# Patient Record
Sex: FEMALE | Race: BLACK OR AFRICAN AMERICAN | Employment: OTHER | URBAN - METROPOLITAN AREA
[De-identification: names, ages, dates, MRNs, and addresses within clinical notes are randomized per-mention and may not be internally consistent; named-entity substitution may affect disease eponyms.]

---

## 2017-07-17 ENCOUNTER — APPOINTMENT (OUTPATIENT)
Dept: CT IMAGING | Age: 77
End: 2017-07-17
Attending: EMERGENCY MEDICINE
Payer: MEDICARE

## 2017-07-17 ENCOUNTER — HOSPITAL ENCOUNTER (OUTPATIENT)
Age: 77
Setting detail: OBSERVATION
Discharge: HOME HEALTH | End: 2017-07-18
Attending: EMERGENCY MEDICINE | Admitting: INTERNAL MEDICINE
Payer: MEDICARE

## 2017-07-17 DIAGNOSIS — S06.5X0A TRAUMATIC SUBDURAL HEMORRHAGE WITHOUT LOSS OF CONSCIOUSNESS, INITIAL ENCOUNTER (HCC): ICD-10-CM
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
## 2017-07-17 LAB
BASOPHILS # BLD AUTO: 0 K/UL (ref 0–0.1)
BASOPHILS # BLD: 0 % (ref 0–1)
EOSINOPHIL # BLD: 0.1 K/UL (ref 0–0.4)
EOSINOPHIL NFR BLD: 1 % (ref 0–7)
ERYTHROCYTE [DISTWIDTH] IN BLOOD BY AUTOMATED COUNT: 14.1 % (ref 11.5–14.5)
HCT VFR BLD AUTO: 35 % (ref 35–47)
HGB BLD-MCNC: 11.4 G/DL (ref 11.5–16)
LYMPHOCYTES # BLD AUTO: 18 % (ref 12–49)
LYMPHOCYTES # BLD: 1.1 K/UL (ref 0.8–3.5)
MCH RBC QN AUTO: 29.5 PG (ref 26–34)
MCHC RBC AUTO-ENTMCNC: 32.6 G/DL (ref 30–36.5)
MCV RBC AUTO: 90.4 FL (ref 80–99)
MONOCYTES # BLD: 0.4 K/UL (ref 0–1)
MONOCYTES NFR BLD AUTO: 7 % (ref 5–13)
NEUTS SEG # BLD: 4.5 K/UL (ref 1.8–8)
NEUTS SEG NFR BLD AUTO: 74 % (ref 32–75)
PLATELET # BLD AUTO: 154 K/UL (ref 150–400)
RBC # BLD AUTO: 3.87 M/UL (ref 3.8–5.2)
WBC # BLD AUTO: 6.1 K/UL (ref 3.6–11)

## 2017-07-17 PROCEDURE — 85025 COMPLETE CBC W/AUTO DIFF WBC: CPT | Performed by: EMERGENCY MEDICINE

## 2017-07-17 PROCEDURE — 85610 PROTHROMBIN TIME: CPT | Performed by: EMERGENCY MEDICINE

## 2017-07-17 PROCEDURE — 70450 CT HEAD/BRAIN W/O DYE: CPT

## 2017-07-17 PROCEDURE — 84484 ASSAY OF TROPONIN QUANT: CPT | Performed by: EMERGENCY MEDICINE

## 2017-07-17 PROCEDURE — 82550 ASSAY OF CK (CPK): CPT | Performed by: EMERGENCY MEDICINE

## 2017-07-17 PROCEDURE — 99285 EMERGENCY DEPT VISIT HI MDM: CPT

## 2017-07-17 PROCEDURE — 36415 COLL VENOUS BLD VENIPUNCTURE: CPT | Performed by: EMERGENCY MEDICINE

## 2017-07-17 PROCEDURE — 85730 THROMBOPLASTIN TIME PARTIAL: CPT | Performed by: EMERGENCY MEDICINE

## 2017-07-17 PROCEDURE — 80053 COMPREHEN METABOLIC PANEL: CPT | Performed by: EMERGENCY MEDICINE

## 2017-07-17 RX ORDER — SODIUM CHLORIDE 0.9 % (FLUSH) 0.9 %
5-10 SYRINGE (ML) INJECTION AS NEEDED
Status: DISCONTINUED | OUTPATIENT
Start: 2017-07-17 | End: 2017-07-18 | Stop reason: HOSPADM

## 2017-07-17 RX ORDER — SODIUM CHLORIDE 0.9 % (FLUSH) 0.9 %
5-10 SYRINGE (ML) INJECTION EVERY 8 HOURS
Status: DISCONTINUED | OUTPATIENT
Start: 2017-07-17 | End: 2017-07-18 | Stop reason: HOSPADM

## 2017-07-17 NOTE — IP AVS SNAPSHOT
[address] [facility] 
[phone] Patient: Bonifacio Padilla MRN: [id number] :1940 You are allergic to the following Allergen Reactions Percocet (Oxycodone-Acetaminophen) Nausea and Vomiting Recent Documentation Weight Breastfeeding? BMI OB Status Smoking Status 62.4 kg No 28.75 kg/m2 Postmenopausal Never Smoker Unresulted Labs Order Current Status CULTURE, MRSA In process Emergency Contacts Name Discharge Info Relation Home Work Mobile Jaki Mercer DISCHARGE CAREGIVER [3] Child [2] [phone] Nedra Bhatia  Daughter [21] [phone] About your hospitalization You were admitted on:  2017 You last received care in the:  [facility] PROGRESSIVE CARE You were discharged on:  2017 Unit phone number:  [phone] Why you were hospitalized Your primary diagnosis was:  Not on File Your diagnoses also included:  Sdh (Subdural Hematoma) (Hcc) Providers Seen During Your Hospitalizations Provider Role Specialty Primary office phone Sol Baires MD Attending Provider Emergency Medicine [phone] Jimbo Spaulding MD Attending Provider Hospitalist [phone] Your Primary Care Physician (PCP) Primary Care Physician Office Phone Office Fax Magy Herb [phone] Follow-up Information Follow up With Details Comments Contact Info Yemi Somers MD On 2017 PCP - follow up at 1:00 PM [address] [place] [address] 
[phone] Addy Boswell 89  will be providing skilled nursing and evaluate for [phone] Renny Lino MD  Neuro surgery - follow up - Rn will call patient to schedule. This would be at the [place] office [address] [facility] 3, [address], [address] [facility] 
[phone] Current Discharge Medication List  
  
START taking these medications Dose & Instructions Dispensing Information Comments Morning Noon Evening Bedtime  
 acetaminophen 500 mg tablet Commonly known as:  TYLENOL Your last dose was: Your next dose is:    
   
   
 Dose:  500 mg Take 1 Tab by mouth every six (6) hours as needed. Quantity:  40 Tab Refills:  0  
     
   
   
   
  
 meclizine 12.5 mg tablet Commonly known as:  ANTIVERT Your last dose was: Your next dose is:    
   
   
 Dose:  12.5 mg Take 1 Tab by mouth three (3) times daily for 5 days. Quantity:  15 Tab Refills:  0  
     
   
   
   
  
 ondansetron hcl 8 mg tablet Commonly known as:  ZOFRAN (AS HYDROCHLORIDE) Your last dose was: Your next dose is:    
   
   
 Dose:  8 mg Take 1 Tab by mouth every eight (8) hours as needed for Nausea. Quantity:  20 Tab Refills:  0 CONTINUE these medications which have CHANGED Dose & Instructions Dispensing Information Comments Morning Noon Evening Bedtime  
 insulin aspart protamine/insulin aspart 100 unit/mL (70-30) injection Commonly known as:  NOVOLOG MIX 70/30 What changed:  additional instructions Your last dose was: Your next dose is:    
   
   
 22 units before breakfast and 22 units before supper Quantity:  20 mL Refills:  10  
     
   
   
   
  
  
CONTINUE these medications which have NOT CHANGED Dose & Instructions Dispensing Information Comments Morning Noon Evening Bedtime  
 albuterol 90 mcg/actuation inhaler Commonly known as:  PROVENTIL HFA, VENTOLIN HFA, PROAIR HFA Your last dose was: Your next dose is:    
   
   
 Dose:  2 Puff Take 2 Puffs by inhalation every four (4) hours as needed for Wheezing. Quantity:  1 Inhaler Refills:  0 amLODIPine 5 mg tablet Commonly known as:  [place] Callie Your last dose was: Your next dose is:    
   
   
 Dose:  5 mg Take 5 mg by mouth daily. Indications: hypertension Refills:  0  
     
   
   
   
  
 chlorpheniramine-HYDROcodone 10-8 mg/5 mL suspension Commonly known as:  Taya Khoury ER Your last dose was: Your next dose is:    
   
   
 Dose:  5 mL Take 5 mL by mouth every twelve (12) hours as needed for Cough. Quantity:  60 mL Refills:  0  
     
   
   
   
  
 cholecalciferol 400 unit Tab tablet Commonly known as:  VITAMIN D3 Your last dose was: Your next dose is: Take  by mouth daily. Refills:  0  
     
   
   
   
  
 co-enzyme Q-10 100 mg capsule Commonly known as:  COQ-10 Your last dose was: Your next dose is:    
   
   
 Dose:  100 mg Take 1 capsule by mouth daily. Quantity:  100 capsule Refills:  2  
     
   
   
   
  
 gabapentin 300 mg capsule Commonly known as:  NEURONTIN Your last dose was: Your next dose is:    
   
   
 Dose:  300 mg Take 300 mg by mouth three (3) times daily as needed. Refills:  0  
     
   
   
   
  
 hydrALAZINE 50 mg tablet Commonly known as:  APRESOLINE Your last dose was: Your next dose is:    
   
   
 Dose:  50 mg Take 50 mg by mouth three (3) times daily. Refills:  0  
     
   
   
   
  
 LASIX 40 mg tablet Generic drug:  furosemide Your last dose was: Your next dose is:    
   
   
 Dose:  60 mg Take 60 mg by mouth two (2) times a day. 1-2 tabs daily Refills:  0  
     
   
   
   
  
 lisinopril 40 mg tablet Commonly known as:  Marietta Martha Your last dose was: Your next dose is:    
   
   
 Dose:  40 mg Take 40 mg by mouth daily. Refills:  0  
     
   
   
   
  
 metoprolol succinate 100 mg tablet Commonly known as:  TOPROL-XL Your last dose was: Your next dose is:    
   
   
 Dose:  100 mg Take 100 mg by mouth daily. Refills:  0 MIRALAX 17 gram packet Generic drug:  polyethylene glycol Your last dose was: Your next dose is:    
   
   
 Dose:  17 g Take 17 g by mouth daily. Indications: constipation Refills:  0  
     
   
   
   
  
 omeprazole 20 mg capsule Commonly known as:  PRILOSEC Your last dose was: Your next dose is:    
   
   
 Dose:  20 mg Take 20 mg by mouth daily. Refills:  0  
     
   
   
   
  
 pramipexole 0.125 mg tablet Commonly known as:  MIRAPEX Your last dose was: Your next dose is:    
   
   
 Dose:  0.125 mg Take 0.125 mg by mouth nightly. Refills:  0  
     
   
   
   
  
 traMADol 50 mg tablet Commonly known as:  ULTRAM  
   
Your last dose was: Your next dose is:    
   
   
 Dose:  50 mg Take 1 Tab by mouth every six (6) hours as needed for Pain. Max Daily Amount: 200 mg. Quantity:  40 Tab Refills:  0 STOP taking these medications   
 aspirin 81 mg chewable tablet HYDROcodone-acetaminophen 7.5-325 mg per tablet Commonly known as:  NORCO  
   
  
 predniSONE 10 mg tablet Commonly known as:  Ancel Clock Where to Get Your Medications Information on where to get these meds will be given to you by the nurse or doctor. ! Ask your nurse or doctor about these medications  
  acetaminophen 500 mg tablet  
 meclizine 12.5 mg tablet  
 ondansetron hcl 8 mg tablet  
 traMADol 50 mg tablet Discharge Instructions HOSPITALIST DISCHARGE INSTRUCTIONS 
NAME: Aline Churchill :  1940 MRN:  [id number] Date/Time:  2017 10:47 AM 
 
ADMIT DATE: 2017 DISCHARGE DATE: 2017 DIAGNOSIS:  Sub Dural Hematomas, DM, HTN, CAD, GERD, PVD, MEDICATIONS: 
  
 
         As per medication reconciliation · It is important that you take the medication exactly as they are prescribed. · Keep your medication in the bottles provided by the pharmacist and keep a list of the medication names, dosages, and times to be taken in your wallet. · Do not take other medications without consulting your doctor. Pain Management: per above medications What to [facility] at [facility] Recommended diet:  Cardiac Diet and Diabetic Diet, NO ASA NOR DERIVATIVES FOR 4 WEEKS (ADVIL, ALEVE, DICLOFENAC, NAPROXEN ETC) Recommended activity: Activity as tolerated and No driving If you experience any of the following symptoms then please call your primary care physician or return to the emergency room if you cannot get hold of your doctor: 
Fever, chills, nausea, vomiting, diarrhea, change in mentation, falling, bleeding, shortness of breath. Follow Up: 
 PCP you are to call and set up an appointment to see them in 2 week. F/U Neuro Surgery Information obtained by : 
I understand that if any problems occur once I am at home I am to contact my physician. I understand and acknowledge receipt of the instructions indicated above. Physician's or R.N.'s Signature                                                                  Date/Time Patient or Representative Signature                                                          Date/Time Discharge Orders None [facility] Announcement We are excited to announce that we are making your provider's discharge notes available to you in [employer].   You will see these notes when they are completed and signed by the physician that discharged you from your recent hospital stay. If you have any questions or concerns about any information you see in Heartbeat, please call the Health Information Department where you were seen or reach out to your Primary Care Provider for more information about your plan of care. Introducing [facility]! Omari Nguyen introduces Heartbeat patient portal. Now you can access parts of your medical record, email your doctor's office, and request medication refills online. 1. In your internet browser, go to [email or web address] 2. Click on the First Time User? Click Here link in the Sign In box. You will see the New Member Sign Up page. 3. Enter your Heartbeat Access Code exactly as it appears below. You will not need to use this code after youve completed the sign-up process. If you do not sign up before the expiration date, you must request a new code. · Heartbeat Access Code: [email or web address] Expires: 10/15/2017 10:53 PM 
 
4. Enter the last four digits of your Social Security Number (xxxx) and Date of Birth (mm/dd/yyyy) as indicated and click Submit. You will be taken to the next sign-up page. 5. Create a Heartbeat ID. This will be your Heartbeat login ID and cannot be changed, so think of one that is secure and easy to remember. 6. Create a Heartbeat password. You can change your password at any time. 7. Enter your Password Reset Question and Answer. This can be used at a later time if you forget your password. 8. Enter your e-mail address. You will receive e-mail notification when new information is available in [address] 9. Click Sign Up. You can now view and download portions of your medical record. 10. Click the Download Summary menu link to download a portable copy of your medical information. If you have questions, please visit the Frequently Asked Questions section of the Heartbeat website. Remember, Heartbeat is NOT to be used for urgent needs. For medical emergencies, dial 911. Now available from your iPhone and Android! General Information Please provide this summary of care documentation to your next provider. Patient Signature:  ____________________________________________________________ Date:  ____________________________________________________________  
  
Sissy Lapine Provider Signature:  ____________________________________________________________ Date:  ____________________________________________________________

## 2017-07-17 NOTE — IP AVS SNAPSHOT
[address] [facility] 
[phone] Patient: Corinne Flores MRN: [id number] :1940 You are allergic to the following Allergen Reactions Percocet (Oxycodone-Acetaminophen) Nausea and Vomiting Recent Documentation Weight Breastfeeding? BMI OB Status Smoking Status 62.4 kg No 28.75 kg/m2 Postmenopausal Never Smoker Unresulted Labs Order Current Status CULTURE, MRSA In process Emergency Contacts Name Discharge Info Relation Home Work Mobile Jaki Mercer DISCHARGE CAREGIVER [3] Child [2] [phone] Mg Hernandes  Daughter [21] [phone] About your hospitalization You were admitted on:  2017 You last received care in the:  [facility] 2 PROGRESSIVE CARE You were discharged on:  2017 Why you were hospitalized Your primary diagnosis was:  Not on File Your diagnoses also included:  Sdh (Subdural Hematoma) (Hcc) Providers Seen During Your Hospitalizations Provider Role Specialty Primary office phone Jesika Hankins MD Attending Provider Emergency Medicine [phone] Lashawn Morris MD Attending Provider Hospitalist [phone] Your Primary Care Physician (PCP) Primary Care Physician Office Phone Office Fax Leesa Sinha [phone] Follow-up Information Follow up With Details Comments Contact Info Zonia White MD On 2017 PCP - follow up at 1:00 PM [address] [facility] [address] [facility] [address] 
[phone] Addy Boswell 103  will be providing skilled nursing and evaluate for [phone] Felicity Domínguez MD  Neuro surgery - follow up - Rn will call patient to schedule. This would be at the [place] office [address] [place] [address], [facility], [address], [address] [place] [address] 
[phone] Current Discharge Medication List  
  
START taking these medications Dose & Instructions Dispensing Information Comments Morning Noon Evening Bedtime  
 acetaminophen 500 mg tablet Commonly known as:  TYLENOL Your last dose was: Your next dose is:    
   
   
 Dose:  500 mg Take 1 Tab by mouth every six (6) hours as needed. Quantity:  40 Tab Refills:  0  
     
   
   
   
  
 meclizine 12.5 mg tablet Commonly known as:  ANTIVERT Your last dose was: Your next dose is:    
   
   
 Dose:  12.5 mg Take 1 Tab by mouth three (3) times daily for 5 days. Quantity:  15 Tab Refills:  0  
     
   
   
   
  
 ondansetron hcl 8 mg tablet Commonly known as:  ZOFRAN (AS HYDROCHLORIDE) Your last dose was: Your next dose is:    
   
   
 Dose:  8 mg Take 1 Tab by mouth every eight (8) hours as needed for Nausea. Quantity:  20 Tab Refills:  0 CONTINUE these medications which have CHANGED Dose & Instructions Dispensing Information Comments Morning Noon Evening Bedtime  
 insulin aspart protamine/insulin aspart 100 unit/mL (70-30) injection Commonly known as:  NOVOLOG MIX 70/30 What changed:  additional instructions Your last dose was: Your next dose is:    
   
   
 22 units before breakfast and 22 units before supper Quantity:  20 mL Refills:  10  
     
   
   
   
  
  
CONTINUE these medications which have NOT CHANGED Dose & Instructions Dispensing Information Comments Morning Noon Evening Bedtime  
 albuterol 90 mcg/actuation inhaler Commonly known as:  PROVENTIL HFA, VENTOLIN HFA, PROAIR HFA Your last dose was: Your next dose is:    
   
   
 Dose:  2 Puff Take 2 Puffs by inhalation every four (4) hours as needed for Wheezing. Quantity:  1 Inhaler Refills:  0  
     
   
   
   
  
 amLODIPine 5 mg tablet Commonly known as:  Arminda Savage Your last dose was: Your next dose is:    
   
   
 Dose:  5 mg Take 5 mg by mouth daily. Indications: hypertension Refills:  0  
     
   
   
   
  
 chlorpheniramine-HYDROcodone 10-8 mg/5 mL suspension Commonly known as:  Bernard Dubin ER Your last dose was: Your next dose is:    
   
   
 Dose:  5 mL Take 5 mL by mouth every twelve (12) hours as needed for Cough. Quantity:  60 mL Refills:  0  
     
   
   
   
  
 cholecalciferol 400 unit Tab tablet Commonly known as:  VITAMIN D3 Your last dose was: Your next dose is: Take  by mouth daily. Refills:  0  
     
   
   
   
  
 co-enzyme Q-10 100 mg capsule Commonly known as:  COQ-10 Your last dose was: Your next dose is:    
   
   
 Dose:  100 mg Take 1 capsule by mouth daily. Quantity:  100 capsule Refills:  2  
     
   
   
   
  
 gabapentin 300 mg capsule Commonly known as:  NEURONTIN Your last dose was: Your next dose is:    
   
   
 Dose:  300 mg Take 300 mg by mouth three (3) times daily as needed. Refills:  0  
     
   
   
   
  
 hydrALAZINE 50 mg tablet Commonly known as:  APRESOLINE Your last dose was: Your next dose is:    
   
   
 Dose:  50 mg Take 50 mg by mouth three (3) times daily. Refills:  0  
     
   
   
   
  
 LASIX 40 mg tablet Generic drug:  furosemide Your last dose was: Your next dose is:    
   
   
 Dose:  60 mg Take 60 mg by mouth two (2) times a day. 1-2 tabs daily Refills:  0  
     
   
   
   
  
 lisinopril 40 mg tablet Commonly known as:  Steph Dowdy Your last dose was: Your next dose is:    
   
   
 Dose:  40 mg Take 40 mg by mouth daily. Refills:  0  
     
   
   
   
  
 metoprolol succinate 100 mg tablet Commonly known as:  TOPROL-XL Your last dose was:     
   
Your next dose is:    
   
   
 Dose:  100 mg  
 Take 100 mg by mouth daily. Refills:  0 MIRALAX 17 gram packet Generic drug:  polyethylene glycol Your last dose was: Your next dose is:    
   
   
 Dose:  17 g Take 17 g by mouth daily. Indications: constipation Refills:  0  
     
   
   
   
  
 omeprazole 20 mg capsule Commonly known as:  PRILOSEC Your last dose was: Your next dose is:    
   
   
 Dose:  20 mg Take 20 mg by mouth daily. Refills:  0  
     
   
   
   
  
 pramipexole 0.125 mg tablet Commonly known as:  MIRAPEX Your last dose was: Your next dose is:    
   
   
 Dose:  0.125 mg Take 0.125 mg by mouth nightly. Refills:  0  
     
   
   
   
  
 traMADol 50 mg tablet Commonly known as:  ULTRAM  
   
Your last dose was: Your next dose is:    
   
   
 Dose:  50 mg Take 1 Tab by mouth every six (6) hours as needed for Pain. Max Daily Amount: 200 mg. Quantity:  40 Tab Refills:  0 STOP taking these medications   
 aspirin 81 mg chewable tablet HYDROcodone-acetaminophen 7.5-325 mg per tablet Commonly known as:  NORCO  
   
  
 predniSONE 10 mg tablet Commonly known as:  [facility] Where to Get Your Medications Information on where to get these meds will be given to you by the nurse or doctor. ! Ask your nurse or doctor about these medications  
  acetaminophen 500 mg tablet  
 meclizine 12.5 mg tablet  
 ondansetron hcl 8 mg tablet  
 traMADol 50 mg tablet Discharge Instructions HOSPITALIST DISCHARGE INSTRUCTIONS 
NAME: Tonie Gillette :  1940 MRN:  [id number] Date/Time:  2017 10:47 AM 
 
ADMIT DATE: 2017 DISCHARGE DATE: 2017 DIAGNOSIS:  Sub Dural Hematomas, DM, HTN, CAD, GERD, PVD, MEDICATIONS: 
  
 
         As per medication reconciliation · It is important that you take the medication exactly as they are prescribed. · Keep your medication in the bottles provided by the pharmacist and keep a list of the medication names, dosages, and times to be taken in your wallet. · Do not take other medications without consulting your doctor. Pain Management: per above medications What to do at [facility] Recommended diet:  Cardiac Diet and Diabetic Diet, NO ASA NOR DERIVATIVES FOR 4 WEEKS (ADVIL, ALEVE, DICLOFENAC, NAPROXEN ETC) Recommended activity: Activity as tolerated and No driving If you experience any of the following symptoms then please call your primary care physician or return to the emergency room if you cannot get hold of your doctor: 
Fever, chills, nausea, vomiting, diarrhea, change in mentation, falling, bleeding, shortness of breath. Follow Up: 
 PCP you are to call and set up an appointment to see them in 2 week. F/U Neuro Surgery Information obtained by : 
I understand that if any problems occur once I am at home I am to contact my physician. I understand and acknowledge receipt of the instructions indicated above. Physician's or R.N.'s Signature                                                                  Date/Time Patient or Representative Signature                                                          Date/Time Discharge Orders None General Information Please provide this summary of care documentation to your next provider. Introducing [facility]! Debbie Delgado introduces [employer] patient portal. Now you can access parts of your medical record, email your doctor's office, and request medication refills online.    
 
1. In your internet browser, go to [email or web address] 2. Click on the First Time User? Click Here link in the Sign In box. You will see the New Member Sign Up page. 3. Enter your PlayMob Access Code exactly as it appears below. You will not need to use this code after youve completed the sign-up process. If you do not sign up before the expiration date, you must request a new code. · PlayMob Access Code: [email or web address] Expires: 10/15/2017 10:53 PM 
 
4. Enter the last four digits of your Social Security Number (xxxx) and Date of Birth (mm/dd/yyyy) as indicated and click Submit. You will be taken to the next sign-up page. 5. Create a PlayMob ID. This will be your PlayMob login ID and cannot be changed, so think of one that is secure and easy to remember. 6. Create a PlayMob password. You can change your password at any time. 7. Enter your Password Reset Question and Answer. This can be used at a later time if you forget your password. 8. Enter your e-mail address. You will receive e-mail notification when new information is available in [address] 9. Click Sign Up. You can now view and download portions of your medical record. 10. Click the Download Summary menu link to download a portable copy of your medical information. If you have questions, please visit the Frequently Asked Questions section of the PlayMob website. Remember, PlayMob is NOT to be used for urgent needs. For medical emergencies, dial 911. Now available from your iPhone and Android! Patient Signature:  ____________________________________________________________ Date:  ____________________________________________________________  
  
Gladys Murcia Provider Signature:  ____________________________________________________________ Date:  ____________________________________________________________

## 2017-07-18 ENCOUNTER — APPOINTMENT (OUTPATIENT)
Dept: CT IMAGING | Age: 77
End: 2017-07-18
Attending: INTERNAL MEDICINE
Payer: MEDICARE

## 2017-07-18 VITALS
RESPIRATION RATE: 20 BRPM | SYSTOLIC BLOOD PRESSURE: 180 MMHG | OXYGEN SATURATION: 98 % | WEIGHT: 137.57 LBS | HEART RATE: 60 BPM | BODY MASS INDEX: 28.75 KG/M2 | TEMPERATURE: 98.2 F | DIASTOLIC BLOOD PRESSURE: 55 MMHG

## 2017-07-18 PROBLEM — S06.5XAA SDH (SUBDURAL HEMATOMA): Status: ACTIVE | Noted: 2017-07-18

## 2017-07-18 LAB
ALBUMIN SERPL BCP-MCNC: 3 G/DL (ref 3.5–5)
ALBUMIN/GLOB SERPL: 0.9 {RATIO} (ref 1.1–2.2)
ALP SERPL-CCNC: 292 U/L (ref 45–117)
ALT SERPL-CCNC: 17 U/L (ref 12–78)
ANION GAP BLD CALC-SCNC: 6 MMOL/L (ref 5–15)
APPEARANCE UR: ABNORMAL
APTT PPP: 30.6 SEC (ref 22.1–32.5)
AST SERPL W P-5'-P-CCNC: 30 U/L (ref 15–37)
ATRIAL RATE: 60 BPM
BACTERIA URNS QL MICRO: NEGATIVE /HPF
BILIRUB SERPL-MCNC: 0.7 MG/DL (ref 0.2–1)
BILIRUB UR QL: NEGATIVE
BUN SERPL-MCNC: 16 MG/DL (ref 6–20)
BUN/CREAT SERPL: 20 (ref 12–20)
CALCIUM SERPL-MCNC: 8.6 MG/DL (ref 8.5–10.1)
CALCULATED R AXIS, ECG10: -109 DEGREES
CALCULATED T AXIS, ECG11: 77 DEGREES
CHLORIDE SERPL-SCNC: 107 MMOL/L (ref 97–108)
CK MB CFR SERPL CALC: 1.4 % (ref 0–2.5)
CK MB SERPL-MCNC: 2.2 NG/ML (ref 5–25)
CK SERPL-CCNC: 158 U/L (ref 26–192)
CO2 SERPL-SCNC: 28 MMOL/L (ref 21–32)
COLOR UR: ABNORMAL
CREAT SERPL-MCNC: 0.82 MG/DL (ref 0.55–1.02)
DIAGNOSIS, 93000: NORMAL
EPITH CASTS URNS QL MICRO: ABNORMAL /LPF
GLOBULIN SER CALC-MCNC: 3.4 G/DL (ref 2–4)
GLUCOSE BLD STRIP.AUTO-MCNC: 144 MG/DL (ref 65–100)
GLUCOSE BLD STRIP.AUTO-MCNC: 162 MG/DL (ref 65–100)
GLUCOSE BLD STRIP.AUTO-MCNC: 195 MG/DL (ref 65–100)
GLUCOSE SERPL-MCNC: 207 MG/DL (ref 65–100)
GLUCOSE UR STRIP.AUTO-MCNC: NEGATIVE MG/DL
HGB UR QL STRIP: ABNORMAL
HYALINE CASTS URNS QL MICRO: ABNORMAL /LPF (ref 0–5)
INR PPP: 1.1 (ref 0.9–1.1)
KETONES UR QL STRIP.AUTO: NEGATIVE MG/DL
LEUKOCYTE ESTERASE UR QL STRIP.AUTO: NEGATIVE
NITRITE UR QL STRIP.AUTO: NEGATIVE
P-R INTERVAL, ECG05: 164 MS
PH UR STRIP: 6 [PH] (ref 5–8)
POTASSIUM SERPL-SCNC: 4 MMOL/L (ref 3.5–5.1)
PROT SERPL-MCNC: 6.4 G/DL (ref 6.4–8.2)
PROT UR STRIP-MCNC: 30 MG/DL
PROTHROMBIN TIME: 10.9 SEC (ref 9–11.1)
Q-T INTERVAL, ECG07: 514 MS
QRS DURATION, ECG06: 172 MS
QTC CALCULATION (BEZET), ECG08: 514 MS
RBC #/AREA URNS HPF: ABNORMAL /HPF (ref 0–5)
SERVICE CMNT-IMP: ABNORMAL
SODIUM SERPL-SCNC: 141 MMOL/L (ref 136–145)
SP GR UR REFRACTOMETRY: 1.03 (ref 1–1.03)
THERAPEUTIC RANGE,PTTT: NORMAL SECS (ref 58–77)
TROPONIN I SERPL-MCNC: <0.04 NG/ML
UA: UC IF INDICATED,UAUC: ABNORMAL
UROBILINOGEN UR QL STRIP.AUTO: 1 EU/DL (ref 0.2–1)
VENTRICULAR RATE, ECG03: 60 BPM
WBC URNS QL MICRO: ABNORMAL /HPF (ref 0–4)

## 2017-07-18 PROCEDURE — 99218 HC RM OBSERVATION: CPT

## 2017-07-18 PROCEDURE — 77030011256 HC DRSG MEPILEX <16IN NO BORD MOLN -A

## 2017-07-18 PROCEDURE — 81001 URINALYSIS AUTO W/SCOPE: CPT | Performed by: EMERGENCY MEDICINE

## 2017-07-18 PROCEDURE — 74011636637 HC RX REV CODE- 636/637: Performed by: INTERNAL MEDICINE

## 2017-07-18 PROCEDURE — 74011250637 HC RX REV CODE- 250/637: Performed by: INTERNAL MEDICINE

## 2017-07-18 PROCEDURE — 70450 CT HEAD/BRAIN W/O DYE: CPT

## 2017-07-18 PROCEDURE — 82962 GLUCOSE BLOOD TEST: CPT

## 2017-07-18 PROCEDURE — 77030032490 HC SLV COMPR SCD KNE COVD -B

## 2017-07-18 PROCEDURE — 65390000012 HC CONDITION CODE 44 OBSERVATION

## 2017-07-18 PROCEDURE — G8978 MOBILITY CURRENT STATUS: HCPCS

## 2017-07-18 PROCEDURE — 97161 PT EVAL LOW COMPLEX 20 MIN: CPT

## 2017-07-18 PROCEDURE — G8979 MOBILITY GOAL STATUS: HCPCS

## 2017-07-18 PROCEDURE — 97116 GAIT TRAINING THERAPY: CPT

## 2017-07-18 PROCEDURE — 93005 ELECTROCARDIOGRAM TRACING: CPT

## 2017-07-18 RX ORDER — NALOXONE HYDROCHLORIDE 0.4 MG/ML
0.4 INJECTION, SOLUTION INTRAMUSCULAR; INTRAVENOUS; SUBCUTANEOUS AS NEEDED
Status: DISCONTINUED | OUTPATIENT
Start: 2017-07-18 | End: 2017-07-18 | Stop reason: HOSPADM

## 2017-07-18 RX ORDER — ONDANSETRON HYDROCHLORIDE 8 MG/1
8 TABLET, FILM COATED ORAL
Qty: 20 TAB | Refills: 0 | Status: SHIPPED | OUTPATIENT
Start: 2017-07-18 | End: 2020-09-03

## 2017-07-18 RX ORDER — HYDRALAZINE HYDROCHLORIDE 20 MG/ML
10 INJECTION INTRAMUSCULAR; INTRAVENOUS
Status: DISCONTINUED | OUTPATIENT
Start: 2017-07-18 | End: 2017-07-18 | Stop reason: HOSPADM

## 2017-07-18 RX ORDER — HYDROCODONE BITARTRATE AND ACETAMINOPHEN 7.5; 325 MG/1; MG/1
1 TABLET ORAL
Status: DISCONTINUED | OUTPATIENT
Start: 2017-07-18 | End: 2017-07-18 | Stop reason: HOSPADM

## 2017-07-18 RX ORDER — GABAPENTIN 300 MG/1
300 CAPSULE ORAL 2 TIMES DAILY
COMMUNITY
End: 2021-04-27 | Stop reason: CLARIF

## 2017-07-18 RX ORDER — MAGNESIUM SULFATE 100 %
4 CRYSTALS MISCELLANEOUS AS NEEDED
Status: DISCONTINUED | OUTPATIENT
Start: 2017-07-18 | End: 2017-07-18 | Stop reason: HOSPADM

## 2017-07-18 RX ORDER — ALBUTEROL SULFATE 90 UG/1
2 AEROSOL, METERED RESPIRATORY (INHALATION)
Status: DISCONTINUED | OUTPATIENT
Start: 2017-07-18 | End: 2017-07-18 | Stop reason: HOSPADM

## 2017-07-18 RX ORDER — INSULIN LISPRO 100 [IU]/ML
INJECTION, SOLUTION INTRAVENOUS; SUBCUTANEOUS
Status: DISCONTINUED | OUTPATIENT
Start: 2017-07-18 | End: 2017-07-18 | Stop reason: HOSPADM

## 2017-07-18 RX ORDER — DEXTROSE 50 % IN WATER (D50W) INTRAVENOUS SYRINGE
12.5-25 AS NEEDED
Status: DISCONTINUED | OUTPATIENT
Start: 2017-07-18 | End: 2017-07-18

## 2017-07-18 RX ORDER — ACETAMINOPHEN 500 MG
500 TABLET ORAL
Status: DISCONTINUED | OUTPATIENT
Start: 2017-07-18 | End: 2017-07-18 | Stop reason: HOSPADM

## 2017-07-18 RX ORDER — PANTOPRAZOLE SODIUM 40 MG/1
40 TABLET, DELAYED RELEASE ORAL DAILY
Status: DISCONTINUED | OUTPATIENT
Start: 2017-07-18 | End: 2017-07-18 | Stop reason: HOSPADM

## 2017-07-18 RX ORDER — METOPROLOL SUCCINATE 50 MG/1
100 TABLET, EXTENDED RELEASE ORAL DAILY
Status: DISCONTINUED | OUTPATIENT
Start: 2017-07-18 | End: 2017-07-18 | Stop reason: HOSPADM

## 2017-07-18 RX ORDER — MECLIZINE HCL 12.5 MG 12.5 MG/1
12.5 TABLET ORAL 3 TIMES DAILY
Qty: 15 TAB | Refills: 0 | Status: SHIPPED | OUTPATIENT
Start: 2017-07-18 | End: 2017-07-23

## 2017-07-18 RX ORDER — DEXTROSE MONOHYDRATE 100 MG/ML
125-250 INJECTION, SOLUTION INTRAVENOUS AS NEEDED
Status: DISCONTINUED | OUTPATIENT
Start: 2017-07-18 | End: 2017-07-18 | Stop reason: HOSPADM

## 2017-07-18 RX ORDER — ACETAMINOPHEN 500 MG
500 TABLET ORAL
Qty: 40 TAB | Refills: 0 | Status: SHIPPED | OUTPATIENT
Start: 2017-07-18 | End: 2018-02-23

## 2017-07-18 RX ORDER — TRAMADOL HYDROCHLORIDE 50 MG/1
50 TABLET ORAL
Qty: 40 TAB | Refills: 0 | Status: SHIPPED | OUTPATIENT
Start: 2017-07-18 | End: 2018-02-05

## 2017-07-18 RX ORDER — POLYETHYLENE GLYCOL 3350 17 G/17G
17 POWDER, FOR SOLUTION ORAL DAILY
COMMUNITY
End: 2021-08-26 | Stop reason: CLARIF

## 2017-07-18 RX ORDER — AMLODIPINE BESYLATE 5 MG/1
5 TABLET ORAL DAILY
Status: ON HOLD | COMMUNITY
End: 2017-08-12

## 2017-07-18 RX ORDER — LISINOPRIL 20 MG/1
40 TABLET ORAL DAILY
Status: DISCONTINUED | OUTPATIENT
Start: 2017-07-18 | End: 2017-07-18 | Stop reason: HOSPADM

## 2017-07-18 RX ORDER — LABETALOL HYDROCHLORIDE 5 MG/ML
10 INJECTION, SOLUTION INTRAVENOUS
Status: DISCONTINUED | OUTPATIENT
Start: 2017-07-18 | End: 2017-07-18 | Stop reason: HOSPADM

## 2017-07-18 RX ORDER — HYDRALAZINE HYDROCHLORIDE 50 MG/1
50 TABLET, FILM COATED ORAL 3 TIMES DAILY
Status: DISCONTINUED | OUTPATIENT
Start: 2017-07-18 | End: 2017-07-18 | Stop reason: HOSPADM

## 2017-07-18 RX ORDER — ONDANSETRON 2 MG/ML
4 INJECTION INTRAMUSCULAR; INTRAVENOUS
Status: DISCONTINUED | OUTPATIENT
Start: 2017-07-18 | End: 2017-07-18 | Stop reason: HOSPADM

## 2017-07-18 RX ADMIN — INSULIN LISPRO 2 UNITS: 100 INJECTION, SOLUTION INTRAVENOUS; SUBCUTANEOUS at 13:05

## 2017-07-18 RX ADMIN — Medication 10 ML: at 06:50

## 2017-07-18 RX ADMIN — Medication 10 ML: at 13:06

## 2017-07-18 RX ADMIN — METOPROLOL SUCCINATE 100 MG: 50 TABLET, EXTENDED RELEASE ORAL at 08:40

## 2017-07-18 RX ADMIN — HYDRALAZINE HYDROCHLORIDE 50 MG: 50 TABLET, FILM COATED ORAL at 08:40

## 2017-07-18 RX ADMIN — ACETAMINOPHEN 500 MG: 500 TABLET ORAL at 06:50

## 2017-07-18 RX ADMIN — LISINOPRIL 40 MG: 20 TABLET ORAL at 08:40

## 2017-07-18 RX ADMIN — PANTOPRAZOLE SODIUM 40 MG: 40 TABLET, DELAYED RELEASE ORAL at 08:40

## 2017-07-18 RX ADMIN — INSULIN LISPRO 9 UNITS: 100 INJECTION, SUSPENSION SUBCUTANEOUS at 08:39

## 2017-07-18 RX ADMIN — INSULIN LISPRO 2 UNITS: 100 INJECTION, SOLUTION INTRAVENOUS; SUBCUTANEOUS at 07:30

## 2017-07-18 RX ADMIN — FUROSEMIDE 60 MG: 40 TABLET ORAL at 08:39

## 2017-07-18 NOTE — ED NOTES
TRANSFER - OUT REPORT:    Verbal report given to [facility]) on Aditi Bruno  being transferred to PCU(unit) for routine progression of care       Report consisted of patients Situation, Background, Assessment and   Recommendations(SBAR). Information from the following report(s) SBAR, Kardex, ED Summary and MAR was reviewed with the receiving nurse. Lines:   Peripheral IV 07/17/17 Left Wrist (Active)   Site Assessment Clean, dry, & intact 7/17/2017 11:53 PM   Phlebitis Assessment 0 7/17/2017 11:53 PM   Infiltration Assessment 0 7/17/2017 11:53 PM   Dressing Status Clean, dry, & intact 7/17/2017 11:53 PM   Dressing Type Transparent 7/17/2017 11:53 PM   Hub Color/Line Status Blue 7/17/2017 11:53 PM   Alcohol Cap Used No 7/17/2017 11:53 PM        Opportunity for questions and clarification was provided.       Patient transported with:   Monitor  Registered Nurse

## 2017-07-18 NOTE — H&P
Hospitalist Admission Note    NAME: Aline Churchill   :  1940   MRN:  [id number]     Date/Time:  2017 3:14 AM    Patient PCP: Fatemeh Galindo MD  ________________________________________________________________________    My assessment of this patient's clinical condition and my plan of care is as follows. Assessment / Plan:  Subdural hematoma, Traumatic s/p GLF  -Transfer from [facility] where patient was undergoing an outpatient vascular procedure with hx of PVD. Prior to to discharge home she fell and hit her head on the left side. OSH CT with 3mm small lenticular subdural hematoma on CT.  -Head CT- Official read currently \"no acute process. \" however ED physician has notified me they have discussed with neurosurgery and there is ~2mm SDH and recommend watching her overnight.  -No focal neuro deficits on current exam.  -Admit to PCU on telemetry  -neuro checks per protocol and stat CT head wo contrast for changes. -BP control to maintain SBP <160. Home BP meds with prn.  -hold ASA  -PT/OT consult  -repeat CT head in am.  -Neurosurgery to follow in am.    DM II  -continue pta 70/30  -accuchecks with SSI    CAD s/p CABG  Ischemic CM  PPM  RBBB  -stable  -telemetery  -Holding ASA with SDH  -continue pta metoprolol, lasix, and lisinopril    GERD  -pta ppi    Code Status: Full  Surrogate Decision Maker: daughter  DVT Prophylaxis: scds  GI Prophylaxis: not indicated  Baseline: ambulatory        Subjective:   CHIEF COMPLAINT: transfer for SDH    HISTORY OF PRESENT ILLNESS:     Aline Churchill is a 64 y.o.  female who presents as a transfer from [facility] for further monitoring following a GLF with 3mm small lenticular subdural hematoma on CT. Patient was having an outpatient left arteriogram with and when preparing to leave the PACU she fell and hit the left side of her head on the floor. No fractures noted on XR's.  No neuro deficits on exam. Repeat CT head at [facility] shows official read of  \"no acute process. \" However, ED physician has notified me they have discussed with neurosurgery and there is ~2mm SDH and recommend watching her overnight. Patient denies headache currently, has some pain around left side of face where she fell. No CP, SOB, N/V, fever/chills. We were asked to admit for work up and evaluation of the above problems. Past Medical History:   Diagnosis Date    Arthritis     CAD (coronary artery disease)     CABG, pacemaker    Chronic pain     COPD     Diabetes ([address]     GERD (gastroesophageal reflux disease)     Hypertension     Ill-defined condition     high cholesterol        Past Surgical History:   Procedure Laterality Date    CARDIAC SURG PROCEDURE UNLIST      3 CABG, Stents    HX PACEMAKER      July 2011    OR COLONOSCOPY FLX DX W/COLLJ SPEC WHEN PFRMD  11/10/2011         OR EGD TRANSORAL BIOPSY SINGLE/MULTIPLE  8/29/2012            Social History   Substance Use Topics    Smoking status: Never Smoker    Smokeless tobacco: Never Used    Alcohol use No        Family History   Problem Relation Age of Onset    Lung Disease Mother     Heart Disease Father     Hypertension Father     Diabetes Sister      Allergies   Allergen Reactions    Percocet [Oxycodone-Acetaminophen] Nausea and Vomiting        Prior to Admission medications    Medication Sig Start Date End Date Taking? Authorizing Provider   hydrALAZINE (APRESOLINE) 50 mg tablet Take 50 mg by mouth three (3) times daily. Historical Provider   HYDROcodone-acetaminophen (NORCO) 7.5-325 mg per tablet Take 1 Tab by mouth every six (6) hours as needed for Pain. Max Daily Amount: 4 Tabs. 3/6/16   Gerald Romberg, DO   albuterol (PROVENTIL HFA, VENTOLIN HFA, PROAIR HFA) 90 mcg/actuation inhaler Take 2 Puffs by inhalation every four (4) hours as needed for Wheezing.  3/2/16   Shahzad Velez MD   chlorpheniramine-HYDROcodone [facility]) 10-8 mg/5 mL suspension Take 5 mL by mouth every twelve (12) hours as needed for Cough. 3/2/16   Emeterio Blanton MD   predniSONE (DELTASONE) 10 mg tablet 4 tabs daily for 2 days   3 tabs daily for 2 days   2 tabs daily for 2 days   1 tab   daily for 2 days 3/2/16   Emeterio Blanton MD   traMADol [facility]) 50 mg tablet Take 50 mg by mouth every six (6) hours as needed for Pain. Omar Hernandez MD   omeprazole (PRILOSEC) 20 mg capsule Take 20 mg by mouth daily. Historical Provider   cholecalciferol (VITAMIN D3) 400 unit tab tablet Take  by mouth daily. Historical Provider   pramipexole (MIRAPEX) 0.125 mg tablet Take 0.125 mg by mouth nightly. Historical Provider   co-enzyme Q-10 (COQ-10) 100 mg capsule Take 1 capsule by mouth daily. 10/23/14   Paul Melendez MD   aspirin 81 mg chewable tablet Take 81 mg by mouth daily. Omar Hernandez MD   insulin aspart protamine (NOVOLOG MIX 70/30) 100 unit/mL (70-30) injection 22 units before breakfast and 22 units before supper  Patient taking differently: 9 units before breakfast and 9 units before supper if BG is below 200 8/1/12   Trinidad Wilson MD   lisinopril (PRINIVIL, ZESTRIL) 40 mg tablet Take 40 mg by mouth daily. Omar Hernandez MD   furosemide (LASIX) 40 mg tablet Take 60 mg by mouth daily. 1-2 tabs daily    Omar Hernandez MD   metoprolol-XL (TOPROL-XL) 100 mg XL tablet Take 100 mg by mouth daily. Omar Hernandez MD       REVIEW OF SYSTEMS:     I am not able to complete the review of systems because:    The patient is intubated and sedated    The patient has altered mental status due to his acute medical problems    The patient has baseline aphasia from prior stroke(s)    The patient has baseline dementia and is not reliable historian    The patient is in acute medical distress and unable to provide information           Total of 12 systems reviewed as follows:       POSITIVE= underlined text  Negative = text not underlined  General:  fever, chills, sweats, generalized weakness, weight loss/gain,      loss of appetite   Eyes:    blurred vision, eye pain, loss of vision, double vision  ENT:    rhinorrhea, pharyngitis   Respiratory:   cough, sputum production, SOB, LE, wheezing, pleuritic pain   Cardiology:   chest pain, palpitations, orthopnea, PND, edema, syncope   Gastrointestinal:  abdominal pain , N/V, diarrhea, dysphagia, constipation, bleeding   Genitourinary:  frequency, urgency, dysuria, hematuria, incontinence   Muskuloskeletal :  arthralgia, myalgia, back pain  Hematology:  easy bruising, nose or gum bleeding, lymphadenopathy   Dermatological: rash, ulceration, pruritis, color change / jaundice  Endocrine:   hot flashes or polydipsia   Neurological:  headache, dizziness, confusion, focal weakness, paresthesia,     Speech difficulties, memory loss, gait difficulty  Psychological: Feelings of anxiety, depression, agitation    Objective:   VITALS:    Visit Vitals    /42 (BP 1 Location: Right arm, BP Patient Position: At rest)    Pulse 60    Temp 98.1 °F (36.7 °C)    Resp 20    SpO2 96%       PHYSICAL EXAM:    General:    Alert, cooperative, no distress, appears stated age. HEENT: Left periorbital swelling. anicteric sclerae, pink conjunctivae     No oral ulcers, mucosa moist, throat clear, dentition fair  Neck:  Supple, symmetrical,  thyroid: non tender  Lungs:   Clear to auscultation bilaterally. No Wheezing or Rhonchi. No rales. Chest wall:  No tenderness  No Accessory muscle use. Heart:   Regular  rhythm,  No  murmur   2+ edema R>L chronic  Abdomen:   Soft, non-tender. Not distended. Bowel sounds normal  Extremities: No cyanosis. No clubbing,      Skin turgor normal, Capillary refill normal, Radial dial pulse 2+  Skin:     Not pale. Not Jaundiced  No rashes   Psych:  Good insight. Not depressed. Not anxious or agitated. Neurologic: EOMs intact. No facial asymmetry. No aphasia or slurred speech. Symmetrical strength, Sensation grossly intact. Alert and oriented X 4. _______________________________________________________________________  Care Plan discussed with:    Comments   Patient y    Family  y  daughter   RN     Care Manager                    Consultant:      _______________________________________________________________________  Expected  Disposition:   Home with Family x   HH/PT/OT/RN    SNF/LTC    QUYNH    ________________________________________________________________________  TOTAL TIME:  61 Minutes    Critical Care Provided     Minutes non procedure based      Comments    x Reviewed previous records   >50% of visit spent in counseling and coordination of care  Discussion with patient and/or family and questions answered       ________________________________________________________________________  Signed: Francisco Panda MD    Procedures: see electronic medical records for all procedures/Xrays and details which were not copied into this note but were reviewed prior to creation of Plan. LAB DATA REVIEWED:    Recent Results (from the past 24 hour(s))   CBC WITH AUTOMATED DIFF    Collection Time: 07/17/17 11:41 PM   Result Value Ref Range    WBC 6.1 3.6 - 11.0 K/uL    RBC 3.87 3.80 - 5.20 M/uL    HGB 11.4 (L) 11.5 - 16.0 g/dL    HCT 35.0 35.0 - 47.0 %    MCV 90.4 80.0 - 99.0 FL    MCH 29.5 26.0 - 34.0 PG    MCHC 32.6 30.0 - 36.5 g/dL    RDW 14.1 11.5 - 14.5 %    PLATELET 716 957 - 882 K/uL    NEUTROPHILS 74 32 - 75 %    LYMPHOCYTES 18 12 - 49 %    MONOCYTES 7 5 - 13 %    EOSINOPHILS 1 0 - 7 %    BASOPHILS 0 0 - 1 %    ABS. NEUTROPHILS 4.5 1.8 - 8.0 K/UL    ABS. LYMPHOCYTES 1.1 0.8 - 3.5 K/UL    ABS. MONOCYTES 0.4 0.0 - 1.0 K/UL    ABS. EOSINOPHILS 0.1 0.0 - 0.4 K/UL    ABS.  BASOPHILS 0.0 0.0 - 0.1 K/UL   METABOLIC PANEL, COMPREHENSIVE    Collection Time: 07/17/17 11:41 PM   Result Value Ref Range    Sodium 141 136 - 145 mmol/L    Potassium 4.0 3.5 - 5.1 mmol/L    Chloride 107 97 - 108 mmol/L    CO2 28 21 - 32 mmol/L    Anion gap 6 5 - 15 mmol/L    Glucose 207 (H) 65 - 100 mg/dL    BUN 16 6 - 20 MG/DL    Creatinine 0.82 0.55 - 1.02 MG/DL    BUN/Creatinine ratio 20 12 - 20      GFR est AA >60 >60 ml/min/1.73m2    GFR est non-AA >60 >60 ml/min/1.73m2    Calcium 8.6 8.5 - 10.1 MG/DL    Bilirubin, total 0.7 0.2 - 1.0 MG/DL    ALT (SGPT) 17 12 - 78 U/L    AST (SGOT) 30 15 - 37 U/L    Alk.  phosphatase 292 (H) 45 - 117 U/L    Protein, total 6.4 6.4 - 8.2 g/dL    Albumin 3.0 (L) 3.5 - 5.0 g/dL    Globulin 3.4 2.0 - 4.0 g/dL    A-G Ratio 0.9 (L) 1.1 - 2.2     PROTHROMBIN TIME + INR    Collection Time: 07/17/17 11:41 PM   Result Value Ref Range    INR 1.1 0.9 - 1.1      Prothrombin time 10.9 9.0 - 11.1 sec   PTT    Collection Time: 07/17/17 11:41 PM   Result Value Ref Range    aPTT 30.6 22.1 - 32.5 sec    aPTT, therapeutic range     58.0 - 77.0 SECS   CK W/ CKMB & INDEX    Collection Time: 07/17/17 11:41 PM   Result Value Ref Range     26 - 192 U/L    CK - MB 2.2 <3.6 NG/ML    CK-MB Index 1.4 0 - 2.5     TROPONIN I    Collection Time: 07/17/17 11:41 PM   Result Value Ref Range    Troponin-I, Qt. <0.04 <0.05 ng/mL   EKG, 12 LEAD, INITIAL    Collection Time: 07/18/17 12:03 AM   Result Value Ref Range    Ventricular Rate 60 BPM    Atrial Rate 60 BPM    P-R Interval 164 ms    QRS Duration 172 ms    Q-T Interval 514 ms    QTC Calculation (Bezet) 514 ms    Calculated R Axis -109 degrees    Calculated T Axis 77 degrees    Diagnosis       ** Suspect arm lead reversal, interpretation assumes no reversal  Atrial-paced rhythm  Right bundle branch block  Anterior infarct , age undetermined  T wave abnormality, consider lateral ischemia  When compared with ECG of 28-FEB-2016 15:02,  Electronic atrial pacemaker has replaced Electronic ventricular pacemaker     URINALYSIS W/ REFLEX CULTURE    Collection Time: 07/18/17 12:53 AM   Result Value Ref Range    Color YELLOW/STRAW      Appearance CLOUDY (A) CLEAR      Specific gravity 1.027 1.003 - 1.030      pH (UA) 6.0 5.0 - 8.0      Protein 30 (A) NEG mg/dL    Glucose NEGATIVE  NEG mg/dL    Ketone NEGATIVE  NEG mg/dL    Bilirubin NEGATIVE  NEG      Blood SMALL (A) NEG      Urobilinogen 1.0 0.2 - 1.0 EU/dL    Nitrites NEGATIVE  NEG      Leukocyte Esterase NEGATIVE  NEG      WBC 0-4 0 - 4 /hpf    RBC 5-10 0 - 5 /hpf    Epithelial cells FEW FEW /lpf    Bacteria NEGATIVE  NEG /hpf    UA:UC IF INDICATED CULTURE NOT INDICATED BY UA RESULT CNI      Hyaline cast 0-2 0 - 5 /lpf

## 2017-07-18 NOTE — DISCHARGE INSTRUCTIONS
HOSPITALIST DISCHARGE INSTRUCTIONS  NAME: Aditi Bruno   :  1940   MRN:  [id number]     Date/Time:  2017 10:47 AM    ADMIT DATE: 2017     DISCHARGE DATE: 2017     DIAGNOSIS:  Sub Dural Hematomas, DM, HTN, CAD, GERD, PVD,     MEDICATIONS:                As per medication reconciliation    · It is important that you take the medication exactly as they are prescribed. · Keep your medication in the bottles provided by the pharmacist and keep a list of the medication names, dosages, and times to be taken in your wallet. · Do not take other medications without consulting your doctor. Pain Management: per above medications    What to do at Home    Recommended diet:  Cardiac Diet and Diabetic Diet, NO ASA NOR DERIVATIVES FOR 4 WEEKS (ADVIL, ALEVE, DICLOFENAC, NAPROXEN ETC)    Recommended activity: Activity as tolerated and No driving     If you experience any of the following symptoms then please call your primary care physician or return to the emergency room if you cannot get hold of your doctor:  Fever, chills, nausea, vomiting, diarrhea, change in mentation, falling, bleeding, shortness of breath. Follow Up:   PCP you are to call and set up an appointment to see them in 2 week. F/U Neuro Surgery      Information obtained by :  I understand that if any problems occur once I am at home I am to contact my physician. I understand and acknowledge receipt of the instructions indicated above.                                                                                                                                            Physician's or R.N.'s Signature                                                                  Date/Time                                                                                                                                              Patient or Representative Signature                                                          Date/Time

## 2017-07-18 NOTE — ED PROVIDER NOTES
HPI Comments: Tashia Gaona is a 64 y.o. female, with PMHx significant for CAD, HTN, DM, COPD, arthritis, GERD and HDL, who presents via EMS to the ED as transferred from [facility] for further evaluation of subdural hematoma as noted on CT scan s/p GLF PTA. Per daughter, pt had L arteriogram at [facility] this afternoon. Daughter states pt was preparing to leave PACU, when she was walking with a nurse, fell, and hit her head. Pt currently c/o a mild L sided HA, but is otherwise without complaint at this time. Pt denies the use of anticoagulants. Per I[facility] Laughlin 9 chart review, pt noted to have 3mm small lenticular subdural hematoma on CT following fall. Pt specifically denies any recent fever, chills, nausea, vomiting, diarrhea, CP, SOB, lightheadedness, dizziness, numbness, weakness, tingling, BLE swelling, heart palpitations, urinary sxs, changes in BM, changes in PO intake, melena, hematochezia, cough, or congestion. PSHx of Stents, pacemaker,     Social hx: - Tobacco use, - EtOH use, - Illicit drug use    PCP: Mary Hernandez MD    There are no other complaints, changes or physical findings at this time. The history is provided by the patient and a relative. No  was used.         Past Medical History:   Diagnosis Date    Arthritis     CAD (coronary artery disease)     CABG, pacemaker    Chronic pain     COPD     Diabetes ([address]     GERD (gastroesophageal reflux disease)     Hypertension     Ill-defined condition     high cholesterol       Past Surgical History:   Procedure Laterality Date    CARDIAC SURG PROCEDURE UNLIST      3 CABG, Stents    HX PACEMAKER      July 2011    [facility] COLONOSCOPY FLX DX W/COLLJ [facility] WHEN PFRMD  11/10/2011         UT EGD TRANSORAL BIOPSY SINGLE/MULTIPLE  8/29/2012              Family History:   Problem Relation Age of Onset    Lung Disease Mother     Heart Disease Father     Hypertension Father     Diabetes Sister Social History     Social History    Marital status:      Spouse name: N/A    Number of children: N/A    Years of education: N/A     Occupational History    Not on file. Social History Main Topics    Smoking status: Never Smoker    Smokeless tobacco: Never Used    Alcohol use No    Drug use: No    Sexual activity: Not on file     Other Topics Concern    Not on file     Social History Narrative         ALLERGIES: Percocet [oxycodone-acetaminophen]    Review of Systems   Constitutional: Negative for chills and fever. HENT: Negative for congestion, ear pain, rhinorrhea and sore throat. Respiratory: Negative for cough and shortness of breath. Cardiovascular: Negative for chest pain, palpitations and leg swelling. Gastrointestinal: Negative for abdominal pain, constipation, diarrhea, nausea and vomiting. No melena  No hematochezia   Endocrine: Negative for polyuria. Genitourinary: Negative for dysuria, frequency and hematuria. Neurological: Positive for headaches. Negative for dizziness, weakness, light-headedness and numbness. No tingling   All other systems reviewed and are negative. Vitals:    07/17/17 2250   BP: 166/54   Pulse: 60   Resp: 16   Temp: 98.1 °F (36.7 °C)   SpO2: 95%            Physical Exam   Nursing note and vitals reviewed.   General appearance - well nourished, well appearing, and in no distress  Eyes - pupils equal and reactive, extraocular eye movements intact  ENT - mucous membranes moist, pharynx normal without lesions  Neck - supple, no significant adenopathy; non-tender to palpation  Chest - clear to auscultation, no wheezes, rales or rhonchi; non-tender to palpation, pacemaker placed in L upper chest wall  Heart - normal rate and regular rhythm, S1 and S2 normal, no murmurs noted  Abdomen - soft, nontender, nondistended, no masses or organomegaly  Musculoskeletal - no joint tenderness, deformity or swelling; normal ROM  Extremities - peripheral pulses normal, no pedal edema  Skin - normal coloration and turgor, no rashes, well healed sternotomy incision, bandage over L groin from surgery earlier today  Neurological - alert, oriented x3, normal speech, no focal findings or movement disorder noted  Psych - drowsy      MDM  Number of Diagnoses or Management Options  Fall, initial encounter:   Traumatic subdural hemorrhage without loss of consciousness, initial encounter [facility]):      Amount and/or Complexity of Data Reviewed  Clinical lab tests: ordered and reviewed  Tests in the radiology section of CPT®: ordered and reviewed  Tests in the medicine section of CPT®: ordered and reviewed  Obtain history from someone other than the patient: yes (Daughter)  Review and summarize past medical records: yes  Discuss the patient with other providers: yes (Neurosurgery / Hospitalist)  Independent visualization of images, tracings, or specimens: yes        Procedures    EKG interpretation: (Preliminary) 0010  Rhythm: paced rhythm. Rate (approx.): 60bpm; Axis: left axis deviation; Normal MO; prolonged QTc intervals; ST/T wave: non-specific changes; Other findings: possible ischemia. Written by BOUCHRA Gan, as dictated by Antonina Tejeda MD    CONSULT NOTE:  1:26 AM  Antonina Tejeda MD spoke with Dr. Selene Denson,  Specialty: Neurosurgery  Discussed patient's hx, disposition, and available diagnostic and imaging results. Reviewed care plans. Consultant does not advise surgical intervention at this time, please see his notes for further details. CONSULT NOTE:   1:56 AM  Joyce Vila MD spoke with Danyell Fang MD,   Specialty: Hospitalist  Discussed pt's hx, disposition, and available diagnostic and imaging results. Reviewed care plans. Consultant will evaluate pt for admission.   Written by BOUCHRA Gan, as dictated by Joyce Vila MD      LABORATORY TESTS:  Recent Results (from the past 12 hour(s))   CBC WITH AUTOMATED DIFF    Collection Time: 07/17/17 11:41 PM   Result Value Ref Range    WBC 6.1 3.6 - 11.0 K/uL    RBC 3.87 3.80 - 5.20 M/uL    HGB 11.4 (L) 11.5 - 16.0 g/dL    HCT 35.0 35.0 - 47.0 %    MCV 90.4 80.0 - 99.0 FL    MCH 29.5 26.0 - 34.0 PG    MCHC 32.6 30.0 - 36.5 g/dL    RDW 14.1 11.5 - 14.5 %    PLATELET 692 625 - 711 K/uL    NEUTROPHILS 74 32 - 75 %    LYMPHOCYTES 18 12 - 49 %    MONOCYTES 7 5 - 13 %    EOSINOPHILS 1 0 - 7 %    BASOPHILS 0 0 - 1 %    ABS. NEUTROPHILS 4.5 1.8 - 8.0 K/UL    ABS. LYMPHOCYTES 1.1 0.8 - 3.5 K/UL    ABS. MONOCYTES 0.4 0.0 - 1.0 K/UL    ABS. EOSINOPHILS 0.1 0.0 - 0.4 K/UL    ABS. BASOPHILS 0.0 0.0 - 0.1 K/UL   METABOLIC PANEL, COMPREHENSIVE    Collection Time: 07/17/17 11:41 PM   Result Value Ref Range    Sodium 141 136 - 145 mmol/L    Potassium 4.0 3.5 - 5.1 mmol/L    Chloride 107 97 - 108 mmol/L    CO2 28 21 - 32 mmol/L    Anion gap 6 5 - 15 mmol/L    Glucose 207 (H) 65 - 100 mg/dL    BUN 16 6 - 20 MG/DL    Creatinine 0.82 0.55 - 1.02 MG/DL    BUN/Creatinine ratio 20 12 - 20      GFR est AA >60 >60 ml/min/1.73m2    GFR est non-AA >60 >60 ml/min/1.73m2    Calcium 8.6 8.5 - 10.1 MG/DL    Bilirubin, total 0.7 0.2 - 1.0 MG/DL    ALT (SGPT) 17 12 - 78 U/L    AST (SGOT) 30 15 - 37 U/L    Alk.  phosphatase 292 (H) 45 - 117 U/L    Protein, total 6.4 6.4 - 8.2 g/dL    Albumin 3.0 (L) 3.5 - 5.0 g/dL    Globulin 3.4 2.0 - 4.0 g/dL    A-G Ratio 0.9 (L) 1.1 - 2.2     PROTHROMBIN TIME + INR    Collection Time: 07/17/17 11:41 PM   Result Value Ref Range    INR 1.1 0.9 - 1.1      Prothrombin time 10.9 9.0 - 11.1 sec   PTT    Collection Time: 07/17/17 11:41 PM   Result Value Ref Range    aPTT 30.6 22.1 - 32.5 sec    aPTT, therapeutic range     58.0 - 77.0 SECS   CK W/ CKMB & INDEX    Collection Time: 07/17/17 11:41 PM   Result Value Ref Range     26 - 192 U/L    CK - MB 2.2 <3.6 NG/ML    CK-MB Index 1.4 0 - 2.5     TROPONIN I    Collection Time: 07/17/17 11:41 PM   Result Value Ref Range Troponin-I, Qt. <0.04 <0.05 ng/mL   EKG, 12 LEAD, INITIAL    Collection Time: 07/18/17 12:03 AM   Result Value Ref Range    Ventricular Rate 60 BPM    Atrial Rate 60 BPM    P-R Interval 164 ms    QRS Duration 172 ms    Q-T Interval 514 ms    QTC Calculation (Bezet) 514 ms    Calculated R Axis -109 degrees    Calculated T Axis 77 degrees    Diagnosis       ** Suspect arm lead reversal, interpretation assumes no reversal  Atrial-paced rhythm  Right bundle branch block  Anterior infarct , age undetermined  T wave abnormality, consider lateral ischemia  When compared with ECG of 28-FEB-2016 15:02,  Electronic atrial pacemaker has replaced Electronic ventricular pacemaker     URINALYSIS W/ REFLEX CULTURE    Collection Time: 07/18/17 12:53 AM   Result Value Ref Range    Color YELLOW/STRAW      Appearance CLOUDY (A) CLEAR      Specific gravity 1.027 1.003 - 1.030      pH (UA) 6.0 5.0 - 8.0      Protein 30 (A) NEG mg/dL    Glucose NEGATIVE  NEG mg/dL    Ketone NEGATIVE  NEG mg/dL    Bilirubin NEGATIVE  NEG      Blood SMALL (A) NEG      Urobilinogen 1.0 0.2 - 1.0 EU/dL    Nitrites NEGATIVE  NEG      Leukocyte Esterase NEGATIVE  NEG      WBC 0-4 0 - 4 /hpf    RBC 5-10 0 - 5 /hpf    Epithelial cells FEW FEW /lpf    Bacteria NEGATIVE  NEG /hpf    UA:UC IF INDICATED CULTURE NOT INDICATED BY UA RESULT CNI      Hyaline cast 0-2 0 - 5 /lpf       IMAGING RESULTS:    CT Results  (Last 48 hours)    EXAM:  CT HEAD WO CONT     INDICATION:   fall, subdural     COMPARISON: 6/8/2014.     CONTRAST:  None.     TECHNIQUE: Unenhanced CT of the head was performed using 5 mm images. Brain and  bone windows were generated. CT dose reduction was achieved through use of a  standardized protocol tailored for this examination and automatic exposure  control for dose modulation.       FINDINGS:  The ventricles and sulci are normal in size, shape and configuration and  midline. There is no significant white matter disease.  Incidental calcifications  are seen in the basal ganglia and dentate nucleus. There is no intracranial  hemorrhage, extra-axial collection, mass, mass effect or midline shift. The  basilar cisterns are open. No acute infarct is identified. The bone windows  demonstrate no abnormalities. The visualized portions of the paranasal sinuses  and mastoid air cells are clear.     IMPRESSION  IMPRESSION: No acute process. -------------------------------------------------------------------------------------------------------------------------------------------    Laurence Harrell MD   brittany Jul 18, 2017 12:48:24 AM EDT         Addendum: There is a convex area of hyperdensity along the right cerebral  convexity on series 2 image 19. This measures 2 mm in thickness and 12 mm long. This likely represents a small subdural hematoma. This is new since 6/8/2014.      This was discussed with Dr. Marko Cassidy at 12:45 AM on 7/18/2017. MEDICATIONS GIVEN:  Medications   sodium chloride (NS) flush 5-10 mL (not administered)   sodium chloride (NS) flush 5-10 mL (not administered)       IMPRESSION:  1. Fall, initial encounter    2. Traumatic subdural hemorrhage without loss of consciousness, initial encounter ([facility] [address]        PLAN:  1. Admit to hospitalist    ADMISSION NOTE:  2:00 AM  Patient is being admitted to the hospital by Dr. Duglas Santacruz. The results of their tests and reasons for their admission have been discussed with them and/or available family. They convey agreement and understanding for the need to be admitted and for their admission diagnosis. Written by Kassy Maldonado, ED Scribe, as dictated by Joyce Negron MD.    ATTESTATION:  This note is prepared by Davis Oshea, acting as Scribe for Joyce Negron MD.    Sravanthi Mckinley MD: The scribe's documentation has been prepared under my direction and personally reviewed by me in its entirety.  I confirm that the note above accurately reflects all work, treatment, procedures, and medical decision making performed by me. This note will not be viewable in [address]

## 2017-07-18 NOTE — DISCHARGE SUMMARY
Hospitalist Discharge Summary     Patient ID:  Cheri Maciel  [id number]  49 y.o.  1940    PCP on record: Amador Hatchet, MD    Admit date: 7/17/2017  Discharge date and time: 7/18/2017      DISCHARGE DIAGNOSIS:    Sub Dural Hematomas, DM, HTN, CAD, GERD, PVD,       CONSULTATIONS:  IP CONSULT TO NEUROSURGERY    Excerpted HPI from H&P of Jerrica Kang MD:  Cheri Maciel is a 64 y.o.  female who presents as a transfer from [address] [place] [address] for further monitoring following a GLF with 3mm small lenticular subdural hematoma on CT. Patient was having an outpatient left arteriogram with and when preparing to leave the PACU she fell and hit the left side of her head on the floor. No fractures noted on XR's. No neuro deficits on exam. Repeat CT head at [facility] shows official read of  \"no acute process. \" However, ED physician has notified me they have discussed with neurosurgery and there is ~2mm SDH and recommend watching her overnight. Patient denies headache currently, has some pain around left side of face where she fell. No CP, SOB, N/V, fever/chills. We were asked to admit for work up and evaluation of the above problems. ______________________________________________________________________  DISCHARGE SUMMARY/HOSPITAL COURSE:  for full details see H&P, daily progress notes, labs, consult notes. Subdural hematoma, Traumatic s/p GLF  -Transfer from [address] [place] [address] where patient was undergoing an outpatient vascular procedure with hx of PVD. Prior to to discharge home she fell and hit her head on the left side.  OSH CT with 3mm small lenticular subdural hematoma on CT.  -Head CT- Official read currently \"no acute process. \" however ED physician has notified me they have discussed with neurosurgery and there is ~2mm SDH and recommend watching her overnight. Repeated CT head no change on SDH, as per NS can D/c home.   DM II  -continue pta 70/30  -accuchecks with SSI   CAD s/p CABG  Ischemic CM  PPM  RBBB  -stable  -telemetery  -Holding ASA with SDH  -continue pta metoprolol, lasix, and lisinopril  GERD  -pta ppi  Code Status: Full  Surrogate Decision Maker: daughter  DVT Prophylaxis: scds  GI Prophylaxis: not indicated  Baseline: ambulatory    D/c Home with Home health services and F/U with PCP and Neuro Surgery as outpatient  _______________________________________________________________________  Patient seen and examined by me on discharge day. Pertinent Findings:  Gen:    Not in distress  Chest: Clear lungs  CVS:   Regular rhythm. No edema  Abd:  Soft, not distended, not tender  Neuro:  Alert, GCS 15, no gross focal deficits    Home Health Care Discharge Planning: [facility]  Face to Face Encounter      NAME: Michael Wilkerson   :  1940   MRN:  [id number]     Primary Diagnosis: Sub Dural Hematomas, DM, HTN, CAD, GERD, PVD,     Date of Face to Face:  2017 10:46 AM                                  Face to Face Encounter findings are related to primary reason for home care:   YES    1. I certify that the patient needs intermittent skilled nursing care, physical therapy and/or speech therapy. I will not be following this patient in the Community and Dr. Bimal Vazquez MD will be responsible for signing the [address] of Care. 2. Initial Orders for Care: Physical Therapy and Occupational Therapy    3. I certify that this patient is homebound because of illness or injury, need the aid of supportive devices such as crutches, canes, wheelchairs, and walkers; the use of special transportation; or the assistance of another person in order to leave their place of residence. There exists a normal inability to leave home and leaving home requires a considerable and taxing effort. 4. I certify that this patient is under my care and that I had a Face-to-Face Encounter that meets the physician Face-to-Face Encounter requirements.   Document the physical findings from the Face-to-Face Encounter that support the need for skilled services: Has new diagnosis that requires skilled nursing teaching and intervention , Has new medications that requires skilled nursing teaching and monitoring for understanding and compliance , Needs skilled safety assessment and interventions  and Has new finding of weakness and altered mobility that requires skilled physical/occupational and/or speech therapy services for evaluation and interventions. Patient is weak and frail, poor balance and endurance, high risk for falls    Vikas Callahan MD  Discharging Physician  Office: [phone]  Fax:   [phone]      _______________________________________________________________________  DISCHARGE MEDICATIONS:   Current Discharge Medication List      START taking these medications    Details   acetaminophen (TYLENOL) 500 mg tablet Take 1 Tab by mouth every six (6) hours as needed. Qty: 40 Tab, Refills: 0      ondansetron hcl (ZOFRAN, AS HYDROCHLORIDE,) 8 mg tablet Take 1 Tab by mouth every eight (8) hours as needed for Nausea. Qty: 20 Tab, Refills: 0      meclizine (ANTIVERT) 12.5 mg tablet Take 1 Tab by mouth three (3) times daily for 5 days. Qty: 15 Tab, Refills: 0         CONTINUE these medications which have CHANGED    Details   traMADol (ULTRAM) 50 mg tablet Take 1 Tab by mouth every six (6) hours as needed for Pain. Max Daily Amount: 200 mg. Qty: 40 Tab, Refills: 0         CONTINUE these medications which have NOT CHANGED    Details   amLODIPine (NORVASC) 5 mg tablet Take 5 mg by mouth daily. Indications: hypertension      polyethylene glycol (MIRALAX) 17 gram packet Take 17 g by mouth daily. Indications: constipation      cholecalciferol (VITAMIN D3) 400 unit tab tablet Take  by mouth daily. Associated Diagnoses: Leg pain, central, right; Gait difficulty      co-enzyme Q-10 (COQ-10) 100 mg capsule Take 1 capsule by mouth daily.   Qty: 100 capsule, Refills: 2 Associated Diagnoses: Leg pain, central, right; Gait difficulty      insulin aspart protamine (NOVOLOG MIX 70/30) 100 unit/mL (70-30) injection 22 units before breakfast and 22 units before supper  Qty: 20 mL, Refills: 10      lisinopril (PRINIVIL, ZESTRIL) 40 mg tablet Take 40 mg by mouth daily. furosemide (LASIX) 40 mg tablet Take 60 mg by mouth two (2) times a day. 1-2 tabs daily       metoprolol-XL (TOPROL-XL) 100 mg XL tablet Take 100 mg by mouth daily. gabapentin (NEURONTIN) 300 mg capsule Take 300 mg by mouth three (3) times daily as needed. hydrALAZINE (APRESOLINE) 50 mg tablet Take 50 mg by mouth three (3) times daily. albuterol (PROVENTIL HFA, VENTOLIN HFA, PROAIR HFA) 90 mcg/actuation inhaler Take 2 Puffs by inhalation every four (4) hours as needed for Wheezing. Qty: 1 Inhaler, Refills: 0      chlorpheniramine-HYDROcodone (TUSSIONEX PENNKINETIC ER) 10-8 mg/5 mL suspension Take 5 mL by mouth every twelve (12) hours as needed for Cough. Qty: 60 mL, Refills: 0      omeprazole (PRILOSEC) 20 mg capsule Take 20 mg by mouth daily. Associated Diagnoses: Leg pain, central, right; Gait difficulty      pramipexole (MIRAPEX) 0.125 mg tablet Take 0.125 mg by mouth nightly. Associated Diagnoses: Leg pain, central, right; Gait difficulty         STOP taking these medications       aspirin 81 mg chewable tablet Comments:   Reason for Stopping:         HYDROcodone-acetaminophen (NORCO) 7.5-325 mg per tablet Comments:   Reason for Stopping:         predniSONE (DELTASONE) 10 mg tablet Comments:   Reason for Stopping:               My Recommended Diet, Activity, Wound Care, and follow-up labs are listed in the patient's Discharge Insturctions which I have personally completed and reviewed.     _______________________________________________________________________  DISPOSITION:    Home with Family:    Home with HH/PT/OT/RN: y   SNF/LTC:    QUYNH:    OTHER:        Condition at Discharge: Stable  _______________________________________________________________________  Follow up with:   PCP : Sanna Dumont MD  Follow-up Information     Follow up With Details Comments [address], [place]   [address] Maria T Rivera   [phone]        F/U PCP  F/U Neuro Surgery        Total time in minutes spent coordinating this discharge (includes going over instructions, follow-up, prescriptions, and preparing report for sign off to her PCP) :  35 minutes    Signed:  Migdalia Aguirre MD

## 2017-07-18 NOTE — CONSULTS
[address]   [address] [place] [address]       Name:  Roya White   MR#:  [id number]   :  1940   Account #:  [de-identified]    Date of Consultation:  2017   Date of Adm:  2017       DATE OF CONSULTATION:  2017    REASON FOR CONSULTATION:  Subdural hematoma. HISTORY OF PRESENT ILLNESS:  The patient is a 77-year-old   [facility] American female. She is having an outpatient   vascular procedure for a history of PVD in [address]. She was   walking in her bathroom and fell, hit her head on the left side. She   does not remember what happened or how it happened. CT scan   revealed a 3 mm small lenticular subdural hematoma. She was   transferred to [facility] for evaluation. A head CT scan was   repeated which showed a 2 mm subdural hematoma. She has   remained awake and alert with no focal deficits. She says she feels a   little funny and has had on and off headaches. Because of the subdural   hematoma, I was called for evaluation. PAST MEDICAL HISTORY:    1. Arthritis. 2.  Coronary artery disease. 3.  Chronic pain. 4.  COPD. 5.  Diabetes. 6.  GERD. 7.  Hypertension. 8.  Cholesterol. SOCIAL HISTORY:  Does not smoke, does not drink. FAMILY HISTORY:  Lung disease, heart disease, hypertension in   mother and father. ALLERGIES:  PERCOCET. MEDICATIONS:    1. Hydralazine. 2.  Hydrocodone. 3.  Proventil. 4.  Tussionex. 6.  Ultram.   7.  Prilosec. REVIEW OF SYSTEMS:  No nausea, vomiting, fever or chills. No   chest pain or shortness of breath. The rest of the 10 systems were   reviewed and negative except for back pain which has been chronic   and longstanding. PHYSICAL EXAMINATION    GENERAL:  Shows a well developed, well nourished female sitting in   no acute distress.     VITAL SIGNS:  Temperature 98.4, pulse 60, blood pressure 179/53,   respirations 18, saturation 92% on room air. HEENT:  Normocephalic, atraumatic. Pupils are equal, round and   reactive to light. Extraocular movements are intact. Face is   symmetric. Tongue and uvula midline. Shoulder shrug is normal.    NEUROLOGIC:  Strength is 5/5 in upper and lower extremities. Sensation is grossly intact to light touch. Deep tendon reflexes are   intact. Gait was not tested. She is awake, alert and oriented x3. Her   speech is fluent. Adequate recent and remote memory. Adequate fund   of knowledge. X-RAYS:  CT scan as above shows a 2 mm right sided hyperdensity   consistent with a subdural hematoma. IMPRESSION:  Small subdural hematoma. PLAN:  I discussed the findings with the patient and her daughter, who   I actually know as I have operated on her Zunilda Bassett). She did   have a repeat study which did not show any further growth of the   subdural hematoma. She has otherwise remained neurologically   intact. Will treat this conservatively. She does not need a surgery. She can go when it is okay with the primary team.  She does not need   followup unless she is having a problem.           Xiomara Jameson MD MM / Cristy Mallory   D:  07/18/2017   08:49   T:  07/18/2017   10:29   Job #:  [id number]

## 2017-07-18 NOTE — ED NOTES
Bedside and Verbal shift change report given to [facility] (oncoming nurse) by Russel Phalen (offgoing nurse). Report included the following information SBAR.

## 2017-07-18 NOTE — PROGRESS NOTES
Bedside shift change report given to Autumn Koenig RN  (oncoming nurse) by Param Lozano RN (offgoing nurse). Report included the following information SBAR, Kardex, Intake/Output, MAR, Med Rec Status, Cardiac Rhythm . and Alarm Parameters .

## 2017-07-18 NOTE — FACE TO FACE
Home Health Care Discharge Planning: [facility]  Face to Face Encounter      NAME: Omar Adair   :  1940   MRN:  [id number]     Primary Diagnosis: Sub Dural Hematomas, DM, HTN, CAD, GERD, PVD,     Date of Face to Face:  2017 10:46 AM                                  Face to Face Encounter findings are related to primary reason for home care:   YES    1. I certify that the patient needs intermittent skilled nursing care, physical therapy and/or speech therapy. I will not be following this patient in the Community and Dr. Jada Steiner MD will be responsible for signing the [address] of Care. 2. Initial Orders for Care: Physical Therapy and Occupational Therapy    3. I certify that this patient is homebound because of illness or injury, need the aid of supportive devices such as crutches, canes, wheelchairs, and walkers; the use of special transportation; or the assistance of another person in order to leave their place of residence. There exists a normal inability to leave home and leaving home requires a considerable and taxing effort. 4. I certify that this patient is under my care and that I had a Face-to-Face Encounter that meets the physician Face-to-Face Encounter requirements. Document the physical findings from the Face-to-Face Encounter that support the need for skilled services: Has new diagnosis that requires skilled nursing teaching and intervention , Has new medications that requires skilled nursing teaching and monitoring for understanding and compliance , Needs skilled safety assessment and interventions  and Has new finding of weakness and altered mobility that requires skilled physical/occupational and/or speech therapy services for evaluation and interventions.    Patient is weak and frail, poor balance and endurance, high risk for falls    Gabriela Molina MD  Discharging Physician  Office: [phone]  Fax:   [phone]

## 2017-07-18 NOTE — PROGRESS NOTES
Cm acknowledged discharge order home  with [facility] and face 2 face for [facility], PT/OT    Patient's family is present in the room. Patient confirmed name  and demographics. HH already serviced by [facility]. Patient is on room air. DME - RW and cane. Patient has O2 at home that she uses \"as needed\". Normal is provider. HH order submitted via ecin and faxed manually to [facility]. Confirmed they can provide soc on 2017. Appts scheduled or noted on discharge summary. Care Management Interventions  PCP Verified by CM: Yes  Mode of Transport at Discharge:  Other (see comment) (family)  Transition of Care Consult (CM Consult): Home Health ([address] [place] [address] - INPATIENT: No  Reason Outside [place]: Patient already serviced by other home care/hospice agency  Discharge Durable Medical Equipment: No  Physical Therapy Consult: Yes  Occupational Therapy Consult: No  Speech Therapy Consult: No  Current Support Network: Lives with Spouse, Has Personal Caregivers  Confirm Follow Up Transport: Family  Plan discussed with Pt/Family/Caregiver: Yes  Discharge Location  Discharge Placement: Home with home health    Susanne Rubio  [id number]

## 2017-07-18 NOTE — WOUND CARE
Wound care consulted to see this patient for her left lower leg wound that was present on admission. Pt. Has had a long journey with chronic wound due to peripheral arterial disease. She has never smoked. She has CAD and CABG with arythmias. The left leg has a small pink wound that is partial thickness but draining some yellow fluid (not pus). The wound was cleansed with NS and then a CHG wipe that was allowed to dry on the skin. Foam dressing applied to the wound. The wound is pink and healing well. The partial thickness wounds on the right leg are also healing well and are dressed with zinc oxide to protect the skin.    Bobby Law RN, BSN, [employer]

## 2017-07-18 NOTE — ED NOTES
Assumed care of pt from EMS. Pt arrives from [facility] for evaluation of subdural hematoma. Daughter reports pt had outpatient imaging done today and was being assisted to bathroom at the completion of the study when she fell and struck her head on the commode. Bruising noted to upper gum, dentures removed PTA. Skin tear to right knee. Given 0.5mg Ativan IM and Tylenol PO at [facility] for anxiety and pain. Alert and oriented upon arrival. Positioned for comfort on stretcher. Call bell within reach. Daughter at bedside.

## 2017-07-18 NOTE — CONSULTS
Pt seen and examined, full consult to follow  Fall, transferred in.  2mm subdural, no mass effect or shift. Stable neurologically. No surgery needed. May go from my standpoint. No follow-up needed from me.     Dragan Qiu MD

## 2017-07-18 NOTE — PROGRESS NOTES
Problem: Mobility Impaired (Adult and Pediatric)  Goal: *Acute Goals and Plan of Care (Insert Text)  Physical Therapy Goals  Initiated 7/18/2017  1. Patient will move from supine to sit and sit to supine , scoot up and down and roll side to side in bed with independence within 7 day(s). 2. Patient will transfer from bed to chair and chair to bed with supervision/set-up using the least restrictive device within 7 day(s). 3. Patient will perform sit to stand with supervision/set-up within 7 day(s). 4. Patient will ambulate with minimal assistance/contact guard assist for 150 feet with the least restrictive device within 7 day(s). 5. Patient will ascend/descend 4 stairs with 1 handrail(s) with minimal assistance/contact guard assist within 7 day(s). PHYSICAL THERAPY EVALUATION  Patient: Chanda Crowley (64 y.o. female)  Date: 7/18/2017  Primary Diagnosis: SDH (subdural hematoma) (HCC)  SDH (subdural hematoma) (HCC)        Precautions: Fall         ASSESSMENT :  Based on the objective data described below, the patient presents with mildly decreased balance/strength/activity tolerance, overall SBA to min assist for all mobility. RA VSS. Pt's dtr lives with her & is available to assist as needed. .     Patient will benefit from skilled intervention to address the above impairments.   Patients rehabilitation potential is considered to be Good  Factors which may influence rehabilitation potential include:   [X]         None noted  [ ]         Mental ability/status  [ ]         Medical condition  [ ]         Home/family situation and support systems  [ ]         Safety awareness  [ ]         Pain tolerance/management  [ ]         Other:        PLAN :  Recommendations and Planned Interventions:  [X]           Bed Mobility Training             [ ]    Neuromuscular Re-Education  [X]           Transfer Training                   [ ]    Orthotic/Prosthetic Training  [X]           Gait Training                         [ ] Modalities  [X]           Therapeutic Exercises           [ ]    Edema Management/Control  [X]           Therapeutic Activities            [X]    Patient and Family Training/Education  [ ]           Other (comment):     Frequency/Duration: Patient will be followed by physical therapy  4 times a week to address goals. Discharge Recommendations: Home Health  Further Equipment Recommendations for Discharge: Pt has RW, cane       SUBJECTIVE:   Patient stated I want to get up. I've been in bed too long.       OBJECTIVE DATA SUMMARY:   HISTORY:    Past Medical History:   Diagnosis Date    Arthritis      CAD (coronary artery disease)       CABG, pacemaker    Chronic pain      COPD      Diabetes ([address]      GERD (gastroesophageal reflux disease)      Hypertension      Ill-defined condition       high cholesterol     Past Surgical History:   Procedure Laterality Date    CARDIAC SURG PROCEDURE UNLIST         3 CABG, Stents    HX PACEMAKER         July 2011    NE COLONOSCOPY FLX DX W/COLLJ SPEC WHEN PFRMD   11/10/2011          NE EGD TRANSORAL BIOPSY SINGLE/MULTIPLE   8/29/2012           Prior Level of Function/Home Situation: Independent with cane or RW  Personal factors and/or comorbidities impacting plan of care:      Home Situation  Home Environment: Private residence  # Steps to Enter: 4 with rails   One/Two Story Residence: One story  Living Alone: No  Support Systems: Family member(s)  Patient Expects to be Discharged to[de-identified] Private residence  Current DME Used/Available at Home: Glucometer, Cane, straight, Walker, Blood pressure cuff, Raised toilet seat     EXAMINATION/PRESENTATION/DECISION MAKING:   Critical Behavior:  Neurologic State: Alert  Orientation Level: Oriented X4  Cognition: Follows commands     Hearing:   Auditory  Auditory Impairment: Hard of hearing, bilateral     Range Of Motion:  AROM: Generally decreased, functional           PROM: Generally decreased, functional           Strength: Strength: Generally decreased, functional; no focal weakness                    Tone & Sensation:   Tone: Normal              Sensation: Impaired (Diabetic neuropathy)               Coordination:  Coordination: Generally decreased, functional        Functional Mobility:  Bed Mobility:  Rolling: Stand-by asssistance; Additional time  Supine to Sit: Minimum assistance; Additional time     Scooting: Stand-by asssistance; Additional time  Transfers:  Sit to Stand: Contact guard assistance; Additional time  Stand to Sit: Contact guard assistance; Additional time                       Balance:   Sitting: Intact  Standing: Impaired; With support (Fair with RW or HHA)  Ambulation/Gait Training:  Distance (ft): 25 Feet (ft)  Assistive Device:  (HHA)  Ambulation - Level of Assistance: Minimal assistance (HHA)        Gait Abnormalities: Decreased step clearance        Base of Support: Narrowed        Step Length: Left shortened;Right shortened     Functional Measure:  Love Balance Test:      Sitting to Standin  Standing Unsupported: 2  Sitting with Back Unsupported: 4  Standing to Sitting: 3  Transfers: 3  Standing Unsupported with Eyes Closed: 1  Standing Unsupported with Feet Together: 0  Reach Forward with Outstretched Arm: 1   Object: 0  Turn to Look Over Shoulders: 2  Turn 360 Degrees: 1  Alternate Foot on Step/Stool: 0  Standing Unsupported One Foot in Front: 0  Stand on One Le  Total: 19             56=Maximum possible score;   0-20=High fall risk  21-40=Moderate fall risk   41-56=Low fall risk      Love Balance Test and G-code impairment scale:  Percentage of Impairment CH     0%    CI     1-19% CJ     20-39% CK     40-59% CL     60-79% CM     80-99% CN      100%   Love   Score 0-56 56 45-55 34-44 23-33 12-22 1-11 0            G codes:   In compliance with CMSs Claims Based Outcome Reporting, the following G-code set was chosen for this patient based on their primary functional limitation being treated: The outcome measure chosen to determine the severity of the functional limitation was the Liz with a score of 19/56  which was correlated with the impairment scale. · Mobility - Walking and Moving Around:               - CURRENT STATUS:    CL - 60%-79% impaired, limited or restricted               - GOAL STATUS:           CK - 40%-59% impaired, limited or restricted               - D/C STATUS:                       ---------------To be determined---------------      Physical Therapy Evaluation Charge Determination   History Examination Presentation Decision-Making   LOW Complexity : Zero comorbidities / personal factors that will impact the outcome / POC LOW Complexity : 1-2 Standardized tests and measures addressing body structure, function, activity limitation and / or participation in recreation  LOW Complexity : Stable, uncomplicated  LOW Complexity : FOTO score of       Based on the above components, the patient evaluation is determined to be of the following complexity level: LOW      Pain:  Pain Scale 1: Numeric (0 - 10)  Pain Intensity 1: 0  Pain Location 1: Head  Pain Orientation 1: Left; Anterior  Pain Description 1: Aching  Pain Intervention(s) 1: Rest  Activity Tolerance:   Fair with RA VSS  Please refer to the flowsheet for vital signs taken during this treatment. After treatment:   [X]         Patient left in no apparent distress sitting up in chair  [ ]         Patient left in no apparent distress in bed  [X]         Call bell left within reach  [X]         Nursing notified  [X]         Caregiver present  [ ]         Bed alarm activated      COMMUNICATION/EDUCATION:   The patients plan of care was discussed with: Registered Nurse and . [X]         Fall prevention education was provided and the patient/caregiver indicated understanding. [X]         Patient/family have participated as able in goal setting and plan of care.   [X]         Patient/family agree to work toward stated goals and plan of care. [ ]         Patient understands intent and goals of therapy, but is neutral about his/her participation. [ ]         Patient is unable to participate in goal setting and plan of care.      Thank you for this referral.  Em Pierre, PT   Time Calculation: 25 mins

## 2017-07-18 NOTE — PROGRESS NOTES
TRANSFER - IN REPORT:    Verbal report received from [facility] RN(name) on Sofy Aiken  being received from ED(unit) for routine progression of care      Report consisted of patients Situation, Background, Assessment and   Recommendations(SBAR). Information from the following report(s) SBAR, Kardex, MAR, Recent Results and Cardiac Rhythm NSR was reviewed with the receiving nurse. Opportunity for questions and clarification was provided. Assessment completed upon patients arrival to unit and care assumed. Primary Nurse Colleen Kingsley RN and Ender Oliva RN performed a dual skin assessment on this patient Impairment noted- see wound doc flow sheet  Gerald score is 18. Bedside shift change report given to Sharad OH (oncoming nurse) by Colleen Kingsley RN (offgoing nurse). Report included the following information SBAR, Kardex, MAR, Recent Results and Cardiac Rhythm paced.

## 2017-07-18 NOTE — PROGRESS NOTES
5020-0076 takes pills crushed with applesauce; Dr Deon Jacobo in to see pt; pt went to CT by transport via stretcher; c/o dizziness; daughter Tyler Nguyễn at bedside with pt. The PT/OT to work with pt this AM too. 6873-2970 denies dizziness in bed, Dr Corrie Jarrett is aware and told pt and her daughter due to the bleed in her head she will have some dizziness from time to time, (repeat of head CT - no changes); follow up in on week after discharge today per Dr Adri Coello. 1119-4275 attempt top have BM on bedpan, only voided urine; PT/OT in room with pt; reports headache; .  [id number] wound care nurse saw pt; case management worked with pt and family on discharge needs; reported leg cramps intermittently; walked to bathroom to void. 3150-7147 reviewed discharge paperwork, which pt and her family verbalized understanding of med list, when to take what next and what f/u apt she has. 1545 transferred via wheelchair by volunteer to front entrance for discharge with her family.

## 2017-07-19 ENCOUNTER — PATIENT OUTREACH (OUTPATIENT)
Dept: ENDOCRINOLOGY | Age: 77
End: 2017-07-19

## 2017-07-19 LAB
BACTERIA SPEC CULT: NORMAL
BACTERIA SPEC CULT: NORMAL
SERVICE CMNT-IMP: NORMAL

## 2017-07-19 NOTE — PROGRESS NOTES
Late entry -  Patient/family informed CM that O2 provider was [facility] and that they have not been able to contact the provider. CM sent inquiry via [employer] this morning to Carline Ryder on behalf of patient. 1124am - [facility] confirmed they provide service. Phone number provided to patient by [employer]. [phone]  CM spoke directly with patient.     [address] Ezra Ray RN CM  Ext [phone]

## 2017-07-19 NOTE — PROGRESS NOTES
Tonie Gillette is a 64 y.o. female   This patient was received as a referral from High Risk Report   Summary of patients top three medical problems:     Problem 1: Fall  Patient had a ground level fall after completing a vascular procedure for PVD. Patient fell on left side of face. Patient states a \"little\" headache, but no dizziness or fainting. Patient states no SOB, difficulty breathing or chest pain. Patient states no numbness or tingling. Patient complains of right knee pain and swelling. Patient  called PCP, Dr. Pablito Rodriguez office today, but no response yet. NN advised patient to call PCP again and schedule an appointment for her right knee pain and hospital follow up. Patient does have a schedule PCP appointment for 8/8/17. NN reinforced patient education on fall precautions. Patient states agreement and understanding. Problem 2: Headache/SDH  Patient complain of a \"little\" headache, but no dizziness, fainting, numbness or tingling. Patient states has Tylenol for pain and it helps. NN advised patient not to take any NSAIDS medications, like Advil or Aleve. NN encouraged patient to follow up with PCP for recent hospitalization and fall. Patient states agreement and understanding. NN competed medication reconciliation with patient. Patient states taking all medications with no problems or concerns. Problem 3: Possible depression/fear of Falling  Patient has good family support and a relationship with PCP office. Patient  has daughters with her today. Patient  has home health, [facility] who visit today, 7/19/17 and has a regular schedule. NN reinforced patient education on fall precautions with ambulating. Patient seemed in good spirits today. No other problems or concerns noted.     Patient's challenges to self management identified:  depression, medication management and PCP relationship  Patients motivational level on a scale of 0-10: 7  Medication Management:  good adherence and good understanding  Advance Care Planning:   Patient was offered the opportunity to discuss advance care planning:  no     Does patient have an Advance Directive:  unknown   If no, did you provide information on Advance Care Planning?  no     [employer], Referrals, and Durable Medical Equipment: [address] [facility] [facility], 7/19/17. Patient uses a walker with ambulation. Follow up appointments:  PCP on 8/8/17, per neurology note, no follow up needed. Goals Addressed      Attends follow-up appointments as directed. 7/19/17  Patient will scheduled a follow up appointment with PCP within the next two days. YHW          Patient verbalized understanding of all information discussed. Patient has this Nurse Navigators contact information for any further questions, concerns, or needs. A written copy of this care plan was will be mailed to the patient.

## 2017-07-20 ENCOUNTER — PATIENT OUTREACH (OUTPATIENT)
Dept: ENDOCRINOLOGY | Age: 77
End: 2017-07-20

## 2017-07-20 NOTE — PROGRESS NOTES
Goals Addressed      Attends follow-up appointments as directed. 17  Patient will scheduled a follow up appointment with PCP within the next two days. [facility]    17  NN called Dr. Kb Henderson office at [facility]. Spoke to Kim Villagomez, verified patient's name, address and . Kim Villagomez states patient was originally scheduled for hospital follow up on 17. NN asked for physician's availability. No appointment for today, 17 or tomorrow, 17, but has an opening on Monday, 2017 at 2:30 pm.  [facility]    NN called patient, verified patient's name, address and . Patients still having right knee pain and swelling, but did not call PCP office for appointment. NN confirmed patient availability for PCP appointment scheduled on 17.   [facility]

## 2017-07-26 ENCOUNTER — PATIENT OUTREACH (OUTPATIENT)
Dept: ENDOCRINOLOGY | Age: 77
End: 2017-07-26

## 2017-07-26 NOTE — PROGRESS NOTES
Goals Addressed      Attends follow-up appointments as directed. 17  Patient will scheduled a follow up appointment with PCP within the next two days. [facility]    17  NN called Dr. Pablito Rodriguez office at [facility]. Spoke to Constantin Salazar, verified patient's name, address and . Constantin Salazar states patient was originally scheduled for hospital follow up on 17. NN asked for physician's availability. No appointment for today, 17 or tomorrow, 17, but has an opening on 2017 at 2:30 pm.  [facility]    NN called patient, verified patient's name, address and . Patients still having right knee pain and swelling, but did not call PCP office for appointment. NN confirmed patient availability for PCP appointment scheduled on 17. YHW    17  Spoke to patient today, verified patient's name, address and . Patient states still having right knee pain and swelling. YW    Patient states attended PCP follow up on 17. Patient states received an x-ray of the right knee and Tylenol for pain. Patient states will get a call with results of x-ray from PCP. Patient states Dr. Rj Wong advised to rest, elevate, use ice to help reduce swelling and extra-strength Tylenol for pain. Patient states still using walker with ambulation. NN encouraged patient to use precautions with walking and movement. Patient states agreement and understanding. YHW    Patient states home health visits twice weekly.   [facility]

## 2017-08-08 ENCOUNTER — PATIENT OUTREACH (OUTPATIENT)
Dept: CASE MANAGEMENT | Age: 77
End: 2017-08-08

## 2017-08-09 ENCOUNTER — HOSPITAL ENCOUNTER (INPATIENT)
Age: 77
LOS: 2 days | Discharge: HOME HEALTH | DRG: 683 | End: 2017-08-12
Attending: FAMILY MEDICINE | Admitting: INTERNAL MEDICINE
Payer: MEDICARE

## 2017-08-09 ENCOUNTER — APPOINTMENT (OUTPATIENT)
Dept: GENERAL RADIOLOGY | Age: 77
DRG: 683 | End: 2017-08-09
Attending: FAMILY MEDICINE
Payer: MEDICARE

## 2017-08-09 ENCOUNTER — APPOINTMENT (OUTPATIENT)
Dept: CT IMAGING | Age: 77
DRG: 683 | End: 2017-08-09
Attending: FAMILY MEDICINE
Payer: MEDICARE

## 2017-08-09 DIAGNOSIS — N17.9 AKI (ACUTE KIDNEY INJURY) (HCC): ICD-10-CM

## 2017-08-09 DIAGNOSIS — E86.0 DEHYDRATION: ICD-10-CM

## 2017-08-09 DIAGNOSIS — R42 DIZZINESS: Primary | ICD-10-CM

## 2017-08-09 LAB
ALBUMIN SERPL BCP-MCNC: 3.6 G/DL (ref 3.4–5)
ALBUMIN/GLOB SERPL: 0.8 {RATIO} (ref 0.8–1.7)
ALP SERPL-CCNC: 294 U/L (ref 45–117)
ALT SERPL-CCNC: 17 U/L (ref 13–56)
ANION GAP BLD CALC-SCNC: 7 MMOL/L (ref 3–18)
APPEARANCE UR: CLEAR
AST SERPL W P-5'-P-CCNC: 27 U/L (ref 15–37)
BACTERIA URNS QL MICRO: NEGATIVE /HPF
BASOPHILS # BLD AUTO: 0 K/UL (ref 0–0.06)
BASOPHILS # BLD: 0 % (ref 0–2)
BILIRUB SERPL-MCNC: 0.4 MG/DL (ref 0.2–1)
BILIRUB UR QL: NEGATIVE
BNP SERPL-MCNC: 3656 PG/ML (ref 0–1800)
BUN SERPL-MCNC: 29 MG/DL (ref 7–18)
BUN/CREAT SERPL: 16 (ref 12–20)
CALCIUM SERPL-MCNC: 9.2 MG/DL (ref 8.5–10.1)
CHLORIDE SERPL-SCNC: 104 MMOL/L (ref 100–108)
CK MB CFR SERPL CALC: NORMAL % (ref 0–4)
CK MB SERPL-MCNC: <1 NG/ML (ref 5–25)
CK SERPL-CCNC: 93 U/L (ref 26–192)
CO2 SERPL-SCNC: 29 MMOL/L (ref 21–32)
COLOR UR: YELLOW
CREAT SERPL-MCNC: 1.85 MG/DL (ref 0.6–1.3)
DIFFERENTIAL METHOD BLD: ABNORMAL
EOSINOPHIL # BLD: 0.1 K/UL (ref 0–0.4)
EOSINOPHIL NFR BLD: 1 % (ref 0–5)
EPITH CASTS URNS QL MICRO: NORMAL /LPF (ref 0–5)
ERYTHROCYTE [DISTWIDTH] IN BLOOD BY AUTOMATED COUNT: 14.5 % (ref 11.6–14.5)
GLOBULIN SER CALC-MCNC: 4.3 G/DL (ref 2–4)
GLUCOSE BLD STRIP.AUTO-MCNC: 218 MG/DL (ref 70–110)
GLUCOSE BLD STRIP.AUTO-MCNC: 50 MG/DL (ref 70–110)
GLUCOSE SERPL-MCNC: 64 MG/DL (ref 74–99)
GLUCOSE UR STRIP.AUTO-MCNC: NEGATIVE MG/DL
HCT VFR BLD AUTO: 35.2 % (ref 35–45)
HGB BLD-MCNC: 11.2 G/DL (ref 12–16)
HGB UR QL STRIP: NEGATIVE
INR PPP: 1.1 (ref 0.8–1.2)
KETONES UR QL STRIP.AUTO: NEGATIVE MG/DL
LEUKOCYTE ESTERASE UR QL STRIP.AUTO: NEGATIVE
LYMPHOCYTES # BLD AUTO: 14 % (ref 21–52)
LYMPHOCYTES # BLD: 0.8 K/UL (ref 0.9–3.6)
MCH RBC QN AUTO: 28.5 PG (ref 24–34)
MCHC RBC AUTO-ENTMCNC: 31.8 G/DL (ref 31–37)
MCV RBC AUTO: 89.6 FL (ref 74–97)
MONOCYTES # BLD: 0.3 K/UL (ref 0.05–1.2)
MONOCYTES NFR BLD AUTO: 6 % (ref 3–10)
NEUTS SEG # BLD: 4.5 K/UL (ref 1.8–8)
NEUTS SEG NFR BLD AUTO: 79 % (ref 40–73)
NITRITE UR QL STRIP.AUTO: NEGATIVE
PH UR STRIP: 6.5 [PH] (ref 5–8)
PLATELET # BLD AUTO: 208 K/UL (ref 135–420)
PMV BLD AUTO: 9.7 FL (ref 9.2–11.8)
POTASSIUM SERPL-SCNC: 5.3 MMOL/L (ref 3.5–5.5)
PROT SERPL-MCNC: 7.9 G/DL (ref 6.4–8.2)
PROT UR STRIP-MCNC: 100 MG/DL
PROTHROMBIN TIME: 14 SEC (ref 11.5–15.2)
RBC # BLD AUTO: 3.93 M/UL (ref 4.2–5.3)
RBC #/AREA URNS HPF: NORMAL /HPF (ref 0–5)
SODIUM SERPL-SCNC: 140 MMOL/L (ref 136–145)
SP GR UR REFRACTOMETRY: 1.01 (ref 1–1.03)
TROPONIN I SERPL-MCNC: <0.02 NG/ML (ref 0–0.06)
UROBILINOGEN UR QL STRIP.AUTO: 1 EU/DL (ref 0.2–1)
WBC # BLD AUTO: 5.7 K/UL (ref 4.6–13.2)
WBC URNS QL MICRO: NORMAL /HPF (ref 0–5)

## 2017-08-09 PROCEDURE — 96375 TX/PRO/DX INJ NEW DRUG ADDON: CPT

## 2017-08-09 PROCEDURE — 81001 URINALYSIS AUTO W/SCOPE: CPT | Performed by: FAMILY MEDICINE

## 2017-08-09 PROCEDURE — 74011000250 HC RX REV CODE- 250: Performed by: FAMILY MEDICINE

## 2017-08-09 PROCEDURE — 74011000258 HC RX REV CODE- 258: Performed by: FAMILY MEDICINE

## 2017-08-09 PROCEDURE — 82962 GLUCOSE BLOOD TEST: CPT

## 2017-08-09 PROCEDURE — 96365 THER/PROPH/DIAG IV INF INIT: CPT

## 2017-08-09 PROCEDURE — 83880 ASSAY OF NATRIURETIC PEPTIDE: CPT | Performed by: FAMILY MEDICINE

## 2017-08-09 PROCEDURE — 85025 COMPLETE CBC W/AUTO DIFF WBC: CPT | Performed by: FAMILY MEDICINE

## 2017-08-09 PROCEDURE — 99285 EMERGENCY DEPT VISIT HI MDM: CPT

## 2017-08-09 PROCEDURE — 74022 RADEX COMPL AQT ABD SERIES: CPT

## 2017-08-09 PROCEDURE — 96361 HYDRATE IV INFUSION ADD-ON: CPT

## 2017-08-09 PROCEDURE — 74011250636 HC RX REV CODE- 250/636: Performed by: FAMILY MEDICINE

## 2017-08-09 PROCEDURE — 82550 ASSAY OF CK (CPK): CPT | Performed by: FAMILY MEDICINE

## 2017-08-09 PROCEDURE — 51701 INSERT BLADDER CATHETER: CPT

## 2017-08-09 PROCEDURE — 93005 ELECTROCARDIOGRAM TRACING: CPT

## 2017-08-09 PROCEDURE — 80053 COMPREHEN METABOLIC PANEL: CPT | Performed by: FAMILY MEDICINE

## 2017-08-09 PROCEDURE — 85610 PROTHROMBIN TIME: CPT | Performed by: FAMILY MEDICINE

## 2017-08-09 PROCEDURE — 70450 CT HEAD/BRAIN W/O DYE: CPT

## 2017-08-09 RX ORDER — DEXTROSE 50 % IN WATER (D50W) INTRAVENOUS SYRINGE
25
Status: COMPLETED | OUTPATIENT
Start: 2017-08-09 | End: 2017-08-09

## 2017-08-09 RX ORDER — ONDANSETRON 2 MG/ML
INJECTION INTRAMUSCULAR; INTRAVENOUS
Status: DISPENSED
Start: 2017-08-09 | End: 2017-08-10

## 2017-08-09 RX ORDER — ONDANSETRON 2 MG/ML
4 INJECTION INTRAMUSCULAR; INTRAVENOUS ONCE
Status: COMPLETED | OUTPATIENT
Start: 2017-08-09 | End: 2017-08-09

## 2017-08-09 RX ADMIN — PROMETHAZINE HYDROCHLORIDE 12.5 MG: 25 INJECTION, SOLUTION INTRAMUSCULAR; INTRAVENOUS at 23:03

## 2017-08-09 RX ADMIN — DEXTROSE MONOHYDRATE 25 G: 25 INJECTION, SOLUTION INTRAVENOUS at 22:05

## 2017-08-09 RX ADMIN — SODIUM CHLORIDE 1000 ML: 900 INJECTION, SOLUTION INTRAVENOUS at 20:08

## 2017-08-09 RX ADMIN — ONDANSETRON 4 MG: 2 INJECTION INTRAMUSCULAR; INTRAVENOUS at 20:08

## 2017-08-09 NOTE — IP AVS SNAPSHOT
[address] [address] 
[phone] Patient: Adam Pelaez MRN: [id number] :1940 Current Discharge Medication List  
  
START taking these medications Dose & Instructions Dispensing Information Comments Morning Noon Evening Bedtime  
 docusate sodium 100 mg capsule Commonly known as:  Steven Wesley Your last dose was: Your next dose is:    
   
   
 Dose:  100 mg Take 1 Cap by mouth two (2) times a day. Quantity:  60 Cap Refills:  0 CONTINUE these medications which have CHANGED Dose & Instructions Dispensing Information Comments Morning Noon Evening Bedtime  
 amLODIPine 10 mg tablet Commonly known as:  Hemant Bowser What changed:   
- medication strength 
- how much to take Your last dose was: Your next dose is:    
   
   
 Dose:  10 mg Take 1 Tab by mouth daily. Indications: hypertension Quantity:  30 Tab Refills:  0  
     
   
   
   
  
 furosemide 40 mg tablet Commonly known as:  LASIX What changed:   
- how much to take - when to take this 
- additional instructions Your last dose was: Your next dose is:    
   
   
 Dose:  40 mg Take 1 Tab by mouth daily. Quantity:  30 Tab Refills:  0  
     
   
   
   
  
 insulin aspart protamine/insulin aspart 100 unit/mL (70-30) injection Commonly known as:  NOVOLOG MIX 70/30 What changed:  additional instructions Your last dose was: Your next dose is:    
   
   
 22 units before breakfast and 22 units before supper Quantity:  20 mL Refills:  10  
     
   
   
   
  
  
CONTINUE these medications which have NOT CHANGED Dose & Instructions Dispensing Information Comments Morning Noon Evening Bedtime  
 acetaminophen 500 mg tablet Commonly known as:  TYLENOL Your last dose was:     
   
Your next dose is:    
   
   
 Dose:  500 mg  
 Take 1 Tab by mouth every six (6) hours as needed. Quantity:  40 Tab Refills:  0  
     
   
   
   
  
 albuterol 90 mcg/actuation inhaler Commonly known as:  PROVENTIL HFA, VENTOLIN HFA, PROAIR HFA Your last dose was: Your next dose is:    
   
   
 Dose:  2 Puff Take 2 Puffs by inhalation every four (4) hours as needed for Wheezing. Quantity:  1 Inhaler Refills:  0  
     
   
   
   
  
 cholecalciferol 400 unit Tab tablet Commonly known as:  VITAMIN D3 Your last dose was: Your next dose is: Take  by mouth daily. Refills:  0  
     
   
   
   
  
 co-enzyme Q-10 100 mg capsule Commonly known as:  COQ-10 Your last dose was: Your next dose is:    
   
   
 Dose:  100 mg Take 1 capsule by mouth daily. Quantity:  100 capsule Refills:  2  
     
   
   
   
  
 gabapentin 300 mg capsule Commonly known as:  NEURONTIN Your last dose was: Your next dose is:    
   
   
 Dose:  300 mg Take 300 mg by mouth three (3) times daily as needed. Refills:  0  
     
   
   
   
  
 metoprolol succinate 100 mg tablet Commonly known as:  TOPROL-XL Your last dose was: Your next dose is:    
   
   
 Dose:  100 mg Take 100 mg by mouth daily. Refills:  0 MIRALAX 17 gram packet Generic drug:  polyethylene glycol Your last dose was: Your next dose is:    
   
   
 Dose:  17 g Take 17 g by mouth daily. Indications: constipation Refills:  0  
     
   
   
   
  
 omeprazole 20 mg capsule Commonly known as:  PRILOSEC Your last dose was: Your next dose is:    
   
   
 Dose:  20 mg Take 20 mg by mouth daily. Refills:  0  
     
   
   
   
  
 ondansetron hcl 8 mg tablet Commonly known as:  ZOFRAN (AS HYDROCHLORIDE) Your last dose was: Your next dose is:    
   
   
 Dose:  8 mg Take 1 Tab by mouth every eight (8) hours as needed for Nausea. Quantity:  20 Tab Refills:  0  
     
   
   
   
  
 pramipexole 0.125 mg tablet Commonly known as:  MIRAPEX Your last dose was: Your next dose is:    
   
   
 Dose:  0.125 mg Take 0.125 mg by mouth nightly. Refills:  0  
     
   
   
   
  
 traMADol 50 mg tablet Commonly known as:  ULTRAM  
   
Your last dose was: Your next dose is:    
   
   
 Dose:  50 mg Take 1 Tab by mouth every six (6) hours as needed for Pain. Max Daily Amount: 200 mg. Quantity:  40 Tab Refills:  0 STOP taking these medications   
 lisinopril 40 mg tablet Commonly known as:  Constance Choudhary Where to Get Your Medications Information on where to get these meds will be given to you by the nurse or doctor. ! Ask your nurse or doctor about these medications  
  amLODIPine 10 mg tablet  
 docusate sodium 100 mg capsule  
 furosemide 40 mg tablet

## 2017-08-09 NOTE — IP AVS SNAPSHOT
Alessandra Whitehead 
 
 
 [address] [facility] [address] 
[phone] Patient: Philippa Holstein MRN: [id number] :1940 You are allergic to the following Allergen Reactions Percocet (Oxycodone-Acetaminophen) Nausea and Vomiting Recent Documentation Height Weight Breastfeeding? BMI OB Status Smoking Status 1.499 m 63.3 kg No 28.19 kg/m2 Postmenopausal Never Smoker Emergency Contacts Name Discharge Info Relation Home Work Mobile Jaki Mercer DISCHARGE CAREGIVER [3] Child [2]   [phone] Sal Centeno  Daughter [21] [phone] About your hospitalization You were admitted on:  August 10, 2017 You last received care in the:  [facility] You were discharged on:  2017 Unit phone number:  [phone] Why you were hospitalized Your primary diagnosis was:  Dehydration Your diagnoses also included:  Dizziness, Gene (Acute Kidney Injury) (Hcc), Drowsy, S/P Cabg (Coronary Artery Bypass Graft), Chf (Congestive Heart Failure) (Hcc), Sdh (Subdural Hematoma) (Hcc), Dm2 (Diabetes Mellitus, Type 2) (Hcc), Hypoglycemia, Elevated Brain Natriuretic Peptide (Bnp) Level, Drowsiness Providers Seen During Your Hospitalizations Provider Role Specialty Primary office phone Tyrell Fisher MD Attending Provider Emergency Medicine [phone] Brando Prescott MD Attending Provider Emergency Medicine [phone] Carmen Vazquez MD Attending Provider Internal Medicine [phone] Your Primary Care Physician (PCP) Primary Care Physician Office Phone Office Fax Kira Messer [phone] Follow-up Information Follow up With Details Comments Contact Info Yony Reno MD Call Follow up in 1 week. Please call to schedule the appointment. [address] [place] [address] 
[phone] Current Discharge Medication List  
  
START taking these medications Dose & Instructions Dispensing Information Comments Morning Noon Evening Bedtime  
 docusate sodium 100 mg capsule Commonly known as:  Tania Macias Your last dose was: Your next dose is:    
   
   
 Dose:  100 mg Take 1 Cap by mouth two (2) times a day. Quantity:  60 Cap Refills:  0 CONTINUE these medications which have CHANGED Dose & Instructions Dispensing Information Comments Morning Noon Evening Bedtime  
 amLODIPine 10 mg tablet Commonly known as:  Job Dub What changed:   
- medication strength 
- how much to take Your last dose was: Your next dose is:    
   
   
 Dose:  10 mg Take 1 Tab by mouth daily. Indications: hypertension Quantity:  30 Tab Refills:  0  
     
   
   
   
  
 furosemide 40 mg tablet Commonly known as:  LASIX What changed:   
- how much to take - when to take this 
- additional instructions Your last dose was: Your next dose is:    
   
   
 Dose:  40 mg Take 1 Tab by mouth daily. Quantity:  30 Tab Refills:  0  
     
   
   
   
  
 insulin aspart protamine/insulin aspart 100 unit/mL (70-30) injection Commonly known as:  NOVOLOG MIX 70/30 What changed:  additional instructions Your last dose was: Your next dose is:    
   
   
 22 units before breakfast and 22 units before supper Quantity:  20 mL Refills:  10  
     
   
   
   
  
  
CONTINUE these medications which have NOT CHANGED Dose & Instructions Dispensing Information Comments Morning Noon Evening Bedtime  
 acetaminophen 500 mg tablet Commonly known as:  TYLENOL Your last dose was: Your next dose is:    
   
   
 Dose:  500 mg Take 1 Tab by mouth every six (6) hours as needed. Quantity:  40 Tab Refills:  0  
     
   
   
   
  
 albuterol 90 mcg/actuation inhaler Commonly known as:  PROVENTIL HFA, VENTOLIN HFA, PROAIR HFA Your last dose was: Your next dose is:    
   
   
 Dose:  2 Puff Take 2 Puffs by inhalation every four (4) hours as needed for Wheezing. Quantity:  1 Inhaler Refills:  0  
     
   
   
   
  
 cholecalciferol 400 unit Tab tablet Commonly known as:  VITAMIN D3 Your last dose was: Your next dose is: Take  by mouth daily. Refills:  0  
     
   
   
   
  
 co-enzyme Q-10 100 mg capsule Commonly known as:  COQ-10 Your last dose was: Your next dose is:    
   
   
 Dose:  100 mg Take 1 capsule by mouth daily. Quantity:  100 capsule Refills:  2  
     
   
   
   
  
 gabapentin 300 mg capsule Commonly known as:  NEURONTIN Your last dose was: Your next dose is:    
   
   
 Dose:  300 mg Take 300 mg by mouth three (3) times daily as needed. Refills:  0  
     
   
   
   
  
 metoprolol succinate 100 mg tablet Commonly known as:  TOPROL-XL Your last dose was: Your next dose is:    
   
   
 Dose:  100 mg Take 100 mg by mouth daily. Refills:  0 MIRALAX 17 gram packet Generic drug:  polyethylene glycol Your last dose was: Your next dose is:    
   
   
 Dose:  17 g Take 17 g by mouth daily. Indications: constipation Refills:  0  
     
   
   
   
  
 omeprazole 20 mg capsule Commonly known as:  PRILOSEC Your last dose was: Your next dose is:    
   
   
 Dose:  20 mg Take 20 mg by mouth daily. Refills:  0  
     
   
   
   
  
 ondansetron hcl 8 mg tablet Commonly known as:  ZOFRAN (AS HYDROCHLORIDE) Your last dose was: Your next dose is:    
   
   
 Dose:  8 mg Take 1 Tab by mouth every eight (8) hours as needed for Nausea. Quantity:  20 Tab Refills:  0  
     
   
   
   
  
 pramipexole 0.125 mg tablet Commonly known as:  MIRAPEX Your last dose was: Your next dose is:    
   
   
 Dose:  0.125 mg Take 0.125 mg by mouth nightly. Refills:  0  
     
   
   
   
  
 traMADol 50 mg tablet Commonly known as:  ULTRAM  
   
Your last dose was: Your next dose is:    
   
   
 Dose:  50 mg Take 1 Tab by mouth every six (6) hours as needed for Pain. Max Daily Amount: 200 mg. Quantity:  40 Tab Refills:  0 STOP taking these medications   
 lisinopril 40 mg tablet Commonly known as:  Marietta Thomas Where to Get Your Medications Information on where to get these meds will be given to you by the nurse or doctor. ! Ask your nurse or doctor about these medications  
  amLODIPine 10 mg tablet  
 docusate sodium 100 mg capsule  
 furosemide 40 mg tablet Discharge Instructions Dehydration: Care Instructions Your Care Instructions Dehydration happens when your body loses too much fluid. This might happen when you do not drink enough water or you lose large amounts of fluids from your body because of diarrhea, vomiting, or sweating. Severe dehydration can be life-threatening. Water and minerals called electrolytes help put your body fluids back in balance. Learn the early signs of fluid loss, and drink more fluids to prevent dehydration. Follow-up care is a key part of your treatment and safety. Be sure to make and go to all appointments, and call your doctor if you are having problems. It's also a good idea to know your test results and keep a list of the medicines you take. How can you care for yourself at home? · To prevent dehydration, drink plenty of fluids, enough so that your urine is light yellow or clear like water. Choose water and other caffeine-free clear liquids until you feel better. If you have kidney, heart, or liver disease and have to limit fluids, talk with your doctor before you increase the amount of fluids you drink. · If you do not feel like eating or drinking, try taking small sips of water, sports drinks, or other rehydration drinks. · Get plenty of rest. 
To prevent dehydration · Add more fluids to your diet and daily routine, unless your doctor has told you not to. · During hot weather, drink more fluids. Drink even more fluids if you exercise a lot. Stay away from drinks with alcohol or caffeine. · Watch for the symptoms of dehydration. These include: ¨ A dry, sticky mouth. ¨ Dark yellow urine, and not much of it. ¨ Dry and sunken eyes. ¨ Feeling very tired. · Learn what problems can lead to dehydration. These include: ¨ Diarrhea, fever, and vomiting. ¨ Any illness with a fever, such as pneumonia or the flu. ¨ Activities that cause heavy sweating, such as endurance races and heavy outdoor work in hot or humid weather. ¨ Alcohol or drug abuse or withdrawal. 
¨ Certain medicines, such as cold and allergy pills (antihistamines), diet pills (diuretics), and laxatives. ¨ Certain diseases, such as diabetes, cancer, and heart or kidney disease. When should you call for help? Call 911 anytime you think you may need emergency care. For example, call if: 
· You passed out (lost consciousness). Call your doctor now or seek immediate medical care if: 
· You are confused and cannot think clearly. · You are dizzy or lightheaded, or you feel like you may faint. · You have signs of needing more fluids. You have sunken eyes and a dry mouth, and you pass only a little dark urine. · You cannot keep fluids down. Watch closely for changes in your health, and be sure to contact your doctor if: 
· You are not making tears. · Your skin is very dry and sags slowly back into place after you pinch it. · Your mouth and eyes are very dry. Where can you learn more? Go to http://armida-lou.info/. Enter X937 in the search box to learn more about \"Dehydration: Care Instructions. \" 
 Current as of: March 20, 2017 Content Version: 11.3 © 1635-5351 [email or web address]. Care instructions adapted under license by [employer] (which disclaims liability or warranty for this information). If you have questions about a medical condition or this instruction, always ask your healthcare professional. [address] any warranty or liability for your use of this information. DISCHARGE SUMMARY from Nurse The following personal items are in your possession at time of discharge: 
 
  
Visual Aid: Glasses, With patient Hearing Aids/Status: Does not own PATIENT INSTRUCTIONS: 
 
 
F-face looks uneven A-arms unable to move or move unevenly S-speech slurred or non-existent T-time-call 911 as soon as signs and symptoms begin-DO NOT go Back to bed or wait to see if you get better-TIME IS BRAIN. Warning Signs of HEART ATTACK Call 911 if you have these symptoms: 
? Chest discomfort. Most heart attacks involve discomfort in the center of the chest that lasts more than a few minutes, or that goes away and comes back. It can feel like uncomfortable pressure, squeezing, fullness, or pain. ? Discomfort in other areas of the upper body. Symptoms can include pain or discomfort in one or both arms, the back, neck, jaw, or stomach. ? Shortness of breath with or without chest discomfort. ? Other signs may include breaking out in a cold sweat, nausea, or lightheadedness. Don't wait more than five minutes to call [address]! Fast action can save your life.  Calling 911 is almost always the fastest way to get lifesaving treatment. Emergency Medical Services staff can begin treatment when they arrive  up to an hour sooner than if someone gets to the hospital by car. The discharge information has been reviewed with the patient. The patient verbalized understanding. Discharge medications reviewed with the patient and appropriate educational materials and side effects teaching were provided. Patient armband removed and shredded Lab Results Component Value Date/Time Hemoglobin A1c 6.9 08/10/2017 01:15 PM  
 
This lab test reflects that your blood sugar averaged 151 mg/dl over the past 3 months. It is important to follow up with your provider on a routine basis to continue to evaluate your blood sugar and discuss any necessary changes in treatment. Discharge Orders None Introducing [facility] SERVICES! Agnes Hankins introduces 1[employer] patient portal. Now you can access parts of your medical record, email your doctor's office, and request medication refills online. 1. In your internet browser, go to [email or web address] 2. Click on the First Time User? Click Here link in the Sign In box. You will see the New Member Sign Up page. 3. Enter your 1[employer] Access Code exactly as it appears below. You will not need to use this code after youve completed the sign-up process. If you do not sign up before the expiration date, you must request a new code. · [employer] Access Code: [email or web address] Expires: 10/15/2017 10:53 PM 
 
4. Enter the last four digits of your Social Security Number (xxxx) and Date of Birth (mm/dd/yyyy) as indicated and click Submit. You will be taken to the next sign-up page. 5. Create a Q.brancht ID. This will be your Q.brancht login ID and cannot be changed, so think of one that is secure and easy to remember. 6. Create a Q.brancht password. You can change your password at any time. 7. Enter your Password Reset Question and Answer. This can be used at a later time if you forget your password. 8. Enter your e-mail address. You will receive e-mail notification when new information is available in [address] 9. Click Sign Up. You can now view and download portions of your medical record. 10. Click the Download Summary menu link to download a portable copy of your medical information. If you have questions, please visit the Frequently Asked Questions section of the [employer] website. Remember, Air[employer] is NOT to be used for urgent needs. For medical emergencies, dial 911. Now available from your iPhone and Android! General Information Please provide this summary of care documentation to your next provider. Patient Signature:  ____________________________________________________________ Date:  ____________________________________________________________  
  
[facility] Provider Signature:  ____________________________________________________________ Date:  ____________________________________________________________

## 2017-08-09 NOTE — ED PROVIDER NOTES
[address]  EMERGENCY DEPARTMENT HISTORY AND PHYSICAL EXAM       Date: 8/9/2017   Patient Name: Miguelina Barger   YOB: 1940  Medical Record Number: [id number]    History of Presenting Illness     Chief Complaint   Patient presents with    Dizziness    Nausea        History Provided By:  patient and EMS    Additional History: 6:39 PM  Miguelina Barger is a 64 y.o. female who presents via EMS to the emergency department C/O nausea and vomiting x 2 days. Other sx include dizziness, generalized weakness, SOB, and chest heaviness. Pt reports that she was out of her medications for 2-3 days and just had them refilled. She took double doses today. PMHx includes CAD, HTN, DM, COPD, arthritis, GERD, HLD, and CHF. SHx includes CABG x 3, cardiac stents, and pacemaker. Denies leg swelling, diarrhea, and any other sx or complaints. Primary Care Provider: Jose Jefferson MD   Specialist:    Past History     Past Medical History:   Past Medical History:   Diagnosis Date    Arthritis     CAD (coronary artery disease)     CABG, pacemaker    Chronic pain     COPD     Diabetes ([address]     GERD (gastroesophageal reflux disease)     Hypertension     Ill-defined condition     high cholesterol        Past Surgical History:   Past Surgical History:   Procedure Laterality Date    CARDIAC SURG PROCEDURE UNLIST      3 CABG, Stents    HX PACEMAKER      July 2011    ID COLONOSCOPY FLX DX W/COLLJ SPEC WHEN PFRMD  11/10/2011         ID EGD TRANSORAL BIOPSY SINGLE/MULTIPLE  8/29/2012             Family History:   Family History   Problem Relation Age of Onset    Lung Disease Mother     Heart Disease Father     Hypertension Father     Diabetes Sister         Social History:   Social History   Substance Use Topics    Smoking status: Never Smoker    Smokeless tobacco: Never Used    Alcohol use No        Allergies:    Allergies   Allergen Reactions    Percocet [Oxycodone-Acetaminophen] Nausea and Vomiting        Review of Systems   Review of Systems   Respiratory: Positive for shortness of breath. Cardiovascular: Positive for chest pain. Negative for leg swelling. Gastrointestinal: Positive for nausea and vomiting. Negative for diarrhea. Neurological: Positive for dizziness and weakness (generalized). All other systems reviewed and are negative. Physical Exam  Vitals:    08/09/17 2300 08/09/17 2345 08/10/17 0000 08/10/17 0030   BP: 177/68 159/55 139/50 143/55   Pulse: 60 60 60 60   Resp: 15 14 13 12   Temp:       SpO2: 93% 97% 96% 95%   Weight:       Height:           Physical Exam   Nursing note and vitals reviewed. Vital signs and nursing notes reviewed    CONSTITUTIONAL: Alert, elderly, weak appearing. Wearing depends  HEAD:  Normocephalic, atraumatic  EYES: PERRL; EOM's intact. ENTM: Nose: no rhinorrhea; Throat: no erythema or exudate, mucous membranes moist  Neck:  No JVD, supple without lymphadenopathy  RESP: Chest clear, equal breath sounds. CV: S1 and S2 WNL; No murmurs, gallops or rubs. Pacemaker in left chest wall. GI: Normal bowel sounds, abdomen soft and non-tender. No masses or organomegaly. : No costo-vertebral angle tenderness. BACK:  Non-tender  UPPER EXT:  Normal inspection  LOWER EXT: Bilateral chronic edema in LE, no calf tenderness. Distal pulses intact. NEURO: CN intact, reflexes 2/4 and sym, strength 5/5 and sym, sensation intact. SKIN: Surgical scar to abd. Chronic skin thickening in bilateral LE.  PSYCH:  Alert and oriented, normal affect.     Diagnostic Study Results     Labs -      Recent Results (from the past 12 hour(s))   EKG, 12 LEAD, INITIAL    Collection Time: 08/09/17  6:58 PM   Result Value Ref Range    Ventricular Rate 60 BPM    Atrial Rate 30 BPM    QRS Duration 162 ms    Q-T Interval 524 ms    QTC Calculation (Bezet) 524 ms    Calculated R Axis -91 degrees    Calculated T Axis 69 degrees    Diagnosis       AV sequential or dual chamber electronic pacemaker  No previous ECGs available     CBC WITH AUTOMATED DIFF    Collection Time: 08/09/17  8:00 PM   Result Value Ref Range    WBC 5.7 4.6 - 13.2 K/uL    RBC 3.93 (L) 4.20 - 5.30 M/uL    HGB 11.2 (L) 12.0 - 16.0 g/dL    HCT 35.2 35.0 - 45.0 %    MCV 89.6 74.0 - 97.0 FL    MCH 28.5 24.0 - 34.0 PG    MCHC 31.8 31.0 - 37.0 g/dL    RDW 14.5 11.6 - 14.5 %    PLATELET 927 298 - 081 K/uL    MPV 9.7 9.2 - 11.8 FL    NEUTROPHILS 79 (H) 40 - 73 %    LYMPHOCYTES 14 (L) 21 - 52 %    MONOCYTES 6 3 - 10 %    EOSINOPHILS 1 0 - 5 %    BASOPHILS 0 0 - 2 %    ABS. NEUTROPHILS 4.5 1.8 - 8.0 K/UL    ABS. LYMPHOCYTES 0.8 (L) 0.9 - 3.6 K/UL    ABS. MONOCYTES 0.3 0.05 - 1.2 K/UL    ABS. EOSINOPHILS 0.1 0.0 - 0.4 K/UL    ABS. BASOPHILS 0.0 0.0 - 0.06 K/UL    DF AUTOMATED     METABOLIC PANEL, COMPREHENSIVE    Collection Time: 08/09/17  8:00 PM   Result Value Ref Range    Sodium 140 136 - 145 mmol/L    Potassium 5.3 3.5 - 5.5 mmol/L    Chloride 104 100 - 108 mmol/L    CO2 29 21 - 32 mmol/L    Anion gap 7 3.0 - 18 mmol/L    Glucose 64 (L) 74 - 99 mg/dL    BUN 29 (H) 7.0 - 18 MG/DL    Creatinine 1.85 (H) 0.6 - 1.3 MG/DL    BUN/Creatinine ratio 16 12 - 20      GFR est AA 32 (L) >60 ml/min/1.73m2    GFR est non-AA 26 (L) >60 ml/min/1.73m2    Calcium 9.2 8.5 - 10.1 MG/DL    Bilirubin, total 0.4 0.2 - 1.0 MG/DL    ALT (SGPT) 17 13 - 56 U/L    AST (SGOT) 27 15 - 37 U/L    Alk.  phosphatase 294 (H) 45 - 117 U/L    Protein, total 7.9 6.4 - 8.2 g/dL    Albumin 3.6 3.4 - 5.0 g/dL    Globulin 4.3 (H) 2.0 - 4.0 g/dL    A-G Ratio 0.8 0.8 - 1.7     PROTHROMBIN TIME + INR    Collection Time: 08/09/17  8:00 PM   Result Value Ref Range    Prothrombin time 14.0 11.5 - 15.2 sec    INR 1.1 0.8 - 1.2     NT-PRO BNP    Collection Time: 08/09/17  8:00 PM   Result Value Ref Range    NT pro-BNP 3656 (H) 0 - 1800 PG/ML   CARDIAC PANEL,(CK, CKMB & TROPONIN)    Collection Time: 08/09/17  8:00 PM   Result Value Ref Range    CK 93 26 - 192 U/L    CK - MB <1.0 <3.6 ng/ml    CK-MB Index  0.0 - 4.0 %     CALCULATION NOT PERFORMED WHEN RESULT IS BELOW LINEAR LIMIT    Troponin-I, Qt. <0.02 0.00 - 0.06 NG/ML   GLUCOSE, POC    Collection Time: 08/09/17  9:43 PM   Result Value Ref Range    Glucose (POC) 50 (LL) 70 - 110 mg/dL   URINALYSIS W/ RFLX MICROSCOPIC    Collection Time: 08/09/17 10:40 PM   Result Value Ref Range    Color YELLOW      Appearance CLEAR      Specific gravity 1.012 1.005 - 1.030      pH (UA) 6.5 5.0 - 8.0      Protein 100 (A) NEG mg/dL    Glucose NEGATIVE  NEG mg/dL    Ketone NEGATIVE  NEG mg/dL    Bilirubin NEGATIVE  NEG      Blood NEGATIVE  NEG      Urobilinogen 1.0 0.2 - 1.0 EU/dL    Nitrites NEGATIVE  NEG      Leukocyte Esterase NEGATIVE  NEG     URINE MICROSCOPIC ONLY    Collection Time: 08/09/17 10:40 PM   Result Value Ref Range    WBC 0 to 1 0 - 5 /hpf    RBC 4 to 6 0 - 5 /hpf    Epithelial cells 1 to 2 0 - 5 /lpf    Bacteria NEGATIVE  NEG /hpf   GLUCOSE, POC    Collection Time: 08/09/17 10:42 PM   Result Value Ref Range    Glucose (POC) 218 (H) 70 - 110 mg/dL   GLUCOSE, POC    Collection Time: 08/10/17 12:31 AM   Result Value Ref Range    Glucose (POC) 142 (H) 70 - 110 mg/dL       Radiologic Studies -  The following have been ordered and reviewed:  CT HEAD WO CONT   Final Result      IMPRESSION  IMPRESSION: Motion degraded study.     Cerebral volume loss and mild periventricular and central white matter  diminished attenuation likely microvascular disease.     No definite acute intracranial abnormality.     Heterogeneous calvarium with scattered sclerotic areas. Correlation needed as  metastatic disease may have this appearance. XR ABD ACUTE W 1 V CHEST    (Results Pending)       X-RAY FINDINGS:  Acute abd x-ray shows NAP  Pending review by Radiologist  Recorded by BOUCHRA Perez, as dictated by Sangita Gruber MD    Medical Decision Making   I am the first provider for this patient.      I reviewed the vital signs, available nursing notes, past medical history, past surgical history, family history and social history. Vital Signs-Reviewed the patient's vital signs. Patient Vitals for the past 12 hrs:   Temp Pulse Resp BP SpO2   08/10/17 0030 - 60 12 143/55 95 %   08/10/17 0000 - 60 13 139/50 96 %   08/09/17 2345 - 60 14 159/55 97 %   08/09/17 2300 - 60 15 177/68 93 %   08/09/17 2230 - 60 23 186/68 92 %   08/09/17 2100 97.9 °F (36.6 °C) (!) 59 18 149/57 97 %   08/09/17 1846 98.6 °F (37 °C) 60 16 168/70 93 %       Pulse Oximetry Analysis - Normal 93% on room air. Pt placed on NC. Cardiac Monitor:   Rate: 60 bpm  Rhythm: Paced     EKG interpretation: (Preliminary)  Rhythm: AV sequential or dual chamber electronic pacemaker. Rate (approx.): 60 bpm; QRS duration is 162 ms. EKG read by Winsome Garcia MD at 6:58 PM    Old Medical Records: Nursing notes. Provider Notes:   INITIAL CLINICAL IMPRESSION and PLANS:  The patient presents with the primary complaint(s) of: nausea, vomiting, and dizziness. The presentation, to include historical aspects and clinical findings are consistent with the DX of dehydration. However, other possible DX's to consider as primary, associated with, or exacerbated by include:    1. Electrolyte abnormality  2. Small bowel obstruction  3. UTI  4. CHF  5. ACS    Considering the above, my initial management plan to evaluate and therapeutic interventions include the following and as noted in the orders:    1. Labs: CBC, CMP, UA, Prothrombin time + INR, NT-PRO BNP, cardiac panel  2. Imaging: EKG, XR abd acute     Procedures:   Procedures    ED Course:  6:39 PM  Initial assessment performed. The patients presenting problems have been discussed, and they are in agreement with the care plan formulated and outlined with them. I have encouraged them to ask questions as they arise throughout their visit. 9:57 PM  She has a glucose of 50. Will give an amp of D50.  Also, in reviewing her medical records, pt has a hx of subdural hematoma, so will order a CT. BEDSIDE TRANSFER OF CARE:  11:12 PM  Patient care will be transferred from Nedra Farnsworth MD to Shaneka Sweeney MD.  Discussed available diagnostic results and care plan at length at beside. Patient and family made aware of provider change. All questions answered. Patient and family voiced understanding. Written by Rebekah Salguero, ED Scribe, as dictated by Nedra Farnsworth MD.    12:30 AM - Pt's daughter states that the pt was added on a second diuretics a few days ago and has been feeling more dizzy since. Pt had a previous fall and was transferred to [facility] in [place] as her CT scan showed subdural hematoma. She was discharged after a few days of hospitalization as the pt's bleed was getting re-absorbed. Pt has no subdural hematoma on CT today. CONSULT NOTE:   12:32 AM  Shaneka Sweeney MD spoke with Mahi Casiano MD,   Specialty: Hospitalist  Discussed pt's hx, disposition, and available diagnostic and imaging results. Reviewed care plans. Consultant will [address] [place] [address]. Written by Buster Henderson, ED Scribe, as dictated by Shaneka Sweeney MD.    12:42 AM - On re-exam: Pt is drowsy on re-exam. Mildly confused, but is in no distress. Lungs are clear and heart is paced. She is moving all four extremities. Pts daughter believes her drowsiness is due to the multiple doses of promethazine IV for nausea in the ED. Blood sugar check is currently in the 140s. Pt was tx with dextrose 25g earlier for low bg. Pt still looks dry, so will give additional liter of nml saline, admit the hospitalist and hold off on diuretics. CT was reviewed and showed no acute bleed or stroke, though should consider MRI in the future for evaluation of potential mets on CT report.     Medications Given in the ED:  Medications   ondansetron (ZOFRAN) 4 mg/2 mL injection (  Canceled Entry 8/9/17 4829)   sodium chloride 0.9 % bolus infusion 1,000 mL (not administered)   sodium chloride 0.9 % bolus infusion 1,000 mL (0 mL IntraVENous IV Completed 8/9/17 2208)   ondansetron (ZOFRAN) injection 4 mg (4 mg IntraVENous Given 8/9/17 2008)   dextrose (D50W) injection syrg 25 g (25 g IntraVENous Given 8/9/17 2205)   promethazine (PHENERGAN) 12.5 mg in 0.9% sodium chloride 50 mL IVPB (0 mg IntraVENous IV Completed 8/9/17 2320)     12:35 AM  Patient is being admitted to the hospital by Leilani Steward MD. The results of their tests and reasons for their admission have been discussed with them and/or available family. They convey agreement and understanding for the need to be admitted and for their admission diagnosis. Diagnosis   Clinical Impression:     1. Dizziness    2. PENELOPE (acute kidney injury) ([address]    3. Dehydration    4.      possibly medication reaction, (additional diuretic started recently)    ADMIT TO TELE  _______________________________   Attestations: This note is prepared by Cherie Yadav, acting as a Scribe for Frank Kirby MD on 6:40 PM on 8/9/2017. Frank Kirby MD: The scribe's documentation has been prepared under my direction and personally reviewed by me in its entirety. _______________________________     Attestation: This note is prepared in-part by Cristino Hernandez, acting as Scribe for Ale Valentine MD.    Ale Valentine MD: The scribe's documentation has been prepared under my direction and personally reviewed by me in its entirety. I confirm that the note above accurately reflects all work, treatment, procedures, and medical decision making performed by me.

## 2017-08-09 NOTE — Clinical Note
Patient Class[de-identified] Observation [360] Type of Bed: Telemetry [19] Reason for Observation: dizziness, dehydration, penelope, pacemaker Admitting Diagnosis: PENELOPE (acute kidney injury) ([facility] [[id number]] Admitting Diagnosis: Dizziness [166048] Admitting Diagnosis: Dehydration [276.51. ICD-9-CM] Admitting Physician: Darcie Cassidy [[id number]] Attending Physician: Darcie Cassidy [[id number]] Comments: dizziness, dehydration, penelope

## 2017-08-09 NOTE — ED TRIAGE NOTES
Pt brought into er via ems for complaints of nausea, and dizziness when standing up. Pt also reports she had an episode of chest heaviness today. Pt states she has chf, and was out of her medications for two days. Restarted medications today. Sepsis Screening completed    (  )Patient meets SIRS criteria. (  x)Patient does not meet SIRS criteria.       SIRS Criteria is achieved when two or more of the following are present   Temperature < 96.8°F (36°C) or > 100.9°F (38.3°C)   Heart Rate > 90 beats per minute   Respiratory Rate > 20 breaths per minute   WBC count > 12,000 or <4,000 or > 10% bands

## 2017-08-09 NOTE — ED NOTES
Bedside and Verbal shift change report given to ALISON Riggins  (oncoming nurse) by Lynda Robin RN   (offgoing nurse). Report included the following information SBAR, Kardex, Intake/Output, MAR and Recent Results.

## 2017-08-10 PROBLEM — E86.0 DEHYDRATION: Status: ACTIVE | Noted: 2017-08-10

## 2017-08-10 PROBLEM — R40.0 DROWSINESS: Status: RESOLVED | Noted: 2017-08-10 | Resolved: 2017-08-10

## 2017-08-10 PROBLEM — E16.2 HYPOGLYCEMIA: Status: ACTIVE | Noted: 2017-08-10

## 2017-08-10 PROBLEM — R42 DIZZINESS: Status: ACTIVE | Noted: 2017-08-10

## 2017-08-10 PROBLEM — R40.0 DROWSINESS: Status: ACTIVE | Noted: 2017-08-10

## 2017-08-10 PROBLEM — N17.9 AKI (ACUTE KIDNEY INJURY) (HCC): Status: ACTIVE | Noted: 2017-08-10

## 2017-08-10 PROBLEM — R40.0 DROWSY: Status: RESOLVED | Noted: 2017-08-10 | Resolved: 2017-08-10

## 2017-08-10 PROBLEM — R40.0 DROWSY: Status: ACTIVE | Noted: 2017-08-10

## 2017-08-10 PROBLEM — R79.89 ELEVATED BRAIN NATRIURETIC PEPTIDE (BNP) LEVEL: Status: ACTIVE | Noted: 2017-08-10

## 2017-08-10 LAB
ALBUMIN SERPL BCP-MCNC: 3.3 G/DL (ref 3.4–5)
ALBUMIN/GLOB SERPL: 0.9 {RATIO} (ref 0.8–1.7)
ALP SERPL-CCNC: 281 U/L (ref 45–117)
ALT SERPL-CCNC: 16 U/L (ref 13–56)
ANION GAP BLD CALC-SCNC: 7 MMOL/L (ref 3–18)
AST SERPL W P-5'-P-CCNC: 25 U/L (ref 15–37)
BILIRUB SERPL-MCNC: 0.4 MG/DL (ref 0.2–1)
BUN SERPL-MCNC: 23 MG/DL (ref 7–18)
BUN/CREAT SERPL: 15 (ref 12–20)
CALCIUM SERPL-MCNC: 8.8 MG/DL (ref 8.5–10.1)
CHLORIDE SERPL-SCNC: 108 MMOL/L (ref 100–108)
CO2 SERPL-SCNC: 29 MMOL/L (ref 21–32)
CREAT SERPL-MCNC: 1.51 MG/DL (ref 0.6–1.3)
EST. AVERAGE GLUCOSE BLD GHB EST-MCNC: 151 MG/DL
GLOBULIN SER CALC-MCNC: 3.5 G/DL (ref 2–4)
GLUCOSE BLD STRIP.AUTO-MCNC: 102 MG/DL (ref 70–110)
GLUCOSE BLD STRIP.AUTO-MCNC: 142 MG/DL (ref 70–110)
GLUCOSE BLD STRIP.AUTO-MCNC: 196 MG/DL (ref 70–110)
GLUCOSE BLD STRIP.AUTO-MCNC: 262 MG/DL (ref 70–110)
GLUCOSE BLD STRIP.AUTO-MCNC: 86 MG/DL (ref 70–110)
GLUCOSE SERPL-MCNC: 84 MG/DL (ref 74–99)
HBA1C MFR BLD: 6.9 % (ref 4.5–5.6)
POTASSIUM SERPL-SCNC: 5 MMOL/L (ref 3.5–5.5)
PROT SERPL-MCNC: 6.8 G/DL (ref 6.4–8.2)
SODIUM SERPL-SCNC: 144 MMOL/L (ref 136–145)

## 2017-08-10 PROCEDURE — 74011250636 HC RX REV CODE- 250/636: Performed by: INTERNAL MEDICINE

## 2017-08-10 PROCEDURE — 74011636637 HC RX REV CODE- 636/637: Performed by: HOSPITALIST

## 2017-08-10 PROCEDURE — 83036 HEMOGLOBIN GLYCOSYLATED A1C: CPT | Performed by: HOSPITALIST

## 2017-08-10 PROCEDURE — 65660000000 HC RM CCU STEPDOWN

## 2017-08-10 PROCEDURE — 74011250636 HC RX REV CODE- 250/636: Performed by: HOSPITALIST

## 2017-08-10 PROCEDURE — 97161 PT EVAL LOW COMPLEX 20 MIN: CPT

## 2017-08-10 PROCEDURE — 82962 GLUCOSE BLOOD TEST: CPT

## 2017-08-10 PROCEDURE — 74011250637 HC RX REV CODE- 250/637: Performed by: INTERNAL MEDICINE

## 2017-08-10 PROCEDURE — 77030011943

## 2017-08-10 PROCEDURE — 80053 COMPREHEN METABOLIC PANEL: CPT | Performed by: INTERNAL MEDICINE

## 2017-08-10 PROCEDURE — 36415 COLL VENOUS BLD VENIPUNCTURE: CPT | Performed by: HOSPITALIST

## 2017-08-10 RX ORDER — CHOLECALCIFEROL (VITAMIN D3) 125 MCG
100 CAPSULE ORAL DAILY
Status: DISCONTINUED | OUTPATIENT
Start: 2017-08-10 | End: 2017-08-12 | Stop reason: HOSPADM

## 2017-08-10 RX ORDER — TRAMADOL HYDROCHLORIDE 50 MG/1
50 TABLET ORAL
Status: DISCONTINUED | OUTPATIENT
Start: 2017-08-10 | End: 2017-08-12 | Stop reason: HOSPADM

## 2017-08-10 RX ORDER — AMLODIPINE BESYLATE 5 MG/1
5 TABLET ORAL DAILY
Status: DISCONTINUED | OUTPATIENT
Start: 2017-08-10 | End: 2017-08-11

## 2017-08-10 RX ORDER — ALBUTEROL SULFATE 90 UG/1
2 AEROSOL, METERED RESPIRATORY (INHALATION)
Status: DISCONTINUED | OUTPATIENT
Start: 2017-08-10 | End: 2017-08-12 | Stop reason: HOSPADM

## 2017-08-10 RX ORDER — METOPROLOL SUCCINATE 100 MG/1
100 TABLET, EXTENDED RELEASE ORAL DAILY
Status: DISCONTINUED | OUTPATIENT
Start: 2017-08-10 | End: 2017-08-12 | Stop reason: HOSPADM

## 2017-08-10 RX ORDER — MAGNESIUM SULFATE 100 %
4 CRYSTALS MISCELLANEOUS AS NEEDED
Status: DISCONTINUED | OUTPATIENT
Start: 2017-08-10 | End: 2017-08-12 | Stop reason: HOSPADM

## 2017-08-10 RX ORDER — POLYETHYLENE GLYCOL 3350 17 G/17G
17 POWDER, FOR SOLUTION ORAL DAILY
Status: DISCONTINUED | OUTPATIENT
Start: 2017-08-10 | End: 2017-08-12 | Stop reason: HOSPADM

## 2017-08-10 RX ORDER — PRAMIPEXOLE DIHYDROCHLORIDE 0.25 MG/1
0.12 TABLET ORAL
Status: DISCONTINUED | OUTPATIENT
Start: 2017-08-10 | End: 2017-08-12 | Stop reason: HOSPADM

## 2017-08-10 RX ORDER — OMEPRAZOLE 20 MG/1
20 CAPSULE, DELAYED RELEASE ORAL DAILY
Status: DISCONTINUED | OUTPATIENT
Start: 2017-08-10 | End: 2017-08-12 | Stop reason: HOSPADM

## 2017-08-10 RX ORDER — INSULIN LISPRO 100 [IU]/ML
INJECTION, SOLUTION INTRAVENOUS; SUBCUTANEOUS
Status: DISCONTINUED | OUTPATIENT
Start: 2017-08-10 | End: 2017-08-10

## 2017-08-10 RX ORDER — DEXTROSE 50 % IN WATER (D50W) INTRAVENOUS SYRINGE
25-50 AS NEEDED
Status: DISCONTINUED | OUTPATIENT
Start: 2017-08-10 | End: 2017-08-10 | Stop reason: SDUPTHER

## 2017-08-10 RX ORDER — ONDANSETRON 4 MG/1
8 TABLET, FILM COATED ORAL
Status: DISCONTINUED | OUTPATIENT
Start: 2017-08-10 | End: 2017-08-11

## 2017-08-10 RX ORDER — DEXTROSE 50 % IN WATER (D50W) INTRAVENOUS SYRINGE
25-50 AS NEEDED
Status: DISCONTINUED | OUTPATIENT
Start: 2017-08-10 | End: 2017-08-12 | Stop reason: HOSPADM

## 2017-08-10 RX ORDER — SODIUM CHLORIDE 9 MG/ML
50 INJECTION, SOLUTION INTRAVENOUS CONTINUOUS
Status: DISCONTINUED | OUTPATIENT
Start: 2017-08-10 | End: 2017-08-11

## 2017-08-10 RX ORDER — CYANOCOBALAMIN (VITAMIN B-12) 500 MCG
400 TABLET ORAL DAILY
Status: DISCONTINUED | OUTPATIENT
Start: 2017-08-10 | End: 2017-08-12 | Stop reason: HOSPADM

## 2017-08-10 RX ORDER — HEPARIN SODIUM 5000 [USP'U]/ML
5000 INJECTION, SOLUTION INTRAVENOUS; SUBCUTANEOUS EVERY 8 HOURS
Status: DISCONTINUED | OUTPATIENT
Start: 2017-08-10 | End: 2017-08-12 | Stop reason: HOSPADM

## 2017-08-10 RX ORDER — ACETAMINOPHEN 325 MG/1
650 TABLET ORAL
Status: DISCONTINUED | OUTPATIENT
Start: 2017-08-10 | End: 2017-08-12 | Stop reason: HOSPADM

## 2017-08-10 RX ORDER — MAGNESIUM SULFATE 100 %
16 CRYSTALS MISCELLANEOUS AS NEEDED
Status: DISCONTINUED | OUTPATIENT
Start: 2017-08-10 | End: 2017-08-10 | Stop reason: SDUPTHER

## 2017-08-10 RX ORDER — MAGNESIUM SULFATE 100 %
4 CRYSTALS MISCELLANEOUS AS NEEDED
Status: DISCONTINUED | OUTPATIENT
Start: 2017-08-10 | End: 2017-08-10 | Stop reason: SDUPTHER

## 2017-08-10 RX ORDER — INSULIN LISPRO 100 [IU]/ML
INJECTION, SOLUTION INTRAVENOUS; SUBCUTANEOUS
Status: DISCONTINUED | OUTPATIENT
Start: 2017-08-10 | End: 2017-08-12 | Stop reason: HOSPADM

## 2017-08-10 RX ADMIN — CHOLECALCIFEROL TAB 10 MCG (400 UNIT) 400 UNITS: 10 TAB at 09:18

## 2017-08-10 RX ADMIN — HEPARIN SODIUM 5000 UNITS: 5000 INJECTION, SOLUTION INTRAVENOUS; SUBCUTANEOUS at 22:19

## 2017-08-10 RX ADMIN — PRAMIPEXOLE DIHYDROCHLORIDE 0.12 MG: 0.25 TABLET ORAL at 22:18

## 2017-08-10 RX ADMIN — Medication 100 MG: at 09:18

## 2017-08-10 RX ADMIN — OMEPRAZOLE 20 MG: 20 CAPSULE, DELAYED RELEASE ORAL at 09:18

## 2017-08-10 RX ADMIN — METOPROLOL SUCCINATE 100 MG: 100 TABLET, EXTENDED RELEASE ORAL at 09:18

## 2017-08-10 RX ADMIN — SODIUM CHLORIDE 1000 ML: 900 INJECTION, SOLUTION INTRAVENOUS at 00:53

## 2017-08-10 RX ADMIN — INSULIN LISPRO 6 UNITS: 100 INJECTION, SOLUTION INTRAVENOUS; SUBCUTANEOUS at 22:18

## 2017-08-10 RX ADMIN — AMLODIPINE BESYLATE 5 MG: 5 TABLET ORAL at 09:18

## 2017-08-10 RX ADMIN — TRAMADOL HYDROCHLORIDE 50 MG: 50 TABLET, FILM COATED ORAL at 09:18

## 2017-08-10 RX ADMIN — SODIUM CHLORIDE 50 ML/HR: 900 INJECTION, SOLUTION INTRAVENOUS at 08:17

## 2017-08-10 RX ADMIN — POLYETHYLENE GLYCOL 3350 17 G: 17 POWDER, FOR SOLUTION ORAL at 09:18

## 2017-08-10 NOTE — PROGRESS NOTES
Problem: Mobility Impaired (Adult and Pediatric)  Goal: *Acute Goals and Plan of Care (Insert Text)  Physical Therapy Goals  Initiated 8/10/2017 and to be accomplished within 3-7 day(s)  1. Patient will move from supine to sit and sit to supine in bed with modified independence. 2. Patient will transfer from bed to chair and chair to bed with modified independence using the least restrictive device. 3. Patient will perform sit to stand with modified independence. 4. Patient will ambulate with supervision for 150 feet with the least restrictive device. 5. Patient will ascend/descend 4 stairs with B handrail(s) with contact guard. Outcome: Progressing Towards Goal  PHYSICAL THERAPY EVALUATION     Patient: Miguelina Barger (64 y.o. female)  Date: 8/10/2017  Primary Diagnosis: PENELOPE (acute kidney injury) ([facility]  Dizziness  Dehydration  PENELOPE (acute kidney injury) ([facility]  Dehydration  Drowsiness  Hypoglycemia  Precautions:   Fall      ASSESSMENT :  Based on the objective data described below, the patient presents with lower extremity weakness, decreased gait quality and ambulation, impaired stair negotiation, and overall limitations in functional mobility. Pt reports she has had several fall sin the past possible due to attempting to do too much on her own and not using walker. Pt lives in [facility] with 4 steps to enter and also has a ramp on the back of the home. Pt performed sit to supine with supervision, sit to stand with CGA. Patient ambulated 15 feet in room with RW, GB applied, CGA; occasional cues for RW position and posture during amb. Pt tolerated session well as evidenced by no c/o increased pain, no lightheadedness, no dizziness. Pt educated on supine there ex to be performed in bed for LE strengthening. Pt reports history of R hip/knee pain worsened with recent fall and plans to see ortho in future.  Patient would benefit from skilled inpatient physical therapy to address deficits, progress as tolerated to achieve long term goals and allow safe discharge. Daughter present upon leaving     Patient will benefit from skilled intervention to address the above impairments. Patients rehabilitation potential is considered to be Good  Factors which may influence rehabilitation potential include:   [ ]         None noted  [ ]         Mental ability/status  [X]         Medical condition  [ ]         Home/family situation and support systems  [ ]         Safety awareness  [ ]         Pain tolerance/management  [ ]         Other:        PLAN :  Recommendations and Planned Interventions:  [X]           Bed Mobility Training             [X]    Neuromuscular Re-Education  [X]           Transfer Training                   [ ]    Orthotic/Prosthetic Training  [X]           Gait Training                          [ ]    Modalities  [X]           Therapeutic Exercises          [ ]    Edema Management/Control  [X]           Therapeutic Activities            [X]    Patient and Family Training/Education  [ ]           Other (comment):     Frequency/Duration: Patient will be followed by physical therapy 1-2 times per day to address goals. Discharge Recommendations: Home Health  Further Equipment Recommendations for Discharge: N/A       SUBJECTIVE:   Patient stated I am feeling pretty good, a little weak, a little tired.       OBJECTIVE DATA SUMMARY:       Past Medical History:   Diagnosis Date    Arthritis      CAD (coronary artery disease)       CABG, pacemaker    Chronic pain      COPD      Diabetes ([facility]      GERD (gastroesophageal reflux disease)      Hypertension      Ill-defined condition       high cholesterol     Past Surgical History:   Procedure Laterality Date    CARDIAC SURG PROCEDURE UNLIST         3 CABG, Stents    HX PACEMAKER         July 2011    MD COLONOSCOPY FLX DX W/IKER Baker WHEN PFRMD   11/10/2011          MD EGD TRANSORAL BIOPSY SINGLE/MULTIPLE   8/29/2012           Barriers to Learning/Limitations: None  Compensate with: N/A  Prior Level of Function/Home Situation: Independent amb c/RW or rollator  Home Situation  Home Environment: Private residence  # Steps to Enter: 4  Rails to Enter: Yes  Hand Rails : Bilateral  Wheelchair Ramp: Yes  One/Two Story Residence: One story  Living Alone: No  Support Systems: Spouse/Significant Other/Partner, Child(jenny)  Patient Expects to be Discharged to[de-identified] Private residence  Current DME Used/Available at Home: Roxi Janet, quad, Nile [place], rolling, Nile [place], rollator  Critical Behavior:  Neurologic State: Alert  Orientation Level: Oriented X4  Cognition: Appropriate decision making  Strength:    Strength: Generally decreased, functional  Tone & Sensation:   Tone: Normal  Sensation: Intact  Range Of Motion:  AROM: Generally decreased, functional  Functional Mobility:  Bed Mobility:  Sit to Supine: Supervision  Transfers:  Sit to Stand: Contact guard assistance  Stand to Sit: Contact guard assistance  Balance:   Sitting: Intact  Standing: Intact; With support  Ambulation/Gait Training:  Distance (ft): 15 Feet (ft)  Assistive Device: Gait belt;Walker, rolling  Ambulation - Level of Assistance: Contact guard assistance  Gait Abnormalities: Decreased step clearance;Shuffling gait  Base of Support: Narrowed  Stance: Time  Speed/Michelle: Slow  Step Length: Left shortened;Right shortened  Swing Pattern: Right asymmetrical;Left asymmetrical  Therapeutic Exercises:   Educated on supine there ex to be performed in bed, QS, heel slides, ankle pumps, x10 reps ea  Pain:  Pain Scale 1: Numeric (0 - 10)  Pain Intensity 1: 0  Pain Location 1: Toe (comment which one) (Left Great)  Pain Description 1: Sore;Constant  Activity Tolerance:   Good  Please refer to the flowsheet for vital signs taken during this treatment.   After treatment:   [ ]         Patient left in no apparent distress sitting up in chair  [X]         Patient left in no apparent distress in bed  [X]         Call bell left within reach  [ ] Nursing notified  [ ]         Caregiver present  [ ]         Bed alarm activated      COMMUNICATION/EDUCATION:   [X]         Fall prevention education was provided and the patient/caregiver indicated understanding. [X]         Patient/family have participated as able in goal setting and plan of care. [ ]         Patient/family agree to work toward stated goals and plan of care. [ ]         Patient understands intent and goals of therapy, but is neutral about his/her participation. [ ]         Patient is unable to participate in goal setting and plan of care.      Thank you for this referral.  Luz Day   Time Calculation: 12 mins      Eval Complexity: History: MEDIUM  Complexity : 1-2 comorbidities / personal factors will impact the outcome/ POC Exam:LOW Complexity : 1-2 Standardized tests and measures addressing body structure, function, activity limitation and / or participation in recreation  Presentation: LOW Complexity : Stable, uncomplicated  Clinical Decision Making:Medium Complexity amb <30 ft, transfer c/CGA Overall Complexity:LOW

## 2017-08-10 NOTE — ROUTINE PROCESS
TRANSFER - OUT REPORT:    Verbal report given to Anuja OH on Miguelina Barger  being transferred to [facility] [address] for routine progression of care       Report consisted of patients Situation, Background, Assessment and   Recommendations(SBAR). Information from the following report(s) SBAR, ED Summary and MAR was reviewed with the receiving nurse. Lines:   Peripheral IV 08/09/17 Left Wrist (Active)   Site Assessment Clean, dry, & intact 8/9/2017  8:03 PM   Phlebitis Assessment 0 8/9/2017  8:03 PM   Infiltration Assessment 0 8/9/2017  8:03 PM   Dressing Status Clean, dry, & intact 8/9/2017  8:03 PM   Hub Color/Line Status Patent 8/9/2017  8:03 PM        Opportunity for questions and clarification was provided.       Patient transported with:   Monitor  Registered Nurse  Tech

## 2017-08-10 NOTE — ROUTINE PROCESS
TRANSFER - IN REPORT:    Verbal report received from ALISON Penny RN(name) on Corrine Solid  being received from ED(unit) for routine progression of care      Report consisted of patients Situation, Background, Assessment and   Recommendations(SBAR). Information from the following report(s) SBAR, Kardex, ED Summary, Intake/Output and MAR was reviewed with the receiving nurse. Opportunity for questions and clarification was provided.

## 2017-08-10 NOTE — PROGRESS NOTES
Hospitalist Progress Note    Patient: Yee Soni MRN: [id number]  CSN: [id number]    YOB: 1940  Age: 64 y.o. Sex: female    DOA: 8/9/2017 LOS:  LOS: 0 days          Chief Complaint:    PENELOPE and dehydration      Assessment/Plan   1. Nausea/Vomiting; resolved -eating well today  2. Acute Kidney Injury; likely from dehydration/diuretics-improved Cr  3. Drowsiness, likely from medication side effect - resolved  4. Hypoglycemia, from poor PO intake in past few days superimposed by same dose insulin in setting of PENELOPE-holding insulin, monitoring BG levels, check A1C  5. Chronic systolic CHF, compensated at this time -gentle IVF  6. Elevated BNP  7. CAD s/p CABG  8. DM2  9. Peripheral Neuropathy  10.  Recent Subdural hemorrhage/hematoma, stable    Continue current plan with genlte fluids and order PT consult  Follow BG levels  BMP in am  Home tomorrow if doing well    Saw a small toe lesion left foot that shes following podiatry for-will restart abx PO-not sure whats she on, but will give keflex for here      Patient Active Problem List   Diagnosis Code    S/P CABG (coronary artery bypass graft) Z95.1    DM2 (diabetes mellitus, type 2) ([facility] E11.9    Polyneuropathy in diabetes ([facility] E11.42    CHF (congestive heart failure) ([facility] I50.9    SDH (subdural hematoma) ([facility]) I62.00    Dehydration E86.0    Dizziness R42    PENELOPE (acute kidney injury) ([facility] N17.9    Hypoglycemia E16.2    Elevated brain natriuretic peptide (BNP) level R79.89       Subjective:  Feeling better  Denies N/V/D  Hungry  No CP or SOB      Review of systems:    Constitutional: denies fevers, chills, myalgias  Respiratory: denies SOB, cough  Cardiovascular: denies chest pain, palpitations  Gastrointestinal: denies nausea, vomiting, diarrhea      Vital signs/Intake and Output:  Visit Vitals    /80    Pulse 60    Temp 98.1 °F (36.7 °C)    Resp 15    Ht 4' 11\" (1.499 m)    Wt 59 kg (130 lb)    SpO2 94%    Breastfeeding No    BMI 26.26 kg/m2     Current Shift:  08/10 0701 - 08/10 1900  In: 480 [P.O.:480]  Out: 350 [Urine:350]  Last three shifts:       Exam:    General: Well developed, alert, NAD, OX3  Head/Neck: NCAT, supple, No masses, No lymphadenopathy  CVS:Regular rate and rhythm, no M/R/G, S1/S2 heard, no thrill  Lungs:Clear to auscultation bilaterally, no wheezes, rhonchi, or rales  Abdomen: Soft, Nontender, No distention, Normal Bowel sounds, No hepatomegaly  Extremities: No C/C/E, pulses palpable 2+  Skin:normal texture and turgor, no rashes, no lesions  Neuro:grossly normal , follows commands  Psych:appropriate                Labs: Results:       Chemistry Recent Labs      08/10/17   0900  08/09/17 2000   GLU  84  64*   NA  144  140   K  5.0  5.3   CL  108  104   CO2  29  29   BUN  23*  29*   CREA  1.51*  1.85*   CA  8.8  9.2   AGAP  7  7   BUCR  15  16   AP  281*  294*   TP  6.8  7.9   ALB  3.3*  3.6   GLOB  3.5  4.3*   AGRAT  0.9  0.8      CBC w/Diff Recent Labs      08/09/17 2000   WBC  5.7   RBC  3.93*   HGB  11.2*   HCT  35.2   PLT  208   GRANS  79*   LYMPH  14*   EOS  1      Cardiac Enzymes Recent Labs      08/09/17 2000   CPK  93   CKND1  CALCULATION NOT PERFORMED WHEN RESULT IS BELOW LINEAR LIMIT      Coagulation Recent Labs      08/09/17 2000   PTP  14.0   INR  1.1       Lipid Panel Lab Results   Component Value Date/Time    Cholesterol, total 96 07/10/2011 04:30 AM    HDL Cholesterol 46 07/10/2011 04:30 AM    LDL, calculated 31 07/10/2011 04:30 AM    VLDL, calculated 19 07/10/2011 04:30 AM    Triglyceride 95 07/10/2011 04:30 AM    CHOL/HDL Ratio 2.1 07/10/2011 04:30 AM      BNP No results for input(s): BNPP in the last 72 hours.    Liver Enzymes Recent Labs      08/10/17   0900   TP  6.8   ALB  3.3*   AP  281*   SGOT  25      Thyroid Studies No results found for: T4, T3U, TSH, TSHEXT     Procedures/imaging: see electronic medical records for all procedures/Xrays and details which were not copied into this note but were reviewed prior to creation of Hieu Gardiner MD

## 2017-08-10 NOTE — PROGRESS NOTES
0668:  Morning medications administered. Two daughters at bedside. Patient finishing up breakfast.  Call light left within reach. 1030:  Patient eating another tray. This RN asked if it was breakfast or lunch, patient states it's an early lunch. 1115:  Patient ate lunch and refused to have BS drawn. 1215:  Patient eating another lunch tray. 1400:  Patient refusing heparin injection. Patient and daughter educated on anticoagulation, risks and benefits, patient gives verbal understanding and still refuses. 1600:  Patient just ate lunch and refusing BS draw.     7626:  Called kitchen. Patient has eaten lunch 3 times today. Kitchen verified last lunch was ticket was printed at 14:38. States someone just ordered dessert for the room. I let kitchen know this RN has not been able to get an accurate blood sugar reading on the patient because they have been eating and getting trays every two hours. Kitjan confirms and states they will flag the account.

## 2017-08-10 NOTE — PROGRESS NOTES
attempted to visit patient but she was unavailable. Chaplains remain available to provide pastoral support to patient and family as needed and requested. Rev.  David Qiu  [phone]

## 2017-08-10 NOTE — ED NOTES
Pt hourly rounding competed. Safety   Pt (x) resting on stretcher with side rails up and call bell in reach. () in chair    () in parents arms. Toileting   Pt offered ()Bedpan     ()Assistance to Restroom     ()Urinal  Ongoing Updates  Updated on plan of care and status of test results. Pain Management  Inquired as to comfort and offered comfort measures:    (x) warm blankets   () dimmed lights  Patient remains on CCM with no s/s ectopy noted.  Patient asleep, but denies any complaints upon awakening

## 2017-08-10 NOTE — DIABETES MGMT
GLYCEMIC CONTROL & NUTRITION:    - known h/o DM, no current A1C on file, please add to labs  - noted hypoglycemic event upon admission   - recommend continue POCT + Humalog corrective coverage  - per PTA med list, pt takes Novolog 70/30 insulin at home, 10 units q breakfast, 9 units q dinner    Recent Glucose Results:   Lab Results   Component Value Date/Time    GLU 64 (L) 08/09/2017 08:00 PM    GLUCPOC 86 08/10/2017 06:21 AM    GLUCPOC 102 08/10/2017 02:45 AM    GLUCPOC 142 (H) 08/10/2017 12:31 AM             Janice Mitchell, MPH, RD, CDE

## 2017-08-10 NOTE — ED NOTES
Pt hourly rounding competed. Safety   Pt (x) resting on stretcher with side rails up and call bell in reach. () in chair    () in parents arms. Toileting   Pt offered ()Bedpan     ()Assistance to Restroom     ()Urinal  Ongoing Updates  Updated on plan of care and status of test results. Pain Management  Inquired as to comfort and offered comfort measures:    (x) warm blankets   (x) dimmed lights  Remains on CCM with no s/s ectopy noted. Patient remains asleep at this time. Upon awakening patient denies any complaints with no s/s distress noted.

## 2017-08-10 NOTE — ED NOTES
Pt hourly rounding competed. Safety   Pt (x) resting on stretcher with side rails up and call bell in reach. () in chair    () in parents arms. Toileting   Pt offered ()Bedpan     ()Assistance to Restroom     ()Urinal  Ongoing Updates  Updated on plan of care and status of test results. Pain Management  Inquired as to comfort and offered comfort measures:    (x) warm blankets   (x) dimmed lights  Remains on CCM with no s/s ectopy noted. Patient remains asleep at this time. No s/s distress noted.

## 2017-08-10 NOTE — ED NOTES
Assumed patient care at this time. Patient returned to room from x-ray. Patient A&Ox4. Denies any complaints at this time. Patient remains on CCM with no s/s ectopy noted. Patient resting to comfort with no s/s distress noted. Family at bedside.

## 2017-08-10 NOTE — ED NOTES
Returned to room from CT at this time. Patient back to [facility] with no s/s ectopy noted. Patient is resting to comfort with no complaints and/or s/s distress noted.

## 2017-08-10 NOTE — ED NOTES
Pt hourly rounding competed. Safety   Pt (x) resting on stretcher with side rails up and call bell in reach. () in chair    () in parents arms. Toileting   Pt offered ()Bedpan     ()Assistance to Restroom     ()Urinal  Ongoing Updates  Updated on plan of care and status of test results. Pain Management  Inquired as to comfort and offered comfort measures:    (x) warm blankets   () dimmed lights  Remains on CCM with no s/s ectopy noted. Patient administered d50 for reports low BS. Patient denies any complaints at this time with no s/s distress note. d

## 2017-08-10 NOTE — H&P
History & Physical    Patient: Governor Sawyer MRN: [id number]  CSN: [id number]    YOB: 1940  Age: 64 y.o. Sex: female      DOA: 8/9/2017  Primary Care Provider:  Shen Hall MD      Assessment/Plan     Patient Active Problem List   Diagnosis Code    Sinus node dysfunction ([facility] I49.5    Angina pectoris, unstable ([facility]) I20.0    S/P CABG (coronary artery bypass graft) Z95.1    Dyspnea R06.00    Carpal tunnel syndrome on both sides G56.03    Ulnar neuropathy of both upper extremities, L>R G56.23    DM2 (diabetes mellitus, type 2) ([facility]) E11.9    Polyneuropathy in diabetes ([facility] E11.42    Unspecified hereditary and idiopathic peripheral neuropathy G60.9    CHF (congestive heart failure) ([facility]) I50.9    SDH (subdural hematoma) ([facility]) I62.00    Dehydration E86.0    Dizziness R42    PENELOPE (acute kidney injury) ([facility] N17.9    Drowsy R40.0    Hypoglycemia E16.2    Elevated brain natriuretic peptide (BNP) level R79.89    Drowsiness R40.0       1. Nausea/Vomiting; improved   2. Acute Kidney Injury; likely from dehydration/diuretics  3. Drowsiness, likely from medication side effect - Phenergan   4. Hypoglycemia, from poor PO intake in past few days superimposed by same dose insulin in setting of PENELOPE  5. Chronic systolic CHF, compensated at this time   6. Elevated BNP, but asymptomatic   7. CAD s/p CABG  8. DM2  9. Peripheral Neuropathy  10. Recent Subdural hemorrhage/hematoma, stable. FULL CODE     -admit for further care to [facility] for now   -gentle hydration, caution with her chf  -discontinue nephrotoxic drugs, monitor Cr and urine output. If no improvement obtain CT A/P without contrast to rule out any post obs etiology and get Renal US to eval kidneys.     -monitor urine output and frequent bladder scan to ensure no urinary retention  -neurochecks, accuchecks, hold home insulin, low dose ISS for now   -CT head is negative at this time for acute pathology, if no improvement in mental status in few hours. Will obtain MRI brain   -hold home dose of lasix at this time  -cont other medications   -supportive care   -no need for ABG at this time, little concern for CO2 narcosis. CC: nausea vomiting        HPI:     Erika Lawrence is a 64 y.o. female who comes to the ED for evaluation of nausea and vomiting. Patient is drowsy at this time therefore hx per russell who is at bedside. Stan Stuart tells me that the patient had suffered from a subdural hematoma last month secondary to the fall and broke her denture. She was doing well in terms of subdural hemorrhage but due to her broken dentures her appetite had been extremely poor. She is typically AAOx3 and able to ambulate on her own. She is compliant with her meds. BUt this last week she ran out of her medications including lasix therefore did not take it on Sunday and Monday. She called her PCP who advised her to double the dose for Tuesday and added another diuretic per the granddaughter but cannot recall medication name at this time. Patient took it on Tuesday but did not again On Wednesday. She continued to have nausea and one or two episodes NBNB vomiting during this time therefore came to the ED. In the ED patient had CT head done which was negative. Her Labs showed hypoglycemia and elevated Cr. She was given 1amp of D50 which improved her BS. She was also given phenergan for nausea but since then she has became increasingly drowsy yet arousable. Patient doesn't seem to have any hx of urinary retention or other known renal issues. No recent changes in her insulin dosages. Patient is drowsy at this time and doesn't have any complaints.      Past Medical History:   Diagnosis Date    Arthritis     CAD (coronary artery disease)     CABG, pacemaker    Chronic pain     COPD     Diabetes ([facility]     GERD (gastroesophageal reflux disease)     Hypertension     Ill-defined condition     high cholesterol       Past Surgical History:   Procedure Laterality Date    CARDIAC SURG PROCEDURE UNLIST      3 CABG, Stents    HX PACEMAKER      July 2011    NV COLONOSCOPY FLX DX W/COLLJ SPEC WHEN PFRMD  11/10/2011         NV EGD TRANSORAL BIOPSY SINGLE/MULTIPLE  8/29/2012            Family History   Problem Relation Age of Onset    Lung Disease Mother     Heart Disease Father     Hypertension Father     Diabetes Sister        Social History     Social History    Marital status:      Spouse name: N/A    Number of children: N/A    Years of education: N/A     Social History Main Topics    Smoking status: Never Smoker    Smokeless tobacco: Never Used    Alcohol use No    Drug use: No    Sexual activity: Not on file     Other Topics Concern    Not on file     Social History Narrative       Prior to Admission medications    Medication Sig Start Date End Date Taking? Authorizing Provider   amLODIPine (NORVASC) 5 mg tablet Take 5 mg by mouth daily. Indications: hypertension   Yes Historical Provider   gabapentin (NEURONTIN) 300 mg capsule Take 300 mg by mouth three (3) times daily as needed. Yes Historical Provider   polyethylene glycol (MIRALAX) 17 gram packet Take 17 g by mouth daily. Indications: constipation   Yes Historical Provider   acetaminophen (TYLENOL) 500 mg tablet Take 1 Tab by mouth every six (6) hours as needed. 7/18/17  Yes Yvette Mobley MD   traMADol (ULTRAM) 50 mg tablet Take 1 Tab by mouth every six (6) hours as needed for Pain. Max Daily Amount: 200 mg. 7/18/17  Yes Yvette Mobley MD   ondansetron hcl (ZOFRAN, AS HYDROCHLORIDE,) 8 mg tablet Take 1 Tab by mouth every eight (8) hours as needed for Nausea. 7/18/17  Yes Yvette Mobley MD   albuterol (PROVENTIL HFA, VENTOLIN HFA, PROAIR HFA) 90 mcg/actuation inhaler Take 2 Puffs by inhalation every four (4) hours as needed for Wheezing. 3/2/16  Yes Veronica Norris MD   omeprazole (PRILOSEC) 20 mg capsule Take 20 mg by mouth daily.    Yes Historical Provider co-enzyme Q-10 (COQ-10) 100 mg capsule Take 1 capsule by mouth daily. 10/23/14  Yes Latisha Santoro MD   insulin aspart protamine (NOVOLOG MIX 70/30) 100 unit/mL (70-30) injection 22 units before breakfast and 22 units before supper  Patient taking differently: 10 units before breakfast and 9 units before supper 12  Yes Jonnathan Monroy MD   lisinopril (PRINIVIL, ZESTRIL) 40 mg tablet Take 40 mg by mouth daily. Yes Omar Hernandez MD   furosemide (LASIX) 40 mg tablet Take 60 mg by mouth two (2) times a day. 1-2 tabs daily    Yes Omar Hernandez MD   metoprolol-XL (TOPROL-XL) 100 mg XL tablet Take 100 mg by mouth daily. Yes Omar Hernandez MD   cholecalciferol (VITAMIN D3) 400 unit tab tablet Take  by mouth daily. Historical Provider   pramipexole (MIRAPEX) 0.125 mg tablet Take 0.125 mg by mouth nightly. Historical Provider       Allergies   Allergen Reactions    Percocet [Oxycodone-Acetaminophen] Nausea and Vomiting       Review of Systems  Gen: No fever, chills, malaise, weight loss/gain. Heent: No headache, rhinorrhea, epistaxis, ear pain, hearing loss, sinus pain, neck pain/stiffness, sore throat. Heart: No chest pain, palpitations, LE, pnd, or orthopnea. Resp: No cough, hemoptysis, wheezing and shortness of breath. GI: No diarrhea, constipation, melena or hematochezia. : No urinary obstruction, dysuria or hematuria. Derm: No rash, new skin lesion or pruritis. Musc/skeletal: no bone or joint complains. Vasc: No edema, cyanosis or claudication. Endo: No heat/cold intolerance, no polyuria,polydipsia or polyphagia. Neuro: No unilateral weakness, numbness, tingling. No seizures. Heme: No easy bruising or bleeding.           Physical Exam:     Physical Exam:  Visit Vitals    /55    Pulse 60    Temp 97.9 °F (36.6 °C)    Resp 12    Ht 4' 11\" (1.499 m)    Wt 59 kg (130 lb)    SpO2 95%    BMI 26.26 kg/m2      O2 Device: Room air    Temp (24hrs), Av.3 °F (36.8 °C), Min:97.9 °F (36.6 °C), Max:98.6 °F (37 °C)             General:  Drowsy yet arousable   Head:  Normocephalic, without obvious abnormality, atraumatic. Eyes:  Conjunctivae/corneas clear, sclera anicteric, PERRL, EOMs intact. Nose: Nares normal. No drainage or sinus tenderness. Throat: Lips, mucosa, and tongue DRY   Neck: Supple, symmetrical, trachea midline, no adenopathy. Lungs:   Clear to auscultation bilaterally. Heart:  Regular rate and rhythm, S1, S2 normal, systolic murmur, click, rub or gallop. CABG scar     Abdomen: Soft, non-tender. Bowel sounds normal. No masses,  No organomegaly. Extremities: Extremities normal, atraumatic, no cyanosis or edema. Capillary refill normal.   Pulses: 2+ and symmetric all extremities. Skin: Skin color pink/pale/mottled/flushed, turgor normal. No rashes or lesions   Neurologic: CNII-XII intact. No focal motor or sensory deficit. Labs Reviewed:      Recent Results (from the past 24 hour(s))   EKG, 12 LEAD, INITIAL    Collection Time: 08/09/17  6:58 PM   Result Value Ref Range    Ventricular Rate 60 BPM    Atrial Rate 30 BPM    QRS Duration 162 ms    Q-T Interval 524 ms    QTC Calculation (Bezet) 524 ms    Calculated R Axis -91 degrees    Calculated T Axis 69 degrees    Diagnosis       AV sequential or dual chamber electronic pacemaker  No previous ECGs available     CBC WITH AUTOMATED DIFF    Collection Time: 08/09/17  8:00 PM   Result Value Ref Range    WBC 5.7 4.6 - 13.2 K/uL    RBC 3.93 (L) 4.20 - 5.30 M/uL    HGB 11.2 (L) 12.0 - 16.0 g/dL    HCT 35.2 35.0 - 45.0 %    MCV 89.6 74.0 - 97.0 FL    MCH 28.5 24.0 - 34.0 PG    MCHC 31.8 31.0 - 37.0 g/dL    RDW 14.5 11.6 - 14.5 %    PLATELET 581 363 - 301 K/uL    MPV 9.7 9.2 - 11.8 FL    NEUTROPHILS 79 (H) 40 - 73 %    LYMPHOCYTES 14 (L) 21 - 52 %    MONOCYTES 6 3 - 10 %    EOSINOPHILS 1 0 - 5 %    BASOPHILS 0 0 - 2 %    ABS. NEUTROPHILS 4.5 1.8 - 8.0 K/UL    ABS. LYMPHOCYTES 0.8 (L) 0.9 - 3.6 K/UL    ABS.  MONOCYTES 0.3 0.05 - 1.2 K/UL    ABS. EOSINOPHILS 0.1 0.0 - 0.4 K/UL    ABS. BASOPHILS 0.0 0.0 - 0.06 K/UL    DF AUTOMATED     METABOLIC PANEL, COMPREHENSIVE    Collection Time: 08/09/17  8:00 PM   Result Value Ref Range    Sodium 140 136 - 145 mmol/L    Potassium 5.3 3.5 - 5.5 mmol/L    Chloride 104 100 - 108 mmol/L    CO2 29 21 - 32 mmol/L    Anion gap 7 3.0 - 18 mmol/L    Glucose 64 (L) 74 - 99 mg/dL    BUN 29 (H) 7.0 - 18 MG/DL    Creatinine 1.85 (H) 0.6 - 1.3 MG/DL    BUN/Creatinine ratio 16 12 - 20      GFR est AA 32 (L) >60 ml/min/1.73m2    GFR est non-AA 26 (L) >60 ml/min/1.73m2    Calcium 9.2 8.5 - 10.1 MG/DL    Bilirubin, total 0.4 0.2 - 1.0 MG/DL    ALT (SGPT) 17 13 - 56 U/L    AST (SGOT) 27 15 - 37 U/L    Alk.  phosphatase 294 (H) 45 - 117 U/L    Protein, total 7.9 6.4 - 8.2 g/dL    Albumin 3.6 3.4 - 5.0 g/dL    Globulin 4.3 (H) 2.0 - 4.0 g/dL    A-G Ratio 0.8 0.8 - 1.7     PROTHROMBIN TIME + INR    Collection Time: 08/09/17  8:00 PM   Result Value Ref Range    Prothrombin time 14.0 11.5 - 15.2 sec    INR 1.1 0.8 - 1.2     NT-PRO BNP    Collection Time: 08/09/17  8:00 PM   Result Value Ref Range    NT pro-BNP 3656 (H) 0 - 1800 PG/ML   CARDIAC PANEL,(CK, CKMB & TROPONIN)    Collection Time: 08/09/17  8:00 PM   Result Value Ref Range    CK 93 26 - 192 U/L    CK - MB <1.0 <3.6 ng/ml    CK-MB Index  0.0 - 4.0 %     CALCULATION NOT PERFORMED WHEN RESULT IS BELOW LINEAR LIMIT    Troponin-I, Qt. <0.02 0.00 - 0.06 NG/ML   GLUCOSE, POC    Collection Time: 08/09/17  9:43 PM   Result Value Ref Range    Glucose (POC) 50 (LL) 70 - 110 mg/dL   URINALYSIS W/ RFLX MICROSCOPIC    Collection Time: 08/09/17 10:40 PM   Result Value Ref Range    Color YELLOW      Appearance CLEAR      Specific gravity 1.012 1.005 - 1.030      pH (UA) 6.5 5.0 - 8.0      Protein 100 (A) NEG mg/dL    Glucose NEGATIVE  NEG mg/dL    Ketone NEGATIVE  NEG mg/dL    Bilirubin NEGATIVE  NEG      Blood NEGATIVE  NEG      Urobilinogen 1.0 0.2 - 1.0 EU/dL Nitrites NEGATIVE  NEG      Leukocyte Esterase NEGATIVE  NEG     URINE MICROSCOPIC ONLY    Collection Time: 08/09/17 10:40 PM   Result Value Ref Range    WBC 0 to 1 0 - 5 /hpf    RBC 4 to 6 0 - 5 /hpf    Epithelial cells 1 to 2 0 - 5 /lpf    Bacteria NEGATIVE  NEG /hpf   GLUCOSE, POC    Collection Time: 08/09/17 10:42 PM   Result Value Ref Range    Glucose (POC) 218 (H) 70 - 110 mg/dL   GLUCOSE, POC    Collection Time: 08/10/17 12:31 AM   Result Value Ref Range    Glucose (POC) 142 (H) 70 - 110 mg/dL       Procedures/imaging: see electronic medical records for all procedures/Xrays and details which were not copied into this note but were reviewed prior to creation of Plan    50 minutes of care time spent in the direct evaluation and treatment of this high risk patient.      CC: Pati Fishman MD

## 2017-08-10 NOTE — DIABETES MGMT
GLYCEMIC CONTROL AND NUTRITION PROGRESS NOTE:    Assessment/Recommendations:  -pt with known h/o T2DM, BID mixed insulin home regimen  -- noted hypoglycemic event upon admission   - recommend continue POCT + Humalog corrective coverage  -<24 hour admission, no corrective coverage required    Subjective:  -pt/family confirmed home regimen, denies hypoglycemia rarely, SBGM BID 80-130s range in AM, 100-250 range in the evening  -pt under the care of [facility] in [place]  -pt with poor appetite recently r/t need for new dentures & N & V, pt with N & V 2 days prior to admission, believes she took AM insulin dose had N & V which caused hypoglycemia    Goal:  - BG will be in target range of  mg/dl (non-ICU) by 8/16  -PO intake will be at least 50% of meals offered by 8/15    Most recent blood glucose values:   Lab Results   Component Value Date/Time    GLU 84 08/10/2017 09:00 AM    GLU 64 (L) 08/09/2017 08:00 PM    GLUCPOC 86 08/10/2017 06:21 AM    GLUCPOC 102 08/10/2017 02:45 AM    GLUCPOC 142 (H) 08/10/2017 12:31 AM       Current hospital diabetes medications:   - Humalog Normal Insulin Sensitivity Corrective Coverage    Previous day's insulin requirements:   <24 hour admission    Home diabetes medications:  Novolog Mix 70/30 10 units QAM  Novolog Mix 70/30 9 units QPM        ,       Body mass index is 26.26 kg/(m^2). Last 3 Recorded Weights in this Encounter    08/09/17 1846   Weight: 59 kg (130 lb)    Ht Readings from Last 1 Encounters:   08/09/17 4' 11\" (1.499 m)       Diet:    Active Orders   Diet    DIET DIABETIC CONSISTENT CARB Regular; 2 GM NA (House Low NA); 70-70-70 (House);  AHA-LOW-CHOL FAT       Intake:   Patient Vitals for the past 100 hrs:   % Diet Eaten   08/10/17 0804 45 %       Education:  __x__Refer to Diabetes Education Record             ____Education not indicated at this time        Thanh Dave RN, MS

## 2017-08-10 NOTE — ED NOTES
Pt hourly rounding competed. Safety   Pt (x) resting on stretcher with side rails up and call bell in reach. () in chair    () in parents arms. Toileting   Pt offered ()Bedpan     ()Assistance to Restroom     ()Urinal  Ongoing Updates  Updated on plan of care and status of test results. Pain Management  Inquired as to comfort and offered comfort measures:    ()xwarm blankets   () dimmed lights  Remains on CCM. Patient continues to deny any complaints at this time with no s/s distress noted. Family at bedside.

## 2017-08-10 NOTE — DIABETES MGMT
Diabetes Patient/Family Education Record    Factors That  May Influence Patients Ability  to Learn or  Comply With  Recommendations:    []   Language barrier    []   Cultural needs   []   Motivation    []   Cognitive limitation    []   Physical   []   Education    []   Physiological factors   []   Hearing/vision/speaking impairment   []   Congregational beliefs    []   Financial factors   []  Other:   [x]  No factors identified at this time.      Person Instructed:   [x]   Patient   [x]   Family (daughters)   []  Other     Preference for Learning:   [x]   Verbal   []   Written   []  Demonstration     Level of Comprehension & Competence:    []  Good                                      [x] Fair                                     []  Poor                             []  Needs Reinforcement   [x]  Teachback completed    Education Component:   [x]  Medication management, including confirmation of home regimen, rotation of injection sites and timing of insulin    []  Nutritional management including the role of carbohydrate intake   []  Exercise   [x]  Signs, symptoms, and treatment of hyperglycemia and hypoglycemia   [] Treatment of hyperglycemia and hypoglycemia   [x]  Importance of blood glucose monitoring; pt states SBGM BID   []  Instruction on use of blood glucose meter   []  Discuss the importance of HbA1C monitoring and provide patient with  Results; pending   []  Sick day guidelines   []  Proper use and disposal of lancets, needles, syringes or insulin pens (if appropriate)   []  Potential long-term complications (retinopathy, kidney disease, neuropathy, heart disease, stroke, vascular disease, foot care)   [] Provide emergency contact number and contact number for more information    [x]  Goal:  Patient/family will demonstrate understanding of Diabetes Self Management Skills by: (date) _8/15______  Plan for post-discharge education or self-management support:    [] Outpatient class schedule provided            [] Patient Declined    [] Scheduled for outpatient classes (date) _______       Dimple Pace RN, MS  Glycemic Control Team

## 2017-08-10 NOTE — PROGRESS NOTES
PT    PT orders received and chart reviewed. Attempted PT evaluation at this time however, pt in spa session.       Sydney Collins PT, DPT

## 2017-08-11 LAB
ALBUMIN SERPL BCP-MCNC: 3.3 G/DL (ref 3.4–5)
ALBUMIN/GLOB SERPL: 0.8 {RATIO} (ref 0.8–1.7)
ALP SERPL-CCNC: 277 U/L (ref 45–117)
ALT SERPL-CCNC: 15 U/L (ref 13–56)
ANION GAP BLD CALC-SCNC: 8 MMOL/L (ref 3–18)
APTT PPP: 34.7 SEC (ref 23–36.4)
AST SERPL W P-5'-P-CCNC: 25 U/L (ref 15–37)
BASOPHILS # BLD AUTO: 0 K/UL (ref 0–0.06)
BASOPHILS # BLD: 0 % (ref 0–2)
BILIRUB SERPL-MCNC: 0.5 MG/DL (ref 0.2–1)
BUN SERPL-MCNC: 23 MG/DL (ref 7–18)
BUN/CREAT SERPL: 16 (ref 12–20)
CALCIUM SERPL-MCNC: 9.5 MG/DL (ref 8.5–10.1)
CHLORIDE SERPL-SCNC: 107 MMOL/L (ref 100–108)
CO2 SERPL-SCNC: 27 MMOL/L (ref 21–32)
CREAT SERPL-MCNC: 1.47 MG/DL (ref 0.6–1.3)
DIFFERENTIAL METHOD BLD: ABNORMAL
EOSINOPHIL # BLD: 0.1 K/UL (ref 0–0.4)
EOSINOPHIL NFR BLD: 2 % (ref 0–5)
ERYTHROCYTE [DISTWIDTH] IN BLOOD BY AUTOMATED COUNT: 14.8 % (ref 11.6–14.5)
GLOBULIN SER CALC-MCNC: 4.1 G/DL (ref 2–4)
GLUCOSE BLD STRIP.AUTO-MCNC: 261 MG/DL (ref 70–110)
GLUCOSE BLD STRIP.AUTO-MCNC: 87 MG/DL (ref 70–110)
GLUCOSE BLD STRIP.AUTO-MCNC: 98 MG/DL (ref 70–110)
GLUCOSE SERPL-MCNC: 83 MG/DL (ref 74–99)
HCT VFR BLD AUTO: 35 % (ref 35–45)
HGB BLD-MCNC: 11.1 G/DL (ref 12–16)
INR PPP: 1.1 (ref 0.8–1.2)
LYMPHOCYTES # BLD AUTO: 16 % (ref 21–52)
LYMPHOCYTES # BLD: 1 K/UL (ref 0.9–3.6)
MCH RBC QN AUTO: 28.7 PG (ref 24–34)
MCHC RBC AUTO-ENTMCNC: 31.7 G/DL (ref 31–37)
MCV RBC AUTO: 90.4 FL (ref 74–97)
MONOCYTES # BLD: 0.5 K/UL (ref 0.05–1.2)
MONOCYTES NFR BLD AUTO: 8 % (ref 3–10)
NEUTS SEG # BLD: 4.7 K/UL (ref 1.8–8)
NEUTS SEG NFR BLD AUTO: 74 % (ref 40–73)
PLATELET # BLD AUTO: 202 K/UL (ref 135–420)
PMV BLD AUTO: 10.1 FL (ref 9.2–11.8)
POTASSIUM SERPL-SCNC: 4.8 MMOL/L (ref 3.5–5.5)
PROT SERPL-MCNC: 7.4 G/DL (ref 6.4–8.2)
PROTHROMBIN TIME: 13.7 SEC (ref 11.5–15.2)
RBC # BLD AUTO: 3.87 M/UL (ref 4.2–5.3)
SODIUM SERPL-SCNC: 142 MMOL/L (ref 136–145)
WBC # BLD AUTO: 6.3 K/UL (ref 4.6–13.2)

## 2017-08-11 PROCEDURE — 74011636637 HC RX REV CODE- 636/637: Performed by: HOSPITALIST

## 2017-08-11 PROCEDURE — 85610 PROTHROMBIN TIME: CPT | Performed by: INTERNAL MEDICINE

## 2017-08-11 PROCEDURE — 85730 THROMBOPLASTIN TIME PARTIAL: CPT | Performed by: INTERNAL MEDICINE

## 2017-08-11 PROCEDURE — 82962 GLUCOSE BLOOD TEST: CPT

## 2017-08-11 PROCEDURE — 74011250636 HC RX REV CODE- 250/636: Performed by: HOSPITALIST

## 2017-08-11 PROCEDURE — 74011250637 HC RX REV CODE- 250/637: Performed by: INTERNAL MEDICINE

## 2017-08-11 PROCEDURE — 80053 COMPREHEN METABOLIC PANEL: CPT | Performed by: INTERNAL MEDICINE

## 2017-08-11 PROCEDURE — 74011250636 HC RX REV CODE- 250/636: Performed by: INTERNAL MEDICINE

## 2017-08-11 PROCEDURE — 74011250637 HC RX REV CODE- 250/637: Performed by: HOSPITALIST

## 2017-08-11 PROCEDURE — 65660000000 HC RM CCU STEPDOWN

## 2017-08-11 PROCEDURE — 85025 COMPLETE CBC W/AUTO DIFF WBC: CPT | Performed by: INTERNAL MEDICINE

## 2017-08-11 PROCEDURE — 36415 COLL VENOUS BLD VENIPUNCTURE: CPT | Performed by: INTERNAL MEDICINE

## 2017-08-11 RX ORDER — FUROSEMIDE 40 MG/1
40 TABLET ORAL DAILY
Status: DISCONTINUED | OUTPATIENT
Start: 2017-08-11 | End: 2017-08-12 | Stop reason: HOSPADM

## 2017-08-11 RX ORDER — FUROSEMIDE 40 MG/1
40 TABLET ORAL DAILY
Status: DISCONTINUED | OUTPATIENT
Start: 2017-08-11 | End: 2017-08-11

## 2017-08-11 RX ORDER — ONDANSETRON 2 MG/ML
4 INJECTION INTRAMUSCULAR; INTRAVENOUS ONCE
Status: COMPLETED | OUTPATIENT
Start: 2017-08-11 | End: 2017-08-11

## 2017-08-11 RX ORDER — INSULIN GLARGINE 100 [IU]/ML
10 INJECTION, SOLUTION SUBCUTANEOUS DAILY
Status: DISCONTINUED | OUTPATIENT
Start: 2017-08-11 | End: 2017-08-12 | Stop reason: HOSPADM

## 2017-08-11 RX ORDER — AMLODIPINE BESYLATE 5 MG/1
10 TABLET ORAL DAILY
Status: DISCONTINUED | OUTPATIENT
Start: 2017-08-11 | End: 2017-08-12 | Stop reason: HOSPADM

## 2017-08-11 RX ORDER — FUROSEMIDE 10 MG/ML
40 INJECTION INTRAMUSCULAR; INTRAVENOUS ONCE
Status: COMPLETED | OUTPATIENT
Start: 2017-08-11 | End: 2017-08-11

## 2017-08-11 RX ORDER — MECLIZINE HCL 12.5 MG 12.5 MG/1
25 TABLET ORAL
Status: DISCONTINUED | OUTPATIENT
Start: 2017-08-11 | End: 2017-08-12 | Stop reason: HOSPADM

## 2017-08-11 RX ORDER — DOCUSATE SODIUM 100 MG/1
100 CAPSULE, LIQUID FILLED ORAL 2 TIMES DAILY
Status: DISCONTINUED | OUTPATIENT
Start: 2017-08-11 | End: 2017-08-12 | Stop reason: HOSPADM

## 2017-08-11 RX ADMIN — OMEPRAZOLE 20 MG: 20 CAPSULE, DELAYED RELEASE ORAL at 10:18

## 2017-08-11 RX ADMIN — ONDANSETRON 4 MG: 2 INJECTION INTRAMUSCULAR; INTRAVENOUS at 03:00

## 2017-08-11 RX ADMIN — INSULIN LISPRO 6 UNITS: 100 INJECTION, SOLUTION INTRAVENOUS; SUBCUTANEOUS at 13:21

## 2017-08-11 RX ADMIN — FUROSEMIDE 40 MG: 40 TABLET ORAL at 21:29

## 2017-08-11 RX ADMIN — METOPROLOL SUCCINATE 100 MG: 100 TABLET, EXTENDED RELEASE ORAL at 10:19

## 2017-08-11 RX ADMIN — PRAMIPEXOLE DIHYDROCHLORIDE 0.12 MG: 0.25 TABLET ORAL at 21:30

## 2017-08-11 RX ADMIN — DOCUSATE SODIUM 100 MG: 100 CAPSULE, LIQUID FILLED ORAL at 21:30

## 2017-08-11 RX ADMIN — TRAMADOL HYDROCHLORIDE 50 MG: 50 TABLET, FILM COATED ORAL at 19:07

## 2017-08-11 RX ADMIN — HEPARIN SODIUM 5000 UNITS: 5000 INJECTION, SOLUTION INTRAVENOUS; SUBCUTANEOUS at 21:30

## 2017-08-11 RX ADMIN — FUROSEMIDE 40 MG: 10 INJECTION, SOLUTION INTRAMUSCULAR; INTRAVENOUS at 13:20

## 2017-08-11 RX ADMIN — Medication 100 MG: at 10:19

## 2017-08-11 RX ADMIN — AMLODIPINE BESYLATE 10 MG: 5 TABLET ORAL at 10:19

## 2017-08-11 RX ADMIN — CHOLECALCIFEROL TAB 10 MCG (400 UNIT) 400 UNITS: 10 TAB at 10:19

## 2017-08-11 RX ADMIN — HEPARIN SODIUM 5000 UNITS: 5000 INJECTION, SOLUTION INTRAVENOUS; SUBCUTANEOUS at 13:20

## 2017-08-11 RX ADMIN — DOCUSATE SODIUM 100 MG: 100 CAPSULE, LIQUID FILLED ORAL at 10:18

## 2017-08-11 RX ADMIN — POLYETHYLENE GLYCOL 3350 17 G: 17 POWDER, FOR SOLUTION ORAL at 09:00

## 2017-08-11 RX ADMIN — HEPARIN SODIUM 5000 UNITS: 5000 INJECTION, SOLUTION INTRAVENOUS; SUBCUTANEOUS at 06:30

## 2017-08-11 RX ADMIN — INSULIN GLARGINE 10 UNITS: 100 INJECTION, SOLUTION SUBCUTANEOUS at 13:21

## 2017-08-11 NOTE — PROGRESS NOTES
Problem: Mobility Impaired (Adult and Pediatric)  Goal: *Acute Goals and Plan of Care (Insert Text)  Physical Therapy Goals  Initiated 8/10/2017 and to be accomplished within 3-7 day(s)  1. Patient will move from supine to sit and sit to supine in bed with modified independence. 2. Patient will transfer from bed to chair and chair to bed with modified independence using the least restrictive device. 3. Patient will perform sit to stand with modified independence. 4. Patient will ambulate with supervision for 150 feet with the least restrictive device. 5. Patient will ascend/descend 4 stairs with B handrail(s) with contact guard. Outcome: Progressing Towards Goal  Pt refused PT due to:  [ ]  Nausea/vomiting  [ ]  Eating  [ ]  Pain  [X]  Pt lethargic. Stating, \"I've had a long day. Maybe tomorrow\"  [ ]  Off Unit  Will f/u tomorrow. Thank you.   Meli Navarro, PTA

## 2017-08-11 NOTE — PROGRESS NOTES
2237:  Patient's granddaughter called this RN and states her grandmother has vertigo and is still dizzy. Last time patient was in the hospital patient was given something for vertigo and family would like patient to receive something for vertigo. Spoke with Dr. Karthik Baires' PA at nurse's station about patient. He will update Dr. Karthik Baires. 1112:  During rounds patient states she is still SOB. This is a new complaint for patient. Demond Felix checked her oxygen levels. Patient was 89/90 on room air.   Patient placed on 2 L NC.

## 2017-08-11 NOTE — PROGRESS NOTES
1930: Care assumed of this pt from [facility]. Pt appears to be in stable condition. No distress noted. Will monitor. 2200: Pt took scheduled meds crushed in apple sauce. Tolerated well. Will monitor. 0000: Pt resting. Family member at bedside will monitor. 0150: Pt with c/o nausea. Cannot take pills at this time. Notified Dr. Charlotte Holloway. Orders placed in chart    0300: Pt resting, Stated nausea subsided. She refused Zofran at this time. Will monitor. 0600: Pt decided to take zofran now as she became nauseated again. Zofran given. Will monitor.

## 2017-08-11 NOTE — ROUTINE PROCESS
Bedside and Verbal shift change report given to [employer] (oncoming nurse) by Caty Dale RN   (offgoing nurse). Report included the following information SBAR, Kardex and MAR.

## 2017-08-11 NOTE — ROUTINE PROCESS
Bedside and Verbal shift change report given to MIKE Wilhelm (oncoming nurse) by Lenwood Nissen, RN   (offgoing nurse). Report included the following information SBAR, Kardex, Intake/Output, MAR, Accordion, Recent Results, Med Rec Status and Alarm Parameters .

## 2017-08-11 NOTE — PROGRESS NOTES
Hospitalist Progress Note    Patient: Hussein Spencer MRN: [id number]  CSN: [id number]    YOB: 1940  Age: 64 y.o.   Sex: female    DOA: 8/9/2017 LOS:  LOS: 1 day          Chief Complaint:    Vertigo, dehydration      Assessment/Plan     Nausea/Vomiting; resolved -eating well today  Acute Kidney Injury; likely from dehydration/diuretics-improved Cr  Vertigo-CT head upon admission-add antivert PRN  Drowsiness, likely from medication side effect - resolved  Hypoglycemia, from poor PO intake in past few days superimposed by same dose insulin in setting of PENELOPE-holding insulin, monitoring BG levels, check [id number] basal lantus as BG increasing  Chronic systolic CHF, starting to decompensate as sats lower today and LE-IV lasix X 1, resume daily lasix  CAD s/p CABG  Peripheral Neuropathy  Recent Subdural hemorrhage/hematoma, stable    BMP in am  Get chest xray  PT again today  May be able to leave by tomorrow, getting her back onto lasix again as needs it for CHF but had overdone her dosing at home which caused PENELOPE< dehydration, and vomiting    Patient Active Problem List   Diagnosis Code    S/P CABG (coronary artery bypass graft) Z95.1    DM2 (diabetes mellitus, type 2) ([facility]) E11.9    Polyneuropathy in diabetes ([facility] [address] E11.42    CHF (congestive heart failure) ([facility]) I50.9    SDH (subdural hematoma) ([facility]) I62.00    Dehydration E86.0    Dizziness R42    PENELOPE (acute kidney injury) ([facility] [address] N17.9    Hypoglycemia E16.2    Elevated brain natriuretic peptide (BNP) level R79.89       Subjective:    Vertigo this am, had some vomiting last night  Better now, but c/o LE  No CP  No nausea now resolved  Did eat some  Apparently has vertigo at home also    Review of systems:    Constitutional: denies fevers, chills, myalgias  Respiratory: denies cough  Cardiovascular: denies chest pain, palpitations  Gastrointestinal: denies diarrhea      Vital signs/Intake and Output:  Visit Vitals    /60    Pulse 60    Temp 98.1 °F (36.7 °C)    Resp 17    Ht 4' 11\" (1.499 m)    Wt 60 kg (132 lb 4.4 oz)    SpO2 100%    Breastfeeding No    BMI 26.72 kg/m2     Current Shift:  08/11 0701 - 08/11 1900  In: 440 [P.O.:440]  Out: 275 [Urine:275]  Last three shifts:  08/09 1901 - 08/11 0700  In: 720 [P.O.:720]  Out: 1350 [Urine:1350]    Exam:    General: Well developed, alert, NAD, OX3  Head/Neck: NCAT, supple, No masses, No lymphadenopathy  CVS:Regular rate and rhythm, no M/R/G, S1/S2 heard, no thrill  Lungs:rales bases BL  Abdomen: Soft, Nontender, No distention, Normal Bowel sounds, No hepatomegaly  Extremities: No C/C/E, pulses palpable 2+  Skin:normal texture and turgor, no rashes, no lesions  Neuro:grossly normal , follows commands  Psych:appropriate                Labs: Results:       Chemistry Recent Labs      08/11/17   0206  08/10/17   0900 08/09/17 2000   GLU  83  84  64*   NA  142  144  140   K  4.8  5.0  5.3   CL  107  108  104   CO2  27  29  29   BUN  23*  23*  29*   CREA  1.47*  1.51*  1.85*   CA  9.5  8.8  9.2   AGAP  8  7  7   BUCR  16  15  16   AP  277*  281*  294*   TP  7.4  6.8  7.9   ALB  3.3*  3.3*  3.6   GLOB  4.1*  3.5  4.3*   AGRAT  0.8  0.9  0.8      CBC w/Diff Recent Labs      08/11/17 0206 08/09/17 2000   WBC  6.3  5.7   RBC  3.87*  3.93*   HGB  11.1*  11.2*   HCT  35.0  35.2   PLT  202  208   GRANS  74*  79*   LYMPH  16*  14*   EOS  2  1      Cardiac Enzymes Recent Labs      08/09/17 2000   CPK  93   CKND1  CALCULATION NOT PERFORMED WHEN RESULT IS BELOW LINEAR LIMIT      Coagulation Recent Labs      08/11/17 0206 08/09/17 2000   PTP  13.7  14.0   INR  1.1  1.1   APTT  34.7   --        Lipid Panel Lab Results   Component Value Date/Time    Cholesterol, total 96 07/10/2011 04:30 AM    HDL Cholesterol 46 07/10/2011 04:30 AM    LDL, calculated 31 07/10/2011 04:30 AM    VLDL, calculated 19 07/10/2011 04:30 AM    Triglyceride 95 07/10/2011 04:30 AM    CHOL/HDL Ratio 2.1 07/10/2011 04:30 AM BNP No results for input(s): BNPP in the last 72 hours.    Liver Enzymes Recent Labs      08/11/17   0206   TP  7.4   ALB  3.3*   AP  277*   SGOT  25      Thyroid Studies No results found for: T4, T3U, TSH, TSHEXT     Procedures/imaging: see electronic medical records for all procedures/Xrays and details which were not copied into this note but were reviewed prior to creation of Leoncio Henderson MD

## 2017-08-11 NOTE — PROGRESS NOTES
Chart Reviewed. Noted patient admitted to the hospital for further medical management. Care Management to follow up with patient and/or family for transition of care needs prn. Readmission Risk Assessment:     High Risk and [facility] patients    RRAT Score:  21 or greater    Initial Assessment:  64 y.o. female who presents via EMS to the emergency department C/O nausea and vomiting x 2 days. Admitted for medical management of dehydration     Emergency Contact:  See face sheet    Pertinent Medical Hx:    Arthritis, CAD, CABG, chronic pain, COPD, DM,GERD, HTN    PCP/Specialists:  Carlos Del Rio     [facility] Services:      DME:     [address] [facility], quad walker rollator    High Risk Care Transition Plan:  1. Evaluate for [facility] or [facility], SNF, acute rehab, community care coordination of Resources. 2. Involve patient/caregiver in assessment, planning, education and implement of intervention. 3. CM daily patient care huddles/interdisciplinary rounds. 4. PCP/Specialist appointment within 48 hours of discharge unless otherwise specified. 5. Facilitate transportation and logistics for follow-up appointments. 6. Medication reconciliation [id number]  7. Discharge follow-up phone call within 2  4 days (NN, [employer], Nursing) CM follow up as assigned. 8. Formal handoff between hospital provider and post-acute provider to transition patient  9. Handoff to [address] Nurse Navigator or PCP practice. Care Management Interventions  PCP Verified by CM:  Yes  Transition of Care Consult (CM Consult): Discharge Planning     Care manager available and will assist with safe transition of care

## 2017-08-12 ENCOUNTER — APPOINTMENT (OUTPATIENT)
Dept: GENERAL RADIOLOGY | Age: 77
DRG: 683 | End: 2017-08-12
Attending: INTERNAL MEDICINE
Payer: MEDICARE

## 2017-08-12 VITALS
RESPIRATION RATE: 20 BRPM | TEMPERATURE: 98.5 F | SYSTOLIC BLOOD PRESSURE: 164 MMHG | HEIGHT: 59 IN | OXYGEN SATURATION: 94 % | BODY MASS INDEX: 28.13 KG/M2 | WEIGHT: 139.55 LBS | DIASTOLIC BLOOD PRESSURE: 55 MMHG | HEART RATE: 60 BPM

## 2017-08-12 LAB
ALBUMIN SERPL BCP-MCNC: 3.1 G/DL (ref 3.4–5)
ALBUMIN/GLOB SERPL: 0.8 {RATIO} (ref 0.8–1.7)
ALP SERPL-CCNC: 266 U/L (ref 45–117)
ALT SERPL-CCNC: 13 U/L (ref 13–56)
ANION GAP BLD CALC-SCNC: 7 MMOL/L (ref 3–18)
AST SERPL W P-5'-P-CCNC: 22 U/L (ref 15–37)
BILIRUB SERPL-MCNC: 0.6 MG/DL (ref 0.2–1)
BUN SERPL-MCNC: 14 MG/DL (ref 7–18)
BUN/CREAT SERPL: 11 (ref 12–20)
CALCIUM SERPL-MCNC: 9.6 MG/DL (ref 8.5–10.1)
CHLORIDE SERPL-SCNC: 106 MMOL/L (ref 100–108)
CO2 SERPL-SCNC: 29 MMOL/L (ref 21–32)
CREAT SERPL-MCNC: 1.29 MG/DL (ref 0.6–1.3)
GLOBULIN SER CALC-MCNC: 3.9 G/DL (ref 2–4)
GLUCOSE BLD STRIP.AUTO-MCNC: 116 MG/DL (ref 70–110)
GLUCOSE BLD STRIP.AUTO-MCNC: 85 MG/DL (ref 70–110)
GLUCOSE SERPL-MCNC: 111 MG/DL (ref 74–99)
POTASSIUM SERPL-SCNC: 4.3 MMOL/L (ref 3.5–5.5)
PROT SERPL-MCNC: 7 G/DL (ref 6.4–8.2)
SODIUM SERPL-SCNC: 142 MMOL/L (ref 136–145)

## 2017-08-12 PROCEDURE — 74011250637 HC RX REV CODE- 250/637: Performed by: INTERNAL MEDICINE

## 2017-08-12 PROCEDURE — 74011250636 HC RX REV CODE- 250/636: Performed by: HOSPITALIST

## 2017-08-12 PROCEDURE — 80053 COMPREHEN METABOLIC PANEL: CPT | Performed by: INTERNAL MEDICINE

## 2017-08-12 PROCEDURE — 74011250637 HC RX REV CODE- 250/637: Performed by: HOSPITALIST

## 2017-08-12 PROCEDURE — 36415 COLL VENOUS BLD VENIPUNCTURE: CPT | Performed by: INTERNAL MEDICINE

## 2017-08-12 PROCEDURE — 97530 THERAPEUTIC ACTIVITIES: CPT

## 2017-08-12 PROCEDURE — 97116 GAIT TRAINING THERAPY: CPT

## 2017-08-12 PROCEDURE — 74011636637 HC RX REV CODE- 636/637: Performed by: HOSPITALIST

## 2017-08-12 PROCEDURE — 82962 GLUCOSE BLOOD TEST: CPT

## 2017-08-12 PROCEDURE — 74022 RADEX COMPL AQT ABD SERIES: CPT

## 2017-08-12 RX ORDER — FUROSEMIDE 40 MG/1
40 TABLET ORAL DAILY
Qty: 30 TAB | Refills: 0 | Status: SHIPPED | OUTPATIENT
Start: 2017-08-12

## 2017-08-12 RX ORDER — AMLODIPINE BESYLATE 10 MG/1
10 TABLET ORAL DAILY
Qty: 30 TAB | Refills: 0 | Status: SHIPPED | OUTPATIENT
Start: 2017-08-12 | End: 2018-02-23 | Stop reason: DRUGHIGH

## 2017-08-12 RX ORDER — DOCUSATE SODIUM 100 MG/1
100 CAPSULE, LIQUID FILLED ORAL 2 TIMES DAILY
Qty: 60 CAP | Refills: 0 | Status: SHIPPED | OUTPATIENT
Start: 2017-08-12 | End: 2018-02-23

## 2017-08-12 RX ADMIN — OMEPRAZOLE 20 MG: 20 CAPSULE, DELAYED RELEASE ORAL at 09:01

## 2017-08-12 RX ADMIN — INSULIN GLARGINE 10 UNITS: 100 INJECTION, SOLUTION SUBCUTANEOUS at 13:06

## 2017-08-12 RX ADMIN — CHOLECALCIFEROL TAB 10 MCG (400 UNIT) 400 UNITS: 10 TAB at 09:02

## 2017-08-12 RX ADMIN — POLYETHYLENE GLYCOL 3350 17 G: 17 POWDER, FOR SOLUTION ORAL at 09:07

## 2017-08-12 RX ADMIN — ACETAMINOPHEN 650 MG: 325 TABLET ORAL at 02:35

## 2017-08-12 RX ADMIN — DOCUSATE SODIUM 100 MG: 100 CAPSULE, LIQUID FILLED ORAL at 09:02

## 2017-08-12 RX ADMIN — METOPROLOL SUCCINATE 100 MG: 100 TABLET, EXTENDED RELEASE ORAL at 09:02

## 2017-08-12 RX ADMIN — Medication 100 MG: at 09:01

## 2017-08-12 RX ADMIN — AMLODIPINE BESYLATE 10 MG: 5 TABLET ORAL at 09:01

## 2017-08-12 RX ADMIN — HEPARIN SODIUM 5000 UNITS: 5000 INJECTION, SOLUTION INTRAVENOUS; SUBCUTANEOUS at 07:02

## 2017-08-12 NOTE — PROGRESS NOTES
Problem: Mobility Impaired (Adult and Pediatric)  Goal: *Acute Goals and Plan of Care (Insert Text)  Physical Therapy Goals  Initiated 8/10/2017 and to be accomplished within 3-7 day(s)  1. Patient will move from supine to sit and sit to supine in bed with modified independence. 2. Patient will transfer from bed to chair and chair to bed with modified independence using the least restrictive device. 3. Patient will perform sit to stand with modified independence. 4. Patient will ambulate with supervision for 150 feet with the least restrictive device. 5. Patient will ascend/descend 4 stairs with B handrail(s) with contact guard. Outcome: Progressing Towards Goal  PHYSICAL THERAPY TREATMENT     Patient: Sofy Aiken (64 y.o. female)  Date: 8/12/2017  Diagnosis: PENELOPE (acute kidney injury) ([facility] [address]  Dizziness  Dehydration  PENELOPE (acute kidney injury) ([facility] [address]  Dehydration  Drowsiness  Hypoglycemia Dehydration       Precautions: Fall   Chart, physical therapy assessment, plan of care and goals were reviewed. ASSESSMENT:  Pt is fairly mobile, notes that she has ramp to enter home and demonstrates enough safety for D/c. SPO2 of 93-94% on room air. Daughter is present and comfortable with pt's ability. Will continue to follow, if D/c is held for some reason   Progression toward goals:  [ ]      Improving appropriately and progressing toward goals  [X]      Improving slowly and progressing toward goals  [ ]      Not making progress toward goals and plan of care will be adjusted       PLAN:  Patient continues to benefit from skilled intervention to address the above impairments. Continue treatment per established plan of care.   Discharge Recommendations:  Home Health  Further Equipment Recommendations for Discharge:  bedside commode and rolling walker       SUBJECTIVE:   Patient stated I want to get out of .      OBJECTIVE DATA SUMMARY:   Critical Behavior:  Neurologic State: Alert  Orientation Level: Oriented X4  Cognition: Appropriate decision making, Appropriate for age attention/concentration, Appropriate safety awareness, Follows commands  Functional Mobility Training:  Bed Mobility:  Rolling: Supervision  Supine to Sit: Supervision  Sit to Supine: Supervision  Transfers:  Sit to Stand: Contact guard assistance  Stand to Sit: Contact guard assistance  Balance:  Sitting: Intact  Standing: Intact; With support  Ambulation/Gait Training:  Distance (ft): 200 Feet (ft)  Assistive Device: Gait belt;Walker, rolling  Ambulation - Level of Assistance: Contact guard assistance  Gait Abnormalities: Decreased step clearance;Shuffling gait  Base of Support: Widened  Stance: Time  Speed/Michelle: Slow  Step Length: Left shortened;Right shortened  Swing Pattern: Right asymmetrical;Left asymmetrical  Pain:  Pain Scale 1: Numeric (0 - 10)  Pain Intensity 1: 0  Pain Intervention(s) 1: Medication (see MAR)  Activity Tolerance:   Fair  Please refer to the flowsheet for vital signs taken during this treatment.   After treatment:   [ ] Patient left in no apparent distress sitting up in chair  [X] Patient left in no apparent distress in bed  [X] Call bell left within reach  [X] Nursing notified  [ ] Caregiver present  [ ] Bed alarm activated      Megan Torres PTA   Time Calculation: 34 mins

## 2017-08-12 NOTE — PROGRESS NOTES
2250:  Bedside and Verbal shift change report received from Aly Hodge RN (offgoing nurse) to Conor Butler RN (oncoming nurse). Report included the following information SBAR, Kardex, Intake/Output, MAR, Recent Results and Cardiac Rhythm AV Paced. Pt resting in bed. Appears to be in no distress at this time. Call bell within reach. Zone phone number given to pt. Pt instructed to call zone phone or call bell if needed. Pt instructed to call for assistance before ambulating.    0125:  Pt complains of pain in her feet, but refuses tylenol 650mg. Explained that the tramadol 50 mg isn't due until 0700. She said she \"can wait\".

## 2017-08-12 NOTE — ROUTINE PROCESS
Bedside and Verbal shift change report given to ADRIANO Estes (oncoming nurse) by Geary Phoenix, RN (offgoing nurse).  Report included the following information SBAR, Kardex, Intake/Output, MAR, Recent Results, Med Rec Status and Cardiac Rhythm AV Paced

## 2017-08-12 NOTE — PROGRESS NOTES
Cm notified by nurse Sheldon Perkins of patients discharge,PT and  recommendation for home health, cm spoke with daughter via phone conversation family and patient active with home health agency but doesn't recall name,cm noted from last visit this yr in July pt was discharged with [facility] services,cm also verified with [facility] from [address] pt is still active,patent requested to resume services with company,daughter informed cm that pt has home rolling walker and bedside commode,daughter verified that pt also receives personal care services thru [facility] [employer] in Home which will also resume via family arrangements,[facility] home [facility] aware of todays discharge and will contacting patient at home to resume services. Daughter will be scheduling patients follow up appointments and driving pt home. Care Management Interventions  PCP Verified by CM: Yes  Palliative Care Consult (Criteria: CHF and RRAT>21): No  Reason for No Palliative Care Consult: Other (see comment)  Mode of Transport at Discharge:  (daughter)  Transition of Care Consult (CM Consult): [address]: No  Reason Outside [place]: Patient already serviced by other home care/hospice agency ([facility])  Discharge Durable Medical Equipment: No  Health Maintenance Reviewed: Yes  Physical Therapy Consult: Yes  Occupational Therapy Consult: No  Speech Therapy Consult: No  Current Support Network:  Other (daughters)  Confirm Follow Up Transport: Family  Plan discussed with Pt/Family/Caregiver: Yes  Freedom of Choice Offered: Yes  Discharge Location  Discharge Placement: Home with home health

## 2017-08-12 NOTE — ROUTINE PROCESS
0730 assumed care of pt after bedside verbal report was given by off going nurse, pt asleep in bed quietly, no acute distress noted, will monitor     0910 pt off unit for xray    0942 pt back from xray    1530 pt discharged to home, instructions given on medication, follow up appointment, opportunity given for questions, avs reviewed with silva armendariz

## 2017-08-12 NOTE — PROGRESS NOTES
1530: Care assumed of pt at this time from [facility]. No distress noted. Pt resting quietly. No c/o pain will monitor. 1700: Pt having frequent incontinent episodes. Asking for [place]wick. [address] [facility] [address]. Pt tolerated well. Will monitor. 2000: Pt resting well. Family members at bedside. Will monitor. 2300: Bedside and Verbal shift change report given to Emmanuel OH (oncoming nurse) by Susana Isaacs RN   (offgoing nurse). Report included the following information SBAR, Kardex and MAR.

## 2017-08-12 NOTE — DISCHARGE INSTRUCTIONS
Dehydration: Care Instructions  Your Care Instructions  Dehydration happens when your body loses too much fluid. This might happen when you do not drink enough water or you lose large amounts of fluids from your body because of diarrhea, vomiting, or sweating. Severe dehydration can be life-threatening. Water and minerals called electrolytes help put your body fluids back in balance. Learn the early signs of fluid loss, and drink more fluids to prevent dehydration. Follow-up care is a key part of your treatment and safety. Be sure to make and go to all appointments, and call your doctor if you are having problems. It's also a good idea to know your test results and keep a list of the medicines you take. How can you care for yourself at home? · To prevent dehydration, drink plenty of fluids, enough so that your urine is light yellow or clear like water. Choose water and other caffeine-free clear liquids until you feel better. If you have kidney, heart, or liver disease and have to limit fluids, talk with your doctor before you increase the amount of fluids you drink. · If you do not feel like eating or drinking, try taking small sips of water, sports drinks, or other rehydration drinks. · Get plenty of rest.  To prevent dehydration  · Add more fluids to your diet and daily routine, unless your doctor has told you not to. · During hot weather, drink more fluids. Drink even more fluids if you exercise a lot. Stay away from drinks with alcohol or caffeine. · Watch for the symptoms of dehydration. These include:  ¨ A dry, sticky mouth. ¨ Dark yellow urine, and not much of it. ¨ Dry and sunken eyes. ¨ Feeling very tired. · Learn what problems can lead to dehydration. These include:  ¨ Diarrhea, fever, and vomiting. ¨ Any illness with a fever, such as pneumonia or the flu. ¨ Activities that cause heavy sweating, such as endurance races and heavy outdoor work in hot or humid weather.   ¨ Alcohol or drug abuse or withdrawal.  ¨ Certain medicines, such as cold and allergy pills (antihistamines), diet pills (diuretics), and laxatives. ¨ Certain diseases, such as diabetes, cancer, and heart or kidney disease. When should you call for help? Call 911 anytime you think you may need emergency care. For example, call if:  · You passed out (lost consciousness). Call your doctor now or seek immediate medical care if:  · You are confused and cannot think clearly. · You are dizzy or lightheaded, or you feel like you may faint. · You have signs of needing more fluids. You have sunken eyes and a dry mouth, and you pass only a little dark urine. · You cannot keep fluids down. Watch closely for changes in your health, and be sure to contact your doctor if:  · You are not making tears. · Your skin is very dry and sags slowly back into place after you pinch it. · Your mouth and eyes are very dry. Where can you learn more? Go to http://armida-lou.info/. Enter U380 in the search box to learn more about \"Dehydration: Care Instructions. \"  Current as of: March 20, 2017  Content Version: 11.3  © 0752-7548 [employer]. Care instructions adapted under license by [employer] (which disclaims liability or warranty for this information). If you have questions about a medical condition or this instruction, always ask your healthcare professional. [place] [address] any warranty or liability for your use of this information.     DISCHARGE SUMMARY from Nurse    The following personal items are in your possession at time of discharge:       Visual Aid: Glasses, With patient  Hearing Aids/Status: Does not own                         PATIENT INSTRUCTIONS:    After general anesthesia or intravenous sedation, for 24 hours or while taking prescription Narcotics:  · Limit your activities  · Do not drive and operate hazardous machinery  · Do not make important personal or business decisions  · Do  not drink alcoholic beverages  · If you have not urinated within 8 hours after discharge, please contact your surgeon on call. Report the following to your surgeon:  · Excessive pain, swelling, redness or odor of or around the surgical area  · Temperature over 100.5  · Nausea and vomiting lasting longer than 4 hours or if unable to take medications  · Any signs of decreased circulation or nerve impairment to extremity: change in color, persistent  numbness, tingling, coldness or increase pain  · Any questions        What to do at Home:  Recommended activity: Activity as tolerated. If you experience any of the following symptoms chest pain, difficulty breathing, changes in mental status, increased weakness/dizziness, bleeding/dark tarry stools please call 911. *  Please give a list of your current medications to your Primary Care Provider. *  Please update this list whenever your medications are discontinued, doses are      changed, or new medications (including over-the-counter products) are added. *  Please carry medication information at all times in case of emergency situations. These are general instructions for a healthy lifestyle:    No smoking/ No tobacco products/ Avoid exposure to second hand smoke    Surgeon General's Warning:  Quitting smoking now greatly reduces serious risk to your health. Obesity, smoking, and sedentary lifestyle greatly increases your risk for illness    A healthy diet, regular physical exercise & weight monitoring are important for maintaining a healthy lifestyle    You may be retaining fluid if you have a history of heart failure or if you experience any of the following symptoms:  Weight gain of 3 pounds or more overnight or 5 pounds in a week, increased swelling in our hands or feet or shortness of breath while lying flat in bed.   Please call your doctor as soon as you notice any of these symptoms; do not wait until your next office visit. Recognize signs and symptoms of STROKE:    F-face looks uneven    A-arms unable to move or move unevenly    S-speech slurred or non-existent    T-time-call 911 as soon as signs and symptoms begin-DO NOT go       Back to bed or wait to see if you get better-TIME IS BRAIN. Warning Signs of HEART ATTACK     Call 911 if you have these symptoms:   Chest discomfort. Most heart attacks involve discomfort in the center of the chest that lasts more than a few minutes, or that goes away and comes back. It can feel like uncomfortable pressure, squeezing, fullness, or pain.  Discomfort in other areas of the upper body. Symptoms can include pain or discomfort in one or both arms, the back, neck, jaw, or stomach.  Shortness of breath with or without chest discomfort.  Other signs may include breaking out in a cold sweat, nausea, or lightheadedness. Don't wait more than five minutes to call 911 - MINUTES MATTER! Fast action can save your life. Calling 911 is almost always the fastest way to get lifesaving treatment. Emergency Medical Services staff can begin treatment when they arrive -- up to an hour sooner than if someone gets to the hospital by car. The discharge information has been reviewed with the patient. The patient verbalized understanding. Discharge medications reviewed with the patient and appropriate educational materials and side effects teaching were provided. Patient armband removed and shredded      Lab Results   Component Value Date/Time    Hemoglobin A1c 6.9 08/10/2017 01:15 PM     This lab test reflects that your blood sugar averaged 151 mg/dl over the past 3 months. It is important to follow up with your provider on a routine basis to continue to evaluate your blood sugar and discuss any necessary changes in treatment.

## 2017-08-12 NOTE — PROGRESS NOTES
Problem: Falls - Risk of  Goal: *Absence of Falls  Document Baljinder Fall Risk and appropriate interventions in the flowsheet.    Outcome: Progressing Towards Goal  Fall Risk Interventions:        Mentation Interventions: Adequate sleep, hydration, pain control     Medication Interventions: Evaluate medications/consider consulting pharmacy     Elimination Interventions: Call light in reach     History of Falls Interventions: Consult care management for discharge planning, Door open when patient unattended, Evaluate medications/consider consulting pharmacy, Room close to nurse's station, Utilize gait belt for transfer/ambulation

## 2017-08-12 NOTE — ROUTINE PROCESS
Bedside and Written shift change report given to MIKE Martin (oncoming nurse) by Ester Dawson RN   (offgoing nurse). Report included the following information SBAR, Kardex, Intake/Output, MAR, Accordion, Recent Results, Med Rec Status and Alarm Parameters .

## 2017-08-12 NOTE — DISCHARGE SUMMARY
Discharge Summary    Patient: Alicia Viveros MRN: [id number]  CSN: [id number]    YOB: 1940  Age: 64 y.o.   Sex: female    DOA: 8/9/2017 LOS:  LOS: 2 days   Discharge Date: 8/12/2017     Primary Care Provider:  Urbano Teixeira MD    Admission Diagnoses: PENELOPE (acute kidney injury) [facility])  Dizziness  Dehydration  PENELOPE (acute kidney injury) ([facility]  Dehydration  Drowsiness  Hypoglycemia    Discharge Diagnoses:    Problem List as of 8/12/2017  Date Reviewed: 8/10/2017          Codes Class Noted - Resolved    * (Principal)Dehydration ICD-10-CM: E86.0  ICD-9-CM: 276.51  8/10/2017 - Present        Dizziness ICD-10-CM: R42  ICD-9-CM: 780.4  8/10/2017 - Present        PENELOPE (acute kidney injury) ([facility] ICD-10-CM: N17.9  ICD-9-CM: 584.9  8/10/2017 - Present        Hypoglycemia ICD-10-CM: E16.2  ICD-9-CM: 251.2  8/10/2017 - Present        Elevated brain natriuretic peptide (BNP) level ICD-10-CM: R79.89  ICD-9-CM: 790.99  8/10/2017 - Present        SDH (subdural hematoma) ([facility] ICD-10-CM: I62.00  ICD-9-CM: 432.1  7/18/2017 - Present        CHF (congestive heart failure) ([facility] ICD-10-CM: I50.9  ICD-9-CM: 428.0  2/28/2016 - Present        DM2 (diabetes mellitus, type 2) ([facility] ICD-10-CM: E11.9  ICD-9-CM: 250.00  10/30/2014 - Present        Polyneuropathy in diabetes ([facility] ICD-10-CM: E11.42  ICD-9-CM: 357.2  10/30/2014 - Present        S/P CABG (coronary artery bypass graft) (Chronic) ICD-10-CM: Z95.1  ICD-9-CM: V45.81  7/11/2011 - Present        RESOLVED: Drowsy ICD-10-CM: R40.0  ICD-9-CM: 780.09  8/10/2017 - 8/10/2017        RESOLVED: Drowsiness ICD-10-CM: R40.0  ICD-9-CM: 780.09  8/10/2017 - 8/10/2017        RESOLVED: Carpal tunnel syndrome on both sides ICD-10-CM: G56.03  ICD-9-CM: 354.0  10/30/2014 - 8/10/2017        RESOLVED: Ulnar neuropathy of both upper extremities, L>R ICD-10-CM: G56.23  ICD-9-CM: 354.2  10/30/2014 - 8/10/2017        RESOLVED: Unspecified hereditary and idiopathic peripheral neuropathy ICD-10-CM: G60.9  ICD-9-CM: 356.9  10/30/2014 - 8/10/2017        RESOLVED: Dyspnea ICD-10-CM: R06.00  ICD-9-CM: 786.09  8/18/2014 - 8/10/2017        RESOLVED: Sinus node dysfunction (HCC) ICD-10-CM: I49.5  ICD-9-CM: 427.81  7/11/2011 - 8/10/2017        RESOLVED: Angina pectoris, unstable ([facility] ICD-10-CM: I20.0  ICD-9-CM: 411.1  7/11/2011 - 8/10/2017        RESOLVED: CAD (coronary artery disease) (Chronic) ICD-10-CM: I25.10  ICD-9-CM: 414.00  7/11/2011 - 8/18/2014              Discharge Medications:     Discharge Medication List as of 8/12/2017  1:53 PM      START taking these medications    Details   docusate sodium (COLACE) 100 mg capsule Take 1 Cap by mouth two (2) times a day., Print, Disp-60 Cap, R-0         CONTINUE these medications which have CHANGED    Details   amLODIPine (NORVASC) 10 mg tablet Take 1 Tab by mouth daily. Indications: hypertension, Print, Disp-30 Tab, R-0      furosemide (LASIX) 40 mg tablet Take 1 Tab by mouth daily. , Print, Disp-30 Tab, R-0         CONTINUE these medications which have NOT CHANGED    Details   gabapentin (NEURONTIN) 300 mg capsule Take 300 mg by mouth three (3) times daily as needed., Historical Med      polyethylene glycol (MIRALAX) 17 gram packet Take 17 g by mouth daily. Indications: constipation, Historical Med      acetaminophen (TYLENOL) 500 mg tablet Take 1 Tab by mouth every six (6) hours as needed. , Print, Disp-40 Tab, R-0      traMADol (ULTRAM) 50 mg tablet Take 1 Tab by mouth every six (6) hours as needed for Pain. Max Daily Amount: 200 mg., Print, Disp-40 Tab, R-0      ondansetron hcl (ZOFRAN, AS HYDROCHLORIDE,) 8 mg tablet Take 1 Tab by mouth every eight (8) hours as needed for Nausea. , Print, Disp-20 Tab, R-0      albuterol (PROVENTIL HFA, VENTOLIN HFA, PROAIR HFA) 90 mcg/actuation inhaler Take 2 Puffs by inhalation every four (4) hours as needed for Wheezing., Print, Disp-1 Inhaler, R-0      omeprazole (PRILOSEC) 20 mg capsule Take 20 mg by mouth daily. , Historical Med      co-enzyme Q-10 (COQ-10) 100 mg capsule Take 1 capsule by mouth daily. , Normal, Disp-100 capsule, R-2      insulin aspart protamine (NOVOLOG MIX 70/30) 100 unit/mL (70-30) injection 22 units before breakfast and 22 units before supper, Normal, Disp-20 mL, R-10      metoprolol-XL (TOPROL-XL) 100 mg XL tablet Take 100 mg by mouth daily. , Historical Med      cholecalciferol (VITAMIN D3) 400 unit tab tablet Take  by mouth daily. , Historical Med      pramipexole (MIRAPEX) 0.125 mg tablet Take 0.125 mg by mouth nightly., Historical Med         STOP taking these medications       lisinopril (PRINIVIL, ZESTRIL) 40 mg tablet Comments:   Reason for Stopping:               Discharge Condition: Good    Procedures : none    Consults: None      PHYSICAL EXAM   Visit Vitals    /55 (BP 1 Location: Left arm, BP Patient Position: At rest;Sitting)    Pulse 60    Temp 98.5 °F (36.9 °C)    Resp 20    Ht 4' 11\" (1.499 m)    Wt 63.3 kg (139 lb 8.8 oz)    SpO2 94%    Breastfeeding No    BMI 28.19 kg/m2     General: Awake, cooperative, no acute distress    HEENT: NC, Atraumatic. PERRLA, EOMI. Anicteric sclerae. Lungs:  CTA Bilaterally. No Wheezing/Rhonchi/Rales. Heart:  Regular  rhythm,  No murmur, No Rubs, No Gallops  Abdomen: Soft, Non distended, Non tender. +Bowel sounds,   Extremities: No c/c/e  Psych:   Not anxious or agitated. Neurologic:  No acute neurological deficits. Admission HPI : Erika Lawrence is a 64 y.o. female who comes to the ED for evaluation of nausea and vomiting. Patient is drowsy at this time therefore  per [facility] who is at [address] tells me that the patient had suffered from a subdural hematoma last month secondary to the fall and broke her denture. She was doing well in terms of subdural hemorrhage but due to her broken dentures her appetite had been extremely poor. She is typically AAOx3 and able to ambulate on her own. She is compliant with her meds. BUt this last week she ran out of her medications including lasix therefore did not take it on Sunday and Monday. She called her PCP who advised her to double the dose for Tuesday and added another diuretic per the granddaughter but cannot recall medication name at this time. Patient took it on Tuesday but did not again On Wednesday. She continued to have nausea and one or two episodes NBNB vomiting during this time therefore came to the ED. In the ED patient had CT head done which was negative. Her Labs showed hypoglycemia and elevated Cr. She was given 1amp of D50 which improved her BS. She was also given phenergan for nausea but since then she has became increasingly drowsy yet arousable.      Patient doesn't seem to have any hx of urinary retention or other known renal issues. No recent changes in her insulin dosages. Patient is drowsy at this time and doesn't have any complaints. Hospital Course :     Prerenal renal failure:  admitted to telemetry for gentle hydration with caution in light of her history of CHF. Nephrotoxic drugs were discontinued and renal function was followed with serial metabolic panel measurements which gradually improved and were normal prior to discharge. Patient discharge creatinine was 1.29. Patient's clinical presentation was thought secondary to dehydration. Upon discharge she will be started on a reduced dose of Lasix at 40 mg daily. On the day of discharge the patient had stable vital signs and laboratories and was discharged home in improved condition.     Activity: Activity as tolerated    Diet: Cardiac Diet    Follow-up: in one week with primary care    Disposition: home    Minutes spent on discharge: 40       Labs: Results:       Chemistry Recent Labs      08/12/17   0425  08/11/17   0206  08/10/17   0900   GLU  111*  83  84   NA  142  142  144   K  4.3  4.8  5.0   CL  106  107  108   CO2  29  27  29   BUN  14  23*  23*   CREA  1.29  1.47* 1.51*   CA  9.6  9.5  8.8   AGAP  7  8  7   BUCR  11*  16  15   AP  266*  277*  281*   TP  7.0  7.4  6.8   ALB  3.1*  3.3*  3.3*   GLOB  3.9  4.1*  3.5   AGRAT  0.8  0.8  0.9      CBC w/Diff Recent Labs      08/11/17 0206 08/09/17 2000   WBC  6.3  5.7   RBC  3.87*  3.93*   HGB  11.1*  11.2*   HCT  35.0  35.2   PLT  202  208   GRANS  74*  79*   LYMPH  16*  14*   EOS  2  1      Cardiac Enzymes Recent Labs      08/09/17 2000   CPK  93   CKND1  CALCULATION NOT PERFORMED WHEN RESULT IS BELOW LINEAR LIMIT      Coagulation Recent Labs      08/11/17 0206 08/09/17 2000   PTP  13.7  14.0   INR  1.1  1.1   APTT  34.7   --        Lipid Panel Lab Results   Component Value Date/Time    Cholesterol, total 96 07/10/2011 04:30 AM    HDL Cholesterol 46 07/10/2011 04:30 AM    LDL, calculated 31 07/10/2011 04:30 AM    VLDL, calculated 19 07/10/2011 04:30 AM    Triglyceride 95 07/10/2011 04:30 AM    CHOL/HDL Ratio 2.1 07/10/2011 04:30 AM      BNP No results for input(s): BNPP in the last 72 hours. Liver Enzymes Recent Labs      08/12/17   0425   TP  7.0   ALB  3.1*   AP  266*   SGOT  22      Thyroid Studies No results found for: T4, T3U, TSH, TSHEXT         Significant Diagnostic Studies: Xr Abd Acute W 1 V Chest    Result Date: 8/12/2017  Acute abdominal series, including frontal chest radiograph and supine and upright views of the abdomen Comparison: 8/9/2017 Indication: Abdominal pain. Findings: Stable enlargement of cardiac silhouette. Pacer leads unchanged. No visible pleural abnormality. Increased reticular opacification in the lungs. No consolidation. No free intraperitoneal air. Gaseous distention of bowel segments in the left abdomen likely colonic. Overall there is a nonobstructive pattern. Moderate amount of colonic stool. Calcifications in the pelvis likely fibroids. No acute osseous findings. IMPRESSION: 1. Nonobstructive bowel gas pattern. Moderate amount of proximal colonic stool.  2. Increased reticulation in the lungs, possibly mild edema. Xr Abd Acute W 1 V Chest    Result Date: 8/10/2017  ACUTE ABDOMINAL SERIES: Indication: Nausea and vomiting. Comparison:  None. --- ABDOMEN --- Supine and erect  views, 3 films. No evidence of bowel obstruction or free intraperitoneal air. Moderate to large amount of stool within the hepatic flexure and proximal transverse colon. Coarse calcifications in the right para midline lower pelvis may represent calcified fibroids. Moderate to marked lower lumbar spondylosis. --- CHEST --- PA view. Left subclavian biventricular pacer. Prior sternotomy and CABG. The lungs are expanded without focal consolidation, pulmonary edema or pneumothorax. Blunting of both lateral costophrenic angles may represent trace pleural effusions or thickening. The cardiac silhouette is mildly enlarged. IMPRESSION: 1. No evidence of bowel obstruction or free intraperitoneal air. 2. Moderate to large amount stool within the hepatic flexure and proximal transverse colon. 3. Suspect calcified fibroids. 4. Trace pleural effusions or thickening. Otherwise, no acute cardiopulmonary disease. Mild cardiomegaly. Ct Head Wo Cont    Result Date: 8/10/2017  EXAM: CT head INDICATION: Dizziness. COMPARISON: None. TECHNIQUE: Axial CT imaging of the head was performed without intravenous contrast. Dose reduction techniques used: automated exposure control, adjustment of the mAs and/or kVp according to patient size, and iterative reconstruction techniques. _______________ FINDINGS: The images are motion degraded. BRAIN AND POSTERIOR FOSSA: There is cerebral volume loss with prominence of the lateral and the third ventricles. The cortical sulci are widened appropriately. The fourth ventricle and basal cisterns are normally outlined. There is mild bilateral periventricular and central white matter diminished attenuation. Basal ganglia and cerebellar calcifications are noted. No acute hemorrhage is seen. No definite territorial defect is identified. EXTRA-AXIAL SPACES AND MENINGES: There are no abnormal extra-axial fluid collections. CALVARIUM: The calvarium is heterogeneous with scattered sclerotic areas are seen measuring up to 2 cm. SINUSES: Clear. OTHER: None. _______________     IMPRESSION: Motion degraded study. Cerebral volume loss and mild periventricular and central white matter diminished attenuation likely microvascular disease. No definite acute intracranial abnormality. Heterogeneous calvarium with scattered sclerotic areas. Correlation needed as metastatic disease may have this appearance. Ct Head Wo Cont    Result Date: 7/18/2017  EXAM:  CT HEAD WITHOUT CONTRAST INDICATION: Follow-up subdural hematoma. COMPARISON: 7/17/2017. CONTRAST: None. TECHNIQUE: Unenhanced CT of the head was performed using 5 mm images. Brain and bone windows were generated. Sagittal and coronal reformations were generated. CT dose reduction was achieved through use of a standardized protocol tailored for this examination and automatic exposure control for dose modulation. CT dose reduction was achieved through use of a standardized protocol tailored for this examination and automatic exposure control for dose modulation. FINDINGS: The ventricles and sulci are normal in size, shape and configuration and midline. There is no significant white matter disease. The tiny right lateral and left posterior parietal subdural hematomas are unchanged. There is no mass effect or midline shift. There is no new intracranial hemorrhage. The basilar cisterns are open. No acute infarct is identified. There is some basal ganglia and cerebellar calcification which is unchanged. The bone windows demonstrate no abnormalities. The visualized portions of the paranasal sinuses and mastoid air cells are clear. IMPRESSION: Tiny subdural hematomas unchanged.       Ct Head Wo Cont    Addendum Date: 7/18/2017    Addendum: There is a convex area of hyperdensity along the right cerebral convexity on series 2 image 19. This measures 2 mm in thickness and 12 mm long. This likely represents a small subdural hematoma. This is new since 6/8/2014. This was discussed with Dr. Ginger Rothman at 12:45 AM on 7/18/2017. Result Date: 7/18/2017  EXAM:  CT HEAD WO CONT INDICATION:   fall, subdural COMPARISON: 6/8/2014. CONTRAST:  None. TECHNIQUE: Unenhanced CT of the head was performed using 5 mm images. Brain and bone windows were generated. CT dose reduction was achieved through use of a standardized protocol tailored for this examination and automatic exposure control for dose modulation. FINDINGS: The ventricles and sulci are normal in size, shape and configuration and midline. There is no significant white matter disease. Incidental calcifications are seen in the basal ganglia and dentate nucleus. There is no intracranial hemorrhage, extra-axial collection, mass, mass effect or midline shift. The basilar cisterns are open. No acute infarct is identified. The bone windows demonstrate no abnormalities. The visualized portions of the paranasal sinuses and mastoid air cells are clear. IMPRESSION: No acute process. No results found for this or any previous visit.         CC: Hawk Naylor MD

## 2017-08-13 LAB
ATRIAL RATE: 30 BPM
CALCULATED R AXIS, ECG10: -91 DEGREES
CALCULATED T AXIS, ECG11: 69 DEGREES
DIAGNOSIS, 93000: NORMAL
Q-T INTERVAL, ECG07: 524 MS
QRS DURATION, ECG06: 162 MS
QTC CALCULATION (BEZET), ECG08: 524 MS
VENTRICULAR RATE, ECG03: 60 BPM

## 2017-08-14 NOTE — PROGRESS NOTES
Occupational Therapy Screening:  Services are not indicated at this time. [facility] based on results obtained during the nursing admission assessment. The patients chart was reviewed and the patient was discharged home with Home Health services resumed. Thank you.   Renetta Ramirez, OTR/L

## 2017-08-16 ENCOUNTER — PATIENT OUTREACH (OUTPATIENT)
Dept: CASE MANAGEMENT | Age: 77
End: 2017-08-16

## 2017-08-23 ENCOUNTER — PATIENT OUTREACH (OUTPATIENT)
Dept: CASE MANAGEMENT | Age: 77
End: 2017-08-23

## 2017-08-23 NOTE — PROGRESS NOTES
Goals Addressed      Attends follow-up appointments as directed. 17  Patient will scheduled a follow up appointment with PCP within the next two days. [facility]    17  NN called Dr. Kelby Mabry office at [facility]. Spoke to Shauna Lennox, verified patient's name, address and . Shauna Lennox states patient was originally scheduled for hospital follow up on 17. NN asked for physician's availability. No appointment for today, 17 or tomorrow, 17, but has an opening on 2017 at 2:30 pm.  [facility]    NN called patient, verified patient's name, address and . Patients still having right knee pain and swelling, but did not call PCP office for appointment. NN confirmed patient availability for PCP appointment scheduled on 17. YW    17  Spoke to patient today, verified patient's name, address and . Patient states still having right knee pain and swelling. YW    Patient states attended PCP follow up on 17. Patient states received an x-ray of the right knee and Tylenol for pain. Patient states will get a call with results of x-ray from PCP. Patient states Dr. Temo Bowman advised to rest, elevate, use ice to help reduce swelling and extra-strength Tylenol for pain. Patient states still using walker with ambulation. NN encouraged patient to use precautions with walking and movement. Patient states agreement and understanding. YW    Patient states home health visits twice weekly. YHW    17  Spoke to patient today, verified patient's name, address and . Patient states doing okay, still having right knee pain and swelling. Patient states has follow up visit with PCP, Dr. Pattie Austin today, 17. Patient states x-ray was negative. 17  Patient readmitted to [facility] on 17-17 for dehydration due to nausea, vomiting. Called patient, patient unavailable.   Spoke to patient's daughter, verified patient's name, address and .  Patient's daughter states patient is currently in the bathroom. Patient daughter states patient is eating and voiding normally. Patient's daughter states patient has a hospital follow up visit with PCP, Dr. Geneva Mcelroy on 17 and [facility] YW    17  Non-[facility] patient, patient completed NN confirmed hospital follow up visit to PCP, Dr. Kunal Newman on 17 for 17 discharge from [facility]. Patient has no follow up with specialty physician. [facility] on 17 with weekly visits. Patient was readmitted to [facility] on 17, NN confirmed followed up with PCP on 17. Home health reestablished. This patient has no further outreach by NN.   Jin Riggins

## 2017-09-17 ENCOUNTER — HOSPITAL ENCOUNTER (EMERGENCY)
Age: 77
Discharge: HOME | End: 2017-09-17
Attending: EMERGENCY MEDICINE | Admitting: EMERGENCY MEDICINE
Payer: MEDICARE

## 2017-09-17 ENCOUNTER — APPOINTMENT (OUTPATIENT)
Dept: GENERAL RADIOLOGY | Age: 77
End: 2017-09-17
Attending: EMERGENCY MEDICINE
Payer: MEDICARE

## 2017-09-17 VITALS
OXYGEN SATURATION: 100 % | DIASTOLIC BLOOD PRESSURE: 71 MMHG | BODY MASS INDEX: 30.23 KG/M2 | SYSTOLIC BLOOD PRESSURE: 178 MMHG | RESPIRATION RATE: 16 BRPM | HEART RATE: 60 BPM | TEMPERATURE: 97.7 F | WEIGHT: 149.69 LBS

## 2017-09-17 DIAGNOSIS — R06.02 SOB (SHORTNESS OF BREATH): Primary | ICD-10-CM

## 2017-09-17 DIAGNOSIS — I50.9 ACUTE ON CHRONIC CONGESTIVE HEART FAILURE, UNSPECIFIED CONGESTIVE HEART FAILURE TYPE: ICD-10-CM

## 2017-09-17 LAB
ALBUMIN SERPL-MCNC: 3.4 G/DL (ref 3.5–5)
ALBUMIN/GLOB SERPL: 0.9 {RATIO} (ref 1.1–2.2)
ALP SERPL-CCNC: 275 U/L (ref 45–117)
ALT SERPL-CCNC: 15 U/L (ref 12–78)
ANION GAP SERPL CALC-SCNC: 4 MMOL/L (ref 5–15)
AST SERPL-CCNC: 25 U/L (ref 15–37)
BASOPHILS # BLD: 0 K/UL (ref 0–0.1)
BASOPHILS NFR BLD: 0 % (ref 0–1)
BILIRUB SERPL-MCNC: 0.7 MG/DL (ref 0.2–1)
BNP SERPL-MCNC: 3781 PG/ML (ref 0–450)
BUN SERPL-MCNC: 16 MG/DL (ref 6–20)
BUN/CREAT SERPL: 15 (ref 12–20)
CALCIUM SERPL-MCNC: 8.9 MG/DL (ref 8.5–10.1)
CHLORIDE SERPL-SCNC: 106 MMOL/L (ref 97–108)
CK MB CFR SERPL CALC: 2.5 % (ref 0–2.5)
CK MB SERPL-MCNC: 1.6 NG/ML (ref 5–25)
CK SERPL-CCNC: 64 U/L (ref 26–192)
CO2 SERPL-SCNC: 30 MMOL/L (ref 21–32)
CREAT SERPL-MCNC: 1.04 MG/DL (ref 0.55–1.02)
EOSINOPHIL # BLD: 0.1 K/UL (ref 0–0.4)
EOSINOPHIL NFR BLD: 1 % (ref 0–7)
ERYTHROCYTE [DISTWIDTH] IN BLOOD BY AUTOMATED COUNT: 15.6 % (ref 11.5–14.5)
GLOBULIN SER CALC-MCNC: 4 G/DL (ref 2–4)
GLUCOSE SERPL-MCNC: 124 MG/DL (ref 65–100)
HCT VFR BLD AUTO: 37.2 % (ref 35–47)
HGB BLD-MCNC: 11.7 G/DL (ref 11.5–16)
LYMPHOCYTES # BLD: 1.1 K/UL (ref 0.8–3.5)
LYMPHOCYTES NFR BLD: 20 % (ref 12–49)
MCH RBC QN AUTO: 28.9 PG (ref 26–34)
MCHC RBC AUTO-ENTMCNC: 31.5 G/DL (ref 30–36.5)
MCV RBC AUTO: 91.9 FL (ref 80–99)
MONOCYTES # BLD: 0.4 K/UL (ref 0–1)
MONOCYTES NFR BLD: 8 % (ref 5–13)
NEUTS SEG # BLD: 3.9 K/UL (ref 1.8–8)
NEUTS SEG NFR BLD: 71 % (ref 32–75)
PLATELET # BLD AUTO: 198 K/UL (ref 150–400)
POTASSIUM SERPL-SCNC: 4 MMOL/L (ref 3.5–5.1)
PROT SERPL-MCNC: 7.4 G/DL (ref 6.4–8.2)
RBC # BLD AUTO: 4.05 M/UL (ref 3.8–5.2)
SODIUM SERPL-SCNC: 140 MMOL/L (ref 136–145)
TROPONIN I SERPL-MCNC: <0.04 NG/ML
WBC # BLD AUTO: 5.5 K/UL (ref 3.6–11)

## 2017-09-17 PROCEDURE — 83880 ASSAY OF NATRIURETIC PEPTIDE: CPT | Performed by: EMERGENCY MEDICINE

## 2017-09-17 PROCEDURE — 71020 XR CHEST PA LAT: CPT

## 2017-09-17 PROCEDURE — 74011250637 HC RX REV CODE- 250/637: Performed by: EMERGENCY MEDICINE

## 2017-09-17 PROCEDURE — 36415 COLL VENOUS BLD VENIPUNCTURE: CPT | Performed by: EMERGENCY MEDICINE

## 2017-09-17 PROCEDURE — 74011250636 HC RX REV CODE- 250/636: Performed by: EMERGENCY MEDICINE

## 2017-09-17 PROCEDURE — 99285 EMERGENCY DEPT VISIT HI MDM: CPT

## 2017-09-17 PROCEDURE — 84484 ASSAY OF TROPONIN QUANT: CPT | Performed by: EMERGENCY MEDICINE

## 2017-09-17 PROCEDURE — 80053 COMPREHEN METABOLIC PANEL: CPT | Performed by: EMERGENCY MEDICINE

## 2017-09-17 PROCEDURE — 82550 ASSAY OF CK (CPK): CPT | Performed by: EMERGENCY MEDICINE

## 2017-09-17 PROCEDURE — 85025 COMPLETE CBC W/AUTO DIFF WBC: CPT | Performed by: EMERGENCY MEDICINE

## 2017-09-17 PROCEDURE — 93005 ELECTROCARDIOGRAM TRACING: CPT

## 2017-09-17 PROCEDURE — 96374 THER/PROPH/DIAG INJ IV PUSH: CPT

## 2017-09-17 RX ORDER — FUROSEMIDE 80 MG/1
TABLET ORAL
Qty: 6 TAB | Refills: 0 | Status: SHIPPED | OUTPATIENT
Start: 2017-09-17 | End: 2017-09-20

## 2017-09-17 RX ORDER — FUROSEMIDE 10 MG/ML
80 INJECTION INTRAMUSCULAR; INTRAVENOUS ONCE
Status: COMPLETED | OUTPATIENT
Start: 2017-09-17 | End: 2017-09-17

## 2017-09-17 RX ORDER — FUROSEMIDE 10 MG/ML
40 INJECTION INTRAMUSCULAR; INTRAVENOUS
Status: DISCONTINUED | OUTPATIENT
Start: 2017-09-17 | End: 2017-09-17

## 2017-09-17 RX ORDER — AMLODIPINE BESYLATE 5 MG/1
5 TABLET ORAL
Status: COMPLETED | OUTPATIENT
Start: 2017-09-17 | End: 2017-09-17

## 2017-09-17 RX ADMIN — AMLODIPINE BESYLATE 5 MG: 5 TABLET ORAL at 14:42

## 2017-09-17 RX ADMIN — FUROSEMIDE 80 MG: 10 INJECTION, SOLUTION INTRAMUSCULAR; INTRAVENOUS at 15:30

## 2017-09-17 NOTE — ED NOTES
Pt given discharge instructions by Dr. Rosio Mc. Saline lock removed. Pt discharged via wheelchair. Accompanied by family. No acute distress at time of discharge.

## 2017-09-17 NOTE — ED PROVIDER NOTES
Patient is a 64 y.o. female presenting with shortness of breath. Shortness of Breath   This is a new problem. The average episode lasts 3 days. The problem occurs intermittently. The current episode started more than 2 days ago. Associated symptoms include leg swelling. Pertinent negatives include no fever, no headaches, no sore throat, no ear pain, no neck pain, no cough, no sputum production, no hemoptysis, no wheezing, no PND, no orthopnea, no chest pain, no syncope, no abdominal pain, no rash and no leg pain. Pt is a 64 y.o. F with h/o PENELOPE, chronic systolic CHF, CAD s/p CABG, DM2, COPD who presents with c/o shortness of breath, onset 3 days ago. Reports she called her cardiologist and recommended increase in her Lasix from 40mg to 80mg daily and she started doing this several days ago. She is on chronic 3L O2 at home. Denies any chest pain; she is currently on plavix for which she is taking as prescribed. She reports bilateral leg swelling and that her right leg is often more swollen than her left. Denies any calf pain, palpitations, lightheadness.   She states her last cath was over a year ago.     Past Medical History:   Diagnosis Date    Arthritis     CAD (coronary artery disease)     CABG, pacemaker    Chronic pain     COPD     Diabetes ([address]     GERD (gastroesophageal reflux disease)     Hypertension     Ill-defined condition     high cholesterol       Past Surgical History:   Procedure Laterality Date    CARDIAC SURG PROCEDURE UNLIST      3 CABG, Stents    HX PACEMAKER      July 2011    OR COLONOSCOPY FLX DX W/COLLJ SPEC WHEN PFRMD  11/10/2011         OR EGD TRANSORAL BIOPSY SINGLE/MULTIPLE  8/29/2012              Family History:   Problem Relation Age of Onset    Lung Disease Mother     Heart Disease Father     Hypertension Father     Diabetes Sister        Social History     Social History    Marital status:      Spouse name: N/A    Number of children: N/A    Years of education: N/A     Occupational History    Not on file. Social History Main Topics    Smoking status: Never Smoker    Smokeless tobacco: Never Used    Alcohol use No    Drug use: No    Sexual activity: Not on file     Other Topics Concern    Not on file     Social History Narrative         ALLERGIES: Percocet [oxycodone-acetaminophen]    Review of Systems   Constitutional: Negative. Negative for activity change, appetite change, chills, diaphoresis, fatigue and fever. HENT: Negative. Negative for ear pain and sore throat. Eyes: Negative. Respiratory: Positive for shortness of breath. Negative for apnea, cough, hemoptysis, sputum production, choking, chest tightness, wheezing and stridor. Cardiovascular: Positive for leg swelling. Negative for chest pain, orthopnea, syncope and PND. Gastrointestinal: Negative for abdominal pain. Musculoskeletal: Negative for neck pain. Skin: Negative for rash. Neurological: Negative for headaches. Vitals:    09/17/17 1223 09/17/17 1415   BP:  (!) 149/131   Pulse: 60 60   Resp: 16    Temp: 97.7 °F (36.5 °C)    SpO2: 97% 100%   Weight: 67.9 kg (149 lb 11.1 oz)             Physical Exam   Constitutional: She appears well-developed. HENT:   Head: Normocephalic. Eyes: Pupils are equal, round, and reactive to light. Cardiovascular: Normal rate and normal heart sounds. Pulmonary/Chest: No respiratory distress. She has no wheezes. She has rales. She exhibits no tenderness. Abdominal: Soft. She exhibits no distension. There is no tenderness. There is no rebound. Musculoskeletal: She exhibits edema. Bilateral LE edema from mid calf to feet bilaterally; R>L, baseline per patient; neg Herman's; non TTP   Neurological: She is alert. Skin: Skin is warm.       Recent Results (from the past 12 hour(s))   EKG, 12 LEAD, INITIAL    Collection Time: 09/17/17 12:39 PM   Result Value Ref Range    Ventricular Rate 60 BPM    Atrial Rate 62 BPM    P-R Interval 176 ms    QRS Duration 164 ms    Q-T Interval 482 ms    QTC Calculation (Bezet) 482 ms    Calculated P Axis -126 degrees    Calculated R Axis -95 degrees    Calculated T Axis 90 degrees    Diagnosis       AV dual-paced rhythm  Biventricular pacemaker detected  Abnormal ECG  When compared with ECG of 18-JUL-2017 00:03,  No significant change was found     CBC WITH AUTOMATED DIFF    Collection Time: 09/17/17  1:13 PM   Result Value Ref Range    WBC 5.5 3.6 - 11.0 K/uL    RBC 4.05 3.80 - 5.20 M/uL    HGB 11.7 11.5 - 16.0 g/dL    HCT 37.2 35.0 - 47.0 %    MCV 91.9 80.0 - 99.0 FL    MCH 28.9 26.0 - 34.0 PG    MCHC 31.5 30.0 - 36.5 g/dL    RDW 15.6 (H) 11.5 - 14.5 %    PLATELET 555 932 - 298 K/uL    NEUTROPHILS 71 32 - 75 %    LYMPHOCYTES 20 12 - 49 %    MONOCYTES 8 5 - 13 %    EOSINOPHILS 1 0 - 7 %    BASOPHILS 0 0 - 1 %    ABS. NEUTROPHILS 3.9 1.8 - 8.0 K/UL    ABS. LYMPHOCYTES 1.1 0.8 - 3.5 K/UL    ABS. MONOCYTES 0.4 0.0 - 1.0 K/UL    ABS. EOSINOPHILS 0.1 0.0 - 0.4 K/UL    ABS. BASOPHILS 0.0 0.0 - 0.1 K/UL   METABOLIC PANEL, COMPREHENSIVE    Collection Time: 09/17/17  1:13 PM   Result Value Ref Range    Sodium 140 136 - 145 mmol/L    Potassium 4.0 3.5 - 5.1 mmol/L    Chloride 106 97 - 108 mmol/L    CO2 30 21 - 32 mmol/L    Anion gap 4 (L) 5 - 15 mmol/L    Glucose 124 (H) 65 - 100 mg/dL    BUN 16 6 - 20 MG/DL    Creatinine 1.04 (H) 0.55 - 1.02 MG/DL    BUN/Creatinine ratio 15 12 - 20      GFR est AA >60 >60 ml/min/1.73m2    GFR est non-AA 51 (L) >60 ml/min/1.73m2    Calcium 8.9 8.5 - 10.1 MG/DL    Bilirubin, total 0.7 0.2 - 1.0 MG/DL    ALT (SGPT) 15 12 - 78 U/L    AST (SGOT) 25 15 - 37 U/L    Alk.  phosphatase 275 (H) 45 - 117 U/L    Protein, total 7.4 6.4 - 8.2 g/dL    Albumin 3.4 (L) 3.5 - 5.0 g/dL    Globulin 4.0 2.0 - 4.0 g/dL    A-G Ratio 0.9 (L) 1.1 - 2.2     TROPONIN I    Collection Time: 09/17/17  1:13 PM   Result Value Ref Range    Troponin-I, Qt. <0.04 <0.05 ng/mL   CK W/ CKMB & INDEX    Collection Time: 09/17/17  1:13 PM   Result Value Ref Range    CK 64 26 - 192 U/L    CK - MB 1.6 <3.6 NG/ML    CK-MB Index 2.5 0 - 2.5     NT-PRO BNP    Collection Time: 09/17/17  1:13 PM   Result Value Ref Range    NT pro-BNP 3781 (H) 0 - 450 PG/ML       CXR Results  (Last 48 hours)               09/17/17 1313  XR CHEST PA LAT Final result    Impression:  IMPRESSION: New small pleural effusions. Chronic cardiomegaly. No overt   pulmonary edema. Narrative:  INDICATION: sob       EXAM: CXR 2 Views. COMPARISON: 6/8/2016. FINDINGS: Frontal and lateral views of the chest show no pulmonary infiltrate. Heart is large, unchanged. There is no overt pulmonary edema. There are new   small pleural effusions. There is no evident pneumothorax, adenopathy or pleural effusion. Biventricular   pacemaker is present. There is prior median sternotomy/CABG. MDM  Number of Diagnoses or Management Options  Acute on chronic congestive heart failure, unspecified congestive heart failure type ([facility] [address]):   SOB (shortness of breath):     ED Course     64 y.o. F with known systolic CHF on lasix, requires 3L NC at home p/w SOB; ddx includes acute on chronic CHF, ACS, pleural effusion, will eval with EKG, labs, CXR, will give home antihypertensives; pending eval may need Cardiology involvement.     Procedures    Labs noted; prior ECHO noted. CXR with new pleural effusions, although small. Discussed case with Dr. Star Costa, Cardiology, recommend IV Lasix 80mg here in ED; if symptomatically improved, will dc home with PO Lasix 80mg BID x 3 days and f/u Cardiology 9/18. Reassessment 15:20  Pt feels signifcant improvement after Lasix. No new complaints; will dc home; Rx's for increased Lasix dosage given to patient; return precautions given. I have reviewed discharge instructions with the patient. The patient verbalized understanding. ICD-10-CM ICD-9-CM   1. SOB (shortness of breath) R06.02 786.05   2. Acute on chronic congestive heart failure, unspecified congestive heart failure type ([facility]) I50.9 428.0

## 2017-09-17 NOTE — ED NOTES
Pt waited in [address] until room 17 available, pt undressed and placed in patient gown, pt placed on monitor times three, 3L of oxygen via NC, call bell within reach

## 2017-09-18 LAB
ATRIAL RATE: 62 BPM
CALCULATED P AXIS, ECG09: -126 DEGREES
CALCULATED R AXIS, ECG10: -95 DEGREES
CALCULATED T AXIS, ECG11: 90 DEGREES
DIAGNOSIS, 93000: NORMAL
P-R INTERVAL, ECG05: 176 MS
Q-T INTERVAL, ECG07: 482 MS
QRS DURATION, ECG06: 164 MS
QTC CALCULATION (BEZET), ECG08: 482 MS
VENTRICULAR RATE, ECG03: 60 BPM

## 2018-02-05 ENCOUNTER — APPOINTMENT (OUTPATIENT)
Dept: GENERAL RADIOLOGY | Age: 78
End: 2018-02-05
Attending: EMERGENCY MEDICINE
Payer: MEDICARE

## 2018-02-05 ENCOUNTER — HOSPITAL ENCOUNTER (EMERGENCY)
Age: 78
Discharge: HOME | End: 2018-02-05
Attending: EMERGENCY MEDICINE
Payer: MEDICARE

## 2018-02-05 VITALS
RESPIRATION RATE: 13 BRPM | WEIGHT: 149.91 LBS | DIASTOLIC BLOOD PRESSURE: 89 MMHG | TEMPERATURE: 97.8 F | HEIGHT: 58 IN | OXYGEN SATURATION: 100 % | HEART RATE: 60 BPM | BODY MASS INDEX: 31.47 KG/M2 | SYSTOLIC BLOOD PRESSURE: 161 MMHG

## 2018-02-05 DIAGNOSIS — M79.672 LEFT FOOT PAIN: ICD-10-CM

## 2018-02-05 DIAGNOSIS — E87.79 OTHER HYPERVOLEMIA: Primary | ICD-10-CM

## 2018-02-05 LAB
ANION GAP SERPL CALC-SCNC: 4 MMOL/L (ref 5–15)
BASOPHILS # BLD: 0 K/UL (ref 0–0.1)
BASOPHILS NFR BLD: 0 % (ref 0–1)
BNP SERPL-MCNC: 5433 PG/ML (ref 0–450)
BUN SERPL-MCNC: 16 MG/DL (ref 6–20)
BUN/CREAT SERPL: 15 (ref 12–20)
CALCIUM SERPL-MCNC: 9.4 MG/DL (ref 8.5–10.1)
CHLORIDE SERPL-SCNC: 102 MMOL/L (ref 97–108)
CO2 SERPL-SCNC: 34 MMOL/L (ref 21–32)
CREAT SERPL-MCNC: 1.05 MG/DL (ref 0.55–1.02)
DIFFERENTIAL METHOD BLD: NORMAL
EOSINOPHIL # BLD: 0.2 K/UL (ref 0–0.4)
EOSINOPHIL NFR BLD: 2 % (ref 0–7)
ERYTHROCYTE [DISTWIDTH] IN BLOOD BY AUTOMATED COUNT: 14.5 % (ref 11.5–14.5)
GLUCOSE SERPL-MCNC: 89 MG/DL (ref 65–100)
HCT VFR BLD AUTO: 40.9 % (ref 35–47)
HGB BLD-MCNC: 12.6 G/DL (ref 11.5–16)
IMM GRANULOCYTES # BLD: 0 K/UL (ref 0–0.04)
IMM GRANULOCYTES NFR BLD AUTO: 0 % (ref 0–0.5)
LYMPHOCYTES # BLD: 1.1 K/UL (ref 0.8–3.5)
LYMPHOCYTES NFR BLD: 14 % (ref 12–49)
MCH RBC QN AUTO: 29.2 PG (ref 26–34)
MCHC RBC AUTO-ENTMCNC: 30.8 G/DL (ref 30–36.5)
MCV RBC AUTO: 94.7 FL (ref 80–99)
MONOCYTES # BLD: 0.8 K/UL (ref 0–1)
MONOCYTES NFR BLD: 10 % (ref 5–13)
NEUTS SEG # BLD: 5.9 K/UL (ref 1.8–8)
NEUTS SEG NFR BLD: 74 % (ref 32–75)
NRBC # BLD: 0 K/UL (ref 0–0.01)
NRBC BLD-RTO: 0 PER 100 WBC
PLATELET # BLD AUTO: 186 K/UL (ref 150–400)
PMV BLD AUTO: 10.1 FL (ref 8.9–12.9)
POTASSIUM SERPL-SCNC: 4.4 MMOL/L (ref 3.5–5.1)
RBC # BLD AUTO: 4.32 M/UL (ref 3.8–5.2)
SODIUM SERPL-SCNC: 140 MMOL/L (ref 136–145)
WBC # BLD AUTO: 7.9 K/UL (ref 3.6–11)

## 2018-02-05 PROCEDURE — 96374 THER/PROPH/DIAG INJ IV PUSH: CPT

## 2018-02-05 PROCEDURE — 77030008027

## 2018-02-05 PROCEDURE — 73620 X-RAY EXAM OF FOOT: CPT

## 2018-02-05 PROCEDURE — 74011250637 HC RX REV CODE- 250/637: Performed by: EMERGENCY MEDICINE

## 2018-02-05 PROCEDURE — 85025 COMPLETE CBC W/AUTO DIFF WBC: CPT | Performed by: EMERGENCY MEDICINE

## 2018-02-05 PROCEDURE — 71045 X-RAY EXAM CHEST 1 VIEW: CPT

## 2018-02-05 PROCEDURE — 36415 COLL VENOUS BLD VENIPUNCTURE: CPT | Performed by: EMERGENCY MEDICINE

## 2018-02-05 PROCEDURE — 80048 BASIC METABOLIC PNL TOTAL CA: CPT | Performed by: EMERGENCY MEDICINE

## 2018-02-05 PROCEDURE — 99285 EMERGENCY DEPT VISIT HI MDM: CPT

## 2018-02-05 PROCEDURE — 73630 X-RAY EXAM OF FOOT: CPT

## 2018-02-05 PROCEDURE — 74011250636 HC RX REV CODE- 250/636: Performed by: EMERGENCY MEDICINE

## 2018-02-05 PROCEDURE — 83880 ASSAY OF NATRIURETIC PEPTIDE: CPT | Performed by: EMERGENCY MEDICINE

## 2018-02-05 RX ORDER — TRAMADOL HYDROCHLORIDE 50 MG/1
50 TABLET ORAL
Qty: 20 TAB | Refills: 0 | Status: SHIPPED | OUTPATIENT
Start: 2018-02-05 | End: 2018-02-21 | Stop reason: ALTCHOICE

## 2018-02-05 RX ORDER — ACETAMINOPHEN 500 MG
1000 TABLET ORAL ONCE
Status: COMPLETED | OUTPATIENT
Start: 2018-02-05 | End: 2018-02-05

## 2018-02-05 RX ORDER — FUROSEMIDE 10 MG/ML
40 INJECTION INTRAMUSCULAR; INTRAVENOUS
Status: COMPLETED | OUTPATIENT
Start: 2018-02-05 | End: 2018-02-05

## 2018-02-05 RX ADMIN — ACETAMINOPHEN 1000 MG: 500 TABLET ORAL at 15:53

## 2018-02-05 RX ADMIN — FUROSEMIDE 40 MG: 10 INJECTION, SOLUTION INTRAMUSCULAR; INTRAVENOUS at 16:49

## 2018-02-05 NOTE — DISCHARGE INSTRUCTIONS
Foot Pain: Care Instructions  Your Care Instructions  Foot injuries that cause pain and swelling are fairly common. Almost all sports or home repair projects can cause a misstep that ends up as foot pain. Normal wear and tear, especially as you get older, also can cause foot pain. Most minor foot injuries will heal on their own, and home treatment is usually all you need to do. If you have a severe injury, you may need tests and treatment. Follow-up care is a key part of your treatment and safety. Be sure to make and go to all appointments, and call your doctor if you are having problems. It's also a good idea to know your test results and keep a list of the medicines you take. How can you care for yourself at home? · Take pain medicines exactly as directed. ¨ If the doctor gave you a prescription medicine for pain, take it as prescribed. ¨ If you are not taking a prescription pain medicine, ask your doctor if you can take an over-the-counter medicine. · Rest and protect your foot. Take a break from any activity that may cause pain. · Put ice or a cold pack on your foot for 10 to 20 minutes at a time. Put a thin cloth between the ice and your skin. · Prop up the sore foot on a pillow when you ice it or anytime you sit or lie down during the next 3 days. Try to keep it above the level of your heart. This will help reduce swelling. · Your doctor may recommend that you wrap your foot with an elastic bandage. Keep your foot wrapped for as long as your doctor advises. · If your doctor recommends crutches, use them as directed. · Wear roomy footwear. · As soon as pain and swelling end, begin gentle exercises of your foot. Your doctor can tell you which exercises will help. When should you call for help? Call 911 anytime you think you may need emergency care. For example, call if:  ? · Your foot turns pale, white, blue, or cold.    ?Call your doctor now or seek immediate medical care if:  ? · You cannot move or stand on your foot. ? · Your foot looks twisted or out of its normal position. ? · Your foot is not stable when you step down. ? · You have signs of infection, such as:  ¨ Increased pain, swelling, warmth, or redness. ¨ Red streaks leading from the sore area. ¨ Pus draining from a place on your foot. ¨ A fever. ? · Your foot is numb or tingly. ? Watch closely for changes in your health, and be sure to contact your doctor if:  ? · You do not get better as expected. ? · You have bruises from an injury that last longer than 2 weeks. Where can you learn more? Go to http://armida-lou.info/. Enter D259 in the search box to learn more about \"Foot Pain: Care Instructions. \"  Current as of: March 21, 2017  Content Version: 11.4  © 1336-9758 [employer]. Care instructions adapted under license by [employer] (which disclaims liability or warranty for this information). If you have questions about a medical condition or this instruction, always ask your healthcare professional. [address] any warranty or liability for your use of this information.

## 2018-02-05 NOTE — ED PROVIDER NOTES
EMERGENCY DEPARTMENT HISTORY AND PHYSICAL EXAM      Date: 2/5/2018  Patient Name: Linnette Landau    History of Presenting Illness     Chief Complaint   Patient presents with    Foot Pain     Via w/c w/ daughter w/ c/o L foot pain for 2 days, pt denies injury/trauma. Pt unable to bear weight on LLE at this time. Pt found to be hypoxic in triage, pt was recently d/c-ed from [address] [place] [address] for CHF exac & was sent home on 2l nc, pt has no home oxygen at this time, placed on 2L in triage.  Suture Removal     Daughter reports need to have sutures from pelvis removed that have been in for 2 weeks after a skin biopsy. History Provided By: Patient    HPI: Linnette Landau, 76 y.o. female with PMHx significant for CAD, HTN, DM, COPD, GERD, presents via wheelchair to the ED for further evaluation of an acute onset of sharp, stabbing, left lateral foot pain with an associated gait complication secondary to pain with onset this morning. The pt reports associated sx of chronic BL leg swelling as well. She expresses that she went to bed last night normal and upon waking up this morning she went to stand up with sudden onset sharp pain in the left foot noting that she has been unable to ambulate or bear weight leading her to the ED. The pt endorses taking a Gabapentin this morning but denies taking an afternoon dose. She is also c/o exacerbated SOB since discharge from hospital on 2/3 now requiring oxygen all the time. She reports associated clear mucous productive cough with intermittent mild hemoptysis as well. The pt discloses that prior to her admission she was on oxygen via nasal cannula PRN but since her admission she has required O2 at all times noting that her SPO2 drops significantly when her oxygen is taken off. She ensures that she is compliant with her Lasix. She denies any fevers, chills, chest pain, abdominal pain, nausea, vomiting, or diarrhea.        PCP: Joi Foster MD    There are no other complaints, changes, or physical findings at this time. Current Facility-Administered Medications   Medication Dose Route Frequency Provider Last Rate Last Dose    acetaminophen (TYLENOL) tablet 1,000 mg  1,000 mg Oral ONCE Johanne Lockhart MD         Current Outpatient Prescriptions   Medication Sig Dispense Refill    amLODIPine (NORVASC) 10 mg tablet Take 1 Tab by mouth daily. Indications: hypertension 30 Tab 0    furosemide (LASIX) 40 mg tablet Take 1 Tab by mouth daily. 30 Tab 0    docusate sodium (COLACE) 100 mg capsule Take 1 Cap by mouth two (2) times a day. 60 Cap 0    gabapentin (NEURONTIN) 300 mg capsule Take 300 mg by mouth three (3) times daily as needed.  polyethylene glycol (MIRALAX) 17 gram packet Take 17 g by mouth daily. Indications: constipation      acetaminophen (TYLENOL) 500 mg tablet Take 1 Tab by mouth every six (6) hours as needed. 40 Tab 0    traMADol (ULTRAM) 50 mg tablet Take 1 Tab by mouth every six (6) hours as needed for Pain. Max Daily Amount: 200 mg. 40 Tab 0    ondansetron hcl (ZOFRAN, AS HYDROCHLORIDE,) 8 mg tablet Take 1 Tab by mouth every eight (8) hours as needed for Nausea. 20 Tab 0    albuterol (PROVENTIL HFA, VENTOLIN HFA, PROAIR HFA) 90 mcg/actuation inhaler Take 2 Puffs by inhalation every four (4) hours as needed for Wheezing. 1 Inhaler 0    omeprazole (PRILOSEC) 20 mg capsule Take 20 mg by mouth daily.  cholecalciferol (VITAMIN D3) 400 unit tab tablet Take  by mouth daily.  pramipexole (MIRAPEX) 0.125 mg tablet Take 0.125 mg by mouth nightly.  co-enzyme Q-10 (COQ-10) 100 mg capsule Take 1 capsule by mouth daily. 100 capsule 2    insulin aspart protamine (NOVOLOG MIX 70/30) 100 unit/mL (70-30) injection 22 units before breakfast and 22 units before supper (Patient taking differently: 10 units before breakfast and 9 units before supper) 20 mL 10    metoprolol-XL (TOPROL-XL) 100 mg XL tablet Take 100 mg by mouth daily.          Past History     Past Medical History:  Past Medical History:   Diagnosis Date    Arthritis     CAD (coronary artery disease)     CABG, pacemaker    Chronic pain     COPD     Diabetes ([address]     GERD (gastroesophageal reflux disease)     Hypertension     Ill-defined condition     high cholesterol       Past Surgical History:  Past Surgical History:   Procedure Laterality Date    CARDIAC SURG PROCEDURE UNLIST      3 CABG, Stents    HX PACEMAKER      July 2011    [place] COLONOSCOPY FLX DX W/COLLJ SPEC WHEN PFRMD  11/10/2011         [place] EGD TRANSORAL BIOPSY SINGLE/MULTIPLE  8/29/2012            Family History:  Family History   Problem Relation Age of Onset    Lung Disease Mother     Heart Disease Father     Hypertension Father     Diabetes Sister        Social History:  Social History   Substance Use Topics    Smoking status: Never Smoker    Smokeless tobacco: Never Used    Alcohol use No       Allergies: Allergies   Allergen Reactions    Percocet [Oxycodone-Acetaminophen] Nausea and Vomiting         Review of Systems   Review of Systems   Constitutional: Negative for activity change, appetite change, chills, fever and unexpected weight change. HENT: Negative for congestion. Eyes: Negative for pain and visual disturbance. Respiratory: Positive for cough (mucous productive with intermittent hemoptysis ) and shortness of breath. Cardiovascular: Positive for leg swelling (chronic ). Negative for chest pain. Gastrointestinal: Negative for abdominal pain, diarrhea, nausea and vomiting. Genitourinary: Negative for dysuria. Musculoskeletal: Positive for arthralgias (left foot ) and gait problem (secondary to pain ). Negative for back pain. Skin: Negative for rash. Neurological: Negative for headaches. Physical Exam   Physical Exam   Constitutional: She is oriented to person, place, and time. She appears well-developed and well-nourished.    Elderly female in mild distress    HENT:   Head: Normocephalic and atraumatic. Mouth/Throat: Oropharynx is clear and moist.   Eyes: Conjunctivae and EOM are normal. Pupils are equal, round, and reactive to light. Right eye exhibits no discharge. Left eye exhibits no discharge. Neck: Normal range of motion. Neck supple. Cardiovascular: Normal rate, regular rhythm and normal heart sounds. No murmur heard. Pulmonary/Chest: Effort normal and breath sounds normal. No respiratory distress. She has no wheezes. She has no rales. Abdominal: Soft. Bowel sounds are normal. She exhibits no distension. There is no tenderness. Genitourinary:   Genitourinary Comments: 1 stitch to the proximal portion of an incision just above the mons pubis that appears well healed without redness or induration   Musculoskeletal: Normal range of motion. She exhibits edema (2+ pitting BL lower extremities ). The lateral portion of the left foot there is a small protuberance similar to the right foot with tenderness along the 5th metatarsal, there is no swelling, redness, or induration   Neurological: She is alert and oriented to person, place, and time. No cranial nerve deficit. She exhibits normal muscle tone. Skin: Skin is warm and dry. No rash noted. She is not diaphoretic. Nursing note and vitals reviewed. Diagnostic Study Results     Labs -     Recent Results (from the past 12 hour(s))   CBC WITH AUTOMATED DIFF    Collection Time: 02/05/18  3:45 PM   Result Value Ref Range    WBC 7.9 3.6 - 11.0 K/uL    RBC 4.32 3.80 - 5.20 M/uL    HGB 12.6 11.5 - 16.0 g/dL    HCT 40.9 35.0 - 47.0 %    MCV 94.7 80.0 - 99.0 FL    MCH 29.2 26.0 - 34.0 PG    MCHC 30.8 30.0 - 36.5 g/dL    RDW 14.5 11.5 - 14.5 %    PLATELET 084 735 - 297 K/uL    MPV 10.1 8.9 - 12.9 FL    NRBC 0.0 0  WBC    ABSOLUTE NRBC 0.00 0.00 - 0.01 K/uL    NEUTROPHILS 74 32 - 75 %    LYMPHOCYTES 14 12 - 49 %    MONOCYTES 10 5 - 13 %    EOSINOPHILS 2 0 - 7 %    BASOPHILS 0 0 - 1 %    IMMATURE GRANULOCYTES 0 0.0 - 0.5 %    ABS. NEUTROPHILS 5.9 1.8 - 8.0 K/UL    ABS. LYMPHOCYTES 1.1 0.8 - 3.5 K/UL    ABS. MONOCYTES 0.8 0.0 - 1.0 K/UL    ABS. EOSINOPHILS 0.2 0.0 - 0.4 K/UL    ABS. BASOPHILS 0.0 0.0 - 0.1 K/UL    ABS. IMM. GRANS. 0.0 0.00 - 0.04 K/UL    DF AUTOMATED     METABOLIC PANEL, BASIC    Collection Time: 02/05/18  3:45 PM   Result Value Ref Range    Sodium 140 136 - 145 mmol/L    Potassium 4.4 3.5 - 5.1 mmol/L    Chloride 102 97 - 108 mmol/L    CO2 34 (H) 21 - 32 mmol/L    Anion gap 4 (L) 5 - 15 mmol/L    Glucose 89 65 - 100 mg/dL    BUN 16 6 - 20 MG/DL    Creatinine 1.05 (H) 0.55 - 1.02 MG/DL    BUN/Creatinine ratio 15 12 - 20      GFR est AA >60 >60 ml/min/1.73m2    GFR est non-AA 51 (L) >60 ml/min/1.73m2    Calcium 9.4 8.5 - 10.1 MG/DL   NT-PRO BNP    Collection Time: 02/05/18  3:45 PM   Result Value Ref Range    NT pro-BNP 5433 (H) 0 - 450 PG/ML       Radiologic Studies -   XR FOOT LT MIN 3 V   Final Result   EXAM:  XR FOOT LT MIN 3 V     INDICATION:   Lateral left foot pain for 2 days without injury.     COMPARISON:  Left foot views on 9/13/2013.     FINDINGS:  Three views of the left foot demonstrate osteopenia. New diffuse  soft tissue swelling, greatest in the toes and lateral forefoot. No acute  fracture or dislocation. No evidence of osteomyelitis. No soft tissue gas. Vascular calcifications are still present.     IMPRESSION  IMPRESSION:       New nonspecific soft tissue swelling. No evidence of fracture or osteomyelitis.      XR CHEST PORT   Final Result   INDICATION:  sob, hypoxia      COMPARISON: 9/17/2017     FINDINGS: Single AP portable view of the chest obtained at 1605 demonstrates a  stable cardiomediastinal silhouette. There is chronic cardiomegaly. There are  median sternotomy wires and a left-sided pacer device. The lungs are clear  bilaterally. There is a chronic small right pleural effusion.  No osseous  abnormalities are seen.     IMPRESSION  IMPRESSION: No evidence of acute cardiopulmonary process.    Medical Decision Making   I am the first provider for this patient. I reviewed the vital signs, available nursing notes, past medical history, past surgical history, family history and social history. Vital Signs-Reviewed the patient's vital signs. Patient Vitals for the past 12 hrs:   Temp Pulse Resp BP SpO2   02/05/18 1645 - 60 10 154/62 100 %   02/05/18 1615 - 60 17 168/74 99 %   02/05/18 1547 - - - - 99 %   02/05/18 1530 - 60 15 161/70 99 %   02/05/18 1519 - 62 14 - 98 %   02/05/18 1515 97.8 °F (36.6 °C) - 17 (!) 157/97 97 %   02/05/18 1350 - - - - 92 %   02/05/18 1345 97.8 °F (36.6 °C) 60 22 146/49 (!) 87 %       Pulse Oximetry Analysis - 87% on room air    Cardiac Monitor:   Rate: 60 bpm  Rhythm: Normal Sinus Rhythm          Records Reviewed: Nursing Notes, Old Medical Records, Previous electrocardiograms, Previous Radiology Studies and Previous Laboratory Studies    Provider Notes (Medical Decision Making):     Elderly female with multiple complaints and an exam concerning for fluid overload. Foot pain without evidence of gout, ulceration, or active cellulitis. ED Course:   Initial assessment performed. The patients presenting problems have been discussed, and they are in agreement with the care plan formulated and outlined with them. I have encouraged them to ask questions as they arise throughout their visit. Progress Notes:    6:48 PM  The pt has been re-evaluated. She was updated on reassuring findings and updated on the plan to discharge with follow up in place. Daughter states she has been experiencing pain for the past few days and mentions it has not been as acute as the patient portrayed. Discussed home care and monitoring with medications. Also discussed home diuretics and cardiology follow up this week. Critical Care Time: 0 minutes    Disposition:  Discharge Note:  6:48 PM  The patient is ready for discharge.  The patient's signs, symptoms, diagnosis, and discharge instruction have been discussed and the patient has conveyed their understanding. The patient is to follow up as recommended or return to the ER should their symptoms worsen. Plan has been discussed and the patient is in agreement. Written by Randall Gottlieb ED Scribe, as dictated by Gabrielle Baires MD    PLAN:  1. Current Discharge Medication List      CONTINUE these medications which have CHANGED    Details   traMADol (ULTRAM) 50 mg tablet Take 1 Tab by mouth every six (6) hours as needed for Pain. Max Daily Amount: 200 mg. Qty: 20 Tab, Refills: 0    Associated Diagnoses: Left foot pain           2. Follow-up Information     Follow up With Details Comments Contact Info    Royce Quevedo MD Schedule an appointment as soon as possible for a visit  [address] [phone]          Return to ED if worse     Diagnosis     Clinical Impression:   1. Other hypervolemia    2. Left foot pain        Attestations:    Attestation: This note is prepared by Sybil Gottlieb, acting as Scribe for Gabrielle Baires MD.      Gabrielle Baires MD: The scribe's documentation has been prepared under my direction and personally reviewed by me in its entirety. I confirm that the note above accurately reflects all work, treatment, procedures, and medical decision making performed by me.

## 2018-02-05 NOTE — ED NOTES
Pt reports left foot pain on bottom x2 days, denies injury, unable to stand on foot, reports she needs sutures to pelvic area removed from biopsy, had an appt with dermatologist however did not go due to foot pain, pt uses oxygen at home as needed, was placed on oxygen in triage due to low oxygen sats

## 2018-02-06 NOTE — ED NOTES
Pt received discharge instructions from Dr. Mehreen Mccormick and verbalized understanding. Pt wheeled to exit and helped into vehicle.

## 2018-02-21 ENCOUNTER — APPOINTMENT (OUTPATIENT)
Dept: GENERAL RADIOLOGY | Age: 78
DRG: 871 | End: 2018-02-21
Payer: MEDICARE

## 2018-02-21 ENCOUNTER — HOSPITAL ENCOUNTER (EMERGENCY)
Age: 78
Discharge: HOME | DRG: 871 | End: 2018-02-21
Attending: EMERGENCY MEDICINE
Payer: MEDICARE

## 2018-02-21 VITALS
HEART RATE: 59 BPM | OXYGEN SATURATION: 100 % | RESPIRATION RATE: 20 BRPM | HEIGHT: 58 IN | BODY MASS INDEX: 26.79 KG/M2 | DIASTOLIC BLOOD PRESSURE: 51 MMHG | WEIGHT: 127.65 LBS | SYSTOLIC BLOOD PRESSURE: 157 MMHG | TEMPERATURE: 98.1 F

## 2018-02-21 DIAGNOSIS — M79.10 MYALGIA: ICD-10-CM

## 2018-02-21 DIAGNOSIS — J20.9 ACUTE BRONCHITIS, UNSPECIFIED ORGANISM: Primary | ICD-10-CM

## 2018-02-21 DIAGNOSIS — E16.2 HYPOGLYCEMIA: ICD-10-CM

## 2018-02-21 LAB
ALBUMIN SERPL-MCNC: 3.5 G/DL (ref 3.5–5)
ALBUMIN/GLOB SERPL: 0.8 {RATIO} (ref 1.1–2.2)
ALP SERPL-CCNC: 362 U/L (ref 45–117)
ALT SERPL-CCNC: 21 U/L (ref 12–78)
ANION GAP SERPL CALC-SCNC: 7 MMOL/L (ref 5–15)
APPEARANCE UR: CLEAR
AST SERPL-CCNC: 29 U/L (ref 15–37)
BACTERIA URNS QL MICRO: NEGATIVE /HPF
BASOPHILS # BLD: 0.1 K/UL (ref 0–0.1)
BASOPHILS NFR BLD: 1 % (ref 0–1)
BILIRUB SERPL-MCNC: 0.7 MG/DL (ref 0.2–1)
BILIRUB UR QL: NEGATIVE
BUN SERPL-MCNC: 32 MG/DL (ref 6–20)
BUN/CREAT SERPL: 27 (ref 12–20)
CALCIUM SERPL-MCNC: 9.2 MG/DL (ref 8.5–10.1)
CHLORIDE SERPL-SCNC: 104 MMOL/L (ref 97–108)
CO2 SERPL-SCNC: 28 MMOL/L (ref 21–32)
COLOR UR: ABNORMAL
CREAT SERPL-MCNC: 1.19 MG/DL (ref 0.55–1.02)
DIFFERENTIAL METHOD BLD: NORMAL
EOSINOPHIL # BLD: 0.1 K/UL (ref 0–0.4)
EOSINOPHIL NFR BLD: 2 % (ref 0–7)
EPITH CASTS URNS QL MICRO: ABNORMAL /LPF
ERYTHROCYTE [DISTWIDTH] IN BLOOD BY AUTOMATED COUNT: 13.8 % (ref 11.5–14.5)
FLUAV AG NPH QL IA: NEGATIVE
FLUBV AG NOSE QL IA: NEGATIVE
GLOBULIN SER CALC-MCNC: 4.2 G/DL (ref 2–4)
GLUCOSE SERPL-MCNC: 64 MG/DL (ref 65–100)
GLUCOSE UR STRIP.AUTO-MCNC: NEGATIVE MG/DL
HCT VFR BLD AUTO: 37.1 % (ref 35–47)
HGB BLD-MCNC: 11.7 G/DL (ref 11.5–16)
HGB UR QL STRIP: ABNORMAL
HYALINE CASTS URNS QL MICRO: ABNORMAL /LPF (ref 0–5)
IMM GRANULOCYTES # BLD: 0 K/UL (ref 0–0.04)
IMM GRANULOCYTES NFR BLD AUTO: 0 % (ref 0–0.5)
KETONES UR QL STRIP.AUTO: NEGATIVE MG/DL
LEUKOCYTE ESTERASE UR QL STRIP.AUTO: NEGATIVE
LYMPHOCYTES # BLD: 1 K/UL (ref 0.8–3.5)
LYMPHOCYTES NFR BLD: 16 % (ref 12–49)
MCH RBC QN AUTO: 29.3 PG (ref 26–34)
MCHC RBC AUTO-ENTMCNC: 31.5 G/DL (ref 30–36.5)
MCV RBC AUTO: 92.8 FL (ref 80–99)
MONOCYTES # BLD: 0.7 K/UL (ref 0–1)
MONOCYTES NFR BLD: 11 % (ref 5–13)
NEUTS SEG # BLD: 4.1 K/UL (ref 1.8–8)
NEUTS SEG NFR BLD: 69 % (ref 32–75)
NITRITE UR QL STRIP.AUTO: NEGATIVE
NRBC # BLD: 0 K/UL (ref 0–0.01)
NRBC BLD-RTO: 0 PER 100 WBC
PH UR STRIP: 5 [PH] (ref 5–8)
PLATELET # BLD AUTO: 193 K/UL (ref 150–400)
PMV BLD AUTO: 10.6 FL (ref 8.9–12.9)
POTASSIUM SERPL-SCNC: 3.9 MMOL/L (ref 3.5–5.1)
PROT SERPL-MCNC: 7.7 G/DL (ref 6.4–8.2)
PROT UR STRIP-MCNC: ABNORMAL MG/DL
RBC # BLD AUTO: 4 M/UL (ref 3.8–5.2)
RBC #/AREA URNS HPF: ABNORMAL /HPF (ref 0–5)
SODIUM SERPL-SCNC: 139 MMOL/L (ref 136–145)
SP GR UR REFRACTOMETRY: 1.01 (ref 1–1.03)
UA: UC IF INDICATED,UAUC: ABNORMAL
UROBILINOGEN UR QL STRIP.AUTO: 1 EU/DL (ref 0.2–1)
WBC # BLD AUTO: 6 K/UL (ref 3.6–11)
WBC URNS QL MICRO: ABNORMAL /HPF (ref 0–4)

## 2018-02-21 PROCEDURE — 71046 X-RAY EXAM CHEST 2 VIEWS: CPT

## 2018-02-21 PROCEDURE — 36415 COLL VENOUS BLD VENIPUNCTURE: CPT | Performed by: PHYSICIAN ASSISTANT

## 2018-02-21 PROCEDURE — 81001 URINALYSIS AUTO W/SCOPE: CPT | Performed by: PHYSICIAN ASSISTANT

## 2018-02-21 PROCEDURE — 74011250637 HC RX REV CODE- 250/637: Performed by: PHYSICIAN ASSISTANT

## 2018-02-21 PROCEDURE — 85025 COMPLETE CBC W/AUTO DIFF WBC: CPT | Performed by: PHYSICIAN ASSISTANT

## 2018-02-21 PROCEDURE — 80053 COMPREHEN METABOLIC PANEL: CPT | Performed by: PHYSICIAN ASSISTANT

## 2018-02-21 PROCEDURE — 94640 AIRWAY INHALATION TREATMENT: CPT

## 2018-02-21 PROCEDURE — 99283 EMERGENCY DEPT VISIT LOW MDM: CPT

## 2018-02-21 PROCEDURE — 77030038269 HC DRN EXT URIN PURWCK BARD -A

## 2018-02-21 PROCEDURE — 87804 INFLUENZA ASSAY W/OPTIC: CPT | Performed by: PHYSICIAN ASSISTANT

## 2018-02-21 PROCEDURE — 74011250637 HC RX REV CODE- 250/637: Performed by: EMERGENCY MEDICINE

## 2018-02-21 PROCEDURE — 74011000250 HC RX REV CODE- 250: Performed by: EMERGENCY MEDICINE

## 2018-02-21 PROCEDURE — 77030029684 HC NEB SM VOL KT MONA -A

## 2018-02-21 RX ORDER — ALBUTEROL SULFATE 90 UG/1
2 AEROSOL, METERED RESPIRATORY (INHALATION)
Qty: 1 INHALER | Refills: 0 | Status: SHIPPED | OUTPATIENT
Start: 2018-02-21 | End: 2018-10-08

## 2018-02-21 RX ORDER — CODEINE PHOSPHATE AND GUAIFENESIN 10; 100 MG/5ML; MG/5ML
5 SOLUTION ORAL
Qty: 118 ML | Refills: 0 | Status: SHIPPED | OUTPATIENT
Start: 2018-02-21 | End: 2018-02-23

## 2018-02-21 RX ORDER — METHOCARBAMOL 750 MG/1
750 TABLET, FILM COATED ORAL
Status: COMPLETED | OUTPATIENT
Start: 2018-02-21 | End: 2018-02-21

## 2018-02-21 RX ORDER — ALBUTEROL SULFATE 2.5 MG/.5ML
5 SOLUTION RESPIRATORY (INHALATION)
Status: COMPLETED | OUTPATIENT
Start: 2018-02-21 | End: 2018-02-21

## 2018-02-21 RX ORDER — AZITHROMYCIN 250 MG/1
TABLET, FILM COATED ORAL
Qty: 6 TAB | Refills: 0 | Status: SHIPPED | OUTPATIENT
Start: 2018-02-21 | End: 2018-02-23

## 2018-02-21 RX ORDER — IBUPROFEN 400 MG/1
400 TABLET ORAL
Status: COMPLETED | OUTPATIENT
Start: 2018-02-21 | End: 2018-02-21

## 2018-02-21 RX ORDER — METHOCARBAMOL 750 MG/1
750 TABLET, FILM COATED ORAL
Qty: 20 TAB | Refills: 0 | Status: SHIPPED | OUTPATIENT
Start: 2018-02-21 | End: 2018-02-23

## 2018-02-21 RX ADMIN — IBUPROFEN 400 MG: 400 TABLET, FILM COATED ORAL at 14:59

## 2018-02-21 RX ADMIN — ALBUTEROL SULFATE 5 MG: 2.5 SOLUTION RESPIRATORY (INHALATION) at 16:44

## 2018-02-21 RX ADMIN — METHOCARBAMOL 750 MG: 750 TABLET ORAL at 17:42

## 2018-02-21 NOTE — ED NOTES
Received patient to exam room, sitting in a chair in a position of comfort, call bell within reach; pt reports cough, body aches and fever onset yesterday; family members with flu recently

## 2018-02-21 NOTE — DISCHARGE INSTRUCTIONS
Bronchitis: Care Instructions  Your Care Instructions    Bronchitis is inflammation of the bronchial tubes, which carry air to the lungs. The tubes swell and produce mucus, or phlegm. The mucus and inflamed bronchial tubes make you cough. You may have trouble breathing. Most cases of bronchitis are caused by viruses like those that cause colds. Antibiotics usually do not help and they may be harmful. Bronchitis usually develops rapidly and lasts about 2 to 3 weeks in otherwise healthy people. Follow-up care is a key part of your treatment and safety. Be sure to make and go to all appointments, and call your doctor if you are having problems. It's also a good idea to know your test results and keep a list of the medicines you take. How can you care for yourself at home? · Take all medicines exactly as prescribed. Call your doctor if you think you are having a problem with your medicine. · Get some extra rest.  · Take an over-the-counter pain medicine, such as acetaminophen (Tylenol), ibuprofen (Advil, Motrin), or naproxen (Aleve) to reduce fever and relieve body aches. Read and follow all instructions on the label. · Do not take two or more pain medicines at the same time unless the doctor told you to. Many pain medicines have acetaminophen, which is Tylenol. Too much acetaminophen (Tylenol) can be harmful. · Take an over-the-counter cough medicine that contains dextromethorphan to help quiet a dry, hacking cough so that you can sleep. Avoid cough medicines that have more than one active ingredient. Read and follow all instructions on the label. · Breathe moist air from a humidifier, hot shower, or sink filled with hot water. The heat and moisture will thin mucus so you can cough it out. · Do not smoke. Smoking can make bronchitis worse. If you need help quitting, talk to your doctor about stop-smoking programs and medicines. These can increase your chances of quitting for good.   When should you call for help? Call 911 anytime you think you may need emergency care. For example, call if:  ? · You have severe trouble breathing. ?Call your doctor now or seek immediate medical care if:  ? · You have new or worse trouble breathing. ? · You cough up dark brown or bloody mucus (sputum). ? · You have a new or higher fever. ? · You have a new rash. ? Watch closely for changes in your health, and be sure to contact your doctor if:  ? · You cough more deeply or more often, especially if you notice more mucus or a change in the color of your mucus. ? · You are not getting better as expected. Where can you learn more? Go to http://armida-lou.info/. Enter H333 in the search box to learn more about \"Bronchitis: Care Instructions. \"  Current as of: May 12, 2017  Content Version: 11.4  © 8277-4523 [employer]. Care instructions adapted under license by [employer] (which disclaims liability or warranty for this information). If you have questions about a medical condition or this instruction, always ask your healthcare professional. [place] [address] any warranty or liability for your use of this information. Muscle Aches: Care Instructions  Your Care Instructions    Muscle aches have many possible causes. Some common ones are overuse, tension, and injuries such as a strained muscle. An infection such as the flu can cause muscle aches. Or the aches may be caused by some medicines, such as statins. Muscle aches may also be a symptom of a disease like lupus or fibromyalgia. Myalgia is the medical term for muscle aches. The doctor will do a physical exam and ask questions to try to find what is causing your pain. You may also have tests such as blood tests or imaging tests like X-rays. These can help find or rule out serious problems. The doctor has checked you carefully, but problems can develop later.  If you notice any problems or new symptoms, get medical treatment right away. Follow-up care is a key part of your treatment and safety. Be sure to make and go to all appointments, and call your doctor if you are having problems. It's also a good idea to know your test results and keep a list of the medicines you take. How can you care for yourself at home? · Rest the area that hurts. You may need to stop or reduce the activity that causes your symptoms. Then you can return to it slowly. · Put ice or a cold pack on the area for 10 to 20 minutes at a time to ease pain. Put a thin cloth between the ice and your skin. · Take an over-the-counter pain medicine, such as acetaminophen (Tylenol), ibuprofen (Advil, Motrin), or naproxen (Aleve). Be safe with medicines. Read and follow all instructions on the label. When should you call for help? Call your doctor now or seek immediate medical care if:  ? · Your pain gets worse. ? · You have new symptoms, such as a fever, swelling, or a rash. ? Watch closely for changes in your health, and be sure to contact your doctor if:  ? · You do not get better as expected. Where can you learn more? Go to http://armida-lou.info/. Enter G355 in the search box to learn more about \"Muscle Aches: Care Instructions. \"  Current as of: October 14, 2016  Content Version: 11.4  © 0275-7811 [employer] Care instructions adapted under license by [employer] (which disclaims liability or warranty for this information). If you have questions about a medical condition or this instruction, always ask your healthcare professional. [address] any warranty or liability for your use of this information. Learning About Low Blood Sugar (Hypoglycemia) in Diabetes  What is low blood sugar (hypoglycemia)? Hypoglycemia means that your blood sugar is low and your body (especially your brain) is not getting enough fuel.  If you have diabetes, your blood sugar can go too low if you take too much of some diabetes medicines. It can also go too low if you miss a meal. And it can happen if you exercise too hard without eating enough food. Some medicines used to treat other health problems can cause low blood sugar too. What are the symptoms? Symptoms of low blood sugar can start quickly. It may take just 10 to 15 minutes. If you have had diabetes for many years, you may not realize that your blood sugar is low until it drops very low. · If your blood sugar level drops below 70 (mild low blood sugar), you may feel tired, anxious, dizzy, weak, shaky, or sweaty. You may have a fast heartbeat or blurry vision. · If your blood sugar level continues to drop (usually below 40), your behavior may change. You may feel more irritable. You may find it hard to concentrate or talk. And you may feel unsteady when you stand or walk. You may become too weak or confused to eat something with sugar to raise your blood sugar level. · If your blood sugar level drops very low (usually below 20), you may pass out (lose consciousness). Or you may have a seizure or stroke. If you have symptoms of severe low blood sugar, you need to get medical care right away. If you had a low blood sugar level during the night, you may wake up tired or with a headache. Or you may sweat so much during the night that your pajamas or sheets are damp when you wake up. How is low blood sugar treated? You can treat low blood sugar by eating or drinking something that has 15 grams of carbohydrate. These should be quick-sugar foods. Check your blood sugar level again 15 minutes after having a quick-sugar food to make sure your level is getting back to your target range.   Here are examples of quick-sugar foods that have 15 grams of carbohydrate:  · 3 to 4 glucose tablets  · 1 tube of glucose gel  · Hard candy (such as 3 Jolly Ranchers or 5 to 7 Life Savers)  · 1 tablespoon honey  · 2 tablespoons of raisins  · ½ cup to Viralica cup (4 to 6 ounces) of fruit juice or regular (not diet) soda  · 1 tablespoon of sugar  · 1 cup of fat-free milk  If you have problems with severe low blood sugar, someone else may have to give you a shot of glucagon. This is a hormone that raises blood sugar levels quickly. How can you prevent low blood sugar? You can take steps to prevent low blood sugar. · Follow your treatment plan. Take your insulin or other diabetes medicine exactly as your doctor prescribed it. Talk with your doctor if you're having low blood sugar often. Your medicine may need to be adjusted if it's causing your low blood sugar. · Check your blood sugar levels often. This helps you find early changes before an emergency happens. · Keep a quick-sugar food with you in case your blood sugar level drops low. · Eat small meals more often so that you don't get too hungry between meals. Don't skip meals. · Balance extra exercise with eating more. Check your blood sugar and learn how it changes after exercise. If your blood sugar stays at a normal level, you may not need to eat after you exercise. · Limit how much alcohol you drink. Alcohol can make low blood sugar go even lower. Don't drink alcohol if you have problems recognizing the early signs of low blood sugar. · Keep a diary of your symptoms. This helps you learn when changes in your body may signal low blood sugar. And keep track of how often you have low blood sugar, including when you last ate and what you ate. This will help you learn what causes your blood sugar to drop. · Learn about diabetes and low blood sugar. Support groups or a diabetes education center can help you understand how medicines, diet, and exercise affect your blood sugar levels. Since low blood sugar levels can quickly become an emergency, be sure to wear medical alert jewelry, such as a medical alert bracelet. This is to let people know you have diabetes so they can get help for you.  You can buy this at most drugstores. And make sure your family, friends, and coworkers know the symptoms of low blood sugar. Teach them what to do to get your sugar level up. Follow-up care is a key part of your treatment and safety. Be sure to make and go to all appointments, and call your doctor if you are having problems. It's also a good idea to know your test results and keep a list of the medicines you take. Where can you learn more? Go to http://armida-lou.info/. Enter B225 in the search box to learn more about \"Learning About Low Blood Sugar (Hypoglycemia) in Diabetes. \"  Current as of: March 13, 2017  Content Version: 11.4  © 2831-2816 [employer]. Care instructions adapted under license by [employer] (which disclaims liability or warranty for this information). If you have questions about a medical condition or this instruction, always ask your healthcare professional. [address] any warranty or liability for your use of this information.

## 2018-02-21 NOTE — ED PROVIDER NOTES
EMERGENCY DEPARTMENT HISTORY AND PHYSICAL EXAM      Date: 2/21/2018  Patient Name: Jose Antonio Marshall    I have seen and evaluated this patient in the Express Care portion of triage for generalized weakness with hx of CAD, COPD, and DM. The patient's care will begin now and orders have been placed. This patient will be seen and provided further care in the Emergency Room. Written by Mulu Hugo ED Scribe, as dictated by Alejandro Zuluaga  ---------------------------------------------------------------------------------------------------------------------    History of Presenting Illness     Chief Complaint   Patient presents with    Generalized Body Aches     Woke up feeling bad this am with a cough and generalized body aches    Fever     Feels very warm to the touch but no temp in triage    Cough     Congested sounding cough       History Provided By: Patient    HPI: Jose Antonio Marshall, 76 y.o. female with PMHx significant for CAD, HTN, DM, COPD, and HLD, presents ambulatory to the ED with cc of constant, generalized weakness and body aches with associated low grade fever and productive cough of dark colored sputum x several days. Pt reports additional symptom of mild SOB at baseline for which she uses supplemental oxygen at home as needed. She denies tobacco or alcohol use. She denies any use of duo-neb treatments at home. She denies any CP, HA, abdominal pain, diarrhea, sore throat. PCP: Miko Bassett NP   Family PMhx is significant for CAD, HTN, DM, CA    There are no other complaints, changes, or physical findings at this time. Current Outpatient Prescriptions   Medication Sig Dispense Refill    azithromycin (ZITHROMAX Z-DORIAN) 250 mg tablet As directed 6 Tab 0    methocarbamol (ROBAXIN-750) 750 mg tablet Take 1 Tab by mouth four (4) times daily as needed.  20 Tab 0    albuterol (PROVENTIL HFA, VENTOLIN HFA, PROAIR HFA) 90 mcg/actuation inhaler Take 2 Puffs by inhalation every four (4) hours as needed for Wheezing. 1 Inhaler 0    guaiFENesin-codeine (ROBITUSSIN AC) 100-10 mg/5 mL solution Take 5 mL by mouth three (3) times daily as needed for Cough. Max Daily Amount: 15 mL. 118 mL 0    amLODIPine (NORVASC) 10 mg tablet Take 1 Tab by mouth daily. Indications: hypertension 30 Tab 0    furosemide (LASIX) 40 mg tablet Take 1 Tab by mouth daily. 30 Tab 0    docusate sodium (COLACE) 100 mg capsule Take 1 Cap by mouth two (2) times a day. 60 Cap 0    gabapentin (NEURONTIN) 300 mg capsule Take 300 mg by mouth three (3) times daily as needed.  polyethylene glycol (MIRALAX) 17 gram packet Take 17 g by mouth daily. Indications: constipation      acetaminophen (TYLENOL) 500 mg tablet Take 1 Tab by mouth every six (6) hours as needed. 40 Tab 0    ondansetron hcl (ZOFRAN, AS HYDROCHLORIDE,) 8 mg tablet Take 1 Tab by mouth every eight (8) hours as needed for Nausea. 20 Tab 0    omeprazole (PRILOSEC) 20 mg capsule Take 20 mg by mouth daily.  cholecalciferol (VITAMIN D3) 400 unit tab tablet Take  by mouth daily.  pramipexole (MIRAPEX) 0.125 mg tablet Take 0.125 mg by mouth nightly.  co-enzyme Q-10 (COQ-10) 100 mg capsule Take 1 capsule by mouth daily. 100 capsule 2    insulin aspart protamine (NOVOLOG MIX 70/30) 100 unit/mL (70-30) injection 22 units before breakfast and 22 units before supper (Patient taking differently: 10 units before breakfast and 9 units before supper) 20 mL 10    metoprolol-XL (TOPROL-XL) 100 mg XL tablet Take 100 mg by mouth daily.          Past History     Past Medical History:  Past Medical History:   Diagnosis Date    Arthritis     CAD (coronary artery disease)     CABG, pacemaker    Chronic pain     COPD     Diabetes ([address]     GERD (gastroesophageal reflux disease)     Hypertension     Ill-defined condition     high cholesterol       Past Surgical History:  Past Surgical History:   Procedure Laterality Date    CARDIAC SURG PROCEDURE UNLIST      3 CABG, Stents    HX PACEMAKER      July 2011    WA COLONOSCOPY FLX DX W/COLLJ [facility] WHEN PFRMD  11/10/2011         [place] EGD TRANSORAL BIOPSY SINGLE/MULTIPLE  8/29/2012            Family History:  Family History   Problem Relation Age of Onset    Lung Disease Mother     Heart Disease Father     Hypertension Father     Diabetes Sister        Social History:  Social History   Substance Use Topics    Smoking status: Never Smoker    Smokeless tobacco: Never Used    Alcohol use No       Allergies: Allergies   Allergen Reactions    Percocet [Oxycodone-Acetaminophen] Nausea and Vomiting         Review of Systems   Review of Systems   Constitutional: Positive for fever. HENT: Negative for congestion and sore throat. Eyes: Negative for visual disturbance. Respiratory: Positive for cough and shortness of breath. Cardiovascular: Negative for chest pain. Gastrointestinal: Negative for abdominal pain and diarrhea. Endocrine: Negative for heat intolerance. Genitourinary: Negative for dysuria. Musculoskeletal: Positive for myalgias (body aches). Skin: Negative for rash. Allergic/Immunologic: Negative for immunocompromised state. Neurological: Positive for weakness (generalized). Negative for headaches. Hematological: Does not bruise/bleed easily. Psychiatric/Behavioral: Negative. All other systems reviewed and are negative. Physical Exam   Physical Exam   Constitutional: She is oriented to person, place, and time. She appears well-developed and well-nourished. She appears distressed (mild). HENT:   Head: Normocephalic and atraumatic. Eyes: EOM are normal. Pupils are equal, round, and reactive to light. Neck: Normal range of motion. Neck supple. Cardiovascular: Normal rate, regular rhythm, normal heart sounds and intact distal pulses. Pulmonary/Chest: Effort normal. No respiratory distress. She has rhonchi (BL). Abdominal: Soft. Bowel sounds are normal. She exhibits no mass.  There is tenderness in the suprapubic area and left upper quadrant. Musculoskeletal: Normal range of motion. She exhibits no edema. Neurological: She is alert and oriented to person, place, and time. Coordination normal.   Skin: Skin is warm and dry. Psychiatric: She has a normal mood and affect. Her behavior is normal.   Nursing note and vitals reviewed. Diagnostic Study Results     Labs -  Recent Results (from the past 12 hour(s))   METABOLIC PANEL, COMPREHENSIVE    Collection Time: 02/21/18  1:51 PM   Result Value Ref Range    Sodium 139 136 - 145 mmol/L    Potassium 3.9 3.5 - 5.1 mmol/L    Chloride 104 97 - 108 mmol/L    CO2 28 21 - 32 mmol/L    Anion gap 7 5 - 15 mmol/L    Glucose 64 (L) 65 - 100 mg/dL    BUN 32 (H) 6 - 20 MG/DL    Creatinine 1.19 (H) 0.55 - 1.02 MG/DL    BUN/Creatinine ratio 27 (H) 12 - 20      GFR est AA 53 (L) >60 ml/min/1.73m2    GFR est non-AA 44 (L) >60 ml/min/1.73m2    Calcium 9.2 8.5 - 10.1 MG/DL    Bilirubin, total 0.7 0.2 - 1.0 MG/DL    ALT (SGPT) 21 12 - 78 U/L    AST (SGOT) 29 15 - 37 U/L    Alk. phosphatase 362 (H) 45 - 117 U/L    Protein, total 7.7 6.4 - 8.2 g/dL    Albumin 3.5 3.5 - 5.0 g/dL    Globulin 4.2 (H) 2.0 - 4.0 g/dL    A-G Ratio 0.8 (L) 1.1 - 2.2     CBC WITH AUTOMATED DIFF    Collection Time: 02/21/18  1:51 PM   Result Value Ref Range    WBC 6.0 3.6 - 11.0 K/uL    RBC 4.00 3.80 - 5.20 M/uL    HGB 11.7 11.5 - 16.0 g/dL    HCT 37.1 35.0 - 47.0 %    MCV 92.8 80.0 - 99.0 FL    MCH 29.3 26.0 - 34.0 PG    MCHC 31.5 30.0 - 36.5 g/dL    RDW 13.8 11.5 - 14.5 %    PLATELET 431 516 - 573 K/uL    MPV 10.6 8.9 - 12.9 FL    NRBC 0.0 0  WBC    ABSOLUTE NRBC 0.00 0.00 - 0.01 K/uL    NEUTROPHILS 69 32 - 75 %    LYMPHOCYTES 16 12 - 49 %    MONOCYTES 11 5 - 13 %    EOSINOPHILS 2 0 - 7 %    BASOPHILS 1 0 - 1 %    IMMATURE GRANULOCYTES 0 0.0 - 0.5 %    ABS. NEUTROPHILS 4.1 1.8 - 8.0 K/UL    ABS. LYMPHOCYTES 1.0 0.8 - 3.5 K/UL    ABS. MONOCYTES 0.7 0.0 - 1.0 K/UL    ABS.  EOSINOPHILS 0.1 0.0 - 0.4 K/UL    ABS. BASOPHILS 0.1 0.0 - 0.1 K/UL    ABS. IMM. GRANS. 0.0 0.00 - 0.04 K/UL    DF AUTOMATED     INFLUENZA A & B AG (RAPID TEST)    Collection Time: 02/21/18  2:18 PM   Result Value Ref Range    Influenza A Antigen NEGATIVE  NEG      Influenza B Antigen NEGATIVE  NEG     URINALYSIS W/ REFLEX CULTURE    Collection Time: 02/21/18  5:44 PM   Result Value Ref Range    Color YELLOW/STRAW      Appearance CLEAR CLEAR      Specific gravity 1.013 1.003 - 1.030      pH (UA) 5.0 5.0 - 8.0      Protein TRACE (A) NEG mg/dL    Glucose NEGATIVE  NEG mg/dL    Ketone NEGATIVE  NEG mg/dL    Bilirubin NEGATIVE  NEG      Blood TRACE (A) NEG      Urobilinogen 1.0 0.2 - 1.0 EU/dL    Nitrites NEGATIVE  NEG      Leukocyte Esterase NEGATIVE  NEG      WBC 0-4 0 - 4 /hpf    RBC 5-10 0 - 5 /hpf    Epithelial cells FEW FEW /lpf    Bacteria NEGATIVE  NEG /hpf    UA:UC IF INDICATED CULTURE NOT INDICATED BY UA RESULT CNI      Hyaline cast 0-2 0 - 5 /lpf       Radiologic Studies -   CXR Results  (Last 48 hours)               02/21/18 1511  XR CHEST PA LAT Final result    Impression:  IMPRESSION: No acute findings. Narrative:  EXAM:  XR CHEST PA LAT       INDICATION:   Chest Pain       COMPARISON: 2/5/2018. FINDINGS: PA and lateral radiographs of the chest demonstrate clear lungs and   pleural margins. Median sternotomy wires and left axillary pacemaker with right   atrial, right ventricular and coronary sinus leads are again shown. Mild to   moderate cardiac contour enlargement is again demonstrated, unchanged. There is   mild atherosclerotic thoracic aortic tortuosity again noted with otherwise   normal mediastinal and hilar contour. The bones are moderately osteopenic   without vertebral compression fracture. Mild to moderate thoracic spine   degenerative changes are again shown. Medical Decision Making   I am the first provider for this patient.     I reviewed the vital signs, available nursing notes, past medical history, past surgical history, family history and social history. Vital Signs-Reviewed the patient's vital signs. Patient Vitals for the past 12 hrs:   Temp Pulse Resp BP SpO2   02/21/18 1250 98.1 °F (36.7 °C) (!) 59 20 157/51 100 %       Records Reviewed: Nursing Notes and Old Medical Records    Provider Notes (Medical Decision Making):   DDx: Bronchitis, pneumonia, viral syndrome, UTI, dehydration, hypoglycemia    ED Course:   Initial assessment performed. The patients presenting problems have been discussed, and they are in agreement with the care plan formulated and outlined with them. I have encouraged them to ask questions as they arise throughout their visit. 6:27 PM  Pt re-evaluated. She reports she is feeling better at this time. Anticipate discharge. 6:37 PM  I have reviewed discharge instructions with the patient and explained medications that she is being discharged with. The patient verbalized understanding and agrees with plan. Disposition:  Discharge Note:  6:37 PM  The patient has been re-evaluated and is ready for discharge. Reviewed available results with patient. Counseled patient on diagnosis and care plan. Patient has expressed understanding, and all questions have been answered. Patient agrees with plan and agrees to follow up as recommended, or return to the ED if their symptoms worsen. Discharge instructions have been provided and explained to the patient, along with reasons to return to the ED. PLAN:  1.    Discharge Medication List as of 2/21/2018  6:32 PM      START taking these medications    Details   azithromycin (ZITHROMAX Z-DORIAN) 250 mg tablet As directed, Normal, Disp-6 Tab, R-0      methocarbamol (ROBAXIN-750) 750 mg tablet Take 1 Tab by mouth four (4) times daily as needed., Normal, Disp-20 Tab, R-0         CONTINUE these medications which have CHANGED    Details   albuterol (PROVENTIL HFA, VENTOLIN HFA, PROAIR HFA) 90 mcg/actuation inhaler Take 2 Puffs by inhalation every four (4) hours as needed for Wheezing., Normal, Disp-1 Inhaler, R-0         CONTINUE these medications which have NOT CHANGED    Details   traMADol (ULTRAM) 50 mg tablet Take 1 Tab by mouth every six (6) hours as needed for Pain. Max Daily Amount: 200 mg., Print, Disp-20 Tab, R-0      amLODIPine (NORVASC) 10 mg tablet Take 1 Tab by mouth daily. Indications: hypertension, Print, Disp-30 Tab, R-0      furosemide (LASIX) 40 mg tablet Take 1 Tab by mouth daily. , Print, Disp-30 Tab, R-0      docusate sodium (COLACE) 100 mg capsule Take 1 Cap by mouth two (2) times a day., Print, Disp-60 Cap, R-0      gabapentin (NEURONTIN) 300 mg capsule Take 300 mg by mouth three (3) times daily as needed., Historical Med      polyethylene glycol (MIRALAX) 17 gram packet Take 17 g by mouth daily. Indications: constipation, Historical Med      acetaminophen (TYLENOL) 500 mg tablet Take 1 Tab by mouth every six (6) hours as needed. , Print, Disp-40 Tab, R-0      ondansetron hcl (ZOFRAN, AS HYDROCHLORIDE,) 8 mg tablet Take 1 Tab by mouth every eight (8) hours as needed for Nausea. , Print, Disp-20 Tab, R-0      omeprazole (PRILOSEC) 20 mg capsule Take 20 mg by mouth daily. , Historical Med      cholecalciferol (VITAMIN D3) 400 unit tab tablet Take  by mouth daily. , Historical Med      pramipexole (MIRAPEX) 0.125 mg tablet Take 0.125 mg by mouth nightly., Historical Med      co-enzyme Q-10 (COQ-10) 100 mg capsule Take 1 capsule by mouth daily. , Normal, Disp-100 capsule, R-2      insulin aspart protamine (NOVOLOG MIX 70/30) 100 unit/mL (70-30) injection 22 units before breakfast and 22 units before supper, Normal, Disp-20 mL, R-10      metoprolol-XL (TOPROL-XL) 100 mg XL tablet Take 100 mg by mouth daily. , Historical Med           2.    Follow-up Information     Follow up With Details Comments Matthew Coyne 89, NP In 2 days As needed [address]  [phone] [facility] EMERGENCY [facility]PT  If symptoms worsen [address] [address]  [phone]        Return to ED if worse     Diagnosis     Clinical Impression:   1. Acute bronchitis, unspecified organism    2. Myalgia    3. Hypoglycemia        Attestations: This note is prepared by Elina Pedro, acting as Scribe for Billy Calvert MD.    Billy Calvert MD: The scribe's documentation has been prepared under my direction and personally reviewed by me in its entirety. I confirm that the note above accurately reflects all work, treatment, procedures, and medical decision making performed by me.

## 2018-02-22 ENCOUNTER — HOSPITAL ENCOUNTER (INPATIENT)
Age: 78
LOS: 3 days | Discharge: HOME HEALTH | DRG: 871 | End: 2018-02-26
Attending: EMERGENCY MEDICINE | Admitting: INTERNAL MEDICINE
Payer: MEDICARE

## 2018-02-22 ENCOUNTER — APPOINTMENT (OUTPATIENT)
Dept: GENERAL RADIOLOGY | Age: 78
DRG: 871 | End: 2018-02-22
Attending: EMERGENCY MEDICINE
Payer: MEDICARE

## 2018-02-22 DIAGNOSIS — I50.9 ACUTE ON CHRONIC CONGESTIVE HEART FAILURE, UNSPECIFIED CONGESTIVE HEART FAILURE TYPE: ICD-10-CM

## 2018-02-22 DIAGNOSIS — J20.9 ACUTE BRONCHITIS, UNSPECIFIED ORGANISM: ICD-10-CM

## 2018-02-22 DIAGNOSIS — N17.9 AKI (ACUTE KIDNEY INJURY) (HCC): ICD-10-CM

## 2018-02-22 DIAGNOSIS — A41.9 SEPSIS, DUE TO UNSPECIFIED ORGANISM: Primary | ICD-10-CM

## 2018-02-22 LAB
ALBUMIN SERPL-MCNC: 3.7 G/DL (ref 3.5–5)
ALBUMIN/GLOB SERPL: 0.8 {RATIO} (ref 1.1–2.2)
ALP SERPL-CCNC: 369 U/L (ref 45–117)
ALT SERPL-CCNC: 20 U/L (ref 12–78)
ANION GAP SERPL CALC-SCNC: 7 MMOL/L (ref 5–15)
AST SERPL-CCNC: 46 U/L (ref 15–37)
BASOPHILS # BLD: 0 K/UL (ref 0–0.1)
BASOPHILS NFR BLD: 1 % (ref 0–1)
BILIRUB SERPL-MCNC: 0.8 MG/DL (ref 0.2–1)
BNP SERPL-MCNC: 9359 PG/ML (ref 0–450)
BUN SERPL-MCNC: 27 MG/DL (ref 6–20)
BUN/CREAT SERPL: 21 (ref 12–20)
CALCIUM SERPL-MCNC: 9.4 MG/DL (ref 8.5–10.1)
CHLORIDE SERPL-SCNC: 103 MMOL/L (ref 97–108)
CO2 SERPL-SCNC: 29 MMOL/L (ref 21–32)
CREAT SERPL-MCNC: 1.3 MG/DL (ref 0.55–1.02)
DIFFERENTIAL METHOD BLD: NORMAL
EOSINOPHIL # BLD: 0.1 K/UL (ref 0–0.4)
EOSINOPHIL NFR BLD: 1 % (ref 0–7)
ERYTHROCYTE [DISTWIDTH] IN BLOOD BY AUTOMATED COUNT: 13.8 % (ref 11.5–14.5)
GLOBULIN SER CALC-MCNC: 4.8 G/DL (ref 2–4)
GLUCOSE SERPL-MCNC: 122 MG/DL (ref 65–100)
HCT VFR BLD AUTO: 41.7 % (ref 35–47)
HGB BLD-MCNC: 13.1 G/DL (ref 11.5–16)
IMM GRANULOCYTES # BLD: 0 K/UL (ref 0–0.04)
IMM GRANULOCYTES NFR BLD AUTO: 0 % (ref 0–0.5)
LACTATE SERPL-SCNC: 1.4 MMOL/L (ref 0.4–2)
LYMPHOCYTES # BLD: 1.2 K/UL (ref 0.8–3.5)
LYMPHOCYTES NFR BLD: 20 % (ref 12–49)
MCH RBC QN AUTO: 29.2 PG (ref 26–34)
MCHC RBC AUTO-ENTMCNC: 31.4 G/DL (ref 30–36.5)
MCV RBC AUTO: 92.9 FL (ref 80–99)
MONOCYTES # BLD: 0.5 K/UL (ref 0–1)
MONOCYTES NFR BLD: 8 % (ref 5–13)
NEUTS SEG # BLD: 4 K/UL (ref 1.8–8)
NEUTS SEG NFR BLD: 69 % (ref 32–75)
NRBC # BLD: 0 K/UL (ref 0–0.01)
NRBC BLD-RTO: 0 PER 100 WBC
PLATELET # BLD AUTO: 159 K/UL (ref 150–400)
PMV BLD AUTO: 10.7 FL (ref 8.9–12.9)
POTASSIUM SERPL-SCNC: 5 MMOL/L (ref 3.5–5.1)
PROT SERPL-MCNC: 8.5 G/DL (ref 6.4–8.2)
RBC # BLD AUTO: 4.49 M/UL (ref 3.8–5.2)
SODIUM SERPL-SCNC: 139 MMOL/L (ref 136–145)
TROPONIN I SERPL-MCNC: <0.04 NG/ML
WBC # BLD AUTO: 5.7 K/UL (ref 3.6–11)

## 2018-02-22 PROCEDURE — 85025 COMPLETE CBC W/AUTO DIFF WBC: CPT | Performed by: EMERGENCY MEDICINE

## 2018-02-22 PROCEDURE — 96368 THER/DIAG CONCURRENT INF: CPT

## 2018-02-22 PROCEDURE — 87040 BLOOD CULTURE FOR BACTERIA: CPT | Performed by: EMERGENCY MEDICINE

## 2018-02-22 PROCEDURE — 74011000250 HC RX REV CODE- 250: Performed by: EMERGENCY MEDICINE

## 2018-02-22 PROCEDURE — 77030029684 HC NEB SM VOL KT MONA -A

## 2018-02-22 PROCEDURE — 96365 THER/PROPH/DIAG IV INF INIT: CPT

## 2018-02-22 PROCEDURE — 96375 TX/PRO/DX INJ NEW DRUG ADDON: CPT

## 2018-02-22 PROCEDURE — 94640 AIRWAY INHALATION TREATMENT: CPT

## 2018-02-22 PROCEDURE — 80053 COMPREHEN METABOLIC PANEL: CPT | Performed by: EMERGENCY MEDICINE

## 2018-02-22 PROCEDURE — 83605 ASSAY OF LACTIC ACID: CPT | Performed by: EMERGENCY MEDICINE

## 2018-02-22 PROCEDURE — 83880 ASSAY OF NATRIURETIC PEPTIDE: CPT | Performed by: EMERGENCY MEDICINE

## 2018-02-22 PROCEDURE — 71045 X-RAY EXAM CHEST 1 VIEW: CPT

## 2018-02-22 PROCEDURE — 36415 COLL VENOUS BLD VENIPUNCTURE: CPT | Performed by: EMERGENCY MEDICINE

## 2018-02-22 PROCEDURE — 99285 EMERGENCY DEPT VISIT HI MDM: CPT

## 2018-02-22 PROCEDURE — 84484 ASSAY OF TROPONIN QUANT: CPT | Performed by: EMERGENCY MEDICINE

## 2018-02-22 PROCEDURE — 93005 ELECTROCARDIOGRAM TRACING: CPT

## 2018-02-22 PROCEDURE — 74011250636 HC RX REV CODE- 250/636: Performed by: EMERGENCY MEDICINE

## 2018-02-22 RX ORDER — FUROSEMIDE 10 MG/ML
80 INJECTION INTRAMUSCULAR; INTRAVENOUS
Status: COMPLETED | OUTPATIENT
Start: 2018-02-22 | End: 2018-02-22

## 2018-02-22 RX ORDER — IPRATROPIUM BROMIDE AND ALBUTEROL SULFATE 2.5; .5 MG/3ML; MG/3ML
3 SOLUTION RESPIRATORY (INHALATION)
Status: COMPLETED | OUTPATIENT
Start: 2018-02-22 | End: 2018-02-22

## 2018-02-22 RX ORDER — SODIUM CHLORIDE 0.9 % (FLUSH) 0.9 %
5-10 SYRINGE (ML) INJECTION AS NEEDED
Status: DISCONTINUED | OUTPATIENT
Start: 2018-02-22 | End: 2018-02-25 | Stop reason: SDUPTHER

## 2018-02-22 RX ADMIN — AZITHROMYCIN 500 MG: 500 INJECTION, POWDER, LYOPHILIZED, FOR SOLUTION INTRAVENOUS at 23:28

## 2018-02-22 RX ADMIN — CEFTRIAXONE SODIUM 1 G: 1 INJECTION, POWDER, FOR SOLUTION INTRAMUSCULAR; INTRAVENOUS at 22:32

## 2018-02-22 RX ADMIN — METHYLPREDNISOLONE SODIUM SUCCINATE 125 MG: 125 INJECTION, POWDER, FOR SOLUTION INTRAMUSCULAR; INTRAVENOUS at 23:26

## 2018-02-22 RX ADMIN — IPRATROPIUM BROMIDE AND ALBUTEROL SULFATE 3 ML: .5; 3 SOLUTION RESPIRATORY (INHALATION) at 21:37

## 2018-02-22 RX ADMIN — FUROSEMIDE 80 MG: 10 INJECTION, SOLUTION INTRAMUSCULAR; INTRAVENOUS at 23:27

## 2018-02-22 NOTE — IP AVS SNAPSHOT
[address] [facility] 
[phone] Patient: Marcia Lund MRN: [id number] :1940 About your hospitalization You were admitted on:  2018 You last received care in the:  [facility]ARDIOPUL[facility] CARE You were discharged on:  2018 Why you were hospitalized Your primary diagnosis was:  Not on File Your diagnoses also included:  Diastolic Chf, Acute (Hcc) Follow-up Information Follow up With Details Comments Contact Info Fabienne Quintero NP In 3 days NP's office will contact pt directly to schedule follow-up appointment. [address] [phone] [facility] Office on Aging  For additional CHF education, med management, resources, and support in the community. [address] On 2018 This is your post-acute home healthcare provider. If you do not hear from them within 24 hours, please give them a call. Discharge Orders None A check [place] indicates which time of day the medication should be taken. My Medications START taking these medications Instructions Each Dose to Equal  
 Morning Noon Evening Bedtime  
 guaiFENesin  mg ER tablet Commonly known as:  Mike & Mike Your last dose was: Your next dose is: Take 1 Tab by mouth every twelve (12) hours. 600 mg  
    
   
   
   
  
 guaiFENesin-dextromethorphan 100-10 mg/5 mL syrup Commonly known as:  ROBITUSSIN DM Your last dose was: Your next dose is: Take 10 mL by mouth every six (6) hours as needed for up to 10 days. 10 mL  
    
   
   
   
  
 levoFLOXacin 750 mg tablet Commonly known as:  Cynthia Patel Your last dose was: Your next dose is: Take 1 Tab by mouth daily for 2 days.   
 750 mg  
    
   
   
 oseltamivir 30 mg capsule Commonly known as:  TAMIFLU Your last dose was: Your next dose is: Take 1 Cap by mouth daily for 3 days. 30 mg  
    
   
   
   
  
 predniSONE 10 mg tablet Commonly known as:  Carol Passy Your last dose was: Your next dose is:    
   
   
 5 tabs daily for 2 days 4 tabs daily for 2 days  3 tabs daily for 2 days  2 tabs daily for 2 days  1 tab   daily for 2 days CHANGE how you take these medications Instructions Each Dose to Equal  
 Morning Noon Evening Bedtime  
 amLODIPine 5 mg tablet Commonly known as:  Sadie Rose What changed:  Another medication with the same name was removed. Continue taking this medication, and follow the directions you see here. Your last dose was: Your next dose is: Take 5 mg by mouth daily. 5 mg * NovoLOG Mix 70-30 U-100 Insuln 100 unit/mL (70-30) injection Generic drug:  insulin aspart protamine/insulin aspart What changed:  Another medication with the same name was removed. Continue taking this medication, and follow the directions you see here. Your last dose was: Your next dose is:    
   
   
 13 Units by SubCUTAneous route daily. Patient takes 13 units in the AM and 3 units QHS  
 13 Units * NovoLOG Mix 70-30 U-100 Insuln 100 unit/mL (70-30) injection Generic drug:  insulin aspart protamine/insulin aspart What changed:  Another medication with the same name was removed. Continue taking this medication, and follow the directions you see here. Your last dose was: Your next dose is:    
   
   
 3 Units by SubCUTAneous route nightly. Patient takes 13 units in the AM and 3 units QHS  
 3 Units * Notice: This list has 2 medication(s) that are the same as other medications prescribed for you.  Read the directions carefully, and ask your doctor or other care provider to review them with you. CONTINUE taking these medications Instructions Each Dose to Equal  
 Morning Noon Evening Bedtime  
 albuterol 90 mcg/actuation inhaler Commonly known as:  PROVENTIL HFA, VENTOLIN HFA, PROAIR HFA Your last dose was: Your next dose is: Take 2 Puffs by inhalation every four (4) hours as needed for Wheezing. 2 Puff  
    
   
   
   
  
 furosemide 40 mg tablet Commonly known as:  LASIX Your last dose was: Your next dose is: Take 1 Tab by mouth daily. 40 mg  
    
   
   
   
  
 gabapentin 300 mg capsule Commonly known as:  NEURONTIN Your last dose was: Your next dose is: Take 300 mg by mouth two (2) times a day. 300 mg  
    
   
   
   
  
 metoprolol succinate 100 mg tablet Commonly known as:  TOPROL-XL Your last dose was: Your next dose is: Take 100 mg by mouth daily. 100 mg MIRALAX 17 gram packet Generic drug:  polyethylene glycol Your last dose was: Your next dose is: Take 17 g by mouth daily. Indications: constipation 17 g  
    
   
   
   
  
 ondansetron hcl 8 mg tablet Commonly known as:  ZOFRAN (AS HYDROCHLORIDE) Your last dose was: Your next dose is: Take 1 Tab by mouth every eight (8) hours as needed for Nausea. 8 mg  
    
   
   
   
  
 traMADol 50 mg tablet Commonly known as:  ULTRAM  
   
Your last dose was: Your next dose is: Take 50 mg by mouth two (2) times a day. 50 mg Where to Get Your Medications Information on where to get these meds will be given to you by the nurse or doctor. ! Ask your nurse or doctor about these medications  
  guaiFENesin  mg ER tablet  
 guaiFENesin-dextromethorphan 100-10 mg/5 mL syrup  
 levoFLOXacin 750 mg tablet oseltamivir 30 mg capsule  
 predniSONE 10 mg tablet Discharge Instructions None [employer] Announcement We are excited to announce that we are making your provider's discharge notes available to you in [email or web address]. You will see these notes when they are completed and signed by the physician that discharged you from your recent hospital stay. If you have any questions or concerns about any information you see in [email or web address], please call the Health Information Department where you were seen or reach out to your Primary Care Provider for more information about your plan of care. Introducing [facility]! [employer] introduces [email or web address] patient portal. Now you can access parts of your medical record, email your doctor's office, and request medication refills online. 1. In your internet browser, go to [email or web address] 2. Click on the First Time User? Click Here link in the Sign In box. You will see the New Member Sign Up page. 3. Enter your [email or web address] Access Code exactly as it appears below. You will not need to use this code after youve completed the sign-up process. If you do not sign up before the expiration date, you must request a new code. · [email or web address] Access Code: [email or web address] Expires: 5/6/2018  6:18 PM 
 
4. Enter the last four digits of your Social Security Number (xxxx) and Date of Birth (mm/dd/yyyy) as indicated and click Submit. You will be taken to the next sign-up page. 5. Create a [email or web address] ID. This will be your [email or web address] login ID and cannot be changed, so think of one that is secure and easy to remember. 6. Create a [email or web address] password. You can change your password at any time. 7. Enter your Password Reset Question and Answer. This can be used at a later time if you forget your password. 8. Enter your e-mail address. You will receive e-mail notification when new information is available in [address] 9. Click Sign Up. You can now view and download portions of your medical record. 10. Click the Download Summary menu link to download a portable copy of your medical information. If you have questions, please visit the Frequently Asked Questions section of the [employer] website. Remember, [employer] is NOT to be used for urgent needs. For medical emergencies, dial 911. Now available from your iPhone and Android! Unresulted Labs-Please follow up with your PCP about these lab tests Order Current Status CULTURE, BLOOD Preliminary result CULTURE, BLOOD Preliminary result Providers Seen During Your Hospitalization Provider Specialty Primary office phone Shama Oreilly MD Emergency Medicine [phone] Megan Sanchez MD Hospitalist [phone] Camille Levi MD Internal Medicine [phone] Your Primary Care Physician (PCP) Primary Care Physician Office Phone Office Fax Noy Luz Elena [phone] You are allergic to the following Allergen Reactions Percocet (Oxycodone-Acetaminophen) Nausea and Vomiting Recent Documentation Height Weight BMI OB Status Smoking Status 1.473 m 57.2 kg 26.33 kg/m2 Postmenopausal Never Smoker Emergency Contacts Name Discharge Info Relation Home Work Mobile Replaced by [facility] DISCHARGE CAREGIVER [3] Child [2] [phone] Jesús Penning  Daughter [21] [phone] Patient Belongings The following personal items are in your possession at time of discharge: 
  Dental Appliances: Lowers, Uppers, With patient  Visual Aid: Glasses, With patient      Home Medications: None   Jewelry: None  Clothing: At bedside    Other Valuables: None Please provide this summary of care documentation to your next provider.  
  
  
 
  
Signatures-by signing, you are acknowledging that this After Visit Summary has been reviewed with you and you have received a copy. Patient Signature:  ____________________________________________________________ Date:  ____________________________________________________________  
  
Kathleen Phenes Provider Signature:  ____________________________________________________________ Date:  ____________________________________________________________

## 2018-02-23 ENCOUNTER — APPOINTMENT (OUTPATIENT)
Dept: CT IMAGING | Age: 78
DRG: 871 | End: 2018-02-23
Attending: INTERNAL MEDICINE
Payer: MEDICARE

## 2018-02-23 PROBLEM — I50.31 DIASTOLIC CHF, ACUTE (HCC): Status: ACTIVE | Noted: 2018-02-23

## 2018-02-23 LAB
ATRIAL RATE: 84 BPM
CALCULATED R AXIS, ECG10: -96 DEGREES
CALCULATED T AXIS, ECG11: 78 DEGREES
DIAGNOSIS, 93000: NORMAL
GLUCOSE BLD STRIP.AUTO-MCNC: 143 MG/DL (ref 65–100)
GLUCOSE BLD STRIP.AUTO-MCNC: 165 MG/DL (ref 65–100)
GLUCOSE BLD STRIP.AUTO-MCNC: 202 MG/DL (ref 65–100)
GLUCOSE BLD STRIP.AUTO-MCNC: 250 MG/DL (ref 65–100)
Q-T INTERVAL, ECG07: 480 MS
QRS DURATION, ECG06: 164 MS
QTC CALCULATION (BEZET), ECG08: 480 MS
SERVICE CMNT-IMP: ABNORMAL
VENTRICULAR RATE, ECG03: 60 BPM

## 2018-02-23 PROCEDURE — 77010033678 HC OXYGEN DAILY

## 2018-02-23 PROCEDURE — 93306 TTE W/DOPPLER COMPLETE: CPT

## 2018-02-23 PROCEDURE — 94761 N-INVAS EAR/PLS OXIMETRY MLT: CPT

## 2018-02-23 PROCEDURE — 71250 CT THORAX DX C-: CPT

## 2018-02-23 PROCEDURE — 94640 AIRWAY INHALATION TREATMENT: CPT

## 2018-02-23 PROCEDURE — 74011000250 HC RX REV CODE- 250: Performed by: INTERNAL MEDICINE

## 2018-02-23 PROCEDURE — 65660000000 HC RM CCU STEPDOWN

## 2018-02-23 PROCEDURE — 74011250637 HC RX REV CODE- 250/637: Performed by: INTERNAL MEDICINE

## 2018-02-23 PROCEDURE — 74011250636 HC RX REV CODE- 250/636: Performed by: INTERNAL MEDICINE

## 2018-02-23 PROCEDURE — 82962 GLUCOSE BLOOD TEST: CPT

## 2018-02-23 PROCEDURE — 94760 N-INVAS EAR/PLS OXIMETRY 1: CPT

## 2018-02-23 PROCEDURE — 74011636637 HC RX REV CODE- 636/637: Performed by: INTERNAL MEDICINE

## 2018-02-23 RX ORDER — INSULIN LISPRO 100 [IU]/ML
INJECTION, SOLUTION INTRAVENOUS; SUBCUTANEOUS
Status: DISCONTINUED | OUTPATIENT
Start: 2018-02-23 | End: 2018-02-26 | Stop reason: HOSPADM

## 2018-02-23 RX ORDER — ONDANSETRON 2 MG/ML
4 INJECTION INTRAMUSCULAR; INTRAVENOUS
Status: DISCONTINUED | OUTPATIENT
Start: 2018-02-23 | End: 2018-02-26 | Stop reason: HOSPADM

## 2018-02-23 RX ORDER — ENOXAPARIN SODIUM 100 MG/ML
40 INJECTION SUBCUTANEOUS EVERY 24 HOURS
Status: DISCONTINUED | OUTPATIENT
Start: 2018-02-23 | End: 2018-02-23

## 2018-02-23 RX ORDER — SODIUM CHLORIDE 0.9 % (FLUSH) 0.9 %
5-10 SYRINGE (ML) INJECTION AS NEEDED
Status: DISCONTINUED | OUTPATIENT
Start: 2018-02-23 | End: 2018-02-26 | Stop reason: HOSPADM

## 2018-02-23 RX ORDER — POLYETHYLENE GLYCOL 3350 17 G/17G
17 POWDER, FOR SOLUTION ORAL DAILY
Status: DISCONTINUED | OUTPATIENT
Start: 2018-02-23 | End: 2018-02-26 | Stop reason: HOSPADM

## 2018-02-23 RX ORDER — METOPROLOL SUCCINATE 50 MG/1
100 TABLET, EXTENDED RELEASE ORAL DAILY
Status: DISCONTINUED | OUTPATIENT
Start: 2018-02-23 | End: 2018-02-26 | Stop reason: HOSPADM

## 2018-02-23 RX ORDER — FUROSEMIDE 10 MG/ML
40 INJECTION INTRAMUSCULAR; INTRAVENOUS DAILY
Status: DISCONTINUED | OUTPATIENT
Start: 2018-02-23 | End: 2018-02-24

## 2018-02-23 RX ORDER — GABAPENTIN 300 MG/1
300 CAPSULE ORAL
Status: DISCONTINUED | OUTPATIENT
Start: 2018-02-23 | End: 2018-02-26 | Stop reason: HOSPADM

## 2018-02-23 RX ORDER — DEXTROSE 50 % IN WATER (D50W) INTRAVENOUS SYRINGE
12.5-25 AS NEEDED
Status: DISCONTINUED | OUTPATIENT
Start: 2018-02-23 | End: 2018-02-26 | Stop reason: HOSPADM

## 2018-02-23 RX ORDER — FACIAL-BODY WIPES
10 EACH TOPICAL DAILY PRN
Status: DISCONTINUED | OUTPATIENT
Start: 2018-02-23 | End: 2018-02-26 | Stop reason: HOSPADM

## 2018-02-23 RX ORDER — FUROSEMIDE 10 MG/ML
40 INJECTION INTRAMUSCULAR; INTRAVENOUS 2 TIMES DAILY
Status: DISCONTINUED | OUTPATIENT
Start: 2018-02-23 | End: 2018-02-23

## 2018-02-23 RX ORDER — INSULIN ASPART 100 [IU]/ML
16 INJECTION, SUSPENSION SUBCUTANEOUS DAILY
COMMUNITY

## 2018-02-23 RX ORDER — TRAMADOL HYDROCHLORIDE 50 MG/1
50 TABLET ORAL 2 TIMES DAILY
COMMUNITY
End: 2021-04-27 | Stop reason: CLARIF

## 2018-02-23 RX ORDER — ENOXAPARIN SODIUM 100 MG/ML
30 INJECTION SUBCUTANEOUS EVERY 24 HOURS
Status: DISCONTINUED | OUTPATIENT
Start: 2018-02-24 | End: 2018-02-26 | Stop reason: HOSPADM

## 2018-02-23 RX ORDER — PRAMIPEXOLE DIHYDROCHLORIDE 0.25 MG/1
0.12 TABLET ORAL
Status: DISCONTINUED | OUTPATIENT
Start: 2018-02-23 | End: 2018-02-26 | Stop reason: HOSPADM

## 2018-02-23 RX ORDER — ALBUTEROL SULFATE 0.83 MG/ML
2.5 SOLUTION RESPIRATORY (INHALATION)
Status: DISCONTINUED | OUTPATIENT
Start: 2018-02-23 | End: 2018-02-26 | Stop reason: HOSPADM

## 2018-02-23 RX ORDER — AMLODIPINE BESYLATE 5 MG/1
10 TABLET ORAL DAILY
Status: ON HOLD | COMMUNITY
End: 2021-09-15

## 2018-02-23 RX ORDER — MAGNESIUM SULFATE 100 %
4 CRYSTALS MISCELLANEOUS AS NEEDED
Status: DISCONTINUED | OUTPATIENT
Start: 2018-02-23 | End: 2018-02-26 | Stop reason: HOSPADM

## 2018-02-23 RX ORDER — PREDNISONE 20 MG/1
40 TABLET ORAL
Status: DISCONTINUED | OUTPATIENT
Start: 2018-02-23 | End: 2018-02-26 | Stop reason: HOSPADM

## 2018-02-23 RX ORDER — OSELTAMIVIR PHOSPHATE 75 MG/1
75 CAPSULE ORAL 2 TIMES DAILY
Status: DISCONTINUED | OUTPATIENT
Start: 2018-02-23 | End: 2018-02-23

## 2018-02-23 RX ORDER — AMLODIPINE BESYLATE 5 MG/1
10 TABLET ORAL DAILY
Status: DISCONTINUED | OUTPATIENT
Start: 2018-02-23 | End: 2018-02-26 | Stop reason: HOSPADM

## 2018-02-23 RX ORDER — INSULIN ASPART 100 [IU]/ML
3 INJECTION, SUSPENSION SUBCUTANEOUS
COMMUNITY
End: 2021-04-27 | Stop reason: CLARIF

## 2018-02-23 RX ORDER — NALOXONE HYDROCHLORIDE 0.4 MG/ML
0.4 INJECTION, SOLUTION INTRAMUSCULAR; INTRAVENOUS; SUBCUTANEOUS AS NEEDED
Status: DISCONTINUED | OUTPATIENT
Start: 2018-02-23 | End: 2018-02-26 | Stop reason: HOSPADM

## 2018-02-23 RX ORDER — IPRATROPIUM BROMIDE AND ALBUTEROL SULFATE 2.5; .5 MG/3ML; MG/3ML
3 SOLUTION RESPIRATORY (INHALATION)
Status: DISCONTINUED | OUTPATIENT
Start: 2018-02-23 | End: 2018-02-24

## 2018-02-23 RX ORDER — AZITHROMYCIN 250 MG/1
250 TABLET, FILM COATED ORAL EVERY 24 HOURS
Status: DISCONTINUED | OUTPATIENT
Start: 2018-02-23 | End: 2018-02-26 | Stop reason: HOSPADM

## 2018-02-23 RX ORDER — ACETAMINOPHEN 325 MG/1
650 TABLET ORAL
Status: DISCONTINUED | OUTPATIENT
Start: 2018-02-23 | End: 2018-02-26 | Stop reason: HOSPADM

## 2018-02-23 RX ORDER — OSELTAMIVIR PHOSPHATE 30 MG/1
30 CAPSULE ORAL DAILY
Status: DISCONTINUED | OUTPATIENT
Start: 2018-02-24 | End: 2018-02-26 | Stop reason: HOSPADM

## 2018-02-23 RX ORDER — SODIUM CHLORIDE 0.9 % (FLUSH) 0.9 %
5-10 SYRINGE (ML) INJECTION EVERY 8 HOURS
Status: DISCONTINUED | OUTPATIENT
Start: 2018-02-23 | End: 2018-02-26 | Stop reason: HOSPADM

## 2018-02-23 RX ORDER — INSULIN GLARGINE 100 [IU]/ML
14 INJECTION, SOLUTION SUBCUTANEOUS
Status: DISCONTINUED | OUTPATIENT
Start: 2018-02-23 | End: 2018-02-24

## 2018-02-23 RX ORDER — GUAIFENESIN 100 MG/5ML
81 LIQUID (ML) ORAL DAILY
Status: DISCONTINUED | OUTPATIENT
Start: 2018-02-23 | End: 2018-02-26 | Stop reason: HOSPADM

## 2018-02-23 RX ADMIN — OSELTAMIVIR PHOSPHATE 75 MG: 75 CAPSULE ORAL at 09:12

## 2018-02-23 RX ADMIN — INSULIN GLARGINE 14 UNITS: 100 INJECTION, SOLUTION SUBCUTANEOUS at 21:47

## 2018-02-23 RX ADMIN — ASPIRIN 81 MG 81 MG: 81 TABLET ORAL at 09:12

## 2018-02-23 RX ADMIN — INSULIN LISPRO 5 UNITS: 100 INJECTION, SOLUTION INTRAVENOUS; SUBCUTANEOUS at 17:40

## 2018-02-23 RX ADMIN — PREDNISONE 40 MG: 20 TABLET ORAL at 09:14

## 2018-02-23 RX ADMIN — ACETAMINOPHEN 650 MG: 325 TABLET ORAL at 15:37

## 2018-02-23 RX ADMIN — ENOXAPARIN SODIUM 40 MG: 40 INJECTION SUBCUTANEOUS at 10:10

## 2018-02-23 RX ADMIN — ONDANSETRON 4 MG: 2 INJECTION INTRAMUSCULAR; INTRAVENOUS at 05:17

## 2018-02-23 RX ADMIN — IPRATROPIUM BROMIDE AND ALBUTEROL SULFATE 3 ML: .5; 3 SOLUTION RESPIRATORY (INHALATION) at 20:46

## 2018-02-23 RX ADMIN — Medication 10 ML: at 09:22

## 2018-02-23 RX ADMIN — CEFTRIAXONE SODIUM 1 G: 1 INJECTION, POWDER, FOR SOLUTION INTRAMUSCULAR; INTRAVENOUS at 21:40

## 2018-02-23 RX ADMIN — IPRATROPIUM BROMIDE AND ALBUTEROL SULFATE 3 ML: .5; 3 SOLUTION RESPIRATORY (INHALATION) at 09:11

## 2018-02-23 RX ADMIN — INSULIN LISPRO 2 UNITS: 100 INJECTION, SOLUTION INTRAVENOUS; SUBCUTANEOUS at 21:48

## 2018-02-23 RX ADMIN — IPRATROPIUM BROMIDE AND ALBUTEROL SULFATE 3 ML: .5; 3 SOLUTION RESPIRATORY (INHALATION) at 15:18

## 2018-02-23 RX ADMIN — INSULIN LISPRO 2 UNITS: 100 INJECTION, SOLUTION INTRAVENOUS; SUBCUTANEOUS at 08:59

## 2018-02-23 RX ADMIN — IPRATROPIUM BROMIDE AND ALBUTEROL SULFATE 3 ML: .5; 3 SOLUTION RESPIRATORY (INHALATION) at 13:43

## 2018-02-23 RX ADMIN — AZITHROMYCIN 250 MG: 250 TABLET, FILM COATED ORAL at 21:42

## 2018-02-23 RX ADMIN — POLYETHYLENE GLYCOL 3350 17 G: 17 POWDER, FOR SOLUTION ORAL at 09:16

## 2018-02-23 RX ADMIN — PRAMIPEXOLE DIHYDROCHLORIDE 0.12 MG: 0.25 TABLET ORAL at 21:43

## 2018-02-23 RX ADMIN — Medication 10 ML: at 21:38

## 2018-02-23 RX ADMIN — INSULIN LISPRO 2 UNITS: 100 INJECTION, SOLUTION INTRAVENOUS; SUBCUTANEOUS at 13:29

## 2018-02-23 RX ADMIN — FUROSEMIDE 40 MG: 10 INJECTION, SOLUTION INTRAMUSCULAR; INTRAVENOUS at 09:22

## 2018-02-23 RX ADMIN — METOPROLOL SUCCINATE 100 MG: 50 TABLET, EXTENDED RELEASE ORAL at 09:15

## 2018-02-23 RX ADMIN — AMLODIPINE BESYLATE 10 MG: 5 TABLET ORAL at 09:11

## 2018-02-23 RX ADMIN — INSULIN GLARGINE 14 UNITS: 100 INJECTION, SOLUTION SUBCUTANEOUS at 07:59

## 2018-02-23 NOTE — PROGRESS NOTES
TRANSFER - IN REPORT:    Verbal report received from IvyRN(name) on Alma Rothman  being received from ED(unit) for routine progression of care      Report consisted of patients Situation, Background, Assessment and   Recommendations(SBAR). Information from the following report(s) SBAR, Kardex, ED Summary, Intake/Output, MAR and Recent Results was reviewed with the receiving nurse. Opportunity for questions and clarification was provided. Assessment completed upon patients arrival to unit and care assumed.

## 2018-02-23 NOTE — PROGRESS NOTES
Patient off floor at this time. Will defer PT services and continue to follow.      Rosalba Sierra, PT, DPT, Emma Hoskins

## 2018-02-23 NOTE — ED NOTES
Linen changed, old brief removed, saturated at time of removal. Arrie Shack in place at this time. Cardiac monitor x3. Call bell in reach. Daughter at bedside. No complaints at this time. Pt accidentally pulled out PIV. New 20 gauge PIV placed in left arm. Pillow and blanket provided.

## 2018-02-23 NOTE — PROGRESS NOTES
Pharmacy Clarification of the Prior to Admission Medication Regimen Retrospective to the Admission Medication Reconciliation    The patient was interviewed regarding clarification of the prior to admission medication regimen. Patient's sister was present in room and obtained permission from patient to discuss drug regimen with visitor(s) present. Patient was questioned regarding use of any other inhalers, topical products, over the counter medications, herbal medications, vitamin products or ophthalmic/nasal/otic medication use. Information Obtained From: Rx Query and patient    Recommendations/Findings: The following amendments were made to the patient's active medication list on file at [address]:     1) Additions:    traMADol (ULTRAM) 50 mg tablet    2) Removals:    Tylenol   Zithromax   Vitamin D3   COQ-10   Colace   Robitussin AC   Methocarbamol   Prilosec   Mirapex    3) Changes:   amLODIPine (NORVASC)  (Old regimen: (strength: 10 mg) 10 mg daily /New regimen: (strength: 5 mg) 5 mg daily)   gabapentin (NEURONTIN) 300 mg capsule (Old regimen: 300 mg TID PRN /New regimen: 300 mg BID)   insulin aspart protamine/insulin aspart (NOVOLOG MIX 70-30 U-100 INSULN) 100 unit/mL (70-30) injection (Old regimen: 22 units before breakfast and 22 units before supper /New regimen: 13 units SC daily and 3 units SC QHS)    4) Pertinent Pharmacy Findings:   albuterol (PROVENTIL HFA, VENTOLIN HFA, PROAIR HFA) 90 mcg/actuation inhaler: Patient stated she ran out of this medication \"a few weeks ago but can get it refilled again if she needs it. \"       PTA medication list was corrected to the following:     Prior to Admission Medications   Prescriptions Last Dose Informant Patient Reported? Taking? albuterol (PROVENTIL HFA, VENTOLIN HFA, PROAIR HFA) 90 mcg/actuation inhaler Not Taking at Unknown time Self No No   Sig: Take 2 Puffs by inhalation every four (4) hours as needed for Wheezing.    amLODIPine (NORVASC) 5 mg tablet 2018 at Unknown time Self Yes Yes   Sig: Take 5 mg by mouth daily. furosemide (LASIX) 40 mg tablet 2018 at Unknown time Self No Yes   Sig: Take 1 Tab by mouth daily. gabapentin (NEURONTIN) 300 mg capsule 2018 at Unknown time Self Yes Yes   Sig: Take 300 mg by mouth two (2) times a day. insulin aspart protamine/insulin aspart (NOVOLOG MIX 70-30 U-100 INSULN) 100 unit/mL (70-30) injection 2018 at Unknown time Self Yes Yes   Si Units by SubCUTAneous route daily. Patient takes 13 units in the AM and 3 units QHS   insulin aspart protamine/insulin aspart (NOVOLOG MIX 70-30 U-100 INSULN) 100 unit/mL (70-30) injection 2018 at Unknown time Self Yes Yes   Sig: 3 Units by SubCUTAneous route nightly. Patient takes 13 units in the AM and 3 units QHS   metoprolol-XL (TOPROL-XL) 100 mg XL tablet 2018 at Unknown time Self Yes Yes   Sig: Take 100 mg by mouth daily. ondansetron hcl (ZOFRAN, AS HYDROCHLORIDE,) 8 mg tablet 2018 at Unknown time Self No Yes   Sig: Take 1 Tab by mouth every eight (8) hours as needed for Nausea. polyethylene glycol (MIRALAX) 17 gram packet 2018 at Unknown time Self Yes Yes   Sig: Take 17 g by mouth daily. Indications: constipation   traMADol (ULTRAM) 50 mg tablet 2018 at Unknown time Self Yes Yes   Sig: Take 50 mg by mouth two (2) times a day.       Facility-Administered Medications: None          Thank you,  Nita Lewis [facility]  Medication History Pharmacy Technician

## 2018-02-23 NOTE — ROUTINE PROCESS
TRANSFER - OUT REPORT:    Verbal report given to Isela(name) on Legrand Mcburney  being transferred to 2213 (unit) for routine progression of care       Report consisted of patients Situation, Background, Assessment and   Recommendations(SBAR). Information from the following report(s) SBAR, Kardex, ED Summary, Procedure Summary, Intake/Output, MAR, Accordion, Recent Results, Med Rec Status and Cardiac Rhythm NSR paced was reviewed with the receiving nurse. Lines:   Peripheral IV 02/23/18 Left Arm (Active)   Site Assessment Clean, dry, & intact 2/23/2018  5:45 AM   Phlebitis Assessment 0 2/23/2018  5:45 AM   Infiltration Assessment 0 2/23/2018  5:45 AM   Dressing Status Clean, dry, & intact 2/23/2018  5:45 AM   Dressing Type 4 X 4 2/23/2018  5:45 AM   Hub Color/Line Status Pink 2/23/2018  5:45 AM        Opportunity for questions and clarification was provided.       Patient transported with:   Zaya

## 2018-02-23 NOTE — CARDIO/PULMONARY
Cardiopulmonary Rehab:     Chart reviewed. Pt is on the CHF bundle list.   Pt is a 76 y.o. female admitted with Possible influenza A POA Community acquired pneumonia? .      ECHO pending. Pt with acute on chronic Diastolic HF. PMH includes DM with neuropathy, HTN, CABG, stents, COPD, and pacemaker. She is a nonsmoker. Pt last visited for CHF teaching 4/14/16. Pt visited, holding green emesis bag. Family at the bedside states she feels poorly. Pt avoiding eye contact, did not respond to nurses greeting. Will follow when pt feeling better and able to participate in teaching session. CHF teaching folder left at the bedside.

## 2018-02-23 NOTE — ED TRIAGE NOTES
Pt presents to the ED by EMS from home with c/o SOB. Pt was seen here in the ER yesterday and discharged with dx of bronchitis. Today, pt continues to have productive cough and now has a fever, CP, and SOB. EMS found patient to be 70% on RA at home. Pt has home O2 PRN but was not wearing at home when EMS arrived. BG for EMS was 122.

## 2018-02-23 NOTE — PROGRESS NOTES
Pharmacy Automatic Renal Dosing Protocol - Antimicrobials    Indication for Antimicrobials: Concern for influenza     Current Regimen of Each Antimicrobial:  Oseltamivir 75 mg PO twice daily (Start Date ; Day # )  Azithromycin 250 mg PO every daily (Start Date ; Day # )  Ceftriaxone 1 g IV every 24 hours (Start Date ; Day # 2)    Previous Antimicrobial Therapy:  None    Significant Cultures:    Blood: NGTD x 9 hours- pending    Paralysis, amputations, malnutrition: None documented     Labs:  Recent Labs      18   2154  18   1351   CREA  1.30*  1.19*   BUN  27*  32*   WBC  5.7  6.0     Temp (24hrs), Av.4 °F (37.4 °C), Min:98 °F (36.7 °C), Max:102 °F (38.9 °C)      Creatinine Clearance (mL/min) or Dialysis: 25 mL/min   No results found for: PCT    Impression/Plan:   · Will change oseltamivir to 30 mg daily for CrCl 10-29 mL/min per renal dosing protocol. · Antimicrobial stop date entered for oseltamivir and azithromycin. Pharmacy will follow daily and adjust medications as appropriate for renal function and/or serum levels. Thank you,  Sophia Drew, PHARMD          Recommended duration of therapy  [email or web address]/Pharmacy/Clinical%20Companion/Duration%20of%20ABX%20therapy. docx    Renal Dosing  [email or web address]/[facility]/Pharmacy/Clinical%20Companion/Renal%[email or web address]. pdf

## 2018-02-23 NOTE — H&P
Hospitalist Admission Note    NAME:  Valery Edwards   :   1940   MRN:   [id number]     Date of admit: 2018    PCP: Damian Min, NP    Assessment/Plan:     Sepsis POA with fever 102, RR 22, normal lactate  Possible influenza A POA  ? Community acquired pneumonia POA  Increasing cough and flu like symptoms with fever x days, then increasing SOB  CXR x 2 past 24 hours without ASD or edema  Influenza swab negative 18, but clinically consistent with flu  UA  was negative for UTI  ? Pneumonia missed on CXR  ? Another URI due to virus  IV ceftriaxone and azithro  Empiric tamiflu  Check CT scan chest to assess for ASD  Follow up on [facility]  Serial labs    Acute on chronic respiratory failure with hypoxia POA  Acute on chronic diastolic CHF POA  Occurs in setting of recent URI  Baseline uses O2 PRN, now Hypoxic sats 73%, tachypneic and SOB  CXR clear  pBNP increased 9359, positive orthopnea  Actively wheezing, denies known COPD or asthma, non smoker  ? URI with bronchospasm  ? CHF exacerbated by the URI  ? Pneumonia  IV lasix BID  Check echo  CT chest to rule out pneumonia  Nebs, PO prednisone    DM type 2 with neuropathy POA  Baseline uses 70/30 13 units in AM, 3 units in PM  Start lantus and SSI  Check HgBa1c  Additional NPH with steroids if persistently hyperglycemic    Essential HTN POA  Continue norvasc, PRN hydralazine    GERD    Body mass index is 25.71 kg/(m^2). Admit to inpatient appropriate due to high risk of decompensation if discharged from the ED    DVT prophylaxis with Lovenox    Code status: full code  NOK: daughter Sotero Clement    History     CHIEF COMPLAINT: I have been cough and SOB the past few days    HISTORY OF PRESENT ILLNESS:  76 Y. O. AAF  Baseline no COPD or asthma or tobacco history  Known diastolic CHF, last LVEF 66-51% in 2016, recently at [facility] for CHF per daughter  Has home O2 but uses only as needed, baseline not daily use    Past 4 to 5 days, increasing cough productive of white sputum  Fevers at home, pt does not know how high  Myalgias  Diffuse weakness, not wanting to get OOB  Poor appetite  No CP or orthopnea or LE edema  Increasing SOB with activity, starting having to wear O2 more frequently  Daughter also reports SOB with laying flat, having to sit up to catch her breath    Seen in ED yesterday  PA/LAt CXR negative for ASD or edema, UA negative for UTI  Flu swab negative  Nebs  Improved  D/C on z-dorian, albuterol HFA    Overnight increasing SOB, felt SOB at rest and with any activity  Woke up at night gasping for air  EMS called, sats 73% room air, brought to ED    ED hypoxic, required 3 L NC O2  Febrile to 102  pCXR no ASD or edema  pBNP 9359  Lactate 1.4  Actively wheezing  IV solumedrol, nebs  IV ceftriaxone and azithro  We were called to admit the patient        Past Medical History:   Diagnosis Date    Arthritis     CAD (coronary artery disease)     CABG, pacemaker    Chronic pain     COPD     Diabetes ([address]     GERD (gastroesophageal reflux disease)     Hypertension     Ill-defined condition     high cholesterol        Past Surgical History:   Procedure Laterality Date    CARDIAC SURG PROCEDURE UNLIST      3 CABG, Stents    HX PACEMAKER      July 2011    [place] COLONOSCOPY FLX DX W/COLLJ SPEC WHEN PFRMD  11/10/2011         [place] EGD TRANSORAL BIOPSY SINGLE/MULTIPLE  8/29/2012            Social History   Substance Use Topics    Smoking status: Never Smoker    Smokeless tobacco: Never Used    Alcohol use No    l;lenin with daughter    Family History   Problem Relation Age of Onset    Lung Disease Mother     Heart Disease Father     Hypertension Father     Diabetes Sister     8 children    Allergies   Allergen Reactions    Percocet [Oxycodone-Acetaminophen] Nausea and Vomiting        Prior to Admission medications    Medication Sig Start Date End Date Taking?  Authorizing Provider   azithromycin (ZITHROMAX Z-DORIAN) 250 mg tablet As directed 2/21/18 2/26/18  Ajay Ceron MD   methocarbamol (ROBAXIN-750) 750 mg tablet Take 1 Tab by mouth four (4) times daily as needed. 2/21/18   Ajay Ceron MD   albuterol (PROVENTIL HFA, VENTOLIN HFA, PROAIR HFA) 90 mcg/actuation inhaler Take 2 Puffs by inhalation every four (4) hours as needed for Wheezing. 2/21/18   Ajay Ceron MD   guaiFENesin-codeine (ROBITUSSIN AC) 100-10 mg/5 mL solution Take 5 mL by mouth three (3) times daily as needed for Cough. Max Daily Amount: 15 mL. 2/21/18   Ajay Ceron MD   amLODIPine (NORVASC) 10 mg tablet Take 1 Tab by mouth daily. Indications: hypertension 8/12/17   Meri Foster MD   furosemide (LASIX) 40 mg tablet Take 1 Tab by mouth daily. 8/12/17   Meri Foster MD   docusate sodium (COLACE) 100 mg capsule Take 1 Cap by mouth two (2) times a day. 8/12/17   Meri Foster MD   gabapentin (NEURONTIN) 300 mg capsule Take 300 mg by mouth three (3) times daily as needed. Historical Provider   polyethylene glycol (MIRALAX) 17 gram packet Take 17 g by mouth daily. Indications: constipation    Historical Provider   acetaminophen (TYLENOL) 500 mg tablet Take 1 Tab by mouth every six (6) hours as needed. 7/18/17   Salomón Henry MD   ondansetron hcl (ZOFRAN, AS HYDROCHLORIDE,) 8 mg tablet Take 1 Tab by mouth every eight (8) hours as needed for Nausea. 7/18/17   Salomón Henry MD   omeprazole (PRILOSEC) 20 mg capsule Take 20 mg by mouth daily. Historical Provider   cholecalciferol (VITAMIN D3) 400 unit tab tablet Take  by mouth daily. Historical Provider   pramipexole (MIRAPEX) 0.125 mg tablet Take 0.125 mg by mouth nightly. Historical Provider   co-enzyme Q-10 (COQ-10) 100 mg capsule Take 1 capsule by mouth daily.  10/23/14   Ada Burgos MD   insulin aspart protamine (NOVOLOG MIX 70/30) 100 unit/mL (70-30) injection 22 units before breakfast and 22 units before supper  Patient taking differently: 10 units before breakfast and 9 units before supper 8/1/12 Krysta Alvarez MD   metoprolol-XL (TOPROL-XL) 100 mg XL tablet Take 100 mg by mouth daily.     Omar Hernandez MD       Review of symptoms:     POSITIVE= Bold  Negative = not bold  General:  fever, chills, sweats, generalized weakness, weight loss     loss of appetite   Eyes:    blurred vision, eye pain, double vision  ENT:    Coryza, sore throat, trouble swallowing  Respiratory:   cough, sputum, SOB  Cardiology:   chest pain, orthopnea  Gastrointestinal:  abdominal pain , N/V, diarrhea, constipation, melena or BRBPR  Genitourinary:  Urgency, dysuria, hematuria  Muskuloskeletal :  Joint redness, swelling or acute joint pain, myalgias  Hematology:  easy bruising, nose or gum bleeding  Dermatological: rash, ulceration  Endocrine:   Polyuria or polydipsia, heat or hold intolerance  Neurological:  Headache, focal motor or sensory changes     Speech difficulties, memory loss  Psychological: depression, agitation      Objective:   VITALS:    Patient Vitals for the past 24 hrs:   Temp Pulse Resp BP SpO2   18 0330 - 60 18 130/41 100 %   18 0315 - 68 20 169/49 99 %   18 0300 - 60 21 140/42 100 %   18 0245 - 60 21 143/44 100 %   18 0234 - 60 21 - 99 %   18 0231 - - - 143/44 -   18 0200 - 60 19 - 96 %   18 0100 - 60 21 148/47 98 %   18 0042 - 60 26 138/46 97 %   18 2330 - - - 157/48 98 %   18 2326 - 60 16 - 99 %   18 2315 - 60 19 153/46 100 %   18 2300 - 60 21 154/50 100 %   18 2245 99.4 °F (37.4 °C) - - - -   18 - 62 20 161/52 94 %   184 (!) 102 °F (38.9 °C) 60 22 191/56 93 %     Temp (24hrs), Av.7 °F (38.2 °C), Min:99.4 °F (37.4 °C), Max:102 °F (38.9 °C)    O2 Flow Rate (L/min): 3 l/min O2 Device: Nasal cannula    PHYSICAL EXAM:   General:    Alert, cooperative in no distress     HEENT: Normocephalic, atraumatic    PERRL, EOMI  Sclera no icterus    Nasal mucosa without masses or discharge  Hearing intact to voice    Oropharynx without erythema or exudate  No thrush  Pink MM  Neck:  No meningismus, trachea midline, no carotid bruits     Thyroid not enlarged, no nodules or tenderness  Lungs:   Diffuse expiratory wheezes bilaterally, crackles at bases    No accessory muscle use or retractions. Heart:   Regular rate and rhythm,  no murmur or gallop. No LE edema     Dorsal pedis, Posterior tibial and radial pulses 2+ and symmetric  Abdomen:   Soft, non-tender. Not distended. Bowel sounds normal.     No masses, No Hepatosplenomegaly, No Rebound or guarding  Lymph nodes: No cervical or inguinal GILDA  Musculoskeletal:  No Joint swelling, erythema, warmth. No Cyanosis or clubbing  Skin:      No rashes    Not Jaundiced   No nodules or thickening    Capillary refill normal  Neurologic: Alert and oriented X 3, follows commands     Cranial nerves 2 to 12 intact    Symmetric motor strength bilaterally       LAB DATA REVIEWED:    Recent Results (from the past 12 hour(s))   METABOLIC PANEL, COMPREHENSIVE    Collection Time: 02/22/18  9:54 PM   Result Value Ref Range    Sodium 139 136 - 145 mmol/L    Potassium 5.0 3.5 - 5.1 mmol/L    Chloride 103 97 - 108 mmol/L    CO2 29 21 - 32 mmol/L    Anion gap 7 5 - 15 mmol/L    Glucose 122 (H) 65 - 100 mg/dL    BUN 27 (H) 6 - 20 MG/DL    Creatinine 1.30 (H) 0.55 - 1.02 MG/DL    BUN/Creatinine ratio 21 (H) 12 - 20      GFR est AA 48 (L) >60 ml/min/1.73m2    GFR est non-AA 40 (L) >60 ml/min/1.73m2    Calcium 9.4 8.5 - 10.1 MG/DL    Bilirubin, total 0.8 0.2 - 1.0 MG/DL    ALT (SGPT) 20 12 - 78 U/L    AST (SGOT) 46 (H) 15 - 37 U/L    Alk.  phosphatase 369 (H) 45 - 117 U/L    Protein, total 8.5 (H) 6.4 - 8.2 g/dL    Albumin 3.7 3.5 - 5.0 g/dL    Globulin 4.8 (H) 2.0 - 4.0 g/dL    A-G Ratio 0.8 (L) 1.1 - 2.2     CBC WITH AUTOMATED DIFF    Collection Time: 02/22/18  9:54 PM   Result Value Ref Range    WBC 5.7 3.6 - 11.0 K/uL    RBC 4.49 3.80 - 5.20 M/uL    HGB 13.1 11.5 - 16.0 g/dL    HCT 41.7 35.0 - 47.0 %    MCV 92.9 80.0 - 99.0 FL    MCH 29.2 26.0 - 34.0 PG    MCHC 31.4 30.0 - 36.5 g/dL    RDW 13.8 11.5 - 14.5 %    PLATELET 335 864 - 102 K/uL    MPV 10.7 8.9 - 12.9 FL    NRBC 0.0 0  WBC    ABSOLUTE NRBC 0.00 0.00 - 0.01 K/uL    NEUTROPHILS 69 32 - 75 %    LYMPHOCYTES 20 12 - 49 %    MONOCYTES 8 5 - 13 %    EOSINOPHILS 1 0 - 7 %    BASOPHILS 1 0 - 1 %    IMMATURE GRANULOCYTES 0 0.0 - 0.5 %    ABS. NEUTROPHILS 4.0 1.8 - 8.0 K/UL    ABS. LYMPHOCYTES 1.2 0.8 - 3.5 K/UL    ABS. MONOCYTES 0.5 0.0 - 1.0 K/UL    ABS. EOSINOPHILS 0.1 0.0 - 0.4 K/UL    ABS. BASOPHILS 0.0 0.0 - 0.1 K/UL    ABS. IMM. GRANS. 0.0 0.00 - 0.04 K/UL    DF AUTOMATED     NT-PRO BNP    Collection Time: 02/22/18  9:54 PM   Result Value Ref Range    NT pro-BNP 9359 (H) 0 - 450 PG/ML   TROPONIN I    Collection Time: 02/22/18  9:54 PM   Result Value Ref Range    Troponin-I, Qt. <0.04 <0.05 ng/mL   LACTIC ACID    Collection Time: 02/22/18  9:54 PM   Result Value Ref Range    Lactic acid 1.4 0.4 - 2.0 MMOL/L   EKG, 12 LEAD, INITIAL    Collection Time: 02/22/18 10:24 PM   Result Value Ref Range    Ventricular Rate 60 BPM    Atrial Rate 84 BPM    QRS Duration 164 ms    Q-T Interval 480 ms    QTC Calculation (Bezet) 480 ms    Calculated R Axis -96 degrees    Calculated T Axis 78 degrees    Diagnosis       AV dual-paced rhythm  Abnormal ECG  When compared with ECG of 17-SEP-2017 12:39,  No significant change was found         EKG as read by me shows AV paced rhythm, rate 60    pCXR read by radiology and reviewed by myself shows no ASD or edema    CT scan chest pending      I saw the patient personally, took a history and did a complete physical exam at the bedside. I performed complex decision making in coming up with a diagnostic and treatment plan for the patient. I reviewed the patient's past medical records, current laboratory and radiology results, and actual Xray films/EKG.  I have also discussed this case with the involved ED physician.     Care Plan discussed with:    Patient, daughter in room, ED Doc    Risk of deterioration:  High    Total Time Coordinating Admission: 65    minutes    Total Critical Care Time:         Soco Matos MD

## 2018-02-23 NOTE — CDMP QUERY
Dr. Ebenezer Prince MD :    There is noted documentation of Sepsis and Acute on chronic respiratory failure with hypoxia POA on this record. Could this be further clarified as    =>Severe sepsis with acute organ dysfunction (acute on chronic respiratory failure) POA  =>Other Explanation of clinical findings  =>Unable to Determine (no explanation of clinical findings)    The medical record reflects the following clinical findings, treatment, and risk factors:    Risk Factors: 77 BF w/hx: CAD, COPD, DM, GERD, HTN, high cholesterol  Clinical Indicators: Admitted with sepsis,  Pt with acute on chronic hypoxic respiratory failure   Treatment:  IV/PO antibiotics, tamiflu, IV lasix, oxygen      Please clarify and document your clinical opinion in the progress notes and discharge summary including the definitive and/or presumptive diagnosis, (suspected or probable), related to the above clinical findings. Please include clinical findings supporting your diagnosis    Thank You,   [email or web address]  [phone]

## 2018-02-23 NOTE — ED NOTES
Pt is not wanting to eat. Pt has a good strong cough, some of it productive, and some is coarse and hard to get up  Some coughing sets her breathing off and she has a hard time then eating  She is picking at her food  She wants applesauce to take her meds  She takes her meds with applesauce but swallows fine, she just doesn't have very much of an appetitie  Daughter says they have a hard time with her at home for the same reasons  Repositioned pt she moves and turns well.    Placed a new fresh gown on pt and new fresh pur wick on pt

## 2018-02-23 NOTE — PROGRESS NOTES
Pharmacy  Enoxaparin (Lovenox®) Monitoring/Dosing      Indication: VTE ppx  Current Dose: Enoxaparin 40 subcutaneously every 24 hours    Creatinine Clearance (mL/min): 25 mL/min   Current Weight: 55.8 kg     Labs:  Recent Labs      02/22/18 2154  02/21/18   1351   CREA  1.30*  1.19*   HGB  13.1  11.7   PLT  159  193     Wt Readings from Last 1 Encounters:   02/22/18 55.8 kg (123 lb)     Ht Readings from Last 1 Encounters:   02/22/18 147.3 cm (58\")       Impression/Plan: Will change lovenox to 30 mg daily due to patient weight < 60 kg and CrCl < 30 mL/min. Thanks,  Qiana Le, PHARMD      [email or web address]/Pharmacy/Clinical%20Companion/DVT%20Prophylaxis%20Adjustment%20Protcol. pdf

## 2018-02-23 NOTE — ED NOTES
Bedside and Verbal shift change report given to [address] [place] [address]  (oncoming nurse) by Rick Martinez (offgoing nurse). Report included the following information SBAR, Kardex, ED Summary, [facility] and Recent Results.

## 2018-02-23 NOTE — ED PROVIDER NOTES
EMERGENCY DEPARTMENT HISTORY AND PHYSICAL EXAM      Date: 2/22/2018  Patient Name: Meseret Kessler    History of Presenting Illness     Chief Complaint   Patient presents with    Shortness of Breath     by EMS for SOB. discharged from this ER yesterday dx with bronchitis. History Provided By: Patient    HPI: Meseret Kessler, 76 y.o. female with PMHx significant for CAD, HTN, DM, CHF, COPD, arthritis, GERD, presents via EMS to the ED with cc of constant SOB, worsening today. Pt reports associated symptoms of a productive cough, wheezing, mild CP, runny nose, and watery eyes. EMS notes the pt was found at 73% without O2, on RA. She is on O2 as needed, but was not on oxygen upon arrival. Pt was discharged home yesterday for similar symptoms for bronchitis. She was discharged with a z-pack and robaxin after feeling better. She had also had nebulization performed as well as given Robitussin and felt better afterwards and was then discharged. Pt endorses having her annual influenza vaccination this year. Pt denies a h/o COPD (despite medical records reporting she has a h/o COPD) or asthma. She denies any nausea, vomiting, or diarrhea. PCP: Gardenia Schofield NP    There are no other complaints, changes, or physical findings at this time. Current Facility-Administered Medications   Medication Dose Route Frequency Provider Last Rate Last Dose    sodium chloride (NS) flush 5-10 mL  5-10 mL IntraVENous PRN Heraclio Sauceda MD         Current Outpatient Prescriptions   Medication Sig Dispense Refill    azithromycin (ZITHROMAX Z-DORIAN) 250 mg tablet As directed 6 Tab 0    methocarbamol (ROBAXIN-750) 750 mg tablet Take 1 Tab by mouth four (4) times daily as needed. 20 Tab 0    albuterol (PROVENTIL HFA, VENTOLIN HFA, PROAIR HFA) 90 mcg/actuation inhaler Take 2 Puffs by inhalation every four (4) hours as needed for Wheezing.  1 Inhaler 0    guaiFENesin-codeine (ROBITUSSIN AC) 100-10 mg/5 mL solution Take 5 mL by mouth three (3) times daily as needed for Cough. Max Daily Amount: 15 mL. 118 mL 0    amLODIPine (NORVASC) 10 mg tablet Take 1 Tab by mouth daily. Indications: hypertension 30 Tab 0    furosemide (LASIX) 40 mg tablet Take 1 Tab by mouth daily. 30 Tab 0    docusate sodium (COLACE) 100 mg capsule Take 1 Cap by mouth two (2) times a day. 60 Cap 0    gabapentin (NEURONTIN) 300 mg capsule Take 300 mg by mouth three (3) times daily as needed.  polyethylene glycol (MIRALAX) 17 gram packet Take 17 g by mouth daily. Indications: constipation      acetaminophen (TYLENOL) 500 mg tablet Take 1 Tab by mouth every six (6) hours as needed. 40 Tab 0    ondansetron hcl (ZOFRAN, AS HYDROCHLORIDE,) 8 mg tablet Take 1 Tab by mouth every eight (8) hours as needed for Nausea. 20 Tab 0    omeprazole (PRILOSEC) 20 mg capsule Take 20 mg by mouth daily.  cholecalciferol (VITAMIN D3) 400 unit tab tablet Take  by mouth daily.  pramipexole (MIRAPEX) 0.125 mg tablet Take 0.125 mg by mouth nightly.  co-enzyme Q-10 (COQ-10) 100 mg capsule Take 1 capsule by mouth daily. 100 capsule 2    insulin aspart protamine (NOVOLOG MIX 70/30) 100 unit/mL (70-30) injection 22 units before breakfast and 22 units before supper (Patient taking differently: 10 units before breakfast and 9 units before supper) 20 mL 10    metoprolol-XL (TOPROL-XL) 100 mg XL tablet Take 100 mg by mouth daily.          Past History     Past Medical History:  Past Medical History:   Diagnosis Date    Arthritis     CAD (coronary artery disease)     CABG, pacemaker    Chronic pain     COPD     Diabetes ([facility] [address]     GERD (gastroesophageal reflux disease)     Hypertension     Ill-defined condition     high cholesterol       Past Surgical History:  Past Surgical History:   Procedure Laterality Date    CARDIAC SURG PROCEDURE UNLIST      3 CABG, Stents    HX PACEMAKER      July 2011    TX COLONOSCOPY FLX DX W/COLLJ [facility] WHEN PFRMD  11/10/2011         TX EGD TRANSORAL BIOPSY SINGLE/MULTIPLE  8/29/2012            Family History:  Family History   Problem Relation Age of Onset    Lung Disease Mother     Heart Disease Father     Hypertension Father     Diabetes Sister        Social History:  Social History   Substance Use Topics    Smoking status: Never Smoker    Smokeless tobacco: Never Used    Alcohol use No       Allergies: Allergies   Allergen Reactions    Percocet [Oxycodone-Acetaminophen] Nausea and Vomiting         Review of Systems   Review of Systems   Constitutional: Negative for chills and fever. HENT: Positive for rhinorrhea. Eyes:        +watery eyes   Respiratory: Positive for cough, shortness of breath and wheezing. Cardiovascular: Positive for chest pain. Gastrointestinal: Negative for constipation, diarrhea, nausea and vomiting. Neurological: Negative for weakness and numbness. All other systems reviewed and are negative. Physical Exam   Physical Exam   Constitutional: She is oriented to person, place, and time. She appears well-developed and well-nourished. HENT:   Head: Normocephalic and atraumatic. Eyes: Conjunctivae and EOM are normal.   Neck: Normal range of motion. Neck supple. Cardiovascular: Normal rate and regular rhythm. Pulmonary/Chest: Effort normal. Tachypnea noted. No respiratory distress. She has wheezes. Wheezing diffusely  93% on 2L   Abdominal: Soft. She exhibits no distension. There is no tenderness. Musculoskeletal: Normal range of motion. 1+ pitting edema BL, worse on the R   Neurological: She is alert and oriented to person, place, and time. Skin: Skin is warm and dry. Psychiatric: Her mood appears anxious. Nursing note and vitals reviewed.       Diagnostic Study Results     Labs -  Recent Results (from the past 12 hour(s))   METABOLIC PANEL, COMPREHENSIVE    Collection Time: 02/22/18  9:54 PM   Result Value Ref Range    Sodium 139 136 - 145 mmol/L    Potassium 5.0 3.5 - 5.1 mmol/L    Chloride 103 97 - 108 mmol/L    CO2 29 21 - 32 mmol/L    Anion gap 7 5 - 15 mmol/L    Glucose 122 (H) 65 - 100 mg/dL    BUN 27 (H) 6 - 20 MG/DL    Creatinine 1.30 (H) 0.55 - 1.02 MG/DL    BUN/Creatinine ratio 21 (H) 12 - 20      GFR est AA 48 (L) >60 ml/min/1.73m2    GFR est non-AA 40 (L) >60 ml/min/1.73m2    Calcium 9.4 8.5 - 10.1 MG/DL    Bilirubin, total 0.8 0.2 - 1.0 MG/DL    ALT (SGPT) 20 12 - 78 U/L    AST (SGOT) 46 (H) 15 - 37 U/L    Alk. phosphatase 369 (H) 45 - 117 U/L    Protein, total 8.5 (H) 6.4 - 8.2 g/dL    Albumin 3.7 3.5 - 5.0 g/dL    Globulin 4.8 (H) 2.0 - 4.0 g/dL    A-G Ratio 0.8 (L) 1.1 - 2.2     CBC WITH AUTOMATED DIFF    Collection Time: 02/22/18  9:54 PM   Result Value Ref Range    WBC 5.7 3.6 - 11.0 K/uL    RBC 4.49 3.80 - 5.20 M/uL    HGB 13.1 11.5 - 16.0 g/dL    HCT 41.7 35.0 - 47.0 %    MCV 92.9 80.0 - 99.0 FL    MCH 29.2 26.0 - 34.0 PG    MCHC 31.4 30.0 - 36.5 g/dL    RDW 13.8 11.5 - 14.5 %    PLATELET 172 113 - 378 K/uL    MPV 10.7 8.9 - 12.9 FL    NRBC 0.0 0  WBC    ABSOLUTE NRBC 0.00 0.00 - 0.01 K/uL    NEUTROPHILS 69 32 - 75 %    LYMPHOCYTES 20 12 - 49 %    MONOCYTES 8 5 - 13 %    EOSINOPHILS 1 0 - 7 %    BASOPHILS 1 0 - 1 %    IMMATURE GRANULOCYTES 0 0.0 - 0.5 %    ABS. NEUTROPHILS 4.0 1.8 - 8.0 K/UL    ABS. LYMPHOCYTES 1.2 0.8 - 3.5 K/UL    ABS. MONOCYTES 0.5 0.0 - 1.0 K/UL    ABS. EOSINOPHILS 0.1 0.0 - 0.4 K/UL    ABS. BASOPHILS 0.0 0.0 - 0.1 K/UL    ABS. IMM.  GRANS. 0.0 0.00 - 0.04 K/UL    DF AUTOMATED     NT-PRO BNP    Collection Time: 02/22/18  9:54 PM   Result Value Ref Range    NT pro-BNP 9359 (H) 0 - 450 PG/ML   TROPONIN I    Collection Time: 02/22/18  9:54 PM   Result Value Ref Range    Troponin-I, Qt. <0.04 <0.05 ng/mL   LACTIC ACID    Collection Time: 02/22/18  9:54 PM   Result Value Ref Range    Lactic acid 1.4 0.4 - 2.0 MMOL/L       Radiologic Studies -   CXR Results  (Last 48 hours)               02/22/18 5103  XR CHEST PORT Final result    Impression:  IMPRESSION: No evidence of acute cardiopulmonary process. Narrative:  INDICATION:  SOB wheezing        COMPARISON: 2/21/2018       FINDINGS: Single AP portable view of the chest obtained at 2158 demonstrates a   stable cardiomediastinal silhouette. There is chronic cardiomegaly. There is   evidence of median sternotomy. There is a left-sided pacer device. The lungs are   clear bilaterally. No osseous abnormalities are seen. 02/21/18 1511  XR CHEST PA LAT Final result    Impression:  IMPRESSION: No acute findings. Narrative:  EXAM:  XR CHEST PA LAT       INDICATION:   Chest Pain       COMPARISON: 2/5/2018. FINDINGS: PA and lateral radiographs of the chest demonstrate clear lungs and   pleural margins. Median sternotomy wires and left axillary pacemaker with right   atrial, right ventricular and coronary sinus leads are again shown. Mild to   moderate cardiac contour enlargement is again demonstrated, unchanged. There is   mild atherosclerotic thoracic aortic tortuosity again noted with otherwise   normal mediastinal and hilar contour. The bones are moderately osteopenic   without vertebral compression fracture. Mild to moderate thoracic spine   degenerative changes are again shown. Medical Decision Making   I am the first provider for this patient. I reviewed the vital signs, available nursing notes, past medical history, past surgical history, family history and social history. Vital Signs-Reviewed the patient's vital signs.   Patient Vitals for the past 12 hrs:   Temp Pulse Resp BP SpO2   02/22/18 2330 - - - 157/48 98 %   02/22/18 2326 - 60 16 - 99 %   02/22/18 2315 - 60 19 153/46 100 %   02/22/18 2300 - 60 21 154/50 100 %   02/22/18 2245 99.4 °F (37.4 °C) - - - -   02/22/18 2245 - 62 20 161/52 94 %   02/22/18 2124 (!) 102 °F (38.9 °C) 60 22 191/56 93 %     Pulse Oximetry Analysis - 95% on NC    Cardiac Monitor:   Rate: 58 bpm  Rhythm: Sinus Bradycardia EKG interpretation: (Preliminary): 2224  Rhythm: AV dual-paced rhythm; and regular . Rate (approx.): 60; Axis: normal; QRS interval: normal ; ST/T wave: normal.  Written by BOUCHRA San, as dictated by Frederick Chapman M.D. Records Reviewed: Nursing Notes and Old Medical Records    Provider Notes (Medical Decision Making):   Patient presents with SOB. DDx: COPD/Asthma exacerbation, CHF exacerbation, ACS, PE, PNA, PTX, Anxiety. Will get labs and cxr and ekg. Concern for heart failure vs PNA vs bronchitis. Given pts fever, sepsis labs ordered. Will only order fluids if pt meets severe sepsis criteria. ED Course:   Initial assessment performed. The patients presenting problems have been discussed, and they are in agreement with the care plan formulated and outlined with them. I have encouraged them to ask questions as they arise throughout their visit. CONSULT NOTE:   Margaret Mendoza M.D spoke with Dr. Nenita Hsu,   Specialty: Hospitalist  Discussed pt's hx, disposition, and available diagnostic and imaging results. Reviewed care plans. Consultant will evaluate pt for admission. Written by BOUCHRA San, as dictated by Frederick Chapman M.D. Critical Care Time:   0    Disposition:  PLAN:  1. Admit    ADMIT NOTE:  11:03 PM  Patient is being admitted to the hospital by Dr. Nenita Hsu. The results of their tests and reasons for their admission have been discussed with them and/or available family. They convey agreement and understanding for the need to be admitted and for their admission diagnosis. Consultation has been made with the inpatient physician specialist for hospitalization. Diagnosis     Clinical Impression:   1. Sepsis, due to unspecified organism ([address]    2. PENELOPE (acute kidney injury) ([address]    3. Acute bronchitis, unspecified organism    4. Acute on chronic congestive heart failure, unspecified congestive heart failure type [facility])        Attestations:     This note is prepared by Melecio Larson, acting as Scribe for Garret Hopkins M.D. Garret Hopkins M.D: The scribe's documentation has been prepared under my direction and personally reviewed by me in its entirety. I confirm that the note above accurately reflects all work, treatment, procedures, and medical decision making performed by me.

## 2018-02-24 LAB
ALBUMIN SERPL-MCNC: 3.2 G/DL (ref 3.5–5)
ALBUMIN/GLOB SERPL: 0.7 {RATIO} (ref 1.1–2.2)
ALP SERPL-CCNC: 318 U/L (ref 45–117)
ALT SERPL-CCNC: 16 U/L (ref 12–78)
ANION GAP SERPL CALC-SCNC: 11 MMOL/L (ref 5–15)
AST SERPL-CCNC: 27 U/L (ref 15–37)
BASOPHILS # BLD: 0 K/UL (ref 0–0.1)
BASOPHILS NFR BLD: 0 % (ref 0–1)
BILIRUB SERPL-MCNC: 0.4 MG/DL (ref 0.2–1)
BUN SERPL-MCNC: 42 MG/DL (ref 6–20)
BUN/CREAT SERPL: 25 (ref 12–20)
CALCIUM SERPL-MCNC: 8.8 MG/DL (ref 8.5–10.1)
CHLORIDE SERPL-SCNC: 102 MMOL/L (ref 97–108)
CO2 SERPL-SCNC: 28 MMOL/L (ref 21–32)
CREAT SERPL-MCNC: 1.69 MG/DL (ref 0.55–1.02)
DIFFERENTIAL METHOD BLD: ABNORMAL
EOSINOPHIL # BLD: 0 K/UL (ref 0–0.4)
EOSINOPHIL NFR BLD: 0 % (ref 0–7)
ERYTHROCYTE [DISTWIDTH] IN BLOOD BY AUTOMATED COUNT: 13.9 % (ref 11.5–14.5)
GLOBULIN SER CALC-MCNC: 4.6 G/DL (ref 2–4)
GLUCOSE BLD STRIP.AUTO-MCNC: 111 MG/DL (ref 65–100)
GLUCOSE BLD STRIP.AUTO-MCNC: 207 MG/DL (ref 65–100)
GLUCOSE BLD STRIP.AUTO-MCNC: 260 MG/DL (ref 65–100)
GLUCOSE BLD STRIP.AUTO-MCNC: 69 MG/DL (ref 65–100)
GLUCOSE SERPL-MCNC: 170 MG/DL (ref 65–100)
HCT VFR BLD AUTO: 39.4 % (ref 35–47)
HGB BLD-MCNC: 12.5 G/DL (ref 11.5–16)
IMM GRANULOCYTES # BLD: 0 K/UL (ref 0–0.04)
IMM GRANULOCYTES NFR BLD AUTO: 0 % (ref 0–0.5)
LYMPHOCYTES # BLD: 0.9 K/UL (ref 0.8–3.5)
LYMPHOCYTES NFR BLD: 11 % (ref 12–49)
MCH RBC QN AUTO: 29.2 PG (ref 26–34)
MCHC RBC AUTO-ENTMCNC: 31.7 G/DL (ref 30–36.5)
MCV RBC AUTO: 92.1 FL (ref 80–99)
MONOCYTES # BLD: 0.6 K/UL (ref 0–1)
MONOCYTES NFR BLD: 7 % (ref 5–13)
NEUTS SEG # BLD: 6.5 K/UL (ref 1.8–8)
NEUTS SEG NFR BLD: 81 % (ref 32–75)
NRBC # BLD: 0 K/UL (ref 0–0.01)
NRBC BLD-RTO: 0 PER 100 WBC
PLATELET # BLD AUTO: 188 K/UL (ref 150–400)
PMV BLD AUTO: 11.2 FL (ref 8.9–12.9)
POTASSIUM SERPL-SCNC: 4.4 MMOL/L (ref 3.5–5.1)
PROT SERPL-MCNC: 7.8 G/DL (ref 6.4–8.2)
RBC # BLD AUTO: 4.28 M/UL (ref 3.8–5.2)
SERVICE CMNT-IMP: ABNORMAL
SERVICE CMNT-IMP: NORMAL
SODIUM SERPL-SCNC: 141 MMOL/L (ref 136–145)
TROPONIN I SERPL-MCNC: <0.04 NG/ML
WBC # BLD AUTO: 8 K/UL (ref 3.6–11)

## 2018-02-24 PROCEDURE — 74011250636 HC RX REV CODE- 250/636: Performed by: INTERNAL MEDICINE

## 2018-02-24 PROCEDURE — 74011250637 HC RX REV CODE- 250/637: Performed by: INTERNAL MEDICINE

## 2018-02-24 PROCEDURE — 94640 AIRWAY INHALATION TREATMENT: CPT

## 2018-02-24 PROCEDURE — 85025 COMPLETE CBC W/AUTO DIFF WBC: CPT | Performed by: INTERNAL MEDICINE

## 2018-02-24 PROCEDURE — 74011000250 HC RX REV CODE- 250: Performed by: INTERNAL MEDICINE

## 2018-02-24 PROCEDURE — 74011636637 HC RX REV CODE- 636/637: Performed by: INTERNAL MEDICINE

## 2018-02-24 PROCEDURE — 65660000000 HC RM CCU STEPDOWN

## 2018-02-24 PROCEDURE — 80053 COMPREHEN METABOLIC PANEL: CPT | Performed by: INTERNAL MEDICINE

## 2018-02-24 PROCEDURE — 36415 COLL VENOUS BLD VENIPUNCTURE: CPT | Performed by: INTERNAL MEDICINE

## 2018-02-24 PROCEDURE — 84484 ASSAY OF TROPONIN QUANT: CPT | Performed by: INTERNAL MEDICINE

## 2018-02-24 PROCEDURE — 77010033678 HC OXYGEN DAILY

## 2018-02-24 PROCEDURE — 82962 GLUCOSE BLOOD TEST: CPT

## 2018-02-24 RX ORDER — IPRATROPIUM BROMIDE AND ALBUTEROL SULFATE 2.5; .5 MG/3ML; MG/3ML
3 SOLUTION RESPIRATORY (INHALATION)
Status: DISCONTINUED | OUTPATIENT
Start: 2018-02-24 | End: 2018-02-26 | Stop reason: HOSPADM

## 2018-02-24 RX ORDER — CEFTRIAXONE 1 G/1
INJECTION, POWDER, FOR SOLUTION INTRAMUSCULAR; INTRAVENOUS
Status: DISPENSED
Start: 2018-02-24 | End: 2018-02-25

## 2018-02-24 RX ORDER — INSULIN GLARGINE 100 [IU]/ML
10 INJECTION, SOLUTION SUBCUTANEOUS
Status: DISCONTINUED | OUTPATIENT
Start: 2018-02-24 | End: 2018-02-26 | Stop reason: HOSPADM

## 2018-02-24 RX ORDER — GUAIFENESIN/DEXTROMETHORPHAN 100-10MG/5
10 SYRUP ORAL
Status: DISCONTINUED | OUTPATIENT
Start: 2018-02-24 | End: 2018-02-26 | Stop reason: HOSPADM

## 2018-02-24 RX ADMIN — ENOXAPARIN SODIUM 30 MG: 30 INJECTION SUBCUTANEOUS at 10:14

## 2018-02-24 RX ADMIN — AZITHROMYCIN 250 MG: 250 TABLET, FILM COATED ORAL at 22:54

## 2018-02-24 RX ADMIN — AMLODIPINE BESYLATE 10 MG: 5 TABLET ORAL at 10:14

## 2018-02-24 RX ADMIN — INSULIN LISPRO 3 UNITS: 100 INJECTION, SOLUTION INTRAVENOUS; SUBCUTANEOUS at 18:05

## 2018-02-24 RX ADMIN — INSULIN LISPRO 3 UNITS: 100 INJECTION, SOLUTION INTRAVENOUS; SUBCUTANEOUS at 22:56

## 2018-02-24 RX ADMIN — GUAIFENESIN AND DEXTROMETHORPHAN 10 ML: 100; 10 SYRUP ORAL at 11:51

## 2018-02-24 RX ADMIN — ASPIRIN 81 MG 81 MG: 81 TABLET ORAL at 10:14

## 2018-02-24 RX ADMIN — Medication 10 ML: at 22:55

## 2018-02-24 RX ADMIN — PREDNISONE 40 MG: 20 TABLET ORAL at 10:14

## 2018-02-24 RX ADMIN — POLYETHYLENE GLYCOL 3350 17 G: 17 POWDER, FOR SOLUTION ORAL at 10:13

## 2018-02-24 RX ADMIN — INSULIN GLARGINE 10 UNITS: 100 INJECTION, SOLUTION SUBCUTANEOUS at 22:56

## 2018-02-24 RX ADMIN — METOPROLOL SUCCINATE 100 MG: 50 TABLET, EXTENDED RELEASE ORAL at 10:14

## 2018-02-24 RX ADMIN — CEFTRIAXONE SODIUM 1 G: 1 INJECTION, POWDER, FOR SOLUTION INTRAMUSCULAR; INTRAVENOUS at 22:55

## 2018-02-24 RX ADMIN — IPRATROPIUM BROMIDE AND ALBUTEROL SULFATE 3 ML: .5; 3 SOLUTION RESPIRATORY (INHALATION) at 19:58

## 2018-02-24 RX ADMIN — GUAIFENESIN AND DEXTROMETHORPHAN 10 ML: 100; 10 SYRUP ORAL at 22:55

## 2018-02-24 RX ADMIN — IPRATROPIUM BROMIDE AND ALBUTEROL SULFATE 3 ML: .5; 3 SOLUTION RESPIRATORY (INHALATION) at 08:16

## 2018-02-24 RX ADMIN — FUROSEMIDE 40 MG: 10 INJECTION, SOLUTION INTRAMUSCULAR; INTRAVENOUS at 10:14

## 2018-02-24 RX ADMIN — OSELTAMIVIR PHOSPHATE 30 MG: 30 CAPSULE ORAL at 10:14

## 2018-02-24 RX ADMIN — PRAMIPEXOLE DIHYDROCHLORIDE 0.12 MG: 0.25 TABLET ORAL at 22:54

## 2018-02-24 NOTE — PROGRESS NOTES
Hospitalist Progress Note    NAME: Jonatan Root   :  1940   MRN:  [id number]       Assessment / Plan:  Sepsis POA with fever 102, RR 22, normal lactate  Possible influenza A POA  Community acquired pneumonia POA  -CT chest showed a very small right effusion and minor right basilar atelectasis. There are small scattered foci of airspace disease some of which have a  tree-in-bud appearance in the right lung most pronounced in right lower lobe may  represent early changes of pneumonia. -CXR neg for evidence of acute cardiopulmonary process. -Influenza swab negative 18, but clinically consistent with flu  -UA  was negative for UTI  -improving on IV ceftriaxone and azithro and Tamiflu. Will cont' to complete 5 days treatment  -nebs, prednisone, robitussin  -stop IV lasix    PENELOPE  -cr worst today at 1.6 from 1.3 on admission  -hold lasix and other nephrotoxic agents  -monitor bmp    Acute on chronic respiratory failure with hypoxia POA  Acute on chronic diastolic CHF POA, resolved  -pBNP increased 9359, received IV lasix on admisison, but now appears dry on exam   -Echo showedl EF 55-60 % with mild concentric hypertrophy  -will stop lasix. Cont' BB  -wean O2. She was only on 2-3L prn prior to admisison     DM type 2 with neuropathy POA  -A1C 6.9  -baseline uses 70/30 13 units in AM, 3 units in PM  -decrease lantus as pt was hypoglycemic this morning     Essential HTN POA  -continue norvasc, PRN hydralazine     GERD   Body mass index is 25.71 kg/(m^2).       DVT prophylaxis with Lovenox   Code status: full code  NOK: daughter Vangie Meals:     Chief Complaint / Reason for Physician Visit  Pt seen sitting up. Family include pt's sister and niece at bedside. Pt has no complaints. Appeared dry on physical exam.  C/o coughs but no other issues. Discussed with RN events overnight.      Review of Systems:  Symptom Y/N Comments  Symptom Y/N Comments   Fever/Chills    Chest Pain     Poor Appetite    Edema     Cough    Abdominal Pain     Sputum    Joint Pain     SOB/LE    Pruritis/Rash     Nausea/vomit    Tolerating PT/OT     Diarrhea    Tolerating Diet     Constipation    Other       Could NOT obtain due to:      Objective:     VITALS:   Last 24hrs VS reviewed since prior progress note. Most recent are:  Patient Vitals for the past 24 hrs:   Temp Pulse Resp BP SpO2   02/24/18 1042 98.1 °F (36.7 °C) 62 20 139/68 100 %   02/24/18 0817 - - - - 100 %   02/24/18 0755 98.2 °F (36.8 °C) 60 20 147/56 100 %   02/24/18 0234 98.1 °F (36.7 °C) 60 20 148/54 100 %   02/23/18 2251 97.8 °F (36.6 °C) 60 18 129/45 100 %   02/23/18 2046 - - - - 100 %   02/23/18 1902 97.8 °F (36.6 °C) 62 18 133/83 93 %   02/23/18 1518 - - - - 93 %       Intake/Output Summary (Last 24 hours) at 02/24/18 1453  Last data filed at 02/24/18 0755   Gross per 24 hour   Intake                0 ml   Output              450 ml   Net             -450 ml        PHYSICAL EXAM:  General: Thin female in bed, NA  EENT:  EOMI. Anicteric sclerae. MMM  Resp:  CTA bilaterally, no wheezing or rales. No accessory muscle use  CV:  Regular  rhythm,  No edema  GI:  Soft, Non distended, Non tender.  +BS  Neurologic:  Alert and oriented X 3, normal speech  Psych:   not anxious nor agitated  Skin:  No rashes. No jaundice    Reviewed most current lab test results and cultures  YES  Reviewed most current radiology test results   YES  Review and summation of old records today    NO  Reviewed patient's current orders and MAR    YES  PMH/SH reviewed - no change compared to H&P  ________________________________________________________________________  Care Plan discussed with:    Comments   Patient x    Family  x    RN x    Care Manager     Consultant                        Multidiciplinary team rounds were held today with , nursing, pharmacist and clinical coordinator.   Patient's plan of care was discussed; medications were reviewed and discharge planning was addressed. ________________________________________________________________________  Total NON critical care TIME:  35   Minutes    Total CRITICAL CARE TIME Spent:   Minutes non procedure based      Comments   >50% of visit spent in counseling and coordination of care     ________________________________________________________________________  Toribio Whittaker MD     Procedures: see electronic medical records for all procedures/Xrays and details which were not copied into this note but were reviewed prior to creation of Plan. LABS:  I reviewed today's most current labs and imaging studies.   Pertinent labs include:  Recent Labs      02/24/18   0240  02/22/18   2154   WBC  8.0  5.7   HGB  12.5  13.1   HCT  39.4  41.7   PLT  188  159     Recent Labs      02/24/18   0240  02/22/18   2154   NA  141  139   K  4.4  5.0   CL  102  103   CO2  28  29   GLU  170*  122*   BUN  42*  27*   CREA  1.69*  1.30*   CA  8.8  9.4   ALB  3.2*  3.7   TBILI  0.4  0.8   SGOT  27  46*   ALT  16  20       Signed: Toribio hWittaker MD

## 2018-02-24 NOTE — PROGRESS NOTES
SHIFT SUMMARY:    0830: Patient morning blood glucose was 69; 4oz of OJ given; patient is asymptomatic    1900: Bedside shift change report given to SN Reid/ADRIANO Bynum (oncoming nurse) by Dwight Milton (offgoing nurse). Report included the following information SBAR, Kardex, Intake/Output, MAR and Recent Results.

## 2018-02-24 NOTE — PROGRESS NOTES
Bedside shift change report given to [facility]  (oncoming nurse) by Alejo Person (offgoing nurse). Report included the following information SBAR, Kardex, Intake/Output, MAR and Recent Results.

## 2018-02-24 NOTE — PROGRESS NOTES
Nutrition Assessment:    RECOMMENDATIONS:   Continue Diabetic diet  RD to add Glucerna BID    DIETITIANS INTERVENTIONS/PLAN:   Continue diet as tolerated  Add PO supplements  Monitor appetite/PO intake    ASSESSMENT:   Pt admitted with CHF and possible flu. PMH: CHF, DM, HTN, GERD. Chart reviewed for MST-poor appetite and wt loss. Per EMR pt has lost ~7% over the past 7 months. Her appetite was poor PTA per family. No skin breakdown noted. Will add Glucerna BID given hx of DM and monitor acceptance. SUBJECTIVE/OBJECTIVE:     Diet Order: Consistent carb  % Eaten:  No data found. Pertinent Medications:zithromax, rocephin, lantus, humalog, miralax, prednisone. Chemistries:  Lab Results   Component Value Date/Time    Sodium 141 02/24/2018 02:40 AM    Potassium 4.4 02/24/2018 02:40 AM    Chloride 102 02/24/2018 02:40 AM    CO2 28 02/24/2018 02:40 AM    Anion gap 11 02/24/2018 02:40 AM    Glucose 170 (H) 02/24/2018 02:40 AM    BUN 42 (H) 02/24/2018 02:40 AM    Creatinine 1.69 (H) 02/24/2018 02:40 AM    BUN/Creatinine ratio 25 (H) 02/24/2018 02:40 AM    GFR est AA 35 (L) 02/24/2018 02:40 AM    GFR est non-AA 29 (L) 02/24/2018 02:40 AM    Calcium 8.8 02/24/2018 02:40 AM    AST (SGOT) 27 02/24/2018 02:40 AM    Alk. phosphatase 318 (H) 02/24/2018 02:40 AM    Protein, total 7.8 02/24/2018 02:40 AM    Albumin 3.2 (L) 02/24/2018 02:40 AM    Globulin 4.6 (H) 02/24/2018 02:40 AM    A-G Ratio 0.7 (L) 02/24/2018 02:40 AM    ALT (SGPT) 16 02/24/2018 02:40 AM      Anthropometrics: Height: 4' 10\" (147.3 cm) Weight: 58.1 kg (128 lb 1.4 oz)  [x]bed scale (2/24)   []stated   []unknown    IBW (%IBW):   ( ) UBW (%UBW): 62.4 kg (137 lb 9.1 oz) (July 2017 per EMR ) (93.11 %)    BMI: Body mass index is 26.77 kg/(m^2).     This BMI is indicative of:  []Underweight   [x]Normal(for age)   []Overweight   [] Obesity   [] Extreme Obesity (BMI>40)  Estimated Nutrition Needs (Based on): 1230 Kcals/day ( x 1.3) , 58 g (1gPro/kg) Protein  Carbohydrate: At Least 130 g/day  Fluids: 1300 mL/day    Last BM: 2/21   []Active     []Hyperactive  [x]Hypoactive       [] Absent   BS  Skin:    [x] Intact   [] Incision  [] Breakdown   [] DTI   [] Tears/Excoriation/Abrasion  [x]Edema(nonpitting-BLE) [] Other: Wt Readings from Last 30 Encounters:   02/24/18 58.1 kg (128 lb 1.4 oz)   02/21/18 57.9 kg (127 lb 10.3 oz)   02/05/18 68 kg (149 lb 14.6 oz)   09/17/17 67.9 kg (149 lb 11.1 oz)   08/12/17 63.3 kg (139 lb 8.8 oz)   07/18/17 62.4 kg (137 lb 9.1 oz)   06/08/16 61.7 kg (136 lb)   04/14/16 59.4 kg (131 lb)   03/06/16 59.4 kg (131 lb)   03/02/16 59.8 kg (131 lb 14.4 oz)   12/15/15 61.1 kg (134 lb 11.2 oz)   07/12/15 60.6 kg (133 lb 9.6 oz)   01/12/15 70.8 kg (156 lb)   01/09/15 68 kg (150 lb)   11/06/14 68.9 kg (152 lb)   10/23/14 68.9 kg (152 lb)   10/20/14 69.2 kg (152 lb 8.9 oz)   08/18/14 68.8 kg (151 lb 11.2 oz)   08/07/14 70.6 kg (155 lb 10.3 oz)   08/01/14 72.1 kg (158 lb 15.2 oz)   06/08/14 72.1 kg (159 lb)   05/15/14 72.1 kg (159 lb)   04/21/14 72.1 kg (159 lb)   02/16/14 73.5 kg (162 lb)   02/08/14 74.9 kg (165 lb 2 oz)   12/13/13 74.2 kg (163 lb 8 oz)   11/18/13 72.7 kg (160 lb 4.8 oz)   09/13/13 73.5 kg (162 lb)   01/13/13 75.3 kg (166 lb 0.1 oz)   11/18/12 76.2 kg (167 lb 15.9 oz)      NUTRITION DIAGNOSES:   Problem:  Inadequate protein-energy intake      Etiology: related to poor appetite      Signs/Symptoms: as evidenced by reported poor appetite PTA and wt loss. NUTRITION INTERVENTIONS:  Meals/Snacks: General/healthful diet   Supplements: Commercial supplement              GOAL:   Pt will consume >50% of meals/supplements in 2-4 days.      Cultural, Mandaeism, or Ethnic Dietary Needs: None     LEARNING NEEDS (Diet, Food/Nutrient-Drug Interaction):    [x] None Identified   [] Identified and Education Provided/Documented   [] Identified and Pt declined/was not appropriate      [x] Interdisciplinary Care Plan Reviewed/Documented    [x] Participated in Discharge Planning:  Diabetic diet + Glucerna BID    [] Interdisciplinary Rounds     NUTRITION RISK:    [x] High              [x] Moderate           []  Low  []  Minimal/Uncompromised    PT SEEN FOR:    []  MD Consult: []Calorie Count      []Diabetic Diet Education        []Diet Education     []Electrolyte Management     []General Nutrition Management and Supplements     []Management of Tube Feeding     []TPN Recommendations    [x]  RN Referral:  [x]MST score >=2     []Enteral/Parenteral Nutrition PTA     []Pregnant: Gestational DM or Multigestation   []  Low BMI  []  Re-Screen   []  DIONISIO Zhou RD, [address]   Pager [phone]  Weekend Pager [phone]

## 2018-02-25 LAB
ANION GAP SERPL CALC-SCNC: 7 MMOL/L (ref 5–15)
BASOPHILS # BLD: 0 K/UL (ref 0–0.1)
BASOPHILS NFR BLD: 0 % (ref 0–1)
BUN SERPL-MCNC: 41 MG/DL (ref 6–20)
BUN/CREAT SERPL: 29 (ref 12–20)
CALCIUM SERPL-MCNC: 8.4 MG/DL (ref 8.5–10.1)
CHLORIDE SERPL-SCNC: 103 MMOL/L (ref 97–108)
CO2 SERPL-SCNC: 30 MMOL/L (ref 21–32)
CREAT SERPL-MCNC: 1.42 MG/DL (ref 0.55–1.02)
DIFFERENTIAL METHOD BLD: ABNORMAL
EOSINOPHIL # BLD: 0 K/UL (ref 0–0.4)
EOSINOPHIL NFR BLD: 0 % (ref 0–7)
ERYTHROCYTE [DISTWIDTH] IN BLOOD BY AUTOMATED COUNT: 14 % (ref 11.5–14.5)
GLUCOSE BLD STRIP.AUTO-MCNC: 105 MG/DL (ref 65–100)
GLUCOSE BLD STRIP.AUTO-MCNC: 115 MG/DL (ref 65–100)
GLUCOSE BLD STRIP.AUTO-MCNC: 156 MG/DL (ref 65–100)
GLUCOSE BLD STRIP.AUTO-MCNC: 322 MG/DL (ref 65–100)
GLUCOSE SERPL-MCNC: 338 MG/DL (ref 65–100)
HCT VFR BLD AUTO: 35.6 % (ref 35–47)
HGB BLD-MCNC: 11.3 G/DL (ref 11.5–16)
IMM GRANULOCYTES # BLD: 0 K/UL (ref 0–0.04)
IMM GRANULOCYTES NFR BLD AUTO: 0 % (ref 0–0.5)
LYMPHOCYTES # BLD: 0.7 K/UL (ref 0.8–3.5)
LYMPHOCYTES NFR BLD: 9 % (ref 12–49)
MCH RBC QN AUTO: 28.8 PG (ref 26–34)
MCHC RBC AUTO-ENTMCNC: 31.7 G/DL (ref 30–36.5)
MCV RBC AUTO: 90.8 FL (ref 80–99)
MONOCYTES # BLD: 0.5 K/UL (ref 0–1)
MONOCYTES NFR BLD: 6 % (ref 5–13)
NEUTS SEG # BLD: 6.6 K/UL (ref 1.8–8)
NEUTS SEG NFR BLD: 85 % (ref 32–75)
NRBC # BLD: 0 K/UL (ref 0–0.01)
NRBC BLD-RTO: 0 PER 100 WBC
PLATELET # BLD AUTO: 186 K/UL (ref 150–400)
PMV BLD AUTO: 11.5 FL (ref 8.9–12.9)
POTASSIUM SERPL-SCNC: 4.2 MMOL/L (ref 3.5–5.1)
RBC # BLD AUTO: 3.92 M/UL (ref 3.8–5.2)
SERVICE CMNT-IMP: ABNORMAL
SODIUM SERPL-SCNC: 140 MMOL/L (ref 136–145)
WBC # BLD AUTO: 7.8 K/UL (ref 3.6–11)

## 2018-02-25 PROCEDURE — 74011636637 HC RX REV CODE- 636/637: Performed by: INTERNAL MEDICINE

## 2018-02-25 PROCEDURE — 82962 GLUCOSE BLOOD TEST: CPT

## 2018-02-25 PROCEDURE — 74011250636 HC RX REV CODE- 250/636: Performed by: INTERNAL MEDICINE

## 2018-02-25 PROCEDURE — 80048 BASIC METABOLIC PNL TOTAL CA: CPT | Performed by: INTERNAL MEDICINE

## 2018-02-25 PROCEDURE — 74011250637 HC RX REV CODE- 250/637: Performed by: INTERNAL MEDICINE

## 2018-02-25 PROCEDURE — 77010033678 HC OXYGEN DAILY

## 2018-02-25 PROCEDURE — 36415 COLL VENOUS BLD VENIPUNCTURE: CPT | Performed by: INTERNAL MEDICINE

## 2018-02-25 PROCEDURE — 94640 AIRWAY INHALATION TREATMENT: CPT

## 2018-02-25 PROCEDURE — 85025 COMPLETE CBC W/AUTO DIFF WBC: CPT | Performed by: INTERNAL MEDICINE

## 2018-02-25 PROCEDURE — 74011000250 HC RX REV CODE- 250: Performed by: INTERNAL MEDICINE

## 2018-02-25 PROCEDURE — 65660000000 HC RM CCU STEPDOWN

## 2018-02-25 RX ORDER — GUAIFENESIN 600 MG/1
600 TABLET, EXTENDED RELEASE ORAL EVERY 12 HOURS
Status: DISCONTINUED | OUTPATIENT
Start: 2018-02-25 | End: 2018-02-26 | Stop reason: HOSPADM

## 2018-02-25 RX ORDER — FUROSEMIDE 40 MG/1
40 TABLET ORAL DAILY
Status: DISCONTINUED | OUTPATIENT
Start: 2018-02-25 | End: 2018-02-26

## 2018-02-25 RX ADMIN — PREDNISONE 40 MG: 20 TABLET ORAL at 10:27

## 2018-02-25 RX ADMIN — INSULIN LISPRO 4 UNITS: 100 INJECTION, SOLUTION INTRAVENOUS; SUBCUTANEOUS at 21:26

## 2018-02-25 RX ADMIN — INSULIN LISPRO 2 UNITS: 100 INJECTION, SOLUTION INTRAVENOUS; SUBCUTANEOUS at 17:32

## 2018-02-25 RX ADMIN — GUAIFENESIN AND DEXTROMETHORPHAN 10 ML: 100; 10 SYRUP ORAL at 21:30

## 2018-02-25 RX ADMIN — ONDANSETRON 4 MG: 2 INJECTION INTRAMUSCULAR; INTRAVENOUS at 00:51

## 2018-02-25 RX ADMIN — ENOXAPARIN SODIUM 30 MG: 30 INJECTION SUBCUTANEOUS at 10:27

## 2018-02-25 RX ADMIN — GUAIFENESIN AND DEXTROMETHORPHAN 10 ML: 100; 10 SYRUP ORAL at 11:29

## 2018-02-25 RX ADMIN — AMLODIPINE BESYLATE 10 MG: 5 TABLET ORAL at 10:27

## 2018-02-25 RX ADMIN — METOPROLOL SUCCINATE 100 MG: 50 TABLET, EXTENDED RELEASE ORAL at 10:27

## 2018-02-25 RX ADMIN — ASPIRIN 81 MG 81 MG: 81 TABLET ORAL at 10:27

## 2018-02-25 RX ADMIN — CEFTRIAXONE SODIUM 1 G: 1 INJECTION, POWDER, FOR SOLUTION INTRAMUSCULAR; INTRAVENOUS at 21:25

## 2018-02-25 RX ADMIN — INSULIN GLARGINE 10 UNITS: 100 INJECTION, SOLUTION SUBCUTANEOUS at 21:26

## 2018-02-25 RX ADMIN — POLYETHYLENE GLYCOL 3350 17 G: 17 POWDER, FOR SOLUTION ORAL at 10:27

## 2018-02-25 RX ADMIN — FUROSEMIDE 40 MG: 40 TABLET ORAL at 13:26

## 2018-02-25 RX ADMIN — IPRATROPIUM BROMIDE AND ALBUTEROL SULFATE 3 ML: .5; 3 SOLUTION RESPIRATORY (INHALATION) at 10:19

## 2018-02-25 RX ADMIN — GUAIFENESIN 600 MG: 600 TABLET, EXTENDED RELEASE ORAL at 13:26

## 2018-02-25 RX ADMIN — Medication 10 ML: at 13:26

## 2018-02-25 RX ADMIN — PRAMIPEXOLE DIHYDROCHLORIDE 0.12 MG: 0.25 TABLET ORAL at 21:25

## 2018-02-25 RX ADMIN — Medication 10 ML: at 21:25

## 2018-02-25 RX ADMIN — AZITHROMYCIN 250 MG: 250 TABLET, FILM COATED ORAL at 21:26

## 2018-02-25 RX ADMIN — IPRATROPIUM BROMIDE AND ALBUTEROL SULFATE 3 ML: .5; 3 SOLUTION RESPIRATORY (INHALATION) at 19:49

## 2018-02-25 RX ADMIN — OSELTAMIVIR PHOSPHATE 30 MG: 30 CAPSULE ORAL at 10:27

## 2018-02-25 NOTE — PROGRESS NOTES
Hospitalist Progress Note    NAME: Meli Ventura   :  1940   MRN:  [id number]       Assessment / Plan:  Sepsis POA with fever 102, RR 22, normal lactate  Possible influenza A POA  Community acquired pneumonia POA  -CT chest showed a very small right effusion and minor right basilar atelectasis. There are small scattered foci of airspace disease some of which have a  tree-in-bud appearance in the right lung most pronounced in right lower lobe may  represent early changes of pneumonia. -CXR neg for evidence of acute cardiopulmonary process. -Influenza swab negative 18, but clinically consistent with flu  -UA  was negative for UTI  -cont' IV ceftriaxone and azithro and Tamiflu. Will cont' to complete 5 days treatment  -nebs, prednisone, robitussin  -stop IV lasix    PENELOPE on CKD3  -cr improving, at baseline  -monitor bmp    Acute on chronic respiratory failure with hypoxia POA  Acute on chronic diastolic CHF POA, resolved  -Echo showedl EF 55-60 % with mild concentric hypertrophy  -PO lasix, BB  -wean O2. She was only on 2-3L prn prior to admisison      DM type 2 with neuropathy POA  -A1C 6.9  -baseline uses 70/30 13 units in AM, 3 units in PM  -decrease lantus as pt was hypoglycemic this morning     Essential HTN POA  -continue norvasc, PRN hydralazine     GERD   Body mass index is 25.71 kg/(m^2).       DVT prophylaxis with Lovenox   Code status: full code  NOK: daughter Torrey Ward:     Chief Complaint / Reason for Physician Visit  Pt seen sitting up in bed. Coughing up sputum. Discussed with RN events overnight.      Review of Systems:  Symptom Y/N Comments  Symptom Y/N Comments   Fever/Chills n   Chest Pain n    Poor Appetite    Edema     Cough y   Abdominal Pain n    Sputum    Joint Pain     SOB/LE n   Pruritis/Rash     Nausea/vomit    Tolerating PT/OT     Diarrhea    Tolerating Diet     Constipation    Other       Could NOT obtain due to:      Objective:     VITALS:   Last 24hrs VS reviewed since prior progress note. Most recent are:  Patient Vitals for the past 24 hrs:   Temp Pulse Resp BP SpO2   02/25/18 0815 98.6 °F (37 °C) 60 20 156/54 100 %   02/25/18 0310 98.7 °F (37.1 °C) 60 20 144/48 98 %   02/24/18 2311 - 61 18 154/57 94 %   02/24/18 2247 - - - - 94 %   02/24/18 1955 - - - - 100 %   02/24/18 1809 98.1 °F (36.7 °C) 67 20 137/87 100 %   02/24/18 1544 98.2 °F (36.8 °C) 65 20 148/87 100 %       Intake/Output Summary (Last 24 hours) at 02/25/18 1046  Last data filed at 02/24/18 2110   Gross per 24 hour   Intake                0 ml   Output              750 ml   Net             -750 ml        PHYSICAL EXAM:  General: Thin female in bed, NA  EENT:  + rales. No wheezing. No accessory muscle use  CV:  Regular  rhythm,  No edema  GI:  Soft, Non distended, Non tender.  +BS  Neurologic:  Alert and oriented X 3, normal speech  Psych:   not anxious nor agitated  Skin:  No rashes. No jaundice    Reviewed most current lab test results and cultures  YES  Reviewed most current radiology test results   YES  Review and summation of old records today    NO  Reviewed patient's current orders and MAR    YES  PMH/ reviewed - no change compared to H&P  ________________________________________________________________________  Care Plan discussed with:    Comments   Patient x    Family      RN x    Care Manager     Consultant                        Multidiciplinary team rounds were held today with , nursing, pharmacist and clinical coordinator. Patient's plan of care was discussed; medications were reviewed and discharge planning was addressed.      ________________________________________________________________________  Total NON critical care TIME:  35   Minutes    Total CRITICAL CARE TIME Spent:   Minutes non procedure based      Comments   >50% of visit spent in counseling and coordination of care     ________________________________________________________________________  Kiah Zuniga Lazarus Risk, MD     Procedures: see electronic medical records for all procedures/Xrays and details which were not copied into this note but were reviewed prior to creation of Plan. LABS:  I reviewed today's most current labs and imaging studies.   Pertinent labs include:  Recent Labs      02/25/18 0059 02/24/18 0240 02/22/18 2154   WBC  7.8  8.0  5.7   HGB  11.3*  12.5  13.1   HCT  35.6  39.4  41.7   PLT  186  188  159     Recent Labs      02/25/18 0059 02/24/18 0240 02/22/18 2154   NA  140  141  139   K  4.2  4.4  5.0   CL  103  102  103   CO2  30  28  29   GLU  338*  170*  122*   BUN  41*  42*  27*   CREA  1.42*  1.69*  1.30*   CA  8.4*  8.8  9.4   ALB   --   3.2*  3.7   TBILI   --   0.4  0.8   SGOT   --   27  46*   ALT   --   16  20       Signed: Emelyn Ashley MD

## 2018-02-25 NOTE — PROGRESS NOTES
SHIFT SUMMARY:  1100: Patient is having trouble swallowing; meds are crushed in apple sauce; remains on thin liquids; lungs sounds crackly; MD notified    1600: Patient remains on RA at 98% at rest; will ambulate later per patient    31 75 62: Patient ambulated to the bathroom with spo2 at 97% on RA    1900: Bedside shift change report given to [address] [place] [address]  (oncoming nurse) by Estela Watts (offgoing nurse). Report included the following information SBAR, Kardex, Intake/Output, MAR and Recent Results.

## 2018-02-26 VITALS
WEIGHT: 126 LBS | RESPIRATION RATE: 16 BRPM | SYSTOLIC BLOOD PRESSURE: 154 MMHG | BODY MASS INDEX: 26.45 KG/M2 | OXYGEN SATURATION: 100 % | DIASTOLIC BLOOD PRESSURE: 89 MMHG | HEIGHT: 58 IN | HEART RATE: 66 BPM | TEMPERATURE: 98.1 F

## 2018-02-26 LAB
ANION GAP SERPL CALC-SCNC: 6 MMOL/L (ref 5–15)
BUN SERPL-MCNC: 33 MG/DL (ref 6–20)
BUN/CREAT SERPL: 26 (ref 12–20)
CALCIUM SERPL-MCNC: 8.7 MG/DL (ref 8.5–10.1)
CHLORIDE SERPL-SCNC: 104 MMOL/L (ref 97–108)
CO2 SERPL-SCNC: 31 MMOL/L (ref 21–32)
CREAT SERPL-MCNC: 1.25 MG/DL (ref 0.55–1.02)
GLUCOSE BLD STRIP.AUTO-MCNC: 150 MG/DL (ref 65–100)
GLUCOSE SERPL-MCNC: 274 MG/DL (ref 65–100)
POTASSIUM SERPL-SCNC: 4 MMOL/L (ref 3.5–5.1)
SERVICE CMNT-IMP: ABNORMAL
SODIUM SERPL-SCNC: 141 MMOL/L (ref 136–145)

## 2018-02-26 PROCEDURE — 74011250637 HC RX REV CODE- 250/637: Performed by: INTERNAL MEDICINE

## 2018-02-26 PROCEDURE — 94640 AIRWAY INHALATION TREATMENT: CPT

## 2018-02-26 PROCEDURE — 36415 COLL VENOUS BLD VENIPUNCTURE: CPT | Performed by: INTERNAL MEDICINE

## 2018-02-26 PROCEDURE — 74011250636 HC RX REV CODE- 250/636: Performed by: INTERNAL MEDICINE

## 2018-02-26 PROCEDURE — 74011000250 HC RX REV CODE- 250: Performed by: INTERNAL MEDICINE

## 2018-02-26 PROCEDURE — 80048 BASIC METABOLIC PNL TOTAL CA: CPT | Performed by: INTERNAL MEDICINE

## 2018-02-26 PROCEDURE — 82962 GLUCOSE BLOOD TEST: CPT

## 2018-02-26 PROCEDURE — 74011636637 HC RX REV CODE- 636/637: Performed by: INTERNAL MEDICINE

## 2018-02-26 RX ORDER — GUAIFENESIN 600 MG/1
600 TABLET, EXTENDED RELEASE ORAL EVERY 12 HOURS
Qty: 30 TAB | Refills: 0 | Status: SHIPPED | OUTPATIENT
Start: 2018-02-26 | End: 2021-04-27 | Stop reason: CLARIF

## 2018-02-26 RX ORDER — FUROSEMIDE 10 MG/ML
40 INJECTION INTRAMUSCULAR; INTRAVENOUS ONCE
Status: COMPLETED | OUTPATIENT
Start: 2018-02-26 | End: 2018-02-26

## 2018-02-26 RX ORDER — GUAIFENESIN/DEXTROMETHORPHAN 100-10MG/5
10 SYRUP ORAL
Qty: 1 BOTTLE | Refills: 0 | Status: SHIPPED | OUTPATIENT
Start: 2018-02-26 | End: 2018-03-08

## 2018-02-26 RX ORDER — FUROSEMIDE 40 MG/1
40 TABLET ORAL DAILY
Status: DISCONTINUED | OUTPATIENT
Start: 2018-02-27 | End: 2018-02-26 | Stop reason: HOSPADM

## 2018-02-26 RX ORDER — PREDNISONE 10 MG/1
TABLET ORAL
Qty: 20 TAB | Refills: 0 | Status: SHIPPED | OUTPATIENT
Start: 2018-02-26 | End: 2018-10-08

## 2018-02-26 RX ORDER — LEVOFLOXACIN 750 MG/1
750 TABLET ORAL DAILY
Qty: 2 TAB | Refills: 0 | Status: SHIPPED | OUTPATIENT
Start: 2018-02-26 | End: 2018-02-28

## 2018-02-26 RX ORDER — OSELTAMIVIR PHOSPHATE 30 MG/1
30 CAPSULE ORAL DAILY
Qty: 3 CAP | Refills: 0 | Status: SHIPPED | OUTPATIENT
Start: 2018-02-26 | End: 2018-03-01

## 2018-02-26 RX ADMIN — INSULIN LISPRO 2 UNITS: 100 INJECTION, SOLUTION INTRAVENOUS; SUBCUTANEOUS at 08:58

## 2018-02-26 RX ADMIN — PREDNISONE 40 MG: 20 TABLET ORAL at 08:59

## 2018-02-26 RX ADMIN — OSELTAMIVIR PHOSPHATE 30 MG: 30 CAPSULE ORAL at 08:58

## 2018-02-26 RX ADMIN — ENOXAPARIN SODIUM 30 MG: 30 INJECTION SUBCUTANEOUS at 11:16

## 2018-02-26 RX ADMIN — METOPROLOL SUCCINATE 100 MG: 50 TABLET, EXTENDED RELEASE ORAL at 08:58

## 2018-02-26 RX ADMIN — ASPIRIN 81 MG 81 MG: 81 TABLET ORAL at 08:59

## 2018-02-26 RX ADMIN — AMLODIPINE BESYLATE 10 MG: 5 TABLET ORAL at 08:59

## 2018-02-26 RX ADMIN — Medication 5 ML: at 04:09

## 2018-02-26 RX ADMIN — IPRATROPIUM BROMIDE AND ALBUTEROL SULFATE 3 ML: .5; 3 SOLUTION RESPIRATORY (INHALATION) at 07:40

## 2018-02-26 RX ADMIN — FUROSEMIDE 40 MG: 10 INJECTION, SOLUTION INTRAMUSCULAR; INTRAVENOUS at 11:14

## 2018-02-26 RX ADMIN — POLYETHYLENE GLYCOL 3350 17 G: 17 POWDER, FOR SOLUTION ORAL at 08:57

## 2018-02-26 NOTE — PROGRESS NOTES
Problem: Falls - Risk of  Goal: *Absence of Falls  Document Baljinder Fall Risk and appropriate interventions in the flowsheet.    Outcome: Progressing Towards Goal  Fall Risk Interventions:  Mobility Interventions: Bed/chair exit alarm, Mechanical lift, OT consult for ADLs, Patient to call before getting OOB, PT Consult for mobility concerns, PT Consult for assist device competence         Medication Interventions: Assess postural VS orthostatic hypotension, Bed/chair exit alarm, Evaluate medications/consider consulting pharmacy, Patient to call before getting OOB, Teach patient to arise slowly, Utilize gait belt for transfers/ambulation    Elimination Interventions: Bed/chair exit alarm, Call light in reach, Elevated toilet seat, Patient to call for help with toileting needs, Toilet paper/wipes in reach, Toileting schedule/hourly rounds, Urinal in reach

## 2018-02-26 NOTE — PROGRESS NOTES
Pt to discharge home today between 10:00-10:30AM by private vehicle with daughter. Pt's daughter or other family to provide transportation to follow-up care appointments. Pt to resume  PT/OT, SN services with [facility] tomorrow), confirmed via [employer]. Referral sent to [facility]. CM specialist attempted to schedule PCP f/u, left message with office staff to contact pt directly to schedule appointment. All information entered into pt AVS.     Pt has no additional CM needs at this time. Care Management Interventions  PCP Verified by CM: Yes  Palliative Care Criteria Met (RRAT>21 & CHF Dx)?: Yes  Palliative Consult Recommended?: No  Reason Palliative Care Not Recommended?: Provider preference to wait  Mode of Transport at Discharge:  Other (see comment) (By private vehicle with daughter)  Hospital Transport Time of Discharge: 1030  Transition of Care Consult (CM Consult): Home Health ([facility]  PT/OT, SN)  [facility] - INPATIENT: No  Reason Outside [place]: Patient already serviced by other home care/hospice agency (Resume [facility] with [facility])  Discharge Durable Medical Equipment: No (Rosalio Risk at home)  Health Maintenance Reviewed: Yes  Physical Therapy Consult: Yes  Occupational Therapy Consult: Yes  Speech Therapy Consult: No  Current Support Network: Lives with Spouse, Lives with Caregiver, Family Lives [place], Own Home (Lives in double-wide trailer (ramp access) with spouse and daughter Lyla Gore))  Confirm Follow Up Transport: Family  Plan discussed with Pt/Family/Caregiver: Yes  Freedom of Choice Offered: Yes  Discharge Location  Discharge Placement: Home with home health ([facility]  PT/OT, SN)    TAHIRA Agudelo Supervisee in Social Work, [address] [place] [address]  [phone]

## 2018-02-26 NOTE — DISCHARGE SUMMARY
Discharge Summary      Name: Cori Russell  [id number]  YOB: 1940 (Age: 76 y.o.)   Date of Admission: 2/22/2018  Date of Discharge: 2/26/2018  Attending Physician: Wilian Hilliard MD    Discharge Diagnosis:   Sepsis POA   Possible influenza A POA  Community acquired pneumonia POA  Acute on chronic hypoxic respiratory failure, POA  Acute on chronic diastolic heart failure  W4PN  HTN  GERD    Consultations:  None    Procedures:     Brief Admission History/Reason for Admission Per Shelia Choudhury MD:   76 Y. O. AAF  Baseline no COPD or asthma or tobacco history  Known diastolic CHF, last LVEF 87-96% in 2016, recently at [address] for CHF per daughter  Has home O2 but uses only as needed, baseline not daily use   Past 4 to 5 days, increasing cough productive of white sputum  Fevers at home, pt does not know how high  Myalgias  Diffuse weakness, not wanting to get OOB  Poor appetite  No CP or orthopnea or LE edema  Increasing SOB with activity, starting having to wear O2 more frequently  Daughter also reports SOB with laying flat, having to sit up to catch her breath   Seen in ED yesterday  PA/LAt CXR negative for ASD or edema, UA negative for UTI  Flu swab negative  Nebs  Improved  D/C on z-kingsley, albuterol HFA   Overnight increasing SOB, felt SOB at rest and with any activity  Woke up at night gasping for air  EMS called, sats 73% room air, brought to ED   ED hypoxic, required 3 L NC O2  Febrile to 102  pCXR no ASD or edema  pBNP 9359  Lactate 1.4  Actively wheezing  IV solumedrol, nebs  IV ceftriaxone and azithro  We were called to admit the patient     Brief Hospital Course by Main Problems:   Sepsis POA with fever 102, RR 22, normal lactate  Possible influenza A POA  Community acquired pneumonia POA  CT chest showed a very small right effusion and minor right basilar atelectasis.   There are small scattered foci of airspace disease some of which have a tree-in-bud appearance in the right lung most pronounced in right lower lobe may represent early changes of pneumonia. CXR neg for evidence of acute cardiopulmonary process. Influenza swab negative 2/21/18, but clinically consistent with flu. UA 2/21 was negative for UTI. Pt started IV ceftriaxone, azithromycin, prednisone, and Tamiflu. She initially required up to 3L NC continuously, however is now weaned off to room air. Pt ambulated on RA with SpO2 maintaining at 96%. Pt remains hemodynamically stable and is being discharged home with [facility]. Pt lives with daughter and wished to be discharge to home with daughter. She is to continue levaquin/Tamifly, and tapered prednisone as prescribed.      PENELOPE on CKD3, initially due to overdiuresed. IV lasix held with cr improving. PO lasix resumed and cr cont' to improves. Aute on chronic respiratory failure with hypoxia POA  Acute on chronic diastolic CHF POA, resolved  Echo showedl EF 55-60 % with mild concentric hypertrophy. Cont' home lasix. Stable on RA.      DM type 2 with neuropathy POA. A1C 6.9, resume home insulin. Essential HTN POA, cont' home meds.      GERD   Body mass index is 25.71 kg/(m^2). Discharge Exam:  Patient seen and examined by me on discharge day. Pertinent Findings:  Visit Vitals    /58 (BP 1 Location: Right arm, BP Patient Position: At rest)    Pulse 65    Temp 98.2 °F (36.8 °C)    Resp 16    Ht 4' 10\" (1.473 m)    Wt 57.2 kg (126 lb)    SpO2 (P) 100%    BMI 26.33 kg/m2     Gen:    Not in distress  Chest: Clear lungs  CVS:   Regular rhythm.   No edema  Abd:  Soft, not distended, not tender    Discharge/Recent Laboratory Results:  Recent Labs      02/26/18   0109   NA  141   K  4.0   CL  104   CO2  31   BUN  33*   GLU  274*   CA  8.7     Recent Labs      02/25/18   0059   HGB  11.3*   HCT  35.6   WBC  7.8   PLT  186       Discharge Medications:  Current Discharge Medication List      START taking these medications Details   guaiFENesin ER (MUCINEX) 600 mg ER tablet Take 1 Tab by mouth every twelve (12) hours. Qty: 30 Tab, Refills: 0      oseltamivir (TAMIFLU) 30 mg capsule Take 1 Cap by mouth daily for 3 days. Qty: 3 Cap, Refills: 0      predniSONE (DELTASONE) 10 mg tablet 5 tabs daily for 2 days  4 tabs daily for 2 days   3 tabs daily for 2 days   2 tabs daily for 2 days   1 tab   daily for 2 days  Qty: 20 Tab, Refills: 0      guaiFENesin-dextromethorphan (ROBITUSSIN DM) 100-10 mg/5 mL syrup Take 10 mL by mouth every six (6) hours as needed for up to 10 days. Qty: 1 Bottle, Refills: 0      levoFLOXacin (LEVAQUIN) 750 mg tablet Take 1 Tab by mouth daily for 2 days. Qty: 2 Tab, Refills: 0         CONTINUE these medications which have NOT CHANGED    Details   amLODIPine (NORVASC) 5 mg tablet Take 5 mg by mouth daily. !! insulin aspart protamine/insulin aspart (NOVOLOG MIX 70-30 U-100 INSULN) 100 unit/mL (70-30) injection 13 Units by SubCUTAneous route daily. Patient takes 13 units in the AM and 3 units QHS      !! insulin aspart protamine/insulin aspart (NOVOLOG MIX 70-30 U-100 INSULN) 100 unit/mL (70-30) injection 3 Units by SubCUTAneous route nightly. Patient takes 13 units in the AM and 3 units QHS      traMADol (ULTRAM) 50 mg tablet Take 50 mg by mouth two (2) times a day. furosemide (LASIX) 40 mg tablet Take 1 Tab by mouth daily. Qty: 30 Tab, Refills: 0      gabapentin (NEURONTIN) 300 mg capsule Take 300 mg by mouth two (2) times a day. polyethylene glycol (MIRALAX) 17 gram packet Take 17 g by mouth daily. Indications: constipation      ondansetron hcl (ZOFRAN, AS HYDROCHLORIDE,) 8 mg tablet Take 1 Tab by mouth every eight (8) hours as needed for Nausea. Qty: 20 Tab, Refills: 0      metoprolol-XL (TOPROL-XL) 100 mg XL tablet Take 100 mg by mouth daily.       albuterol (PROVENTIL HFA, VENTOLIN HFA, PROAIR HFA) 90 mcg/actuation inhaler Take 2 Puffs by inhalation every four (4) hours as needed for Wheezing. Qty: 1 Inhaler, Refills: 0       !! - Potential duplicate medications found. Please discuss with provider.       STOP taking these medications       pramipexole (MIRAPEX) 0.125 mg tablet Comments:   Reason for Stopping:               DISPOSITION:    Home with Family:    Home with HH/PT/OT/RN: x   SNF/LTC:    QUYNH:    OTHER:          Follow up with:   PCP : Dick Bojorquez NP  Follow-up Information     Follow up With Details [facility] [address], NP In 5 days  [place] [id number] [phone]            Code: Full  Diet: diabetic/ low salt    Total time in minutes spent coordinating this discharge (includes going over instructions, follow-up, prescriptions, and preparing report for sign off to her PCP) :  35 minutes

## 2018-02-26 NOTE — CARDIO/PULMONARY
Cardiopulmonary Rehab:      Chart reviewed. Pt is on the CHF bundle list.   Pt is a 76 y.o. Adm with sepsis,community acquired,CHF. LVEF 55-60%. Cardiac Rehab left CHF folder for pt last visit. Last CHF teaching 4/2016. Nonsmoker. For discharge today. Met with pt and daughter. This was a follow-up visit to answer questions and reinforce prior teaching re: CHF, S&Ss, medication management, Low NA diet, daily weights, when to call the doctor and balancing rest/activity. Teachback-daughter has her weigh daily and knows to follow Low sodium diet. Reminded of weight gain of 2-3 pounds in a day or 5 over the week to call MD.        Diet is currently-DIET DIABETIC CONSISTENT CARB Regular      All questions answered. Understanding verbalized.

## 2018-02-26 NOTE — PROGRESS NOTES
Pt is a 75 yo  female admitted on 3/58/19 for Diastolic CHF. Pt lives in a double-wide trailer (ramp access) with spouse and daughter Krystal Fried). Pt is independent in ADLs and her daughter Krystal Davis assists with IADLs. Pt does not drive, family provides transport. Pt is currently active with [facility]  PT/OT, SN services at this time. Pt has no history of SNF or acute inpatient rehab. Pt has a RW and cane at home. Pt to dc home by private vehicle with daughter Krystal Fried). Pt's family to provide transportation to follow-up care appointments. Pt's preferred Rx is [facility]. CM met with pt to verify demographic info and complete initial assessment, dc planning. Pt is alert and oriented x 4. Pt sees Carlos A Maldonado NP (PCP) and Dr. Alvin Burnett (Cardiology) outpatient. Pt to be seen by PT/OT for evaluation, spoke with MD, will likely resume services with [facility]. Referral sent to [facility] via [facility]. Pt would like to continue [facility] services with [facility]. FOC completed and placed on bedside chart. Referral sent to [facility] via [facility], pt accepted. Care Management Interventions  PCP Verified by CM: Yes  Palliative Care Criteria Met (RRAT>21 & CHF Dx)?: Yes  Palliative Consult Recommended?: No  Reason Palliative Care Not Recommended?: Provider preference to wait  Mode of Transport at Discharge:  Other (see comment) (By private vehicle with daughter)  Transition of Care Consult (CM Consult): Discharge Planning  Discharge Durable Medical Equipment: No (RW, Cane at home)  Health Maintenance Reviewed: Yes  Physical Therapy Consult: Yes  Occupational Therapy Consult: Yes  Speech Therapy Consult: No  Current Support Network: Lives with Spouse, Lives with Caregiver, Family Lives [place], Own Home (Lives in double-wide trailer (ramp access) with spouse and daughter Krystal Freid))  Confirm Follow Up Transport: Family  Plan discussed with Pt/Family/Caregiver: Yes  Freedom of Choice Offered: Yes  Discharge Location  Discharge Placement: Home with home health ([facility] PT/OT, SN)    Stan Savage MSW Supervisee in Social Work, [address] [place] [address]  [phone]

## 2018-02-26 NOTE — PROGRESS NOTES
Home Oxygen Test  Date of test: 02/25/2018  Time of test: 1830    Sa02 98 % on room air AT REST. Sa02 96 % on room air DURING AMBULATION. Sa02 97 % on RA AT REST/AFTER AMBULATION.

## 2018-02-26 NOTE — PROGRESS NOTES
0700: Received bedside report from [place], off going nurse. Assumed care of patient. 1200: Reviewed discharge instructions with patient and family. Opportunity for questions provided, none at this time. Patient stable and ready for discharge at this time.

## 2018-02-26 NOTE — FACE TO FACE
Home Health Care Discharge Planning: [facility]  Face to Face Encounter      NAME: Mushtaq Hess   :  1940   MRN:  [id number]     Primary Diagnosis: sepsis, community acquired PNA    Date of Face to Face:  2018 8:26 AM                                  Face to Face Encounter findings are related to primary reason for home care:   YES    1. I certify that the patient needs intermittent skilled nursing care, physical therapy and/or speech therapy. I will not be following this patient in the Community and Dr. Trudi Castaneda, SINGH will be responsible for signing the [address] 2. Initial Orders for Care: Physical Therapy    3. I certify that this patient is homebound because of illness or injury, need the aid of supportive devices such as crutches, canes, wheelchairs, and walkers; the use of special transportation; or the assistance of another person in order to leave their place of residence. There exists a normal inability to leave home and leaving home requires a considerable and taxing effort. 4. I certify that this patient is under my care and that I had a Face-to-Face Encounter that meets the physician Face-to-Face Encounter requirements.   Document the physical findings from the Face-to-Face Encounter that support the need for skilled services: Has new diagnosis that requires skilled nursing teaching and intervention  and Has new medications that requires skilled nursing teaching and monitoring for understanding and compliance     Ziyad Ogden MD  Discharging Physician                 Office:  [phone]  Fax:    [phone]  ]

## 2018-02-26 NOTE — PROGRESS NOTES
ADULT PROTOCOL: JET AEROSOL ASSESSMENT    Patient  Marci Harry     76 y.o.   female     2/25/2018  10:42 PM    Breath Sounds Pre Procedure: Right Breath Sounds: Lower, Crackles                               Left Breath Sounds: Lower, Crackles    Breath Sounds Post Procedure: Right Breath Sounds: Lower, Crackles                                 Left Breath Sounds: Lower, Crackles    Breathing pattern: Pre procedure Breathing Pattern: Regular          Post procedure Breathing Pattern: Regular    Heart Rate: Pre procedure Pulse: 59           Post procedure Pulse: 61    Resp Rate: Pre procedure Respirations: 16           Post procedure Respirations 16      Cough: Pre procedure Cough: Non-productive               Post procedure Cough: Non-productive    Suctioned: NO    Oxygen: O2 Device: Room air        Changed: NO    SpO2: Pre procedure SpO2: 100 %   without oxygen              Post procedure SpO2: 100 %  without oxygen    Nebulizer Therapy: Current medications Aerosolized Medications: DuoNeb      Changed: NO    Smoking History: never    Problem List:   Patient Active Problem List   Diagnosis Code    S/P CABG (coronary artery bypass graft) Z95.1    DM2 (diabetes mellitus, type 2) ([facility]) E11.9    Polyneuropathy in diabetes(357.2) E11.42    CHF (congestive heart failure) ([facility]) I50.9    SDH (subdural hematoma) ([facility]) I62.00    Dehydration E86.0    Dizziness R42    PENELOPE (acute kidney injury) ([facility]) N17.9    Hypoglycemia E16.2    Elevated brain natriuretic peptide (BNP) level F24.69    Diastolic CHF, acute ([facility]) I50.31       Respiratory Therapist: Maya Mccauley RT

## 2018-02-28 ENCOUNTER — PATIENT OUTREACH (OUTPATIENT)
Dept: CASE MANAGEMENT | Age: 78
End: 2018-02-28

## 2018-02-28 LAB
BACTERIA SPEC CULT: NORMAL
BACTERIA SPEC CULT: NORMAL
SERVICE CMNT-IMP: NORMAL
SERVICE CMNT-IMP: NORMAL

## 2018-02-28 NOTE — PROGRESS NOTES
2/28/18  Attempted to contact patient, follow up for hospital visit on 2/22/18-2/26/18 to [facility] for diastolic heart failure. Spoke to patient's daughter who states patient is currently at her PCP office for her hospital follow up today, 2/28/18. NN left message to have patient return telephone call.

## 2018-03-01 ENCOUNTER — PATIENT OUTREACH (OUTPATIENT)
Dept: CASE MANAGEMENT | Age: 78
End: 2018-03-01

## 2018-03-01 NOTE — PROGRESS NOTES
3/1/18  Spoke to patient today, verified patient's name, address and . Patient states doing good, no problems or concerns noted. Patient states no chest pain, coughing, wheezing, SOB or difficulty breathing noted. Patient states followed up with PCP, Yue Pham's office yesterday, 18. Patient states she was referred to cardiology by her PCP. Patient states she will be scheduling her cardiology appointment as soon as possible. NN encouraged patient to visit cardiology soon and establish care. Patient states agreement and understanding. Patient states having home health visit on 18. Patient has standing order for home health with [facility].

## 2018-04-04 ENCOUNTER — PATIENT OUTREACH (OUTPATIENT)
Dept: CASE MANAGEMENT | Age: 78
End: 2018-04-04

## 2018-04-04 NOTE — PROGRESS NOTES
4/4/18  Patient is non [facility] patient. Patient was discharge from [facility] on 7/88/66 for diastolic heart failure. Patient had PCP hospital follow up visit on 2/28/18. Patient had standing order for home health with [facility] and it was resumed on 2/27/18. Patient has had no other admission at this time and NN will close MARIO ALBERTO.

## 2018-09-24 ENCOUNTER — HOSPITAL ENCOUNTER (OUTPATIENT)
Dept: CT IMAGING | Age: 78
Discharge: HOME | End: 2018-09-24
Attending: NURSE PRACTITIONER
Payer: MEDICARE

## 2018-09-24 DIAGNOSIS — R10.31 ABDOMINAL PAIN, RIGHT LOWER QUADRANT: ICD-10-CM

## 2018-09-24 PROCEDURE — 74176 CT ABD & PELVIS W/O CONTRAST: CPT

## 2018-10-08 ENCOUNTER — HOSPITAL ENCOUNTER (OUTPATIENT)
Dept: NON INVASIVE DIAGNOSTICS | Age: 78
Discharge: HOME | End: 2018-10-08
Payer: MEDICARE

## 2018-10-08 VITALS
RESPIRATION RATE: 14 BRPM | BODY MASS INDEX: 26.24 KG/M2 | OXYGEN SATURATION: 98 % | HEART RATE: 70 BPM | HEIGHT: 58 IN | SYSTOLIC BLOOD PRESSURE: 112 MMHG | DIASTOLIC BLOOD PRESSURE: 47 MMHG | WEIGHT: 125 LBS

## 2018-10-08 DIAGNOSIS — I48.91 ATRIAL FIBRILLATION (HCC): ICD-10-CM

## 2018-10-08 LAB
ATRIAL RATE: 68 BPM
CALCULATED R AXIS, ECG10: -97 DEGREES
CALCULATED T AXIS, ECG11: 86 DEGREES
DIAGNOSIS, 93000: NORMAL
Q-T INTERVAL, ECG07: 514 MS
QRS DURATION, ECG06: 172 MS
QTC CALCULATION (BEZET), ECG08: 555 MS
VENTRICULAR RATE, ECG03: 70 BPM

## 2018-10-08 PROCEDURE — 99152 MOD SED SAME PHYS/QHP 5/>YRS: CPT

## 2018-10-08 PROCEDURE — 77030022017 HC DRSG HEMO QCLOT ZMED -A

## 2018-10-08 PROCEDURE — 74011250636 HC RX REV CODE- 250/636

## 2018-10-08 PROCEDURE — 74011000250 HC RX REV CODE- 250: Performed by: INTERNAL MEDICINE

## 2018-10-08 PROCEDURE — 92960 CARDIOVERSION ELECTRIC EXT: CPT | Performed by: INTERNAL MEDICINE

## 2018-10-08 PROCEDURE — 74011000250 HC RX REV CODE- 250

## 2018-10-08 PROCEDURE — 77030018729 HC ELECTRD DEFIB PAD CARD -B

## 2018-10-08 PROCEDURE — 93005 ELECTROCARDIOGRAM TRACING: CPT

## 2018-10-08 RX ORDER — FENTANYL CITRATE 50 UG/ML
INJECTION, SOLUTION INTRAMUSCULAR; INTRAVENOUS
Status: COMPLETED
Start: 2018-10-08 | End: 2018-10-08

## 2018-10-08 RX ORDER — CLOPIDOGREL BISULFATE 75 MG/1
75 TABLET ORAL DAILY
COMMUNITY
End: 2021-04-27 | Stop reason: CLARIF

## 2018-10-08 RX ORDER — MIDAZOLAM HYDROCHLORIDE 1 MG/ML
INJECTION, SOLUTION INTRAMUSCULAR; INTRAVENOUS
Status: COMPLETED
Start: 2018-10-08 | End: 2018-10-08

## 2018-10-08 RX ORDER — FENTANYL CITRATE 50 UG/ML
25-50 INJECTION, SOLUTION INTRAMUSCULAR; INTRAVENOUS
Status: DISCONTINUED | OUTPATIENT
Start: 2018-10-08 | End: 2018-10-12 | Stop reason: HOSPADM

## 2018-10-08 RX ORDER — LIDOCAINE HYDROCHLORIDE 20 MG/ML
SOLUTION OROPHARYNGEAL
Status: COMPLETED
Start: 2018-10-08 | End: 2018-10-08

## 2018-10-08 RX ORDER — MIDAZOLAM HYDROCHLORIDE 1 MG/ML
.5-2 INJECTION, SOLUTION INTRAMUSCULAR; INTRAVENOUS
Status: DISCONTINUED | OUTPATIENT
Start: 2018-10-08 | End: 2018-10-12 | Stop reason: HOSPADM

## 2018-10-08 RX ORDER — SPIRONOLACTONE 25 MG/1
25 TABLET ORAL EVERY OTHER DAY
COMMUNITY

## 2018-10-08 RX ORDER — LIDOCAINE HYDROCHLORIDE 20 MG/ML
15 SOLUTION OROPHARYNGEAL ONCE
Status: COMPLETED | OUTPATIENT
Start: 2018-10-08 | End: 2018-10-08

## 2018-10-08 RX ORDER — ASPIRIN 81 MG/1
81 TABLET ORAL DAILY
COMMUNITY
End: 2021-04-27 | Stop reason: CLARIF

## 2018-10-08 RX ADMIN — MIDAZOLAM 1 MG: 1 INJECTION INTRAMUSCULAR; INTRAVENOUS at 09:37

## 2018-10-08 RX ADMIN — MIDAZOLAM HYDROCHLORIDE 1 MG: 1 INJECTION, SOLUTION INTRAMUSCULAR; INTRAVENOUS at 09:47

## 2018-10-08 RX ADMIN — LIDOCAINE HYDROCHLORIDE 15 ML: 20 SOLUTION OROPHARYNGEAL at 09:37

## 2018-10-08 RX ADMIN — FENTANYL CITRATE 25 MCG: 50 INJECTION, SOLUTION INTRAMUSCULAR; INTRAVENOUS at 09:37

## 2018-10-08 RX ADMIN — BENZOCAINE, BUTAMBEN, AND TETRACAINE HYDROCHLORIDE 1 SPRAY: .028; .004; .004 AEROSOL, SPRAY TOPICAL at 09:38

## 2018-10-08 RX ADMIN — MIDAZOLAM HYDROCHLORIDE 1 MG: 1 INJECTION, SOLUTION INTRAMUSCULAR; INTRAVENOUS at 09:37

## 2018-10-08 RX ADMIN — LIDOCAINE HYDROCHLORIDE 15 ML: 20 SOLUTION ORAL; TOPICAL at 09:37

## 2018-10-08 NOTE — PROGRESS NOTES
Pt sedated with 25 mcg IV Fentanyl and 1 mg IV Versed for AMANDEEP. Dr. Elvira Smallwood unable to pass probe easily. No AMANDEEP done. Pt sedated with an additional 1 mg IV Versed for Elective cardioversion. Dr. Elvira Smallwood felt pt's rhythm was atrial tachycardia. Pt given 1 synch shock at 200 joules. Rhythm converted to sinus (atrial paced). Rhythm checked by [place]. Bjorn Rep.  Monitored sedation time 9:37 to 10:00

## 2018-10-08 NOTE — PROCEDURES
[address] [facility]  [address]  [phone]    Patient ID:  Patient: Reny Dunn  MRN: [id number]  Age: 76 y.o.  : 1940  Gender: female  Study Date: 10/8/2018    History: This is a female with a persistent atrial tachycardia ( msec) with symptoms, here for cardioversion. Procedure:  Electrical Cardioversion, Elective (59030)  The patient was brought to the noninvasive lab in a postabsorptive state after informed consent had been previously obtained. Continuous electrocardiographic and hemodynamic monitoring was performed. Sedation was provided using Versed and fentanyl by the noninvasive nurse who was in constant attendance and my supervision throughout the procedure using Versed and fentanyl, sedation time 9:37-10:00 (23 minutes). Shock pads were placed in the left anterior-right posterior or anterior-apical position and R wave synchronized shock energy was delivered. The patient was recovered, with satisfactory hemodynamics, no immediate complication noted. Preoperative Diagnosis: As above. Postoperative Diagnosis: As above. Procedure:  As above. Surgeon(s) and Role:  Johnny Scanlon MD - Primary   Anesthesia:   MAC. Estimated Blood Loss:  <5 cc. Specimens: * No specimens in log *   Findings:  As below. Complications:  None. Findings:  1. Baseline atrial tachycardia with demand biventricular pacing. The tachycardia cycle length in the atrium was 400 msec. 2.  Successful external cardioversion using 200 J shock energy to restore sinus rhythm (AV pacing) after a failed trial of antitachycardia pacing through the pacemaker. 3.  Normal dual chamber [facility] pacemaker function was confirmed before and after the procedure. Recommendations:  Continue current medications including dual antiplatelet therapy.   The pacemaker base rate was changed to 70 bpm.      Signed By: Cecile Nicholson MD     2018

## 2018-10-08 NOTE — PROGRESS NOTES
She was draped in the usual fashion, monitored with continuous ECG and pulse oximetry, interval BP's. After local anesthetic was sprayed, an attempt at AMANDEEP followed. I was unable to pass the AMANDEEP probe past the oropharynx. I gently manipulated the probe but due to restriction, I decided to avoid pushing through this. No complications. I think that this is an atrial tachycardia that is \"slow\" enough as to not treat like an atrial flutter and proceed to cardioversion without forcing the AMANDEEP.     Signed By: Chayo Gamez MD     October 8, 2018

## 2018-10-08 NOTE — H&P
[address] HISTORY AND PHYSICAL Carlin Severance 
MR#: [id number] : 1940 ACCOUNT #: [de-identified] ADMIT DATE: 10/08/2018 CHIEF COMPLAINT:  Fatigue. HISTORY OF PRESENT ILLNESS:  This is a 66-year-old female with history of persistent atrial tachycardia. She was last seen in the office in 2018, where she saw my nurse practitioner Bipin Webster. I had previously tried to pace terminate her through her pacemaker almost a month before that time. I could not terminate the arrhythmia but did make some programming changes to her pacemaker. This included changing the atrial tachycardia rate to 140, decreasing the maximum tracking rate from 130 to 120 beats per minute, and a kept her base rate at 60 beats per minute at that time. Her mode switch rate was changed to 80 beats per minute to force biventricular pacing and she was beating out the device with her atrial tachycardia when it was programmed to 70 beats per minute. No medication changes at that time. Since making the program changes she has felt little better. Her energy has improved. Her breathing is better, and her weight is down a significant amount. She no longer has lower extremity edema. ALLERGIES:  NO KNOWN DRUG ALLERGIES. MEDICATIONS: 
1. Aspirin 81 mg daily. 2.  Plavix 75 mg daily. 3.  Furosemide 40 mg daily. 4.  Gabapentin 300 mg one capsule every two days (I wonder if this is an error). 5.  Metoprolol succinate 50 mg daily (she may be on 100 mg). 6.  Novolin 70/30 insulin 10 units twice daily subcutaneously. 7.  Polyethylene glycol daily for constipation. 8.  Spironolactone 25 mg every other day. 9.  Tramadol 50 mg every 8 hours as needed. 10.  Zofran 4 mg disintegrating tablet every 8 hours as needed. 11.  Amlodipine 10 mg daily. She states she is not taking her diltiazem extended release 180 mg daily. PAST MEDICAL HISTORY: 
1. As above. 2.  Coronary artery disease with remote coronary artery bypass grafting in 2006. 3.  Hypertensive heart disease. 4.  Peripheral vascular disease. 5.  Remote dual chamber pacemaker ([employer]). She had had paroxysmal ventricular tachycardia noted on prior checks with a negative EP study. 6.  Ischemic cardiomyopathy with an ejection fraction of 35% on an echo 04/2018. 
7.  Chronic systolic congestive heart failure, now compensated. 8.  Dyslipidemia. 9.  Diabetes mellitus type 2 requiring chronic insulin therapy. She has an associated neuropathy. FAMILY HISTORY:  Noncontributory. SOCIAL HISTORY:  Currently . Never smoked. REVIEW OF SYSTEMS:  Except as noted above, it is otherwise noncontributory. PHYSICAL EXAMINATION: 
VITAL SIGNS:  Afebrile, pulse 80, blood pressure 155/64. GENERAL:  Nondiaphoretic, not in acute distress. CARDIOVASCULAR:  Regular and paced. EXTREMITIES:  No peripheral edema. Palpable radial pulses bilaterally. NEUROLOGIC:  Awake, appropriate. HEENT:  Dentures were removed. Unremarkable. TELEMETRY:  Atrial tachycardia with sequential ventricular pacing. This is biventricular pacing that is confirmed by a pacemaker interrogation. ASSESSMENT:  Persistent atrial tachycardia with a cycle length of approximately 400 milliseconds. I would be inclined to believe this is more of an atrial tachycardia than an atrial flutter (thus anticoagulation may not be needed). RECOMMENDATIONS:  Given the potential benefits, risks and alternatives, she agrees to proceed with transesophageal echo. I think it is reasonable to at least screen for intracardiac thrombus. If everything goes well, then we will proceed to a cardioversion to follow. MD ELLI Benito/JULIO 
D: 10/08/2018 10:07    
T: 10/08/2018 11:09 
JOB #: [id number] CC: Addi Gómez MD 
CC: Sudhir Joseph NP

## 2018-10-08 NOTE — IP AVS SNAPSHOT
[address] [facility] [address] 
[phone] Patient: Sylvain Sylvester MRN: [id number] :1940 About your hospitalization You were admitted on:  2018 You last received care in the:  [facility] NON-INVASIVE CARD You were discharged on:  2018 Why you were hospitalized Your primary diagnosis was:  Not on File Follow-up Information Follow up With Details Comments Contact Info Rui Agustin NP   [address] [phone] Discharge Orders None A check [place] indicates which time of day the medication should be taken. My Medications CONTINUE taking these medications Instructions Each Dose to Equal  
 Morning Noon Evening Bedtime  
 amLODIPine 5 mg tablet Commonly known as:  Sha Elmore Your last dose was: Your next dose is: Take 10 mg by mouth daily. 10 mg  
    
   
   
   
  
 aspirin delayed-release 81 mg tablet Your last dose was: Your next dose is: Take 81 mg by mouth daily. 81 mg  
    
   
   
   
  
 clopidogrel 75 mg Tab Commonly known as:  PLAVIX Your last dose was: Your next dose is: Take 75 mg by mouth daily. 75 mg  
    
   
   
   
  
 furosemide 40 mg tablet Commonly known as:  LASIX Your last dose was: Your next dose is: Take 1 Tab by mouth daily. 40 mg  
    
   
   
   
  
 gabapentin 300 mg capsule Commonly known as:  NEURONTIN Your last dose was: Your next dose is: Take 300 mg by mouth two (2) times a day. 300 mg  
    
   
   
   
  
 guaiFENesin  mg ER tablet Commonly known as:  Mike & Mike Your last dose was: Your next dose is: Take 1 Tab by mouth every twelve (12) hours. 600 mg  
    
   
   
   
  
 metoprolol succinate 100 mg tablet Commonly known as:  TOPROL-XL Your last dose was: Your next dose is: Take 100 mg by mouth daily. 100 mg MIRALAX 17 gram packet Generic drug:  polyethylene glycol Your last dose was: Your next dose is: Take 17 g by mouth daily. Indications: constipation 17 g * NovoLOG Mix 70-30 U-100 Insuln 100 unit/mL (70-30) injection Generic drug:  insulin aspart protamine/insulin aspart Your last dose was: Your next dose is:    
   
   
 13 Units by SubCUTAneous route daily. Patient takes 13 units in the AM and 3 units QHS  
 13 Units * NovoLOG Mix 70-30 U-100 Insuln 100 unit/mL (70-30) injection Generic drug:  insulin aspart protamine/insulin aspart Your last dose was: Your next dose is:    
   
   
 3 Units by SubCUTAneous route nightly. Patient takes 13 units in the AM and 3 units QHS  
 3 Units  
    
   
   
   
  
 ondansetron hcl 8 mg tablet Commonly known as:  Jenceline Broach Your last dose was: Your next dose is: Take 1 Tab by mouth every eight (8) hours as needed for Nausea. 8 mg  
    
   
   
   
  
 spironolactone 25 mg tablet Commonly known as:  ALDACTONE Your last dose was: Your next dose is: Take 25 mg by mouth every other day. 25 mg  
    
   
   
   
  
 traMADol 50 mg tablet Commonly known as:  ULTRAM  
   
Your last dose was: Your next dose is: Take 50 mg by mouth two (2) times a day. 50 mg  
    
   
   
   
  
 * Notice: This list has 2 medication(s) that are the same as other medications prescribed for you. Read the directions carefully, and ask your doctor or other care provider to review them with you. Opioid Education  Prescription Opioids: What You Need to Know: 
 
 
Cardiology Procedures this Admission: 1. Successful electrical cardioversion terminating atrial tachycardia and restoring AV pacing Disposition: home with the company of a  Patient Instructions: 
No driving, operating heavy machinery, or signing legal documents today. Do NOT drink or eat HOT items for 4 hours after the AMANDEEP. Cool or warm is OK. FOLLOW-UP: 
Call the office [phone] for any questions. Keep your routine appointments in the office otherwise. [address] [facility], [address]    [phone] [place], [address] [place] [address]    [email or web address] Thank you for placing your trust for your heart with the physicians and staff of [facility]. We have long provided [place] with the most advanced cardiovascular care possible using a personalized and caring approach. And we hope to continue this strong tradition well into the future. A heart condition, or the fear of having a heart condition, can be a stressful time for a patient and their family. There are appointments, testing procedures and unfamiliar terms that can cause natural questions and concerns. While we encourage you to speak with your nurse or physician regarding any questions you might have, please consider this web site as a powerful resource you can use at any time. Often, questions or concerns only arise once you've left our office. There are many useful sections on this web site and links to other professional organizations and advocacy groups. These sources can help answer many questions you might have from the comfort of your own home or office. Again, thank you for considering [facility] as your trusted provider of heart care. Please don't hesitate to let us know if there is anything we can do to enhance your experience with us. Signed: 
Richie Ellis MD 
10/8/2018 Introducing [facility]! Radhika Araizatsman introduces [employer] patient portal. Now you can access parts of your medical record, email your doctor's office, and request medication refills online. 1. In your internet browser, go to [email or web address] 2. Click on the First Time User? Click Here link in the Sign In box. You will see the New Member Sign Up page. 3. Enter your [employer] Access Code exactly as it appears below. You will not need to use this code after youve completed the sign-up process. If you do not sign up before the expiration date, you must request a new code. · [employer] Access Code: [email or web address] Expires: 12/23/2018  2:12 PM 
 
4. Enter the last four digits of your Social Security Number (xxxx) and Date of Birth (mm/dd/yyyy) as indicated and click Submit. You will be taken to the next sign-up page. 5. Create a [employer] ID. This will be your [employer] login ID and cannot be changed, so think of one that is secure and easy to remember. 6. Create a [employer] password. You can change your password at any time. 7. Enter your Password Reset Question and Answer. This can be used at a later time if you forget your password. 8. Enter your e-mail address. You will receive e-mail notification when new information is available in [address] 9. Click Sign Up. You can now view and download portions of your medical record. 10. Click the Download Summary menu link to download a portable copy of your medical information. If you have questions, please visit the Frequently Asked Questions section of the [employer] website. Remember, [employer] is NOT to be used for urgent needs. For medical emergencies, dial 911. Now available from your iPhone and Android! Introducing Stan Elmore As a [employer] patient, I wanted to make you aware of our electronic visit tool called Stan Elmore. [employer] 24/7 allows you to connect within minutes with a medical provider 24 hours a day, seven days a week via a mobile device or tablet or logging into a secure website from your computer. You can access Stan Elmore from anywhere in the [place]. A virtual visit might be right for you when you have a simple condition and feel like you just dont want to get out of bed, or cant get away from work for an appointment, when your regular [employer] provider is not available (evenings, weekends or holidays), or when youre out of town and need minor care. Electronic visits cost only $49 and if the [employer] 24/7 provider determines a prescription is needed to treat your condition, one can be electronically transmitted to a nearby pharmacy*. Please take a moment to enroll today if you have not already done so. The enrollment process is free and takes just a few minutes. To enroll, please download the [employer] 24/7 suleman to your tablet or phone, or visit [email or web address] to enroll on your computer. And, as an [address] [place] [address] patient with a [employer] account, the results of your visits will be scanned into your electronic medical record and your primary care provider will be able to view the scanned results. We urge you to continue to see your regular [employer] provider for your ongoing medical care. And while your primary care provider may not be the one available when you seek a Stan Elmore virtual visit, the peace of mind you get from getting a real diagnosis real time can be priceless. For more information on Stan Elmore, view our Frequently Asked Questions (FAQs) at [email or web address]. Sincerely, 
 
Paramjit Dodge MD 
Chief Medical Officer Joy Mcpherson *:  certain medications cannot be prescribed via Stan Elmore Unresulted Labs-Please follow up with your PCP about these lab tests Order Current Status EKG, 12 LEAD, INITIAL Preliminary result Your Primary Care Physician (PCP) Primary Care Physician Office Phone Office Fax Clarence Hernandes [phone] You are allergic to the following Allergen Reactions Percocet (Oxycodone-Acetaminophen) Nausea and Vomiting Recent Documentation Height Weight BMI OB Status Smoking Status 1.473 m 56.7 kg 26.13 kg/m2 Postmenopausal Never Smoker Emergency Contacts Name Discharge Info Relation Home Work Mobile [facility] DISCHARGE CAREGIVER [3] Daughter [21] [phone] [facility] DISCHARGE CAREGIVER [3] Daughter [21] [phone] Patient Belongings The following personal items are in your possession at time of discharge: 
                             
 
  
  
 Please provide this summary of care documentation to your next provider. Signatures-by signing, you are acknowledging that this After Visit Summary has been reviewed with you and you have received a copy. Patient Signature:  ____________________________________________________________ Date:  ____________________________________________________________  
  
Corrineis Derick Provider Signature:  ____________________________________________________________ Date:  ____________________________________________________________

## 2018-10-08 NOTE — DISCHARGE INSTRUCTIONS
Cardiology Discharge Instructions                Patient ID:  Liz Sosa  [id number]  16 y.o.  1940    Admit Date: 10/8/2018    Discharge Date: 10/8/2018   Physician:  Rachel Estrada MD    Cardiology Procedures this Admission:  1. Successful electrical cardioversion terminating atrial tachycardia and restoring AV pacing      Disposition: home with the company of a     Patient Instructions:  No driving, operating heavy machinery, or signing legal documents today. Do NOT drink or eat HOT items for 4 hours after the AMANDEEP. Cool or warm is OK. FOLLOW-UP:  Call the office [phone] for any questions. Keep your routine appointments in the office otherwise. [address] [facility], [address]    [phone]  [place], [address] [facility]    [email or web address]    Thank you for placing your trust for your heart with the physicians and staff of [facility]. We have long provided [place] with the most advanced cardiovascular care possible using a personalized and caring approach. And we hope to continue this strong tradition well into the future. A heart condition, or the fear of having a heart condition, can be a stressful time for a patient and their family. There are appointments, testing procedures and unfamiliar terms that can cause natural questions and concerns. While we encourage you to speak with your nurse or physician regarding any questions you might have, please consider this web site as a powerful resource you can use at any time. Often, questions or concerns only arise once you've left our office. There are many useful sections on this web site and links to other professional organizations and advocacy groups. These sources can help answer many questions you might have from the comfort of your own home or office. Again, thank you for considering [facility] as your trusted provider of heart care. Please don't hesitate to let us know if there is anything we can do to enhance your experience with us. Signed:  Beverley Carolina MD  10/8/2018

## 2018-12-14 ENCOUNTER — APPOINTMENT (OUTPATIENT)
Dept: GENERAL RADIOLOGY | Age: 78
End: 2018-12-14
Attending: EMERGENCY MEDICINE
Payer: MEDICARE

## 2018-12-14 ENCOUNTER — APPOINTMENT (OUTPATIENT)
Dept: CT IMAGING | Age: 78
End: 2018-12-14
Attending: EMERGENCY MEDICINE
Payer: MEDICARE

## 2018-12-14 ENCOUNTER — HOSPITAL ENCOUNTER (EMERGENCY)
Age: 78
Discharge: HOME | End: 2018-12-14
Attending: EMERGENCY MEDICINE | Admitting: EMERGENCY MEDICINE
Payer: MEDICARE

## 2018-12-14 VITALS
OXYGEN SATURATION: 93 % | BODY MASS INDEX: 24.8 KG/M2 | SYSTOLIC BLOOD PRESSURE: 161 MMHG | HEART RATE: 70 BPM | TEMPERATURE: 97.9 F | DIASTOLIC BLOOD PRESSURE: 61 MMHG | HEIGHT: 59 IN | RESPIRATION RATE: 15 BRPM | WEIGHT: 123 LBS

## 2018-12-14 DIAGNOSIS — M25.561 ARTHRALGIA OF RIGHT LOWER LEG: Primary | ICD-10-CM

## 2018-12-14 LAB
ALBUMIN SERPL-MCNC: 3.2 G/DL (ref 3.5–5)
ALBUMIN/GLOB SERPL: 0.7 {RATIO} (ref 1.1–2.2)
ALP SERPL-CCNC: 432 U/L (ref 45–117)
ALT SERPL-CCNC: 19 U/L (ref 12–78)
ANION GAP SERPL CALC-SCNC: 7 MMOL/L (ref 5–15)
AST SERPL-CCNC: 21 U/L (ref 15–37)
BASOPHILS # BLD: 0.1 K/UL (ref 0–0.1)
BASOPHILS NFR BLD: 1 % (ref 0–1)
BILIRUB SERPL-MCNC: 0.6 MG/DL (ref 0.2–1)
BNP SERPL-MCNC: 5565 PG/ML (ref 0–450)
BUN SERPL-MCNC: 34 MG/DL (ref 6–20)
BUN/CREAT SERPL: 26 (ref 12–20)
CALCIUM SERPL-MCNC: 9.4 MG/DL (ref 8.5–10.1)
CHLORIDE SERPL-SCNC: 103 MMOL/L (ref 97–108)
CO2 SERPL-SCNC: 28 MMOL/L (ref 21–32)
CREAT SERPL-MCNC: 1.31 MG/DL (ref 0.55–1.02)
DIFFERENTIAL METHOD BLD: ABNORMAL
EOSINOPHIL # BLD: 0.1 K/UL (ref 0–0.4)
EOSINOPHIL NFR BLD: 1 % (ref 0–7)
ERYTHROCYTE [DISTWIDTH] IN BLOOD BY AUTOMATED COUNT: 12.9 % (ref 11.5–14.5)
GLOBULIN SER CALC-MCNC: 4.7 G/DL (ref 2–4)
GLUCOSE SERPL-MCNC: 160 MG/DL (ref 65–100)
HCT VFR BLD AUTO: 32.5 % (ref 35–47)
HGB BLD-MCNC: 10 G/DL (ref 11.5–16)
IMM GRANULOCYTES # BLD: 0 K/UL (ref 0–0.04)
IMM GRANULOCYTES NFR BLD AUTO: 0 % (ref 0–0.5)
INR PPP: 1.2 (ref 0.9–1.1)
LYMPHOCYTES # BLD: 0.8 K/UL (ref 0.8–3.5)
LYMPHOCYTES NFR BLD: 11 % (ref 12–49)
MCH RBC QN AUTO: 29.3 PG (ref 26–34)
MCHC RBC AUTO-ENTMCNC: 30.8 G/DL (ref 30–36.5)
MCV RBC AUTO: 95.3 FL (ref 80–99)
MONOCYTES # BLD: 0.5 K/UL (ref 0–1)
MONOCYTES NFR BLD: 7 % (ref 5–13)
NEUTS SEG # BLD: 5.8 K/UL (ref 1.8–8)
NEUTS SEG NFR BLD: 80 % (ref 32–75)
NRBC # BLD: 0 K/UL (ref 0–0.01)
NRBC BLD-RTO: 0 PER 100 WBC
PLATELET # BLD AUTO: 316 K/UL (ref 150–400)
PMV BLD AUTO: 9.5 FL (ref 8.9–12.9)
POTASSIUM SERPL-SCNC: 4.5 MMOL/L (ref 3.5–5.1)
PROT SERPL-MCNC: 7.9 G/DL (ref 6.4–8.2)
PROTHROMBIN TIME: 11.9 SEC (ref 9–11.1)
RBC # BLD AUTO: 3.41 M/UL (ref 3.8–5.2)
RBC MORPH BLD: ABNORMAL
SODIUM SERPL-SCNC: 138 MMOL/L (ref 136–145)
WBC # BLD AUTO: 7.3 K/UL (ref 3.6–11)

## 2018-12-14 PROCEDURE — 77030038269 HC DRN EXT URIN PURWCK BARD -A

## 2018-12-14 PROCEDURE — 96361 HYDRATE IV INFUSION ADD-ON: CPT

## 2018-12-14 PROCEDURE — 74011636320 HC RX REV CODE- 636/320: Performed by: EMERGENCY MEDICINE

## 2018-12-14 PROCEDURE — 71045 X-RAY EXAM CHEST 1 VIEW: CPT

## 2018-12-14 PROCEDURE — 36415 COLL VENOUS BLD VENIPUNCTURE: CPT

## 2018-12-14 PROCEDURE — 80053 COMPREHEN METABOLIC PANEL: CPT

## 2018-12-14 PROCEDURE — 74011250636 HC RX REV CODE- 250/636: Performed by: EMERGENCY MEDICINE

## 2018-12-14 PROCEDURE — 96375 TX/PRO/DX INJ NEW DRUG ADDON: CPT

## 2018-12-14 PROCEDURE — 75635 CT ANGIO ABDOMINAL ARTERIES: CPT

## 2018-12-14 PROCEDURE — 85610 PROTHROMBIN TIME: CPT

## 2018-12-14 PROCEDURE — 96374 THER/PROPH/DIAG INJ IV PUSH: CPT

## 2018-12-14 PROCEDURE — 83880 ASSAY OF NATRIURETIC PEPTIDE: CPT

## 2018-12-14 PROCEDURE — 99285 EMERGENCY DEPT VISIT HI MDM: CPT

## 2018-12-14 PROCEDURE — 85025 COMPLETE CBC W/AUTO DIFF WBC: CPT

## 2018-12-14 RX ORDER — HYDROCODONE BITARTRATE AND ACETAMINOPHEN 5; 325 MG/1; MG/1
1 TABLET ORAL
Qty: 10 TAB | Refills: 0 | Status: SHIPPED | OUTPATIENT
Start: 2018-12-14 | End: 2020-09-03

## 2018-12-14 RX ORDER — SODIUM CHLORIDE 0.9 % (FLUSH) 0.9 %
10 SYRINGE (ML) INJECTION
Status: COMPLETED | OUTPATIENT
Start: 2018-12-14 | End: 2018-12-14

## 2018-12-14 RX ORDER — MORPHINE SULFATE 10 MG/ML
2 INJECTION, SOLUTION INTRAMUSCULAR; INTRAVENOUS ONCE
Status: COMPLETED | OUTPATIENT
Start: 2018-12-14 | End: 2018-12-14

## 2018-12-14 RX ORDER — ONDANSETRON 2 MG/ML
4 INJECTION INTRAMUSCULAR; INTRAVENOUS
Status: COMPLETED | OUTPATIENT
Start: 2018-12-14 | End: 2018-12-14

## 2018-12-14 RX ORDER — SODIUM CHLORIDE 9 MG/ML
50 INJECTION, SOLUTION INTRAVENOUS
Status: COMPLETED | OUTPATIENT
Start: 2018-12-14 | End: 2018-12-14

## 2018-12-14 RX ADMIN — SODIUM CHLORIDE 500 ML: 900 INJECTION, SOLUTION INTRAVENOUS at 18:22

## 2018-12-14 RX ADMIN — MORPHINE SULFATE 2 MG: 10 INJECTION INTRAVENOUS at 16:53

## 2018-12-14 RX ADMIN — Medication 10 ML: at 19:42

## 2018-12-14 RX ADMIN — ONDANSETRON 4 MG: 2 INJECTION INTRAMUSCULAR; INTRAVENOUS at 16:52

## 2018-12-14 RX ADMIN — IOPAMIDOL 100 ML: 755 INJECTION, SOLUTION INTRAVENOUS at 19:42

## 2018-12-14 RX ADMIN — SODIUM CHLORIDE 50 ML/HR: 900 INJECTION, SOLUTION INTRAVENOUS at 19:42

## 2018-12-14 NOTE — ED PROVIDER NOTES
EMERGENCY DEPARTMENT HISTORY AND PHYSICAL EXAM      Date: 12/14/2018  Patient Name: Sage Crow     PROVIDER IN TRIAGE NOTE:  4:01 PM  Sage Saenz PA-C has evaluated the patient as the Provider in Triage (PIT) for continued right leg pain and swelling. She notes that she a procedure on 11/27 at [facility] by Dr. Jalen Jones. She was in excruciating pain after the surgery and instead of being outpatient she was admitted after the procedure. She then returned to the ED on 12/4 for continued pain and was admitted until 12/6. Pt notes continued pain and came to ED [facility] for a second opinion. She has not contacted her surgeon. The vital signs and the triage nurse assessment have been reviewed. The patient and any available family have been advised that the appropriate studies have been ordered to initiate the work up based on the clinical presentation during the assessment. The pt has been advised that they will be accommodated in non-monitored ED as soon as possible. The pt has been requested to contact the triage nurse or PIT immediately if they experiences any changes in their condition during this brief waiting period. History of Presenting Illness     Chief Complaint   Patient presents with    Leg Pain     Right leg pain following vascular surgery on 11/23. Pain is increasing and pt reports she is unable to move without pain. Pt assisted out of car and arrives via wheelchair. History Provided By: Patient and patient's family    HPI: Sage Crow, [de-identified] y.o. female s/p angioplasty to the LE with PMHx significant for CAD (CABG, pacemaker), HTN, DM, COPD, chronic pain, arthritis, GERD, high cholesterol, A-Fib, presents after Iliac stent placement 4th to the ED with cc of continued, moderate post operative RLE pain. Pt states she was admitted back to hospital and had ABIs in leg that showed everything was patent and seemed to be fine.  She states she followed up with vascular, who told her it was nerve pain, and was told to treat her pain with amoxin and motrin. Daughter said this was causing the patient to act \"out of her head\", and stopped giving the patient these meds a few days ago, and per PCP started giving tramadol which has not been helping the patient's pain. Family reports the patient is more confused, she is getting more SOB with walking around and exerting herself. She states she is not walking due to LE pain, she is also not eating and drinking very well. Pt reports LE swelling, which is usually R > L and is unchanged from baseline. Pt denies fever, chills, numbness/weakness/tingling. There are no other complaints, changes, or physical findings at this time. PCP: Sharad Rojas NP    Current Outpatient Medications   Medication Sig Dispense Refill    HYDROcodone-acetaminophen (NORCO) 5-325 mg per tablet Take 1 Tab by mouth every four (4) hours as needed for Pain. Max Daily Amount: 6 Tabs. 10 Tab 0    traMADol (ULTRAM) 50 mg tablet Take 50 mg by mouth two (2) times a day.  aspirin delayed-release 81 mg tablet Take 81 mg by mouth daily.  clopidogrel (PLAVIX) 75 mg tab Take 75 mg by mouth daily.  spironolactone (ALDACTONE) 25 mg tablet Take 25 mg by mouth every other day.  guaiFENesin ER (MUCINEX) 600 mg ER tablet Take 1 Tab by mouth every twelve (12) hours. 30 Tab 0    amLODIPine (NORVASC) 5 mg tablet Take 10 mg by mouth daily.  insulin aspart protamine/insulin aspart (NOVOLOG MIX 70-30 U-100 INSULN) 100 unit/mL (70-30) injection 13 Units by SubCUTAneous route daily. Patient takes 13 units in the AM and 3 units QHS      insulin aspart protamine/insulin aspart (NOVOLOG MIX 70-30 U-100 INSULN) 100 unit/mL (70-30) injection 3 Units by SubCUTAneous route nightly. Patient takes 13 units in the AM and 3 units QHS      furosemide (LASIX) 40 mg tablet Take 1 Tab by mouth daily.  30 Tab 0    gabapentin (NEURONTIN) 300 mg capsule Take 300 mg by mouth two (2) times a day.      polyethylene glycol (MIRALAX) 17 gram packet Take 17 g by mouth daily. Indications: constipation      ondansetron hcl (ZOFRAN, AS HYDROCHLORIDE,) 8 mg tablet Take 1 Tab by mouth every eight (8) hours as needed for Nausea. 20 Tab 0    metoprolol-XL (TOPROL-XL) 100 mg XL tablet Take 100 mg by mouth daily. Past History     Past Medical History:  Past Medical History:   Diagnosis Date    Arrhythmia     atrial fibrillation 2018    Arthritis     CAD (coronary artery disease)     CABG, pacemaker    Chronic pain     COPD     Diabetes ([address]     GERD (gastroesophageal reflux disease)     Hypertension     Ill-defined condition     high cholesterol       Past Surgical History:  Past Surgical History:   Procedure Laterality Date    CARDIAC SURG PROCEDURE UNLIST      3 CABG, Stents    HX PACEMAKER      July 2011    MI COLONOSCOPY FLX DX W/COLLJ SPEC WHEN PFRMD  11/10/2011         MI EGD TRANSORAL BIOPSY SINGLE/MULTIPLE  8/29/2012            Family History:  Family History   Problem Relation Age of Onset    Lung Disease Mother     Heart Disease Father     Hypertension Father     Diabetes Sister        Social History:  Social History     Tobacco Use    Smoking status: Never Smoker    Smokeless tobacco: Never Used   Substance Use Topics    Alcohol use: No    Drug use: No       Allergies: Allergies   Allergen Reactions    Percocet [Oxycodone-Acetaminophen] Nausea and Vomiting         Review of Systems   Review of Systems   Constitutional: Negative for activity change, appetite change, chills, fever and unexpected weight change. HENT: Negative for congestion. Eyes: Negative for pain and visual disturbance. Respiratory: Positive for shortness of breath. Negative for cough. Cardiovascular: Negative for chest pain and leg swelling (baseline). Gastrointestinal: Negative for abdominal pain, diarrhea, nausea and vomiting. Genitourinary: Negative for dysuria.    Musculoskeletal: Negative for back pain. Reports RLE pain   Skin: Negative for rash. Neurological: Negative for weakness, numbness and headaches. Denies tingling   Psychiatric/Behavioral: Positive for confusion. Physical Exam   Physical Exam   Constitutional: She is oriented to person, place, and time. She appears well-developed and well-nourished. Elderly female in mild to moderate distress due to pain. HENT:   Head: Normocephalic and atraumatic. Mouth/Throat: Oropharynx is clear and moist.   Eyes: Conjunctivae and EOM are normal. Pupils are equal, round, and reactive to light. Right eye exhibits no discharge. Left eye exhibits no discharge. Neck: Normal range of motion. Neck supple. Cardiovascular: Normal rate, regular rhythm and normal heart sounds. No murmur heard. Has decreased palpable groin pulses bilaterally with a palpable stent in the left groin. Pulmonary/Chest: Effort normal and breath sounds normal. No respiratory distress. She has no wheezes. She has no rales. No rales no rhonchi or wheezes. Abdominal: Soft. Bowel sounds are normal. She exhibits no distension. There is no tenderness. Musculoskeletal: Normal range of motion. Right LE is 2+ edema with skin changes consistent with chronic PVD   Neurological: She is alert and oriented to person, place, and time. No cranial nerve deficit. She exhibits normal muscle tone. Skin: Skin is warm and dry. No rash noted. She is not diaphoretic. Nursing note and vitals reviewed.       Diagnostic Study Results     Labs -     Recent Results (from the past 12 hour(s))   CBC WITH AUTOMATED DIFF    Collection Time: 12/14/18  4:36 PM   Result Value Ref Range    WBC 7.3 3.6 - 11.0 K/uL    RBC 3.41 (L) 3.80 - 5.20 M/uL    HGB 10.0 (L) 11.5 - 16.0 g/dL    HCT 32.5 (L) 35.0 - 47.0 %    MCV 95.3 80.0 - 99.0 FL    MCH 29.3 26.0 - 34.0 PG    MCHC 30.8 30.0 - 36.5 g/dL    RDW 12.9 11.5 - 14.5 %    PLATELET 488 770 - 980 K/uL    MPV 9.5 8.9 - 12.9 FL    NRBC 0.0 0  WBC    ABSOLUTE NRBC 0.00 0.00 - 0.01 K/uL    NEUTROPHILS 80 (H) 32 - 75 %    LYMPHOCYTES 11 (L) 12 - 49 %    MONOCYTES 7 5 - 13 %    EOSINOPHILS 1 0 - 7 %    BASOPHILS 1 0 - 1 %    IMMATURE GRANULOCYTES 0 0.0 - 0.5 %    ABS. NEUTROPHILS 5.8 1.8 - 8.0 K/UL    ABS. LYMPHOCYTES 0.8 0.8 - 3.5 K/UL    ABS. MONOCYTES 0.5 0.0 - 1.0 K/UL    ABS. EOSINOPHILS 0.1 0.0 - 0.4 K/UL    ABS. BASOPHILS 0.1 0.0 - 0.1 K/UL    ABS. IMM. GRANS. 0.0 0.00 - 0.04 K/UL    DF AUTOMATED      RBC COMMENTS POLYCHROMASIA  PRESENT       PROTHROMBIN TIME + INR    Collection Time: 12/14/18  4:36 PM   Result Value Ref Range    INR 1.2 (H) 0.9 - 1.1      Prothrombin time 11.9 (H) 9.0 - 00.7 sec   METABOLIC PANEL, COMPREHENSIVE    Collection Time: 12/14/18  4:36 PM   Result Value Ref Range    Sodium 138 136 - 145 mmol/L    Potassium 4.5 3.5 - 5.1 mmol/L    Chloride 103 97 - 108 mmol/L    CO2 28 21 - 32 mmol/L    Anion gap 7 5 - 15 mmol/L    Glucose 160 (H) 65 - 100 mg/dL    BUN 34 (H) 6 - 20 MG/DL    Creatinine 1.31 (H) 0.55 - 1.02 MG/DL    BUN/Creatinine ratio 26 (H) 12 - 20      GFR est AA 47 (L) >60 ml/min/1.73m2    GFR est non-AA 39 (L) >60 ml/min/1.73m2    Calcium 9.4 8.5 - 10.1 MG/DL    Bilirubin, total 0.6 0.2 - 1.0 MG/DL    ALT (SGPT) 19 12 - 78 U/L    AST (SGOT) 21 15 - 37 U/L    Alk. phosphatase 432 (H) 45 - 117 U/L    Protein, total 7.9 6.4 - 8.2 g/dL    Albumin 3.2 (L) 3.5 - 5.0 g/dL    Globulin 4.7 (H) 2.0 - 4.0 g/dL    A-G Ratio 0.7 (L) 1.1 - 2.2     NT-PRO BNP    Collection Time: 12/14/18  4:36 PM   Result Value Ref Range    NT pro-BNP 5,565 (H) 0 - 450 PG/ML       Radiologic Studies -   CTA ABD ART W RUNOFF W WO CONT         XR CHEST PORT   Final Result   IMPRESSION:      No acute process on portable chest. No change.            CT Results  (Last 48 hours)               12/14/18 1952  CTA ABD ART W RUNOFF W WO CONT Preliminary result    Narrative:  **PRELIMINARY REPORT**       Small right and trace left pleural effusions. Right lower lobe atelectasis more   likely than pneumonia. Cardiomegaly. No pericardial effusion. No acute process   in the abdomen or pelvis. Aorta is atherosclerotic without aneurysm. No   mesenteric arterial occlusion. Bilateral common and external iliac arteries have atherosclerosis without   flow-limiting stenosis. Heterogeneous atherosclerosis causes intermittent   stenosis in the right femoral and popliteal arteries. Right leg arteries are not   well evaluated due to motion. Heterogeneous atherosclerosis causes multi level severe stenosis in the left   femoral artery and popliteal artery. Limited contrast in the left leg arteries. Right lower extremity soft tissue swelling. Fluid deep to the soleus muscle is   nonspecific. No drainable fluid collection. Bones are osteopenic. No evidence of   osteomyelitis. Preliminary report was provided by Dr. Mortimer Oman, the on-call radiologist, at 2010   hours       Final report by vascular imaging specialist to follow. *END PRELIMINARY REPORT*                                   CXR Results  (Last 48 hours)               12/14/18 1730  XR CHEST PORT Final result    Impression:  IMPRESSION:       No acute process on portable chest. No change. Narrative:  EXAM: XR CHEST PORT       INDICATION: Severe right lower extremity pain. Right lower extremity vascular   surgery on 11/23/2018. COMPARISON: Portable chest on 2/22/2018       TECHNIQUE: Upright portable chest AP view       FINDINGS: Pacemaker battery pack and leads are unchanged. Cardiac monitoring   wires overlie the thorax. Sternotomy wires and mediastinal clips are unchanged. Cardiomegaly is unchanged. The pulmonary vasculature is within normal limits. Lung volumes are low. No evidence of pneumonia or pneumothorax. No definite   pulmonary edema. Osteopenia is unchanged.                    Medical Decision Making   I am the first provider for this patient. I reviewed the vital signs, available nursing notes, past medical history, past surgical history, family history and social history. Vital Signs-Reviewed the patient's vital signs. Patient Vitals for the past 12 hrs:   Temp Pulse Resp BP SpO2   12/14/18 1621  70 19  97 %   12/14/18 1614  75 22 155/58 (!) 86 %   12/14/18 1548 97.9 °F (36.6 °C) 69 18 169/66 95 %       Pulse Oximetry Analysis - 97% on RA    Cardiac Monitor:   Rate: 70 bpm  Rhythm: Normal Sinus Rhythm      Records Reviewed: Nursing Notes, Old Medical Records, Previous Radiology Studies and Previous Laboratory Studies    Provider Notes (Medical Decision Making):   Elderly female, increased pain after angioplasty, prior [facility] records reviewed extensively, unable to find CT angiogram. Discussed with family and they do not feel the study was done, that is what initiated the evaluation here. When pt placed on monitor 86%, family admits to O2 requirement increasing for the past few days. They have been giving her O2 more frequently than normal. Pain appears comfortable but complains of severe pain. Low suspicion for fistula and arterial insufficiency. ED Course:   Initial assessment performed. The patients presenting problems have been discussed, and they are in agreement with the care plan formulated and outlined with them. I have encouraged them to ask questions as they arise throughout their visit. Medications   morphine 10 mg/ml injection 2 mg (2 mg IntraVENous Given 12/14/18 1653)   ondansetron (ZOFRAN) injection 4 mg (4 mg IntraVENous Given 12/14/18 1652)   0.9% sodium chloride infusion (0 mL/hr IntraVENous IV Completed 12/14/18 1951)   iopamidol (ISOVUE-370) 76 % injection 100 mL (100 mL IntraVENous Given 12/14/18 1942)   sodium chloride (NS) flush 10 mL (10 mL IntraVENous Given 12/14/18 1942)   sodium chloride 0.9 % bolus infusion 500 mL (0 mL IntraVENous IV Completed 12/14/18 1924)     21:00 Appears more comfortable. Reviewed available results with family. Radiologist requests hydration after IV contrast load. Discussed with family to increase her lasix for the next three days given pleural effusion. RX given for pain control. Request they follow up with PCP Monday. Critical Care Time:   0 minutes    Disposition:  Discharge Note:  2112  The pt is ready for discharge. The pt's signs, symptoms, diagnosis, and discharge instructions have been discussed and pt has conveyed their understanding. The pt is to follow up as recommended or return to ER should their symptoms worsen. Plan has been discussed and pt is in agreement. PLAN:  1. Current Discharge Medication List      START taking these medications    Details   HYDROcodone-acetaminophen (NORCO) 5-325 mg per tablet Take 1 Tab by mouth every four (4) hours as needed for Pain. Max Daily Amount: 6 Tabs. Qty: 10 Tab, Refills: 0    Associated Diagnoses: Arthralgia of right lower leg           2. Follow-up Information     Follow up With Specialties Details Why Contact Info    Andre Arzola NP Family Practice  for recheck Monday [address] [facility]       [facility] EMERGENCY DEPT Emergency Medicine  If symptoms worsen [address] [place], [place] [id number] [facility]  [phone]        Return to ED if worse     Diagnosis     Clinical Impression:   1. Arthralgia of right lower leg        Attestations: This note is prepared by Ilya Dennis. Alvaro Beyer, acting as Scribe for Irving Schultz MD.    Irving Schultz MD: The scribe's documentation has been prepared under my direction and personally reviewed by me in its entirety. I confirm that the note above accurately reflects all work, treatment, procedures, and medical decision making performed by me. This note will not be viewable in [address]

## 2018-12-15 NOTE — ED NOTES
Pt discharge with discharge instructions from MD Richy. Pt given opportunities to ask questions. Pt expressed no more needs at the time of discharge. Pt discharged via wheelchair with sister. Pt awaiting ride home.

## 2018-12-15 NOTE — ED NOTES
Bedside and Verbal shift change report given to Vahe Osullivan RN (oncoming nurse) by Eduardo Guajardo RN (offgoing nurse). Report included the following information SBAR, Kardex, ED Summary, MAR, Accordion and Recent Results.

## 2018-12-15 NOTE — ED NOTES
RN changed linen on pt's bed and repositioned patient. No further needs expressed at this time. Sister at the bedside.

## 2018-12-15 NOTE — ED NOTES
Discussed pt's IV being infiltrated. MD Kaylin ordered for patient to have a water in hospital bucket instead.

## 2019-02-14 NOTE — PROGRESS NOTES
Occupational Therapy  Attempted to see patient this morning for OT evaluation, but she is getting ready to be discharged. Her daughter is ready and they are anticipating leaving soon. She has been set up for home health OT.   Will defer OT needs to home health therapist. Patient Education     How a Hernia Develops  Although a hernia bulge may appear suddenly, hernias often take years to develop. They grow larger as pressure inside the body presses the intestines or other tissues out through a weak area in the abdominal wall, often at the belly button, or a site of previous surgery. With time, these tissues can bulge out beneath the skin.  Stages of hernia development       The wall weakens or tears: The abdominal lining bulges out through a weak area and begins to form a hernia sac. The sac may contain fat, intestine, or other tissues. At this point, the hernia may or may not cause a visible bulge.        The intestine pushes into the sac: As the intestine pushes further into the sac, it forms a visible bulge. The bulge may flatten when you lie down or push against it. This is called a reducible hernia and does not cause any immediate danger.             The intestine may become trapped: The sac containing the intestine may become trapped by muscle (incarcerated). If this happens, you won’t be able to flatten the bulge. You may also have pain. Prompt treatment is needed.        The intestine may become strangulated: If the intestine is tightly trapped, it becomes strangulated. The strangulated area loses blood supply and may die. This can cause severe pain and block the intestine. Emergency surgery is needed.   © 6293-4951 [employer]. [address] [place] [address], [place] [address]. All rights reserved. This information is not intended as a substitute for professional medical care. Always follow your healthcare professional's instructions.

## 2020-08-20 ENCOUNTER — OFFICE VISIT (OUTPATIENT)
Dept: URGENT CARE | Age: 80
End: 2020-08-20
Payer: MEDICARE

## 2020-08-20 VITALS — TEMPERATURE: 98.5 F | OXYGEN SATURATION: 95 % | HEART RATE: 80 BPM | RESPIRATION RATE: 14 BRPM

## 2020-08-20 DIAGNOSIS — Z20.822 CLOSE EXPOSURE TO 2019 NOVEL CORONAVIRUS: Primary | ICD-10-CM

## 2020-08-20 PROCEDURE — G8421 BMI NOT CALCULATED: HCPCS | Performed by: FAMILY MEDICINE

## 2020-08-20 PROCEDURE — 1090F PRES/ABSN URINE INCON ASSESS: CPT | Performed by: FAMILY MEDICINE

## 2020-08-20 PROCEDURE — 1101F PT FALLS ASSESS-DOCD LE1/YR: CPT | Performed by: FAMILY MEDICINE

## 2020-08-20 PROCEDURE — 99203 OFFICE O/P NEW LOW 30 MIN: CPT | Performed by: FAMILY MEDICINE

## 2020-08-20 PROCEDURE — G8427 DOCREV CUR MEDS BY ELIG CLIN: HCPCS | Performed by: FAMILY MEDICINE

## 2020-08-20 PROCEDURE — G8399 PT W/DXA RESULTS DOCUMENT: HCPCS | Performed by: FAMILY MEDICINE

## 2020-08-20 PROCEDURE — G8536 NO DOC ELDER MAL SCRN: HCPCS | Performed by: FAMILY MEDICINE

## 2020-08-20 PROCEDURE — G8432 DEP SCR NOT DOC, RNG: HCPCS | Performed by: FAMILY MEDICINE

## 2020-08-20 RX ORDER — DILTIAZEM HYDROCHLORIDE 180 MG/1
180 CAPSULE, EXTENDED RELEASE ORAL DAILY
COMMUNITY
Start: 2019-03-25

## 2020-08-20 RX ORDER — CITALOPRAM 10 MG/1
20 TABLET ORAL DAILY
COMMUNITY
End: 2021-04-27 | Stop reason: CLARIF

## 2020-08-20 NOTE — LETTER
August 20, 2020 [address] [facility] [address] Dear Daisha Graham: 
Thank you for requesting access to [facility]. Please follow the instructions below to securely access and download your online medical record. [facility] allows you to send messages to your doctor, view your test results, renew your prescriptions, schedule appointments, and more. How Do I Sign Up? 1. In your internet browser, go to [email or web address]. 2. Click on the First Time User? Click Here link in the Sign In box. You will see the New Member Sign Up page. 3. Enter your [facility] Access Code exactly as it appears below. You will not need to use this code after youve completed the sign-up process. If you do not sign up before the expiration date, you must request a new code. [employer] Access Code: [email or web address] Expires: 10/4/2020  5:57 PM  
 
4. Enter the last four digits of your Social Security Number (xxxx) and Date of Birth (mm/dd/yyyy) as indicated and click Submit. You will be taken to the next sign-up page. 5. Create a [facility] ID. This will be your [employer] login ID and cannot be changed, so think of one that is secure and easy to remember. 6. Create a [employer] password. You can change your password at any time. 7. Enter your Password Reset Question and Answer. This can be used at a later time if you forget your password. 8. Enter your e-mail address. You will receive e-mail notification when new information is available in [address] 9. Click Sign Up. You can now view and download portions of your medical record. 10. Click the Download Summary menu link to download a portable copy of your medical information. Additional Information If you have questions, please visit the Frequently Asked Questions section of the [employer] website at [email or web address] Remember, [employer] is NOT to be used for urgent needs. For medical emergencies, dial 911. Now available from your iPhone and Android! Sincerely, [employer]

## 2020-08-20 NOTE — PROGRESS NOTES
This patient was seen in [facility] at [address] [place] [address] Urgent Care while in their vehicle due to COVID-19 pandemic with PPE and focused examination in order to decrease community viral transmission. The patient/guardian gave verbal consent to treat. Michael Fuentes is a 78 y.o. female who presents for COVID-19 testing. Was exposed to COVID-19 by a relative. Denies cough, fever, SOB. PMH: afib, CAD, DM, COPD, HTN, HLD. Non-smoker. The history is provided by the patient.         Past Medical History:   Diagnosis Date    Arrhythmia     atrial fibrillation 2018    Arthritis     CAD (coronary artery disease)     CABG, pacemaker    Chronic pain     COPD     Diabetes ([address]     GERD (gastroesophageal reflux disease)     Hypertension     Ill-defined condition     high cholesterol        Past Surgical History:   Procedure Laterality Date    CARDIAC SURG PROCEDURE UNLIST      3 CABG, Stents    HX PACEMAKER      July 2011    OR COLONOSCOPY FLX DX W/COLLJ SPEC WHEN PFRMD  11/10/2011         OR EGD TRANSORAL BIOPSY SINGLE/MULTIPLE  8/29/2012              Family History   Problem Relation Age of Onset    Lung Disease Mother     Heart Disease Father     Hypertension Father     Diabetes Sister         Social History     Socioeconomic History    Marital status:      Spouse name: Not on file    Number of children: Not on file    Years of education: Not on file    Highest education level: Not on file   Occupational History    Not on file   Social Needs    Financial resource strain: Not on file    Food insecurity     Worry: Not on file     Inability: Not on file    Transportation needs     Medical: Not on file     Non-medical: Not on file   Tobacco Use    Smoking status: Never Smoker    Smokeless tobacco: Never Used   Substance and Sexual Activity    Alcohol use: No    Drug use: No    Sexual activity: Not on file   Lifestyle    Physical activity     Days per week: Not on file     Minutes per session: Not on file    Stress: Not on file   Relationships    Social connections     Talks on phone: Not on file     Gets together: Not on file     Attends [employer] service: Not on file     Active member of club or organization: Not on file     Attends meetings of clubs or organizations: Not on file     Relationship status: Not on file    Intimate partner violence     Fear of current or ex partner: Not on file     Emotionally abused: Not on file     Physically abused: Not on file     Forced sexual activity: Not on file   Other Topics Concern    Not on file   Social History Narrative    Not on file                ALLERGIES: Percocet [oxycodone-acetaminophen]    Review of Systems   Constitutional: Negative for activity change, appetite change, chills and fever. HENT: Negative for congestion, rhinorrhea and sore throat. Respiratory: Negative for cough, shortness of breath and wheezing. Cardiovascular: Negative for chest pain. Gastrointestinal: Negative for abdominal pain, diarrhea, nausea and vomiting. Musculoskeletal: Negative for myalgias. Neurological: Negative for headaches. Vitals:    08/20/20 1732   Pulse: 80   Resp: 14   Temp: 98.5 °F (36.9 °C)   SpO2: 95%       Physical Exam  Vitals signs and nursing note reviewed. Constitutional:       General: She is not in acute distress. Appearance: She is well-developed. She is not diaphoretic. Pulmonary:      Effort: Pulmonary effort is normal. No respiratory distress. Breath sounds: Normal breath sounds. No stridor. No wheezing, rhonchi or rales. Neurological:      Mental Status: She is alert. Psychiatric:         Behavior: Behavior normal.         Thought Content:  Thought content normal.         Judgment: Judgment normal.         MDM    ICD-10-CM ICD-9-CM   1. Close exposure to 2019 novel coronavirus  Z20.828 V01.79       Orders Placed This Encounter    NOVEL CORONAVIRUS (COVID-19)     Scheduling Instructions:      1) Due to current limited availability of the COVID-19 PCR test, tests will be prioritized and may not be completed.              2) Order only if the test result will change clinical management or necessary for a return to mission-critical employment decision.              3) Print and instruct patient to adhere to CDC home isolation program. (Link Above)              4) Set up or refer patient for a monitoring program.              5) Have patient sign up for and leverage MyChart (if not previously done). Order Specific Question:   Is this test for diagnosis or screening? Answer:   Screening     Order Specific Question:   Symptomatic for COVID-19 as defined by CDC? Answer:   No     Order Specific Question:   Hospitalized for COVID-19? Answer:   No     Order Specific Question:   Admitted to ICU for COVID-19? Answer:   No     Order Specific Question:   Employed in healthcare setting? Answer:   No     Order Specific Question:   Resident in a congregate (group) care setting? Answer:   No     Order Specific Question:   Pregnant? Answer:   No     Order Specific Question:   Previously tested for COVID-19? Answer:   No        Self Quarantine x 10 days  Deep breathing exercises  Tylenol prn  Increase fluids    If signs and symptoms become worse the pt is to go to the ER.          Procedures

## 2020-08-22 LAB — SARS-COV-2, NAA: NOT DETECTED

## 2020-09-01 ENCOUNTER — APPOINTMENT (OUTPATIENT)
Dept: GENERAL RADIOLOGY | Age: 80
DRG: 194 | End: 2020-09-01
Attending: EMERGENCY MEDICINE
Payer: MEDICARE

## 2020-09-01 ENCOUNTER — APPOINTMENT (OUTPATIENT)
Dept: CT IMAGING | Age: 80
DRG: 194 | End: 2020-09-01
Attending: EMERGENCY MEDICINE
Payer: MEDICARE

## 2020-09-01 ENCOUNTER — HOSPITAL ENCOUNTER (INPATIENT)
Age: 80
LOS: 2 days | Discharge: HOME | DRG: 194 | End: 2020-09-03
Attending: EMERGENCY MEDICINE | Admitting: FAMILY MEDICINE
Payer: MEDICARE

## 2020-09-01 DIAGNOSIS — E11.9 TYPE 2 DIABETES MELLITUS WITHOUT COMPLICATION, UNSPECIFIED WHETHER LONG TERM INSULIN USE (HCC): ICD-10-CM

## 2020-09-01 DIAGNOSIS — E86.0 DEHYDRATION: ICD-10-CM

## 2020-09-01 DIAGNOSIS — J18.9 PNEUMONIA OF RIGHT LOWER LOBE DUE TO INFECTIOUS ORGANISM: Primary | ICD-10-CM

## 2020-09-01 DIAGNOSIS — Z95.1 S/P CABG (CORONARY ARTERY BYPASS GRAFT): Chronic | ICD-10-CM

## 2020-09-01 LAB
ALBUMIN SERPL-MCNC: 3.6 G/DL (ref 3.4–5)
ALBUMIN/GLOB SERPL: 1.1 {RATIO} (ref 0.8–1.7)
ALP SERPL-CCNC: 328 U/L (ref 45–117)
ALT SERPL-CCNC: 22 U/L (ref 13–56)
ANION GAP SERPL CALC-SCNC: 8 MMOL/L (ref 3–18)
APPEARANCE UR: CLEAR
AST SERPL-CCNC: 37 U/L (ref 10–38)
BACTERIA URNS QL MICRO: ABNORMAL /HPF
BASOPHILS # BLD: 0 K/UL (ref 0–0.1)
BASOPHILS NFR BLD: 0 % (ref 0–2)
BILIRUB SERPL-MCNC: 0.5 MG/DL (ref 0.2–1)
BILIRUB UR QL: NEGATIVE
BUN SERPL-MCNC: 23 MG/DL (ref 7–18)
BUN/CREAT SERPL: 18 (ref 12–20)
CALCIUM SERPL-MCNC: 9.1 MG/DL (ref 8.5–10.1)
CHLORIDE SERPL-SCNC: 100 MMOL/L (ref 100–111)
CO2 SERPL-SCNC: 27 MMOL/L (ref 21–32)
COLOR UR: YELLOW
CREAT SERPL-MCNC: 1.27 MG/DL (ref 0.6–1.3)
DIFFERENTIAL METHOD BLD: ABNORMAL
EOSINOPHIL # BLD: 0 K/UL (ref 0–0.4)
EOSINOPHIL NFR BLD: 0 % (ref 0–5)
EPITH CASTS URNS QL MICRO: ABNORMAL /LPF (ref 0–5)
ERYTHROCYTE [DISTWIDTH] IN BLOOD BY AUTOMATED COUNT: 13.3 % (ref 11.6–14.5)
GLOBULIN SER CALC-MCNC: 3.4 G/DL (ref 2–4)
GLUCOSE SERPL-MCNC: 130 MG/DL (ref 74–99)
GLUCOSE UR STRIP.AUTO-MCNC: NEGATIVE MG/DL
HCT VFR BLD AUTO: 37 % (ref 35–45)
HGB BLD-MCNC: 12 G/DL (ref 12–16)
HGB UR QL STRIP: NEGATIVE
KETONES UR QL STRIP.AUTO: NEGATIVE MG/DL
LACTATE SERPL-SCNC: 0.6 MMOL/L (ref 0.4–2)
LEUKOCYTE ESTERASE UR QL STRIP.AUTO: NEGATIVE
LIPASE SERPL-CCNC: 160 U/L (ref 73–393)
LYMPHOCYTES # BLD: 0.8 K/UL (ref 0.9–3.6)
LYMPHOCYTES NFR BLD: 13 % (ref 21–52)
MCH RBC QN AUTO: 32.3 PG (ref 24–34)
MCHC RBC AUTO-ENTMCNC: 32.4 G/DL (ref 31–37)
MCV RBC AUTO: 99.5 FL (ref 74–97)
MONOCYTES # BLD: 0.6 K/UL (ref 0.05–1.2)
MONOCYTES NFR BLD: 10 % (ref 3–10)
NEUTS SEG # BLD: 4.5 K/UL (ref 1.8–8)
NEUTS SEG NFR BLD: 77 % (ref 40–73)
NITRITE UR QL STRIP.AUTO: NEGATIVE
PH UR STRIP: 5 [PH] (ref 5–8)
PLATELET # BLD AUTO: 164 K/UL (ref 135–420)
PMV BLD AUTO: 9.6 FL (ref 9.2–11.8)
POTASSIUM SERPL-SCNC: 4.7 MMOL/L (ref 3.5–5.5)
PROT SERPL-MCNC: 7 G/DL (ref 6.4–8.2)
PROT UR STRIP-MCNC: 100 MG/DL
RBC # BLD AUTO: 3.72 M/UL (ref 4.2–5.3)
RBC #/AREA URNS HPF: ABNORMAL /HPF (ref 0–5)
SODIUM SERPL-SCNC: 135 MMOL/L (ref 136–145)
SP GR UR REFRACTOMETRY: 1.01 (ref 1–1.03)
UROBILINOGEN UR QL STRIP.AUTO: 0.2 EU/DL (ref 0.2–1)
WBC # BLD AUTO: 5.9 K/UL (ref 4.6–13.2)
WBC URNS QL MICRO: ABNORMAL /HPF (ref 0–5)

## 2020-09-01 PROCEDURE — 71045 X-RAY EXAM CHEST 1 VIEW: CPT

## 2020-09-01 PROCEDURE — 85025 COMPLETE CBC W/AUTO DIFF WBC: CPT

## 2020-09-01 PROCEDURE — 80053 COMPREHEN METABOLIC PANEL: CPT

## 2020-09-01 PROCEDURE — 74011250636 HC RX REV CODE- 250/636: Performed by: EMERGENCY MEDICINE

## 2020-09-01 PROCEDURE — 99284 EMERGENCY DEPT VISIT MOD MDM: CPT

## 2020-09-01 PROCEDURE — 83605 ASSAY OF LACTIC ACID: CPT

## 2020-09-01 PROCEDURE — 65270000029 HC RM PRIVATE

## 2020-09-01 PROCEDURE — 74011000250 HC RX REV CODE- 250: Performed by: EMERGENCY MEDICINE

## 2020-09-01 PROCEDURE — 83690 ASSAY OF LIPASE: CPT

## 2020-09-01 PROCEDURE — 96374 THER/PROPH/DIAG INJ IV PUSH: CPT

## 2020-09-01 PROCEDURE — 81001 URINALYSIS AUTO W/SCOPE: CPT

## 2020-09-01 PROCEDURE — 96375 TX/PRO/DX INJ NEW DRUG ADDON: CPT

## 2020-09-01 PROCEDURE — 96361 HYDRATE IV INFUSION ADD-ON: CPT

## 2020-09-01 PROCEDURE — 74176 CT ABD & PELVIS W/O CONTRAST: CPT

## 2020-09-01 PROCEDURE — 87040 BLOOD CULTURE FOR BACTERIA: CPT

## 2020-09-01 RX ORDER — ONDANSETRON 2 MG/ML
4 INJECTION INTRAMUSCULAR; INTRAVENOUS
Status: COMPLETED | OUTPATIENT
Start: 2020-09-01 | End: 2020-09-01

## 2020-09-01 RX ORDER — KETOROLAC TROMETHAMINE 30 MG/ML
15 INJECTION, SOLUTION INTRAMUSCULAR; INTRAVENOUS ONCE
Status: COMPLETED | OUTPATIENT
Start: 2020-09-01 | End: 2020-09-01

## 2020-09-01 RX ADMIN — AZITHROMYCIN MONOHYDRATE 500 MG: 500 INJECTION, POWDER, LYOPHILIZED, FOR SOLUTION INTRAVENOUS at 22:03

## 2020-09-01 RX ADMIN — CEFTRIAXONE 2 G: 2 INJECTION, POWDER, FOR SOLUTION INTRAMUSCULAR; INTRAVENOUS at 22:03

## 2020-09-01 RX ADMIN — ONDANSETRON 4 MG: 2 INJECTION INTRAMUSCULAR; INTRAVENOUS at 19:11

## 2020-09-01 RX ADMIN — SODIUM CHLORIDE 1000 ML: 900 INJECTION, SOLUTION INTRAVENOUS at 19:11

## 2020-09-01 RX ADMIN — KETOROLAC TROMETHAMINE 15 MG: 30 INJECTION, SOLUTION INTRAMUSCULAR at 19:11

## 2020-09-01 NOTE — ED PROVIDER NOTES
EMERGENCY DEPARTMENT HISTORY AND PHYSICAL EXAM    Date: 9/1/2020  Patient Name: James Goldberg    History of Presenting Illness     Chief Complaint   Patient presents with    Abdominal Pain    Back Pain         History Provided By: Patient    James Goldberg is a 78 y.o. female who presents to the emergency department C/O right-sided abdominal pain/flank pain. Patient states that symptoms started yesterday. States that the pain radiates to her right lower quadrant. She states she was seen at an urgent care where they recommended she come to the emergency department for evaluation. She states she has had her appendix removed but does not believe she has had her gallbladder removed. She states she has noticed some discomfort with urination as well as dark urine. She denies any fevers or chills. She admits to nausea without vomiting. Denies any diarrhea but admits to mild constipation. Denies any history of kidney stones to her knowledge. Gely Muñoz      PCP: Juan Erickson NP    Current Facility-Administered Medications   Medication Dose Route Frequency Provider Last Rate Last Dose    sodium chloride (NS) flush 5-40 mL  5-40 mL IntraVENous Q8H Marek Parikh MD   10 mL at 09/03/20 0515    sodium chloride (NS) flush 5-40 mL  5-40 mL IntraVENous PRN Teule-Hekima, Colletta Settler, MD        acetaminophen (TYLENOL) tablet 650 mg  650 mg Oral Q4H PRN Teule-Hekima, Colletta Settler, MD        oxyCODONE IR (ROXICODONE) tablet 5 mg  5 mg Oral Q6H PRN Teule-Hekima, Colletta Settler, MD   5 mg at 09/02/20 2131    naloxone (NARCAN) injection 0.4 mg  0.4 mg IntraVENous PRN Teule-Hekima, Colletta Settler, MD        azithromycin (ZITHROMAX) 500 mg in 0.9% sodium chloride 250 mL (VIAL-MATE)  500 mg IntraVENous Q24H Marek Parikh MD   500 mg at 09/02/20 2119    cefTRIAXone (ROCEPHIN) 1 g in sterile water (preservative free) 10 mL IV syringe  1 g IntraVENous Q24H Marek Parikh MD   1 g at 09/02/20 2231    insulin lispro (HUMALOG) injection   SubCUTAneous AC&HS Teressa Parikh MD   Stopped at 09/03/20 0730    glucose chewable tablet 16 g  16 g Oral PRN Teressa Parikh MD        glucagon (GLUCAGEN) injection 1 mg  1 mg IntraMUSCular PRN Marek Parikh MD        dextrose 10% infusion 125-250 mL  125-250 mL IntraVENous PRN Teressa Parikh MD        apixaban (ELIQUIS) tablet 5 mg  5 mg Oral BID Kacie HEATH MD   5 mg at 09/03/20 0816    famotidine (PEPCID) tablet 40 mg  40 mg Oral QHS Kacie HEATH MD   40 mg at 09/02/20 2111    metoprolol succinate (TOPROL-XL) XL tablet 50 mg  50 mg Oral DAILY Jessica Ordonez MD   50 mg at 09/03/20 0816    levothyroxine (SYNTHROID) tablet 25 mcg  25 mcg Oral ACB Kacie HEATH MD   25 mcg at 09/03/20 0816    spironolactone (ALDACTONE) tablet 25 mg  25 mg Oral EVERY OTHER DAY Kacie HEATH MD   25 mg at 09/03/20 2861    furosemide (LASIX) tablet 40 mg  40 mg Oral DAILY Kacie HEATH MD   40 mg at 09/03/20 0816    dilTIAZem ER (CARDIZEM CD) capsule 180 mg  180 mg Oral DAILY Jessica Ordonez MD   180 mg at 09/03/20 0817    ondansetron (ZOFRAN) injection 4 mg  4 mg IntraVENous Q4H PRN Teressa Parikh MD   4 mg at 09/02/20 2111       Past History     Past Medical History:  Past Medical History:   Diagnosis Date    Arrhythmia     atrial fibrillation 2018    Arthritis     CAD (coronary artery disease)     CABG, pacemaker    Chronic pain     COPD     Diabetes ([address]     GERD (gastroesophageal reflux disease)     Hypertension     Ill-defined condition     high cholesterol       Past Surgical History:  Past Surgical History:   Procedure Laterality Date    CARDIAC SURG PROCEDURE UNLIST      3 CABG, Stents    HX PACEMAKER      July 2011    CA COLONOSCOPY FLX DX W/COLLJ [facility] WHEN PFRMD  11/10/2011         CA EGD TRANSORAL BIOPSY SINGLE/MULTIPLE  8/29/2012            Family History:  Family History   Problem Relation Age of Onset    Lung Disease Mother     Heart Disease Father     Hypertension Father     Diabetes Sister        Social History:  Social History     Tobacco Use    Smoking status: Never Smoker    Smokeless tobacco: Never Used   Substance Use Topics    Alcohol use: No    Drug use: No       Allergies: Allergies   Allergen Reactions    Percocet [Oxycodone-Acetaminophen] Nausea and Vomiting         Review of Systems   Review of Systems   Constitutional: Negative for fever. Respiratory: Negative for shortness of breath. Cardiovascular: Negative for chest pain. Gastrointestinal: Positive for abdominal pain, constipation and nausea. Negative for vomiting. Genitourinary: Positive for dysuria, flank pain and hematuria. Negative for pelvic pain, vaginal bleeding and vaginal discharge.          Physical Exam     Vitals:    09/02/20 2321 09/03/20 0333 09/03/20 0748 09/03/20 1026   BP: 139/49 129/55 157/64    Pulse: 80 64 80    Resp: 18 18 18    Temp: 98.6 °F (37 °C) 98.6 °F (37 °C) 99.2 °F (37.3 °C)    SpO2: 100% 100% 94%    Weight:    61.2 kg (134 lb 14.7 oz)   Height:         Physical Exam    Nursing notes and vital signs reviewed    Airway: intact, speaking normally  Breathing: No apparent distress, no cyanosis  Circulation: Peripheral pulses equal    Constitutional: Non toxic appearing, appears uncomfortable but no acute distress   HEENT & Neck: Normocephalic, Atraumatic, PERRL, EOMI, No rhinorrhea, external nose normal, external ears normal, mucous membranes moist, No stridor, No JVD  Cardiovascular: Regular rate and rhythm, no murmurs  Chest: Normal work of breathing and chest excursion bilaterally  Lungs: Clear to ausculation bilaterally  Abdomen: Soft, right flank and right upper quadrant tenderness to palpation, non distended, normoactive bowel sounds, No rigidity, no peritoneal signs  Musculoskeletal: No evidence of trauma or deformity to the back  Extremities: No evidence of trauma or deformity, no LE edema  Skin: Warm, No obvious rashes  Neuro: Alert and oriented x 3, CN 2-12 intact, normal speech, strength and sensation full and symmetric bilaterally, normal coordination  Psychiatric: Normal mood and affect      Diagnostic Study Results     Labs -     Recent Results (from the past 72 hour(s))   CBC WITH AUTOMATED DIFF    Collection Time: 09/01/20  7:00 PM   Result Value Ref Range    WBC 5.9 4.6 - 13.2 K/uL    RBC 3.72 (L) 4.20 - 5.30 M/uL    HGB 12.0 12.0 - 16.0 g/dL    HCT 37.0 35.0 - 45.0 %    MCV 99.5 (H) 74.0 - 97.0 FL    MCH 32.3 24.0 - 34.0 PG    MCHC 32.4 31.0 - 37.0 g/dL    RDW 13.3 11.6 - 14.5 %    PLATELET 170 701 - 567 K/uL    MPV 9.6 9.2 - 11.8 FL    NEUTROPHILS 77 (H) 40 - 73 %    LYMPHOCYTES 13 (L) 21 - 52 %    MONOCYTES 10 3 - 10 %    EOSINOPHILS 0 0 - 5 %    BASOPHILS 0 0 - 2 %    ABS. NEUTROPHILS 4.5 1.8 - 8.0 K/UL    ABS. LYMPHOCYTES 0.8 (L) 0.9 - 3.6 K/UL    ABS. MONOCYTES 0.6 0.05 - 1.2 K/UL    ABS. EOSINOPHILS 0.0 0.0 - 0.4 K/UL    ABS. BASOPHILS 0.0 0.0 - 0.1 K/UL    DF AUTOMATED     METABOLIC PANEL, COMPREHENSIVE    Collection Time: 09/01/20  7:00 PM   Result Value Ref Range    Sodium 135 (L) 136 - 145 mmol/L    Potassium 4.7 3.5 - 5.5 mmol/L    Chloride 100 100 - 111 mmol/L    CO2 27 21 - 32 mmol/L    Anion gap 8 3.0 - 18 mmol/L    Glucose 130 (H) 74 - 99 mg/dL    BUN 23 (H) 7.0 - 18 MG/DL    Creatinine 1.27 0.6 - 1.3 MG/DL    BUN/Creatinine ratio 18 12 - 20      GFR est AA 49 (L) >60 ml/min/1.73m2    GFR est non-AA 40 (L) >60 ml/min/1.73m2    Calcium 9.1 8.5 - 10.1 MG/DL    Bilirubin, total 0.5 0.2 - 1.0 MG/DL    ALT (SGPT) 22 13 - 56 U/L    AST (SGOT) 37 10 - 38 U/L    Alk.  phosphatase 328 (H) 45 - 117 U/L    Protein, total 7.0 6.4 - 8.2 g/dL    Albumin 3.6 3.4 - 5.0 g/dL    Globulin 3.4 2.0 - 4.0 g/dL    A-G Ratio 1.1 0.8 - 1.7     LIPASE    Collection Time: 09/01/20  7:00 PM   Result Value Ref Range    Lipase 160 73 - 393 U/L   URINALYSIS W/ RFLX MICROSCOPIC    Collection Time: 09/01/20  8:30 PM Result Value Ref Range    Color YELLOW      Appearance CLEAR      Specific gravity 1.012 1.005 - 1.030      pH (UA) 5.0 5.0 - 8.0      Protein 100 (A) NEG mg/dL    Glucose Negative NEG mg/dL    Ketone Negative NEG mg/dL    Bilirubin Negative NEG      Blood Negative NEG      Urobilinogen 0.2 0.2 - 1.0 EU/dL    Nitrites Negative NEG      Leukocyte Esterase Negative NEG     URINE MICROSCOPIC ONLY    Collection Time: 09/01/20  8:30 PM   Result Value Ref Range    WBC 0 to 3 0 - 5 /hpf    RBC 1 to 3 0 - 5 /hpf    Epithelial cells FEW 0 - 5 /lpf    Bacteria 1+ (A) NEG /hpf   CULTURE, BLOOD    Collection Time: 09/01/20  9:45 PM    Specimen: Blood   Result Value Ref Range    Special Requests: NO SPECIAL REQUESTS      Culture result: NO GROWTH 2 DAYS     CULTURE, BLOOD    Collection Time: 09/01/20 10:00 PM    Specimen: Blood   Result Value Ref Range    Special Requests: NO SPECIAL REQUESTS      Culture result: NO GROWTH 2 DAYS     LACTIC ACID    Collection Time: 09/01/20 10:05 PM   Result Value Ref Range    Lactic acid 0.6 0.4 - 2.0 MMOL/L   METABOLIC PANEL, BASIC    Collection Time: 09/02/20  5:42 AM   Result Value Ref Range    Sodium 138 136 - 145 mmol/L    Potassium 4.2 3.5 - 5.5 mmol/L    Chloride 103 100 - 111 mmol/L    CO2 29 21 - 32 mmol/L    Anion gap 6 3.0 - 18 mmol/L    Glucose 107 (H) 74 - 99 mg/dL    BUN 25 (H) 7.0 - 18 MG/DL    Creatinine 1.19 0.6 - 1.3 MG/DL    BUN/Creatinine ratio 21 (H) 12 - 20      GFR est AA 53 (L) >60 ml/min/1.73m2    GFR est non-AA 44 (L) >60 ml/min/1.73m2    Calcium 8.5 8.5 - 10.1 MG/DL   CBC W/O DIFF    Collection Time: 09/02/20  5:42 AM   Result Value Ref Range    WBC 3.8 (L) 4.6 - 13.2 K/uL    RBC 3.43 (L) 4.20 - 5.30 M/uL    HGB 11.0 (L) 12.0 - 16.0 g/dL    HCT 34.1 (L) 35.0 - 45.0 %    MCV 99.4 (H) 74.0 - 97.0 FL    MCH 32.1 24.0 - 34.0 PG    MCHC 32.3 31.0 - 37.0 g/dL    RDW 13.3 11.6 - 14.5 %    PLATELET 873 791 - 690 K/uL    MPV 9.3 9.2 - 11.8 FL   HEMOGLOBIN A1C WITH EAG Collection Time: 09/02/20  5:42 AM   Result Value Ref Range    Hemoglobin A1c 6.4 (H) 4.2 - 5.6 %    Est. average glucose 137 mg/dL   GLUCOSE, POC    Collection Time: 09/02/20  8:00 AM   Result Value Ref Range    Glucose (POC) 96 70 - 110 mg/dL   GLUCOSE, POC    Collection Time: 09/02/20 11:56 AM   Result Value Ref Range    Glucose (POC) 128 (H) 70 - 110 mg/dL   GLUCOSE, POC    Collection Time: 09/02/20  4:33 PM   Result Value Ref Range    Glucose (POC) 194 (H) 70 - 110 mg/dL   GLUCOSE, POC    Collection Time: 09/02/20  9:24 PM   Result Value Ref Range    Glucose (POC) 190 (H) 70 - 159 mg/dL   METABOLIC PANEL, BASIC    Collection Time: 09/03/20  4:30 AM   Result Value Ref Range    Sodium 141 136 - 145 mmol/L    Potassium 4.1 3.5 - 5.5 mmol/L    Chloride 105 100 - 111 mmol/L    CO2 27 21 - 32 mmol/L    Anion gap 9 3.0 - 18 mmol/L    Glucose 134 (H) 74 - 99 mg/dL    BUN 17 7.0 - 18 MG/DL    Creatinine 1.02 0.6 - 1.3 MG/DL    BUN/Creatinine ratio 17 12 - 20      GFR est AA >60 >60 ml/min/1.73m2    GFR est non-AA 52 (L) >60 ml/min/1.73m2    Calcium 9.0 8.5 - 10.1 MG/DL   CBC W/O DIFF    Collection Time: 09/03/20  4:30 AM   Result Value Ref Range    WBC 5.1 4.6 - 13.2 K/uL    RBC 3.36 (L) 4.20 - 5.30 M/uL    HGB 10.7 (L) 12.0 - 16.0 g/dL    HCT 33.6 (L) 35.0 - 45.0 %    .0 (H) 74.0 - 97.0 FL    MCH 31.8 24.0 - 34.0 PG    MCHC 31.8 31.0 - 37.0 g/dL    RDW 13.3 11.6 - 14.5 %    PLATELET 194 229 - 802 K/uL    MPV 9.5 9.2 - 11.8 FL   GLUCOSE, POC    Collection Time: 09/03/20  7:52 AM   Result Value Ref Range    Glucose (POC) 115 (H) 70 - 110 mg/dL       Radiologic Studies -   XR CHEST PORT   Final Result   IMPRESSION: Right lung base airspace disease medially suggesting pneumonia. CT ABD PELV WO CONT   Final Result   IMPRESSION:      Small right effusion with right lower lobe airspace disease suggesting   pneumonia. Findings are atypical for Covid 19 pneumonia. No ureteral obstruction seen.       Retained stool.        CT Results  (Last 48 hours)               09/01/20 2003  CT ABD PELV WO CONT Final result    Impression:  IMPRESSION:       Small right effusion with right lower lobe airspace disease suggesting   pneumonia. Findings are atypical for Covid 19 pneumonia. No ureteral obstruction seen. Retained stool. Narrative:  EXAM: CT of the abdomen and pelvis       INDICATION: Right flank pain right upper quadrant pain       COMPARISON: None. TECHNIQUE: Axial CT imaging of the abdomen and pelvis was performed without   intravenous contrast. Multiplanar reformats were generated. One or more dose   reduction techniques were used on this CT: automated exposure control,   adjustment of the mAs and/or kVp according to patient size, and iterative   reconstruction techniques. The specific techniques used on this CT exam have   been documented in the patient's electronic medical record. Digital Imaging and   Communications in Medicine (DICOM) format image data are available to   nonaffiliated external healthcare facilities or entities on a secure, media   free, reciprocally searchable basis with patient authorization for at least a   12-month period after this study. _______________       FINDINGS:       LOWER CHEST: Small right pleural effusion present. Patchy airspace disease seen   in the right lower lobe suggesting pneumonia. LIVER, BILIARY: Liver is normal. No biliary dilation. Gallbladder is   unremarkable. PANCREAS: Normal.       SPLEEN: Normal.       ADRENALS: Normal.       KIDNEYS: Normal.       VASCULATURE: Severe calcific atherosclerosis present. LYMPH NODES: No enlarged lymph nodes. GASTROINTESTINAL TRACT: No bowel dilation or wall thickening. Appendix is not   visualized. There is retained stool throughout the colon. There is no bowel   obstruction. PELVIC ORGANS: Calcified uterine fibroids are present. Urinary bladder is   unremarkable.  There is no free fluid. BONES: No acute or aggressive osseous abnormalities identified. There is   osteopenia. Multilevel degenerative disc disease present. OTHER: None.       _______________               CXR Results  (Last 48 hours)               09/01/20 2150  XR CHEST PORT Final result    Impression:  IMPRESSION: Right lung base airspace disease medially suggesting pneumonia. Narrative:  EXAM:  AP Portable Chest X-ray 1 view        INDICATION: Right lower quadrant pain       COMPARISON: CT dated same date       _______________       FINDINGS: There is a left chest wall pacemaker with lead tips overlying the   right atrium, right ventricle and coronary sinus. Sternotomy wires and surgical   clips are seen from prior CABG. Heart and mediastinal contours are within normal limits for portable radiograph. There is right lung base airspace disease suggesting pneumonia. There are no   pleural effusions. No acute osseous findings.        ________________                     Medications given in the ED-  Medications   sodium chloride (NS) flush 5-40 mL (10 mL IntraVENous Given 9/3/20 0515)   sodium chloride (NS) flush 5-40 mL (has no administration in time range)   acetaminophen (TYLENOL) tablet 650 mg (has no administration in time range)   oxyCODONE IR (ROXICODONE) tablet 5 mg (5 mg Oral Given 9/2/20 2131)   naloxone [facility]) injection 0.4 mg (has no administration in time range)   azithromycin (ZITHROMAX) 500 mg in 0.9% sodium chloride 250 mL (VIAL-MATE) (500 mg IntraVENous Given 9/2/20 2119)   cefTRIAXone (ROCEPHIN) 1 g in sterile water (preservative free) 10 mL IV syringe (1 g IntraVENous Given 9/2/20 2231)   insulin lispro (HUMALOG) injection (0 Units SubCUTAneous Held 9/3/20 0730)   glucose chewable tablet 16 g (has no administration in time range)   glucagon (GLUCAGEN) injection 1 mg (has no administration in time range)   dextrose 10% infusion 125-250 mL (has no administration in time range) apixaban (ELIQUIS) tablet 5 mg (5 mg Oral Given 9/3/20 0816)   famotidine (PEPCID) tablet 40 mg (40 mg Oral Given 9/2/20 2111)   metoprolol succinate (TOPROL-XL) XL tablet 50 mg (50 mg Oral Given 9/3/20 0816)   levothyroxine (SYNTHROID) tablet 25 mcg (25 mcg Oral Given 9/3/20 0816)   spironolactone (ALDACTONE) tablet 25 mg (25 mg Oral Given 9/3/20 0817)   furosemide (LASIX) tablet 40 mg (40 mg Oral Given 9/3/20 0816)   dilTIAZem ER (CARDIZEM CD) capsule 180 mg (180 mg Oral Given 9/3/20 0817)   ondansetron (ZOFRAN) injection 4 mg (4 mg IntraVENous Given 9/2/20 2111)   ketorolac (TORADOL) injection 15 mg (15 mg IntraVENous Given 9/1/20 1911)   sodium chloride 0.9 % bolus infusion 1,000 mL (0 mL IntraVENous IV Completed 9/1/20 2217)   ondansetron (ZOFRAN) injection 4 mg (4 mg IntraVENous Given 9/1/20 1911)   azithromycin (ZITHROMAX) 500 mg in 0.9% sodium chloride 250 mL (VIAL-MATE) (500 mg IntraVENous Given 9/1/20 2203)   ondansetron (ZOFRAN) injection 4 mg (4 mg IntraVENous Given 9/2/20 1829)         Medical Decision Making   I am the first provider for this patient. I reviewed the vital signs, available nursing notes, past medical history, past surgical history, family history and social history. Vital Signs-Reviewed the patient's vital signs. Pulse Oximetry Analysis - 98% on Room air     Cardiac Monitor:  Rate: 80 bpm  Rhythm: sinus      Records Reviewed: Nursing Notes    Procedures:  Procedures    ED Course: We will obtain blood work as well as CT scan of the abdomen and pelvis. Will give Toradol and IV fluids for pain    SIGN OUT:  Patient's presentation, labs/imaging and plan of care was reviewed with Dr. Rita Henderson as part of sign out. They will follow up with labs/imaging/dispo as needed as part of the plan discussed with the patient. Paulette Hawkins, DO    Medical Decision Making:   This pleasant lady was signed out to me as normal VS and well appearing with CT pending of right side/flank pain pending CT. Britton Pan This happened to be pos for right PLEURAL effusion, possible PNA. Subsequent CXR did show evidence of airspace disease. Her lungs ARE clear, with possible slight decrease in movement in the base ( I had the advantage of examing after radiographs)  Lactate and WBC was normal, though with a mild left shift. Given age, diabetes, etc, we reviewed with the hospitalist who agreed to admit with CAP abx, no evidence of sepsis. She has already had a negative COVID test as an outpatient, exam is not consistent with ground glass opacities. Diagnosis and Disposition     CRITICAL CARE NOTE:  9:33 PM  I have spent 65 minutes of critical care time involved in lab review, consultations with specialist, family decision-making, and documentation. During this entire length of time I was immediately available to the patient. Critical Care: The reason for providing this level of medical care for this critically ill patient was due a critical illness that impaired one or more vital organ systems such that there was a high probability of imminent or life threatening deterioration in the patients condition. This care involved high complexity decision making to assess, manipulate, and support vital system functions, to treat this degreee vital organ system failure and to prevent further life threatening deterioration of the patients condition. Core Measures:  For Hospitalized Patients:    1. Hospitalization Decision Time:  The decision to hospitalize the patient was made by Jose Antonio Massey MD   at 9:30 PM on 9/1/2020    2. Aspirin: Aspirin was not given because the patient did not present with a stroke at the time of their Emergency Department evaluation    10:32 PM  Patient is being admitted to the hospital by Dr. Ti Gonzalez. The results of their tests and reasons for their admission have been discussed with them and/or available family.  They convey agreement and understanding for the need to be admitted and for their admission diagnosis. CONDITIONS ON ADMISSION:  Sepsis is not present at the time of admission. Deep Vein Thrombosis is not present at the time of admission. Thrombosis is not present at the time of admission. Urinary Tract Infection is not present at the time of admission. Pneumonia is present at the time of admission. MRSA is not present at the time of admission. Wound infection is not present at the time of admission. Pressure Ulcer is not present at the time of admission. CLINICAL IMPRESSION:    1. Pneumonia of right lower lobe due to infectious organism    2. Dehydration    3. Type 2 diabetes mellitus without complication, unspecified whether long term insulin use [facility]    4. S/P CABG (coronary artery bypass graft)        _______________________________      Please note that this dictation was completed with Line[employer], the computer voice recognition software. Quite often unanticipated grammatical, syntax, homophones, and other interpretive errors are inadvertently transcribed by the computer software. Please disregard these errors. Please excuse any errors that have escaped final proofreading.

## 2020-09-01 NOTE — ED TRIAGE NOTES
Patient sent to ER by [facility] for RLQ pain that wraps around to her back. Patient states she has had pain x 4days. Patient denies any N/V.      Patient denies any CP, SOB, fever or contact with anyone COVID+

## 2020-09-02 PROBLEM — R10.9 ABDOMINAL PAIN: Status: ACTIVE | Noted: 2020-09-02

## 2020-09-02 LAB
ANION GAP SERPL CALC-SCNC: 6 MMOL/L (ref 3–18)
BUN SERPL-MCNC: 25 MG/DL (ref 7–18)
BUN/CREAT SERPL: 21 (ref 12–20)
CALCIUM SERPL-MCNC: 8.5 MG/DL (ref 8.5–10.1)
CHLORIDE SERPL-SCNC: 103 MMOL/L (ref 100–111)
CO2 SERPL-SCNC: 29 MMOL/L (ref 21–32)
CREAT SERPL-MCNC: 1.19 MG/DL (ref 0.6–1.3)
ERYTHROCYTE [DISTWIDTH] IN BLOOD BY AUTOMATED COUNT: 13.3 % (ref 11.6–14.5)
EST. AVERAGE GLUCOSE BLD GHB EST-MCNC: 137 MG/DL
GLUCOSE BLD STRIP.AUTO-MCNC: 128 MG/DL (ref 70–110)
GLUCOSE BLD STRIP.AUTO-MCNC: 190 MG/DL (ref 70–110)
GLUCOSE BLD STRIP.AUTO-MCNC: 194 MG/DL (ref 70–110)
GLUCOSE BLD STRIP.AUTO-MCNC: 96 MG/DL (ref 70–110)
GLUCOSE SERPL-MCNC: 107 MG/DL (ref 74–99)
HBA1C MFR BLD: 6.4 % (ref 4.2–5.6)
HCT VFR BLD AUTO: 34.1 % (ref 35–45)
HGB BLD-MCNC: 11 G/DL (ref 12–16)
MCH RBC QN AUTO: 32.1 PG (ref 24–34)
MCHC RBC AUTO-ENTMCNC: 32.3 G/DL (ref 31–37)
MCV RBC AUTO: 99.4 FL (ref 74–97)
PLATELET # BLD AUTO: 147 K/UL (ref 135–420)
PMV BLD AUTO: 9.3 FL (ref 9.2–11.8)
POTASSIUM SERPL-SCNC: 4.2 MMOL/L (ref 3.5–5.5)
RBC # BLD AUTO: 3.43 M/UL (ref 4.2–5.3)
SODIUM SERPL-SCNC: 138 MMOL/L (ref 136–145)
WBC # BLD AUTO: 3.8 K/UL (ref 4.6–13.2)

## 2020-09-02 PROCEDURE — 74011250636 HC RX REV CODE- 250/636: Performed by: FAMILY MEDICINE

## 2020-09-02 PROCEDURE — 74011636637 HC RX REV CODE- 636/637: Performed by: FAMILY MEDICINE

## 2020-09-02 PROCEDURE — 80048 BASIC METABOLIC PNL TOTAL CA: CPT

## 2020-09-02 PROCEDURE — 74011250636 HC RX REV CODE- 250/636: Performed by: HOSPITALIST

## 2020-09-02 PROCEDURE — 74011000250 HC RX REV CODE- 250: Performed by: FAMILY MEDICINE

## 2020-09-02 PROCEDURE — 83036 HEMOGLOBIN GLYCOSYLATED A1C: CPT

## 2020-09-02 PROCEDURE — 97161 PT EVAL LOW COMPLEX 20 MIN: CPT

## 2020-09-02 PROCEDURE — 36415 COLL VENOUS BLD VENIPUNCTURE: CPT

## 2020-09-02 PROCEDURE — 97116 GAIT TRAINING THERAPY: CPT

## 2020-09-02 PROCEDURE — 82962 GLUCOSE BLOOD TEST: CPT

## 2020-09-02 PROCEDURE — 85027 COMPLETE CBC AUTOMATED: CPT

## 2020-09-02 PROCEDURE — 74011250637 HC RX REV CODE- 250/637: Performed by: HOSPITALIST

## 2020-09-02 PROCEDURE — 65270000029 HC RM PRIVATE

## 2020-09-02 PROCEDURE — 74011250637 HC RX REV CODE- 250/637: Performed by: FAMILY MEDICINE

## 2020-09-02 RX ORDER — FAMOTIDINE 20 MG/1
20 TABLET, FILM COATED ORAL
Status: DISCONTINUED | OUTPATIENT
Start: 2020-09-02 | End: 2020-09-02

## 2020-09-02 RX ORDER — FUROSEMIDE 40 MG/1
40 TABLET ORAL DAILY
Status: DISCONTINUED | OUTPATIENT
Start: 2020-09-03 | End: 2020-09-03 | Stop reason: HOSPADM

## 2020-09-02 RX ORDER — METOPROLOL SUCCINATE 50 MG/1
50 TABLET, EXTENDED RELEASE ORAL DAILY
Status: DISCONTINUED | OUTPATIENT
Start: 2020-09-03 | End: 2020-09-03 | Stop reason: HOSPADM

## 2020-09-02 RX ORDER — NALOXONE HYDROCHLORIDE 0.4 MG/ML
0.4 INJECTION, SOLUTION INTRAMUSCULAR; INTRAVENOUS; SUBCUTANEOUS AS NEEDED
Status: DISCONTINUED | OUTPATIENT
Start: 2020-09-02 | End: 2020-09-03 | Stop reason: HOSPADM

## 2020-09-02 RX ORDER — FAMOTIDINE 20 MG/1
40 TABLET, FILM COATED ORAL
Status: DISCONTINUED | OUTPATIENT
Start: 2020-09-02 | End: 2020-09-03 | Stop reason: HOSPADM

## 2020-09-02 RX ORDER — MELATONIN
2000 DAILY
COMMUNITY

## 2020-09-02 RX ORDER — SODIUM CHLORIDE 0.9 % (FLUSH) 0.9 %
5-40 SYRINGE (ML) INJECTION AS NEEDED
Status: DISCONTINUED | OUTPATIENT
Start: 2020-09-02 | End: 2020-09-03 | Stop reason: HOSPADM

## 2020-09-02 RX ORDER — ACETAMINOPHEN 325 MG/1
650 TABLET ORAL
Status: DISCONTINUED | OUTPATIENT
Start: 2020-09-02 | End: 2020-09-03 | Stop reason: HOSPADM

## 2020-09-02 RX ORDER — QUINIDINE GLUCONATE 324 MG
240 TABLET, EXTENDED RELEASE ORAL
COMMUNITY

## 2020-09-02 RX ORDER — SODIUM CHLORIDE 0.9 % (FLUSH) 0.9 %
5-40 SYRINGE (ML) INJECTION EVERY 8 HOURS
Status: DISCONTINUED | OUTPATIENT
Start: 2020-09-02 | End: 2020-09-03 | Stop reason: HOSPADM

## 2020-09-02 RX ORDER — FAMOTIDINE 20 MG/1
40 TABLET, FILM COATED ORAL 2 TIMES DAILY
Status: DISCONTINUED | OUTPATIENT
Start: 2020-09-02 | End: 2020-09-02 | Stop reason: DRUGHIGH

## 2020-09-02 RX ORDER — ONDANSETRON 2 MG/ML
4 INJECTION INTRAMUSCULAR; INTRAVENOUS ONCE
Status: COMPLETED | OUTPATIENT
Start: 2020-09-02 | End: 2020-09-02

## 2020-09-02 RX ORDER — INSULIN LISPRO 100 [IU]/ML
INJECTION, SOLUTION INTRAVENOUS; SUBCUTANEOUS
Status: DISCONTINUED | OUTPATIENT
Start: 2020-09-02 | End: 2020-09-03 | Stop reason: HOSPADM

## 2020-09-02 RX ORDER — OXYCODONE HYDROCHLORIDE 5 MG/1
5 TABLET ORAL
Status: DISCONTINUED | OUTPATIENT
Start: 2020-09-02 | End: 2020-09-03 | Stop reason: HOSPADM

## 2020-09-02 RX ORDER — DEXTROSE MONOHYDRATE 100 MG/ML
125-250 INJECTION, SOLUTION INTRAVENOUS AS NEEDED
Status: DISCONTINUED | OUTPATIENT
Start: 2020-09-02 | End: 2020-09-03 | Stop reason: HOSPADM

## 2020-09-02 RX ORDER — FAMOTIDINE 40 MG/1
40 TABLET, FILM COATED ORAL 2 TIMES DAILY
COMMUNITY
End: 2021-11-04

## 2020-09-02 RX ORDER — LEVOTHYROXINE SODIUM 25 UG/1
25 TABLET ORAL
Status: DISCONTINUED | OUTPATIENT
Start: 2020-09-03 | End: 2020-09-03 | Stop reason: HOSPADM

## 2020-09-02 RX ORDER — SPIRONOLACTONE 25 MG/1
25 TABLET ORAL EVERY OTHER DAY
Status: DISCONTINUED | OUTPATIENT
Start: 2020-09-03 | End: 2020-09-03 | Stop reason: HOSPADM

## 2020-09-02 RX ORDER — SULFASALAZINE 500 MG/1
500 TABLET ORAL 2 TIMES DAILY
COMMUNITY

## 2020-09-02 RX ORDER — DILTIAZEM HYDROCHLORIDE 180 MG/1
180 CAPSULE, COATED, EXTENDED RELEASE ORAL DAILY
Status: DISCONTINUED | OUTPATIENT
Start: 2020-09-03 | End: 2020-09-03 | Stop reason: HOSPADM

## 2020-09-02 RX ORDER — ONDANSETRON 2 MG/ML
4 INJECTION INTRAMUSCULAR; INTRAVENOUS
Status: DISCONTINUED | OUTPATIENT
Start: 2020-09-02 | End: 2020-09-03 | Stop reason: HOSPADM

## 2020-09-02 RX ORDER — MAGNESIUM SULFATE 100 %
16 CRYSTALS MISCELLANEOUS AS NEEDED
Status: DISCONTINUED | OUTPATIENT
Start: 2020-09-02 | End: 2020-09-03 | Stop reason: HOSPADM

## 2020-09-02 RX ORDER — LEVOTHYROXINE SODIUM 25 UG/1
25 TABLET ORAL
COMMUNITY

## 2020-09-02 RX ADMIN — Medication 10 ML: at 21:11

## 2020-09-02 RX ADMIN — AZITHROMYCIN 500 MG: 500 INJECTION, POWDER, LYOPHILIZED, FOR SOLUTION INTRAVENOUS at 21:19

## 2020-09-02 RX ADMIN — APIXABAN 5 MG: 5 TABLET, FILM COATED ORAL at 21:11

## 2020-09-02 RX ADMIN — Medication 10 ML: at 01:00

## 2020-09-02 RX ADMIN — INSULIN LISPRO 2 UNITS: 100 INJECTION, SOLUTION INTRAVENOUS; SUBCUTANEOUS at 17:03

## 2020-09-02 RX ADMIN — ONDANSETRON 4 MG: 2 INJECTION INTRAMUSCULAR; INTRAVENOUS at 18:29

## 2020-09-02 RX ADMIN — OXYCODONE 5 MG: 5 TABLET ORAL at 21:31

## 2020-09-02 RX ADMIN — ONDANSETRON 4 MG: 2 INJECTION INTRAMUSCULAR; INTRAVENOUS at 21:11

## 2020-09-02 RX ADMIN — CEFTRIAXONE 1 G: 1 INJECTION, POWDER, FOR SOLUTION INTRAMUSCULAR; INTRAVENOUS at 22:31

## 2020-09-02 RX ADMIN — Medication 10 ML: at 14:28

## 2020-09-02 RX ADMIN — INSULIN LISPRO 2 UNITS: 100 INJECTION, SOLUTION INTRAVENOUS; SUBCUTANEOUS at 21:30

## 2020-09-02 RX ADMIN — FAMOTIDINE 40 MG: 20 TABLET, FILM COATED ORAL at 21:11

## 2020-09-02 RX ADMIN — Medication 10 ML: at 06:00

## 2020-09-02 NOTE — PROGRESS NOTES
Problem: Falls - Risk of  Goal: *Absence of Falls  Description: Document [facility] Fall Risk and appropriate interventions in the flowsheet.   Outcome: Progressing Towards Goal  Note: Fall Risk Interventions:  Mobility Interventions: Utilize walker, cane, or other assistive device    Mentation Interventions: Adequate sleep, hydration, pain control    Medication Interventions: Teach patient to arise slowly    Elimination Interventions: Call light in reach

## 2020-09-02 NOTE — PROGRESS NOTES
3429  Bedside and verbal shift change report given to Anil Angel, RN (on coming nurse) by QUANG Sebastian, RN (off going nurse). Report included the following information SBAR, Kardex, OR Summary, Intake/Output and MAR.    0993  Notified Dr. Arnulfo Escobar for that pt was confused and not answered admission questions including PTA list during night shift. Will complete when pt's daughter visits today. [address] left message to daughter, Brian Aguirre for incomplete documentation. 7238  Ms. Brian Aguirre will bring PTA list after 1030.    1148  PTA completed. Notified Dr. Fredy Gan. Eliquis  5 mg not administered this morming. 46  Paged Dr. Fredy Gan for skipped Eliquis. 80  Pt feels nauseous. Paged Dr. Deng Stinson, on-call hospitalist for Zofran. 1924  Bedside and verbal shift change report given by ADRIANO Ramos (off going nurse) to MIHIR Louis RN(on coming nurse). Report included the following information SBAR, Kardex, OR Summary, Intake/Output and MAR.

## 2020-09-02 NOTE — ED NOTES
TRANSFER - OUT REPORT:    Verbal report given to [facility] RN on Aria Reeks  being transferred to  for routine progression of care       Report consisted of patients Situation, Background, Assessment and   Recommendations(SBAR). Information from the following report(s) SBAR, ED Summary and Cardiac Rhythm ventricular paced rhythm was reviewed with the receiving nurse. Lines:   Peripheral IV 09/01/20 Right Forearm (Active)        Opportunity for questions and clarification was provided. Patient transported with:   Monitor  Tech        .

## 2020-09-02 NOTE — PROGRESS NOTES
Problem: Falls - Risk of  Goal: *Absence of Falls  Description: Document Arnie Host Fall Risk and appropriate interventions in the flowsheet.   Outcome: Progressing Towards Goal  Note: Fall Risk Interventions:  Mobility Interventions: Utilize walker, cane, or other assistive device    Mentation Interventions: Adequate sleep, hydration, pain control, Bed/chair exit alarm, Door open when patient unattended, Eyeglasses and hearing aids, More frequent rounding, Reorient patient, Room close to nurse's station, Update white board    Medication Interventions: Patient to call before getting OOB, Bed/chair exit alarm                   Problem: Pain  Goal: *Control of Pain  Outcome: Progressing Towards Goal

## 2020-09-02 NOTE — H&P
History & Physical    Patient: Sangita Bueno MRN: [id number]  CSN: [id number]    YOB: 1940  Age: 78 y.o. Sex: female      DOA: 9/1/2020  Primary Care Provider:  Xiomara Pulliam NP      Assessment/Plan   78 h/o female with h/o DM, CHF, CAD s/p CABG c/o right sided abdominal pain found to have RLL PNA.  Admitted to Med/Surg with remote telemetry     RLL CAP -  Pancultures pending  No leukocytosis  Recently tested for COVID-19 and found to be negative, was not tested here in the emergency department  ER started patient on azithromycin and Rocephin, agree and will continue  Oxygen supplementation as needed    Abdominal pain -  Likely due to CAP   CT scan abd/pelvis otherwise unremarkable except for right lower lobe pneumonia  PRN antiemetics    Diabetes mellitus -  ADA, SSI, fingerstick blood glucose q. before meals and nightly    Chronic history of of coronary artery disease status post CABG and congestive heart failure -  Monitor blood pressure  Resume home meds as appropriate    [place] of [place] -  Supportive care    CODE STATUS: Full      Patient Active Problem List   Diagnosis Code    S/P CABG (coronary artery bypass graft) Z95.1    DM2 (diabetes mellitus, type 2) ([facility]) E11.9    Polyneuropathy in diabetes(357.2) E11.42    CHF (congestive heart failure) ([facility]) I50.9    SDH (subdural hematoma) ([facility]) S06.5X9A    Dehydration E86.0    Dizziness R42    PENELOPE (acute kidney injury) ([facility] [address] N17.9    Hypoglycemia E16.2    Elevated brain natriuretic peptide (BNP) level G52.88    Diastolic CHF, acute ([facility]) I50.31    RLL pneumonia J18.9     Estimated length of stay : 2-3 days    CC: Abdominal pain       HPI:     Sangita Bueno is a 78 y.o. female history of diabetes mellitus, congestive heart failure, coronary artery disease status post CABG, COPD with intermittent oxygen dependence at home and [facility] presents to the emergency department complaining of right-sided abdominal pain with concentration in her lower back on the right side and her right flank. She reports some mild nausea but no vomiting and no diarrhea. She also reports a mild cough but denies fever, chills, no racing heart rate, dizziness, lightheadedness, headache, loss of taste or smell. She states that she had a negative COVID test on August 20, 2020 and the reason why she got this test is because her brother-in-law is been hospitalized with COVID-19 viral infection. Past Medical History:   Diagnosis Date    Arrhythmia     atrial fibrillation 2018    Arthritis     CAD (coronary artery disease)     CABG, pacemaker    Chronic pain     COPD     Diabetes ([address]     GERD (gastroesophageal reflux disease)     Hypertension     Ill-defined condition     high cholesterol       Past Surgical History:   Procedure Laterality Date    CARDIAC SURG PROCEDURE UNLIST      3 CABG, Stents    HX PACEMAKER      July 2011    RI COLONOSCOPY FLX DX W/COLLJ SPEC WHEN PFRMD  11/10/2011         RI EGD TRANSORAL BIOPSY SINGLE/MULTIPLE  8/29/2012            Family History   Problem Relation Age of Onset    Lung Disease Mother     Heart Disease Father     Hypertension Father     Diabetes Sister        Social History     Socioeconomic History    Marital status:      Spouse name: Not on file    Number of children: Not on file    Years of education: Not on file    Highest education level: Not on file   Tobacco Use    Smoking status: Never Smoker    Smokeless tobacco: Never Used   Substance and Sexual Activity    Alcohol use: No    Drug use: No       Prior to Admission medications    Medication Sig Start Date End Date Taking? Authorizing Provider   dilTIAZem ER [facility]) 180 mg capsule Take 180 mg by mouth daily. 3/25/19   Provider, Historical   citalopram (CELEXA) 10 mg tablet Take 20 mg by mouth daily. Provider, Historical   apixaban (Eliquis) 5 mg tablet Take 5 mg by mouth two (2) times a day.     Provider, Historical HYDROcodone-acetaminophen (NORCO) 5-325 mg per tablet Take 1 Tab by mouth every four (4) hours as needed for Pain. Max Daily Amount: 6 Tabs. 12/14/18   Thad Lockhart MD   aspirin delayed-release 81 mg tablet Take 81 mg by mouth daily. Provider, Historical   clopidogrel (PLAVIX) 75 mg tab Take 75 mg by mouth daily. Provider, Historical   spironolactone (ALDACTONE) 25 mg tablet Take 25 mg by mouth every other day. Provider, Historical   guaiFENesin ER (MUCINEX) 600 mg ER tablet Take 1 Tab by mouth every twelve (12) hours. 2/26/18   No Villaseñor MD   amLODIPine (NORVASC) 5 mg tablet Take 10 mg by mouth daily. Provider, Historical   insulin aspart protamine/insulin aspart (NOVOLOG MIX 70-30 U-100 INSULN) 100 unit/mL (70-30) injection 13 Units by SubCUTAneous route daily. Patient takes 13 units in the AM and 3 units QHS    Provider, Historical   insulin aspart protamine/insulin aspart (NOVOLOG MIX 70-30 U-100 INSULN) 100 unit/mL (70-30) injection 3 Units by SubCUTAneous route nightly. Patient takes 13 units in the AM and 3 units QHS    Provider, Historical   traMADol (ULTRAM) 50 mg tablet Take 50 mg by mouth two (2) times a day. Provider, Historical   furosemide (LASIX) 40 mg tablet Take 1 Tab by mouth daily. 8/12/17   Amy Guillen MD   gabapentin (NEURONTIN) 300 mg capsule Take 300 mg by mouth two (2) times a day. Provider, Historical   polyethylene glycol (MIRALAX) 17 gram packet Take 17 g by mouth daily. Indications: constipation    Provider, Historical   ondansetron hcl (ZOFRAN, AS HYDROCHLORIDE,) 8 mg tablet Take 1 Tab by mouth every eight (8) hours as needed for Nausea. 7/18/17   Mohini Abarca MD   metoprolol-XL (TOPROL-XL) 100 mg XL tablet Take 100 mg by mouth daily. David, MD Omar       Allergies   Allergen Reactions    Percocet [Oxycodone-Acetaminophen] Nausea and Vomiting       Review of Systems  Gen: No fever, chills, malaise, weight loss/gain.    Heent: No headache, rhinorrhea, epistaxis, ear pain, hearing loss, sinus pain, neck pain/stiffness, sore throat. Heart: No chest pain, palpitations, LE, pnd, or orthopnea. Resp: No cough, hemoptysis, wheezing and shortness of breath. GI: No nausea, vomiting, diarrhea, constipation, melena or hematochezia. : No urinary obstruction, dysuria or hematuria. Derm: No rash, new skin lesion or pruritis. Musc/skeletal: no bone or joint complains. Vasc: No edema, cyanosis or claudication. Endo: No heat/cold intolerance, no polyuria,polydipsia or polyphagia. Neuro: No unilateral weakness, numbness, tingling. No seizures. Heme: No easy bruising or bleeding. Physical Exam:     Physical Exam:  Visit Vitals  /62   Pulse 80   Temp 98.4 °F (36.9 °C)   Resp 18   Ht 4' 11\" (1.499 m)   Wt 61.2 kg (135 lb)   SpO2 95%   BMI 27.27 kg/m²      O2 Device: Room air    Temp (24hrs), Av.4 °F (36.9 °C), Min:98.4 °F (36.9 °C), Max:98.4 °F (36.9 °C)    No intake/output data recorded. No intake/output data recorded. General:  Awake, cooperative, no distress. Head:  Normocephalic, without obvious abnormality, atraumatic. Eyes:  Conjunctivae/corneas clear, sclera anicteric, PERRL, EOMs intact. Nose: Nares normal. No drainage or sinus tenderness. Throat: Lips, mucosa, and tongue normal.    Neck: Supple, symmetrical, trachea midline, no adenopathy. Lungs:   Clear to auscultation bilaterally. Heart:  Regular rate and rhythm, S1, S2 normal, no murmur, click, rub or gallop. Abdomen: Soft, non-tender. Bowel sounds normal. No masses,  No organomegaly. Extremities: Extremities normal, atraumatic, no cyanosis or edema. Capillary refill normal.   Pulses: 2+ and symmetric all extremities. Skin: Skin color as per ethnicity, turgor normal. No rashes or lesions   Neurologic: CNII-XII intact. No focal motor or sensory deficit.        Labs Reviewed:  Recent Results (from the past 24 hour(s))   CBC WITH AUTOMATED DIFF    Collection Time: 09/01/20  7:00 PM   Result Value Ref Range    WBC 5.9 4.6 - 13.2 K/uL    RBC 3.72 (L) 4.20 - 5.30 M/uL    HGB 12.0 12.0 - 16.0 g/dL    HCT 37.0 35.0 - 45.0 %    MCV 99.5 (H) 74.0 - 97.0 FL    MCH 32.3 24.0 - 34.0 PG    MCHC 32.4 31.0 - 37.0 g/dL    RDW 13.3 11.6 - 14.5 %    PLATELET 802 453 - 016 K/uL    MPV 9.6 9.2 - 11.8 FL    NEUTROPHILS 77 (H) 40 - 73 %    LYMPHOCYTES 13 (L) 21 - 52 %    MONOCYTES 10 3 - 10 %    EOSINOPHILS 0 0 - 5 %    BASOPHILS 0 0 - 2 %    ABS. NEUTROPHILS 4.5 1.8 - 8.0 K/UL    ABS. LYMPHOCYTES 0.8 (L) 0.9 - 3.6 K/UL    ABS. MONOCYTES 0.6 0.05 - 1.2 K/UL    ABS. EOSINOPHILS 0.0 0.0 - 0.4 K/UL    ABS. BASOPHILS 0.0 0.0 - 0.1 K/UL    DF AUTOMATED     METABOLIC PANEL, COMPREHENSIVE    Collection Time: 09/01/20  7:00 PM   Result Value Ref Range    Sodium 135 (L) 136 - 145 mmol/L    Potassium 4.7 3.5 - 5.5 mmol/L    Chloride 100 100 - 111 mmol/L    CO2 27 21 - 32 mmol/L    Anion gap 8 3.0 - 18 mmol/L    Glucose 130 (H) 74 - 99 mg/dL    BUN 23 (H) 7.0 - 18 MG/DL    Creatinine 1.27 0.6 - 1.3 MG/DL    BUN/Creatinine ratio 18 12 - 20      GFR est AA 49 (L) >60 ml/min/1.73m2    GFR est non-AA 40 (L) >60 ml/min/1.73m2    Calcium 9.1 8.5 - 10.1 MG/DL    Bilirubin, total 0.5 0.2 - 1.0 MG/DL    ALT (SGPT) 22 13 - 56 U/L    AST (SGOT) 37 10 - 38 U/L    Alk.  phosphatase 328 (H) 45 - 117 U/L    Protein, total 7.0 6.4 - 8.2 g/dL    Albumin 3.6 3.4 - 5.0 g/dL    Globulin 3.4 2.0 - 4.0 g/dL    A-G Ratio 1.1 0.8 - 1.7     LIPASE    Collection Time: 09/01/20  7:00 PM   Result Value Ref Range    Lipase 160 73 - 393 U/L   URINALYSIS W/ RFLX MICROSCOPIC    Collection Time: 09/01/20  8:30 PM   Result Value Ref Range    Color YELLOW      Appearance CLEAR      Specific gravity 1.012 1.005 - 1.030      pH (UA) 5.0 5.0 - 8.0      Protein 100 (A) NEG mg/dL    Glucose Negative NEG mg/dL    Ketone Negative NEG mg/dL    Bilirubin Negative NEG      Blood Negative NEG      Urobilinogen 0.2 0.2 - 1.0 EU/dL    Nitrites Negative NEG      Leukocyte Esterase Negative NEG     URINE MICROSCOPIC ONLY    Collection Time: 09/01/20  8:30 PM   Result Value Ref Range    WBC 0 to 3 0 - 5 /hpf    RBC 1 to 3 0 - 5 /hpf    Epithelial cells FEW 0 - 5 /lpf    Bacteria 1+ (A) NEG /hpf       Procedures/imaging: see electronic medical records for all procedures/Xrays and details which were not copied into this note but were reviewed prior to creation of Plan      CC: Suzanna Platt NP

## 2020-09-02 NOTE — PROGRESS NOTES
Reason for Admission:    Abdominal pain                   RUR Score:   12%                  Plan for utilizing home health:    Likely       PCP: First and Last name:   Opal Mijares   Name of Practice:    Are you a current patient: Yes/No:    Approximate date of last visit:    Can you participate in a virtual visit with your PCP:                     Current Advanced Directive/Advance Care Plan: Not on file                         Transition of Care Plan:   [facility] with family assistance and physician follow up                    Chart reviewed. Per H&P \"Graciela Tom is a 78 y.o. female history of diabetes mellitus, congestive heart failure, coronary artery disease status post CABG, COPD with intermittent oxygen dependence at home and [facility] presents to the emergency department complaining of right-sided abdominal pain with concentration in her lower back on the right side and her right flank. She reports some mild nausea but no vomiting and no diarrhea. She also reports a mild cough but denies fever, chills, no racing heart rate, dizziness, lightheadedness, headache, loss of taste or smell. She states that she had a negative COVID test on August 20, 2020 and the reason why she got this test is because her brother-in-law is been hospitalized with COVID-19 viral infection. \"    Please encourage ambulation or order therapy services when appropriate to assist with identifying a plan of care. CM to continue to follow and assist.    1200:  NETTE met with pt and her daughter, Maggie Hernández ([phone]), at bedside to discuss transition of care. Pt lives with her daughter, Efrain Murrell. Pt has home O2, RW and a 3:1 in the home. Please note O2 is as needed. Pt is in the area with family visiting. CM discussed options and pt/family would like to pt to return home with [facility] if needed. CM provided pt/family with a list of area State mental health facility agencies to refer to. CM confirmed pt's address.   Pt's family will transport pt home when medically stable. CM to continue to follow and assist.    POC for this pt are her daughters:    1) Duyen Daigle ([phone])  2) Toya Roche     ([phone])  3) Kate Lucas Management Interventions  PCP Verified by CM: Yes  Mode of Transport at Discharge:  Other (see comment)(Family)  Transition of Care Consult (CM Consult): Discharge Planning, [address]: No  Reason Outside [place]: Out of service area  Current Support Network: Family Lives Nearby  Confirm Follow Up Transport: Family  The Plan for Transition of Care is Related to the Following Treatment Goals : [facility] and physician follow up and family assistance  The Patient and/or Patient Representative was Provided with a Choice of Provider and Agrees with the Discharge Plan?: Yes  Name of the Patient Representative Who was Provided with a Choice of Provider and Agrees with the Discharge Plan: daughter/Jaki Jama  Freedom of Choice List was Provided with Basic Dialogue that Supports the Patient's Individualized Plan of Care/Goals, Treatment Preferences and Shares the Quality Data Associated with the Providers?: Yes  Discharge Location  Discharge Placement: Home with home health

## 2020-09-02 NOTE — PROGRESS NOTES
Occupational Therapy Evaluation Attempt    Chart reviewed. Attempted Occupational Therapy Evaluation/Treatment, however, patient reporting just getting back into bed after working with PT and requesting to rest.    Will follow up later as patient's schedule allows.    Thank you for this referral.  Yuan Portillo, OTR/L

## 2020-09-02 NOTE — PROGRESS NOTES
Problem: Mobility Impaired (Adult and Pediatric)  Goal: *Acute Goals and Plan of Care (Insert Text)  Description: Physical Therapy short term goals, to be achieved in 3 days. Pt will:   Perform bed mobility with indep in prep for OOB activity. Perform sit <> stand transfers with supervision and RW in prep for functional mobility and ambulation. Ambulate 150 ft with RW and SBA in prep for household and community mobility. Note:   PHYSICAL THERAPY EVALUATION    Patient: Saroj Arevalo (78 y.o. female)  Date: 9/2/2020  Primary Diagnosis: RLL pneumonia [J18.9]        Precautions:   Fall    PLOF: Pt was indep with functional mobility PTA, was a household ambulator with a rollator. Pt reports a history of two falls that did not result in injury, but pt could not recall when the falls occurred. ASSESSMENT :  Pt is an 77 yo F admitted with RLL PNA with history of CAD, CABG, DM, and COPD with intermittent O2 use. Pt presents with decr LE strength, balance, and activity tolerance. Pt performed bed mobility with supervision and required CGA/SBA for transfers and ambulation 100 ft with RW. Pt with O2 sat 98% on 2L, upon ambulation on room air, O2 sat was ~88%, pt denied feeling SOB. Pt would benefit from continued skilled therapy to increase functional independence and decrease risk of falling. Recommend HHPT upon discharge home with family support. Patient will benefit from skilled intervention to address the above impairments.   Patient's rehabilitation potential is considered to be Good  Factors which may influence rehabilitation potential include:   [x]         None noted  []         Mental ability/status  []         Medical condition  []         Home/family situation and support systems  []         Safety awareness  []         Pain tolerance/management  []         Other:      PLAN :  Recommendations and Planned Interventions:   [x]           Bed Mobility Training             [x]    Neuromuscular Re-Education  [x]           Transfer Training                   []    Orthotic/Prosthetic Training  [x]           Gait Training                          [x]    Modalities  [x]           Therapeutic Exercises           []    Edema Management/Control  [x]           Therapeutic Activities            [x]    Family Training/Education  [x]           Patient Education  []           Other (comment):    Frequency/Duration: Patient will be followed by physical therapy 1-2 times per day/4-7 days per week to address goals. Discharge Recommendations: Home Health  Further Equipment Recommendations for Discharge: N/A     SUBJECTIVE:   Patient stated it feels  good to get up and move a little.     OBJECTIVE DATA SUMMARY:     Past Medical History:   Diagnosis Date    Arrhythmia     atrial fibrillation 2018    Arthritis     CAD (coronary artery disease)     CABG, pacemaker    Chronic pain     COPD     Diabetes ([facility] [address]     GERD (gastroesophageal reflux disease)     Hypertension     Ill-defined condition     high cholesterol     Past Surgical History:   Procedure Laterality Date    CARDIAC SURG PROCEDURE UNLIST      3 CABG, Stents    HX PACEMAKER      July 2011    OR COLONOSCOPY FLX DX W/COLLJ SPEC WHEN PFRMD  11/10/2011         OR EGD TRANSORAL BIOPSY SINGLE/MULTIPLE  8/29/2012          Barriers to Learning/Limitations: None  Compensate with: N/A  Home Situation:  Home Situation  Home Environment: Private residence  One/Two Story Residence: One story  Living Alone: No  Support Systems: Family member(s)(daughter)  Patient Expects to be Discharged to[de-identified] Private residence  Current DME Used/Available at Home: Chelsea Barban, rollator, Walker, rolling  Critical Behavior:  Neurologic State: Alert  Orientation Level: Oriented to person;Oriented to place;Oriented to situation  Cognition: Follows commands  Psychosocial  Purposeful Interaction: Yes  Pt Identified Daily Priority: Clinical issues (comment)  Caritas Process: Nurture loving kindness  Caring Interventions: Reassure  Reassure: Therapeutic listening  Therapeutic Modalities: Intentional therapeutic touch  Strength:    Strength: Generally decreased, functional  Tone & Sensation:   Tone: Normal  Sensation: Intact  Range Of Motion:  AROM: Generally decreased, functional  Functional Mobility:  Bed Mobility:  Supine to Sit: Stand-by assistance  Sit to Supine: Stand-by assistance  Scooting: Stand-by assistance  Transfers:  Sit to Stand: Stand-by assistance  Stand to Sit: Stand-by assistance  Balance:   Sitting: Intact  Standing: Intact; With support  Ambulation/Gait Training:  Distance (ft): 100 Feet (ft)  Assistive Device: Walker, rolling  Ambulation - Level of Assistance: Contact guard assistance;Stand-by assistance  Gait Abnormalities: (forward trunk lean)  Right Side Weight Bearing: Full  Left Side Weight Bearing: Full  Base of Support: Widened  Speed/Michelle: Slow  Step Length: Left shortened;Right shortened  Pain:  Pain level pre-treatment: 0/10   Pain level post-treatment: 0/10   Pain Intervention(s) : N/A  Response to intervention: Nurse notified, See doc flow  Activity Tolerance:   Pt with O2 sat 98% on 2L, upon ambulation on room air, O2 sat was ~88%, pt denied feeling SOB. Please refer to the flowsheet for vital signs taken during this treatment. After treatment:   []         Patient left in no apparent distress sitting up in chair  [x]         Patient left in no apparent distress in bed  [x]         Call bell left within reach  [x]         Nursing notified  [x]         Caregiver present  []         Bed alarm activated  [x]         SCDs applied    COMMUNICATION/EDUCATION:   [x]         Role of Physical Therapy in the acute care setting. [x]         Fall prevention education was provided and the patient/caregiver indicated understanding. [x]         Patient/family have participated as able in goal setting and plan of care.   [x]         Patient/family agree to work toward stated goals and plan of care.  []         Patient understands intent and goals of therapy, but is neutral about his/her participation. []         Patient is unable to participate in goal setting/plan of care: ongoing with therapy staff.  []         Other:     Thank you for this referral.  Geraldine Koenig   Time Calculation: 29 mins      Eval Complexity: History: LOW Complexity : Zero comorbidities / personal factors that will impact the outcome / POCExam:LOW Complexity : 1-2 Standardized tests and measures addressing body structure, function, activity limitation and / or participation in recreation  Presentation: LOW Complexity : Stable, uncomplicated  Clinical Decision Making:Low Complexity    Overall Complexity:LOW

## 2020-09-02 NOTE — PROGRESS NOTES
Hospitalist Progress Note    Patient: Dennys Wang MRN: [id number]  CSN: [id number]    YOB: 1940  Age: 78 y.o. Sex: female    DOA: 9/1/2020 LOS:  LOS: 1 day                Assessment/Plan     Patient Active Problem List   Diagnosis Code    S/P CABG (coronary artery bypass graft) Z95.1    DM2 (diabetes mellitus, type 2) ([facility]) E11.9    Polyneuropathy in diabetes(357.2) E11.42    CHF (congestive heart failure) ([facility]) I50.9    SDH (subdural hematoma) ([facility]) S06.5X9A    Dehydration E86.0    Dizziness R42    PENELOPE (acute kidney injury) ([facility] [address] N17.9    Hypoglycemia E16.2    Elevated brain natriuretic peptide (BNP) level P52.91    Diastolic CHF, acute ([facility]) I50.31    RLL pneumonia J18.9    Abdominal pain R10.9            78 h/o female with h/o DM, CHF, CAD s/p CABG c/o right sided abdominal pain found to have RLL PNA. Right lower lobe pneumonia-  Community-acquired pneumonia, continue with antibiotics. Follow cultures. Abdominal pain-  CT abdomen pelvis unremarkable. Likely from pneumonia. DM-  Continue with sliding scale insulin, diabetic diet. CAD with CABG-  Continue with home medications, no anginal symptoms or chest chest pain or shortness of breath. A - Fib -   Continue with betablocker, tiazac, eliquis. Chronic diastolic CHF -   Continue lasix, spironolactone. Discussed with daughter at bedside. Disposition : 1-2 days    Review of systems  General: No fevers or chills. Cardiovascular: No chest pain or pressure. No palpitations. Pulmonary: No shortness of breath. Gastrointestinal: No nausea, vomiting. Physical Exam:  General: Awake, cooperative, no acute distress    HEENT: NC, Atraumatic. PERRLA, anicteric sclerae. Lungs: CTA Bilaterally. No Wheezing/Rhonchi/Rales. Heart:  S1 S2,  No murmur, No Rubs, No Gallops  Abdomen: Soft, Non distended, Non tender.  +Bowel sounds,   Extremities: No c/c/e  Psych:   Not anxious or agitated.   Neurologic:  No acute neurological deficit. Vital signs/Intake and Output:  Visit Vitals  /54 (BP 1 Location: Left arm, BP Patient Position: At rest)   Pulse 80   Temp 100 °F (37.8 °C)   Resp 18   Ht 4' 11\" (1.499 m)   Wt 61.2 kg (135 lb)   SpO2 100%   BMI 27.27 kg/m²     Current Shift:  09/02 0701 - 09/02 1900  In: 240 [P.O.:240]  Out: -   Last three shifts:  08/31 1901 - 09/02 0700  In: 1000 [I.V.:1000]  Out: -             Labs: Results:       Chemistry Recent Labs     09/02/20 0542 09/01/20 1900   * 130*    135*   K 4.2 4.7    100   CO2 29 27   BUN 25* 23*   CREA 1.19 1.27   CA 8.5 9.1   AGAP 6 8   BUCR 21* 18   AP  --  328*   TP  --  7.0   ALB  --  3.6   GLOB  --  3.4   AGRAT  --  1.1      CBC w/Diff Recent Labs     09/02/20 0542 09/01/20 1900   WBC 3.8* 5.9   RBC 3.43* 3.72*   HGB 11.0* 12.0   HCT 34.1* 37.0    164   GRANS  --  77*   LYMPH  --  13*   EOS  --  0      Cardiac Enzymes No results for input(s): CPK, CKND1, SHAWNA in the last 72 hours. No lab exists for component: CKRMB, TROIP   Coagulation No results for input(s): PTP, INR, APTT, INREXT in the last 72 hours. Lipid Panel Lab Results   Component Value Date/Time    Cholesterol, total 96 07/10/2011 04:30 AM    HDL Cholesterol 46 07/10/2011 04:30 AM    LDL, calculated 31 07/10/2011 04:30 AM    VLDL, calculated 19 07/10/2011 04:30 AM    Triglyceride 95 07/10/2011 04:30 AM    CHOL/HDL Ratio 2.1 07/10/2011 04:30 AM      BNP No results for input(s): BNPP in the last 72 hours.    Liver Enzymes Recent Labs     09/01/20 1900   TP 7.0   ALB 3.6   *      Thyroid Studies No results found for: T4, T3U, TSH, TSHEXT     Procedures/imaging: see electronic medical records for all procedures/Xrays and details which were not copied into this note but were reviewed prior to creation of Plan

## 2020-09-02 NOTE — PROGRESS NOTES
TRANSFER - IN REPORT:    Verbal report received from Nelson Richmond, [address] [place] [address] (name) on Martha Choudhury  being received from ED (unit) for routine progression of care      Report consisted of patients Situation, Background, Assessment and   Recommendations(SBAR). Information from the following report(s) SBAR, Kardex, ED Summary, Intake/Output, MAR and Recent Results was reviewed with the receiving nurse. Opportunity for questions and clarification was provided. Assessment completed upon patients arrival to unit and care assumed. Pt was very confused during this shift, only oriented to self. Pt had no complaints of pain and had no acute changes.

## 2020-09-02 NOTE — PROGRESS NOTES
Pharmacy Dosing Services: Famotidine    Indication: Systematic GERD  Previous Regimen Famotidine 20 mg PO Bedtime   Serum Creatinine Lab Results   Component Value Date/Time    Creatinine 1.19 09/02/2020 05:42 AM    Creatinine (POC) 1.3 01/12/2015 08:14 PM      Creatinine Clearance Estimated Creatinine Clearance: 31.5 mL/min (based on SCr of 1.19 mg/dL). BUN Lab Results   Component Value Date/Time    BUN 25 (H) 09/02/2020 05:42 AM    BUN (POC) 25 (H) 01/12/2015 08:14 PM       Dose administration notes:  Initial dose Famotidine 40 mg PO bid .  Dosage was renally adjusted based on CrCl of 31.5 ml/min to Famotidine 40 mg PO Bedtime  Plan:  Continue to monitor

## 2020-09-02 NOTE — DIABETES MGMT
GLYCEMIC CONTROL PROGRESS NOTE:    - discussed in rounds, known h/o T2DM, HbA1C ordered per protocol, insulin home regimen  - BG in target range non-ICU: < 180 mg/dL  - - Humalog Normal Insulin Sensitivity Corrective Coverage ordered recommend continue    Recent Glucose Results:   Lab Results   Component Value Date/Time     (H) 09/02/2020 05:42 AM     (H) 09/01/2020 07:00 PM    GLUCPOC 96 09/02/2020 08:00 AM     Keith Bravo MS, RN, CDE  Glycemic Control Team  [phone]  Pager [phone] (M-TH 8:00-4:30P)  *After Hours pager [phone]

## 2020-09-02 NOTE — PROGRESS NOTES
Pharmacy Dosing Services:   Pharmacist Renal Dosing Progress Note for Stephen iWll    The following medication:Famotidine (Pepcid)  was automatically dose-adjusted per THE [facility] P&T Committee Protocol, with respect to renal function. Consult provided for this   78 y.o. , female , for the indication of  GERD    Pt Weight:   Wt Readings from Last 1 Encounters:   09/01/20 61.2 kg (135 lb)         Previous Regimen FAMOTIDINE 40 MG BID   Serum Creatinine Lab Results   Component Value Date/Time    Creatinine 1.19 09/02/2020 05:42 AM    Creatinine (POC) 1.3 01/12/2015 08:14 PM       Creatinine Clearance Estimated Creatinine Clearance: 31.5 mL/min (based on SCr of 1.19 mg/dL). BUN Lab Results   Component Value Date/Time    BUN 25 (H) 09/02/2020 05:42 AM    BUN (POC) 25 (H) 01/12/2015 08:14 PM       Dosage changed to:  Famotidine 20 mg at night    Additional notes:      Pharmacy to continue to monitor patient daily. Will make dosage adjustments based upon changing renal function. Signed Stephan Duke RPH.  Contact information: [phone]

## 2020-09-03 VITALS
TEMPERATURE: 99.2 F | BODY MASS INDEX: 27.2 KG/M2 | WEIGHT: 134.92 LBS | HEIGHT: 59 IN | DIASTOLIC BLOOD PRESSURE: 64 MMHG | SYSTOLIC BLOOD PRESSURE: 157 MMHG | OXYGEN SATURATION: 94 % | HEART RATE: 80 BPM | RESPIRATION RATE: 18 BRPM

## 2020-09-03 LAB
ANION GAP SERPL CALC-SCNC: 9 MMOL/L (ref 3–18)
BUN SERPL-MCNC: 17 MG/DL (ref 7–18)
BUN/CREAT SERPL: 17 (ref 12–20)
CALCIUM SERPL-MCNC: 9 MG/DL (ref 8.5–10.1)
CHLORIDE SERPL-SCNC: 105 MMOL/L (ref 100–111)
CO2 SERPL-SCNC: 27 MMOL/L (ref 21–32)
CREAT SERPL-MCNC: 1.02 MG/DL (ref 0.6–1.3)
ERYTHROCYTE [DISTWIDTH] IN BLOOD BY AUTOMATED COUNT: 13.3 % (ref 11.6–14.5)
GLUCOSE BLD STRIP.AUTO-MCNC: 115 MG/DL (ref 70–110)
GLUCOSE SERPL-MCNC: 134 MG/DL (ref 74–99)
HCT VFR BLD AUTO: 33.6 % (ref 35–45)
HGB BLD-MCNC: 10.7 G/DL (ref 12–16)
MCH RBC QN AUTO: 31.8 PG (ref 24–34)
MCHC RBC AUTO-ENTMCNC: 31.8 G/DL (ref 31–37)
MCV RBC AUTO: 100 FL (ref 74–97)
PLATELET # BLD AUTO: 169 K/UL (ref 135–420)
PMV BLD AUTO: 9.5 FL (ref 9.2–11.8)
POTASSIUM SERPL-SCNC: 4.1 MMOL/L (ref 3.5–5.5)
RBC # BLD AUTO: 3.36 M/UL (ref 4.2–5.3)
SODIUM SERPL-SCNC: 141 MMOL/L (ref 136–145)
WBC # BLD AUTO: 5.1 K/UL (ref 4.6–13.2)

## 2020-09-03 PROCEDURE — 82962 GLUCOSE BLOOD TEST: CPT

## 2020-09-03 PROCEDURE — 80048 BASIC METABOLIC PNL TOTAL CA: CPT

## 2020-09-03 PROCEDURE — 97166 OT EVAL MOD COMPLEX 45 MIN: CPT

## 2020-09-03 PROCEDURE — 74011250637 HC RX REV CODE- 250/637: Performed by: HOSPITALIST

## 2020-09-03 PROCEDURE — 85027 COMPLETE CBC AUTOMATED: CPT

## 2020-09-03 PROCEDURE — 36415 COLL VENOUS BLD VENIPUNCTURE: CPT

## 2020-09-03 RX ORDER — AMOXICILLIN AND CLAVULANATE POTASSIUM 500; 125 MG/1; MG/1
1 TABLET, FILM COATED ORAL 2 TIMES DAILY
Qty: 14 TAB | Refills: 0 | Status: SHIPPED | OUTPATIENT
Start: 2020-09-03 | End: 2021-04-27 | Stop reason: CLARIF

## 2020-09-03 RX ADMIN — DILTIAZEM HYDROCHLORIDE 180 MG: 180 CAPSULE, COATED, EXTENDED RELEASE ORAL at 08:17

## 2020-09-03 RX ADMIN — APIXABAN 5 MG: 5 TABLET, FILM COATED ORAL at 08:16

## 2020-09-03 RX ADMIN — FUROSEMIDE 40 MG: 40 TABLET ORAL at 08:16

## 2020-09-03 RX ADMIN — LEVOTHYROXINE SODIUM 25 MCG: 0.03 TABLET ORAL at 08:16

## 2020-09-03 RX ADMIN — METOPROLOL SUCCINATE 50 MG: 50 TABLET, EXTENDED RELEASE ORAL at 08:16

## 2020-09-03 RX ADMIN — Medication 10 ML: at 05:15

## 2020-09-03 RX ADMIN — SPIRONOLACTONE 25 MG: 25 TABLET ORAL at 08:17

## 2020-09-03 NOTE — PROGRESS NOTES
Discharge instructions reviewed with the patient. Patient verbalized understanding and verified by teach back. All questions answered. IV to be discontinued upon discharge, no redness, swelling or pain noted. Patient awaiting daughter for transportation home, with an ETA of 1300. Patient armband to be removed and shredded by primary staff upon discharge.

## 2020-09-03 NOTE — PROGRESS NOTES
Noted pt is to be discharged today. CM met with pt and her daughter, Vera Muller ([phone]), at bedside to discuss transition of care. Pt/family have indicated pt is back to her baseline and HH will not be needed. Anticipate pt will transition home today with physician follow up and family assistance. No other transition of care needs have been identified. Care Management Interventions  PCP Verified by CM: Yes  Mode of Transport at Discharge:  Other (see comment)(Family)  Transition of Care Consult (CM Consult): Discharge [address]""way: No  Reason Outside [facility]: Out of service area  Health Maintenance Reviewed: Yes  Physical Therapy Consult: Yes  Occupational Therapy Consult: Yes  Current Support Network: Relative's Home, Family Lives Nearby  Confirm Follow Up Transport: Family  The Plan for Transition of Care is Related to the Following Treatment Goals : Home with physician follow up   The Patient and/or Patient Representative was Provided with a Choice of Provider and Agrees with the Discharge Plan?: Yes  Name of the Patient Representative Who was Provided with a Choice of Provider and Agrees with the Discharge Plan: daughter/Jaki Irwin  [place] of Choice List was Provided with Basic Dialogue that Supports the Patient's Individualized Plan of Care/Goals, Treatment Preferences and Shares the Quality Data Associated with the Providers?: Yes  Discharge Location  Discharge Placement: Home with family assistance

## 2020-09-03 NOTE — PROGRESS NOTES
Bedside shift change report given to Debra Michelle RN (oncoming nurse) by Paulette Greene RN (offgoing nurse). 2045 Pt complaining of nausea, Dr. Sánchez Smith was in the unit and ordered Zofran ivp RN. 2131 Roxicodone was given for abdominal pain score of 6/10. Reassessed and verbalized relief. Visit Vitals  /55 (BP 1 Location: Left arm, BP Patient Position: At rest)   Pulse 64   Temp 98.6 °F (37 °C)   Resp 18   Ht 4' 11\" (1.499 m)   Wt 61.2 kg (135 lb)   SpO2 100%   BMI 27.27 kg/m²       Bedside shift change report given to MIHIR Jasmine RN (oncoming nurse) by Richardson [employer] RN (offgoing nurse). Report included the following information SBAR, Kardex, ED Summary, Procedure Summary, Recent Results and Alarm Parameters .

## 2020-09-03 NOTE — DISCHARGE SUMMARY
[address]     Name:  Manisha Patiño  MR#:   [id number]  :  1940  ACCOUNT #:  [de-identified]  ADMIT DATE:  2020  DISCHARGE DATE:  2020      DISCHARGE DIAGNOSES:  1. Right side pneumonia. 2.  Abdominal pain. 3.  Diabetes mellitus. 4.  Mild dementia. 5.  Coronary artery disease with previous bypass. 6.  Chronic atrial fibrillation. 7.  Chronic diastolic heart failure. HOSPITAL SUMMARY:  This is an 24-year-old [place] American female who came in with her daughter. She had right-sided abdominal pain, was found have right lower lobe pneumonia and was admitted to the medical unit. For her pneumonia, she has been placed on IV antibiotics. She has tolerated that well. She has p.r.n. oxygen to use at home. She is at 2 L here. Seems to be having no issues with cough or dyspnea. Abdominal pain likely due to community-acquired pneumonia has resolved. CT scan of abdomen and pelvis was unremarkable except for the pneumonia. Diabetes numbers appear stable. There is no acute cardiac issues as far as chest pain or shortness of breath. Pulmonary edema noted here. Her daughter is in the room this morning. The patient is sitting up in the chair. They both feel she has improved and ready for discharge to home. The patient denies chest pain or shortness of breath. Productive cough this morning. She rested last night and slept well. She states that she ate breakfast this morning. Blood pressure 157/64, pulse 80, temperature 99.2, respiratory rate 18, SaO2 is 94% on 2 L. Lungs are clear bilaterally. Cardiac exam, regular rate and rhythm. Abdomen is benign, soft, nontender. Lower extremities without edema. White count 5.1, H and H 10.7 and 33.6, platelets are 621. Metabolic panel is within normal limits. Glucose 115 on fingerstick. CT scan of abdomen and pelvis showed the small right pleural effusion with pneumonia. No ureteral obstruction.   She did have retained stool. Chest x-ray on the 1st, showed right lung base airspace disease. She is stable for discharge to home with her daughter. She should follow up with nurse practitioner, Lakisha Abernathy in [facility] as soon as she gets back to home, and that has been scheduled on 09/09. DISCHARGE MEDICATIONS:  Include Augmentin 500 twice daily for 7 days and otherwise, she will resume her old medications that she was on prior to admission that include Norvasc 5 mg daily, aspirin 81 mg daily, vitamin D 2000 units daily, Celexa 20 mg daily, Plavix 75 mg daily, iron tablets three times daily with meals, Neurontin 300 mg twice daily, 70/30 insulin 13 units in the morning and 3 units at night, MiraLax daily as needed, sulfasalazine 500 mg twice daily, tramadol 50 mg twice daily, Eliquis 5 mg twice daily, Lasix 40 mg daily, Cardizem  mg daily, Pepcid 40 mg twice daily, Synthroid 25 mcg daily, Toprol-XL 50 mg daily and Aldactone 25 mg every other day. No changes to her prior to admission medications. Only new medication prescribed is Augmentin. She is improved and stable for release from the hospital.    Forty minutes discharge time in discussion with her daughter as well.       Maureen Schofield MD      [id number]  D:  09/03/2020 11:40  T:  09/03/2020 12:37  JOB #:  [id number]

## 2020-09-03 NOTE — DISCHARGE INSTRUCTIONS
Patient Education        Pneumonia: Care Instructions  Your Care Instructions     Pneumonia is an infection of the lungs. Most cases are caused by infections from bacteria or viruses. Pneumonia may be mild or very severe. If it is caused by bacteria, you will be treated with antibiotics. It may take a few weeks to a few months to recover fully from pneumonia, depending on how sick you were and whether your overall health is good. Follow-up care is a key part of your treatment and safety. Be sure to make and go to all appointments, and call your doctor if you are having problems. It's also a good idea to know your test results and keep a list of the medicines you take. How can you care for yourself at home? · Take your antibiotics exactly as directed. Do not stop taking the medicine just because you are feeling better. You need to take the full course of antibiotics. · Take your medicines exactly as prescribed. Call your doctor if you think you are having a problem with your medicine. · Get plenty of rest and sleep. You may feel weak and tired for a while, but your energy level will improve with time. · To prevent dehydration, drink plenty of fluids, enough so that your urine is light yellow or clear like water. Choose water and other caffeine-free clear liquids until you feel better. If you have kidney, heart, or liver disease and have to limit fluids, talk with your doctor before you increase the amount of fluids you drink. · Take care of your cough so you can rest. A cough that brings up mucus from your lungs is common with pneumonia. It is one way your body gets rid of the infection. But if coughing keeps you from resting or causes severe fatigue and chest-wall pain, talk to your doctor. He or she may suggest that you take a medicine to reduce the cough. · Use a vaporizer or humidifier to add moisture to your bedroom. Follow the directions for cleaning the machine.   · Do not smoke or allow others to smoke around you. Smoke will make your cough last longer. If you need help quitting, talk to your doctor about stop-smoking programs and medicines. These can increase your chances of quitting for good. · Take an over-the-counter pain medicine, such as acetaminophen (Tylenol), ibuprofen (Advil, Motrin), or naproxen (Aleve). Read and follow all instructions on the label. · Do not take two or more pain medicines at the same time unless the doctor told you to. Many pain medicines have acetaminophen, which is Tylenol. Too much acetaminophen (Tylenol) can be harmful. · If you were given a spirometer to measure how well your lungs are working, use it as instructed. This can help your doctor tell how your recovery is going. · To prevent pneumonia in the future, talk to your doctor about getting a flu vaccine (once a year) and a pneumococcal vaccine (one time only for most people). When should you call for help? [id number] anytime you think you may need emergency care. For example, call if:  · You have severe trouble breathing. Call your doctor now or seek immediate medical care if:  · You cough up dark brown or bloody mucus (sputum). · You have new or worse trouble breathing. · You are dizzy or lightheaded, or you feel like you may faint. Watch closely for changes in your health, and be sure to contact your doctor if:  · You have a new or higher fever. · You are coughing more deeply or more often. · You are not getting better after 2 days (48 hours). · You do not get better as expected. Where can you learn more? Go to http://armida-lou.info/  Enter D336 in the search box to learn more about \"Pneumonia: Care Instructions. \"  Current as of: February 24, 2020               Content Version: 12.5  © 3386-7957 [employer]. Care instructions adapted under license by [employer] (which disclaims liability or warranty for this information).  If you have questions about a medical condition or this instruction, always ask your healthcare professional. [place] [address] any warranty or liability for your use of this information. "neutral" did not assess [place], some word finding issues

## 2020-09-03 NOTE — PROGRESS NOTES
0715  Bedside and Verbal shift change report given to MIHIR Aarya (on coming nurse) by Mara Blackwell. Tashia Dejesus RN (off going nurse). Report included the following information SBAR, Kardex, OR Summary, Intake/Output and MAR.    0800  Pt quietly resting with no complaints of pain or discomfort at this time. [phone]  Pt and pt daughter received discharge instructions from [address]. Pt allowed time to address any questions or concerns. Pt demonstrated and verbalized understanding of instruction. 1315  Pt discharged to daughter.

## 2020-09-03 NOTE — PROGRESS NOTES
Pt note: pt dressing with CNA assist for discharge home. No treatment given this date. Zeeshan PT    Addendum: 12:09 Checking room and pt found approaching bathroom sink with RW left behind at bathroom door. Pt  Appears in distress, c/o feeling nauseated and appears to have difficulty standing. OT near by and in to see pt. Pt assisted back to bedside with RW/min assist. Walking slowly with RW as appears fatigued. Assisted to sit EOB. Pt states she has not eating and will wait to have meal at home. Pt became talkative and reported feeling better. Pt given crackers and peanut butter as well as ice chips and emesis bag. Reports nausea passing. Pt left with OT in NAD.   Zeeshan PT

## 2020-09-03 NOTE — PROGRESS NOTES
Problem: Self Care Deficits Care Plan (Adult)  Goal: *Acute Goals and Plan of Care (Insert Text)  Description: Initial Occupational Therapy Goals (9/3/2020) Within 7 day(s):    1. Patient will perform grooming standing at sink with supervision x 6 minutes for increased independence with ADLs. 2. Patient will perform UB dressing with mod I for increased independence with ADLs. 3. Patient will perform LB dressing with mod I & A/E PRN for increased independence with ADLs. 4. Patient will perform all aspects of toileting with mod I for increased independence in ADLs  5. Patient will independently apply energy conservation techniques with 1 verbal cue(s) for increased independence with ADLs. 6. Patient will utilize good body mechanics during ADLs with 1 verbal cue(s). Outcome: Progressing Towards Goal   OCCUPATIONAL THERAPY EVALUATION    Patient: Ever Cuevas (78 y.o. female)  Date: 9/3/2020  Primary Diagnosis: RLL pneumonia [J18.9]        Precautions: Fall  PLOF: pt mod I for ADLs, pt reports she is planning to stay with daughter at d/c    ASSESSMENT :  Based on the objective data described below, the patient presents with decreased activity tolerance, balance, safety awareness, limiting safety/independence with ADLs. Pt found standing at sink in bathroom without RW, reporting her stomach is upset. Instructed pt to use RW for safety, pt ambulated back to EOB with SBA. Pt requires vc for safety with Rw/proper hand placement during transfer. Pt able to used legs crossed method for sock donning with CGA, and required min A to adjust cloth shoe laces. Pt requires vc for reminders/sequencing of functional tasks. Pt left seated EOB, provided crackers/ice, call bell/phone within reach. Education: fall prevention, role of OT in acute care, safety with functional mobility    Patient will benefit from skilled intervention to address the above impairments.   Patient's rehabilitation potential is considered to be Good  Factors which may influence rehabilitation potential include:   []             None noted  []             Mental ability/status  []             Medical condition  []             Home/family situation and support systems  [x]             Safety awareness  []             Pain tolerance/management  []             Other:      PLAN :  Recommendations and Planned Interventions:   [x]               Self Care Training                  [x]      Therapeutic Activities  [x]               Functional Mobility Training   []      Cognitive Retraining  [x]               Therapeutic Exercises           [x]      Endurance Activities  [x]               Balance Training                    []      Neuromuscular Re-Education  []               Visual/Perceptual Training     [x]      Home Safety Training  [x]               Patient Education                   []      Family Training/Education  []               Other (comment):    Frequency/Duration: Patient will be followed by occupational therapy 1-2 times per day/4-7 days per week to address goals. Discharge Recommendations: Home Health with family assist  Further Equipment Recommendations for Discharge: N/A     SUBJECTIVE:   Patient stated I think I haven't eaten enough today.     OBJECTIVE DATA SUMMARY:     Past Medical History:   Diagnosis Date    Arrhythmia     atrial fibrillation 2018    Arthritis     CAD (coronary artery disease)     CABG, pacemaker    Chronic pain     COPD     Diabetes ([facility] [address]     GERD (gastroesophageal reflux disease)     Hypertension     Ill-defined condition     high cholesterol     Past Surgical History:   Procedure Laterality Date    CARDIAC SURG PROCEDURE UNLIST      3 CABG, Stents    HX PACEMAKER      July 2011    PA COLONOSCOPY FLX DX W/COLLJ SPEC WHEN PFRMD  11/10/2011         PA EGD TRANSORAL BIOPSY SINGLE/MULTIPLE  8/29/2012          Barriers to Learning/Limitations: yes;  physical  Compensate with: visual, verbal, tactile, kinesthetic cues/model    Home Situation: pt mod I for ADLs, living in one  story home  [address]: Private residence  One/Two Story Residence: One story  Living Alone: No  Support Systems: Child(jenny)  Patient Expects to be Discharged to[de-identified] Private residence  Current DME Used/Available at Home: Daylene Crellin, rolling, Daylene Crellin, rollator  Tub or Shower Type: Tub/Shower combination  []  Right hand dominant   []  Left hand dominant    Cognitive/Behavioral Status:  Neurologic State: Alert  Orientation Level: Oriented to person;Oriented to place;Oriented to situation;Disoriented to time  Cognition: Decreased attention/concentration;Poor safety awareness; Follows commands  Safety/Judgement: Decreased awareness of need for assistance;Decreased awareness of need for safety;Decreased insight into deficits    Coordination: BUE  Fine Motor Skills-Upper: Left Intact; Right Intact    Gross Motor Skills-Upper: Left Intact; Right Intact    Balance:  Sitting: Intact  Standing: Intact; With support    Strength: BUE  Strength: Generally decreased, functional    Tone & Sensation: BUE  Tone: Normal  Sensation: Intact    Range of Motion: BUE  AROM: Generally decreased, functional    Functional Mobility and Transfers for ADLs:    Transfers:  Sit to Stand: Stand-by assistance   Bathroom Mobility: Stand-by assistance    ADL Assessment:   Feeding: Setup    Oral Facial Hygiene/Grooming: Stand-by assistance    Bathing: Contact guard assistance    Upper Body Dressing: Stand-by assistance    Lower Body Dressing: Contact guard assistance    Toileting: Stand by assistance    ADL Intervention:  Grooming  Grooming Assistance: Stand-by assistance  Position Performed: Standing  Washing Hands: Stand-by assistance    Lower Body Dressing Assistance  Dressing Assistance: Contact guard assistance  Socks: Contact guard assistance  Shoes with Cloth Laces: Minimum assistance    Cognitive Retraining  Safety/Judgement: Decreased awareness of need for assistance;Decreased awareness of need for safety;Decreased insight into deficits    Pain:  Pain level pre-treatment: 0/10   Pain level post-treatment: 0/10   Pain Intervention(s): Medication provided by Nursing (see MAR); Rest, Ice, Repositioning   Response to intervention: Nurse notified, See doc flow sheet    Activity Tolerance:   Fair-. Patient able to stand ~4 minute(s). Patient requires frequent rest breaks. Patient limited by endurance, balance. Patient unsteady. Please refer to the flowsheet for vital signs taken during this treatment. After treatment:   [] Patient left in no apparent distress sitting up in chair  [x] Patient left in no apparent distress in bed  [x] Call bell left within reach  [x] Nursing notified  [] Caregiver present  [] Bed alarm activated    COMMUNICATION/EDUCATION:   [x] Role of Occupational Therapy in the acute care setting  [x] Home safety education was provided and the patient/caregiver indicated understanding. [x] Patient/family have participated as able in goal setting and plan of care. [x] Patient/family agree to work toward stated goals and plan of care. [] Patient understands intent and goals of therapy, but is neutral about his/her participation. [] Patient is unable to participate in goal setting and plan of care. Thank you for this referral.  Radha Winston OTR/L  Time Calculation: 15 mins    Eval Complexity: History: MEDIUM Complexity : Expanded review of history including physical, cognitive and psychosocial  history ; Examination: LOW Complexity : 1-3 performance deficits relating to physical, cognitive , or psychosocial skils that result in activity limitations and / or participation restrictions ; Decision Making:MEDIUM Complexity : Patient may present with comorbidities that affect occupational performnce.  Miniml to moderate modification of tasks or assistance (eg, physical or verbal ) with assesment(s) is necessary to enable patient to complete evaluation

## 2020-09-04 ENCOUNTER — TELEPHONE (OUTPATIENT)
Dept: CASE MANAGEMENT | Age: 80
End: 2020-09-04

## 2020-09-10 NOTE — PROGRESS NOTES
Physician Progress Note      Duke Dillon  CSN #:                  [id number]  :                       1940  ADMIT DATE:       2020 6:30 PM  [address] DATE:        9/3/2020 1:48 PM  RESPONDING  PROVIDER #:        Pamela Rose MD          QUERY TEXT:    Dear Dr. Carlos Vega,  Patient admitted with RLL pneumonia, noted to have atrial fibrillation. If possible, please document in progress notes and discharge summary further specificity regarding the type of atrial fibrillation: The medical record reflects the following:    Risk Factors: PMH CABG, CHF, DM    Clinical Indicators: Afib noted 20  @ 1828 in Dr. Claudette Moons progress note    Treatment: Receiving betablocker, tiazac, eliquis, telemetry monitoring, has a pacemaker  Chronic: nonspecific term that could be referring to paroxysmal, persistent, or permanent  Longstanding persistent: persistent and continuous, lasting > 1 year. Paroxysmal - self-terminating or intermittent; resolves with or without intervention within 7 days of onset; may recur with various frequency. Persistent - Fails to terminate within 7 days; Often requires meds or cardioversion to restore to NSR. Permanent - longstanding & persistent; Medication has been ineffective in restoring NSR &/or cardioversion is contraindicated    Definitions per MS-DRG Training Guide and Quick Reference Guide, Daniel [address] 5 Diseases and Disorders of the Circulatory System; 2019; [employer]. Software content from the [employer]?  [employer].    Thank you,  Jazmin De La O RN, BSN, [facility]  [phone]  Options provided:  -- Paroxysmal Atrial Fibrillation  -- Longstanding Persistent Atrial Fibrillation  -- Permanent Atrial Fibrillation  -- Persistent Atrial Fibrillation  -- Chronic Atrial Fibrillation, unspecified  -- Other - I will add my own diagnosis  -- Disagree - Not applicable / Not valid  -- Disagree - Clinically unable to determine / Unknown  -- Refer to Clinical Documentation Reviewer    PROVIDER RESPONSE TEXT:    This patient has paroxysmal atrial fibrillation.     Query created by: Shanda Melchor on 9/8/2020 10:39 AM      Electronically signed by:  Jarad Hurtado MD 9/10/2020 5:02 PM

## 2020-09-25 ENCOUNTER — TELEPHONE (OUTPATIENT)
Dept: CASE MANAGEMENT | Age: 80
End: 2020-09-25

## 2020-09-25 NOTE — TELEPHONE ENCOUNTER
Patient resolved from Transition of Care episode on 9/25/2020  Discussed COVID-19 related testing which was not done at this time. Test results were not done. Patient informed of results, if available?  Not done     vm not set up unable to reach patient

## 2021-04-27 RX ORDER — VENLAFAXINE HYDROCHLORIDE 37.5 MG/1
75 TABLET, EXTENDED RELEASE ORAL
COMMUNITY
End: 2021-10-25

## 2021-04-27 RX ORDER — ROSUVASTATIN CALCIUM 5 MG/1
5 TABLET, COATED ORAL
COMMUNITY
End: 2021-08-26 | Stop reason: CLARIF

## 2021-04-27 RX ORDER — ACETAMINOPHEN 325 MG/1
TABLET ORAL
COMMUNITY
End: 2021-08-26 | Stop reason: CLARIF

## 2021-04-27 NOTE — PERIOP NOTES
[facility]  Preoperative Instructions        Surgery Date 5/5/21          Time of Arrival 0545    1. On the day of your surgery, please report to the Surgical Services Registration Desk and sign in at your designated time. The Surgery Center is located to the right of the Emergency Room. 2. You must have someone with you to drive you home. You should not drive a car for 24 hours following surgery. Please make arrangements for a friend or family member to stay with you for the first 24 hours after your surgery. 3. Do not have anything to eat or drink (including water, gum, mints, coffee, juice) after midnight ?5/4/21? Stephen Valdez ? This may not apply to medications prescribed by your physician. ?(Please note below the special instructions with medications to take the morning of your procedure.)    4. We recommend you do not drink any alcoholic beverages for 24 hours before and after your surgery. 5. Contact your surgeons office for instructions on the following medications: non-steroidal anti-inflammatory drugs (i.e. Advil, Aleve), vitamins, and supplements. (Some surgeons will want you to stop these medications prior to surgery and others may allow you to take them)  **If you are currently taking Plavix, Coumadin, Aspirin and/or other blood-thinning agents, contact your surgeon for instructions. ** Your surgeon will partner with the physician prescribing these medications to determine if it is safe to stop or if you need to continue taking. Please do not stop taking these medications without instructions from your surgeon    6. Wear comfortable clothes. Wear glasses instead of contacts. Do not bring any money or jewelry. Please bring picture ID, insurance card, and any prearranged co-payment or hospital payment. Do not wear make-up, particularly mascara the morning of your surgery. Do not wear nail polish, particularly if you are having foot /hand surgery.   Wear your hair loose or down, no ponytails, buns, emily pins or clips. All body piercings must be removed. Please shower with antibacterial soap for three consecutive days before and on the morning of surgery, but do not apply any lotions, powders or deodorants after the shower on the day of surgery. Please use a fresh towels after each shower. Please sleep in clean clothes and change bed linens the night before surgery. Please do not shave for 48 hours prior to surgery. Shaving of the face is acceptable. 7. You should understand that if you do not follow these instructions your surgery may be cancelled. If your physical condition changes (I.e. fever, cold or flu) please contact your surgeon as soon as possible. 8. It is important that you be on time. If a situation occurs where you may be late, please call [phone] (OR Holding Area). 9. If you have any questions and or problems, please call [phone] (Pre-admission Testing). 10. Your surgery time may be subject to change. You will receive a phone call the evening prior if your time changes. 11.  If having outpatient surgery, you must have someone to drive you here, stay with you during the duration of your stay, and to drive you home at time of discharge. Special Instructions: do not stop Eliquis per MD  covid test 5/1    TAKE ALL MEDICATIONS DAY OF SURGERY EXCEPT:  Vitamins/supplements, miralax, insulin    I understand a pre-operative phone call will be made to verify my surgery time. In the event that I am not available, I give permission for a message to be left on my answering service and/or with another person?   Yes daughter Zak Archuleta [phone]         ___________________      __________   _________    Lesvia Witt of Patient)             (Witness)                (Date and Time)

## 2021-05-01 ENCOUNTER — HOSPITAL ENCOUNTER (OUTPATIENT)
Dept: PREADMISSION TESTING | Age: 81
Discharge: HOME | End: 2021-05-01
Payer: MEDICARE

## 2021-05-01 LAB — SARS-COV-2, COV2: NORMAL

## 2021-05-01 PROCEDURE — U0005 INFEC AGEN DETEC AMPLI PROBE: HCPCS

## 2021-05-02 LAB — SARS-COV-2, COV2NT: NOT DETECTED

## 2021-05-03 ENCOUNTER — APPOINTMENT (OUTPATIENT)
Dept: GENERAL RADIOLOGY | Age: 81
End: 2021-05-03
Attending: EMERGENCY MEDICINE
Payer: MEDICARE

## 2021-05-03 ENCOUNTER — HOSPITAL ENCOUNTER (EMERGENCY)
Age: 81
Discharge: HOME | End: 2021-05-03
Attending: EMERGENCY MEDICINE
Payer: MEDICARE

## 2021-05-03 VITALS
RESPIRATION RATE: 11 BRPM | BODY MASS INDEX: 27.21 KG/M2 | OXYGEN SATURATION: 94 % | SYSTOLIC BLOOD PRESSURE: 122 MMHG | HEIGHT: 59 IN | TEMPERATURE: 98 F | DIASTOLIC BLOOD PRESSURE: 54 MMHG | HEART RATE: 80 BPM | WEIGHT: 135 LBS
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
## 2021-05-03 DIAGNOSIS — S80.811A ABRASION OF RIGHT LOWER EXTREMITY, INITIAL ENCOUNTER: ICD-10-CM

## 2021-05-03 DIAGNOSIS — R07.9 CHEST PAIN, UNSPECIFIED TYPE: ICD-10-CM

## 2021-05-03 LAB
ALBUMIN SERPL-MCNC: 3.5 G/DL (ref 3.4–5)
ALBUMIN/GLOB SERPL: 1.2 {RATIO} (ref 0.8–1.7)
ALP SERPL-CCNC: 372 U/L (ref 45–117)
ALT SERPL-CCNC: 15 U/L (ref 13–56)
ANION GAP SERPL CALC-SCNC: 7 MMOL/L (ref 3–18)
AST SERPL-CCNC: 21 U/L (ref 10–38)
BASOPHILS # BLD: 0 K/UL (ref 0–0.1)
BASOPHILS NFR BLD: 1 % (ref 0–2)
BILIRUB SERPL-MCNC: 0.3 MG/DL (ref 0.2–1)
BNP SERPL-MCNC: 2240 PG/ML (ref 0–1800)
BUN SERPL-MCNC: 20 MG/DL (ref 7–18)
BUN/CREAT SERPL: 22 (ref 12–20)
CALCIUM SERPL-MCNC: 9.5 MG/DL (ref 8.5–10.1)
CHLORIDE SERPL-SCNC: 108 MMOL/L (ref 100–111)
CK MB CFR SERPL CALC: 2 % (ref 0–4)
CK MB SERPL-MCNC: 1.5 NG/ML (ref 5–25)
CK SERPL-CCNC: 75 U/L (ref 26–192)
CO2 SERPL-SCNC: 27 MMOL/L (ref 21–32)
CREAT SERPL-MCNC: 0.91 MG/DL (ref 0.6–1.3)
DIFFERENTIAL METHOD BLD: ABNORMAL
EOSINOPHIL # BLD: 0.2 K/UL (ref 0–0.4)
EOSINOPHIL NFR BLD: 4 % (ref 0–5)
ERYTHROCYTE [DISTWIDTH] IN BLOOD BY AUTOMATED COUNT: 13.4 % (ref 11.6–14.5)
GLOBULIN SER CALC-MCNC: 3 G/DL (ref 2–4)
GLUCOSE SERPL-MCNC: 116 MG/DL (ref 74–99)
HCT VFR BLD AUTO: 37.3 % (ref 35–45)
HGB BLD-MCNC: 11.9 G/DL (ref 12–16)
LYMPHOCYTES # BLD: 1.3 K/UL (ref 0.9–3.6)
LYMPHOCYTES NFR BLD: 22 % (ref 21–52)
MCH RBC QN AUTO: 32.4 PG (ref 24–34)
MCHC RBC AUTO-ENTMCNC: 31.9 G/DL (ref 31–37)
MCV RBC AUTO: 101.6 FL (ref 74–97)
MONOCYTES # BLD: 0.6 K/UL (ref 0.05–1.2)
MONOCYTES NFR BLD: 10 % (ref 3–10)
NEUTS SEG # BLD: 3.8 K/UL (ref 1.8–8)
NEUTS SEG NFR BLD: 63 % (ref 40–73)
PLATELET # BLD AUTO: 178 K/UL (ref 135–420)
PMV BLD AUTO: 9.9 FL (ref 9.2–11.8)
POTASSIUM SERPL-SCNC: 4.3 MMOL/L (ref 3.5–5.5)
PROT SERPL-MCNC: 6.5 G/DL (ref 6.4–8.2)
RBC # BLD AUTO: 3.67 M/UL (ref 4.2–5.3)
SODIUM SERPL-SCNC: 142 MMOL/L (ref 136–145)
TROPONIN I SERPL-MCNC: 0.02 NG/ML (ref 0–0.04)
WBC # BLD AUTO: 6 K/UL (ref 4.6–13.2)

## 2021-05-03 PROCEDURE — 83880 ASSAY OF NATRIURETIC PEPTIDE: CPT

## 2021-05-03 PROCEDURE — 96374 THER/PROPH/DIAG INJ IV PUSH: CPT

## 2021-05-03 PROCEDURE — 96375 TX/PRO/DX INJ NEW DRUG ADDON: CPT

## 2021-05-03 PROCEDURE — 85025 COMPLETE CBC W/AUTO DIFF WBC: CPT

## 2021-05-03 PROCEDURE — 71045 X-RAY EXAM CHEST 1 VIEW: CPT

## 2021-05-03 PROCEDURE — 72170 X-RAY EXAM OF PELVIS: CPT

## 2021-05-03 PROCEDURE — 99285 EMERGENCY DEPT VISIT HI MDM: CPT

## 2021-05-03 PROCEDURE — 74011250636 HC RX REV CODE- 250/636: Performed by: EMERGENCY MEDICINE

## 2021-05-03 PROCEDURE — 94762 N-INVAS EAR/PLS OXIMTRY CONT: CPT

## 2021-05-03 PROCEDURE — 82553 CREATINE MB FRACTION: CPT

## 2021-05-03 PROCEDURE — 80053 COMPREHEN METABOLIC PANEL: CPT

## 2021-05-03 PROCEDURE — 93005 ELECTROCARDIOGRAM TRACING: CPT

## 2021-05-03 RX ORDER — MORPHINE SULFATE 4 MG/ML
4 INJECTION INTRAVENOUS ONCE
Status: COMPLETED | OUTPATIENT
Start: 2021-05-03 | End: 2021-05-03

## 2021-05-03 RX ORDER — BACITRACIN 500 [USP'U]/G
OINTMENT TOPICAL 2 TIMES DAILY
Qty: 1 TUBE | Refills: 0 | Status: SHIPPED | OUTPATIENT
Start: 2021-05-03 | End: 2021-05-13

## 2021-05-03 RX ORDER — ONDANSETRON 2 MG/ML
4 INJECTION INTRAMUSCULAR; INTRAVENOUS
Status: COMPLETED | OUTPATIENT
Start: 2021-05-03 | End: 2021-05-03

## 2021-05-03 RX ORDER — DICLOFENAC SODIUM 10 MG/G
4 GEL TOPICAL 4 TIMES DAILY
Qty: 100 G | Refills: 0 | Status: SHIPPED | OUTPATIENT
Start: 2021-05-03 | End: 2021-08-26 | Stop reason: CLARIF

## 2021-05-03 RX ADMIN — MORPHINE SULFATE 4 MG: 4 INJECTION INTRAVENOUS at 03:09

## 2021-05-03 RX ADMIN — ONDANSETRON 4 MG: 2 INJECTION INTRAMUSCULAR; INTRAVENOUS at 03:07

## 2021-05-03 NOTE — PERIOP NOTES
Left message with Dr. Vola Goodpasture office of ER encounter today with fall and chest pain. Discharged home.

## 2021-05-03 NOTE — ED PROVIDER NOTES
EMERGENCY DEPARTMENT HISTORY AND PHYSICAL EXAM    Date: 5/3/2021  Patient Name: Rosendo Baig    History of Presenting Illness     Chief Complaint   Patient presents with    Chest Pain         History Provided By: Patient and EMS    Rosendo Baig is a 78 y.o. female who presents to the emergency department C/O fall, chest pain. Patient states this evening she fell after getting up out of her bed. She states she did land on her chest region and notes that she was having pain in that area after the fall. She states she had some difficulty getting up. She denies any head injury, neck or back injury or hip injury. States she did injure her right shin region and had some bleeding but was able to patch it up with some gauze. EMS reports giving the patient aspirin and nitroglycerin which the patient notes did not make any difference with her pain. She denies any shortness of breath. No other complaints        PCP: Inna Cespedes NP    Current Outpatient Medications   Medication Sig Dispense Refill    diclofenac (VOLTAREN) 1 % gel Apply 4 g to affected area four (4) times daily. 100 g 0    bacitracin (BACITRACIN) 500 unit/gram oint Apply  to affected area two (2) times a day for 10 days. Apply to affected area 1 Tube 0    venlafaxine (EFFEXOR) 37.5 mg tablet Take 37.5 mg by mouth nightly.  rosuvastatin (CRESTOR) 5 mg tablet Take 5 mg by mouth nightly.  acetaminophen (TylenoL) 325 mg tablet Take  by mouth every four (4) hours as needed for Pain.  cholecalciferol (Vitamin D3) (1000 Units /25 mcg) tablet Take 2,000 Units by mouth daily.  levothyroxine (synthroid) 25 mcg tablet Take 25 mcg by mouth Daily (before breakfast).  sulfaSALAzine (AZULFIDINE) 500 mg tablet Take 500 mg by mouth two (2) times a day.  ferrous gluconate (FERGON) 240 mg (27 mg iron) tablet Take 240 mg by mouth three (3) times daily (with meals).       famotidine (PEPCID) 40 mg tablet Take 40 mg by mouth two (2) times a day.  dilTIAZem ER (TIAZAC) 180 mg capsule Take 180 mg by mouth daily.  apixaban (Eliquis) 5 mg tablet Take 5 mg by mouth two (2) times a day.  spironolactone (ALDACTONE) 25 mg tablet Take 25 mg by mouth every other day.  amLODIPine (NORVASC) 5 mg tablet Take 10 mg by mouth daily.  insulin aspart protamine/insulin aspart (NOVOLOG MIX 70-30 U-100 INSULN) 100 unit/mL (70-30) injection 16 Units by SubCUTAneous route daily. Patient takes 16 units in the AM and 5 units QHS      furosemide (LASIX) 40 mg tablet Take 1 Tab by mouth daily. 30 Tab 0    polyethylene glycol (MIRALAX) 17 gram packet Take 17 g by mouth daily. Indications: constipation      metoprolol-XL (TOPROL-XL) 100 mg XL tablet Take 50 mg by mouth daily. Past History     Past Medical History:  Past Medical History:   Diagnosis Date    Arrhythmia     atrial fibrillation 2018    Arthritis     CAD (coronary artery disease)     CABG, pacemaker    Chronic pain     COPD     Diabetes ([address]     GERD (gastroesophageal reflux disease)     Heart failure (HCC)     Hypertension     Ill-defined condition     high cholesterol    Thyroid disease        Past Surgical History:  Past Surgical History:   Procedure Laterality Date    HX ORTHOPAEDIC Left     toe amputation    HX PACEMAKER      July 2011    LA CARDIAC SURG PROCEDURE UNLIST      3 CABG, Stents    LA COLONOSCOPY FLX DX W/COLLJ SPEC WHEN PFRMD  11/10/2011         LA EGD TRANSORAL BIOPSY SINGLE/MULTIPLE  8/29/2012            Family History:  Family History   Problem Relation Age of Onset    Lung Disease Mother     Heart Disease Father     Hypertension Father     Diabetes Sister        Social History:  Social History     Tobacco Use    Smoking status: Never Smoker    Smokeless tobacco: Never Used   Substance Use Topics    Alcohol use: No    Drug use: No       Allergies:   Allergies   Allergen Reactions    Percocet [Oxycodone-Acetaminophen] Nausea and Vomiting         Review of Systems   Review of Systems   Constitutional: Negative for fever. Respiratory: Negative for shortness of breath. Cardiovascular: Positive for chest pain. Gastrointestinal: Negative for abdominal pain, nausea and vomiting. Musculoskeletal: Negative for arthralgias, back pain, myalgias, neck pain and neck stiffness. Skin: Positive for wound. All other systems reviewed and are negative. All other systems reviewed and are negative.     Physical Exam     Vitals:    05/03/21 0315 05/03/21 0330 05/03/21 0345 05/03/21 0415   BP: 135/65 135/61 (!) 133/59 136/83   Pulse: 80 80 80    Resp: 17 20 14    Temp:       SpO2: 96% 96% 93% 97%   Weight:       Height:         Physical Exam    Nursing notes and vital signs reviewed    Airway: intact, speaking normally  Breathing: No apparent distress, no cyanosis  Circulation: Peripheral pulses equal    Constitutional: Chronically ill-appearing, non toxic appearing, no acute distress, appearing stated age  Head: Normocephalic, Atraumatic  Eyes: PERRL, EOMI, No conjunctival injection  Ears: external ears normal  Nose: No rhinorrhea, external nose normal  Throat: mucous membranes moist  Neck: symmetric, trachea midline, no obvious swelling, no JVD, no obvious deformity  Cardiovascular: Regular rate and rhythm, no murmurs  Chest: Reproducible anterior chest wall tenderness, structural deformity or crepitus  Lungs: Clear to ausculation bilaterally, No stridor, Normal work of breathing and chest excursion bilaterally  Abdomen: Soft, non tender, non distended, normoactive bowel sounds, No rigidity, no peritoneal signs  Musculoskeletal:  No evidence of obvious deformity to the back, extremities, no LE edema  Skin: Warm, dry, abrasion noted to the right anterior shin  Neuro: Alert and oriented x 3, CN 2-12 intact, normal speech, strength and sensation full and symmetric bilaterally  Psychiatric: Normal mood and affect      Diagnostic Study Results Labs -     Recent Results (from the past 72 hour(s))   SARS-COV-2    Collection Time: 05/01/21  6:05 AM   Result Value Ref Range    SARS-CoV-2 Please find results under separate order     NOVEL CORONAVIRUS (COVID-19)    Collection Time: 05/01/21  6:05 AM   Result Value Ref Range    SARS-CoV-2 Not Detected Not Detected     EKG, 12 LEAD, INITIAL    Collection Time: 05/03/21  2:35 AM   Result Value Ref Range    Ventricular Rate 80 BPM    Atrial Rate 234 BPM    QRS Duration 162 ms    Q-T Interval 476 ms    QTC Calculation (Bezet) 548 ms    Calculated R Axis -91 degrees    Calculated T Axis 75 degrees    Diagnosis       Ventricular-paced rhythm  Abnormal ECG  When compared with ECG of 09-AUG-2017 18:58,  Vent. rate has increased BY  20 BPM     CBC WITH AUTOMATED DIFF    Collection Time: 05/03/21  2:52 AM   Result Value Ref Range    WBC 6.0 4.6 - 13.2 K/uL    RBC 3.67 (L) 4.20 - 5.30 M/uL    HGB 11.9 (L) 12.0 - 16.0 g/dL    HCT 37.3 35.0 - 45.0 %    .6 (H) 74.0 - 97.0 FL    MCH 32.4 24.0 - 34.0 PG    MCHC 31.9 31.0 - 37.0 g/dL    RDW 13.4 11.6 - 14.5 %    PLATELET 069 403 - 472 K/uL    MPV 9.9 9.2 - 11.8 FL    NEUTROPHILS 63 40 - 73 %    LYMPHOCYTES 22 21 - 52 %    MONOCYTES 10 3 - 10 %    EOSINOPHILS 4 0 - 5 %    BASOPHILS 1 0 - 2 %    ABS. NEUTROPHILS 3.8 1.8 - 8.0 K/UL    ABS. LYMPHOCYTES 1.3 0.9 - 3.6 K/UL    ABS. MONOCYTES 0.6 0.05 - 1.2 K/UL    ABS. EOSINOPHILS 0.2 0.0 - 0.4 K/UL    ABS.  BASOPHILS 0.0 0.0 - 0.1 K/UL    DF AUTOMATED     CARDIAC PANEL,(CK, CKMB & TROPONIN)    Collection Time: 05/03/21  3:50 AM   Result Value Ref Range    CK - MB 1.5 <3.6 ng/ml    CK-MB Index 2.0 0.0 - 4.0 %    CK 75 26 - 192 U/L    Troponin-I, QT 0.02 0.0 - 5.018 NG/ML   METABOLIC PANEL, COMPREHENSIVE    Collection Time: 05/03/21  3:50 AM   Result Value Ref Range    Sodium 142 136 - 145 mmol/L    Potassium 4.3 3.5 - 5.5 mmol/L    Chloride 108 100 - 111 mmol/L    CO2 27 21 - 32 mmol/L    Anion gap 7 3.0 - 18 mmol/L Glucose 116 (H) 74 - 99 mg/dL    BUN 20 (H) 7.0 - 18 MG/DL    Creatinine 0.91 0.6 - 1.3 MG/DL    BUN/Creatinine ratio 22 (H) 12 - 20      GFR est AA >60 >60 ml/min/1.73m2    GFR est non-AA 59 (L) >60 ml/min/1.73m2    Calcium 9.5 8.5 - 10.1 MG/DL    Bilirubin, total 0.3 0.2 - 1.0 MG/DL    ALT (SGPT) 15 13 - 56 U/L    AST (SGOT) 21 10 - 38 U/L    Alk. phosphatase 372 (H) 45 - 117 U/L    Protein, total 6.5 6.4 - 8.2 g/dL    Albumin 3.5 3.4 - 5.0 g/dL    Globulin 3.0 2.0 - 4.0 g/dL    A-G Ratio 1.2 0.8 - 1.7     NT-PRO BNP    Collection Time: 05/03/21  3:50 AM   Result Value Ref Range    NT pro-BNP 2,240 (H) 0 - 1,800 PG/ML       Radiologic Studies -   XR CHEST PORT   Final Result      Cardiomegaly with no acute pulmonary abnormalities. XR PELV 1 OR 2 V   Final Result      1. Decreased bone mineral density with no acute fracture or dislocation. 2. Degenerative changes involving the hips and lower lumbar spine. Possible   chronic sequela of left sacroiliitis. CT Results  (Last 48 hours)    None        CXR Results  (Last 48 hours)               05/03/21 0326  XR CHEST PORT Final result    Impression:      Cardiomegaly with no acute pulmonary abnormalities. Narrative:  EXAM: CHEST RADIOGRAPH       CLINICAL INDICATION/HISTORY: cp     > Additional: Right-sided chest pain       COMPARISON: 9/1/2020       TECHNIQUE: Portable frontal view of the chest       _______________       FINDINGS:       SUPPORT DEVICES: Biventricular pacemaker in place. HEART AND MEDIASTINUM: Heart is enlarged. Prior CABG. LUNGS AND PLEURAL SPACES: No focal consolidation, effusion, or pneumothorax.        BONES AND SOFT TISSUES: Degenerative changes in the shoulders and spine.       _______________                 Medications given in the ED-  Medications   morphine injection 4 mg (4 mg IntraVENous Given 5/3/21 8542)   ondansetron (ZOFRAN) injection 4 mg (4 mg IntraVENous Given 5/3/21 1344)         Medical Decision Making     I reviewed the vital signs, available nursing notes, past medical history, past surgical history, family history and social history. Vital Signs interpretation- I have reviewed the patient's vital signs. Pulse Oximetry interpretation - 100% on Room air     Cardiac Monitor interpretation:  Rate: 80 bpm  Rhythm: sinus    EKG interpretation: (Preliminary)  EKG interpretation by Dr. Speedy Ramos 80 ventricular paced rhythm. Records Reviewed: Nursing Notes and Old Medical Records    Procedures:  Procedures    ED Course & MDM:   Based on the patient's history and physical exam findings that be more concerned with chest wall related pain. Laboratory findings unremarkable, chest x-ray normal and pelvis x-ray normal.  She was given morphine with improvement of her pain    Discussed with the patient the results of their emergency department testing. I discussed with the patient that there are many causes for chest pain, in her case that be concern for chest wall related pain. Some of these causes could be serious and life threatening, and our tests obtained today shows a lower likelihood of those etiologies, but many other causes are not life threatening, such as stress/anxiety, heartburn, simple infections, bruised/inflammation to bones/muscles, etc. Recommended to return to Emergency Department or seek medical attention immediately if you have acute worsening in your chest pain or develop shortness of breath, repeated vomiting, fever, coughing up blood, or start sweating. Avoid any activities that bring on the chest pain. Also, avoid any tobacco products or excessive alcohol. Recommended to take any medications that were prescribed as directed and follow up with their primary care physician for reevaluation. They understand and agree to plan      Diagnosis and Disposition         DISCHARGE NOTE:    Kee Jean  results have been reviewed with her.   She has been counseled regarding her diagnosis, treatment, and plan. She verbally conveys understanding and agreement of the signs, symptoms, diagnosis, treatment and prognosis and additionally agrees to follow up as discussed. She also agrees with the care-plan and conveys that all of her questions have been answered. I have also provided discharge instructions for her that include: educational information regarding their diagnosis and treatment, and list of reasons why they would want to return to the ED prior to their follow-up appointment, should her condition change. She has been provided with education for proper emergency department utilization. CLINICAL IMPRESSION:    1. Fall, initial encounter    2. Chest pain, unspecified type    3. Abrasion of right lower extremity, initial encounter        PLAN:  1. D/C Home  2. Current Discharge Medication List      START taking these medications    Details   diclofenac (VOLTAREN) 1 % gel Apply 4 g to affected area four (4) times daily. Qty: 100 g, Refills: 0      bacitracin (BACITRACIN) 500 unit/gram oint Apply  to affected area two (2) times a day for 10 days. Apply to affected area  Qty: 1 Tube, Refills: 0           3. Follow-up Information     Follow up With Specialties Details Why [facility], [address] [place] [address], NP Family Medicine   [address]  [phone]          _______________________________      Please note that this dictation was completed with [employer], the computer voice recognition software. Quite often unanticipated grammatical, syntax, homophones, and other interpretive errors are inadvertently transcribed by the computer software. Please disregard these errors. Please excuse any errors that have escaped final proofreading.

## 2021-05-03 NOTE — ED NOTES
I have reviewed discharge instructions with the caregiver. The caregiver verbalized understanding. Discharge medications reviewed with caregiver and appropriate educational materials and side effects teaching were provided. NAD. VSS. AOX4. Home with daughter.

## 2021-05-03 NOTE — ED TRIAGE NOTES
Patient c/o left sided chest pain that started 30 min pta after tripping and falling while going to the bathroom. 324mg asa and 2 nitro pta with no relief.

## 2021-05-04 LAB
ATRIAL RATE: 234 BPM
CALCULATED R AXIS, ECG10: -91 DEGREES
CALCULATED T AXIS, ECG11: 75 DEGREES
DIAGNOSIS, 93000: NORMAL
Q-T INTERVAL, ECG07: 476 MS
QRS DURATION, ECG06: 162 MS
QTC CALCULATION (BEZET), ECG08: 548 MS
VENTRICULAR RATE, ECG03: 80 BPM

## 2021-05-05 ENCOUNTER — ANESTHESIA (OUTPATIENT)
Dept: SURGERY | Age: 81
End: 2021-05-05
Payer: MEDICARE

## 2021-05-05 ENCOUNTER — HOSPITAL ENCOUNTER (OUTPATIENT)
Age: 81
Setting detail: OUTPATIENT SURGERY
Discharge: HOME | End: 2021-05-05
Attending: SURGERY | Admitting: SURGERY
Payer: MEDICARE

## 2021-05-05 ENCOUNTER — ANESTHESIA EVENT (OUTPATIENT)
Dept: SURGERY | Age: 81
End: 2021-05-05
Payer: MEDICARE

## 2021-05-05 VITALS
SYSTOLIC BLOOD PRESSURE: 154 MMHG | RESPIRATION RATE: 16 BRPM | HEIGHT: 59 IN | DIASTOLIC BLOOD PRESSURE: 64 MMHG | WEIGHT: 127.21 LBS | OXYGEN SATURATION: 94 % | TEMPERATURE: 97.6 F | HEART RATE: 80 BPM | BODY MASS INDEX: 25.64 KG/M2

## 2021-05-05 DIAGNOSIS — L76.82 PAIN AT SURGICAL INCISION: Primary | ICD-10-CM

## 2021-05-05 LAB
GLUCOSE BLD STRIP.AUTO-MCNC: 130 MG/DL (ref 65–100)
GLUCOSE BLD STRIP.AUTO-MCNC: 149 MG/DL (ref 65–100)
SERVICE CMNT-IMP: ABNORMAL
SERVICE CMNT-IMP: ABNORMAL

## 2021-05-05 PROCEDURE — 76210000006 HC OR PH I REC 0.5 TO 1 HR: Performed by: SURGERY

## 2021-05-05 PROCEDURE — 88311 DECALCIFY TISSUE: CPT

## 2021-05-05 PROCEDURE — 77030002916 HC SUT ETHLN J&J -A: Performed by: SURGERY

## 2021-05-05 PROCEDURE — 74011250636 HC RX REV CODE- 250/636: Performed by: NURSE ANESTHETIST, CERTIFIED REGISTERED

## 2021-05-05 PROCEDURE — 77030036687 HC SHOE PSTOP S2SG -A

## 2021-05-05 PROCEDURE — 74011250636 HC RX REV CODE- 250/636: Performed by: ANESTHESIOLOGY

## 2021-05-05 PROCEDURE — 74011000250 HC RX REV CODE- 250: Performed by: SURGERY

## 2021-05-05 PROCEDURE — 82962 GLUCOSE BLOOD TEST: CPT

## 2021-05-05 PROCEDURE — 76060000032 HC ANESTHESIA 0.5 TO 1 HR: Performed by: SURGERY

## 2021-05-05 PROCEDURE — 76210000020 HC REC RM PH II FIRST 0.5 HR: Performed by: SURGERY

## 2021-05-05 PROCEDURE — 88305 TISSUE EXAM BY PATHOLOGIST: CPT

## 2021-05-05 PROCEDURE — 77030031139 HC SUT VCRL2 J&J -A: Performed by: SURGERY

## 2021-05-05 PROCEDURE — 74011000250 HC RX REV CODE- 250: Performed by: NURSE ANESTHETIST, CERTIFIED REGISTERED

## 2021-05-05 PROCEDURE — 74011250637 HC RX REV CODE- 250/637: Performed by: SURGERY

## 2021-05-05 PROCEDURE — 74011250636 HC RX REV CODE- 250/636: Performed by: SURGERY

## 2021-05-05 PROCEDURE — 2709999900 HC NON-CHARGEABLE SUPPLY: Performed by: SURGERY

## 2021-05-05 PROCEDURE — 76010000138 HC OR TIME 0.5 TO 1 HR: Performed by: SURGERY

## 2021-05-05 PROCEDURE — 77030013079 HC BLNKT BAIR HGGR 3M -A: Performed by: ANESTHESIOLOGY

## 2021-05-05 RX ORDER — ROPIVACAINE HYDROCHLORIDE 5 MG/ML
INJECTION, SOLUTION EPIDURAL; INFILTRATION; PERINEURAL AS NEEDED
Status: DISCONTINUED | OUTPATIENT
Start: 2021-05-05 | End: 2021-05-05 | Stop reason: HOSPADM

## 2021-05-05 RX ORDER — PROPOFOL 10 MG/ML
INJECTION, EMULSION INTRAVENOUS
Status: DISCONTINUED | OUTPATIENT
Start: 2021-05-05 | End: 2021-05-05 | Stop reason: HOSPADM

## 2021-05-05 RX ORDER — PROPOFOL 10 MG/ML
INJECTION, EMULSION INTRAVENOUS AS NEEDED
Status: DISCONTINUED | OUTPATIENT
Start: 2021-05-05 | End: 2021-05-05 | Stop reason: HOSPADM

## 2021-05-05 RX ORDER — PHENYLEPHRINE HCL IN 0.9% NACL 0.4MG/10ML
SYRINGE (ML) INTRAVENOUS AS NEEDED
Status: DISCONTINUED | OUTPATIENT
Start: 2021-05-05 | End: 2021-05-05 | Stop reason: HOSPADM

## 2021-05-05 RX ORDER — SODIUM CHLORIDE 0.9 % (FLUSH) 0.9 %
5-40 SYRINGE (ML) INJECTION EVERY 8 HOURS
Status: DISCONTINUED | OUTPATIENT
Start: 2021-05-05 | End: 2021-05-05 | Stop reason: HOSPADM

## 2021-05-05 RX ORDER — SODIUM CHLORIDE 0.9 % (FLUSH) 0.9 %
5-40 SYRINGE (ML) INJECTION AS NEEDED
Status: DISCONTINUED | OUTPATIENT
Start: 2021-05-05 | End: 2021-05-05 | Stop reason: HOSPADM

## 2021-05-05 RX ORDER — HYDROMORPHONE HYDROCHLORIDE 1 MG/ML
0.2 INJECTION, SOLUTION INTRAMUSCULAR; INTRAVENOUS; SUBCUTANEOUS
Status: DISCONTINUED | OUTPATIENT
Start: 2021-05-05 | End: 2021-05-05 | Stop reason: HOSPADM

## 2021-05-05 RX ORDER — DIPHENHYDRAMINE HYDROCHLORIDE 50 MG/ML
12.5 INJECTION, SOLUTION INTRAMUSCULAR; INTRAVENOUS AS NEEDED
Status: DISCONTINUED | OUTPATIENT
Start: 2021-05-05 | End: 2021-05-05 | Stop reason: HOSPADM

## 2021-05-05 RX ORDER — FENTANYL CITRATE 50 UG/ML
INJECTION, SOLUTION INTRAMUSCULAR; INTRAVENOUS AS NEEDED
Status: DISCONTINUED | OUTPATIENT
Start: 2021-05-05 | End: 2021-05-05 | Stop reason: HOSPADM

## 2021-05-05 RX ORDER — MIDAZOLAM HYDROCHLORIDE 1 MG/ML
INJECTION, SOLUTION INTRAMUSCULAR; INTRAVENOUS AS NEEDED
Status: DISCONTINUED | OUTPATIENT
Start: 2021-05-05 | End: 2021-05-05 | Stop reason: HOSPADM

## 2021-05-05 RX ORDER — SODIUM CHLORIDE, SODIUM LACTATE, POTASSIUM CHLORIDE, CALCIUM CHLORIDE 600; 310; 30; 20 MG/100ML; MG/100ML; MG/100ML; MG/100ML
25 INJECTION, SOLUTION INTRAVENOUS CONTINUOUS
Status: DISCONTINUED | OUTPATIENT
Start: 2021-05-05 | End: 2021-05-05 | Stop reason: HOSPADM

## 2021-05-05 RX ORDER — LIDOCAINE HYDROCHLORIDE 20 MG/ML
INJECTION, SOLUTION EPIDURAL; INFILTRATION; INTRACAUDAL; PERINEURAL AS NEEDED
Status: DISCONTINUED | OUTPATIENT
Start: 2021-05-05 | End: 2021-05-05 | Stop reason: HOSPADM

## 2021-05-05 RX ORDER — FENTANYL CITRATE 50 UG/ML
25 INJECTION, SOLUTION INTRAMUSCULAR; INTRAVENOUS
Status: DISCONTINUED | OUTPATIENT
Start: 2021-05-05 | End: 2021-05-05 | Stop reason: HOSPADM

## 2021-05-05 RX ORDER — DEXAMETHASONE SODIUM PHOSPHATE 4 MG/ML
INJECTION, SOLUTION INTRA-ARTICULAR; INTRALESIONAL; INTRAMUSCULAR; INTRAVENOUS; SOFT TISSUE AS NEEDED
Status: DISCONTINUED | OUTPATIENT
Start: 2021-05-05 | End: 2021-05-05 | Stop reason: HOSPADM

## 2021-05-05 RX ORDER — ONDANSETRON 2 MG/ML
INJECTION INTRAMUSCULAR; INTRAVENOUS AS NEEDED
Status: DISCONTINUED | OUTPATIENT
Start: 2021-05-05 | End: 2021-05-05 | Stop reason: HOSPADM

## 2021-05-05 RX ORDER — LIDOCAINE HYDROCHLORIDE 10 MG/ML
0.1 INJECTION, SOLUTION EPIDURAL; INFILTRATION; INTRACAUDAL; PERINEURAL AS NEEDED
Status: DISCONTINUED | OUTPATIENT
Start: 2021-05-05 | End: 2021-05-05 | Stop reason: HOSPADM

## 2021-05-05 RX ORDER — TRAMADOL HYDROCHLORIDE 50 MG/1
50 TABLET ORAL
Qty: 12 TAB | Refills: 0 | Status: SHIPPED | OUTPATIENT
Start: 2021-05-05 | End: 2021-05-08

## 2021-05-05 RX ADMIN — ROPIVACAINE HYDROCHLORIDE 10 ML: 5 INJECTION, SOLUTION EPIDURAL; INFILTRATION; PERINEURAL at 07:06

## 2021-05-05 RX ADMIN — ROPIVACAINE HYDROCHLORIDE 10 ML: 5 INJECTION, SOLUTION EPIDURAL; INFILTRATION; PERINEURAL at 07:08

## 2021-05-05 RX ADMIN — Medication 3 AMPULE: at 07:02

## 2021-05-05 RX ADMIN — Medication 80 MCG: at 07:53

## 2021-05-05 RX ADMIN — SODIUM CHLORIDE, POTASSIUM CHLORIDE, SODIUM LACTATE AND CALCIUM CHLORIDE 25 ML/HR: 600; 310; 30; 20 INJECTION, SOLUTION INTRAVENOUS at 07:02

## 2021-05-05 RX ADMIN — ROPIVACAINE HYDROCHLORIDE 10 ML: 5 INJECTION, SOLUTION EPIDURAL; INFILTRATION; PERINEURAL at 07:07

## 2021-05-05 RX ADMIN — FENTANYL CITRATE 50 MCG: 50 INJECTION, SOLUTION INTRAMUSCULAR; INTRAVENOUS at 07:03

## 2021-05-05 RX ADMIN — DEXAMETHASONE SODIUM PHOSPHATE 8 MG: 4 INJECTION, SOLUTION INTRAMUSCULAR; INTRAVENOUS at 07:47

## 2021-05-05 RX ADMIN — PROPOFOL 20 MG: 10 INJECTION, EMULSION INTRAVENOUS at 07:47

## 2021-05-05 RX ADMIN — ONDANSETRON HYDROCHLORIDE 4 MG: 2 INJECTION, SOLUTION INTRAMUSCULAR; INTRAVENOUS at 07:47

## 2021-05-05 RX ADMIN — WATER 2 G: 1 INJECTION INTRAMUSCULAR; INTRAVENOUS; SUBCUTANEOUS at 07:45

## 2021-05-05 RX ADMIN — LIDOCAINE HYDROCHLORIDE 60 MG: 20 INJECTION, SOLUTION EPIDURAL; INFILTRATION; INTRACAUDAL; PERINEURAL at 07:38

## 2021-05-05 RX ADMIN — MIDAZOLAM HYDROCHLORIDE 1 MG: 1 INJECTION, SOLUTION INTRAMUSCULAR; INTRAVENOUS at 07:03

## 2021-05-05 RX ADMIN — PROPOFOL 50 MCG/KG/MIN: 10 INJECTION, EMULSION INTRAVENOUS at 07:41

## 2021-05-05 RX ADMIN — PROPOFOL 60 MCG/KG/MIN: 10 INJECTION, EMULSION INTRAVENOUS at 07:47

## 2021-05-05 RX ADMIN — MIDAZOLAM HYDROCHLORIDE 1 MG: 1 INJECTION, SOLUTION INTRAMUSCULAR; INTRAVENOUS at 07:05

## 2021-05-05 RX ADMIN — PROPOFOL 30 MG: 10 INJECTION, EMULSION INTRAVENOUS at 07:41

## 2021-05-05 NOTE — PERIOP NOTES
Handoff Report from Operating Room to PACU    Report received from DAVID Cullen RN and ALISON Oliver CRNA regarding Sharla Florentino. Surgeon(s):  Otf Ledezma MD  And Procedure(s) (LRB):  LEFT GREAT TOE AMPUTATION (ANKLE BLOCK) (Left)  confirmed   with allergies and dressings discussed. Anesthesia type, drugs, patient history, complications, estimated blood loss, vital signs, intake and output, and last pain medication, lines and temperature were reviewed.

## 2021-05-05 NOTE — DISCHARGE INSTRUCTIONS
Patient Discharge Instructions    Meghan Frank / [id number] : 1940    Admitted 2021 Discharged: 2021               What to do at Home  Keep foot and dressing dry  Limit weight bearing  Needs surgical (Ama) shoe for discharge     Information obtained by :  I understand that if any problems occur once I am at home I am to contact my physician. I understand and acknowledge receipt of the instructions indicated above. R.N.'s Signature                                                                  Date/Time                                                                                                                                              Patient or Representative Signature                                                          Date/Time      Elizabeth Roman MD    DISCHARGE SUMMARY from Nurse    PATIENT INSTRUCTIONS:    After general anesthesia or intravenous sedation, for 24 hours or while taking prescription Narcotics:  · Limit your activities  · Do not drive and operate hazardous machinery  · Do not make important personal or business decisions  · Do  not drink alcoholic beverages  · If you have not urinated within 8 hours after discharge, please contact your surgeon on call. Report the following to your surgeon:  · Excessive pain, swelling, redness or odor of or around the surgical area  · Temperature over 100.5  · Nausea and vomiting lasting longer than 4 hours or if unable to take medications  · Any signs of decreased circulation or nerve impairment to extremity: change in color, persistent  numbness, tingling, coldness or increase pain  · Any questions    What to do at Home:  A common side effect of anesthesia following surgery is nausea and/or vomiting.  In order to decrease symptoms, it is wise to avoid foods that are high in fat, greasy foods, milk products, and spicy foods for the first 24 hours. Acceptable foods for the first 24 hours following surgery include but are not limited to:     soup   broth    toast    crackers    applesauce    bananas    mashed potatoes,   soft or scrambled eggs   oatmeal    jello    It is important to eat when taking your pain medication. This will help to prevent nausea. If possible, please try to time your meals with your medications. It is very important to stay hydrated following surgery. Sip fluids frequently while awake. Avoid acidic drinks such as citrus juices and soda for 24 hours. Carbonated beverages may cause bloating and gas. Acceptable fluids include:    - water (flavor packets may add variety)  - coffee or tea (in moderation)  - Gatorade  - Royal-aid  - apple juice  - cranberry juice    You are encouraged to cough and deep breathe every hour when awake. This will help to prevent respiratory complications following anesthesia. You may want to hug a pillow when coughing and sneezing to add additional support to the surgical area and to decrease discomfort if you had abdominal or chest surgery. If you are discharged home with support stockings, you may remove them after 24 hours. Support stockings are used to help prevent blood clots in the legs following surgery. Please take time to review all of your Home Care Instructions and Medication Information sheets provided in your discharge packet. If you have any questions, please contact your surgeons office. Thank you. TO PREVENT AN INFECTION      1. [address] YOUR HANDS     To prevent infection, good handwashing is the most important thing you or your caregiver can do.  Wash your hands with soap and water or use the hand  we gave you before you touch any wounds. 2. SHOWER     Use the antibacterial soap we gave you when you take a shower.  Shower with this soap until your wounds are healed.        To reach all areas of your body, you may need someone to help you.  Dont forget to clean your belly button with every shower. 3.  USE CLEAN SHEETS     Use freshly cleaned sheets on your bed after surgery.  To keep the surgery site clean, do not allow pets to sleep with you while your wound is still healing. 4. STOP SMOKING     Stop smoking, or at least cut back on smoking     Smoking slows your healing. 5.  CONTROL YOUR BLOOD SUGAR     High blood sugars slow wound healing. If you are diabetic, control your blood sugar levels before and after your surgery. Patient Education      Tramadol (Ultram, Ultram ER, [place], Theratramadol-60) - (By mouth)   Why this medicine is used:   Treats moderate to severe pain. This medicine is a narcotic pain reliever. Contact a nurse or doctor right away if you have:  Extreme weakness, shallow breathing  Seizures  Seeing or hearing things that are not there  Anxiety, restlessness, muscle spasms, fever, sweating, diarrhea  Slow or fast heartbeat     Common side effects:  Nausea, vomiting, constipation  Headache, drowsiness  Itching, feeling of warmth, redness of the face, neck, arms, and upper chest  © 2017 [facility] Information is for End User's use only and may not be sold, redistributed or otherwise used for commercial purposes. Surgeon General's Warning:  Quitting smoking now greatly reduces serious risk to your health. Obesity, smoking, and sedentary lifestyle greatly increases your risk for illness    A healthy diet, regular physical exercise & weight monitoring are important for maintaining a healthy lifestyle    You may be retaining fluid if you have a history of heart failure or if you experience any of the following symptoms:  Weight gain of 3 pounds or more overnight or 5 pounds in a week, increased swelling in our hands or feet or shortness of breath while lying flat in bed.   Please call your doctor as soon as you notice any of these symptoms; do not wait until your next office visit. The discharge information has been reviewed with the {PATIENT PARENT GUARDIAN:[id number]}. The {PATIENT PARENT GUARDIAN:66865} verbalized understanding. Discharge medications reviewed with the {Dishcarge meds reviewed PZXX:85665} and appropriate educational materials and side effects teaching were provided.   ___________________________________________________________________________________________________________________________________

## 2021-05-05 NOTE — BRIEF OP NOTE
Brief Postoperative Note    Patient:  Salome Valdovinos  YOB: 1940  MRN: [id number]    Date of Procedure: 5/5/2021     Pre-Op Diagnosis: Left great toe wound    Post-Op Diagnosis: same      Procedure(s): PARTIAL LEFT GREAT TOE AMPUTATION     Surgeon(s):  Ana Woodson MD    Surgical Assistant: None    Anesthesia: Regional     Estimated Blood Loss (mL): none    Complications: none    Specimens:   ID Type Source Tests Collected by Time Destination   1 : left great toe Preservative Toe  Ana Woodson MD 5/5/2021 8065 Pathology        Findings: bled moderately     Electronically Signed by Jessica Vidal MD on 5/5/2021 at 8:41 AM

## 2021-05-05 NOTE — ANESTHESIA POSTPROCEDURE EVALUATION
Procedure(s):  LEFT GREAT TOE AMPUTATION (ANKLE BLOCK). MAC, regional    Anesthesia Post Evaluation        Patient location during evaluation: PACU  Note status: Adequate. Level of consciousness: responsive to verbal stimuli and sleepy but conscious  Pain management: satisfactory to patient  Airway patency: patent  Anesthetic complications: no  Cardiovascular status: acceptable  Respiratory status: acceptable  Hydration status: acceptable  Comments: +Post-Anesthesia Evaluation and Assessment    Patient: Luiza Cox MRN: [id number]  SSN: [id number]   YOB: 1940  Age: 78 y.o. Sex: female      Cardiovascular Function/Vital Signs    BP (!) 158/71 (BP 1 Location: Left upper arm, BP Patient Position: At rest)   Pulse 80   Temp 36.7 °C (98.1 °F)   Resp 16   Ht 4' 11\" (1.499 m)   Wt 57.7 kg (127 lb 3.3 oz)   SpO2 95%   BMI 25.69 kg/m²     Patient is status post Procedure(s):  LEFT GREAT TOE AMPUTATION (ANKLE BLOCK). Nausea/Vomiting: Controlled. Postoperative hydration reviewed and adequate. Pain:  Pain Scale 1: Numeric (0 - 10) (05/05/21 0845)  Pain Intensity 1: 0 (05/05/21 0845)   Managed. Neurological Status:   Neuro (WDL): Exceptions to WDL (05/05/21 0825)   At baseline. Mental Status and Level of Consciousness: Arousable. Pulmonary Status:   O2 Device: None (Room air) (05/05/21 0900)   Adequate oxygenation and airway patent. Complications related to anesthesia: None    Post-anesthesia assessment completed. No concerns. Signed By: Heidi Biggs MD    5/5/2021  Post anesthesia nausea and vomiting:  controlled      INITIAL Post-op Vital signs:   Vitals Value Taken Time   /71 05/05/21 0900   Temp 36.7 °C (98.1 °F) 05/05/21 0817   Pulse 80 05/05/21 0912   Resp 16 05/05/21 0912   SpO2 95 % 05/05/21 0912   Vitals shown include unvalidated device data.

## 2021-05-05 NOTE — ANESTHESIA PREPROCEDURE EVALUATION
Anesthetic History               Review of Systems / Medical History  Patient summary reviewed, nursing notes reviewed and pertinent labs reviewed    Pulmonary    COPD            Comments: H/O RLL Pneumonia   Neuro/Psych             Comments: Diabetic peripheral neuropathy    H/O SDH (subdural hematoma)    Hard of hearing Cardiovascular    Hypertension  Valvular problems/murmurs: tricuspid insufficiency and mitral insufficiency    CHF  Dysrhythmias : atrial fibrillation  Pacemaker, CAD, cardiac stents, CABG (3 vessel) and hyperlipidemia    Exercise tolerance: <4 METS  Comments: Elevated brain natriuretic peptide (BNP) level    PHTN    ECG (5/4/21): Ventricular-paced rhythm   Abnormal ECG   When compared with ECG of 09-AUG-2017 18:58,   Vent. rate has increased BY  20 BPM     TTE (2/23/18): Left ventricle: Systolic function was normal. Ejection fraction was  estimated in the range of 55 % to 60 %. No obvious wall motion  abnormalities identified in the views obtained. There was mild concentric  hypertrophy. Left atrium: The atrium was moderately dilated. Right atrium: The atrium was moderately dilated. Mitral valve: There was mild regurgitation. Aortic valve: There was no stenosis. Tricuspid valve: There was moderate to severe regurgitation. Pulmonary  artery systolic pressure: 61 mmHg. There was moderate to severe pulmonary  hypertension.    GI/Hepatic/Renal     GERD    Renal disease: CRI      Comments: CRI, Stage III Endo/Other    Diabetes: type 2  Hypothyroidism  Obesity and arthritis     Other Findings   Comments: Left foot wound    Chronic pain         Physical Exam    Airway  Mallampati: II  TM Distance: 4 - 6 cm  Neck ROM: normal range of motion   Mouth opening: Normal     Cardiovascular  Regular rate and rhythm,  S1 and S2 normal,  no murmur, click, rub, or gallop             Dental    Dentition: Edentulous, Full lower dentures and Full upper dentures     Pulmonary  Breath sounds clear to auscultation               Abdominal  GI exam deferred       Other Findings            Anesthetic Plan    ASA: 3  Anesthesia type: MAC and regional - ankle block          Induction: Intravenous  Anesthetic plan and risks discussed with: Patient

## 2021-05-06 NOTE — OP NOTES
[facility]  OPERATIVE REPORT    Name:  Yady Harper  MR#:  [id number]  :  1940  ACCOUNT #:  [de-identified]  DATE OF SERVICE:  2021    PREOPERATIVE DIAGNOSIS:  Left great toe wound. POSTOPERATIVE DIAGNOSIS:  Left great toe wound. PROCEDURE PERFORMED:  Partial amputation of great toe. SURGEON:  Sumi Reddy MD    ANESTHESIA:  Regional block. SPECIMENS REMOVED:  As above. ESTIMATED BLOOD LOSS:  none. INDICATIONS:  The patient is an 49-year-old woman, who has got gangrene of the left great toe tip, who has had partial revascularization. Now presents for elective great toe removal.    PROCEDURE:  After obtaining informed consent, the patient was placed supine on the operating table. After adequate induction of regional block anesthesia, site and patient confirmation, and administration of prophylactic antibiotics, the left foot was prepped and draped in sterile fashion. An incision was made in mid toe over the joint space and a posterior based flap was fashioned. The specimen was removed and sent for permanent pathology. The condyles of the proximal metatarsal were taken back with a rongeur. Posterior soft tissue flap was brought forward and closed. Subcutaneous level with interrupted buried Vicryl sutures and the skin was closed with fine nylon sutures. The patient tolerated the procedure well. There were no complications.         Hailey Gardiner MD      TERRY/[id number]  D:  2021 12:07  T:  2021 22:12  JOB #:  [id number]  CC:  Sumi Reddy MD

## 2021-07-13 ENCOUNTER — OFFICE VISIT (OUTPATIENT)
Dept: NEUROLOGY | Age: 81
End: 2021-07-13
Payer: MEDICARE

## 2021-07-13 VITALS
TEMPERATURE: 98.2 F | OXYGEN SATURATION: 99 % | DIASTOLIC BLOOD PRESSURE: 79 MMHG | HEART RATE: 88 BPM | SYSTOLIC BLOOD PRESSURE: 139 MMHG | BODY MASS INDEX: 25.65 KG/M2 | WEIGHT: 127 LBS

## 2021-07-13 DIAGNOSIS — R44.1 VISUAL HALLUCINATION: Primary | ICD-10-CM

## 2021-07-13 DIAGNOSIS — R29.898 RIGHT ARM WEAKNESS: ICD-10-CM

## 2021-07-13 PROCEDURE — 99205 OFFICE O/P NEW HI 60 MIN: CPT | Performed by: PSYCHIATRY & NEUROLOGY

## 2021-07-13 PROCEDURE — G8432 DEP SCR NOT DOC, RNG: HCPCS | Performed by: PSYCHIATRY & NEUROLOGY

## 2021-07-13 PROCEDURE — G8536 NO DOC ELDER MAL SCRN: HCPCS | Performed by: PSYCHIATRY & NEUROLOGY

## 2021-07-13 PROCEDURE — 1101F PT FALLS ASSESS-DOCD LE1/YR: CPT | Performed by: PSYCHIATRY & NEUROLOGY

## 2021-07-13 PROCEDURE — G8419 CALC BMI OUT NRM PARAM NOF/U: HCPCS | Performed by: PSYCHIATRY & NEUROLOGY

## 2021-07-13 PROCEDURE — G8427 DOCREV CUR MEDS BY ELIG CLIN: HCPCS | Performed by: PSYCHIATRY & NEUROLOGY

## 2021-07-13 PROCEDURE — 1090F PRES/ABSN URINE INCON ASSESS: CPT | Performed by: PSYCHIATRY & NEUROLOGY

## 2021-07-13 PROCEDURE — G8399 PT W/DXA RESULTS DOCUMENT: HCPCS | Performed by: PSYCHIATRY & NEUROLOGY

## 2021-07-13 RX ORDER — CITALOPRAM 20 MG/1
20 TABLET, FILM COATED ORAL DAILY
Status: ON HOLD | COMMUNITY
End: 2022-06-30

## 2021-07-13 RX ORDER — RANITIDINE 150 MG/1
150 TABLET, FILM COATED ORAL 2 TIMES DAILY
COMMUNITY
End: 2021-08-26 | Stop reason: CLARIF

## 2021-07-13 RX ORDER — TRAMADOL HYDROCHLORIDE 50 MG/1
50 TABLET ORAL
Status: ON HOLD | COMMUNITY
End: 2021-09-15

## 2021-07-13 NOTE — PATIENT INSTRUCTIONS
A Healthy Lifestyle: Care Instructions  Your Care Instructions     A healthy lifestyle can help you feel good, stay at a healthy weight, and have plenty of energy for both work and play. A healthy lifestyle is something you can share with your whole family. A healthy lifestyle also can lower your risk for serious health problems, such as high blood pressure, heart disease, and diabetes. You can follow a few steps listed below to improve your health and the health of your family. Follow-up care is a key part of your treatment and safety. Be sure to make and go to all appointments, and call your doctor if you are having problems. It's also a good idea to know your test results and keep a list of the medicines you take. How can you care for yourself at home? · Do not eat too much sugar, fat, or fast foods. You can still have dessert and treats now and then. The goal is moderation. · Start small to improve your eating habits. Pay attention to portion sizes, drink less juice and soda pop, and eat more fruits and vegetables. ? Eat a healthy amount of food. A 3-ounce serving of meat, for example, is about the size of a deck of cards. Fill the rest of your plate with vegetables and whole grains. ? Limit the amount of soda and sports drinks you have every day. Drink more water when you are thirsty. ? Eat plenty of fruits and vegetables every day. Have an apple or some carrot sticks as an afternoon snack instead of a candy bar. Try to have fruits and/or vegetables at every meal.  · Make exercise part of your daily routine. You may want to start with simple activities, such as walking, bicycling, or slow swimming. Try to be active 30 to 60 minutes every day. You do not need to do all 30 to 60 minutes all at once. For example, you can exercise 3 times a day for 10 or 20 minutes.  Moderate exercise is safe for most people, but it is always a good idea to talk to your doctor before starting an exercise program.  · Keep moving. Prema Bisi the lawn, work in the garden, or i[employer]2Save. Take the stairs instead of the elevator at work. · If you smoke, quit. People who smoke have an increased risk for heart attack, stroke, cancer, and other lung illnesses. Quitting is hard, but there are ways to boost your chance of quitting tobacco for good. ? Use nicotine gum, patches, or lozenges. ? Ask your doctor about stop-smoking programs and medicines. ? Keep trying. In addition to reducing your risk of diseases in the future, you will notice some benefits soon after you stop using tobacco. If you have shortness of breath or asthma symptoms, they will likely get better within a few weeks after you quit. · Limit how much alcohol you drink. Moderate amounts of alcohol (up to 2 drinks a day for men, 1 drink a day for women) are okay. But drinking too much can lead to liver problems, high blood pressure, and other health problems. Family health  If you have a family, there are many things you can do together to improve your health. · Eat meals together as a family as often as possible. · Eat healthy foods. This includes fruits, vegetables, lean meats and dairy, and whole grains. · Include your family in your fitness plan. Most people think of activities such as jogging or tennis as the way to fitness, but there are many ways you and your family can be more active. Anything that makes you breathe hard and gets your heart pumping is exercise. Here are some tips:  ? Walk to do errands or to take your child to school or the bus.  ? Go for a family bike ride after dinner instead of watching TV. Where can you learn more? Go to http://www.gray.com/  Enter O945 in the search box to learn more about \"A Healthy Lifestyle: Care Instructions. \"  Current as of: September 23, 2020               Content Version: 12.8  © 3717-5289 [employer].    Care instructions adapted under license by [employer] (which disclaims liability or warranty for this information). If you have questions about a medical condition or this instruction, always ask your healthcare professional. [address] any warranty or liability for your use of this information.

## 2021-07-13 NOTE — PROGRESS NOTES
Referring Physician: Alexys Charles NP     Reason for Consultation:  Visual hallucinations     Chief Complaint: Hallucinations     History of Present Illness: Jose Ramon Rondon is a 78 y.o. female history of hypothyroidism, hypertension, CAD and diabetes who presents to neurology clinic for evaluation of visual hallucinations. Patient is accompanied by her daughter who did much of the history. She states that these hallucinations started after the death of her  in 2019 and have been present since. She did report that over the last few months that the symptoms have worsened. They do usually start in the evening and progress throughout the night. These are not happening daily however a few months ago they were occurring daily. She did have a significant infection in her feet which required amputation a few months ago. She was on antibiotics at that time which may have worsened some the hallucinations. In regards to patient's memory, her daughter does report that she has noted some mild changes in her memory however nothing that has concerned her. Most of these changes she does think are due to age. She does state that she repeats herself however not too frequently. She does ask the same question a few times. Since patient's   daughter has been giving her her medication however she does know when to take it. She has not had any changes in personality nor has she had any difficulty with remembering the month of the year. Overall she is not very concerned with patient's memory. Additionally patient has had a history of a subdural hematoma in 2017. Her daughter reports this was at a time where she had a fall and does not think that she had any symptoms as a result of it.     Past Medical History:   Diagnosis Date    Arrhythmia     atrial fibrillation 2018    Arthritis     CAD (coronary artery disease)     CABG, pacemaker    Chronic pain     COPD     Diabetes ([facility] [address]     GERD (gastroesophageal reflux disease)     Heart failure (HCC)     Hypertension     Ill-defined condition     high cholesterol    Thyroid disease         Past Surgical History:   Procedure Laterality Date    HX ORTHOPAEDIC Left     toe amputation    HX PACEMAKER      July 2011    DC CARDIAC SURG PROCEDURE UNLIST      3 CABG, Stents    DC COLONOSCOPY FLX DX W/COLLJ SPEC WHEN PFRMD  11/10/2011         DC EGD TRANSORAL BIOPSY SINGLE/MULTIPLE  8/29/2012             Family History   Problem Relation Age of Onset    Lung Disease Mother     Heart Disease Father     Hypertension Father     Diabetes Sister         Social History     Tobacco Use    Smoking status: Never Smoker    Smokeless tobacco: Never Used   Substance Use Topics    Alcohol use: No        Allergies   Allergen Reactions    Percocet [Oxycodone-Acetaminophen] Nausea and Vomiting        Prior to Admission medications    Medication Sig Start Date End Date Taking? Authorizing Provider   aspirin desensitization 81 mg/81 mL soln oral solution Take  by mouth. Yes Provider, Historical   citalopram (CELEXA) 20 mg tablet Take 20 mg by mouth daily. Yes Provider, Historical   traMADoL (ULTRAM) 50 mg tablet Take 50 mg by mouth every six (6) hours as needed for Pain. Yes Provider, Historical   raNITIdine (ZANTAC) 150 mg tablet Take 150 mg by mouth two (2) times a day. Yes Provider, Historical   levothyroxine (synthroid) 25 mcg tablet Take 25 mcg by mouth Daily (before breakfast). Yes Provider, Historical   sulfaSALAzine (AZULFIDINE) 500 mg tablet Take 500 mg by mouth two (2) times a day. Yes Provider, Historical   ferrous gluconate (FERGON) 240 mg (27 mg iron) tablet Take 240 mg by mouth three (3) times daily (with meals). Yes Provider, Historical   famotidine (PEPCID) 40 mg tablet Take 40 mg by mouth two (2) times a day. Yes Provider, Historical   dilTIAZem ER (TIAZAC) 180 mg capsule Take 180 mg by mouth daily.  3/25/19  Yes Provider, Historical apixaban (Eliquis) 5 mg tablet Take 5 mg by mouth two (2) times a day. Yes Provider, Historical   spironolactone (ALDACTONE) 25 mg tablet Take 25 mg by mouth every other day. Yes Provider, Historical   insulin aspart protamine/insulin aspart (NOVOLOG MIX 70-30 U-100 INSULN) 100 unit/mL (70-30) injection 16 Units by SubCUTAneous route daily. Patient takes 16 units in the AM and 5 units QHS   Yes Provider, Historical   furosemide (LASIX) 40 mg tablet Take 1 Tab by mouth daily. 8/12/17  Yes Jacksonvamsi Means MD   metoprolol-XL (TOPROL-XL) 100 mg XL tablet Take 50 mg by mouth daily. Yes Other, MD Omar   diclofenac (VOLTAREN) 1 % gel Apply 4 g to affected area four (4) times daily. Patient not taking: Reported on 7/13/2021 5/3/21   Kale HEATH,    venlafaxine [facility]) 37.5 mg tablet Take 37.5 mg by mouth nightly. Provider, Historical   rosuvastatin (CRESTOR) 5 mg tablet Take 5 mg by mouth nightly. Patient not taking: Reported on 7/13/2021    Provider, Historical   acetaminophen (TylenoL) 325 mg tablet Take  by mouth every four (4) hours as needed for Pain. Patient not taking: Reported on 7/13/2021    Provider, Historical   cholecalciferol (Vitamin D3) (1000 Units /25 mcg) tablet Take 2,000 Units by mouth daily. Patient not taking: Reported on 7/13/2021    Provider, Historical   amLODIPine (NORVASC) 5 mg tablet Take 10 mg by mouth daily. Patient not taking: Reported on 7/13/2021    Provider, Historical   polyethylene glycol (MIRALAX) 17 gram packet Take 17 g by mouth daily.  Indications: constipation  Patient not taking: Reported on 7/13/2021    Provider, Historical       Review of Systems:  General, constitutional: negative  Eyes, vision: negative  Ears, nose, throat: negative  Cardiovascular, heart: negative  Respiratory: negative  Gastrointestinal: negative  Genitourinary: negative  Musculoskeletal: negative  Skin and integumentary: negative  Psychiatric: negative  Endocrine: negative  Neurological: negative, except for HPI  Hematologic/lymphatic: negative  Allergy/immunology: negative    []Unable to obtain  ROS due to  []mental status change  []sedated   []intubated    Visit Vitals  /79   Pulse 88   Temp 98.2 °F (36.8 °C)   Wt 127 lb (57.6 kg)   SpO2 99%   BMI 25.65 kg/m²       Physical Exam:  General:  no acute distress  Neck: no carotid bruits  Lungs: clear to auscultation  Heart:  no murmurs, regular rate and rhythm   Lower extremity: no edema    Neurological exam:  Mental Status: Awake, alert, oriented to person, hospital and month and year    Attention and Concentration: able to state the days of the week backwards   Speech and Language: No dysarthria. Able to name, repeat and follow commands   Fund of knowledge was preserved    Cranial nerves: II-XII  Pupils equal and reactive, visual fields intact by confrontation   Extraocular movements intact, no evidence of nystagmus or ptosis   Facial sensation intact   Facial movements symmetric   Hearing intact to soft rub bilaterally   Shoulder shrug symmetric and strong   Tongue protrusion full and midline without fasciculation or atrophy    Motor:   Normal tone and Bulk   Drift: No evidence of pronator drift     Strength testing:   deltoid triceps biceps Wrist ext. Wrist flex. intrinsics   Right 5 5 5 5 5 5   Left 5 5 5 5 5 5      Hip flex. Hip ext. Knee ext. Knee flex Dorsi flex Plantar flex   Right  5 5 5 5 5 5   Left  5 5 5 5 5 5       Sensory:  Sensation in a length dependent fashion in the bilateral lower extremities. She did have her first 2 digits of her left toe amputated. Reflexes:     Biceps Triceps  Brachiorad Patellar Achilles Plantar Hoffmans   Right  2 1 2 1 - Down Neg   Left  2 1 2 1 - Down Neg        Cerebellar testing: Finger nose finger intact, no dysmetria     Romberg: absent    Gait: steady    Data:     INTERNAL RECORDS:  The patient's electronic medical record was reviewed.   The relevant details include:    Lab Results   Component Value Date/Time    Hemoglobin A1c 6.4 (H) 09/02/2020 05:42 AM    Sodium 142 05/03/2021 03:50 AM    Potassium 4.3 05/03/2021 03:50 AM    Chloride 108 05/03/2021 03:50 AM    Glucose 116 (H) 05/03/2021 03:50 AM    BUN 20 (H) 05/03/2021 03:50 AM    Creatinine 0.91 05/03/2021 03:50 AM    Calcium 9.5 05/03/2021 03:50 AM    WBC 6.0 05/03/2021 02:52 AM    HCT 37.3 05/03/2021 02:52 AM    HGB 11.9 (L) 05/03/2021 02:52 AM    PLATELET 676 22/79/3333 02:52 AM       CT Results (maximum last 3): MRI Results (maximum last 3): No results found for this or any previous visit. Assessment and Plan   Gamal Juárez is a 78 y.o. female history of hypothyroidism, hypertension, CAD and diabetes who presents to neurology clinic for evaluation of visual hallucination, etiology of this is unclear however is possibly due to form of dementia where she has sundowning and increasing hallucinations. Her neurological exam did not reveal any evidence of bradykinesia or tremor or shuffling gait which would raise a suspicion of a Lewy body dementia. Additionally her neurological exam did reveal some hyperreflexia in the upper extremities which could cause some difficulty with her gait and balance. Visual hallucinations: Unclear etiology however there is concern for possible dementia given her age and history of vascular disease. Also has a history of atrial fibrillation and is on anticoagulation  -We will obtain an MRI of the brain to ensure there is no evidence of a stroke, atrophy or white matter disease which may be contributing to her symptoms.   We will also give us information regarding her prior history of subdural hemorrhages  -Did discuss medications however we will hold off on this for now as the hallucinations are not as severe  -Likely that the hallucinations worsened at a time where she did have an infection in her left toes requiring amputation    Neck pain and hyperreflexia: Concerning for cervical spine lesion given her age  -Obtain an MRI of her cervical spine to ensure that there is no evidence of cervical stenosis  -Patient may benefit from physical therapy and Occupational Therapy however we will hold off on this for now until her imaging is done      I spent  60 minutes on the day of the encounter preparing the office visit by reviewing medical records, obtaining a history, performing examination, counseling and educating the patient  on diagnosis, ordering medications, documenting in the clinical medical record, and coordinating the care for the patient. The patient had the ability to ask questions and all questions were answered. Patient Active Problem List   Diagnosis Code    S/P CABG (coronary artery bypass graft) Z95.1    DM2 (diabetes mellitus, type 2) ([facility]) E11.9    Polyneuropathy in diabetes(357.2) E11.42    CHF (congestive heart failure) ([facility]) I50.9    SDH (subdural hematoma) ([facility]) S06.5X9A    Dehydration E86.0    Dizziness R42    PENELOPE (acute kidney injury) ([facility] [address] N17.9    Hypoglycemia E16.2    Elevated brain natriuretic peptide (BNP) level P72.82    Diastolic CHF, acute ([facility]) I50.31    RLL pneumonia J18.9    Abdominal pain R10.9    Fall W19. Nanci Kelch    Chest pain R07.9    Abrasion of right leg S80.811A      I have discussed the diagnosis with the patient and the intended plan as seen in the above orders. Patient is in agreement. The patient has received an after-visit summary and questions were answered concerning future plans. I have discussed medication side effects and warnings with the patient as well.         Signed By:  Divine Blanco MD     July 13, 2021

## 2021-07-16 ENCOUNTER — TELEPHONE (OUTPATIENT)
Dept: NEUROLOGY | Age: 81
End: 2021-07-16

## 2021-07-16 DIAGNOSIS — R44.1 VISUAL HALLUCINATIONS: Primary | ICD-10-CM

## 2021-07-16 DIAGNOSIS — I67.82 CEREBRAL ISCHEMIA: ICD-10-CM

## 2021-07-16 NOTE — TELEPHONE ENCOUNTER
Called and spoke with Luis Antonio Rodas who is on the HIPAA release form. Verified patient name/. Notified that CT was ordered and will be mailing to patient.

## 2021-07-16 NOTE — TELEPHONE ENCOUNTER
I have placed an order for a CT scan of the head.  It is not as specific as an MRI however it will be still informational.

## 2021-08-26 ENCOUNTER — ANESTHESIA EVENT (OUTPATIENT)
Dept: SURGERY | Age: 81
End: 2021-08-26
Payer: MEDICARE

## 2021-08-26 NOTE — PERIOP NOTES
[facility]  Preoperative Instructions        Surgery Date 8/27/21          Time of Arrival 0830    1. On the day of your surgery, please report to the Surgical Services Registration Desk and sign in at your designated time. The Surgery Center is located to the right of the Emergency Room. 2. You must have someone with you to drive you home. You should not drive a car for 24 hours following surgery. Please make arrangements for a friend or family member to stay with you for the first 24 hours after your surgery. 3. Do not have anything to eat or drink (including water, gum, mints, coffee, juice) after midnight ?8/26/21? Nahum Couch ? This may not apply to medications prescribed by your physician. ?(Please note below the special instructions with medications to take the morning of your procedure.)    4. We recommend you do not drink any alcoholic beverages for 24 hours before and after your surgery. 5. Contact your surgeons office for instructions on the following medications: non-steroidal anti-inflammatory drugs (i.e. Advil, Aleve), vitamins, and supplements. (Some surgeons will want you to stop these medications prior to surgery and others may allow you to take them)  **If you are currently taking Plavix, Coumadin, Aspirin and/or other blood-thinning agents, contact your surgeon for instructions. ** Your surgeon will partner with the physician prescribing these medications to determine if it is safe to stop or if you need to continue taking. Please do not stop taking these medications without instructions from your surgeon    6. Wear comfortable clothes. Wear glasses instead of contacts. Do not bring any money or jewelry. Please bring picture ID, insurance card, and any prearranged co-payment or hospital payment. Do not wear make-up, particularly mascara the morning of your surgery. Do not wear nail polish, particularly if you are having foot /hand surgery.   Wear your hair loose or down, no ponytails, buns, emily pins or clips. All body piercings must be removed. Please shower with antibacterial soap for three consecutive days before and on the morning of surgery, but do not apply any lotions, powders or deodorants after the shower on the day of surgery. Please use a fresh towels after each shower. Please sleep in clean clothes and change bed linens the night before surgery. Please do not shave for 48 hours prior to surgery. Shaving of the face is acceptable. 7. You should understand that if you do not follow these instructions your surgery may be cancelled. If your physical condition changes (I.e. fever, cold or flu) please contact your surgeon as soon as possible. 8. It is important that you be on time. If a situation occurs where you may be late, please call [phone] (OR Holding Area). 9. If you have any questions and or problems, please call [phone] (Pre-admission Testing). 10. Your surgery time may be subject to change. You will receive a phone call the evening prior if your time changes. 11.  If having outpatient surgery, you must have someone to drive you here, stay with you during the duration of your stay, and to drive you home at time of discharge. Special Instructions: eliquis per MD    TAKE ALL MEDICATIONS DAY OF SURGERY EXCEPT:  Eliquis, vitamins, insulin    I understand a pre-operative phone call will be made to verify my surgery time. In the event that I am not available, I give permission for a message to be left on my answering service and/or with another person?   yes         ___________________      __________   _________    (Signature of Patient)             (Witness)                (Date and Time)

## 2021-08-26 NOTE — PERIOP NOTES
Spoke to patient's daughter and changed arrival time to [facility] to 0730 for an 0930 scheduled surgery start time.

## 2021-08-27 ENCOUNTER — ANESTHESIA (OUTPATIENT)
Dept: SURGERY | Age: 81
End: 2021-08-27
Payer: MEDICARE

## 2021-08-27 ENCOUNTER — HOSPITAL ENCOUNTER (OUTPATIENT)
Age: 81
Setting detail: OBSERVATION
Discharge: HOME | End: 2021-08-28
Attending: SURGERY | Admitting: SURGERY
Payer: MEDICARE

## 2021-08-27 DIAGNOSIS — I99.8 LIMB ISCHEMIA: Primary | ICD-10-CM

## 2021-08-27 LAB
GLUCOSE BLD STRIP.AUTO-MCNC: 105 MG/DL (ref 65–117)
GLUCOSE BLD STRIP.AUTO-MCNC: 133 MG/DL (ref 65–117)
GLUCOSE BLD STRIP.AUTO-MCNC: 152 MG/DL (ref 65–117)
GLUCOSE BLD STRIP.AUTO-MCNC: 210 MG/DL (ref 65–117)
SERVICE CMNT-IMP: ABNORMAL
SERVICE CMNT-IMP: NORMAL

## 2021-08-27 PROCEDURE — 76060000032 HC ANESTHESIA 0.5 TO 1 HR: Performed by: SURGERY

## 2021-08-27 PROCEDURE — 74011250636 HC RX REV CODE- 250/636: Performed by: NURSE ANESTHETIST, CERTIFIED REGISTERED

## 2021-08-27 PROCEDURE — 82962 GLUCOSE BLOOD TEST: CPT

## 2021-08-27 PROCEDURE — 74011250636 HC RX REV CODE- 250/636: Performed by: ANESTHESIOLOGY

## 2021-08-27 PROCEDURE — 77030002916 HC SUT ETHLN J&J -A: Performed by: SURGERY

## 2021-08-27 PROCEDURE — 2709999900 HC NON-CHARGEABLE SUPPLY: Performed by: SURGERY

## 2021-08-27 PROCEDURE — 99218 HC RM OBSERVATION: CPT

## 2021-08-27 PROCEDURE — 74011250636 HC RX REV CODE- 250/636: Performed by: SURGERY

## 2021-08-27 PROCEDURE — 88311 DECALCIFY TISSUE: CPT

## 2021-08-27 PROCEDURE — 77030031139 HC SUT VCRL2 J&J -A: Performed by: SURGERY

## 2021-08-27 PROCEDURE — 76010000138 HC OR TIME 0.5 TO 1 HR: Performed by: SURGERY

## 2021-08-27 PROCEDURE — 74011000250 HC RX REV CODE- 250: Performed by: ANESTHESIOLOGY

## 2021-08-27 PROCEDURE — 76210000017 HC OR PH I REC 1.5 TO 2 HR: Performed by: SURGERY

## 2021-08-27 PROCEDURE — 74011250637 HC RX REV CODE- 250/637: Performed by: SURGERY

## 2021-08-27 PROCEDURE — 88305 TISSUE EXAM BY PATHOLOGIST: CPT

## 2021-08-27 PROCEDURE — 74011636637 HC RX REV CODE- 636/637: Performed by: SURGERY

## 2021-08-27 PROCEDURE — 74011000250 HC RX REV CODE- 250: Performed by: SURGERY

## 2021-08-27 RX ORDER — SODIUM CHLORIDE 0.9 % (FLUSH) 0.9 %
5-40 SYRINGE (ML) INJECTION AS NEEDED
Status: DISCONTINUED | OUTPATIENT
Start: 2021-08-27 | End: 2021-08-27 | Stop reason: HOSPADM

## 2021-08-27 RX ORDER — SODIUM CHLORIDE 0.9 % (FLUSH) 0.9 %
5-40 SYRINGE (ML) INJECTION EVERY 8 HOURS
Status: DISCONTINUED | OUTPATIENT
Start: 2021-08-27 | End: 2021-08-27 | Stop reason: HOSPADM

## 2021-08-27 RX ORDER — ROPIVACAINE HYDROCHLORIDE 5 MG/ML
INJECTION, SOLUTION EPIDURAL; INFILTRATION; PERINEURAL AS NEEDED
Status: DISCONTINUED | OUTPATIENT
Start: 2021-08-27 | End: 2021-08-27 | Stop reason: HOSPADM

## 2021-08-27 RX ORDER — DILTIAZEM HYDROCHLORIDE 180 MG/1
180 CAPSULE, COATED, EXTENDED RELEASE ORAL DAILY
Status: DISCONTINUED | OUTPATIENT
Start: 2021-08-28 | End: 2021-08-28 | Stop reason: HOSPADM

## 2021-08-27 RX ORDER — SODIUM CHLORIDE, SODIUM LACTATE, POTASSIUM CHLORIDE, CALCIUM CHLORIDE 600; 310; 30; 20 MG/100ML; MG/100ML; MG/100ML; MG/100ML
25 INJECTION, SOLUTION INTRAVENOUS CONTINUOUS
Status: DISCONTINUED | OUTPATIENT
Start: 2021-08-27 | End: 2021-08-27 | Stop reason: HOSPADM

## 2021-08-27 RX ORDER — INSULIN LISPRO 100 [IU]/ML
2 INJECTION, SOLUTION INTRAVENOUS; SUBCUTANEOUS
Status: DISCONTINUED | OUTPATIENT
Start: 2021-08-27 | End: 2021-08-28 | Stop reason: HOSPADM

## 2021-08-27 RX ORDER — LIDOCAINE HYDROCHLORIDE 10 MG/ML
0.1 INJECTION, SOLUTION EPIDURAL; INFILTRATION; INTRACAUDAL; PERINEURAL AS NEEDED
Status: DISCONTINUED | OUTPATIENT
Start: 2021-08-27 | End: 2021-08-27 | Stop reason: HOSPADM

## 2021-08-27 RX ORDER — FENTANYL CITRATE 50 UG/ML
25 INJECTION, SOLUTION INTRAMUSCULAR; INTRAVENOUS
Status: DISCONTINUED | OUTPATIENT
Start: 2021-08-27 | End: 2021-08-27 | Stop reason: HOSPADM

## 2021-08-27 RX ORDER — AMLODIPINE BESYLATE 5 MG/1
10 TABLET ORAL DAILY
Status: DISCONTINUED | OUTPATIENT
Start: 2021-08-28 | End: 2021-08-28 | Stop reason: HOSPADM

## 2021-08-27 RX ORDER — MIDAZOLAM HYDROCHLORIDE 1 MG/ML
INJECTION, SOLUTION INTRAMUSCULAR; INTRAVENOUS AS NEEDED
Status: DISCONTINUED | OUTPATIENT
Start: 2021-08-27 | End: 2021-08-27 | Stop reason: HOSPADM

## 2021-08-27 RX ORDER — VENLAFAXINE 25 MG/1
37.5 TABLET ORAL
Status: DISCONTINUED | OUTPATIENT
Start: 2021-08-27 | End: 2021-08-28 | Stop reason: HOSPADM

## 2021-08-27 RX ORDER — METOPROLOL SUCCINATE 50 MG/1
50 TABLET, EXTENDED RELEASE ORAL DAILY
Status: DISCONTINUED | OUTPATIENT
Start: 2021-08-28 | End: 2021-08-28 | Stop reason: HOSPADM

## 2021-08-27 RX ORDER — DIPHENHYDRAMINE HYDROCHLORIDE 50 MG/ML
12.5 INJECTION, SOLUTION INTRAMUSCULAR; INTRAVENOUS AS NEEDED
Status: DISCONTINUED | OUTPATIENT
Start: 2021-08-27 | End: 2021-08-27 | Stop reason: HOSPADM

## 2021-08-27 RX ORDER — LEVOTHYROXINE SODIUM 25 UG/1
25 TABLET ORAL
Status: DISCONTINUED | OUTPATIENT
Start: 2021-08-28 | End: 2021-08-28 | Stop reason: HOSPADM

## 2021-08-27 RX ORDER — MELATONIN
2000 DAILY
Status: DISCONTINUED | OUTPATIENT
Start: 2021-08-28 | End: 2021-08-28 | Stop reason: HOSPADM

## 2021-08-27 RX ORDER — INSULIN GLARGINE 100 [IU]/ML
11 INJECTION, SOLUTION SUBCUTANEOUS
Status: DISCONTINUED | OUTPATIENT
Start: 2021-08-27 | End: 2021-08-28 | Stop reason: HOSPADM

## 2021-08-27 RX ORDER — ROPIVACAINE HYDROCHLORIDE 5 MG/ML
INJECTION, SOLUTION EPIDURAL; INFILTRATION; PERINEURAL AS NEEDED
Status: DISCONTINUED | OUTPATIENT
Start: 2021-08-27 | End: 2021-08-27

## 2021-08-27 RX ORDER — FUROSEMIDE 40 MG/1
40 TABLET ORAL DAILY
Status: DISCONTINUED | OUTPATIENT
Start: 2021-08-28 | End: 2021-08-28 | Stop reason: HOSPADM

## 2021-08-27 RX ORDER — DILTIAZEM HYDROCHLORIDE 180 MG/1
180 CAPSULE, EXTENDED RELEASE ORAL DAILY
Status: DISCONTINUED | OUTPATIENT
Start: 2021-08-28 | End: 2021-08-27

## 2021-08-27 RX ORDER — SODIUM CHLORIDE 9 MG/ML
50 INJECTION, SOLUTION INTRAVENOUS CONTINUOUS
Status: DISCONTINUED | OUTPATIENT
Start: 2021-08-27 | End: 2021-08-28 | Stop reason: HOSPADM

## 2021-08-27 RX ORDER — SODIUM CHLORIDE, SODIUM LACTATE, POTASSIUM CHLORIDE, CALCIUM CHLORIDE 600; 310; 30; 20 MG/100ML; MG/100ML; MG/100ML; MG/100ML
25 INJECTION, SOLUTION INTRAVENOUS CONTINUOUS
Status: DISCONTINUED | OUTPATIENT
Start: 2021-08-27 | End: 2021-08-28 | Stop reason: HOSPADM

## 2021-08-27 RX ORDER — TRAMADOL HYDROCHLORIDE 50 MG/1
50 TABLET ORAL
Status: DISCONTINUED | OUTPATIENT
Start: 2021-08-27 | End: 2021-08-28 | Stop reason: HOSPADM

## 2021-08-27 RX ORDER — LANOLIN ALCOHOL/MO/W.PET/CERES
325 CREAM (GRAM) TOPICAL DAILY
Status: DISCONTINUED | OUTPATIENT
Start: 2021-08-28 | End: 2021-08-28 | Stop reason: HOSPADM

## 2021-08-27 RX ORDER — SULFASALAZINE 500 MG/1
500 TABLET ORAL 2 TIMES DAILY
Status: DISCONTINUED | OUTPATIENT
Start: 2021-08-27 | End: 2021-08-28 | Stop reason: HOSPADM

## 2021-08-27 RX ORDER — CITALOPRAM 20 MG/1
20 TABLET, FILM COATED ORAL DAILY
Status: DISCONTINUED | OUTPATIENT
Start: 2021-08-28 | End: 2021-08-28 | Stop reason: HOSPADM

## 2021-08-27 RX ORDER — LIDOCAINE HYDROCHLORIDE 20 MG/ML
INJECTION, SOLUTION EPIDURAL; INFILTRATION; INTRACAUDAL; PERINEURAL AS NEEDED
Status: DISCONTINUED | OUTPATIENT
Start: 2021-08-27 | End: 2021-08-27 | Stop reason: HOSPADM

## 2021-08-27 RX ORDER — SPIRONOLACTONE 25 MG/1
25 TABLET ORAL EVERY OTHER DAY
Status: DISCONTINUED | OUTPATIENT
Start: 2021-08-27 | End: 2021-08-28 | Stop reason: HOSPADM

## 2021-08-27 RX ORDER — HYDROMORPHONE HYDROCHLORIDE 1 MG/ML
0.2 INJECTION, SOLUTION INTRAMUSCULAR; INTRAVENOUS; SUBCUTANEOUS
Status: DISCONTINUED | OUTPATIENT
Start: 2021-08-27 | End: 2021-08-27 | Stop reason: HOSPADM

## 2021-08-27 RX ORDER — FENTANYL CITRATE 50 UG/ML
INJECTION, SOLUTION INTRAMUSCULAR; INTRAVENOUS AS NEEDED
Status: DISCONTINUED | OUTPATIENT
Start: 2021-08-27 | End: 2021-08-27 | Stop reason: HOSPADM

## 2021-08-27 RX ORDER — PROPOFOL 10 MG/ML
INJECTION, EMULSION INTRAVENOUS
Status: DISCONTINUED | OUTPATIENT
Start: 2021-08-27 | End: 2021-08-27 | Stop reason: HOSPADM

## 2021-08-27 RX ORDER — FAMOTIDINE 20 MG/1
40 TABLET, FILM COATED ORAL 2 TIMES DAILY
Status: DISCONTINUED | OUTPATIENT
Start: 2021-08-27 | End: 2021-08-28 | Stop reason: HOSPADM

## 2021-08-27 RX ORDER — HYDROCODONE BITARTRATE AND ACETAMINOPHEN 5; 325 MG/1; MG/1
2 TABLET ORAL
Status: ON HOLD | COMMUNITY
End: 2021-09-15

## 2021-08-27 RX ORDER — HYDROCODONE BITARTRATE AND ACETAMINOPHEN 5; 325 MG/1; MG/1
2 TABLET ORAL
Status: DISCONTINUED | OUTPATIENT
Start: 2021-08-27 | End: 2021-08-28 | Stop reason: HOSPADM

## 2021-08-27 RX ADMIN — WATER 2 G: 1 INJECTION INTRAMUSCULAR; INTRAVENOUS; SUBCUTANEOUS at 09:49

## 2021-08-27 RX ADMIN — FENTANYL CITRATE 50 MCG: 50 INJECTION, SOLUTION INTRAMUSCULAR; INTRAVENOUS at 08:42

## 2021-08-27 RX ADMIN — FAMOTIDINE 40 MG: 20 TABLET, FILM COATED ORAL at 14:56

## 2021-08-27 RX ADMIN — MIDAZOLAM HYDROCHLORIDE 2 MG: 1 INJECTION, SOLUTION INTRAMUSCULAR; INTRAVENOUS at 08:42

## 2021-08-27 RX ADMIN — PROPOFOL 50 MCG/KG/MIN: 10 INJECTION, EMULSION INTRAVENOUS at 09:45

## 2021-08-27 RX ADMIN — APIXABAN 5 MG: 5 TABLET, FILM COATED ORAL at 14:56

## 2021-08-27 RX ADMIN — SODIUM CHLORIDE 50 ML/HR: 9 INJECTION, SOLUTION INTRAVENOUS at 17:58

## 2021-08-27 RX ADMIN — SULFASALAZINE 500 MG: 500 TABLET ORAL at 21:22

## 2021-08-27 RX ADMIN — LIDOCAINE HYDROCHLORIDE 40 MG: 20 INJECTION, SOLUTION EPIDURAL; INFILTRATION; INTRACAUDAL; PERINEURAL at 09:45

## 2021-08-27 RX ADMIN — SODIUM CHLORIDE, POTASSIUM CHLORIDE, SODIUM LACTATE AND CALCIUM CHLORIDE 25 ML/HR: 600; 310; 30; 20 INJECTION, SOLUTION INTRAVENOUS at 08:18

## 2021-08-27 RX ADMIN — HYDROCODONE BITARTRATE AND ACETAMINOPHEN 2 TABLET: 5; 325 TABLET ORAL at 17:55

## 2021-08-27 RX ADMIN — ROPIVACAINE HYDROCHLORIDE 5 ML: 5 INJECTION, SOLUTION EPIDURAL; INFILTRATION; PERINEURAL at 08:46

## 2021-08-27 RX ADMIN — ROPIVACAINE HYDROCHLORIDE 5 ML: 5 INJECTION, SOLUTION EPIDURAL; INFILTRATION; PERINEURAL at 08:45

## 2021-08-27 RX ADMIN — SULFASALAZINE 500 MG: 500 TABLET ORAL at 14:56

## 2021-08-27 RX ADMIN — LIDOCAINE HYDROCHLORIDE 5 ML: 20 INJECTION, SOLUTION INTRAVENOUS at 08:46

## 2021-08-27 RX ADMIN — FENTANYL CITRATE 25 MCG: 50 INJECTION, SOLUTION INTRAMUSCULAR; INTRAVENOUS at 09:51

## 2021-08-27 RX ADMIN — ROPIVACAINE HYDROCHLORIDE 5 ML: 5 INJECTION, SOLUTION EPIDURAL; INFILTRATION; PERINEURAL at 08:47

## 2021-08-27 RX ADMIN — VENLAFAXINE 37.5 MG: 25 TABLET ORAL at 21:14

## 2021-08-27 RX ADMIN — APIXABAN 5 MG: 5 TABLET, FILM COATED ORAL at 21:14

## 2021-08-27 RX ADMIN — LIDOCAINE HYDROCHLORIDE 5 ML: 20 INJECTION, SOLUTION INTRAVENOUS at 08:45

## 2021-08-27 RX ADMIN — INSULIN GLARGINE 11 UNITS: 100 INJECTION, SOLUTION SUBCUTANEOUS at 21:14

## 2021-08-27 RX ADMIN — INSULIN LISPRO 2 UNITS: 100 INJECTION, SOLUTION INTRAVENOUS; SUBCUTANEOUS at 16:58

## 2021-08-27 RX ADMIN — SODIUM CHLORIDE 50 ML/HR: 9 INJECTION, SOLUTION INTRAVENOUS at 12:33

## 2021-08-27 RX ADMIN — HYDROCODONE BITARTRATE AND ACETAMINOPHEN 2 TABLET: 5; 325 TABLET ORAL at 22:51

## 2021-08-27 RX ADMIN — FAMOTIDINE 40 MG: 20 TABLET, FILM COATED ORAL at 21:14

## 2021-08-27 RX ADMIN — Medication 3 AMPULE: at 08:18

## 2021-08-27 RX ADMIN — LIDOCAINE HYDROCHLORIDE 5 ML: 20 INJECTION, SOLUTION INTRAVENOUS at 08:47

## 2021-08-27 NOTE — PROGRESS NOTES
End of Shift Note    Bedside shift change report given to ADRIANO Winter (oncoming nurse) by Rob May RN (offgoing nurse). Report included the following information SBAR, Kardex, OR Summary, Intake/Output, MAR and Recent Results    Shift worked:  7a to 7p     Shift summary and any significant changes:     Admitted from PACU today s/p Left great toe amputation. Probable D/C tomorrow. Blood sugar checks, no sliding scale. Alzheimer's, tearful at times. Concerns for physician to address: Ordered ensure supplements due to poor appetite. Zone phone for oncoming shift:   [phone]       Activity:  Activity Level: Bed Rest  Number times ambulated in hallways past shift: 0  Number of times OOB to chair past shift: 0    Cardiac:   Cardiac Monitoring: No      Cardiac Rhythm: Ventricular Paced    Access:   Current line(s): PIV     Genitourinary:   Urinary status: voiding and incontinent    Respiratory:   O2 Device: None (Room air)  Chronic home O2 use?: NO  Incentive spirometer at bedside: NO     GI:  Last Bowel Movement Date: 08/26/21  Current diet:  ADULT DIET Regular  Passing flatus: YES  Tolerating current diet: YES       Pain Management:   Patient states pain is manageable on current regimen: YES    Skin:  Gerald Score: 19  Interventions: turn team and float heels    Patient Safety:  Fall Score:  Total Score: 4  Interventions: bed/chair alarm, gripper socks and pt to call before getting OOB  High Fall Risk: Yes    Length of Stay:  Expected LOS: - - -  Actual LOS: 0      Rob May RN

## 2021-08-27 NOTE — PERIOP NOTES
TRANSFER - OUT REPORT:    Verbal report given to [address] on [address]  being transferred [id number] for routine progression of care       Report consisted of patients Situation, Background, Assessment and   Recommendations(SBAR). Information from the following report(s) SBAR was reviewed with the receiving nurse. Opportunity for questions and clarification was provided.       Patient transported with:   Registered Nurse

## 2021-08-27 NOTE — ANESTHESIA PROCEDURE NOTES
Peripheral Block    Start time: 8/27/2021 8:42 AM  End time: 8/27/2021 8:47 AM  Performed by: Viral Montoya MD  Authorized by: Viral Montoya MD       Pre-procedure: Indications: at surgeon's request and primary anesthetic    Preanesthetic Checklist: patient identified, risks and benefits discussed, site marked, timeout performed, anesthesia consent given and patient being monitored    Timeout Time: 08:42 EDT          Block Type:   Block Type: Ankle  Laterality:  Left  Monitoring:  Standard ASA monitoring, frequent vital sign checks, responsive to questions and oxygen  Injection Technique:  Single shot  Procedures: other (comment)    Patient Position: supine  Prep: chlorhexidine    Location:  Foot  Needle type: BD 25 gauge. Needle Localization:  Anatomical landmarks and infiltration    Assessment:  Number of attempts:  1  Injection Assessment:  Incremental injection every 5 mL, local visualized surrounding nerve on ultrasound, no paresthesia, no intravascular symptoms and negative aspiration for blood  Patient tolerance:  Patient tolerated the procedure well with no immediate complications  Risks, benefits, alternatives explained at length and patient agrees to proceed. Time out performed and site for block/surgery identified. Standard monitors applied, 3 L NC O2, and sedation given as recorded by RN so as to achieve pt comfort and anxiolysis but maintain meaningful verbal contact. Sterile prep followed by injection of local anesthetic divided over 5 locations to block sural, saphenous, posterior tibial, superficial and deep peroneal nerves. No pain, paresthesia, or heme noted. VSS throughout. No complications noted.

## 2021-08-27 NOTE — PERIOP NOTES
TRANSFER - OUT REPORT:    Verbal report given to [address] CRNA on Dontae Baez  being transferred Miguel for routine progression of care       Report consisted of patients Situation, Background, Assessment and   Recommendations(SBAR). Information from the following report(s) OR Summary, Procedure Summary, Intake/Output and MAR was reviewed with the receiving nurse. Opportunity for questions and clarification was provided.       Patient transported with:   Registered Nurse

## 2021-08-27 NOTE — ANESTHESIA POSTPROCEDURE EVALUATION
Procedure(s):  LEFT GREAT TOE AMPUTATION (ANKLE BLOCK) (URGENT). MAC, regional    Anesthesia Post Evaluation        Patient location during evaluation: PACU  Note status: Adequate. Level of consciousness: responsive to verbal stimuli and sleepy but conscious  Pain management: satisfactory to patient  Airway patency: patent  Anesthetic complications: no  Cardiovascular status: acceptable  Respiratory status: acceptable  Hydration status: acceptable  Comments: +Post-Anesthesia Evaluation and Assessment    Patient: Patti Jennings MRN: [id number]  SSN: [id number]   YOB: 1940  Age: 78 y.o. Sex: female      Cardiovascular Function/Vital Signs    BP (!) 144/57   Pulse 80   Temp 37.3 °C (99.1 °F)   Resp 14   Ht 4' 11\" (1.499 m)   Wt 57.9 kg (127 lb 10.3 oz)   SpO2 97%   BMI 25.78 kg/m²     Patient is status post Procedure(s):  LEFT GREAT TOE AMPUTATION (ANKLE BLOCK) (URGENT). Nausea/Vomiting: Controlled. Postoperative hydration reviewed and adequate. Pain:  Pain Scale 1: FLACC (08/27/21 1050)  Pain Intensity 1: 0 (08/27/21 1050)   Managed. Neurological Status:   Neuro (WDL): Exceptions to WDL (08/27/21 1020)   At baseline. Mental Status and Level of Consciousness: Arousable. Pulmonary Status:   O2 Device: Nasal cannula (08/27/21 1020)   Adequate oxygenation and airway patent. Complications related to anesthesia: None    Post-anesthesia assessment completed. No concerns. Signed By: Danae Brooks MD    8/27/2021  Post anesthesia nausea and vomiting:  controlled      INITIAL Post-op Vital signs:   Vitals Value Taken Time   /63 08/27/21 1100   Temp 37.3 °C (99.1 °F) 08/27/21 1020   Pulse 80 08/27/21 1106   Resp 17 08/27/21 1106   SpO2 98 % 08/27/21 1106   Vitals shown include unvalidated device data.

## 2021-08-27 NOTE — BRIEF OP NOTE
Brief Postoperative Note    Patient:  Nelson Nava  YOB: 1940  MRN: [id number]    Date of Procedure: 8/27/2021     Pre-Op Diagnosis: LEFT GREAT TOE WOUND    Post-Op Diagnosis: same      Procedure(s): LEFT GREAT TOE AMPUTATION - TRANSMETATARSAL    Surgeon(s):  Terrell Harris MD    Surgical Assistant: None    Anesthesia: block    Estimated Blood Loss (mL): minimal    Complications: none    Specimens:   ID Type Source Tests Collected by Time Destination   1 : Left Great Toe Preservative   Terrell Harris MD 8/27/2021 2973 Pathology        Findings: [place] well, bone healthy, of note 3rd and 4th toes a little dusky    Electronically Signed by Geri Dykes MD on 8/27/2021 at 10:39 AM

## 2021-08-27 NOTE — PERIOP NOTES
No recent covid test result,+ vaccinated. Periodic confusion, daughter at bedside signing consents  belongings to pacu, 1 BAG. mepilex to sacrum, no redness, tenderness or signs of skin breakdown.   Assisted dr Rose Marie Canales with left ankle block  Bed in low position, side rails up x 2 call bell in reach

## 2021-08-28 VITALS
WEIGHT: 127.65 LBS | DIASTOLIC BLOOD PRESSURE: 51 MMHG | TEMPERATURE: 98.3 F | OXYGEN SATURATION: 94 % | BODY MASS INDEX: 25.73 KG/M2 | HEART RATE: 80 BPM | HEIGHT: 59 IN | RESPIRATION RATE: 18 BRPM | SYSTOLIC BLOOD PRESSURE: 153 MMHG

## 2021-08-28 LAB
GLUCOSE BLD STRIP.AUTO-MCNC: 186 MG/DL (ref 65–117)
SERVICE CMNT-IMP: ABNORMAL

## 2021-08-28 PROCEDURE — 2709999900 HC NON-CHARGEABLE SUPPLY

## 2021-08-28 PROCEDURE — 77030036687 HC SHOE PSTOP S2SG -A

## 2021-08-28 PROCEDURE — 74011250637 HC RX REV CODE- 250/637: Performed by: SURGERY

## 2021-08-28 PROCEDURE — 82962 GLUCOSE BLOOD TEST: CPT

## 2021-08-28 PROCEDURE — 99218 HC RM OBSERVATION: CPT

## 2021-08-28 PROCEDURE — 74011636637 HC RX REV CODE- 636/637: Performed by: SURGERY

## 2021-08-28 RX ORDER — HYDROCODONE BITARTRATE AND ACETAMINOPHEN 5; 325 MG/1; MG/1
1 TABLET ORAL
Qty: 20 TABLET | Refills: 0 | Status: SHIPPED | OUTPATIENT
Start: 2021-08-28 | End: 2021-08-31

## 2021-08-28 RX ADMIN — SPIRONOLACTONE 25 MG: 25 TABLET ORAL at 08:55

## 2021-08-28 RX ADMIN — FAMOTIDINE 40 MG: 20 TABLET, FILM COATED ORAL at 08:48

## 2021-08-28 RX ADMIN — CITALOPRAM HYDROBROMIDE 20 MG: 20 TABLET ORAL at 08:45

## 2021-08-28 RX ADMIN — METOPROLOL SUCCINATE 50 MG: 50 TABLET, EXTENDED RELEASE ORAL at 08:50

## 2021-08-28 RX ADMIN — DILTIAZEM HYDROCHLORIDE 180 MG: 180 CAPSULE, COATED, EXTENDED RELEASE ORAL at 08:49

## 2021-08-28 RX ADMIN — LEVOTHYROXINE SODIUM 25 MCG: 0.03 TABLET ORAL at 08:47

## 2021-08-28 RX ADMIN — FERROUS SULFATE TAB 325 MG (65 MG ELEMENTAL FE) 325 MG: 325 (65 FE) TAB at 08:47

## 2021-08-28 RX ADMIN — HYDROCODONE BITARTRATE AND ACETAMINOPHEN 2 TABLET: 5; 325 TABLET ORAL at 05:04

## 2021-08-28 RX ADMIN — Medication 2000 UNITS: at 08:48

## 2021-08-28 RX ADMIN — APIXABAN 5 MG: 5 TABLET, FILM COATED ORAL at 08:45

## 2021-08-28 RX ADMIN — TRAMADOL HYDROCHLORIDE 50 MG: 50 TABLET, FILM COATED ORAL at 08:44

## 2021-08-28 RX ADMIN — INSULIN LISPRO 2 UNITS: 100 INJECTION, SOLUTION INTRAVENOUS; SUBCUTANEOUS at 08:50

## 2021-08-28 RX ADMIN — SULFASALAZINE 500 MG: 500 TABLET ORAL at 08:56

## 2021-08-28 RX ADMIN — HYDROCODONE BITARTRATE AND ACETAMINOPHEN 2 TABLET: 5; 325 TABLET ORAL at 11:30

## 2021-08-28 RX ADMIN — FUROSEMIDE 40 MG: 40 TABLET ORAL at 08:47

## 2021-08-28 RX ADMIN — AMLODIPINE BESYLATE 10 MG: 5 TABLET ORAL at 08:46

## 2021-08-28 NOTE — PROGRESS NOTES
Problem: Pain  Goal: *Control of Pain  Outcome: Resolved/Met     Problem: Patient Education: Go to Patient Education Activity  Goal: Patient/Family Education  Outcome: Resolved/Met     Problem: Falls - Risk of  Goal: *Absence of Falls  Description: Document Baljinder Fall Risk and appropriate interventions in the flowsheet.   Outcome: Resolved/Met     Problem: Patient Education: Go to Patient Education Activity  Goal: Patient/Family Education  Outcome: Resolved/Met

## 2021-08-28 NOTE — DISCHARGE INSTRUCTIONS
Patient Discharge Instructions    Veronica Dobbsbrad / [id number] : 1940    Admitted 2021 Discharged: 2021     What to do at [address] [facility]/surgical shoe for discharge (Given to patient at discharge and place don left foot.)  Non-weight bearing left foot  Keep dry  Leave dressing on as long as it's clean and dry  May change to clean dry gauze as needed       Information obtained by :  I understand that if any problems occur once I am at home I am to contact my physician. I understand and acknowledge receipt of the instructions indicated above.                                                                                                                                            R.N.'s Signature                                                                  Date/Time                                                                                                                                              Patient or Representative Signature                                                          Date/Time      Jan Paniagua MD

## 2021-08-28 NOTE — PROGRESS NOTES
D/C instructions reviewed with patient and daughter with verbalized understanding. Post op shoe placed on left foot.

## 2021-08-28 NOTE — PROGRESS NOTES
Transition of Care Plan:    RUR: OBS. Observation notice provided in writing to patient and/or caregiver as well as verbal explanation of the policy. Patients who are in outpatient status also receive the Observation notice. Patient has received notice and or patient representative has received via secure email, fax, or certified mail based on patient representative's preference. Disposition: Plan is Home today with Family  Follow up appointments: PCP and Surgery: Weekend CM unable to schedule. Family will schedule on Monday. DME needed: n/a Wound care supplies being sent with family. Transportation at Discharge: Oneda Elder to transport Pt home in car. Joaquin or means to access home:      yes  IM Medicare Letter: n/a  Is patient a BCPI-A Bundle:        n/a   If yes, was Bundle Letter given?:     Caregiver Contact:DTR: Ligia Osorio: [phone]  Corinne: Emiliana Mcqueen: [phone]  Discharge Caregiver contacted prior to discharge? Reviewed plan with Pt and Granddaughter     Reason for Admission:  Pt was admitted on 8/27/21 for ampuatation of R great toe.                       RUR Score:          OBS           Plan for utilizing home health:      TBD    PCP: First and Last name:  Shanelle Lemus NP     Name of Practice:    Are you a current patient: Yes/No: yes   Approximate date of last visit:  2 weeks   Can you participate in a virtual visit with your PCP: yes                    Advance Care Planning     General Advance Care Planning (ACP) Conversation      Date of Conversation: 08/28/21  Conducted with: Patient with Decision Making Capacity    Healthcare Decision Maker: DTR: Ligia Osorio: [phone]  GranddtrAlvie Nissen: [phone]    Content/Action Overview:   Has NO ACP documents/care preferences - information provided, considering goals and options  Reviewed DNR/DNI and patient elects Full Code (Attempt Resuscitation)    Length of Voluntary ACP Conversation in minutes:  <16 minutes (Non-Billable)    Nissa Simon  Pt is alert and oriented. Lives with Sue Luciano in one story home with ramp  DME: RW  DTR is caregiver and helps with ADLs as needed. Zane Garrison is going to be doing the dressing changes. [email or web address] Pt has prescription drug coverage. Justo Shames here and ready to transport Pt home. No further CM needs. Care Management Interventions  PCP Verified by CM: Yes  Palliative Care Criteria Met (RRAT>21 & CHF Dx)?: No  Mode of Transport at Discharge:  Other (see comment)  Transition of Care Consult (CM Consult): Discharge Planning  MyChart Signup: No  Discharge Durable Medical Equipment: No  Physical Therapy Consult: No  Occupational Therapy Consult: No  Speech Therapy Consult: No  Current Support Network: Relative's Home, Lives with Caregiver  Confirm Follow Up Transport: Family  Discharge Location  Discharge Placement: Home with family assistance    Nissa Ames, [place]  Ext [phone]

## 2021-08-30 NOTE — OP NOTES
[address]  OPERATIVE REPORT    Name:  Bibiana Luciano  MR#:  [id number]  :  1940  ACCOUNT #:  [de-identified]  DATE OF SERVICE:  2021      PREOPERATIVE DIAGNOSIS:  Nonhealing left great toe amputation. POSTOPERATIVE DIAGNOSIS:  Nonhealing left great toe amputation. PROCEDURE PERFORMED:  Transmetatarsal amputation of left great toe. SURGEON:  Noé Meyer MD    ASSISTANT:  None. ANESTHESIA:  Regional block. COMPLICATIONS:  None. ESTIMATED BLOOD LOSS:  Minimal.    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 In the  of , she underwent successful percutaneous intervention for disease in her left leg but declined partial toe amputation for some 6-7 months. When she finally agreed, she underwent a partial ray amputation to remove a dry gangrenous toe tip. Initially that appeared to heal well, but it is now clearly failed. In the interim, she has had rapid recurrence of her severe disease and has had re-intervention for her left SFA and popliteal disease, her tibial occlusive disease is untreatable. She returns now for toe amputation at the transmetatarsal level. PROCEDURE:  After informed consent, the patient placed supine on the operating table after adequate induction of regional block anesthesia, site and patient confirmation, administration of prophylactic antibiotics, the left foot was prepped and draped in sterile fashion. An elliptical incision was made around the base of the metatarsal and soft tissue was noted to bleed surprisingly well. The metatarsal was transected proximal to the head and the specimen delivered. A rongeur was used to take the metatarsal back above the level of the soft tissue, and this bone was hard, healthy, and bled relatively well. Bleeding points were controlled with Bovie electrocautery.   The thus created wound was brought together without tension, closed with interrupted buried Vicryl suture. The skin was closed with 5 nylon sutures in horizontal mattress fashion. Bulky dressing was applied. The patient tolerated the procedure well with no complications.       Sylvie Cruz MD      [id number]  D:  08/29/2021 11:03  T:  08/29/2021 22:59  JOB #:  [id number]  CC:  Cristino Chaves MD

## 2021-09-09 NOTE — PERIOP NOTES
[facility]  Preoperative Instructions        Surgery Date 9/10/2021          Time of Arrival 8:00 am    1. On the day of your surgery, please report to the Surgical Services Registration Desk and sign in at your designated time. The Surgery Center is located to the right of the Emergency Room. 2. You must have someone with you to drive you home. You should not drive a car for 24 hours following surgery. Please make arrangements for a friend or family member to stay with you for the first 24 hours after your surgery. 3. Do not have anything to eat or drink (including water, gum, mints, coffee, juice) after midnight. ?This may not apply to medications prescribed by your physician. ?(Please note below the special instructions with medications to take the morning of your procedure.)    4. We recommend you do not drink any alcoholic beverages for 24 hours before and after your surgery. 5. Contact your surgeons office for instructions on the following medications: non-steroidal anti-inflammatory drugs (i.e. Advil, Aleve), vitamins, and supplements. (Some surgeons will want you to stop these medications prior to surgery and others may allow you to take them)  **If you are currently taking Plavix, Coumadin, Aspirin and/or other blood-thinning agents, contact your surgeon for instructions. ** Your surgeon will partner with the physician prescribing these medications to determine if it is safe to stop or if you need to continue taking. Please do not stop taking these medications without instructions from your surgeon    6. Wear comfortable clothes. Wear glasses instead of contacts. Do not bring any money or jewelry. Please bring picture ID, insurance card, and any prearranged co-payment or hospital payment. Do not wear make-up, particularly mascara the morning of your surgery. Do not wear nail polish, particularly if you are having foot /hand surgery.   Wear your hair loose or down, no ponytails, buns, emily pins or clips. All body piercings must be removed. Please shower with antibacterial soap for three consecutive days before and on the morning of surgery, but do not apply any lotions, powders or deodorants after the shower on the day of surgery. Please use a fresh towels after each shower. Please sleep in clean clothes and change bed linens the night before surgery. Please do not shave for 48 hours prior to surgery. Shaving of the face is acceptable. 7. You should understand that if you do not follow these instructions your surgery may be cancelled. If your physical condition changes (I.e. fever, cold or flu) please contact your surgeon as soon as possible. 8. It is important that you be on time. If a situation occurs where you may be late, please call [phone] (OR Holding Area). 9. If you have any questions and or problems, please call [phone] (Pre-admission Testing). 10. Your surgery time may be subject to change. You will receive a phone call the evening prior if your time changes. 11.  If having outpatient surgery, you must have someone to drive you here, stay with you during the duration of your stay, and to drive you home at time of discharge.     Special Instructions:     TAKE ALL MEDICATIONS DAY OF SURGERY EXCEPT: None             ___________________      __________   _________    (Signature of Patient)             (Witness)                (Date and Time)

## 2021-09-10 ENCOUNTER — ANESTHESIA EVENT (OUTPATIENT)
Dept: SURGERY | Age: 81
DRG: 240 | End: 2021-09-10
Payer: MEDICARE

## 2021-09-10 ENCOUNTER — HOSPITAL ENCOUNTER (INPATIENT)
Age: 81
LOS: 3 days | Discharge: HOME HEALTH | DRG: 240 | End: 2021-09-15
Attending: SURGERY | Admitting: SURGERY
Payer: MEDICARE

## 2021-09-10 ENCOUNTER — ANESTHESIA (OUTPATIENT)
Dept: SURGERY | Age: 81
DRG: 240 | End: 2021-09-10
Payer: MEDICARE

## 2021-09-10 PROBLEM — Z89.432 STATUS POST TRANSMETATARSAL AMPUTATION OF LEFT FOOT (HCC): Status: ACTIVE | Noted: 2021-09-10

## 2021-09-10 LAB
GLUCOSE BLD STRIP.AUTO-MCNC: 128 MG/DL (ref 65–117)
GLUCOSE BLD STRIP.AUTO-MCNC: 140 MG/DL (ref 65–117)
GLUCOSE BLD STRIP.AUTO-MCNC: 193 MG/DL (ref 65–117)
GLUCOSE BLD STRIP.AUTO-MCNC: 349 MG/DL (ref 65–117)
SERVICE CMNT-IMP: ABNORMAL

## 2021-09-10 PROCEDURE — 74011000250 HC RX REV CODE- 250: Performed by: SURGERY

## 2021-09-10 PROCEDURE — 99218 HC RM OBSERVATION: CPT

## 2021-09-10 PROCEDURE — 74011250636 HC RX REV CODE- 250/636: Performed by: NURSE ANESTHETIST, CERTIFIED REGISTERED

## 2021-09-10 PROCEDURE — 74011250636 HC RX REV CODE- 250/636: Performed by: SURGERY

## 2021-09-10 PROCEDURE — 77030008463 HC STPLR SKN PROX J&J -B: Performed by: SURGERY

## 2021-09-10 PROCEDURE — 74011000250 HC RX REV CODE- 250: Performed by: NURSE ANESTHETIST, CERTIFIED REGISTERED

## 2021-09-10 PROCEDURE — 82962 GLUCOSE BLOOD TEST: CPT

## 2021-09-10 PROCEDURE — 77030010509 HC AIRWY LMA MSK TELE -A: Performed by: ANESTHESIOLOGY

## 2021-09-10 PROCEDURE — 76060000033 HC ANESTHESIA 1 TO 1.5 HR: Performed by: SURGERY

## 2021-09-10 PROCEDURE — 88307 TISSUE EXAM BY PATHOLOGIST: CPT

## 2021-09-10 PROCEDURE — 77030002916 HC SUT ETHLN J&J -A: Performed by: SURGERY

## 2021-09-10 PROCEDURE — 88311 DECALCIFY TISSUE: CPT

## 2021-09-10 PROCEDURE — 76210000017 HC OR PH I REC 1.5 TO 2 HR: Performed by: SURGERY

## 2021-09-10 PROCEDURE — 77010033678 HC OXYGEN DAILY

## 2021-09-10 PROCEDURE — 74011636637 HC RX REV CODE- 636/637: Performed by: SURGERY

## 2021-09-10 PROCEDURE — 76010000149 HC OR TIME 1 TO 1.5 HR: Performed by: SURGERY

## 2021-09-10 PROCEDURE — 77030038692 HC WND DEB SYS IRMX -B: Performed by: SURGERY

## 2021-09-10 PROCEDURE — 74011250637 HC RX REV CODE- 250/637: Performed by: SURGERY

## 2021-09-10 PROCEDURE — 74011250636 HC RX REV CODE- 250/636: Performed by: ANESTHESIOLOGY

## 2021-09-10 PROCEDURE — 77030002996 HC SUT SLK J&J -A: Performed by: SURGERY

## 2021-09-10 PROCEDURE — 77030031139 HC SUT VCRL2 J&J -A: Performed by: SURGERY

## 2021-09-10 PROCEDURE — 2709999900 HC NON-CHARGEABLE SUPPLY: Performed by: SURGERY

## 2021-09-10 RX ORDER — DEXMEDETOMIDINE HYDROCHLORIDE 100 UG/ML
INJECTION, SOLUTION INTRAVENOUS AS NEEDED
Status: DISCONTINUED | OUTPATIENT
Start: 2021-09-10 | End: 2021-09-10 | Stop reason: HOSPADM

## 2021-09-10 RX ORDER — DEXAMETHASONE SODIUM PHOSPHATE 4 MG/ML
INJECTION, SOLUTION INTRA-ARTICULAR; INTRALESIONAL; INTRAMUSCULAR; INTRAVENOUS; SOFT TISSUE AS NEEDED
Status: DISCONTINUED | OUTPATIENT
Start: 2021-09-10 | End: 2021-09-10 | Stop reason: HOSPADM

## 2021-09-10 RX ORDER — OXYCODONE HYDROCHLORIDE 5 MG/1
5 TABLET ORAL
Status: DISCONTINUED | OUTPATIENT
Start: 2021-09-10 | End: 2021-09-15 | Stop reason: HOSPADM

## 2021-09-10 RX ORDER — LEVOTHYROXINE SODIUM 25 UG/1
25 TABLET ORAL
Status: DISCONTINUED | OUTPATIENT
Start: 2021-09-11 | End: 2021-09-15 | Stop reason: HOSPADM

## 2021-09-10 RX ORDER — PHENYLEPHRINE HCL IN 0.9% NACL 0.4MG/10ML
SYRINGE (ML) INTRAVENOUS AS NEEDED
Status: DISCONTINUED | OUTPATIENT
Start: 2021-09-10 | End: 2021-09-10 | Stop reason: HOSPADM

## 2021-09-10 RX ORDER — METOPROLOL TARTRATE 5 MG/5ML
INJECTION INTRAVENOUS AS NEEDED
Status: DISCONTINUED | OUTPATIENT
Start: 2021-09-10 | End: 2021-09-10 | Stop reason: HOSPADM

## 2021-09-10 RX ORDER — AMLODIPINE BESYLATE 5 MG/1
10 TABLET ORAL DAILY
Status: DISCONTINUED | OUTPATIENT
Start: 2021-09-11 | End: 2021-09-10

## 2021-09-10 RX ORDER — FUROSEMIDE 40 MG/1
40 TABLET ORAL DAILY
Status: DISCONTINUED | OUTPATIENT
Start: 2021-09-11 | End: 2021-09-15 | Stop reason: HOSPADM

## 2021-09-10 RX ORDER — SPIRONOLACTONE 25 MG/1
25 TABLET ORAL EVERY OTHER DAY
Status: DISCONTINUED | OUTPATIENT
Start: 2021-09-10 | End: 2021-09-15 | Stop reason: HOSPADM

## 2021-09-10 RX ORDER — ONDANSETRON 2 MG/ML
INJECTION INTRAMUSCULAR; INTRAVENOUS AS NEEDED
Status: DISCONTINUED | OUTPATIENT
Start: 2021-09-10 | End: 2021-09-10 | Stop reason: HOSPADM

## 2021-09-10 RX ORDER — MIDAZOLAM HYDROCHLORIDE 1 MG/ML
1 INJECTION, SOLUTION INTRAMUSCULAR; INTRAVENOUS AS NEEDED
Status: DISCONTINUED | OUTPATIENT
Start: 2021-09-10 | End: 2021-09-10 | Stop reason: HOSPADM

## 2021-09-10 RX ORDER — SULFASALAZINE 500 MG/1
500 TABLET ORAL 2 TIMES DAILY
Status: DISCONTINUED | OUTPATIENT
Start: 2021-09-10 | End: 2021-09-15 | Stop reason: HOSPADM

## 2021-09-10 RX ORDER — INSULIN LISPRO 100 [IU]/ML
2 INJECTION, SOLUTION INTRAVENOUS; SUBCUTANEOUS
Status: DISCONTINUED | OUTPATIENT
Start: 2021-09-10 | End: 2021-09-15 | Stop reason: HOSPADM

## 2021-09-10 RX ORDER — MIDAZOLAM HYDROCHLORIDE 1 MG/ML
INJECTION, SOLUTION INTRAMUSCULAR; INTRAVENOUS AS NEEDED
Status: DISCONTINUED | OUTPATIENT
Start: 2021-09-10 | End: 2021-09-10 | Stop reason: HOSPADM

## 2021-09-10 RX ORDER — CITALOPRAM 20 MG/1
20 TABLET, FILM COATED ORAL DAILY
Status: DISCONTINUED | OUTPATIENT
Start: 2021-09-11 | End: 2021-09-15 | Stop reason: HOSPADM

## 2021-09-10 RX ORDER — PROPOFOL 10 MG/ML
INJECTION, EMULSION INTRAVENOUS AS NEEDED
Status: DISCONTINUED | OUTPATIENT
Start: 2021-09-10 | End: 2021-09-10 | Stop reason: HOSPADM

## 2021-09-10 RX ORDER — METOPROLOL SUCCINATE 50 MG/1
50 TABLET, EXTENDED RELEASE ORAL DAILY
Status: DISCONTINUED | OUTPATIENT
Start: 2021-09-11 | End: 2021-09-15 | Stop reason: HOSPADM

## 2021-09-10 RX ORDER — WATER FOR INJECTION,STERILE
VIAL (ML) INJECTION
Status: DISPENSED
Start: 2021-09-10 | End: 2021-09-10

## 2021-09-10 RX ORDER — SODIUM CHLORIDE, SODIUM LACTATE, POTASSIUM CHLORIDE, CALCIUM CHLORIDE 600; 310; 30; 20 MG/100ML; MG/100ML; MG/100ML; MG/100ML
25 INJECTION, SOLUTION INTRAVENOUS CONTINUOUS
Status: DISCONTINUED | OUTPATIENT
Start: 2021-09-10 | End: 2021-09-10 | Stop reason: HOSPADM

## 2021-09-10 RX ORDER — DILTIAZEM HYDROCHLORIDE 180 MG/1
180 CAPSULE, EXTENDED RELEASE ORAL DAILY
Status: DISCONTINUED | OUTPATIENT
Start: 2021-09-11 | End: 2021-09-10

## 2021-09-10 RX ORDER — SODIUM CHLORIDE 9 MG/ML
50 INJECTION, SOLUTION INTRAVENOUS CONTINUOUS
Status: DISCONTINUED | OUTPATIENT
Start: 2021-09-10 | End: 2021-09-13

## 2021-09-10 RX ORDER — HYDROMORPHONE HYDROCHLORIDE 1 MG/ML
.2-.5 INJECTION, SOLUTION INTRAMUSCULAR; INTRAVENOUS; SUBCUTANEOUS
Status: DISCONTINUED | OUTPATIENT
Start: 2021-09-10 | End: 2021-09-10 | Stop reason: HOSPADM

## 2021-09-10 RX ORDER — FENTANYL CITRATE 50 UG/ML
50 INJECTION, SOLUTION INTRAMUSCULAR; INTRAVENOUS AS NEEDED
Status: DISCONTINUED | OUTPATIENT
Start: 2021-09-10 | End: 2021-09-10 | Stop reason: HOSPADM

## 2021-09-10 RX ORDER — INSULIN GLARGINE 100 [IU]/ML
12 INJECTION, SOLUTION SUBCUTANEOUS
Status: DISCONTINUED | OUTPATIENT
Start: 2021-09-10 | End: 2021-09-15 | Stop reason: HOSPADM

## 2021-09-10 RX ORDER — DILTIAZEM HYDROCHLORIDE 180 MG/1
180 CAPSULE, COATED, EXTENDED RELEASE ORAL DAILY
Status: DISCONTINUED | OUTPATIENT
Start: 2021-09-11 | End: 2021-09-15 | Stop reason: HOSPADM

## 2021-09-10 RX ORDER — FENTANYL CITRATE 50 UG/ML
25 INJECTION, SOLUTION INTRAMUSCULAR; INTRAVENOUS
Status: DISCONTINUED | OUTPATIENT
Start: 2021-09-10 | End: 2021-09-10 | Stop reason: HOSPADM

## 2021-09-10 RX ORDER — VENLAFAXINE 25 MG/1
37.5 TABLET ORAL
Status: DISCONTINUED | OUTPATIENT
Start: 2021-09-10 | End: 2021-09-15 | Stop reason: HOSPADM

## 2021-09-10 RX ORDER — MORPHINE SULFATE 2 MG/ML
2 INJECTION, SOLUTION INTRAMUSCULAR; INTRAVENOUS
Status: DISCONTINUED | OUTPATIENT
Start: 2021-09-10 | End: 2021-09-15 | Stop reason: HOSPADM

## 2021-09-10 RX ORDER — DEXTROSE 50 % IN WATER (D50W) INTRAVENOUS SYRINGE
12.5-25 AS NEEDED
Status: DISCONTINUED | OUTPATIENT
Start: 2021-09-10 | End: 2021-09-15 | Stop reason: HOSPADM

## 2021-09-10 RX ORDER — FENTANYL CITRATE 50 UG/ML
INJECTION, SOLUTION INTRAMUSCULAR; INTRAVENOUS AS NEEDED
Status: DISCONTINUED | OUTPATIENT
Start: 2021-09-10 | End: 2021-09-10 | Stop reason: HOSPADM

## 2021-09-10 RX ORDER — ONDANSETRON 2 MG/ML
4 INJECTION INTRAMUSCULAR; INTRAVENOUS AS NEEDED
Status: DISCONTINUED | OUTPATIENT
Start: 2021-09-10 | End: 2021-09-10 | Stop reason: HOSPADM

## 2021-09-10 RX ORDER — LIDOCAINE HYDROCHLORIDE 10 MG/ML
0.1 INJECTION, SOLUTION EPIDURAL; INFILTRATION; INTRACAUDAL; PERINEURAL AS NEEDED
Status: DISCONTINUED | OUTPATIENT
Start: 2021-09-10 | End: 2021-09-10 | Stop reason: HOSPADM

## 2021-09-10 RX ORDER — MAGNESIUM SULFATE 100 %
4 CRYSTALS MISCELLANEOUS AS NEEDED
Status: DISCONTINUED | OUTPATIENT
Start: 2021-09-10 | End: 2021-09-15 | Stop reason: HOSPADM

## 2021-09-10 RX ORDER — LIDOCAINE HYDROCHLORIDE 20 MG/ML
INJECTION, SOLUTION EPIDURAL; INFILTRATION; INTRACAUDAL; PERINEURAL AS NEEDED
Status: DISCONTINUED | OUTPATIENT
Start: 2021-09-10 | End: 2021-09-10 | Stop reason: HOSPADM

## 2021-09-10 RX ORDER — INSULIN LISPRO 100 [IU]/ML
INJECTION, SOLUTION INTRAVENOUS; SUBCUTANEOUS
Status: DISCONTINUED | OUTPATIENT
Start: 2021-09-10 | End: 2021-09-15 | Stop reason: HOSPADM

## 2021-09-10 RX ORDER — OXYCODONE HYDROCHLORIDE 5 MG/1
5 TABLET ORAL
COMMUNITY
End: 2021-10-25

## 2021-09-10 RX ADMIN — Medication 3 AMPULE: at 08:33

## 2021-09-10 RX ADMIN — OXYCODONE 5 MG: 5 TABLET ORAL at 21:13

## 2021-09-10 RX ADMIN — FENTANYL CITRATE 12.5 MCG: 50 INJECTION, SOLUTION INTRAMUSCULAR; INTRAVENOUS at 09:31

## 2021-09-10 RX ADMIN — PROPOFOL 20 MG: 10 INJECTION, EMULSION INTRAVENOUS at 09:09

## 2021-09-10 RX ADMIN — FENTANYL CITRATE 25 MCG: 0.05 INJECTION, SOLUTION INTRAMUSCULAR; INTRAVENOUS at 11:04

## 2021-09-10 RX ADMIN — FENTANYL CITRATE 25 MCG: 0.05 INJECTION, SOLUTION INTRAMUSCULAR; INTRAVENOUS at 11:14

## 2021-09-10 RX ADMIN — Medication 40 MCG: at 09:02

## 2021-09-10 RX ADMIN — INSULIN GLARGINE 12 UNITS: 100 INJECTION, SOLUTION SUBCUTANEOUS at 21:26

## 2021-09-10 RX ADMIN — FENTANYL CITRATE 25 MCG: 50 INJECTION, SOLUTION INTRAMUSCULAR; INTRAVENOUS at 09:33

## 2021-09-10 RX ADMIN — SPIRONOLACTONE 25 MG: 25 TABLET ORAL at 16:11

## 2021-09-10 RX ADMIN — DEXAMETHASONE SODIUM PHOSPHATE 4 MG: 4 INJECTION, SOLUTION INTRAMUSCULAR; INTRAVENOUS at 09:10

## 2021-09-10 RX ADMIN — OXYCODONE 5 MG: 5 TABLET ORAL at 16:09

## 2021-09-10 RX ADMIN — INSULIN LISPRO 7 UNITS: 100 INJECTION, SOLUTION INTRAVENOUS; SUBCUTANEOUS at 18:28

## 2021-09-10 RX ADMIN — LIDOCAINE HYDROCHLORIDE 60 MG: 20 INJECTION, SOLUTION EPIDURAL; INFILTRATION; INTRACAUDAL; PERINEURAL at 09:01

## 2021-09-10 RX ADMIN — FENTANYL CITRATE 25 MCG: 0.05 INJECTION, SOLUTION INTRAMUSCULAR; INTRAVENOUS at 12:54

## 2021-09-10 RX ADMIN — DEXMEDETOMIDINE HYDROCHLORIDE 4 MCG: 100 INJECTION, SOLUTION, CONCENTRATE INTRAVENOUS at 09:09

## 2021-09-10 RX ADMIN — ONDANSETRON HYDROCHLORIDE 4 MG: 2 INJECTION, SOLUTION INTRAMUSCULAR; INTRAVENOUS at 10:06

## 2021-09-10 RX ADMIN — PROPOFOL 80 MG: 10 INJECTION, EMULSION INTRAVENOUS at 09:02

## 2021-09-10 RX ADMIN — FENTANYL CITRATE 25 MCG: 50 INJECTION, SOLUTION INTRAMUSCULAR; INTRAVENOUS at 08:40

## 2021-09-10 RX ADMIN — DEXMEDETOMIDINE HYDROCHLORIDE 4 MCG: 100 INJECTION, SOLUTION, CONCENTRATE INTRAVENOUS at 09:21

## 2021-09-10 RX ADMIN — FENTANYL CITRATE 25 MCG: 0.05 INJECTION, SOLUTION INTRAMUSCULAR; INTRAVENOUS at 10:57

## 2021-09-10 RX ADMIN — MORPHINE SULFATE 2 MG: 2 INJECTION, SOLUTION INTRAMUSCULAR; INTRAVENOUS at 19:17

## 2021-09-10 RX ADMIN — FENTANYL CITRATE 12.5 MCG: 50 INJECTION, SOLUTION INTRAMUSCULAR; INTRAVENOUS at 09:21

## 2021-09-10 RX ADMIN — MORPHINE SULFATE 2 MG: 2 INJECTION, SOLUTION INTRAMUSCULAR; INTRAVENOUS at 14:52

## 2021-09-10 RX ADMIN — CEFAZOLIN 2 G: 1 INJECTION, POWDER, FOR SOLUTION INTRAMUSCULAR; INTRAVENOUS; PARENTERAL at 09:10

## 2021-09-10 RX ADMIN — SODIUM CHLORIDE 50 ML/HR: 9 INJECTION, SOLUTION INTRAVENOUS at 12:25

## 2021-09-10 RX ADMIN — SODIUM CHLORIDE, POTASSIUM CHLORIDE, SODIUM LACTATE AND CALCIUM CHLORIDE 25 ML/HR: 600; 310; 30; 20 INJECTION, SOLUTION INTRAVENOUS at 08:33

## 2021-09-10 RX ADMIN — MIDAZOLAM HYDROCHLORIDE 1 MG: 1 INJECTION, SOLUTION INTRAMUSCULAR; INTRAVENOUS at 08:40

## 2021-09-10 RX ADMIN — PROPOFOL 20 MG: 10 INJECTION, EMULSION INTRAVENOUS at 09:31

## 2021-09-10 RX ADMIN — METOPROLOL TARTRATE 1 MG: 5 INJECTION, SOLUTION INTRAVENOUS at 09:10

## 2021-09-10 RX ADMIN — CEFAZOLIN SODIUM 2 G: 1 INJECTION, POWDER, FOR SOLUTION INTRAMUSCULAR; INTRAVENOUS at 16:10

## 2021-09-10 RX ADMIN — APIXABAN 5 MG: 5 TABLET, FILM COATED ORAL at 18:28

## 2021-09-10 RX ADMIN — INSULIN LISPRO 2 UNITS: 100 INJECTION, SOLUTION INTRAVENOUS; SUBCUTANEOUS at 18:28

## 2021-09-10 RX ADMIN — SULFASALAZINE 500 MG: 500 TABLET ORAL at 18:28

## 2021-09-10 RX ADMIN — VENLAFAXINE 37.5 MG: 25 TABLET ORAL at 21:13

## 2021-09-10 NOTE — BRIEF OP NOTE
Brief Postoperative Note    Patient:  Tim Saenz  YOB: 1940  MRN: [id number]    Date of Procedure: 9/10/2021     Pre-Op Diagnosis: Failed left great toe amp    Post-Op Diagnosis: same    Procedure(s): Left TMA    Surgeon(s):  Freda Rizzo MD    Anesthesia: General     Estimated Blood Loss (mL): 56PQ    Complications: none    Specimens:   ID Type Source Tests Collected by Time Destination   1 : Left fore-foot Fresh Foot, left  Freda Rizzo MD 9/10/2021 0412 Pathology        Findings: bled relatively well; left small open area at corner to drain    Electronically Signed by Young Malone MD on 9/10/2021 at 10:34 AM Yes

## 2021-09-10 NOTE — ADDENDUM NOTE
Addendum  created 09/10/21 1332 by Milli Infante, CRNA    Order Reconciliation Section accessed, Orders acknowledged in Narrator

## 2021-09-10 NOTE — PROGRESS NOTES
Re: Mixed insulin 70/30 reordered from home ([facility] approved dose change has been made on this patient)      Home dose:     Novolog Mix 70/30  - 16 units sq qam  +    Novolog Mix 70/30 -  5 units sq QHS      [facility] ordered change:    Lantus 12 units sq QHS  +   Insulin Lispro 2 units sq AC tid         Gladys,  Gisselle Llamas, [facility]

## 2021-09-10 NOTE — PROGRESS NOTES
Bedside and Verbal shift change report given to [facility] (oncoming nurse) by Beata Agustin (offgoing nurse). Report included the following information SBAR.

## 2021-09-10 NOTE — ANESTHESIA PREPROCEDURE EVALUATION
Anesthetic History               Review of Systems / Medical History  Patient summary reviewed, nursing notes reviewed and pertinent labs reviewed    Pulmonary    COPD            Comments: H/O RLL Pneumonia   Neuro/Psych             Comments: Diabetic peripheral neuropathy    H/O SDH (subdural hematoma)    Hard of hearing Cardiovascular    Hypertension  Valvular problems/murmurs: tricuspid insufficiency and mitral insufficiency    CHF  Dysrhythmias : atrial fibrillation  Pacemaker, CAD, PAD, cardiac stents, CABG (3 vessel) and hyperlipidemia    Exercise tolerance: <4 METS  Comments: Ejection fraction was  estimated in the range of 55 % to 60 %. Mod-severe TR & PHTN    ECG (5/4/21): Ventricular-paced rhythm   Abnormal ECG   When compared with ECG of 09-AUG-2017 18:58,   Vent. rate has increased BY  20 BPM     TTE (2/23/18): Left ventricle: Systolic function was normal. Ejection fraction was  estimated in the range of 55 % to 60 %. No obvious wall motion  abnormalities identified in the views obtained. There was mild concentric  hypertrophy. Left atrium: The atrium was moderately dilated. Right atrium: The atrium was moderately dilated. Mitral valve: There was mild regurgitation. Aortic valve: There was no stenosis. Tricuspid valve: There was moderate to severe regurgitation. Pulmonary  artery systolic pressure: 61 mmHg. There was moderate to severe pulmonary  hypertension.    GI/Hepatic/Renal     GERD    Renal disease: CRI      Comments: CRI, Stage III Endo/Other    Diabetes: type 2, using insulin  Hypothyroidism  Obesity and arthritis     Other Findings   Comments: Alzheimer diseaseChronic pain           Physical Exam    Airway  Mallampati: II  TM Distance: 4 - 6 cm  Neck ROM: normal range of motion   Mouth opening: Normal     Cardiovascular  Regular rate and rhythm,  S1 and S2 normal,  no murmur, click, rub, or gallop             Dental    Dentition: Edentulous, Full lower dentures and Full upper dentures     Pulmonary  Breath sounds clear to auscultation               Abdominal  GI exam deferred       Other Findings            Anesthetic Plan    ASA: 3  Anesthesia type: general    Monitoring Plan: BIS      Induction: Intravenous  Anesthetic plan and risks discussed with: Patient

## 2021-09-10 NOTE — PERIOP NOTES
TRANSFER - OUT REPORT:    Verbal report given to Sharon(name) on Anirudh Shells  being transferred to [facility](unit) for routine post - op       Report consisted of patients Situation, Background, Assessment and   Recommendations(SBAR). Information from the following report(s) OR Summary was reviewed with the receiving nurse. Lines:   Peripheral IV 09/10/21 Left Hand (Active)   Site Assessment Clean, dry, & intact 09/10/21 1027   Phlebitis Assessment 0 09/10/21 1027   Infiltration Assessment 0 09/10/21 1027   Dressing Status Clean, dry, & intact 09/10/21 1027   Dressing Type Transparent 09/10/21 1027   Hub Color/Line Status Pink; Infusing 09/10/21 1027        Opportunity for questions and clarification was provided.       Patient transported with:   Registered Nurse

## 2021-09-10 NOTE — PERIOP NOTES
1027 - PT ARRIVED INTO PACU BAY 5 - REPORT TAKEN FROM TIFF RN AND TONJA CARTWRIGHT - PT DROWSY, EASILY ARROUSABLE - KRAMER SPON, BUT NOT TO COMMAND  RESP EVEN AND UNLABORED. POX ON 4LNC > 94%. BSB AND CLEAR ON AUSC. LEFT FOOT ACE DSG CD&I.  ORIENTED TO PACU SETTING - UNABLE TO ASSERTAIN PT'S LEVEL OF UNDERSTANDING - SECONDARY TO PT ORIENTED TO PERSON ONLY(AS PER BASELINE). 1050-PT 5 ON FLACC SCALE - MEDICATED AS ORDERED WITH FENTANYL IV.   REPOSITIONED FOR COMFORT - LEFT LEG ELEVATED ON 1 PILLOW

## 2021-09-10 NOTE — ANESTHESIA POSTPROCEDURE EVALUATION
Procedure(s):  AMPUTATION TRANSMETATARSAL LEFT FOOT. general    Anesthesia Post Evaluation      Multimodal analgesia: multimodal analgesia used between 6 hours prior to anesthesia start to PACU discharge  Patient location during evaluation: PACU  Level of consciousness: sleepy but conscious  Pain management: adequate  Airway patency: patent  Anesthetic complications: no  Cardiovascular status: acceptable  Respiratory status: acceptable  Hydration status: acceptable  Comments: +Post-Anesthesia Evaluation and Assessment    Patient: Triny Doyle MRN: [id number]  SSN: [id number]   YOB: 1940  Age: 78 y.o. Sex: female      Cardiovascular Function/Vital Signs    BP (!) 164/67 (BP 1 Location: Right upper arm, BP Patient Position: At rest)   Pulse 82   Temp 36.5 °C (97.7 °F)   Resp 16   Ht 4' 11\" (1.499 m)   Wt 52.8 kg (116 lb 6.5 oz)   SpO2 97%   BMI 23.51 kg/m²     Patient is status post Procedure(s):  AMPUTATION TRANSMETATARSAL LEFT FOOT. Nausea/Vomiting: Controlled. Postoperative hydration reviewed and adequate. Pain:  Pain Scale 1: Numeric (0 - 10) (09/10/21 1215)  Pain Intensity 1: 0 (09/10/21 1215)   Managed. Neurological Status:   Neuro (WDL): Exceptions to WDL (09/10/21 1040)   At baseline. Mental Status and Level of Consciousness: Arousable. Pulmonary Status:   O2 Device: None (Room air) (09/10/21 1300)   Adequate oxygenation and airway patent. Complications related to anesthesia: None    Post-anesthesia assessment completed. No concerns. Signed By: Tanna Resendez MD    9/10/2021  Post anesthesia nausea and vomiting:  controlled  Final Post Anesthesia Temperature Assessment:  Normothermia (36.0-37.5 degrees C)      INITIAL Post-op Vital signs:   Vitals Value Taken Time   /68 09/10/21 1305   Temp 36.5 °C (97.7 °F) 09/10/21 1027   Pulse 80 09/10/21 1310   Resp 18 09/10/21 1310   SpO2 96 % 09/10/21 1310   Vitals shown include unvalidated device data.

## 2021-09-11 LAB
ERYTHROCYTE [DISTWIDTH] IN BLOOD BY AUTOMATED COUNT: 13.5 % (ref 11.5–14.5)
GLUCOSE BLD STRIP.AUTO-MCNC: 107 MG/DL (ref 65–117)
GLUCOSE BLD STRIP.AUTO-MCNC: 157 MG/DL (ref 65–117)
GLUCOSE BLD STRIP.AUTO-MCNC: 197 MG/DL (ref 65–117)
GLUCOSE BLD STRIP.AUTO-MCNC: 72 MG/DL (ref 65–117)
GLUCOSE BLD STRIP.AUTO-MCNC: 94 MG/DL (ref 65–117)
HCT VFR BLD AUTO: 28.7 % (ref 35–47)
HGB BLD-MCNC: 7.8 G/DL (ref 11.5–16)
MCH RBC QN AUTO: 31 PG (ref 26–34)
MCHC RBC AUTO-ENTMCNC: 27.2 G/DL (ref 30–36.5)
MCV RBC AUTO: 113.9 FL (ref 80–99)
NRBC # BLD: 0 K/UL (ref 0–0.01)
NRBC BLD-RTO: 0 PER 100 WBC
PLATELET # BLD AUTO: 219 K/UL (ref 150–400)
PMV BLD AUTO: 9.2 FL (ref 8.9–12.9)
RBC # BLD AUTO: 2.52 M/UL (ref 3.8–5.2)
SERVICE CMNT-IMP: ABNORMAL
SERVICE CMNT-IMP: ABNORMAL
SERVICE CMNT-IMP: NORMAL
WBC # BLD AUTO: 10.5 K/UL (ref 3.6–11)

## 2021-09-11 PROCEDURE — 74011636637 HC RX REV CODE- 636/637: Performed by: SURGERY

## 2021-09-11 PROCEDURE — 99218 HC RM OBSERVATION: CPT

## 2021-09-11 PROCEDURE — 74011000250 HC RX REV CODE- 250: Performed by: SURGERY

## 2021-09-11 PROCEDURE — 36415 COLL VENOUS BLD VENIPUNCTURE: CPT

## 2021-09-11 PROCEDURE — 85027 COMPLETE CBC AUTOMATED: CPT

## 2021-09-11 PROCEDURE — 82962 GLUCOSE BLOOD TEST: CPT

## 2021-09-11 PROCEDURE — 74011250637 HC RX REV CODE- 250/637: Performed by: SURGERY

## 2021-09-11 PROCEDURE — 74011250636 HC RX REV CODE- 250/636: Performed by: SURGERY

## 2021-09-11 RX ORDER — ACETAMINOPHEN 500 MG
1000 TABLET ORAL EVERY 6 HOURS
Status: DISCONTINUED | OUTPATIENT
Start: 2021-09-12 | End: 2021-09-15 | Stop reason: HOSPADM

## 2021-09-11 RX ADMIN — MORPHINE SULFATE 2 MG: 2 INJECTION, SOLUTION INTRAMUSCULAR; INTRAVENOUS at 09:08

## 2021-09-11 RX ADMIN — SULFASALAZINE 500 MG: 500 TABLET ORAL at 09:11

## 2021-09-11 RX ADMIN — APIXABAN 5 MG: 5 TABLET, FILM COATED ORAL at 17:58

## 2021-09-11 RX ADMIN — SULFASALAZINE 500 MG: 500 TABLET ORAL at 17:58

## 2021-09-11 RX ADMIN — FUROSEMIDE 40 MG: 40 TABLET ORAL at 09:10

## 2021-09-11 RX ADMIN — SODIUM CHLORIDE 50 ML/HR: 9 INJECTION, SOLUTION INTRAVENOUS at 09:10

## 2021-09-11 RX ADMIN — OXYCODONE 5 MG: 5 TABLET ORAL at 01:34

## 2021-09-11 RX ADMIN — MORPHINE SULFATE 2 MG: 2 INJECTION, SOLUTION INTRAMUSCULAR; INTRAVENOUS at 20:13

## 2021-09-11 RX ADMIN — MORPHINE SULFATE 2 MG: 2 INJECTION, SOLUTION INTRAMUSCULAR; INTRAVENOUS at 00:54

## 2021-09-11 RX ADMIN — INSULIN LISPRO 2 UNITS: 100 INJECTION, SOLUTION INTRAVENOUS; SUBCUTANEOUS at 17:58

## 2021-09-11 RX ADMIN — LEVOTHYROXINE SODIUM 25 MCG: 0.03 TABLET ORAL at 09:11

## 2021-09-11 RX ADMIN — CEFAZOLIN SODIUM 2 G: 1 INJECTION, POWDER, FOR SOLUTION INTRAMUSCULAR; INTRAVENOUS at 00:14

## 2021-09-11 RX ADMIN — INSULIN LISPRO 2 UNITS: 100 INJECTION, SOLUTION INTRAVENOUS; SUBCUTANEOUS at 13:04

## 2021-09-11 RX ADMIN — OXYCODONE 5 MG: 5 TABLET ORAL at 13:04

## 2021-09-11 RX ADMIN — ACETAMINOPHEN 1000 MG: 500 TABLET, FILM COATED ORAL at 23:01

## 2021-09-11 RX ADMIN — CITALOPRAM HYDROBROMIDE 20 MG: 20 TABLET ORAL at 09:12

## 2021-09-11 RX ADMIN — DILTIAZEM HYDROCHLORIDE 180 MG: 180 CAPSULE, COATED, EXTENDED RELEASE ORAL at 09:11

## 2021-09-11 RX ADMIN — OXYCODONE 5 MG: 5 TABLET ORAL at 22:52

## 2021-09-11 RX ADMIN — APIXABAN 5 MG: 5 TABLET, FILM COATED ORAL at 09:11

## 2021-09-11 RX ADMIN — VENLAFAXINE 37.5 MG: 25 TABLET ORAL at 22:08

## 2021-09-11 RX ADMIN — METOPROLOL SUCCINATE 50 MG: 50 TABLET, EXTENDED RELEASE ORAL at 09:12

## 2021-09-11 RX ADMIN — INSULIN GLARGINE 12 UNITS: 100 INJECTION, SOLUTION SUBCUTANEOUS at 22:08

## 2021-09-11 RX ADMIN — OXYCODONE 5 MG: 5 TABLET ORAL at 17:58

## 2021-09-12 PROBLEM — S81.802A LEG WOUND, LEFT: Status: ACTIVE | Noted: 2021-09-12

## 2021-09-12 LAB
ANION GAP SERPL CALC-SCNC: 6 MMOL/L (ref 5–15)
BUN SERPL-MCNC: 19 MG/DL (ref 6–20)
BUN/CREAT SERPL: 21 (ref 12–20)
CALCIUM SERPL-MCNC: 9.2 MG/DL (ref 8.5–10.1)
CHLORIDE SERPL-SCNC: 104 MMOL/L (ref 97–108)
CO2 SERPL-SCNC: 28 MMOL/L (ref 21–32)
CREAT SERPL-MCNC: 0.92 MG/DL (ref 0.55–1.02)
GLUCOSE BLD STRIP.AUTO-MCNC: 223 MG/DL (ref 65–117)
GLUCOSE BLD STRIP.AUTO-MCNC: 261 MG/DL (ref 65–117)
GLUCOSE BLD STRIP.AUTO-MCNC: 55 MG/DL (ref 65–117)
GLUCOSE BLD STRIP.AUTO-MCNC: 66 MG/DL (ref 65–117)
GLUCOSE BLD STRIP.AUTO-MCNC: 93 MG/DL (ref 65–117)
GLUCOSE SERPL-MCNC: 80 MG/DL (ref 65–100)
POTASSIUM SERPL-SCNC: 4 MMOL/L (ref 3.5–5.1)
SERVICE CMNT-IMP: ABNORMAL
SERVICE CMNT-IMP: NORMAL
SERVICE CMNT-IMP: NORMAL
SODIUM SERPL-SCNC: 138 MMOL/L (ref 136–145)

## 2021-09-12 PROCEDURE — 80048 BASIC METABOLIC PNL TOTAL CA: CPT

## 2021-09-12 PROCEDURE — 82962 GLUCOSE BLOOD TEST: CPT

## 2021-09-12 PROCEDURE — 36415 COLL VENOUS BLD VENIPUNCTURE: CPT

## 2021-09-12 PROCEDURE — 99218 HC RM OBSERVATION: CPT

## 2021-09-12 PROCEDURE — 74011636637 HC RX REV CODE- 636/637: Performed by: SURGERY

## 2021-09-12 PROCEDURE — 65270000029 HC RM PRIVATE

## 2021-09-12 PROCEDURE — 74011250637 HC RX REV CODE- 250/637: Performed by: SURGERY

## 2021-09-12 PROCEDURE — 74011250636 HC RX REV CODE- 250/636: Performed by: SURGERY

## 2021-09-12 RX ADMIN — SPIRONOLACTONE 25 MG: 25 TABLET ORAL at 16:39

## 2021-09-12 RX ADMIN — INSULIN LISPRO 3 UNITS: 100 INJECTION, SOLUTION INTRAVENOUS; SUBCUTANEOUS at 12:56

## 2021-09-12 RX ADMIN — SULFASALAZINE 500 MG: 500 TABLET ORAL at 18:23

## 2021-09-12 RX ADMIN — APIXABAN 5 MG: 5 TABLET, FILM COATED ORAL at 09:13

## 2021-09-12 RX ADMIN — DILTIAZEM HYDROCHLORIDE 180 MG: 180 CAPSULE, COATED, EXTENDED RELEASE ORAL at 09:12

## 2021-09-12 RX ADMIN — ACETAMINOPHEN 1000 MG: 500 TABLET, FILM COATED ORAL at 12:56

## 2021-09-12 RX ADMIN — ACETAMINOPHEN 1000 MG: 500 TABLET, FILM COATED ORAL at 18:23

## 2021-09-12 RX ADMIN — METOPROLOL SUCCINATE 50 MG: 50 TABLET, EXTENDED RELEASE ORAL at 09:12

## 2021-09-12 RX ADMIN — APIXABAN 5 MG: 5 TABLET, FILM COATED ORAL at 18:23

## 2021-09-12 RX ADMIN — SULFASALAZINE 500 MG: 500 TABLET ORAL at 09:13

## 2021-09-12 RX ADMIN — INSULIN LISPRO 2 UNITS: 100 INJECTION, SOLUTION INTRAVENOUS; SUBCUTANEOUS at 12:56

## 2021-09-12 RX ADMIN — OXYCODONE 5 MG: 5 TABLET ORAL at 16:42

## 2021-09-12 RX ADMIN — SODIUM CHLORIDE 50 ML/HR: 9 INJECTION, SOLUTION INTRAVENOUS at 05:34

## 2021-09-12 RX ADMIN — LEVOTHYROXINE SODIUM 25 MCG: 0.03 TABLET ORAL at 09:13

## 2021-09-12 RX ADMIN — CITALOPRAM HYDROBROMIDE 20 MG: 20 TABLET ORAL at 09:13

## 2021-09-12 RX ADMIN — INSULIN LISPRO 5 UNITS: 100 INJECTION, SOLUTION INTRAVENOUS; SUBCUTANEOUS at 16:40

## 2021-09-12 RX ADMIN — INSULIN LISPRO 2 UNITS: 100 INJECTION, SOLUTION INTRAVENOUS; SUBCUTANEOUS at 09:13

## 2021-09-12 RX ADMIN — ACETAMINOPHEN 1000 MG: 500 TABLET, FILM COATED ORAL at 05:34

## 2021-09-12 RX ADMIN — FUROSEMIDE 40 MG: 40 TABLET ORAL at 09:13

## 2021-09-12 RX ADMIN — INSULIN LISPRO 2 UNITS: 100 INJECTION, SOLUTION INTRAVENOUS; SUBCUTANEOUS at 16:39

## 2021-09-12 RX ADMIN — OXYCODONE 5 MG: 5 TABLET ORAL at 05:44

## 2021-09-12 NOTE — PROGRESS NOTES
S: Pain much improved. Labs today unremarkable. Cr WNL. Slept well overnight and this morning    O: LLE TMA site wit  Dressing in place cdi.  No surrounding erythema or cellulitis    A/P:   --First dressing change Monday  --Patient currently with Oxy and Morphine available PRN  --Continue standing APAP   --If current clinical progress persists suspect she may be ready for discharge as early as tomorrow depending on what other arrangements need to be made

## 2021-09-12 NOTE — PROGRESS NOTES
Generally doing well but complaining of a significant amount of pain. Per the sister this is not abnormal for her. She is otherwise doing well.      --First dressing change Monday  --Patient currently with Oxy and Morphine available PRN  --Will add standing APAP   --Will check 1x labs tomorrow

## 2021-09-13 LAB
GLUCOSE BLD STRIP.AUTO-MCNC: 168 MG/DL (ref 65–117)
GLUCOSE BLD STRIP.AUTO-MCNC: 209 MG/DL (ref 65–117)
GLUCOSE BLD STRIP.AUTO-MCNC: 82 MG/DL (ref 65–117)
SERVICE CMNT-IMP: ABNORMAL
SERVICE CMNT-IMP: ABNORMAL
SERVICE CMNT-IMP: NORMAL

## 2021-09-13 PROCEDURE — 82962 GLUCOSE BLOOD TEST: CPT

## 2021-09-13 PROCEDURE — 74011250637 HC RX REV CODE- 250/637: Performed by: SURGERY

## 2021-09-13 PROCEDURE — 97530 THERAPEUTIC ACTIVITIES: CPT

## 2021-09-13 PROCEDURE — 65270000029 HC RM PRIVATE

## 2021-09-13 PROCEDURE — 2709999900 HC NON-CHARGEABLE SUPPLY

## 2021-09-13 PROCEDURE — 97162 PT EVAL MOD COMPLEX 30 MIN: CPT

## 2021-09-13 PROCEDURE — 74011250636 HC RX REV CODE- 250/636: Performed by: SURGERY

## 2021-09-13 PROCEDURE — 74011636637 HC RX REV CODE- 636/637: Performed by: SURGERY

## 2021-09-13 PROCEDURE — 97165 OT EVAL LOW COMPLEX 30 MIN: CPT

## 2021-09-13 PROCEDURE — 97535 SELF CARE MNGMENT TRAINING: CPT

## 2021-09-13 RX ADMIN — OXYCODONE 5 MG: 5 TABLET ORAL at 13:45

## 2021-09-13 RX ADMIN — FUROSEMIDE 40 MG: 40 TABLET ORAL at 09:01

## 2021-09-13 RX ADMIN — LEVOTHYROXINE SODIUM 25 MCG: 0.03 TABLET ORAL at 09:01

## 2021-09-13 RX ADMIN — VENLAFAXINE 37.5 MG: 25 TABLET ORAL at 21:05

## 2021-09-13 RX ADMIN — VENLAFAXINE 37.5 MG: 25 TABLET ORAL at 00:15

## 2021-09-13 RX ADMIN — INSULIN LISPRO 2 UNITS: 100 INJECTION, SOLUTION INTRAVENOUS; SUBCUTANEOUS at 12:27

## 2021-09-13 RX ADMIN — SODIUM CHLORIDE 50 ML/HR: 9 INJECTION, SOLUTION INTRAVENOUS at 01:58

## 2021-09-13 RX ADMIN — INSULIN LISPRO 3 UNITS: 100 INJECTION, SOLUTION INTRAVENOUS; SUBCUTANEOUS at 11:30

## 2021-09-13 RX ADMIN — CITALOPRAM HYDROBROMIDE 20 MG: 20 TABLET ORAL at 09:01

## 2021-09-13 RX ADMIN — ACETAMINOPHEN 1000 MG: 500 TABLET, FILM COATED ORAL at 16:44

## 2021-09-13 RX ADMIN — APIXABAN 5 MG: 5 TABLET, FILM COATED ORAL at 16:45

## 2021-09-13 RX ADMIN — INSULIN LISPRO 2 UNITS: 100 INJECTION, SOLUTION INTRAVENOUS; SUBCUTANEOUS at 16:30

## 2021-09-13 RX ADMIN — OXYCODONE 5 MG: 5 TABLET ORAL at 09:38

## 2021-09-13 RX ADMIN — SULFASALAZINE 500 MG: 500 TABLET ORAL at 18:00

## 2021-09-13 RX ADMIN — APIXABAN 5 MG: 5 TABLET, FILM COATED ORAL at 09:01

## 2021-09-13 RX ADMIN — ACETAMINOPHEN 1000 MG: 500 TABLET, FILM COATED ORAL at 12:28

## 2021-09-13 RX ADMIN — OXYCODONE 5 MG: 5 TABLET ORAL at 20:24

## 2021-09-13 RX ADMIN — SULFASALAZINE 500 MG: 500 TABLET ORAL at 09:01

## 2021-09-13 RX ADMIN — INSULIN GLARGINE 12 UNITS: 100 INJECTION, SOLUTION SUBCUTANEOUS at 21:04

## 2021-09-13 RX ADMIN — METOPROLOL SUCCINATE 50 MG: 50 TABLET, EXTENDED RELEASE ORAL at 09:01

## 2021-09-13 RX ADMIN — ACETAMINOPHEN 1000 MG: 500 TABLET, FILM COATED ORAL at 23:04

## 2021-09-13 RX ADMIN — ACETAMINOPHEN 1000 MG: 500 TABLET, FILM COATED ORAL at 00:16

## 2021-09-13 RX ADMIN — OXYCODONE 5 MG: 5 TABLET ORAL at 01:55

## 2021-09-13 RX ADMIN — DILTIAZEM HYDROCHLORIDE 180 MG: 180 CAPSULE, COATED, EXTENDED RELEASE ORAL at 09:01

## 2021-09-13 NOTE — PROGRESS NOTES
Problem: Self Care Deficits Care Plan (Adult)  Goal: *Acute Goals and Plan of Care (Insert Text)  Description:   FUNCTIONAL STATUS PRIOR TO ADMISSION: Patient required minimum - maximum assistance for basic and instrumental ADLs. Pts daughter at bedside during eval - reports pt able to bear small amount of weight to BLEs, however was requiring up to max A to transfer bed > Wc/rollator & rollator>BSC/toilet. Reports most often family would use rollator and push pt around home. Pts daughter reports pt required assist to bathe, dress, and groom at baseline. HOME SUPPORT: The patient lived with family. Occupational Therapy Goals  Initiated 9/13/2021  1. Patient will perform upper body dressing with minimal assistance/contact guard assist within 7 day(s). 2.  Patient will perform lower body dressing with moderate assistance  within 7 day(s). 3.  Patient will perform toileting with moderate assistance  within 7 day(s). 4.  Patient will perform toilet transfers with moderate assistance  within 7 day(s). Outcome: Progressing Towards Goal      OCCUPATIONAL THERAPY EVALUATION  Patient: Tim Saenz (78 y.o. female)  Date: 9/13/2021  Primary Diagnosis: Status post transmetatarsal amputation of left foot (HCC) [Z89.432]  Leg wound, left [S81.802A]  Procedure(s) (LRB):  AMPUTATION TRANSMETATARSAL LEFT FOOT (Left) 3 Days Post-Op   Precautions: Fall  NWB    ASSESSMENT  Based on the objective data described below, the patient is POD #3 transmetatarsal amputation of L foot and presents with with decreased strength, coordination, and poor pain mgmt. On this date pts daughter at bedside for evaluation, reports pt requiring increased assist at baseline for all ADL tasks and functional transfers (see PLOF listed above). Pt transitioned supine <> EOB with overall max A x2, difficulty sequencing LE coordination and limited core control.  While seated EOB, pt with post pelvic tilt and multiple occurrences of posterior LOB requiring therapist assist to correct. Pt would greatly benefit from SNF level of care prior to returning home to address above deficits, improve independence, and overall strength/endurance however pts daughter requesting pt to return home with HHOT/PT and reports having ample support from family. Pts daughter reports family was completing majoirty of functional transfers and ADLs with mod - max A for pt Anticipate pt will require janna life, hospital bed, and cont to utilize current DME at home to decrease caregiver burden and maximize pt safety. Current Level of Function Impacting Discharge (ADLs/self-care): total - max A    Functional Outcome Measure: The patient scored Total: 10/100 on the Barthel Index outcome measure which is indicative of 90% impaired ability to care for basic self needs/dependency on others; inferred 90% dependency on others for instrumental ADLs. Other factors to consider for discharge: pt required assist at baseline, NWB LLE, up to total A for ADls     Patient will benefit from skilled therapy intervention to address the above noted impairments. PLAN :  Recommendations and Planned Interventions: self care training, therapeutic exercise, balance training, therapeutic activities, endurance activities, patient education, home safety training, and family training/education    Frequency/Duration: Patient will be followed by occupational therapy 3 times a week to address goals. Recommendation for discharge: (in order for the patient to meet his/her long term goals)  Therapy up to 5 days/week in SNF setting    This discharge recommendation:  A follow-up discussion with the attending provider and/or case management is planned    IF patient discharges home will need the following DME: Hospital bed, janna, WC w/ drop arm (currently owns what sounds like transport chair)       SUBJECTIVE:   Patient stated ouch.     OBJECTIVE DATA SUMMARY:   HISTORY:   Past Medical History: Diagnosis Date    Alzheimer disease ([facility]     Arrhythmia     atrial fibrillation 2018    Arthritis     CAD (coronary artery disease)     CABG, pacemaker    Chronic pain     COPD     Diabetes ([facility] [address]     GERD (gastroesophageal reflux disease)     Heart failure (HCC)     Hypertension     Ill-defined condition     high cholesterol    Peripheral vascular disease ([facility] [address]     Thyroid disease      Past Surgical History:   Procedure Laterality Date    HX ORTHOPAEDIC Left     toe amputation    HX PACEMAKER      July 2011    [place] CARDIAC SURG PROCEDURE UNLIST      3 CABG, Stents    [place] COLONOSCOPY FLX DX W/COLLJ SPEC WHEN PFRMD  11/10/2011         [place] EGD TRANSORAL BIOPSY SINGLE/MULTIPLE  8/29/2012            Expanded or extensive additional review of patient history:     Home Situation  Home Environment: Private residence  # Steps to Enter: 5 (ramp)  Rails to Enter: Yes  Hand Rails : Bilateral  Wheelchair Ramp: Yes  One/Two Story Residence: One story  Living Alone: No  Support Systems: Child(jenny)  Patient Expects to be Discharged to[de-identified] [employer]  Current DME Used/Available at Home: Commode, bedside, Walker, rollator, Wheelchair, Back Bougie, Shower chair  Tub or Shower Type: Tub/Shower combination (walk in shower)    Hand dominance: Right    EXAMINATION OF PERFORMANCE DEFICITS:  Cognitive/Behavioral Status:  Neurologic State: Alert;Drowsy  Orientation Level: Oriented to person;Oriented to place;Oriented to situation;Disoriented to time  Cognition: Decreased attention/concentration; Appropriate for age attention/concentration  Perception:  (Not tested 2/2 lethargy)  Perseveration: No perseveration noted  Safety/Judgement: Insight into deficits; Decreased awareness of need for assistance    Skin: intact; MD performing dressing change during session    Edema: none noted     Hearing:   Auditory  Auditory Impairment:  ([place] raquel ears)  Hearing Aids/Status: Does not own    Vision/Perceptual:                                     Range of Motion:    AROM: Generally decreased, functional                         Strength:    Strength: Generally decreased, functional                Coordination:  Coordination: Generally decreased, functional  Fine Motor Skills-Upper: Right Impaired;Left Impaired    Gross Motor Skills-Upper: Left Impaired;Right Impaired    Tone & Sensation:    Tone: Normal                         Balance:  Sitting: Impaired;High guard; With support  Sitting - Static: Fair (occasional) (required use of bedrail)  Sitting - Dynamic: Fair (occasional)  Standing: Impaired (not attempted 2/2 pain/lethargy)  Standing - Static: Not tested  Standing - Dynamic : Not tested    Functional Mobility and Transfers for ADLs:  Bed Mobility:  Rolling: Maximum assistance;Assist x2  Supine to Sit: Maximum assistance;Assist x2  Sit to Supine: Maximum assistance;Assist x2  Scooting: Maximum assistance;Assist x2    Transfers:       ADL Assessment:  Feeding: Setup    Oral Facial Hygiene/Grooming: Setup    Bathing: Maximum assistance    Upper Body Dressing: Minimum assistance    Lower Body Dressing: Maximum assistance    Toileting: Maximum assistance                ADL Intervention and task modifications:                      Upper Body [address]: Minimum  assistance (difficulty sequencing)  Cues: Physical assistance;Verbal cues provided    Lower Body Dressing Assistance  Socks: Maximum assistance         Cognitive Retraining  Safety/Judgement: Insight into deficits; Decreased awareness of need for assistance       Functional Measure:  Barthel Index:    Bathin  Bladder: 0  Bowels: 0  Groomin  Dressin  Feedin  Mobility: 0  Stairs: 0  Toilet Use: 0  Transfer (Bed to Chair and Back): 5  Total: 10/100        The Barthel ADL Index: Guidelines  1. The index should be used as a record of what a patient does, not as a record of what a patient could do.   2. The main aim is to establish degree of independence from any help, physical or verbal, however minor and for whatever reason. 3. The need for supervision renders the patient not independent. 4. A patient's performance should be established using the best available evidence. Asking the patient, friends/relatives and nurses are the usual sources, but direct observation and common sense are also important. However direct testing is not needed. 5. Usually the patient's performance over the preceding 24-48 hours is important, but occasionally longer periods will be relevant. 6. Middle categories imply that the patient supplies over 50 per cent of the effort. 7. Use of aids to be independent is allowed. Toyin Wagoner., Barthel, D.W. (1434). Functional evaluation: the Barthel Index. [facility] (14)2. [place] [employer] crissy ESDRAS Mario, Richie Landon., Carin Youssef., [place], [address] [place] [address] (1999). Measuring the change indisability after inpatient rehabilitation; comparison of the responsiveness of the Barthel Index and Functional [place] Measure. Journal of Neurology, Neurosurgery, and Psychiatry, 66(4), 878-260. Rosario Carranza, N.J.A, HUNTER Zhou, & Ira Barnes MNISHA. (2004.) Assessment of post-stroke quality of life in cost-effectiveness studies: The usefulness of the Barthel Index and the EuroQoL-5D. Quality of Life Research, 15, 999-09         Occupational Therapy Evaluation Charge Determination   History Examination Decision-Making   LOW Complexity : Brief history review  LOW Complexity : 1-3 performance deficits relating to physical, cognitive , or psychosocial skils that result in activity limitations and / or participation restrictions  LOW Complexity : No comorbidities that affect functional and no verbal or physical assistance needed to complete eval tasks       Based on the above components, the patient evaluation is determined to be of the following complexity level: LOW   Pain Rating:  Pt indicating pain in LLE following dressing change.     Activity Tolerance:   Fair    After treatment patient left in no apparent distress:    Supine in bed, Heels elevated for pressure relief, Call bell within reach, Caregiver / family present, and Side rails x 3    COMMUNICATION/EDUCATION:   The patients plan of care was discussed with: Physical therapist, Occupational therapist, and Registered nurse. Patient/family agree to work toward stated goals and plan of care. This patients plan of care is appropriate for delegation to [facility].     Thank you for this referral.  Earl Melgar OT  Time Calculation: 39 mins

## 2021-09-13 NOTE — PROGRESS NOTES
Bedside shift change report given to ADRIANO Mckoy (oncoming nurse) by Marine Epperson LPN (offgoing nurse). Report included the following information SBAR, Kardex, Intake/Output, MAR and Recent Results.

## 2021-09-13 NOTE — PROGRESS NOTES
Problem: Mobility Impaired (Adult and Pediatric)  Goal: *Acute Goals and Plan of Care (Insert Text)  Description: FUNCTIONAL STATUS PRIOR TO ADMISSION: Pt was dependent for all mobility prior to admission. Family was assisting pt to rollator and push her around. She was able to bear some weight through her LEs at that point. HOME SUPPORT PRIOR TO ADMISSION: The patient lived with family. Physical Therapy Goals  Initiated 9/13/2021  1. Patient will move from supine to sit and sit to supine  in bed with maximal assistance within 7 day(s). 2.  Patient will transfer from bed to chair and chair to bed with maximal assistance using the least restrictive device within 7 day(s). 3.  Patient will perform sit to stand with maximal assistance within 7 day(s). Outcome: Not Met    PHYSICAL THERAPY EVALUATION  Patient: Iraj Mei (78 y.o. female)  Date: 9/13/2021  Primary Diagnosis: Status post transmetatarsal amputation of left foot (HCC) [Z89.432]  Leg wound, left [S81.802A]  Procedure(s) (LRB):  AMPUTATION TRANSMETATARSAL LEFT FOOT (Left) 3 Days Post-Op   Precautions:   NWB      ASSESSMENT  Based on the objective data described below, the patient presents with generalized weakness, decreased activity tolerance, impaired balance and overall poor functional mobility. Pt was received in supine on RA and cleared by nursing to mobilize. Worked on rolling to get bedpan from under pt. Scooted up and repositioned, wound dressing change was performed. Pt was then brought to the edge of the bed with 2 person. Noted increased posterior lean, but no total loss of balance. She tolerated sitting up for 7 minutes before becoming too painful. Returned to supine and positioned for comfort. Current Level of Function Impacting Discharge (mobility/balance): total    Functional Outcome Measure:   The patient scored Total: 10/100 on the Barthel Index which is indicative of 90% impaired ability to care for basic self needs/dependency on others. Other factors to consider for discharge:      Patient will benefit from skilled therapy intervention to address the above noted impairments. PLAN :  Recommendations and Planned Interventions: bed mobility training, transfer training, therapeutic exercises, patient and family training/education, and therapeutic activities      Frequency/Duration: Patient will be followed by physical therapy:  4 times a week to address goals. Recommendation for discharge: (in order for the patient to meet his/her long term goals)  Therapy up to 5 days/week in SNF setting. Family wants to take her  home, at the least she would need HHPT    This discharge recommendation:  Has not yet been discussed the attending provider and/or case management    IF patient discharges home will need the following DME: bedside commode, hospital bed, mechanical lift, and wheelchair         SUBJECTIVE:   Patient stated it hurts.     OBJECTIVE DATA SUMMARY:   HISTORY:    Past Medical History:   Diagnosis Date    Alzheimer disease ([facility] [address]     Arrhythmia     atrial fibrillation 2018    Arthritis     CAD (coronary artery disease)     CABG, pacemaker    Chronic pain     COPD     Diabetes ([facility] [address]     GERD (gastroesophageal reflux disease)     Heart failure (HCC)     Hypertension     Ill-defined condition     high cholesterol    Peripheral vascular disease (HCC)     Thyroid disease      Past Surgical History:   Procedure Laterality Date    HX ORTHOPAEDIC Left     toe amputation    HX PACEMAKER      July 2011    MA CARDIAC SURG PROCEDURE UNLIST      3 CABG, Stents    [place] COLONOSCOPY FLX DX W/COLLJ SPEC WHEN PFRMD  11/10/2011         [place] EGD TRANSORAL BIOPSY SINGLE/MULTIPLE  8/29/2012            Personal factors and/or comorbidities impacting plan of care:     Home Situation  Home Environment: Private residence  # Steps to Enter: 5 (ramp)  Rails to Enter: Yes  Hand Rails : Bilateral  Wheelchair Ramp: Yes  One/Two Story Residence: One story  Living Alone: No  Support Systems: Child(jenny)  Patient Expects to be Discharged to[de-identified] House  Current DME Used/Available at Home: Commode, bedside, Walker, rollator, Wheelchair, Walker, [address] [facility] chair  Tub or Shower Type: Tub/Shower combination (walk in shower)    EXAMINATION/PRESENTATION/DECISION MAKING:   Critical Behavior:  Neurologic State: Alert, Drowsy  Orientation Level: Oriented to person, Oriented to place, Oriented to situation, Disoriented to time  Cognition: Decreased attention/concentration, Appropriate for age attention/concentration  Safety/Judgement: Insight into deficits, Decreased awareness of need for assistance  Hearing: Auditory  Auditory Impairment:  ([place] raquel ears)  Hearing Aids/Status: Does not own  Skin:  dressing intact  Edema: WDL  Range Of Motion:  AROM: Generally decreased, functional                       Strength:    Strength: Generally decreased, functional                    Tone & Sensation:   Tone: Normal                              Coordination:  Coordination: Generally decreased, functional  Vision:      Functional Mobility:  Bed Mobility:  Rolling: Maximum assistance;Assist x2  Supine to Sit: Maximum assistance;Assist x2  Sit to Supine: Maximum assistance;Assist x2  Scooting: Maximum assistance;Assist x2  Transfers:                             Balance:   Sitting: Impaired;High guard; With support  Sitting - Static: Fair (occasional) (required use of bedrail)  Sitting - Dynamic: Fair (occasional)  Standing: Impaired (not attempted 2/2 pain/lethargy)  Standing - Static: Not tested  Standing - Dynamic : Not tested            Functional Measure:  Barthel Index:    Bathin  Bladder: 0  Bowels: 0  Groomin  Dressin  Feedin  Mobility: 0  Stairs: 0  Toilet Use: 0  Transfer (Bed to Chair and Back): 5  Total: 10/100       The Barthel ADL Index: Guidelines  1. The index should be used as a record of what a patient does, not as a record of what a patient could do.   2. The main aim is to establish degree of independence from any help, physical or verbal, however minor and for whatever reason. 3. The need for supervision renders the patient not independent. 4. A patient's performance should be established using the best available evidence. Asking the patient, friends/relatives and nurses are the usual sources, but direct observation and common sense are also important. However direct testing is not needed. 5. Usually the patient's performance over the preceding 24-48 hours is important, but occasionally longer periods will be relevant. 6. Middle categories imply that the patient supplies over 50 per cent of the effort. 7. Use of aids to be independent is allowed. Arnold Campbell., Barthel, D.W. ([id number]). Functional evaluation: the Barthel Index. [address] [facility] (14)2. ESDRAS Machado, Beverley Szymanski., Shaina Ayala., [place], [address] (1999). Measuring the change indisability after inpatient rehabilitation; comparison of the responsiveness of the Barthel Index and Functional [place] Measure. Journal of Neurology, Neurosurgery, and Psychiatry, 66(4), 021-750. Matthews Lombard, N.J.A, HUNTER Zhou, & Rayray Hardy MNISHA. (2004.) Assessment of post-stroke quality of life in cost-effectiveness studies: The usefulness of the Barthel Index and the EuroQoL-5D.  Quality of Life Research, 15, 401-08        Physical Therapy Evaluation Charge Determination   History Examination Presentation Decision-Making   HIGH Complexity :3+ comorbidities / personal factors will impact the outcome/ POC  HIGH Complexity : 4+ Standardized tests and measures addressing body structure, function, activity limitation and / or participation in recreation  MEDIUM Complexity : Evolving with changing characteristics  Other outcome measures barthel  HIGH       Based on the above components, the patient evaluation is determined to be of the following complexity level: MEDIUM    Pain Rating:  Pt medicated during session    Activity Tolerance:   Fair    After treatment patient left in no apparent distress:   Supine in bed and Call bell within reach    COMMUNICATION/EDUCATION:   The patients plan of care was discussed with: Occupational therapist and Registered nurse. Patient is unable to participate in goal setting and plan of care.     Thank you for this referral.  Em Ledesma, PT, DPT   Time Calculation: 40 mins

## 2021-09-13 NOTE — PROGRESS NOTES
Patient's blood sugar was 55. Provider was notified and patient was treated per hypoglycemic protocol.

## 2021-09-13 NOTE — PROGRESS NOTES
End of Shift Note    Bedside shift change report given to ADRIANO Gutierrez (oncoming nurse) by Tunde Beck RN (offgoing nurse). Report included the following information SBAR, Kardex and Recent Results    Shift worked:  7a-7p     Shift summary and any significant changes:     PT/OT ordered for patient today. Complaints of pain this morning medicated with Roxicodone. Dressing on left foot clean, dry and intact. Concerns for physician to address:  none     Zone phone for oncoming shift:   [phone]       Activity:  Activity Level: Bed Rest  Number times ambulated in hallways past shift: 0  Number of times OOB to chair past shift: 0    Cardiac:   Cardiac Monitoring: No      Cardiac Rhythm:  (PACED)    Access:   Current line(s): PIV     Genitourinary:   Urinary status: voiding    Respiratory:   O2 Device: None (Room air)  Chronic home O2 use?: NO  Incentive spirometer at bedside: YES     GI:  Last Bowel Movement Date: 09/12/21  Current diet:  ADULT DIET Regular; 4 carb choices (60 gm/meal)  DIET ONE TIME MESSAGE  Passing flatus: YES  Tolerating current diet: YES       Pain Management:   Patient states pain is manageable on current regimen: YES    Skin:  Gerald Score: 18  Interventions: float heels    Patient Safety:  Fall Score:  Total Score: 4  Interventions: bed/chair alarm  High Fall Risk: Yes    Length of Stay:  Expected LOS: 5d 19h  Actual LOS: 1      Leelee Whitmore RN

## 2021-09-13 NOTE — PROGRESS NOTES
Transition of Care Plan:    RUR: 14% LOW  Disposition: Home w/ HH; referrals sent  Follow up appointments: PCP & specialists as indicated  DME needed: hospital bed, mechanical lift, and wheelchair w/ drop arm- pt has RW, cane, W/C, bsc  Transportation at Discharge: Family  Marlin Mays or means to access home:  Family has access  IM Medicare Letter: Needed at d/c  Is patient a BCPI-A Bundle: No   If yes, was Bundle Letter given?: N/A    Caregiver Contact: DhavalJesus Manuel Callahan, [phone]  Discharge Caregiver contacted prior to discharge? CM reviewed pt's chart. CM met w/ pt & her dtr Abhishek Bonner) at bedside to introduce role & complete initial assessment. Dtr confirmed demographic information is up to date. Pt lives w/ her 2 dtrs in single level/ 5 YAIMA house. Pt requires assistance w/ ADLs/ IADLs & does not drive. Family assists w/ transportation. Pt uses DME in the form of a RW, W/C, cane, & bsc. No home O2. No prior rehab reported or noted in chart review. Prior [facility] w/ Jennifer (2018) & [facility]yas (2019). CM spoke w/ pt's other dtr Murtaza Fung ([phone]) to review d/c plan. Jaki agreeable to [facility] & identified [facility] as preferred provider. Dtr reports pt will need medical transport home. CM spoke w/  [facility] ([phone]) to confirm they accept pt's insurance & service the area. CM to fax [facility] (PT/OT/SN) referrral to [facility] (fax# [phone]). Will continue to follow. Reason for Admission:  Status post transmetatarsal amputation of left foot (HCC)                   RUR Score:   14%                  Plan for utilizing home health:   Could benefit; PT/OT evals pending.  Prior hx w/ [facility] Vyas       PCP: First and Last name:  Yecenia Park NP   Name of Practice: [facility] [address]   Are you a current patient: Yes/No: Yes   Approximate date of last visit:    Can you participate in a virtual visit with your PCP:                     Current Advanced Directive/Advance Care Plan: Prior  Advance Care Planning     General Advance Care Planning (ACP) Conversation  Date of Conversation: 9/13/2021  Conducted with: Healthcare Decision Maker: Next of Kin by law (only applies in absence of a Healthcare Power of  or Legal Guardian)    Healthcare Decision Maker:   No healthcare decision makers have been documented. Click here to complete [address] including selection of the Healthcare Decision Maker Relationship (ie \"Primary\")    Today we discussed [address]. The patient is considering options. Content/Action Overview:   Has NO ACP documents/care preferences - information provided, considering goals and options  Reviewed DNR/DNI and patient     Length of Voluntary ACP Conversation in minutes:  <16 minutes (Non-Billable)    [address] Management Interventions  PCP Verified by CM: Yes  Palliative Care Criteria Met (RRAT>21 & CHF Dx)?: No  Mode of Transport at Discharge:  Other (see comment) (Dtr)  Transition of Care Consult (CM Consult): Discharge Planning, [address]: No  Reason Outside [place]: Out of service area  Physical Therapy Consult: Yes  Occupational Therapy Consult: Yes  Support Systems: Child(jenny) (1 level/ 5 YAIMA)  Confirm Follow Up Transport: Family  The Patient and/or Patient Representative was Provided with a Choice of Provider and Agrees with the Discharge Plan?: Yes  Name of the Patient Representative Who was Provided with a Choice of Provider and Agrees with the Discharge Plan: Dtr- [address] of Choice List was Provided with Basic Dialogue that Supports the Patient's Individualized Plan of Care/Goals, Treatment Preferences and Shares the Quality Data Associated with the Providers?: Yes  Discharge Location  Discharge Placement: Home with home health (& f/u appts)    TAHIRA Rausch  Care Management

## 2021-09-13 NOTE — PROGRESS NOTES
Vascular Surgery Progress Note  Christo Aye AGACNP-BC          Admit Date: 9/10/2021   LOS: 1 day        Daily Progress Note: 2021    POD: 3 Days Post-Op    S/P: Procedure(s):  AMPUTATION TRANSMETATARSAL LEFT FOOT    Subjective: Derrek Pinto is an 77yo female with PMHx of HTN, HLD, DM, ASHD (s/p CABG), diastolic heart failure, depression, hypothroidism, COPD, GERD and PVD with severe mulitlevel atherosclerotic occlusive disease who underwent a transmetatarsal left foot amputation after a failed left hallux amp on 9/10/21 under general anesthesia. She tolerated the procedure without complication. Over the weekend, there were no acute events, however pain was thematic complaint. It was controlled with narcotic intervention. This morning, Ms. Zakia Berman was awake with family at the bedside. She reports that her pain has lessened. She was reminded that she is strict non-weight bearing. She has not been out of the bed. She is eager to go home. Denies numbness, tingling, chest pain, nausea, vomiting, difficulty swallowing, headache, and dyspnea. Objective:     Vital signs  Temp (24hrs), Av.2 °F (36.8 °C), Min:98 °F (36.7 °C), Max:98.3 °F (36.8 °C)   No intake/output data recorded.  1901 -  0700  In: 600 [I.V.:600]  Out: 300 [Urine:300]    Visit Vitals  /62   Pulse 80   Temp 98.1 °F (36.7 °C)   Resp 17   Ht 4' 11\" (1.499 m)   Wt 57.2 kg (126 lb)   SpO2 100%   BMI 25.45 kg/m²    O2 Flow Rate (L/min): 2 l/min O2 Device: None (Room air)     Pain control  Pain Assessment  Pain Scale 1: Numeric (0 - 10)  Pain Intensity 1: 5  Pain Onset 1: post op  Pain Location 1: Leg  Pain Orientation 1: Left  Pain Description 1: Aching  Pain Intervention(s) 1: Medication (see MAR)    PT/OT           Physical Exam  Vitals and nursing note reviewed. Constitutional:       General: She is not in acute distress. HENT:      Head: Normocephalic. Cardiovascular:      Rate and Rhythm: Normal rate. Pulmonary:      Effort: Pulmonary effort is normal.   Abdominal:      Palpations: Abdomen is soft. Musculoskeletal:         General: No swelling. Left lower leg: No edema. Left Lower Extremity: Left leg is amputated below ankle. Feet:      Comments: Bulky dressing with ace bandage, CDI on first layers of dressing. Skin:     General: Skin is warm and dry. Neurological:      General: No focal deficit present. Mental Status: She is alert and oriented to person, place, and time.             24 hour results:    Recent Results (from the past 24 hour(s))   GLUCOSE, POC    Collection Time: 09/12/21 11:08 AM   Result Value Ref Range    Glucose (POC) 223 (H) 65 - 117 mg/dL    Performed by [facility], POC    Collection Time: 09/12/21  4:13 PM   Result Value Ref Range    Glucose (POC) 261 (H) 65 - 117 mg/dL    Performed by [facility], POC    Collection Time: 09/12/21 11:27 PM   Result Value Ref Range    Glucose (POC) 55 (L) 65 - 117 mg/dL    Performed by [address], POC    Collection Time: 09/12/21 11:51 PM   Result Value Ref Range    Glucose (POC) 66 65 - 117 mg/dL    Performed by [address], POC    Collection Time: 09/13/21 12:13 AM   Result Value Ref Range    Glucose (POC) 82 65 - 117 mg/dL    Performed by Michael Garrison           Assessment/Plan:     Active Problems:    Status post transmetatarsal amputation of left foot ([address] (9/10/2021)      Leg wound, left (9/12/2021)      PVD  S/p failed L hallux amp (8/27/21)  Gangrene necrosis, POA  Acute osteomyelitis, POA  - s/p L TMA 9/10/21 with incomplete closure for drainage  - bulky dressing CDI  - positive motor/sensory  - labs okay, anemia  - pain better controlled  - continue pain regimen - scheduled APAP, PRN  morphine & Oxycodone  - Eliquis 5mg  - LLE strict non weight bearing  - PT/OT  - Advanced diet  - d/c maintenance fluids    Anemia  - hgb 7.8  - asymptomatic    DMII  - Lantus  - SSI    HTN  CHF  ASHD  - home meds    Hypothyroid  -  Home meds    Depression  - home meds    Arthritis  - home meds    GERD    Activity: LLE strict non weight bearing  DVT ppx:  Dispo: HH upon discharge    Plan d/w Dr. Paramjit Morse, NP

## 2021-09-14 LAB
GLUCOSE BLD STRIP.AUTO-MCNC: 148 MG/DL (ref 65–117)
GLUCOSE BLD STRIP.AUTO-MCNC: 151 MG/DL (ref 65–117)
GLUCOSE BLD STRIP.AUTO-MCNC: 167 MG/DL (ref 65–117)
GLUCOSE BLD STRIP.AUTO-MCNC: 173 MG/DL (ref 65–117)
GLUCOSE BLD STRIP.AUTO-MCNC: 67 MG/DL (ref 65–117)
SERVICE CMNT-IMP: ABNORMAL
SERVICE CMNT-IMP: NORMAL

## 2021-09-14 PROCEDURE — 74011250637 HC RX REV CODE- 250/637: Performed by: SURGERY

## 2021-09-14 PROCEDURE — 82962 GLUCOSE BLOOD TEST: CPT

## 2021-09-14 PROCEDURE — 97110 THERAPEUTIC EXERCISES: CPT

## 2021-09-14 PROCEDURE — 74011250636 HC RX REV CODE- 250/636: Performed by: SURGERY

## 2021-09-14 PROCEDURE — 65270000029 HC RM PRIVATE

## 2021-09-14 PROCEDURE — 74011636637 HC RX REV CODE- 636/637: Performed by: SURGERY

## 2021-09-14 PROCEDURE — 97530 THERAPEUTIC ACTIVITIES: CPT

## 2021-09-14 PROCEDURE — 2709999900 HC NON-CHARGEABLE SUPPLY

## 2021-09-14 RX ADMIN — ACETAMINOPHEN 1000 MG: 500 TABLET, FILM COATED ORAL at 12:51

## 2021-09-14 RX ADMIN — CITALOPRAM HYDROBROMIDE 20 MG: 20 TABLET ORAL at 08:43

## 2021-09-14 RX ADMIN — INSULIN GLARGINE 12 UNITS: 100 INJECTION, SOLUTION SUBCUTANEOUS at 22:00

## 2021-09-14 RX ADMIN — LEVOTHYROXINE SODIUM 25 MCG: 0.03 TABLET ORAL at 05:44

## 2021-09-14 RX ADMIN — OXYCODONE 5 MG: 5 TABLET ORAL at 22:42

## 2021-09-14 RX ADMIN — INSULIN LISPRO 2 UNITS: 100 INJECTION, SOLUTION INTRAVENOUS; SUBCUTANEOUS at 18:12

## 2021-09-14 RX ADMIN — SULFASALAZINE 500 MG: 500 TABLET ORAL at 08:43

## 2021-09-14 RX ADMIN — MORPHINE SULFATE 2 MG: 2 INJECTION, SOLUTION INTRAMUSCULAR; INTRAVENOUS at 06:37

## 2021-09-14 RX ADMIN — INSULIN LISPRO 2 UNITS: 100 INJECTION, SOLUTION INTRAVENOUS; SUBCUTANEOUS at 12:51

## 2021-09-14 RX ADMIN — SPIRONOLACTONE 25 MG: 25 TABLET ORAL at 16:08

## 2021-09-14 RX ADMIN — METOPROLOL SUCCINATE 50 MG: 50 TABLET, EXTENDED RELEASE ORAL at 08:43

## 2021-09-14 RX ADMIN — VENLAFAXINE 37.5 MG: 25 TABLET ORAL at 22:41

## 2021-09-14 RX ADMIN — OXYCODONE 5 MG: 5 TABLET ORAL at 13:25

## 2021-09-14 RX ADMIN — APIXABAN 2.5 MG: 2.5 TABLET, FILM COATED ORAL at 18:13

## 2021-09-14 RX ADMIN — FUROSEMIDE 40 MG: 40 TABLET ORAL at 08:43

## 2021-09-14 RX ADMIN — APIXABAN 5 MG: 5 TABLET, FILM COATED ORAL at 08:43

## 2021-09-14 RX ADMIN — ACETAMINOPHEN 1000 MG: 500 TABLET, FILM COATED ORAL at 05:44

## 2021-09-14 RX ADMIN — ACETAMINOPHEN 1000 MG: 500 TABLET, FILM COATED ORAL at 18:13

## 2021-09-14 RX ADMIN — DILTIAZEM HYDROCHLORIDE 180 MG: 180 CAPSULE, COATED, EXTENDED RELEASE ORAL at 08:43

## 2021-09-14 RX ADMIN — INSULIN LISPRO 2 UNITS: 100 INJECTION, SOLUTION INTRAVENOUS; SUBCUTANEOUS at 18:13

## 2021-09-14 RX ADMIN — SULFASALAZINE 500 MG: 500 TABLET ORAL at 18:13

## 2021-09-14 NOTE — PROGRESS NOTES
Transition of Care Plan:     RUR:   14% LOW  Disposition: Home w/ [facility] (PT/OT)  Follow up appointments: PCP & specialists as indicated  DME needed: Hospital bed [facility]), Wheelchair ([facility])  Transportation at Discharge: BLS/ [facility]- ETA 11 AM on 9/15  Keys or means to access home:  Family has access  IM Medicare Letter: Reviewed & signed 9/14  Is patient a BCPI-A Bundle: No              If yes, was Bundle Letter given?: N/A    Caregiver Contact: Chantelle Runner, [phone]  Discharge Caregiver contacted prior to discharge? Update 4:21 PM  CM f/u w/ [facility] on delivery of w/c; ETA delayed but can be delivered to pt's home tomorrow (not a delay in d/c). CM spoke w/ vascular NP regarding MINAL- pt to d/c home tomorrow. Hospital bed & w/c to be delivered to pt's home; delivery coordinated w/ pt's dtr. CM secured [facility] transport for tomorrow; ETA 11:00 AM on 9/15. CM met w/ pt & her dtr Jaki at bedside to review d/c plan. Dtr verbalized understanding & is agreeable to d/c. Medicare pt has received, reviewed, and signed 2nd IM letter informing them of their right to appeal the discharge. Signed copy has been placed on pt bedside chart. If no addtl needs arise pt is ready to d/c from CM standpoint. RN notified. Update 11:42 AM  CM spoke w/ vascular NP regarding pt's d/c plan. NP Dixon [facility]; face to face also needs to be completed. Referral for w/c sent to [place] & pt accepted; W/C to be delivered to pt's room today. CM spoke w/ pt's dtr Don Rob to provide updates on d/c. Pt currently on WILL CALL w/ [facility]. Will continue to follow. Initial note-  CM reviewed pt's chart. CM noted PT/OT evals recommending SNF at d/c. Pt/ family wanting to take pt home w/ HH. DME needed if d/c home re: hospital bed, mechanical lift, and wheelchair w/ drop arm. No qualifying dx on chart for hospital bed; will f/u w/ vascular NP. CM to send referral for w/c w/ drop arm. Will need wound care orders for [facility]. Will report updates as they become available. Care Management Interventions  PCP Verified by CM:  Yes  Palliative Care Criteria Met (RRAT>21 & CHF Dx)?: No  Mode of Transport at Discharge: [facility] Transport Time of Discharge: 1100  Transition of Care Consult (CM Consult): [address]: No  Reason Outside [place]: Out of service area  Discharge Durable Medical Equipment: Yes  Physical Therapy Consult: Yes  Occupational Therapy Consult: Yes  Support Systems: Child(jenny)  Confirm Follow Up Transport: Family  The Plan for Transition of Care is Related to the Following Treatment Goals : Home w/ [facility] (PT/OT)  The Patient and/or Patient Representative was Provided with a Choice of Provider and Agrees with the Discharge Plan?: Yes  Name of the Patient Representative Who was Provided with a Choice of Provider and Agrees with the Discharge Plan: Silvia Valladares  [place] of Choice List was Provided with Basic Dialogue that Supports the Patient's Individualized Plan of Care/Goals, Treatment Preferences and Shares the Quality Data Associated with the Providers?: Yes  Discharge Location  Discharge Placement: Home with home health (Home w/ [facility] (PT/OT) & f/u appts)      TAHIRA Cerda  Care Management

## 2021-09-14 NOTE — PROGRESS NOTES
End of Shift Note    Bedside shift change report given to Cherie Young RN (oncoming nurse) by Maria Guadalupe Churchill RN (offgoing nurse). Report included the following information SBAR, Kardex, Intake/Output, MAR and Recent Results    Shift worked:  0290-6255     Shift summary and any significant changes:    PRN pain medication given x2, effective.    Concerns for physician to address: None   Zone phone for oncoming shift:  [phone]

## 2021-09-14 NOTE — PROGRESS NOTES
Vascular Surgery Progress Note  Jigna Whitfield AGACNP-BC     Admit Date: 9/10/2021   LOS: 2 days        Daily Progress Note: 2021    POD: 4 Days Post-Op    S/P: Procedure(s):  AMPUTATION TRANSMETATARSAL LEFT FOOT    Subjective: Obey Sanchez is an 79yo female with PMHx of Alzheimer's disease, chronic pain, HTN, HLD, DMII w/ polyneuropathy, ASHD (s/p CABG), diastolic heart failure, depression, hypothroidism, COPD, GERD and PVD with severe mulitlevel atherosclerotic occlusive disease who underwent a transmetatarsal left foot amputation after a failed left hallux amp on 9/10/21 under general anesthesia. She tolerated the procedure without complication. This morning, Ms. Galileo Velez was awake. According to her nurse, she did have pain overnight. She was reminded that she is strict non-weight bearing. Denies numbness, tingling, chest pain, nausea, vomiting, difficulty swallowing, headache, and dyspnea. Objective:     Vital signs  Temp (24hrs), Av.3 °F (36.8 °C), Min:97.9 °F (36.6 °C), Max:98.9 °F (37.2 °C)   No intake/output data recorded. No intake/output data recorded. Visit Vitals  /64   Pulse 90   Temp 98 °F (36.7 °C)   Resp 18   Ht 4' 11\" (1.499 m)   Wt 57.2 kg (126 lb)   SpO2 91%   BMI 25.45 kg/m²    O2 Flow Rate (L/min): 2 l/min O2 Device: None (Room air)     Pain control  Pain Assessment  Pain Scale 1: Numeric (0 - 10)  Pain Intensity 1: 0  Pain Onset 1: post op  Pain Location 1: Leg  Pain Orientation 1: Left  Pain Description 1: Aching  Pain Intervention(s) 1: Medication (see MAR)    PT/OT           Physical Exam  Vitals and nursing note reviewed. Constitutional:       General: She is not in acute distress. HENT:      Head: Normocephalic. Cardiovascular:      Rate and Rhythm: Normal rate. Pulmonary:      Effort: Pulmonary effort is normal.   Abdominal:      Palpations: Abdomen is soft. Musculoskeletal:         General: No swelling. Left lower leg: No edema.       Left Lower Extremity: Left leg is amputated below ankle. Feet:      Comments: Bulky dressing with ace bandage, CDI on first layers of dressing. Skin:     General: Skin is warm and dry. Neurological:      General: No focal deficit present. Mental Status: She is alert and oriented to person, place, and time.             24 hour results:    Recent Results (from the past 24 hour(s))   GLUCOSE, POC    Collection Time: 09/13/21  3:52 PM   Result Value Ref Range    Glucose (POC) 168 (H) 65 - 117 mg/dL    Performed by Pati Cespedes PCT    GLUCOSE, POC    Collection Time: 09/14/21  8:15 AM   Result Value Ref Range    Glucose (POC) 67 65 - 117 mg/dL    Performed by Self Deann OH    GLUCOSE, POC    Collection Time: 09/14/21  8:50 AM   Result Value Ref Range    Glucose (POC) 151 (H) 65 - 117 mg/dL    Performed by Self Deann RN    GLUCOSE, POC    Collection Time: 09/14/21 11:22 AM   Result Value Ref Range    Glucose (POC) 167 (H) 65 - 117 mg/dL    Performed by Jyoti Ford PCT           Assessment/Plan:     Active Problems:    Status post transmetatarsal amputation of left foot ([facility] [address] (9/10/2021)      Leg wound, left (9/12/2021)      PVD  S/p failed L hallux amp (8/27/21)  Gangrene necrosis, POA  Acute osteomyelitis, POA  - s/p L TMA 9/10/21 with incomplete closure for drainage  - bulky dressing CDI  - positive motor/sensory  - labs okay, anemia  - pain better controlled  - continue pain regimen - scheduled APAP, PRN  morphine & Oxycodone  - Eliquis 5mg  - LLE strict non weight bearing  - PT/OT  - Advanced diet  - d/c maintenance fluids    Anemia  - hgb 7.8  - asymptomatic    DMII  Polyneuropathy  - Lantus  - SSI    HTN  CHF  ASHD  - home meds    Chronic Pain  Polyneuropathy  - frequent bed turns per floor protocol    Hypothyroid  -  Home meds    Depression  - home meds    Arthritis  - home meds    GERD    Alzheimer's disease      Activity: PT/OT  LLE strict non weight bearing  DVT ppx: Eliquis  Dispo: HH upon discharge    Plan d/w Dr. Santos Shea, NP

## 2021-09-14 NOTE — PROGRESS NOTES
Problem: Mobility Impaired (Adult and Pediatric)  Goal: *Acute Goals and Plan of Care (Insert Text)  Description: FUNCTIONAL STATUS PRIOR TO ADMISSION: Pt was dependent for all mobility prior to admission. Family was assisting pt to rollator and push her around. She was able to bear some weight through her LEs at that point. HOME SUPPORT PRIOR TO ADMISSION: The patient lived with family. Physical Therapy Goals  Initiated 9/13/2021  1. Patient will move from supine to sit and sit to supine  in bed with maximal assistance within 7 day(s). 2.  Patient will transfer from bed to chair and chair to bed with maximal assistance using the least restrictive device within 7 day(s). 3.  Patient will perform sit to stand with maximal assistance within 7 day(s). Outcome: Progressing Towards Goal  PHYSICAL THERAPY TREATMENT  Patient: Mouna Leal (78 y.o. female)  Date: 9/14/2021  Diagnosis: Status post transmetatarsal amputation of left foot (HCC) [Z89.432]  Leg wound, left [S81.802A] <principal problem not specified>  Procedure(s) (LRB):  AMPUTATION TRANSMETATARSAL LEFT FOOT (Left) 4 Days Post-Op  Precautions: NWB  Chart, physical therapy assessment, plan of care and goals were reviewed. ASSESSMENT  Patient continues with skilled PT services and is progressing towards goals. RN clears Pt for PT session. Pt is found sleeping, but agrees to sitting EOB. Slight decrease of assistance is need for EOB transition today and pt sits for 22 min. During this time, pt performs some exercises and attempts standing 3 x with full guard. Pt with verbal confirmation of NWB status, but requires physical assist to maintain. Pt becomes confused with continued activity and suggested that she needed to use BSC, which was present at bedside. Pt immediately placed L foot on floor with gesture for standing transfer. Assisted pt back to supine, where she fell back asleep quickly.  .     Current Level of Function Impacting Discharge (mobility/balance): Max assist for all currently testable mobility    Other factors to consider for discharge: Very weakened          PLAN :  Patient continues to benefit from skilled intervention to address the above impairments. Continue treatment per established plan of care. to address goals. Recommendation for discharge: (in order for the patient to meet his/her long term goals)  Therapy up to 5 days/week in SNF setting vs HHPT and 24 hr care. This discharge recommendation:  Has been made in collaboration with the attending provider and/or case management    IF patient discharges home will need the following DME:Hospital bed, mechanical lift W/c       SUBJECTIVE:   Patient stated [facility]! Where did my daughter go? [facility]    OBJECTIVE DATA SUMMARY:   Critical Behavior:  Neurologic State: Alert, Confused  Orientation Level: Disoriented to time, Oriented to person, Oriented to place, Oriented to situation  Cognition: Decreased attention/concentration  Safety/Judgement: Insight into deficits, Decreased awareness of need for assistance  Functional Mobility Training:  Bed Mobility:  Rolling: Maximum assistance  Supine to Sit: Maximum assistance  Scooting: Maximum assistance  Transfers:  Balance:  Sitting: Impaired;High guard  Sitting - Static: Fair (occasional)  Sitting - Dynamic: Fair (occasional)  Standing: Impaired  Standing - Static: Poor  Standing - Dynamic : Not tested   Therapeutic Exercises:       EXERCISE   Sets   Reps   Active Active Assist   Passive Self ROM   Comments   Ankle Pumps 1 20  [x] [] [] [] R   Quad Sets/Glut Sets 1 10 [x] [] [] [] B   Hamstring Sets   [] [] [] []    Short Arc Quads   [] [] [] []    Heel Slides   [] [] [] []    Straight Leg Raises   [] [] [] []    Hip Abd/Add 1 10 [x] [] [] [] B   Long Arc Quads 1 10 [x] [] [] [] B   Seated Marching 1 10 [x] [] [] [] B   Standing Marching   [] [] [] []       [] [] [] []        Pain Rating:  Pain in: 5  Pain out: 5    Activity Tolerance:   Fair and Poor    After treatment patient left in no apparent distress:   Supine in bed, Bed / chair alarm activated, Caregiver / family present, and Side rails x 3    COMMUNICATION/COLLABORATION:   The patients plan of care was discussed with: Registered nurse.      Soledad Aguayo PTA   Time Calculation: 29 mins

## 2021-09-14 NOTE — PROGRESS NOTES
Spiritual Care Assessment/Progress Note  [facility]      NAME: Anjali Ye      MRN: [id number]  AGE: 78 y.o. SEX: female  Islam Affiliation: Buddhist   Language: English     9/14/2021     Total Time (in minutes): 6     Spiritual Assessment begun in [facility] through conversation with:         []Patient        [] Family    [] Friend(s)        Reason for Consult: Initial visit     Spiritual beliefs: (Please include comment if needed)     [] Identifies with a lily tradition:         [] Supported by a lily community:            [] Claims no spiritual orientation:           [] Seeking spiritual identity:                [] Adheres to an individual form of spirituality:           [x] Not able to assess:                           Identified resources for coping:      [] Prayer                               [] Music                  [] Guided Imagery     [] Family/friends                 [] Pet visits     [] Devotional reading                         [x] Unknown     [] Other:                                             Interventions offered during this visit: (See comments for more details)    Patient Interventions: Initial visit           Plan of Care:     [] Support spiritual and/or cultural needs    [] Support AMD and/or advance care planning process      [] Support grieving process   [] Coordinate Rites and/or Rituals    [] Coordination with community clergy   [] No spiritual needs identified at this time   [] Detailed Plan of Care below (See Comments)  [] Make referral to Music Therapy  [] Make referral to Pet Therapy     [] Make referral to Addiction services  [] Make referral to [facility]  [] Make referral to Spiritual Care Partner  [] No future visits requested        [] Follow up upon further referrals     Comments: Attempted Initial Spiritual Assessment for this pt in [place] [address].  Reviewed pt's chart prior to this visit to augment any observed or expressed support needs this pt may have. Pt was unable to be assessed at this time. No family/friends present at time of visit. Contact Spiritual Care Services for any spiritual or emotional support needs. Carleen Hoffman MDiv.  Staff   Request  Support/Spiritual Care Services via [address] [place] [address]

## 2021-09-14 NOTE — INTERDISCIPLINARY ROUNDS
Interdisciplinary Rounds were completed on 09/14/21 for this patient. Rounds included nursing, clinical care leader, pharmacy, and case management. Plan of care discussed. See clinical pathway and/or care plan for interventions and desired outcomes.

## 2021-09-14 NOTE — PROGRESS NOTES
End of Shift Note    Bedside shift change report given to ADRIANO Gutierrez (oncoming nurse) by Kathleen Patel RN (offgoing nurse). Report included the following information SBAR and Kardex    Shift worked:  7a-7p     Shift summary and any significant changes:     Pt had good shift, worked with PT/OT today. Doctor changed dressing. Pt is supposed to be discharged tomorrow. Concerns for physician to address:  none     Zone phone for oncoming shift:   [phone]       Activity:  Activity Level: Bed Rest  Number times ambulated in hallways past shift: 0  Number of times OOB to chair past shift: 0    Cardiac:   Cardiac Monitoring: No      Cardiac Rhythm:  (PACED)    Access:   Current line(s): PIV     Genitourinary:   Urinary status: voiding and incontinent    Respiratory:   O2 Device: None (Room air)  Chronic home O2 use?: NO  Incentive spirometer at bedside: NO     GI:  Last Bowel Movement Date: 09/12/21  Current diet:  ADULT DIET Regular; 4 carb choices (60 gm/meal)  DIET ONE TIME MESSAGE  Passing flatus: YES  Tolerating current diet: YES       Pain Management:   Patient states pain is manageable on current regimen: YES    Skin:  Gerald Score: 18  Interventions: increase time out of bed, PT/OT consult, limit briefs and nutritional support     Patient Safety:  Fall Score:  Total Score: 4  Interventions: bed/chair alarm, assistive device (walker, cane, etc), gripper socks, pt to call before getting OOB and stay with me (per policy)  High Fall Risk: Yes    Length of Stay:  Expected LOS: 5d 19h  Actual LOS: [id number] [facility] [place] [address], RN

## 2021-09-14 NOTE — PROGRESS NOTES
Discussed with cardiologist.  Looks like apixaban is for afib and patient should be on the reduced dose of 2.5mg PO BID. Adjusted orders for inpatient and sent PS to NP. Cardiologist is also making note in their system of change.

## 2021-09-14 NOTE — PROGRESS NOTES
HYPOGLYCEMIC EPISODE DOCUMENTATION    Patient with hypoglycemic episode(s) at [phone] (time) on 09/14/2021(date). BG value(s) pre-treatment 79    Was patient symptomatic?  [] yes, [x] no  Patient was treated with the following rescue medications/treatments: [] D50                [] Glucose tablets                [] Glucagon                [x] 4oz juice                [] 6oz reg soda                [] 8oz low fat milk  BG value post-treatment: 151  Once BG treated and value greater than 80mg/dl, pt was provided with the following:  [] snack  [] meal  Name of MD notified: Bimal Boston MD  The following orders were received: continue to monitor

## 2021-09-15 VITALS
DIASTOLIC BLOOD PRESSURE: 56 MMHG | OXYGEN SATURATION: 93 % | BODY MASS INDEX: 25.4 KG/M2 | HEIGHT: 59 IN | WEIGHT: 126 LBS | HEART RATE: 80 BPM | SYSTOLIC BLOOD PRESSURE: 137 MMHG | RESPIRATION RATE: 17 BRPM | TEMPERATURE: 98.2 F

## 2021-09-15 LAB
ANION GAP SERPL CALC-SCNC: 2 MMOL/L (ref 5–15)
BUN SERPL-MCNC: 15 MG/DL (ref 6–20)
BUN/CREAT SERPL: 15 (ref 12–20)
CALCIUM SERPL-MCNC: 9.7 MG/DL (ref 8.5–10.1)
CHLORIDE SERPL-SCNC: 105 MMOL/L (ref 97–108)
CO2 SERPL-SCNC: 30 MMOL/L (ref 21–32)
CREAT SERPL-MCNC: 0.97 MG/DL (ref 0.55–1.02)
ERYTHROCYTE [DISTWIDTH] IN BLOOD BY AUTOMATED COUNT: 13 % (ref 11.5–14.5)
GLUCOSE BLD STRIP.AUTO-MCNC: 152 MG/DL (ref 65–117)
GLUCOSE BLD STRIP.AUTO-MCNC: 93 MG/DL (ref 65–117)
GLUCOSE SERPL-MCNC: 217 MG/DL (ref 65–100)
HCT VFR BLD AUTO: 25.6 % (ref 35–47)
HGB BLD-MCNC: 7.8 G/DL (ref 11.5–16)
MCH RBC QN AUTO: 30.7 PG (ref 26–34)
MCHC RBC AUTO-ENTMCNC: 30.5 G/DL (ref 30–36.5)
MCV RBC AUTO: 100.8 FL (ref 80–99)
NRBC # BLD: 0 K/UL (ref 0–0.01)
NRBC BLD-RTO: 0 PER 100 WBC
PLATELET # BLD AUTO: 286 K/UL (ref 150–400)
PMV BLD AUTO: 9.3 FL (ref 8.9–12.9)
POTASSIUM SERPL-SCNC: 3.8 MMOL/L (ref 3.5–5.1)
RBC # BLD AUTO: 2.54 M/UL (ref 3.8–5.2)
SERVICE CMNT-IMP: ABNORMAL
SERVICE CMNT-IMP: NORMAL
SODIUM SERPL-SCNC: 137 MMOL/L (ref 136–145)
WBC # BLD AUTO: 9.4 K/UL (ref 3.6–11)

## 2021-09-15 PROCEDURE — 97530 THERAPEUTIC ACTIVITIES: CPT

## 2021-09-15 PROCEDURE — 36415 COLL VENOUS BLD VENIPUNCTURE: CPT

## 2021-09-15 PROCEDURE — 0Y6N0ZD DETACHMENT AT LEFT FOOT, PARTIAL 4TH RAY, OPEN APPROACH: ICD-10-PCS | Performed by: SURGERY

## 2021-09-15 PROCEDURE — 0Y6N0ZB DETACHMENT AT LEFT FOOT, PARTIAL 2ND RAY, OPEN APPROACH: ICD-10-PCS | Performed by: SURGERY

## 2021-09-15 PROCEDURE — 74011250637 HC RX REV CODE- 250/637: Performed by: SURGERY

## 2021-09-15 PROCEDURE — 80048 BASIC METABOLIC PNL TOTAL CA: CPT

## 2021-09-15 PROCEDURE — 97110 THERAPEUTIC EXERCISES: CPT

## 2021-09-15 PROCEDURE — 0Y6N0ZC DETACHMENT AT LEFT FOOT, PARTIAL 3RD RAY, OPEN APPROACH: ICD-10-PCS | Performed by: SURGERY

## 2021-09-15 PROCEDURE — 0Y6N0ZF DETACHMENT AT LEFT FOOT, PARTIAL 5TH RAY, OPEN APPROACH: ICD-10-PCS | Performed by: SURGERY

## 2021-09-15 PROCEDURE — 85027 COMPLETE CBC AUTOMATED: CPT

## 2021-09-15 PROCEDURE — 74011636637 HC RX REV CODE- 636/637: Performed by: SURGERY

## 2021-09-15 PROCEDURE — 0Y6N0Z9 DETACHMENT AT LEFT FOOT, PARTIAL 1ST RAY, OPEN APPROACH: ICD-10-PCS | Performed by: SURGERY

## 2021-09-15 PROCEDURE — 82962 GLUCOSE BLOOD TEST: CPT

## 2021-09-15 RX ADMIN — APIXABAN 2.5 MG: 2.5 TABLET, FILM COATED ORAL at 08:33

## 2021-09-15 RX ADMIN — DILTIAZEM HYDROCHLORIDE 180 MG: 180 CAPSULE, COATED, EXTENDED RELEASE ORAL at 08:34

## 2021-09-15 RX ADMIN — FUROSEMIDE 40 MG: 40 TABLET ORAL at 08:33

## 2021-09-15 RX ADMIN — CITALOPRAM HYDROBROMIDE 20 MG: 20 TABLET ORAL at 08:33

## 2021-09-15 RX ADMIN — SULFASALAZINE 500 MG: 500 TABLET ORAL at 08:33

## 2021-09-15 RX ADMIN — INSULIN LISPRO 2 UNITS: 100 INJECTION, SOLUTION INTRAVENOUS; SUBCUTANEOUS at 08:33

## 2021-09-15 RX ADMIN — LEVOTHYROXINE SODIUM 25 MCG: 0.03 TABLET ORAL at 06:36

## 2021-09-15 RX ADMIN — ACETAMINOPHEN 1000 MG: 500 TABLET, FILM COATED ORAL at 00:24

## 2021-09-15 RX ADMIN — METOPROLOL SUCCINATE 50 MG: 50 TABLET, EXTENDED RELEASE ORAL at 08:34

## 2021-09-15 RX ADMIN — ACETAMINOPHEN 1000 MG: 500 TABLET, FILM COATED ORAL at 06:36

## 2021-09-15 RX ADMIN — INSULIN LISPRO 2 UNITS: 100 INJECTION, SOLUTION INTRAVENOUS; SUBCUTANEOUS at 08:32

## 2021-09-15 RX ADMIN — ACETAMINOPHEN 1000 MG: 500 TABLET, FILM COATED ORAL at 12:08

## 2021-09-15 RX ADMIN — OXYCODONE 5 MG: 5 TABLET ORAL at 06:36

## 2021-09-15 NOTE — PROGRESS NOTES
Pharmacist Discharge Medication Reconciliation    Significant PMH:   Past Medical History:   Diagnosis Date    Alzheimer disease ([facility]     Arrhythmia     atrial fibrillation 2018    Arthritis     CAD (coronary artery disease)     CABG, pacemaker    Chronic pain     COPD     Diabetes ([facility]     GERD (gastroesophageal reflux disease)     Heart failure ([facility]     Hypertension     Ill-defined condition     high cholesterol    Peripheral vascular disease ([facility]     Thyroid disease      Encounter Diagnoses: No diagnosis found. Status post transmetatarsal amputation of left foot ([facility] (9/10/2021)  Allergies: Percocet [oxycodone-acetaminophen]    Discharge Medications:   Current Discharge Medication List        CONTINUE these medications which have CHANGED    Details   apixaban (ELIQUIS) 2.5 mg tablet Take 1 Tablet by mouth two (2) times a day. Qty: 180 Tablet, Refills: 1  Start date: 9/15/2021           CONTINUE these medications which have NOT CHANGED    Details   oxyCODONE IR (ROXICODONE) 5 mg immediate release tablet Take 5 mg by mouth every four (4) hours as needed for Pain. citalopram (CELEXA) 20 mg tablet Take 20 mg by mouth daily. venlafaxine-ER 24 HR (EFFEXOR-ER) 37.5 mg tr24 tablet Take 37.5 mg by mouth nightly. cholecalciferol (Vitamin D3) (1000 Units /25 mcg) tablet Take 2,000 Units by mouth daily. levothyroxine (synthroid) 25 mcg tablet Take 25 mcg by mouth Daily (before breakfast). sulfaSALAzine (AZULFIDINE) 500 mg tablet Take 500 mg by mouth two (2) times a day. ferrous gluconate (FERGON) 240 mg (27 mg iron) tablet Take 240 mg by mouth three (3) times daily (with meals). famotidine (PEPCID) 40 mg tablet Take 40 mg by mouth two (2) times a day. dilTIAZem ER (TIAZAC) 180 mg capsule Take 180 mg by mouth daily. spironolactone (ALDACTONE) 25 mg tablet Take 25 mg by mouth every other day.       insulin aspart protamine/insulin aspart (NOVOLOG MIX 70-30 U-100 INSULN) 100 unit/mL (70-30) injection 16 Units by SubCUTAneous route daily. Patient takes 16 units in the AM and 5 units QHS      furosemide (LASIX) 40 mg tablet Take 1 Tab by mouth daily. Qty: 30 Tab, Refills: 0      metoprolol succinate (TOPROL-XL) 50 mg XL tablet Take 50 mg by mouth daily. The patient's chart, MAR and AVS were reviewed by Hakan Parker [facility].     Discharging Provider: Maxine Bosworth NP    Thank you,     Hakan Parker, [facility]

## 2021-09-15 NOTE — PROGRESS NOTES
Problem: Mobility Impaired (Adult and Pediatric)  Goal: *Acute Goals and Plan of Care (Insert Text)  Description: FUNCTIONAL STATUS PRIOR TO ADMISSION: Pt was dependent for all mobility prior to admission. Family was assisting pt to rollator and push her around. She was able to bear some weight through her LEs at that point. HOME SUPPORT PRIOR TO ADMISSION: The patient lived with family. Physical Therapy Goals  Initiated 9/13/2021  1. Patient will move from supine to sit and sit to supine  in bed with maximal assistance within 7 day(s). 2.  Patient will transfer from bed to chair and chair to bed with maximal assistance using the least restrictive device within 7 day(s). 3.  Patient will perform sit to stand with maximal assistance within 7 day(s). Outcome: Not Progressing Towards Goal  PHYSICAL THERAPY TREATMENT  Patient: Nelson Nava (78 y.o. female)  Date: 9/15/2021  Diagnosis: Status post transmetatarsal amputation of left foot (HCC) [Z89.432]  Leg wound, left [S81.802A] <principal problem not specified>  Procedure(s) (LRB):  AMPUTATION TRANSMETATARSAL LEFT FOOT (Left) 5 Days Post-Op  Precautions: NWB  Chart, physical therapy assessment, plan of care and goals were reviewed. ASSESSMENT  Patient continues with skilled PT services and is progressing towards goals. Pt is found resting, with increased lethargy. Pt agrees too attempt exercise and EOB sitting. Pt is able to lightly assist with exercises for B LE, as well as UEs. Pt falls asleep with any attempt at a rest break. Standing attempt is not appropriate on this session. Pt is being prepared for D/c. D/w CM and family, continued recommendation for medication, due to dependent mobility.      Current Level of Function Impacting Discharge (mobility/balance): max-total assist for all mobility/activity    Other factors to consider for discharge:          PLAN :  Patient continues to benefit from skilled intervention to address the above impairments. Continue treatment per established plan of care. to address goals. Recommendation for discharge: (in order for the patient to meet his/her long term goals)  Therapy up to 5 days/week in SNF setting or intensive home health therapy program    This discharge recommendation:  Has been made in collaboration with the attending provider and/or case management    IF patient discharges home will need the following DME: hospital bed, mechanical lift, and wheelchair       SUBJECTIVE:   Patient stated II don't remember you at all.     OBJECTIVE DATA SUMMARY:   Critical Behavior:  Neurologic State: Alert, Confused  Orientation Level: Disoriented to time, Oriented to person, Oriented to place, Oriented to situation  Cognition: Decreased attention/concentration  Safety/Judgement: Insight into deficits, Decreased awareness of need for assistance  Functional Mobility Training:  Bed Mobility:  Rolling: Maximum assistance; Total assistance  Supine to Sit: Maximum assistance; Total assistance  Scooting: Total assistance   Balance:  Sitting: Impaired;High guard  Sitting - Static: Fair (occasional); Poor (constant support)  Sitting - Dynamic: Poor (constant support)  Therapeutic Exercises:       EXERCISE   Sets   Reps   Active Active Assist   Passive Self ROM   Comments   Ankle Pumps 1 30  [] [x] [] []    Quad Sets/Glut Sets 1 10 [x] [] [] []    Hamstring Sets   [] [] [] []    Short Arc Quads   [] [] [] []    Heel Slides 1 5 [] [x] [] []    Straight Leg Raises   [] [] [] []    Hip Abd/Add 1 10 [x] [] [] []    Long Arc Quads 1 10 [x] [] [] []    Seated Marching 1 10 [] [x] [] [] P!   Standing Marching   [] [] [] []       [] [] [] []        Pain Rating:  Pain in: 5  Pain out: 5    Activity Tolerance:   Poor    After treatment patient left in no apparent distress:   Supine in bed, Call bell within reach, Bed / chair alarm activated, and Caregiver / family present    COMMUNICATION/COLLABORATION:   The patients plan of care was discussed with: Registered nurse and Case management.      Oly Monk, PTA   Time Calculation: 28 mins

## 2021-09-15 NOTE — PROGRESS NOTES
Transition of Care Plan:     RUR:   14% LOW  Disposition: Home w/ [facility] (PT/OT)  Follow up appointments: PCP, Vascular- details in AVS  DME needed: Hospital bed Claudene Mcgee), Wheelchair ([employer])  Transportation at [place] [id number] 12 PM  [place] or means to access home:  Family has access  IM Medicare Letter: Reviewed & signed 9/14  Is patient a BCPI-A Bundle: No              If yes, was Bundle Letter given?: N/A    Caregiver Contact: Dtr- Yana Al, [phone]  Discharge Caregiver contacted prior to discharge?     CM acknowledged d/c.     Update 10:41 AM  CM received call from [facility] reporting pt's transport has been delayed d/t emergent calls. [facility] outsourced to other provideres & reproted [employer] can p/u pt at 12 PM. CM updated pt's family at bedside. RN notified of change as well. Initial note-  CM reviewed pt's chart. Pt anticipated to d/c today; [facility] at 11:00 AM. CM met w/ pt and her dtr yesterday evening to review d/c plan. Pt & family both agreeable. Report no questions or concerns for d/c. CM notified [facility] provider of pt's d/c home today. If no addtl needs arise pt is ready to d/c from CM standpoint. RN notified. Care Management Interventions  PCP Verified by CM:  Yes  Palliative Care Criteria Met (RRAT>21 & CHF Dx)?: No  Mode of Transport at Discharge: [facility] Time of Discharge: 1100  Transition of Care Consult (CM Consult): [address]: No  Reason Outside [place]: Out of service area  Discharge Durable Medical Equipment: Yes  Physical Therapy Consult: Yes  Occupational Therapy Consult: Yes  Support Systems: Child(jenny)  Confirm Follow Up Transport: Family  The Plan for Transition of Care is Related to the Following Treatment Goals : Home w/ [facility] (PT/OT)  The Patient and/or Patient Representative was Provided with a Choice of Provider and Agrees with the Discharge Plan?: Yes  Name of the Patient Representative Who was Provided with a Choice of Provider and Agrees with the Discharge Plan: Silvia Escalante  Freedom of Choice List was Provided with Basic Dialogue that Supports the Patient's Individualized Plan of Care/Goals, Treatment Preferences and Shares the Quality Data Associated with the Providers?: Yes  Discharge Location  Discharge Placement: Home with home health (Home w/ [facility] (PT/OT) & f/u appts)      TAHIRA Loja  Care Management

## 2021-09-15 NOTE — PROGRESS NOTES
Physician Progress Note      [facility]  CSN #:                  [id number]  :                       1940  ADMIT DATE:       9/10/2021 7:00 AM  DISCH DATE:  RESPONDING  PROVIDER #:        SUZY ARNOLD NP        QUERY TEXT:    Type of Anemia: Please provide further specificity, if known. Clinical indicators include: anemia, hgb  Options provided:  -- Anemia due to acute blood loss  -- Anemia due to chronic blood loss  -- Anemia due to iron deficiency  -- Anemia due to postoperative blood loss  -- Anemia due to chronic disease  -- Other - I will add my own diagnosis  -- Disagree - Not applicable / Not valid  -- Disagree - Clinically Unable to determine / Unknown        PROVIDER RESPONSE TEXT:    The patient has anemia due to chronic disease.       Electronically signed by:  Delia Spurling NP 9/15/2021 9:29 AM

## 2021-09-15 NOTE — DISCHARGE SUMMARY
Vascular Surgery Discharge Summary     Patient ID:  Manuel Dorado  [id number]  41 y.o.  1940    Admitting Provider: Enedina Villarreal MD  Discharging Provider:    Rocco Wilkerson Date: 9/10/2021    Discharge Date: 9/15/2021    Discharge Diagnoses: Active Problems:    Status post transmetatarsal amputation of left foot ([address] (9/10/2021)      Leg wound, left (9/12/2021)      Alzheimer's disease (POA)  Chronic pain (POA)  HTN (POA)  HLD (POA)  DMII w/ polyneuropathy (POA)  ASHD (POA)  Diastolic HF (POA)  Depression (POA)  Hypothyroidism (POA)  COPD (POA)  GERD (POA)  PVD (POA)    Procedure(s):  AMPUTATION TRANSMETATARSAL LEFT FOOT      Hospital Course: Manuel Dorado is an 77yo female with PMHx of Alzheimer's disease, chronic pain, HTN, HLD, DMII w/ polyneuropathy, ASHD (s/p CABG), diastolic heart failure, depression, hypothroidism, COPD, GERD and PVD with severe mulitlevel atherosclerotic occlusive disease who underwent a transmetatarsal left foot amputation after a failed left hallux amp on 9/10/21 under general anesthesia. She tolerated the procedure without complication. Her post-operative course has also been without acute events. Her dressing was changed and the incision showed no signs of infection or wound dehiscence. She was ready to be discharged home with [facility] and strict instructions to be non-weight bearing. Discharge instructions and followup appointment were also given.      Pertinent Results:   Recent Results (from the past 24 hour(s))   GLUCOSE, POC    Collection Time: 09/14/21 11:22 AM   Result Value Ref Range    Glucose (POC) 167 (H) 65 - 117 mg/dL    Performed by Kajal BOLAÑOS    GLUCOSE, POC    Collection Time: 09/14/21  5:14 PM   Result Value Ref Range    Glucose (POC) 173 (H) 65 - 117 mg/dL    Performed by Arminda Wheeler RN    GLUCOSE, POC    Collection Time: 09/14/21  8:56 PM   Result Value Ref Range    Glucose (POC) 148 (H) 65 - 117 mg/dL    Performed by [employer] (PCT)    METABOLIC PANEL, BASIC    Collection Time: 09/15/21  3:43 AM   Result Value Ref Range    Sodium 137 136 - 145 mmol/L    Potassium 3.8 3.5 - 5.1 mmol/L    Chloride 105 97 - 108 mmol/L    CO2 30 21 - 32 mmol/L    Anion gap 2 (L) 5 - 15 mmol/L    Glucose 217 (H) 65 - 100 mg/dL    BUN 15 6 - 20 MG/DL    Creatinine 0.97 0.55 - 1.02 MG/DL    BUN/Creatinine ratio 15 12 - 20      GFR est AA >60 >60 ml/min/1.73m2    GFR est non-AA 55 (L) >60 ml/min/1.73m2    Calcium 9.7 8.5 - 10.1 MG/DL   CBC W/O DIFF    Collection Time: 09/15/21  3:43 AM   Result Value Ref Range    WBC 9.4 3.6 - 11.0 K/uL    RBC 2.54 (L) 3.80 - 5.20 M/uL    HGB 7.8 (L) 11.5 - 16.0 g/dL    HCT 25.6 (L) 35.0 - 47.0 %    .8 (H) 80.0 - 99.0 FL    MCH 30.7 26.0 - 34.0 PG    MCHC 30.5 30.0 - 36.5 g/dL    RDW 13.0 11.5 - 14.5 %    PLATELET 513 951 - 433 K/uL    MPV 9.3 8.9 - 12.9 FL    NRBC 0.0 0  WBC    ABSOLUTE NRBC 0.00 0.00 - 0.01 K/uL   GLUCOSE, POC    Collection Time: 09/15/21  7:18 AM   Result Value Ref Range    Glucose (POC) 152 (H) 65 - 117 mg/dL    Performed by Sanjuanita Ro (PCT)        Vital signs:   Patient Vitals for the past 24 hrs:   BP Temp Pulse Resp SpO2   09/15/21 0717 (!) 137/56 98.2 °F (36.8 °C) 80 17 93 %   09/15/21 0024 (!) 135/54 98.7 °F (37.1 °C) 79 16 92 %   09/14/21 2013 (!) 133/59 98.4 °F (36.9 °C) 78 16 93 %   09/14/21 1620 139/74 98.1 °F (36.7 °C) 79 16 94 %   09/14/21 1114 135/64 98 °F (36.7 °C) 90 18 91 %       Patient Weight:   Last 3 Recorded Weights in this Encounter    09/10/21 1435 09/11/21 1946 09/13/21 0839   Weight: 57.8 kg (127 lb 6.4 oz) 58.7 kg (129 lb 4.8 oz) 57.2 kg (126 lb)       Consults: None    Patient Condition at Discharge: good    Disposition: home    Patient Instructions:   Current Discharge Medication List      CONTINUE these medications which have CHANGED    Details   apixaban (ELIQUIS) 2.5 mg tablet Take 1 Tablet by mouth two (2) times a day.   Qty: 180 Tablet, Refills: 1         CONTINUE these medications which have NOT CHANGED    Details   oxyCODONE IR (ROXICODONE) 5 mg immediate release tablet Take 5 mg by mouth every four (4) hours as needed for Pain. citalopram (CELEXA) 20 mg tablet Take 20 mg by mouth daily. venlafaxine (EFFEXOR) 37.5 mg tablet Take 37.5 mg by mouth nightly. cholecalciferol (Vitamin D3) (1000 Units /25 mcg) tablet Take 2,000 Units by mouth daily. levothyroxine (synthroid) 25 mcg tablet Take 25 mcg by mouth Daily (before breakfast). sulfaSALAzine (AZULFIDINE) 500 mg tablet Take 500 mg by mouth two (2) times a day. ferrous gluconate (FERGON) 240 mg (27 mg iron) tablet Take 240 mg by mouth three (3) times daily (with meals). famotidine (PEPCID) 40 mg tablet Take 40 mg by mouth two (2) times a day. dilTIAZem ER (TIAZAC) 180 mg capsule Take 180 mg by mouth daily. insulin aspart protamine/insulin aspart (NOVOLOG MIX 70-30 U-100 INSULN) 100 unit/mL (70-30) injection 16 Units by SubCUTAneous route daily. Patient takes 16 units in the AM and 5 units QHS      furosemide (LASIX) 40 mg tablet Take 1 Tab by mouth daily. Qty: 30 Tab, Refills: 0      metoprolol-XL (TOPROL-XL) 100 mg XL tablet Take 50 mg by mouth daily. HYDROcodone-acetaminophen (NORCO) 5-325 mg per tablet Take 2 Tablets by mouth every six (6) hours as needed for Pain.      traMADoL (ULTRAM) 50 mg tablet Take 50 mg by mouth every six (6) hours as needed for Pain. spironolactone (ALDACTONE) 25 mg tablet Take 25 mg by mouth every other day. amLODIPine (NORVASC) 5 mg tablet Take 10 mg by mouth daily. Diet: Diabetic Diet    Activity/Wound Care:     Home Health to do daily wound care. Strict non weight bearing to LLE. Call the office at [phone] if there are any problems. Follow-up Information     Follow up With Specialties Details Why Contact Jesse Cheek NP Nurse Practitioner  Office will call you directly to schedule follow-up appointment. [place] [address] [facility]  This is your home health provider. If you do not hear from them within 24-48 hours please contact them directly! [address] [facility] [address] [facility]  This is your wheelchair provider. Contact them directly with any questions. [address] [facility]  [address] [facility] bed provider. Contact them directly with any questions.  [address]  [phone]    Kapil Jaquez MD General and Vascular Surgery Go on 9/28/2021 For wound re-check @ [address]  Selene Anuj [phone]            Signed:  Chidi Doyle NP  9/15/2021  10:04 AM

## 2021-09-15 NOTE — PROGRESS NOTES
Problem: Falls - Risk of  Goal: *Absence of Falls  Description: Document [place] Fall Risk and appropriate interventions in the flowsheet. Outcome: Resolved/Met     Problem: Patient Education: Go to Patient Education Activity  Goal: Patient/Family Education  Outcome: Resolved/Met     Problem: Patient Education: Go to Patient Education Activity  Goal: Patient/Family Education  Outcome: Resolved/Met     Problem: Patient Education: Go to Patient Education Activity  Goal: Patient/Family Education  Outcome: Resolved/Met     Problem: Pressure Injury - Risk of  Goal: *Prevention of pressure injury  Description: Document Gerald Scale and appropriate interventions in the flowsheet.   Outcome: Resolved/Met     Problem: Patient Education: Go to Patient Education Activity  Goal: Patient/Family Education  Outcome: Resolved/Met

## 2021-09-15 NOTE — PROGRESS NOTES
End of Shift Note    Bedside shift change report given to Lucia Duane, RN (oncoming nurse) by Devi Burgess RN and ADRIANO Gutierrez (offgoing nurse). Report included the following information SBAR, Kardex, Intake/Output, MAR and Recent Results    Shift worked:  7p-7a     Shift summary and any significant changes:     Pt complained of pain, pain med effective, see MAR. Pt up to bedside commode once, tolerated well. Dressing CDI. Daughter at bedside through out the night. Concerns for physician to address:  none     Zone phone for oncoming shift:   [phone]       Activity:  Activity Level: Bed Rest  Number times ambulated in hallways past shift: 0  Number of times OOB to chair past shift: 0    Cardiac:   Cardiac Monitoring: No      Cardiac Rhythm:  (PACED)    Access:   Current line(s): PIV     Genitourinary:   Urinary status: voiding    Respiratory:   O2 Device: None (Room air)  Chronic home O2 use?: NO  Incentive spirometer at bedside: NO     GI:  Last Bowel Movement Date: 09/12/21  Current diet:  ADULT DIET Regular; 4 carb choices (60 gm/meal)  DIET ONE TIME MESSAGE  Passing flatus: YES  Tolerating current diet: YES       Pain Management:   Patient states pain is manageable on current regimen: YES    Skin:  Gerald Score: 18  Interventions: increase time out of bed, PT/OT consult and limit briefs    Patient Safety:  Fall Score:  Total Score: 4  Interventions: bed/chair alarm, assistive device (walker, cane, etc), gripper socks, pt to call before getting OOB and stay with me (per policy)  High Fall Risk: Yes    Length of Stay:  Expected LOS: 5d 19h  Actual LOS: 3      Devi Burgess RN

## 2021-09-15 NOTE — DISCHARGE INSTRUCTIONS
What is transmetatarsal amputation? Transmetatarsal amputation (TMA) involves surgical removal of a part of the foot that includes the metatarsals. Metatarsals are the five bones located between the ankle and toes in each foot. TMA is a relatively common operation performed to treat a severely infected foot or a foot with lack of oxygen supply (ischemic). Surgeons resort to this type of surgery when all other nonsurgical options to save the foot or limb have failed. Removing the infected part prevents the infection from spreading to the other parts of the limb and thus saves the limb in the long run. When is transmetatarsal amputation needed? Transmetatarsal amputation (TMA) is required to stop further loss of tissue from the foot that is damaged because of the following conditions:    Chronic forefoot ulceration  Forefoot gangrene (tissue death due to lack of blood flow)   Combination of above two complicated by diabetes  Severely crushed forefoot due to a major accident    Post-operative care: Non weight bearing. If you start to develop yellow/green fluid discharge, foul-odor and fevers/chills go to the Emergency Room as these are signs of infection.     Call the office at [phone] if there are any problems or questions

## 2021-09-15 NOTE — PROGRESS NOTES
DISCHARGE NOTE FROM [facility] NURSE    Patient determined to be stable for discharge by attending provider. I have reviewed the discharge instructions and follow-up appointments with the patient, caregiver and daughter. They verbalized understanding and all questions were answered to their satisfaction. No complaints or further questions were expressed. Medications sent to pharmacy. Appropriate educational materials and medication side effect teaching were provided. PIV were removed prior to discharge. Patient did not discharge with any line, mae, or drain. All personal items collected during admission were returned to the patient prior to discharge. Post-op patient: Yes- Patient given post-op discharge kit and instructed on use.        Mathew Elkins RN

## 2021-09-16 NOTE — OP NOTES
[address]  OPERATIVE REPORT    Name:  Enio Hernandez  MR#:  [id number]  :  1940  ACCOUNT #:  [de-identified]  DATE OF SERVICE:  09/10/2021    PREOPERATIVE DIAGNOSIS:  Failed left great toe amputation. POSTOPERATIVE DIAGNOSIS:  Failed left great toe amputation. PROCEDURE PERFORMED:  Left transmetatarsal amputation (TMA). SURGEON:  Enedina Villarreal MD    ANESTHESIA:  General.    COMPLICATIONS:  None. SPECIMENS REMOVED:  As above. ESTIMATED BLOOD LOSS:  50 mL. INDICATIONS:  The patient is an 23-year-old with severe tibial disease who has had a left great toe amputation which has failed. PROCEDURE:  After obtaining informed consent, the patient was placed supine on the operating table. After adequate induction of general anesthesia, site and patient confirmation, administration of prophylactic antibiotics, the left foot and leg were prepped and draped in sterile fashion. An incision was made along the proximal portion of the metatarsals. A posterior based flap was fashioned utilizing what viable plantar skin and subcutaneous tissue was available. This did not include a gap at the site of the failed toe amputation. The metatarsals were divided with an oscillating saw, specimen was delivered. All flexor tendons were resected. There was good bleeding at the cut tissue edges. All muscle was viable. The flap was brought forward under no tension and approximated at the fascial level with interrupted buried Vicryl sutures and the skin was approximated with fine simple nylon sutures. A small open area was let to drain at the site of the first metatarsal.  A bulky dressing was applied. The patient tolerated the procedure well with no complications.       MD TERRY Portillo/[id number]  D:  09/15/2021 22:12  T:  2021 1:31  JOB #:  [id number]  CC:  Enedina Villarreal MD

## 2021-09-20 ENCOUNTER — HOSPITAL ENCOUNTER (EMERGENCY)
Age: 81
Discharge: HOME | End: 2021-09-20
Attending: EMERGENCY MEDICINE
Payer: MEDICARE

## 2021-09-20 VITALS
HEIGHT: 59 IN | BODY MASS INDEX: 25.4 KG/M2 | TEMPERATURE: 98.6 F | RESPIRATION RATE: 18 BRPM | OXYGEN SATURATION: 100 % | SYSTOLIC BLOOD PRESSURE: 144 MMHG | DIASTOLIC BLOOD PRESSURE: 64 MMHG | HEART RATE: 81 BPM | WEIGHT: 126 LBS

## 2021-09-20 DIAGNOSIS — Z48.89 ENCOUNTER FOR POST SURGICAL WOUND CHECK: Primary | ICD-10-CM

## 2021-09-20 LAB
ALBUMIN SERPL-MCNC: 2.8 G/DL (ref 3.5–5)
ALBUMIN/GLOB SERPL: 0.6 {RATIO} (ref 1.1–2.2)
ALP SERPL-CCNC: 312 U/L (ref 45–117)
ALT SERPL-CCNC: 16 U/L (ref 12–78)
ANION GAP SERPL CALC-SCNC: 5 MMOL/L (ref 5–15)
AST SERPL-CCNC: 34 U/L (ref 15–37)
BASOPHILS # BLD: 0 K/UL (ref 0–0.1)
BASOPHILS NFR BLD: 0 % (ref 0–1)
BILIRUB SERPL-MCNC: 0.8 MG/DL (ref 0.2–1)
BUN SERPL-MCNC: 15 MG/DL (ref 6–20)
BUN/CREAT SERPL: 15 (ref 12–20)
CALCIUM SERPL-MCNC: 10.2 MG/DL (ref 8.5–10.1)
CHLORIDE SERPL-SCNC: 98 MMOL/L (ref 97–108)
CO2 SERPL-SCNC: 34 MMOL/L (ref 21–32)
CREAT SERPL-MCNC: 1 MG/DL (ref 0.55–1.02)
DIFFERENTIAL METHOD BLD: ABNORMAL
EOSINOPHIL # BLD: 0.2 K/UL (ref 0–0.4)
EOSINOPHIL NFR BLD: 2 % (ref 0–7)
ERYTHROCYTE [DISTWIDTH] IN BLOOD BY AUTOMATED COUNT: 13.2 % (ref 11.5–14.5)
GLOBULIN SER CALC-MCNC: 5 G/DL (ref 2–4)
GLUCOSE SERPL-MCNC: 142 MG/DL (ref 65–100)
HCT VFR BLD AUTO: 30.7 % (ref 35–47)
HGB BLD-MCNC: 8.9 G/DL (ref 11.5–16)
IMM GRANULOCYTES # BLD AUTO: 0.1 K/UL (ref 0–0.04)
IMM GRANULOCYTES NFR BLD AUTO: 1 % (ref 0–0.5)
LACTATE BLD-SCNC: 1.33 MMOL/L (ref 0.4–2)
LYMPHOCYTES # BLD: 0.7 K/UL (ref 0.8–3.5)
LYMPHOCYTES NFR BLD: 7 % (ref 12–49)
MCH RBC QN AUTO: 29.7 PG (ref 26–34)
MCHC RBC AUTO-ENTMCNC: 29 G/DL (ref 30–36.5)
MCV RBC AUTO: 102.3 FL (ref 80–99)
MONOCYTES # BLD: 0.7 K/UL (ref 0–1)
MONOCYTES NFR BLD: 8 % (ref 5–13)
NEUTS SEG # BLD: 7.6 K/UL (ref 1.8–8)
NEUTS SEG NFR BLD: 82 % (ref 32–75)
NRBC # BLD: 0 K/UL (ref 0–0.01)
NRBC BLD-RTO: 0 PER 100 WBC
PLATELET # BLD AUTO: 335 K/UL (ref 150–400)
PMV BLD AUTO: 9.2 FL (ref 8.9–12.9)
POTASSIUM SERPL-SCNC: 4.5 MMOL/L (ref 3.5–5.1)
PROT SERPL-MCNC: 7.8 G/DL (ref 6.4–8.2)
RBC # BLD AUTO: 3 M/UL (ref 3.8–5.2)
RBC MORPH BLD: ABNORMAL
SODIUM SERPL-SCNC: 137 MMOL/L (ref 136–145)
WBC # BLD AUTO: 9.3 K/UL (ref 3.6–11)

## 2021-09-20 PROCEDURE — 96366 THER/PROPH/DIAG IV INF ADDON: CPT

## 2021-09-20 PROCEDURE — 96367 TX/PROPH/DG ADDL SEQ IV INF: CPT

## 2021-09-20 PROCEDURE — 74011000258 HC RX REV CODE- 258: Performed by: EMERGENCY MEDICINE

## 2021-09-20 PROCEDURE — 87040 BLOOD CULTURE FOR BACTERIA: CPT

## 2021-09-20 PROCEDURE — 99285 EMERGENCY DEPT VISIT HI MDM: CPT

## 2021-09-20 PROCEDURE — 96365 THER/PROPH/DIAG IV INF INIT: CPT

## 2021-09-20 PROCEDURE — 83605 ASSAY OF LACTIC ACID: CPT

## 2021-09-20 PROCEDURE — 85025 COMPLETE CBC W/AUTO DIFF WBC: CPT

## 2021-09-20 PROCEDURE — 36415 COLL VENOUS BLD VENIPUNCTURE: CPT

## 2021-09-20 PROCEDURE — 74011250636 HC RX REV CODE- 250/636: Performed by: EMERGENCY MEDICINE

## 2021-09-20 PROCEDURE — 80053 COMPREHEN METABOLIC PANEL: CPT

## 2021-09-20 RX ORDER — VANCOMYCIN HYDROCHLORIDE
1250
Status: COMPLETED | OUTPATIENT
Start: 2021-09-20 | End: 2021-09-20

## 2021-09-20 RX ADMIN — CEFTRIAXONE 1 G: 1 INJECTION, POWDER, FOR SOLUTION INTRAMUSCULAR; INTRAVENOUS at 15:04

## 2021-09-20 RX ADMIN — SODIUM CHLORIDE 1250 MG: 9 INJECTION, SOLUTION INTRAVENOUS at 15:38

## 2021-09-20 NOTE — ED PROVIDER NOTES
EMERGENCY DEPARTMENT HISTORY AND PHYSICAL EXAM      Date: 9/20/2021  Patient Name: Anjali Ye    History of Presenting Illness     Chief Complaint   Patient presents with    Wound Check     Pt arrived via [facility] for wound check. Per family on scene pt foot was being dressed by family and they noticed signs of infection. EMS did not undress wound. Pt had top half of foot amputated 9/10/21. History Provided By: Patient and Patient's Daughter    HPI: Anjali Ye, 78 y.o. female presents to the ED with cc of wound check. 78 YOF on eliquis, PAD, afib, diabetes presents for wound check. Patient underwent forefoot amputation with Dr. Sherita Yoder 9/9. Saturday family noted drainage from wound and dehiscence. Patient reports pain, no fever. Decreased appetite. There are no other complaints, changes, or physical findings at this time. PCP: Yecenia Park NP    No current facility-administered medications on file prior to encounter. Current Outpatient Medications on File Prior to Encounter   Medication Sig Dispense Refill    apixaban (ELIQUIS) 2.5 mg tablet Take 1 Tablet by mouth two (2) times a day. 180 Tablet 1    oxyCODONE IR (ROXICODONE) 5 mg immediate release tablet Take 5 mg by mouth every four (4) hours as needed for Pain.  citalopram (CELEXA) 20 mg tablet Take 20 mg by mouth daily.  venlafaxine-ER 24 HR (EFFEXOR-ER) 37.5 mg tr24 tablet Take 37.5 mg by mouth nightly.  cholecalciferol (Vitamin D3) (1000 Units /25 mcg) tablet Take 2,000 Units by mouth daily.  levothyroxine (synthroid) 25 mcg tablet Take 25 mcg by mouth Daily (before breakfast).  sulfaSALAzine (AZULFIDINE) 500 mg tablet Take 500 mg by mouth two (2) times a day.  ferrous gluconate (FERGON) 240 mg (27 mg iron) tablet Take 240 mg by mouth three (3) times daily (with meals).  famotidine (PEPCID) 40 mg tablet Take 40 mg by mouth two (2) times a day.       dilTIAZem ER (TIAZAC) 180 mg capsule Take 180 mg by mouth daily.  spironolactone (ALDACTONE) 25 mg tablet Take 25 mg by mouth every other day.  insulin aspart protamine/insulin aspart (NOVOLOG MIX 70-30 U-100 INSULN) 100 unit/mL (70-30) injection 16 Units by SubCUTAneous route daily. Patient takes 16 units in the AM and 5 units QHS      furosemide (LASIX) 40 mg tablet Take 1 Tab by mouth daily. 30 Tab 0    metoprolol succinate (TOPROL-XL) 50 mg XL tablet Take 50 mg by mouth daily. Past History     Past Medical History:  Past Medical History:   Diagnosis Date    [facility]     Arrhythmia     atrial fibrillation 2018    Arthritis     CAD (coronary artery disease)     CABG, pacemaker    Chronic pain     COPD     Diabetes ([facility] [facility]     GERD (gastroesophageal reflux disease)     Heart failure (HCC)     Hypertension     Ill-defined condition     high cholesterol    Peripheral vascular disease ([facility] [address] [facility]     Thyroid disease        Past Surgical History:  Past Surgical History:   Procedure Laterality Date    HX ORTHOPAEDIC Left     toe amputation    HX PACEMAKER      July 2011    DC CARDIAC SURG PROCEDURE UNLIST      3 CABG, Stents    DC COLONOSCOPY FLX DX W/COLLJ SPEC WHEN PFRMD  11/10/2011         DC EGD TRANSORAL BIOPSY SINGLE/MULTIPLE  8/29/2012            Family History:  Family History   Problem Relation Age of Onset    Lung Disease Mother     Heart Disease Father     Hypertension Father     Diabetes Sister        Social History:  Social History     Tobacco Use    Smoking status: Never Smoker    Smokeless tobacco: Never Used   Substance Use Topics    Alcohol use: No    Drug use: No       Allergies: Allergies   Allergen Reactions    Percocet [Oxycodone-Acetaminophen] Nausea and Vomiting         Review of Systems   Review of Systems   Constitutional: Positive for appetite change. Negative for activity change, chills and fever. HENT: Negative for facial swelling and voice change. Eyes: Negative for redness. Respiratory: Negative for cough, shortness of breath and wheezing. Cardiovascular: Negative for chest pain and leg swelling. Gastrointestinal: Negative for abdominal pain, diarrhea, nausea and vomiting. Genitourinary: Negative for decreased urine volume. Musculoskeletal: Negative for gait problem. Skin: Positive for wound. Negative for pallor and rash. Neurological: Negative for tremors and facial asymmetry. Psychiatric/Behavioral: Negative for agitation. All other systems reviewed and are negative. Physical Exam   Physical Exam  Vitals and nursing note reviewed. Constitutional:       Comments: 78 YOF, resting in bed, no distress   HENT:      Head: Normocephalic and atraumatic. Cardiovascular:      Rate and Rhythm: Normal rate and regular rhythm. Heart sounds: No murmur heard. No friction rub. No gallop. Pulmonary:      Effort: Pulmonary effort is normal.      Breath sounds: Normal breath sounds. Abdominal:      Palpations: Abdomen is soft. Tenderness: There is no abdominal tenderness. Musculoskeletal:         General: Normal range of motion. Cervical back: Normal range of motion. Skin:     General: Skin is warm. Capillary Refill: Capillary refill takes less than 2 seconds. Comments: L forefoot surgically absent. There is an incision in place with sutures in place. There is an area along the medial aspect where there is some dehiscence of the wound. There is odor and scant purulent drainage. Neurological:      General: No focal deficit present. Mental Status: She is alert.    Psychiatric:         Mood and Affect: Mood normal.         Diagnostic Study Results     Labs -     Recent Results (from the past 12 hour(s))   CBC WITH AUTOMATED DIFF    Collection Time: 09/20/21  2:49 PM   Result Value Ref Range    WBC 9.3 3.6 - 11.0 K/uL    RBC 3.00 (L) 3.80 - 5.20 M/uL    HGB 8.9 (L) 11.5 - 16.0 g/dL    HCT 30.7 (L) 35.0 - 47.0 %    .3 (H) 80.0 - 99.0 FL    MCH 29.7 26.0 - 34.0 PG    MCHC 29.0 (L) 30.0 - 36.5 g/dL    RDW 13.2 11.5 - 14.5 %    PLATELET 965 262 - 289 K/uL    MPV 9.2 8.9 - 12.9 FL    NRBC 0.0 0  WBC    ABSOLUTE NRBC 0.00 0.00 - 0.01 K/uL    NEUTROPHILS 82 (H) 32 - 75 %    LYMPHOCYTES 7 (L) 12 - 49 %    MONOCYTES 8 5 - 13 %    EOSINOPHILS 2 0 - 7 %    BASOPHILS 0 0 - 1 %    IMMATURE GRANULOCYTES 1 (H) 0.0 - 0.5 %    ABS. NEUTROPHILS 7.6 1.8 - 8.0 K/UL    ABS. LYMPHOCYTES 0.7 (L) 0.8 - 3.5 K/UL    ABS. MONOCYTES 0.7 0.0 - 1.0 K/UL    ABS. EOSINOPHILS 0.2 0.0 - 0.4 K/UL    ABS. BASOPHILS 0.0 0.0 - 0.1 K/UL    ABS. IMM. GRANS. 0.1 (H) 0.00 - 0.04 K/UL    DF SMEAR SCANNED      RBC COMMENTS ANISOCYTOSIS  1+       METABOLIC PANEL, COMPREHENSIVE    Collection Time: 09/20/21  2:49 PM   Result Value Ref Range    Sodium 137 136 - 145 mmol/L    Potassium 4.5 3.5 - 5.1 mmol/L    Chloride 98 97 - 108 mmol/L    CO2 34 (H) 21 - 32 mmol/L    Anion gap 5 5 - 15 mmol/L    Glucose 142 (H) 65 - 100 mg/dL    BUN 15 6 - 20 MG/DL    Creatinine 1.00 0.55 - 1.02 MG/DL    BUN/Creatinine ratio 15 12 - 20      GFR est AA >60 >60 ml/min/1.73m2    GFR est non-AA 53 (L) >60 ml/min/1.73m2    Calcium 10.2 (H) 8.5 - 10.1 MG/DL    Bilirubin, total 0.8 0.2 - 1.0 MG/DL    ALT (SGPT) 16 12 - 78 U/L    AST (SGOT) 34 15 - 37 U/L    Alk. phosphatase 312 (H) 45 - 117 U/L    Protein, total 7.8 6.4 - 8.2 g/dL    Albumin 2.8 (L) 3.5 - 5.0 g/dL    Globulin 5.0 (H) 2.0 - 4.0 g/dL    A-G Ratio 0.6 (L) 1.1 - 2.2     POC LACTIC ACID    Collection Time: 09/20/21  2:57 PM   Result Value Ref Range    Lactic Acid (POC) 1.33 0.40 - 2.00 mmol/L       Radiologic Studies -   No orders to display     CT Results  (Last 48 hours)    None        CXR Results  (Last 48 hours)    None          Medical Decision Making   I am the first provider for this patient. I reviewed the vital signs, available nursing notes, past medical history, past surgical history, family history and social history.     Vital Signs-Reviewed the patient's vital signs. Patient Vitals for the past 12 hrs:   Temp Pulse Resp BP SpO2   09/20/21 1415    (!) 144/64 100 %   09/20/21 1400    (!) 141/69 100 %   09/20/21 1345    (!) 152/63 99 %   09/20/21 1330    (!) 140/62 99 %   09/20/21 1322 98.6 °F (37 °C) 81 18 (!) 151/69 99 %     Records Reviewed: Nursing Notes, Old Medical Records and Previous Laboratory Studies    Provider Notes (Medical Decision Making):     68-year-old female presents emergency department with a chief complaint of wound check. Vitals are stable, afebrile here. Check screening labs including lactate. Clinically patient does appear to have a area of infection in the left forefoot vs. Wound dehiscence. Given patient is recently postop will check with vascular. ED Course:   Initial assessment performed. The patients presenting problems have been discussed, and they are in agreement with the care plan formulated and outlined with them. I have encouraged them to ask questions as they arise throughout their visit. ED Course as of Sep 20 2017   Mon Sep 20, 2021   1606 D/w Dr. Vicky Morton NP who evaluated the patient. Treat in ED and d/c    [MB]   1639 Discussed with NP via telephone, at this point they do not want antibiotics, they think that the dehiscence is due to patient moving the leg and request a knee immobilizer. Patient is receiving antibiotics in the ED. Then we will discharge. [MB]      ED Course User Index  [MB] Gaby Godwin MD     Updates as above, consulted with vascular NP who evaluated the patient and felt this was more likely wound dehiscence from patient flexing her knee. Discussed my findings and that patient was referred from home health for concern for wound infection. They did NOT want antibiotics given afebrile, no leukocytosis and recommend follow-up and continued home health wound care. Rafael Schulz MD      Disposition:    Discharged    DISCHARGE PLAN:  1.    Discharge Medication List as of 9/20/2021  6:22 PM        2. Follow-up Information     Follow up With Specialties Details Why Contact Info    Russell Dunlap NP Nurse Practitioner In 3 days  [facility] [phone] [facility] EMERGENCY DEPT Emergency Medicine  If symptoms worsen [address] [facility] [address]    Cha Melgar MD General and Vascular Surgery In 1 week  [address]  [address] [phone]          3. Return to ED if worse     Diagnosis     Clinical Impression:   1. Encounter for post surgical wound check        Attestations:    Wade Tyson MD    Please note that this dictation was completed with [employer], the computer voice recognition software. Quite often unanticipated grammatical, syntax, homophones, and other interpretive errors are inadvertently transcribed by the computer software. Please disregard these errors. Please excuse any errors that have escaped final proofreading. Thank you.

## 2021-09-20 NOTE — ED NOTES
Halina Spears RN reviewed discharge instructions with the patient. The patient verbalized understanding. All questions and concerns were addressed. The patient is discharged via wheelchair in the care of family members with instructions and prescriptions in hand. Pt is alert and oriented x 4. Respirations are clear and unlabored.

## 2021-09-20 NOTE — CONSULTS
Vascular Surgery Consult Note  9/20/2021    Subjective: Jodie Monsivais is an 77yo female with PMHx of Alzheimer's disease, chronic pain, HTN, HLD, DMII w/ polyneuropathy, ASHD (s/p CABG), diastolic heart failure, depression, hypothroidism, COPD, GERD and PVD with severe mulitlevel atherosclerotic occlusive disease who presents to the ED with complaints of L TMA \"wound drainage and wound opening. \" Blood cultures were drawn and IV abx were initiated. We have been asked to evaluate. She recently underwent a left transmetatarsal  amputation with small area left open at first metatarsal for drainage on 9/10/21after a failed left hallux amputation and was discharged on 9/15/21. According to Ms. Mercer's family, there has been no purulent drainage, malodor and she has not had fever or chills since discharge. Past Medical History:   Diagnosis Date    Alzheimer disease ([address]     Arrhythmia     atrial fibrillation 2018    Arthritis     CAD (coronary artery disease)     CABG, pacemaker    Chronic pain     COPD     Diabetes ([address]     GERD (gastroesophageal reflux disease)     Heart failure (HCC)     Hypertension     Ill-defined condition     high cholesterol    Peripheral vascular disease ([address]     Thyroid disease       Past Surgical History:   Procedure Laterality Date    HX ORTHOPAEDIC Left     toe amputation    HX PACEMAKER      July 2011    DE CARDIAC SURG PROCEDURE UNLIST      3 CABG, Stents    DE COLONOSCOPY FLX DX W/COLLJ SPEC WHEN PFRMD  11/10/2011         DE EGD TRANSORAL BIOPSY SINGLE/MULTIPLE  8/29/2012          Family History   Problem Relation Age of Onset    Lung Disease Mother     Heart Disease Father     Hypertension Father     Diabetes Sister       Social History     Tobacco Use    Smoking status: Never Smoker    Smokeless tobacco: Never Used   Substance Use Topics    Alcohol use: No       Prior to Admission medications    Medication Sig Start Date End Date Taking?  Authorizing Provider   apixaban (ELIQUIS) 2.5 mg tablet Take 1 Tablet by mouth two (2) times a day. 9/15/21   Fiorella Hernandez NP   oxyCODONE IR (ROXICODONE) 5 mg immediate release tablet Take 5 mg by mouth every four (4) hours as needed for Pain. Provider, Historical   citalopram (CELEXA) 20 mg tablet Take 20 mg by mouth daily. Provider, Historical   venlafaxine-ER 24 HR (EFFEXOR-ER) 37.5 mg tr24 tablet Take 37.5 mg by mouth nightly. Provider, Historical   cholecalciferol (Vitamin D3) (1000 Units /25 mcg) tablet Take 2,000 Units by mouth daily. Provider, Historical   levothyroxine (synthroid) 25 mcg tablet Take 25 mcg by mouth Daily (before breakfast). Provider, Historical   sulfaSALAzine (AZULFIDINE) 500 mg tablet Take 500 mg by mouth two (2) times a day. Provider, Historical   ferrous gluconate (FERGON) 240 mg (27 mg iron) tablet Take 240 mg by mouth three (3) times daily (with meals). Provider, Historical   famotidine (PEPCID) 40 mg tablet Take 40 mg by mouth two (2) times a day. Provider, Historical   dilTIAZem ER (TIAZAC) 180 mg capsule Take 180 mg by mouth daily. 3/25/19   Provider, Historical   spironolactone (ALDACTONE) 25 mg tablet Take 25 mg by mouth every other day. Provider, Historical   insulin aspart protamine/insulin aspart (NOVOLOG MIX 70-30 U-100 INSULN) 100 unit/mL (70-30) injection 16 Units by SubCUTAneous route daily. Patient takes 16 units in the AM and 5 units QHS    Provider, Historical   furosemide (LASIX) 40 mg tablet Take 1 Tab by mouth daily. 8/12/17   Brayden Montes MD   metoprolol succinate (TOPROL-XL) 50 mg XL tablet Take 50 mg by mouth daily.     Other, MD Omar     Allergies   Allergen Reactions    Percocet [Oxycodone-Acetaminophen] Nausea and Vomiting        Review of Systems:  Review of Systems   Unable to perform ROS: Dementia       Objective:       Patient Vitals for the past 24 hrs:   BP Temp Pulse Resp SpO2 Height Weight   09/20/21 1322 (!) 151/69 98.6 °F (37 °C) 81 18 99 % 4' 11\" (1.499 m) 57.2 kg (126 lb)     ---------------------------------------------------------------------------------------------------------    Physical Exam:   Physical Exam  Vitals reviewed. Constitutional:       General: She is not in acute distress. HENT:      Head: Normocephalic. Cardiovascular:      Rate and Rhythm: Normal rate and regular rhythm. Pulses:           Femoral pulses are 2+ on the right side and 2+ on the left side. Pulmonary:      Effort: No respiratory distress. Breath sounds: Normal breath sounds. Abdominal:      General: Bowel sounds are normal.      Palpations: Abdomen is soft. Musculoskeletal:      Left Lower Extremity: Left leg is amputated below ankle. Feet:      Comments: Wound dehiscence along medial suture line and flap. Slight serious drainage noted on dressing, no malodor. Motor and sensory intact  Skin:     General: Skin is warm. Neurological:      Mental Status: She is alert. Mental status is at baseline. She is disoriented. Pertinent Test Results:   Recent Results (from the past 24 hour(s))   CBC WITH AUTOMATED DIFF    Collection Time: 09/20/21  2:49 PM   Result Value Ref Range    WBC 9.3 3.6 - 11.0 K/uL    RBC 3.00 (L) 3.80 - 5.20 M/uL    HGB 8.9 (L) 11.5 - 16.0 g/dL    HCT 30.7 (L) 35.0 - 47.0 %    .3 (H) 80.0 - 99.0 FL    MCH 29.7 26.0 - 34.0 PG    MCHC 29.0 (L) 30.0 - 36.5 g/dL    RDW 13.2 11.5 - 14.5 %    PLATELET 702 881 - 751 K/uL    MPV 9.2 8.9 - 12.9 FL    NRBC 0.0 0  WBC    ABSOLUTE NRBC 0.00 0.00 - 0.01 K/uL    NEUTROPHILS 82 (H) 32 - 75 %    LYMPHOCYTES 7 (L) 12 - 49 %    MONOCYTES 8 5 - 13 %    EOSINOPHILS 2 0 - 7 %    BASOPHILS 0 0 - 1 %    IMMATURE GRANULOCYTES 1 (H) 0.0 - 0.5 %    ABS. NEUTROPHILS 7.6 1.8 - 8.0 K/UL    ABS. LYMPHOCYTES 0.7 (L) 0.8 - 3.5 K/UL    ABS. MONOCYTES 0.7 0.0 - 1.0 K/UL    ABS. EOSINOPHILS 0.2 0.0 - 0.4 K/UL    ABS. BASOPHILS 0.0 0.0 - 0.1 K/UL    ABS. IMM.  GRANS. 0.1 (H) 0.00 - 0.04 K/UL    DF SMEAR SCANNED      RBC COMMENTS ANISOCYTOSIS  1+       POC LACTIC ACID    Collection Time: 09/20/21  2:57 PM   Result Value Ref Range    Lactic Acid (POC) 1.33 0.40 - 2.00 mmol/L       Assessment/Plan:     PVD  S/p L TMA 9/10/21 w/ incomplete closure for drainage  S/p failed L hallux amp (8/27/21)  Gangrene necrosis, POA  Acute osteomyelitis, POA  - she is strict non-weightbearing  - wound dehiscence along medial suture line- she is likely putting pressure despite being strict non-weight bearing  - positive motor/sensory  - foot warm, femoral pulse palpable  - does not look infected  - WBCS wnl  - lactic acid 1.33  - Blood cultures drawn  - IV abx started in ED Vanc/ceftriaxone  - silvasorb and bulky dressing applied  - okay to d/c home from vascular standpoint  - On Eliquis 2.5mg BID  - no PO abx needed at d/c  - knee immobilizer ordered  - can keep f/u appt 9/28/21 in clinic    Anemia  DMII  Polyneuropathy   HTN  CHF  ASHD  Chronic Pain  Hypothyroid  Depression  Arthritis  GERD  Alzheimer's disease    Disposition:  Okay to d/c home w/ knee immobilizer. Wound dehiscence likely due to noncompliance with strict non-weightbearing. No PO abx needed at discharge. Ms. Nelia Parrish has an appointment to followup in clinic. Discusses plan with Dr. Sherita Yoder.      Signed By: Jose Maria Crowell NP     September 20, 2021

## 2021-09-20 NOTE — DISCHARGE INSTRUCTIONS
Please follow-up with your primary care doctor and Dr. Faisal Mace. Please continue wound dressings. You are given antibiotics in the ER. Please return for new or worsening symptoms anytime especially fever.

## 2021-09-25 LAB
BACTERIA SPEC CULT: NORMAL
SERVICE CMNT-IMP: NORMAL

## 2021-10-25 RX ORDER — VENLAFAXINE HYDROCHLORIDE 75 MG/1
75 CAPSULE, EXTENDED RELEASE ORAL
COMMUNITY

## 2021-10-25 RX ORDER — POLYETHYLENE GLYCOL 3350 17 G/17G
17 POWDER, FOR SOLUTION ORAL DAILY
COMMUNITY

## 2021-10-25 RX ORDER — ACETAMINOPHEN 500 MG
500 TABLET ORAL
COMMUNITY

## 2021-10-25 RX ORDER — HYDROCODONE BITARTRATE AND ACETAMINOPHEN 5; 325 MG/1; MG/1
1 TABLET ORAL AS NEEDED
COMMUNITY

## 2021-10-25 RX ORDER — ROSUVASTATIN CALCIUM 5 MG/1
5 TABLET, COATED ORAL
COMMUNITY

## 2021-10-25 RX ORDER — METOPROLOL SUCCINATE 50 MG/1
50 TABLET, EXTENDED RELEASE ORAL DAILY
COMMUNITY

## 2021-10-25 RX ORDER — ONDANSETRON HYDROCHLORIDE 8 MG/1
8 TABLET, FILM COATED ORAL
COMMUNITY

## 2021-10-25 NOTE — PERIOP NOTES
[facility]  Preoperative Instructions    COVID TEST /10/28  MOB1 [place] side  Anytime between 7 am and 11:45 am    Surgery Date 11/01/21          Time of Arrival 1045  Contact #[phone]  1. On the day of your surgery, please report to the Surgical Services Registration Desk and sign in at your designated time. The Surgery Center is located to the right of the Emergency Room. 2. You must have someone with you to drive you home. You should not drive a car for 24 hours following surgery. Please make arrangements for a friend or family member to stay with you for the first 24 hours after your surgery. 3. Do not have anything to eat or drink (including water, gum, mints, coffee, juice) after midnight ?10/31/21   . ? This may not apply to medications prescribed by your physician. ?(Please note below the special instructions with medications to take the morning of your procedure.)    4. We recommend you do not drink any alcoholic beverages for 24 hours before and after your surgery. 5. Contact your surgeons office for instructions on the following medications: non-steroidal anti-inflammatory drugs (i.e. Advil, Aleve), vitamins, and supplements. (Some surgeons will want you to stop these medications prior to surgery and others may allow you to take them)  **If you are currently taking Plavix, Coumadin, Aspirin and/or other blood-thinning agents, contact your surgeon for instructions. ** Your surgeon will partner with the physician prescribing these medications to determine if it is safe to stop or if you need to continue taking. Please do not stop taking these medications without instructions from your surgeon    6. Wear comfortable clothes. Wear glasses instead of contacts. Do not bring any money or jewelry. Please bring picture ID, insurance card, and any prearranged co-payment or hospital payment. Do not wear make-up, particularly mascara the morning of your surgery.   Do not wear nail polish, particularly if you are having foot /hand surgery. Wear your hair loose or down, no ponytails, buns, emily pins or clips. All body piercings must be removed. Please shower with antibacterial soap for three consecutive days before and on the morning of surgery, but do not apply any lotions, powders or deodorants after the shower on the day of surgery. Please use a fresh towels after each shower. Please sleep in clean clothes and change bed linens the night before surgery. Please do not shave for 48 hours prior to surgery. Shaving of the face is acceptable. 7. You should understand that if you do not follow these instructions your surgery may be cancelled. If your physical condition changes (I.e. fever, cold or flu) please contact your surgeon as soon as possible. 8. It is important that you be on time. If a situation occurs where you may be late, please call [phone] (OR Holding Area). 9. If you have any questions and or problems, please call [phone] (Pre-admission Testing). 10. Your surgery time may be subject to change. You will receive a phone call the evening prior if your time changes. 11.  If having outpatient surgery, you must have someone to drive you here, stay with you during the duration of your stay, and to drive you home at time of discharge. Special Instructions:   Stop eliquis 2 days before surgery per surgeon  TAKE ALL MEDICATIONS DAY OF SURGERY EXCEPT:eliquis,insulin,miralax      I understand a pre-operative phone call will be made to verify my surgery time. In the event that I am not available, I give permission for a message to be left on my answering service and/or with another person?   yes          ___________________      __________   _________    (Signature of Patient)             (Witness)                (Date and Time)

## 2021-10-28 ENCOUNTER — HOSPITAL ENCOUNTER (OUTPATIENT)
Dept: PREADMISSION TESTING | Age: 81
Discharge: HOME | End: 2021-10-28
Payer: MEDICARE

## 2021-10-28 PROCEDURE — U0005 INFEC AGEN DETEC AMPLI PROBE: HCPCS

## 2021-10-29 LAB
SARS-COV-2, XPLCVT: NOT DETECTED
SOURCE, COVRS: NORMAL

## 2021-10-30 ENCOUNTER — ANESTHESIA EVENT (OUTPATIENT)
Dept: SURGERY | Age: 81
DRG: 857 | End: 2021-10-30
Payer: MEDICARE

## 2021-11-01 ENCOUNTER — ANESTHESIA (OUTPATIENT)
Dept: SURGERY | Age: 81
DRG: 857 | End: 2021-11-01
Payer: MEDICARE

## 2021-11-01 ENCOUNTER — HOSPITAL ENCOUNTER (INPATIENT)
Age: 81
LOS: 3 days | Discharge: HOME HEALTH | DRG: 857 | End: 2021-11-04
Attending: SURGERY | Admitting: SURGERY
Payer: MEDICARE

## 2021-11-01 PROBLEM — I96 GANGRENE OF LEFT FOOT (HCC): Status: ACTIVE | Noted: 2021-11-01

## 2021-11-01 LAB
BASE EXCESS BLD CALC-SCNC: 2 MMOL/L
CA-I BLD-MCNC: 1.42 MMOL/L (ref 1.12–1.32)
CHLORIDE BLD-SCNC: 104 MMOL/L (ref 100–108)
CO2 BLD-SCNC: 29 MMOL/L (ref 19–24)
CREAT UR-MCNC: 0.6 MG/DL (ref 0.6–1.3)
GLUCOSE BLD STRIP.AUTO-MCNC: 115 MG/DL (ref 65–117)
GLUCOSE BLD STRIP.AUTO-MCNC: 198 MG/DL (ref 65–117)
GLUCOSE BLD STRIP.AUTO-MCNC: 89 MG/DL (ref 74–106)
GLUCOSE BLD STRIP.AUTO-MCNC: 93 MG/DL (ref 65–117)
HCO3 BLDA-SCNC: 29 MMOL/L
LACTATE BLD-SCNC: 1.53 MMOL/L (ref 0.4–2)
PCO2 BLDV: 53.2 MMHG (ref 41–51)
PH BLDV: 7.34 [PH] (ref 7.32–7.42)
PO2 BLDV: 27 MMHG (ref 25–40)
POTASSIUM BLD-SCNC: 4.7 MMOL/L (ref 3.5–5.5)
SERVICE CMNT-IMP: ABNORMAL
SERVICE CMNT-IMP: NORMAL
SERVICE CMNT-IMP: NORMAL
SODIUM BLD-SCNC: 144 MMOL/L (ref 136–145)
SPECIMEN SITE: ABNORMAL

## 2021-11-01 PROCEDURE — 77030008463 HC STPLR SKN PROX J&J -B: Performed by: SURGERY

## 2021-11-01 PROCEDURE — 76010000138 HC OR TIME 0.5 TO 1 HR: Performed by: SURGERY

## 2021-11-01 PROCEDURE — 74011250636 HC RX REV CODE- 250/636: Performed by: ANESTHESIOLOGY

## 2021-11-01 PROCEDURE — 82962 GLUCOSE BLOOD TEST: CPT

## 2021-11-01 PROCEDURE — 74011250636 HC RX REV CODE- 250/636: Performed by: SURGERY

## 2021-11-01 PROCEDURE — 2709999900 HC NON-CHARGEABLE SUPPLY: Performed by: SURGERY

## 2021-11-01 PROCEDURE — 0JBP0ZZ EXCISION OF LEFT LOWER LEG SUBCUTANEOUS TISSUE AND FASCIA, OPEN APPROACH: ICD-10-PCS | Performed by: SURGERY

## 2021-11-01 PROCEDURE — 74011000250 HC RX REV CODE- 250: Performed by: SURGERY

## 2021-11-01 PROCEDURE — 74011636637 HC RX REV CODE- 636/637: Performed by: SURGERY

## 2021-11-01 PROCEDURE — 84295 ASSAY OF SERUM SODIUM: CPT

## 2021-11-01 PROCEDURE — 74011250636 HC RX REV CODE- 250/636: Performed by: NURSE ANESTHETIST, CERTIFIED REGISTERED

## 2021-11-01 PROCEDURE — 76060000032 HC ANESTHESIA 0.5 TO 1 HR: Performed by: SURGERY

## 2021-11-01 PROCEDURE — 76210000006 HC OR PH I REC 0.5 TO 1 HR: Performed by: SURGERY

## 2021-11-01 PROCEDURE — 74011250637 HC RX REV CODE- 250/637: Performed by: SURGERY

## 2021-11-01 PROCEDURE — 65270000029 HC RM PRIVATE

## 2021-11-01 RX ORDER — ROPIVACAINE HYDROCHLORIDE 5 MG/ML
INJECTION, SOLUTION EPIDURAL; INFILTRATION; PERINEURAL
Status: COMPLETED | OUTPATIENT
Start: 2021-11-01 | End: 2021-11-01

## 2021-11-01 RX ORDER — FAMOTIDINE 20 MG/1
40 TABLET, FILM COATED ORAL 2 TIMES DAILY
Status: DISCONTINUED | OUTPATIENT
Start: 2021-11-01 | End: 2021-11-03

## 2021-11-01 RX ORDER — CITALOPRAM 20 MG/1
20 TABLET, FILM COATED ORAL DAILY
Status: DISCONTINUED | OUTPATIENT
Start: 2021-11-02 | End: 2021-11-04 | Stop reason: HOSPADM

## 2021-11-01 RX ORDER — SODIUM CHLORIDE 0.9 % (FLUSH) 0.9 %
5-40 SYRINGE (ML) INJECTION EVERY 8 HOURS
Status: DISCONTINUED | OUTPATIENT
Start: 2021-11-01 | End: 2021-11-01 | Stop reason: HOSPADM

## 2021-11-01 RX ORDER — SULFASALAZINE 500 MG/1
500 TABLET ORAL 2 TIMES DAILY
Status: DISCONTINUED | OUTPATIENT
Start: 2021-11-01 | End: 2021-11-04 | Stop reason: HOSPADM

## 2021-11-01 RX ORDER — SODIUM CHLORIDE 0.9 % (FLUSH) 0.9 %
5-40 SYRINGE (ML) INJECTION AS NEEDED
Status: DISCONTINUED | OUTPATIENT
Start: 2021-11-01 | End: 2021-11-01 | Stop reason: HOSPADM

## 2021-11-01 RX ORDER — FENTANYL CITRATE 50 UG/ML
25 INJECTION, SOLUTION INTRAMUSCULAR; INTRAVENOUS
Status: DISCONTINUED | OUTPATIENT
Start: 2021-11-01 | End: 2021-11-01 | Stop reason: HOSPADM

## 2021-11-01 RX ORDER — LIDOCAINE HYDROCHLORIDE 10 MG/ML
0.1 INJECTION, SOLUTION EPIDURAL; INFILTRATION; INTRACAUDAL; PERINEURAL AS NEEDED
Status: DISCONTINUED | OUTPATIENT
Start: 2021-11-01 | End: 2021-11-01 | Stop reason: HOSPADM

## 2021-11-01 RX ORDER — CEFAZOLIN SODIUM 1 G/3ML
2 INJECTION, POWDER, FOR SOLUTION INTRAMUSCULAR; INTRAVENOUS ONCE
Status: COMPLETED | OUTPATIENT
Start: 2021-11-01 | End: 2021-11-01

## 2021-11-01 RX ORDER — PROPOFOL 10 MG/ML
INJECTION, EMULSION INTRAVENOUS AS NEEDED
Status: DISCONTINUED | OUTPATIENT
Start: 2021-11-01 | End: 2021-11-01 | Stop reason: HOSPADM

## 2021-11-01 RX ORDER — INSULIN GLARGINE 100 [IU]/ML
11 INJECTION, SOLUTION SUBCUTANEOUS
Status: DISCONTINUED | OUTPATIENT
Start: 2021-11-01 | End: 2021-11-04 | Stop reason: HOSPADM

## 2021-11-01 RX ORDER — DILTIAZEM HYDROCHLORIDE 180 MG/1
180 CAPSULE, COATED, EXTENDED RELEASE ORAL DAILY
Status: DISCONTINUED | OUTPATIENT
Start: 2021-11-02 | End: 2021-11-04 | Stop reason: HOSPADM

## 2021-11-01 RX ORDER — DILTIAZEM HYDROCHLORIDE 180 MG/1
180 CAPSULE, EXTENDED RELEASE ORAL DAILY
Status: DISCONTINUED | OUTPATIENT
Start: 2021-11-02 | End: 2021-11-01 | Stop reason: CLARIF

## 2021-11-01 RX ORDER — FENTANYL CITRATE 50 UG/ML
INJECTION, SOLUTION INTRAMUSCULAR; INTRAVENOUS AS NEEDED
Status: DISCONTINUED | OUTPATIENT
Start: 2021-11-01 | End: 2021-11-01 | Stop reason: HOSPADM

## 2021-11-01 RX ORDER — BUPIVACAINE HYDROCHLORIDE 2.5 MG/ML
30 INJECTION, SOLUTION EPIDURAL; INFILTRATION; INTRACAUDAL ONCE
Status: COMPLETED | OUTPATIENT
Start: 2021-11-01 | End: 2021-11-01

## 2021-11-01 RX ORDER — HYDROMORPHONE HYDROCHLORIDE 1 MG/ML
0.2 INJECTION, SOLUTION INTRAMUSCULAR; INTRAVENOUS; SUBCUTANEOUS
Status: DISCONTINUED | OUTPATIENT
Start: 2021-11-01 | End: 2021-11-01 | Stop reason: HOSPADM

## 2021-11-01 RX ORDER — POLYETHYLENE GLYCOL 3350 17 G/17G
17 POWDER, FOR SOLUTION ORAL DAILY
Status: DISCONTINUED | OUTPATIENT
Start: 2021-11-02 | End: 2021-11-04 | Stop reason: HOSPADM

## 2021-11-01 RX ORDER — ACETAMINOPHEN 500 MG
500 TABLET ORAL
Status: DISCONTINUED | OUTPATIENT
Start: 2021-11-01 | End: 2021-11-04 | Stop reason: HOSPADM

## 2021-11-01 RX ORDER — SODIUM CHLORIDE 9 MG/ML
50 INJECTION, SOLUTION INTRAVENOUS CONTINUOUS
Status: DISCONTINUED | OUTPATIENT
Start: 2021-11-01 | End: 2021-11-02

## 2021-11-01 RX ORDER — METOPROLOL SUCCINATE 50 MG/1
50 TABLET, EXTENDED RELEASE ORAL DAILY
Status: DISCONTINUED | OUTPATIENT
Start: 2021-11-02 | End: 2021-11-04 | Stop reason: HOSPADM

## 2021-11-01 RX ORDER — FUROSEMIDE 40 MG/1
40 TABLET ORAL DAILY
Status: DISCONTINUED | OUTPATIENT
Start: 2021-11-02 | End: 2021-11-04 | Stop reason: HOSPADM

## 2021-11-01 RX ORDER — FERROUS SULFATE 300 MG/5ML
300 LIQUID (ML) ORAL
Status: DISCONTINUED | OUTPATIENT
Start: 2021-11-01 | End: 2021-11-04 | Stop reason: HOSPADM

## 2021-11-01 RX ORDER — ACETAMINOPHEN 325 MG/1
650 TABLET ORAL ONCE
Status: DISCONTINUED | OUTPATIENT
Start: 2021-11-01 | End: 2021-11-01 | Stop reason: HOSPADM

## 2021-11-01 RX ORDER — FENTANYL CITRATE 50 UG/ML
50 INJECTION, SOLUTION INTRAMUSCULAR; INTRAVENOUS AS NEEDED
Status: DISCONTINUED | OUTPATIENT
Start: 2021-11-01 | End: 2021-11-01 | Stop reason: HOSPADM

## 2021-11-01 RX ORDER — HYDROCODONE BITARTRATE AND ACETAMINOPHEN 5; 325 MG/1; MG/1
1 TABLET ORAL AS NEEDED
Status: DISCONTINUED | OUTPATIENT
Start: 2021-11-01 | End: 2021-11-04 | Stop reason: HOSPADM

## 2021-11-01 RX ORDER — SODIUM CHLORIDE, SODIUM LACTATE, POTASSIUM CHLORIDE, CALCIUM CHLORIDE 600; 310; 30; 20 MG/100ML; MG/100ML; MG/100ML; MG/100ML
25 INJECTION, SOLUTION INTRAVENOUS CONTINUOUS
Status: DISCONTINUED | OUTPATIENT
Start: 2021-11-01 | End: 2021-11-01 | Stop reason: HOSPADM

## 2021-11-01 RX ORDER — SPIRONOLACTONE 25 MG/1
25 TABLET ORAL EVERY OTHER DAY
Status: DISCONTINUED | OUTPATIENT
Start: 2021-11-02 | End: 2021-11-04 | Stop reason: HOSPADM

## 2021-11-01 RX ORDER — SODIUM CHLORIDE, SODIUM LACTATE, POTASSIUM CHLORIDE, CALCIUM CHLORIDE 600; 310; 30; 20 MG/100ML; MG/100ML; MG/100ML; MG/100ML
50 INJECTION, SOLUTION INTRAVENOUS CONTINUOUS
Status: DISCONTINUED | OUTPATIENT
Start: 2021-11-01 | End: 2021-11-01 | Stop reason: HOSPADM

## 2021-11-01 RX ORDER — MIDAZOLAM HYDROCHLORIDE 1 MG/ML
INJECTION, SOLUTION INTRAMUSCULAR; INTRAVENOUS AS NEEDED
Status: DISCONTINUED | OUTPATIENT
Start: 2021-11-01 | End: 2021-11-01 | Stop reason: HOSPADM

## 2021-11-01 RX ORDER — ROSUVASTATIN CALCIUM 10 MG/1
5 TABLET, COATED ORAL
Status: DISCONTINUED | OUTPATIENT
Start: 2021-11-01 | End: 2021-11-04 | Stop reason: HOSPADM

## 2021-11-01 RX ORDER — ONDANSETRON 2 MG/ML
4 INJECTION INTRAMUSCULAR; INTRAVENOUS AS NEEDED
Status: DISCONTINUED | OUTPATIENT
Start: 2021-11-01 | End: 2021-11-01 | Stop reason: HOSPADM

## 2021-11-01 RX ORDER — VENLAFAXINE HYDROCHLORIDE 37.5 MG/1
75 CAPSULE, EXTENDED RELEASE ORAL
Status: DISCONTINUED | OUTPATIENT
Start: 2021-11-01 | End: 2021-11-04 | Stop reason: HOSPADM

## 2021-11-01 RX ORDER — PROPOFOL 10 MG/ML
INJECTION, EMULSION INTRAVENOUS
Status: DISCONTINUED | OUTPATIENT
Start: 2021-11-01 | End: 2021-11-01 | Stop reason: HOSPADM

## 2021-11-01 RX ORDER — INSULIN LISPRO 100 [IU]/ML
2 INJECTION, SOLUTION INTRAVENOUS; SUBCUTANEOUS
Status: DISCONTINUED | OUTPATIENT
Start: 2021-11-01 | End: 2021-11-04 | Stop reason: HOSPADM

## 2021-11-01 RX ORDER — MIDAZOLAM HYDROCHLORIDE 1 MG/ML
1 INJECTION, SOLUTION INTRAMUSCULAR; INTRAVENOUS AS NEEDED
Status: DISCONTINUED | OUTPATIENT
Start: 2021-11-01 | End: 2021-11-01 | Stop reason: HOSPADM

## 2021-11-01 RX ORDER — LEVOTHYROXINE SODIUM 50 UG/1
25 TABLET ORAL
Status: DISCONTINUED | OUTPATIENT
Start: 2021-11-02 | End: 2021-11-04 | Stop reason: HOSPADM

## 2021-11-01 RX ADMIN — SULFASALAZINE 500 MG: 500 TABLET ORAL at 18:00

## 2021-11-01 RX ADMIN — PROPOFOL 10 MG: 10 INJECTION, EMULSION INTRAVENOUS at 11:56

## 2021-11-01 RX ADMIN — FENTANYL CITRATE 50 MCG: 50 INJECTION, SOLUTION INTRAMUSCULAR; INTRAVENOUS at 12:08

## 2021-11-01 RX ADMIN — VENLAFAXINE HYDROCHLORIDE 75 MG: 37.5 CAPSULE, EXTENDED RELEASE ORAL at 21:10

## 2021-11-01 RX ADMIN — SODIUM CHLORIDE 50 ML/HR: 900 INJECTION, SOLUTION INTRAVENOUS at 13:00

## 2021-11-01 RX ADMIN — MINERAL SUPPLEMENT IRON 300 MG / 5 ML STRENGTH LIQUID 100 PER BOX UNFLAVORED 300 MG: at 17:00

## 2021-11-01 RX ADMIN — ROPIVACAINE HYDROCHLORIDE 30 ML: 5 INJECTION, SOLUTION EPIDURAL; INFILTRATION; PERINEURAL at 10:55

## 2021-11-01 RX ADMIN — CEFAZOLIN 2 G: 1 INJECTION, POWDER, FOR SOLUTION INTRAMUSCULAR; INTRAVENOUS; PARENTERAL at 12:02

## 2021-11-01 RX ADMIN — MIDAZOLAM HYDROCHLORIDE 2 MG: 1 INJECTION, SOLUTION INTRAMUSCULAR; INTRAVENOUS at 10:47

## 2021-11-01 RX ADMIN — APIXABAN 2.5 MG: 2.5 TABLET, FILM COATED ORAL at 18:00

## 2021-11-01 RX ADMIN — FAMOTIDINE 40 MG: 20 TABLET, FILM COATED ORAL at 18:00

## 2021-11-01 RX ADMIN — INSULIN GLARGINE 11 UNITS: 100 INJECTION, SOLUTION SUBCUTANEOUS at 22:52

## 2021-11-01 RX ADMIN — PROPOFOL 25 MCG/KG/MIN: 10 INJECTION, EMULSION INTRAVENOUS at 11:56

## 2021-11-01 RX ADMIN — ROSUVASTATIN CALCIUM 5 MG: 10 TABLET, COATED ORAL at 21:10

## 2021-11-01 RX ADMIN — SODIUM CHLORIDE, POTASSIUM CHLORIDE, SODIUM LACTATE AND CALCIUM CHLORIDE: 600; 310; 30; 20 INJECTION, SOLUTION INTRAVENOUS at 11:52

## 2021-11-01 NOTE — PROGRESS NOTES
End of Shift Note    Bedside shift change report given to Denise (oncoming nurse) by Percy Gonzalez RN (offgoing nurse). Report included the following information SBAR, Kardex, ED Summary, Procedure Summary, Intake/Output, MAR and Recent Results    Shift worked:  7a-7p     Shift summary and any significant changes:     Patient had her left foot debridement today. No complaints of pain for me. She is tolerating her liquid diet well. Concerns for physician to address:  none     Zone phone for oncoming shift:   [phone]       Activity:  Activity Level: Bed Rest  Number times ambulated in hallways past shift: 0  Number of times OOB to chair past shift: 0    Cardiac:   Cardiac Monitoring: No      Cardiac Rhythm: AV Paced    Access:   Current line(s): PIV     Genitourinary:   Urinary status: external catheter    Respiratory:   O2 Device: None (Room air)  Chronic home O2 use?: NO  Incentive spirometer at bedside: NO     GI:     Current diet:  ADULT DIET Clear Liquid  Passing flatus: YES  Tolerating current diet: YES       Pain Management:   Patient states pain is manageable on current regimen: YES    Skin:  Gerald Score: 16  Interventions: increase time out of bed and internal/external urinary devices    Patient Safety:  Fall Score:  Total Score: 4  Interventions: gripper socks, pt to call before getting OOB and stay with me (per policy)  High Fall Risk: Yes    Length of Stay:  Expected LOS: - - -  Actual LOS: 0      Percy Gonzalez RN

## 2021-11-01 NOTE — BRIEF OP NOTE
Brief Postoperative Note    Patient:  Tarah Vazquez  YOB: 1940  MRN: [id number]    Date of Procedure: 11/1/2021     Pre-Op Diagnosis: Failed left TMA    Post-Op Diagnosis: same      Procedure(s): Left foot debridement (Excisional)    Surgeon(s):  Carlie Wolfe MD    Anesthesia: block     Estimated Blood Loss (mL): none    Complications: none    Findings: bled well, will be ready for VAC in 24-48 hrs    Electronically Signed by Becky Silva MD on 11/1/2021 at 12:28 PM

## 2021-11-01 NOTE — ANESTHESIA PREPROCEDURE EVALUATION
Anesthetic History               Review of Systems / Medical History  Patient summary reviewed, nursing notes reviewed and pertinent labs reviewed    Pulmonary    COPD            Comments: H/O RLL Pneumonia   Neuro/Psych             Comments: Diabetic peripheral neuropathy    H/O SDH (subdural hematoma)    Hard of hearing Cardiovascular    Hypertension  Valvular problems/murmurs: tricuspid insufficiency and mitral insufficiency    CHF  Dysrhythmias : atrial fibrillation  Pacemaker, CAD, PAD, cardiac stents, CABG (3 vessel) and hyperlipidemia    Exercise tolerance: <4 METS  Comments: Ejection fraction was  estimated in the range of 55 % to 60 %. Mod-severe TR & PHTN    ECG (5/4/21): Ventricular-paced rhythm   Abnormal ECG   When compared with ECG of 09-AUG-2017 18:58,   Vent. rate has increased BY  20 BPM     TTE (2/23/18): Left ventricle: Systolic function was normal. Ejection fraction was  estimated in the range of 55 % to 60 %. No obvious wall motion  abnormalities identified in the views obtained. There was mild concentric  hypertrophy. Left atrium: The atrium was moderately dilated. Right atrium: The atrium was moderately dilated. Mitral valve: There was mild regurgitation. Aortic valve: There was no stenosis. Tricuspid valve: There was moderate to severe regurgitation. Pulmonary  artery systolic pressure: 61 mmHg. There was moderate to severe pulmonary  hypertension.    GI/Hepatic/Renal     GERD    Renal disease: CRI      Comments: CRI, Stage III Endo/Other    Diabetes: type 2, using insulin  Hypothyroidism  Obesity and arthritis     Other Findings   Comments: Alzheimer diseaseChronic pain           Physical Exam    Airway  Mallampati: II  TM Distance: 4 - 6 cm  Neck ROM: normal range of motion   Mouth opening: Normal     Cardiovascular  Regular rate and rhythm,  S1 and S2 normal,  no murmur, click, rub, or gallop             Dental    Dentition: Edentulous, Full lower dentures and Full upper dentures     Pulmonary  Breath sounds clear to auscultation               Abdominal  GI exam deferred       Other Findings            Anesthetic Plan    ASA: 3  Anesthesia type: MAC and regional - ankle block    Monitoring Plan: BIS      Induction: Intravenous  Anesthetic plan and risks discussed with: Patient

## 2021-11-01 NOTE — PERIOP NOTES
1226-Handoff Report from Operating Room to PACU    Report received from [address] and Polina Starkey CRNA regarding Maria C Wilkerson. Surgeon(s):  Liv Lofton MD  And Procedure(s) (LRB):  LEFT FOOT DEBRIDEMENT (ANKLE BLOCK) (Left)  confirmed   with allergies and dressings discussed. Anesthesia type, drugs, patient history, complications, estimated blood loss, vital signs, intake and output, and last pain medication, lines, reversal medications and temperature were reviewed. 18- Daughter updated. TRANSFER - OUT REPORT:    Verbal report given to [facility] RN(name) on Maria C Wilkerson  being transferred to surg tele(unit) for routine post - op       Report consisted of patients Situation, Background, Assessment and   Recommendations(SBAR). Information from the following report(s) SBAR, Kardex, OR Summary, Procedure Summary, Intake/Output, MAR and Recent Results was reviewed with the receiving nurse. Opportunity for questions and clarification was provided.       Patient transported with:   [employer]

## 2021-11-01 NOTE — PROGRESS NOTES
Re: Novolog Mix 70/30 Insulin  ([employer] approved dose interchange has been made on this patient)      Ordered dose:   16 units in the AM and 5 units QHS    [facility] ordered dose change:     Lantus 11 units sq HS  Insulin Lispro 2 units sq before meals three times a day    Thanks,  Rosa Isela Ghosh [facility]

## 2021-11-01 NOTE — ANESTHESIA POSTPROCEDURE EVALUATION
Post-Anesthesia Evaluation and Assessment    Patient: Eugene Mueller MRN: [id number]  SSN: [id number]    YOB: 1940  Age: 78 y.o. Sex: female      I have evaluated the patient and they are stable and ready for discharge from the PACU. Cardiovascular Function/Vital Signs  Visit Vitals  BP (!) 157/72   Pulse 80   Temp 36.7 °C (98 °F)   Resp 16   Ht 4' 11\" (1.499 m)   Wt 57 kg (125 lb 10.6 oz)   SpO2 100%   BMI 25.38 kg/m²       Patient is status post Other anesthesia for Procedure(s):  LEFT FOOT DEBRIDEMENT (ANKLE BLOCK). Nausea/Vomiting: None    Postoperative hydration reviewed and adequate. Pain:  Pain Scale 1: FLACC (11/01/21 1235)  Pain Intensity 1: 0 (11/01/21 1235)   Managed    Neurological Status:   Neuro (WDL): Exceptions to WDL (11/01/21 1230)  Neuro  Neurologic State: Eyes open to voice (11/01/21 1230)  Orientation Level: Oriented to person (11/01/21 1230)  Cognition: Follows commands;Memory loss (11/01/21 1230)  Speech: Clear (11/01/21 1230)  LUE Motor Response: Purposeful (11/01/21 1230)  LLE Motor Response: Purposeful (11/01/21 1230)  RUE Motor Response: Purposeful (11/01/21 1230)  RLE Motor Response: Purposeful (11/01/21 1230)   At baseline    Mental Status, Level of Consciousness: Alert and  oriented to person, place, and time    Pulmonary Status:   O2 Device: Nasal cannula (11/01/21 1235)   Adequate oxygenation and airway patent    Complications related to anesthesia: None    Post-anesthesia assessment completed.  No concerns    Signed By: Adrian Diaz MD     November 1, 2021

## 2021-11-01 NOTE — ROUTINE PROCESS
Patient: Pallavi Maxwell MRN: [id number]  SSN: [id number]   YOB: 1940  Age: 78 y.o. Sex: female     Patient is status post Procedure(s):  LEFT FOOT DEBRIDEMENT (ANKLE BLOCK). Surgeon(s) and Role:     * Peace Adams MD - Primary    Local/Dose/Irrigation:  10 ml local injection left foot                  Peripheral IV 11/01/21 Left;Posterior Forearm (Active)   Site Assessment Clean, dry, & intact 11/01/21 1049   Phlebitis Assessment 0 11/01/21 1049   Infiltration Assessment 0 11/01/21 1049   Dressing Status Clean, dry, & intact 11/01/21 1049   Dressing Type Tape;Transparent 11/01/21 1049   Hub Color/Line Status Pink; Infusing 11/01/21 1049                           Dressing/Packing:  Incision 11/01/21 Foot Left-Dressing/Treatment: Gauze dressing/dressing sponge;Petroleum impregnated gauze (adapatic, 4x4s, kerlix dressing) (11/01/21 1217)    Splint/Cast:  ]    Other:  none

## 2021-11-01 NOTE — PROGRESS NOTES
Problem: Falls - Risk of  Goal: *Absence of Falls  Description: Document Meera Henry Fall Risk and appropriate interventions in the flowsheet. Outcome: Progressing Towards Goal  Note: Fall Risk Interventions:                                Problem: Patient Education: Go to Patient Education Activity  Goal: Patient/Family Education  Outcome: Progressing Towards Goal     Problem: Pressure Injury - Risk of  Goal: *Prevention of pressure injury  Description: Document Gerald Scale and appropriate interventions in the flowsheet.   Outcome: Progressing Towards Goal  Note: Pressure Injury Interventions:                                            Problem: Patient Education: Go to Patient Education Activity  Goal: Patient/Family Education  Outcome: Progressing Towards Goal

## 2021-11-01 NOTE — ANESTHESIA PROCEDURE NOTES
Peripheral Block    Start time: 11/1/2021 10:54 AM  Performed by: Srinivasa Marroquin MD  Authorized by: Srinivasa Marroquin MD       Pre-procedure: Indications: at surgeon's request and post-op pain management    Preanesthetic Checklist: patient identified, risks and benefits discussed, site marked, timeout performed and patient being monitored    Timeout Time: 10:54 EDT          Block Type:   Block Type:   Ankle  Laterality:  Left  Monitoring:  Standard ASA monitoring, continuous pulse ox, frequent vital sign checks, heart rate, responsive to questions and oxygen  Injection Technique:  Single shot  Procedures: ultrasound guided    Patient Position: supine  Prep: chlorhexidine    Location:  Medial anterior leg above ankle  Needle Type:  Stimuplex  Needle Gauge:  21 G  Needle Localization:  Ultrasound guidance and anatomical landmarks  Medication Injected:  Ropivacaine (PF) (NAROPIN)(0.5%) 5 mg/mL injection, 30 mL  Med Admin Time: 11/1/2021 10:55 AM    Assessment:  Number of attempts:  1  Injection Assessment:  Incremental injection every 5 mL, local visualized surrounding nerve on ultrasound, negative aspiration for blood, no paresthesia, no intravascular symptoms and ultrasound image on chart  Patient tolerance:  Patient tolerated the procedure well with no immediate complications

## 2021-11-02 LAB
GLUCOSE BLD STRIP.AUTO-MCNC: 100 MG/DL (ref 65–117)
GLUCOSE BLD STRIP.AUTO-MCNC: 142 MG/DL (ref 65–117)
GLUCOSE BLD STRIP.AUTO-MCNC: 268 MG/DL (ref 65–117)
GLUCOSE BLD STRIP.AUTO-MCNC: 309 MG/DL (ref 65–117)
SERVICE CMNT-IMP: ABNORMAL
SERVICE CMNT-IMP: NORMAL

## 2021-11-02 PROCEDURE — 74011636637 HC RX REV CODE- 636/637: Performed by: SURGERY

## 2021-11-02 PROCEDURE — 82962 GLUCOSE BLOOD TEST: CPT

## 2021-11-02 PROCEDURE — 65270000029 HC RM PRIVATE

## 2021-11-02 PROCEDURE — 74011250637 HC RX REV CODE- 250/637: Performed by: SURGERY

## 2021-11-02 PROCEDURE — 97605 NEG PRS WND THER DME<=50SQCM: CPT

## 2021-11-02 RX ORDER — MAGNESIUM SULFATE 100 %
4 CRYSTALS MISCELLANEOUS AS NEEDED
Status: DISCONTINUED | OUTPATIENT
Start: 2021-11-02 | End: 2021-11-04 | Stop reason: HOSPADM

## 2021-11-02 RX ORDER — DEXTROSE 50 % IN WATER (D50W) INTRAVENOUS SYRINGE
12.5-25 AS NEEDED
Status: DISCONTINUED | OUTPATIENT
Start: 2021-11-02 | End: 2021-11-04 | Stop reason: HOSPADM

## 2021-11-02 RX ORDER — INSULIN LISPRO 100 [IU]/ML
INJECTION, SOLUTION INTRAVENOUS; SUBCUTANEOUS
Status: DISCONTINUED | OUTPATIENT
Start: 2021-11-03 | End: 2021-11-04 | Stop reason: HOSPADM

## 2021-11-02 RX ADMIN — APIXABAN 2.5 MG: 2.5 TABLET, FILM COATED ORAL at 17:47

## 2021-11-02 RX ADMIN — LEVOTHYROXINE SODIUM 25 MCG: 0.05 TABLET ORAL at 06:23

## 2021-11-02 RX ADMIN — SPIRONOLACTONE 25 MG: 25 TABLET ORAL at 09:17

## 2021-11-02 RX ADMIN — CITALOPRAM HYDROBROMIDE 20 MG: 20 TABLET ORAL at 09:17

## 2021-11-02 RX ADMIN — SULFASALAZINE 500 MG: 500 TABLET ORAL at 17:47

## 2021-11-02 RX ADMIN — INSULIN GLARGINE 11 UNITS: 100 INJECTION, SOLUTION SUBCUTANEOUS at 21:42

## 2021-11-02 RX ADMIN — MINERAL SUPPLEMENT IRON 300 MG / 5 ML STRENGTH LIQUID 100 PER BOX UNFLAVORED 300 MG: at 09:17

## 2021-11-02 RX ADMIN — DILTIAZEM HYDROCHLORIDE 180 MG: 180 CAPSULE, COATED, EXTENDED RELEASE ORAL at 09:17

## 2021-11-02 RX ADMIN — FAMOTIDINE 40 MG: 20 TABLET, FILM COATED ORAL at 17:47

## 2021-11-02 RX ADMIN — ROSUVASTATIN CALCIUM 5 MG: 10 TABLET, COATED ORAL at 21:37

## 2021-11-02 RX ADMIN — POLYETHYLENE GLYCOL 3350 17 G: 17 POWDER, FOR SOLUTION ORAL at 09:17

## 2021-11-02 RX ADMIN — FAMOTIDINE 40 MG: 20 TABLET, FILM COATED ORAL at 09:17

## 2021-11-02 RX ADMIN — MINERAL SUPPLEMENT IRON 300 MG / 5 ML STRENGTH LIQUID 100 PER BOX UNFLAVORED 300 MG: at 12:30

## 2021-11-02 RX ADMIN — VENLAFAXINE HYDROCHLORIDE 75 MG: 37.5 CAPSULE, EXTENDED RELEASE ORAL at 21:37

## 2021-11-02 RX ADMIN — INSULIN LISPRO 2 UNITS: 100 INJECTION, SOLUTION INTRAVENOUS; SUBCUTANEOUS at 17:47

## 2021-11-02 RX ADMIN — HYDROCODONE BITARTRATE AND ACETAMINOPHEN 1 TABLET: 5; 325 TABLET ORAL at 14:37

## 2021-11-02 RX ADMIN — SULFASALAZINE 500 MG: 500 TABLET ORAL at 09:17

## 2021-11-02 RX ADMIN — INSULIN LISPRO 2 UNITS: 100 INJECTION, SOLUTION INTRAVENOUS; SUBCUTANEOUS at 12:30

## 2021-11-02 RX ADMIN — METOPROLOL SUCCINATE 50 MG: 50 TABLET, EXTENDED RELEASE ORAL at 09:17

## 2021-11-02 RX ADMIN — APIXABAN 2.5 MG: 2.5 TABLET, FILM COATED ORAL at 09:17

## 2021-11-02 RX ADMIN — FUROSEMIDE 40 MG: 40 TABLET ORAL at 09:17

## 2021-11-02 RX ADMIN — MINERAL SUPPLEMENT IRON 300 MG / 5 ML STRENGTH LIQUID 100 PER BOX UNFLAVORED 300 MG: at 17:47

## 2021-11-02 RX ADMIN — HYDROCODONE BITARTRATE AND ACETAMINOPHEN 1 TABLET: 5; 325 TABLET ORAL at 21:36

## 2021-11-02 NOTE — PROGRESS NOTES
Vascular Surgery Progress Note  Benny Mayes AGACNP-BC          Admit Date: 2021   LOS: 1 day        Daily Progress Note: 2021    POD:1 Day Post-Op    S/P: Procedure(s):  LEFT FOOT DEBRIDEMENT (ANKLE BLOCK)    Subjective: Viky Mcqueen is an 77yo female with PMHx of Alzheimer's disease, chronic pain, HTN, HLD, DMII w/ polyneuropathy, ASHD (s/p CABG), diastolic heart failure, depression, hypothroidism, COPD, GERD and PVD with severe mulitlevel atherosclerotic occlusive disease who underwent a L foot debridement on 21 for failed TMA. She tolerated the procedure without complication. Historically, she underwent a transmetatarsal left foot amputation after a failed left hallux amputation on 9/10/21 under general anesthesia. This morning, Ms. Rubio Lindsay had just woken up upon entering. She denied foot pain. Additionally, she denies numbness, tingling, chest pain, leg pain, nausea, vomiting, difficulty swallowing, headache, and dyspnea. Objective:     Vital signs  Temp (24hrs), Av °F (36.7 °C), Min:97.3 °F (36.3 °C), Max:98.6 °F (37 °C)   No intake/output data recorded. 10/31 190 -  0700  In: 520 [P.O.:270; I.V.:250]  Out: 755 [Urine:750]    Visit Vitals  BP (!) 151/69   Pulse 84   Temp 98 °F (36.7 °C)   Resp 17   Ht 4' 11\" (1.499 m)   Wt 57 kg (125 lb 10.6 oz)   SpO2 98%   BMI 25.38 kg/m²    O2 Flow Rate (L/min): 2 l/min O2 Device: None (Room air)     Pain control  Pain Assessment  Pain Scale 1: Numeric (0 - 10)  Pain Intensity 1: 0  Pain Location 1: Leg  Pain Orientation 1: Left, Right  Pain Description 1: Constant, Sore    PT/OT  Gait                 Physical Exam  Vitals and nursing note reviewed. Constitutional:       General: She is not in acute distress. Appearance: She is ill-appearing. HENT:      Head: Normocephalic. Cardiovascular:      Rate and Rhythm: Normal rate.    Pulmonary:      Effort: Pulmonary effort is normal.   Abdominal:      Palpations: Abdomen is soft.   Skin:     General: Skin is warm and dry. Neurological:      General: No focal deficit present. Mental Status: She is alert. She is disoriented.       Comments: baseline   Psychiatric:         Mood and Affect: Mood normal.            24 hour results:    Recent Results (from the past 24 hour(s))   GLUCOSE, POC    Collection Time: 11/01/21  1:09 PM   Result Value Ref Range    Glucose (POC) 93 65 - 117 mg/dL    Performed by Zachary Bowen*    GLUCOSE, POC    Collection Time: 11/01/21  5:42 PM   Result Value Ref Range    Glucose (POC) 115 65 - 117 mg/dL    Performed by Mariel Melgoza RN    GLUCOSE, POC    Collection Time: 11/01/21  9:15 PM   Result Value Ref Range    Glucose (POC) 198 (H) 65 - 117 mg/dL    Performed by Radha Dos Santos (PCT)    GLUCOSE, POC    Collection Time: 11/02/21  6:33 AM   Result Value Ref Range    Glucose (POC) 100 65 - 117 mg/dL    Performed by Radha Dos Santos (PCT)           Assessment/Plan:     Active Problems:    Gangrene of left foot ([address] (11/1/2021)      PVD  s/p failed L hallux amp (8/27/21)  s/p L TMA  (9/10/21)  Gangrene necrosis, POA  Acute osteomyelitis, POA  - s/p L foot debridement 11/1/21  - bulky dressing CDI  - positive motor/sensory  - wound vac today  - pain regimen- APAP, Norco PRN  - Eliquis 2.5mg BID  - statin  - Diet advanced  - d/c maintenance fluids        DMII  Polyneuropathy  - Lantus  - SSI     HTN  CHF  ASHD  - home meds     Chronic Pain  Polyneuropathy  - frequent bed turns per floor protocol     Hypothyroid  -  Home meds     Depression  - home meds     Arthritis  - home meds     GERD     Alzheimer's disease      Activity:  DVT ppx: Eliquis  Dispo:     Plan d/w Dr. Rula Marx, NP

## 2021-11-02 NOTE — PROGRESS NOTES
End of Shift Note    Bedside shift change report given to Matt (oncoming nurse) by Kimmy Templeton RN (offgoing nurse). Report included the following information SBAR, Kardex, ED Summary, Procedure Summary, Intake/Output, MAR and Recent Results    Shift worked:  7a-7p     Shift summary and any significant changes:     Patient got a wound vac on her foot today. Not much drainage has been coming out. I gave her one percocet for pain. Patient is tolerating her diet well. Concerns for physician to address:  none     Zone phone for oncoming shift:   [phone]       Activity:  Activity Level: Bed Rest  Number times ambulated in hallways past shift: 0  Number of times OOB to chair past shift: 0    Cardiac:   Cardiac Monitoring: No      Cardiac Rhythm: AV Paced    Access:   Current line(s): PIV     Genitourinary:   Urinary status: external catheter    Respiratory:   O2 Device: None (Room air)  Chronic home O2 use?: NO  Incentive spirometer at bedside: NO     GI:     Current diet:  ADULT DIET Regular; 5 carb choices (75 gm/meal)  Passing flatus: YES  Tolerating current diet: YES       Pain Management:   Patient states pain is manageable on current regimen: YES    Skin:  Gerald Score: 16  Interventions: limit briefs and internal/external urinary devices    Patient Safety:  Fall Score:  Total Score: 4  Interventions: bed/chair alarm, gripper socks and pt to call before getting OOB  High Fall Risk: Yes    Length of Stay:  Expected LOS: - - -  Actual LOS: [address], RN

## 2021-11-02 NOTE — WOUND CARE
Wound care nurse consult: consult placed by Dr Mary Grace Richard to apply NPWT/wound vac to left TMA site. Patient is an 77 y/o AAF admitted for gangrene of failed TMA site. POD#1 from left foot debridement. Past Medical History:   Diagnosis Date    Alzheimer disease ([facility]     Arrhythmia     atrial fibrillation 2018    Arthritis     CAD (coronary artery disease)     CABG, pacemaker    Chronic pain     COPD     Diabetes ([facility]     GERD (gastroesophageal reflux disease)     Heart failure ([facility]     Hypertension     Ill-defined condition     high cholesterol    Peripheral vascular disease ([facility]     Sleep apnea     no longer uses-stopped years ago    Thyroid disease      Patient [place] and has confusion/alzhimer's    Left foot wound:      Wound Foot Left failed TMA 11/02/21 (Active)   Wound Image   11/02/21 1037   Wound Etiology Surgical 11/02/21 1037   Dressing Status New dressing applied 11/02/21 1037   Cleansed Cleansed with saline 11/02/21 1037   Dressing/Treatment Negative Pressure Wound Therapy 11/02/21 1037   Dressing Change Due 11/05/21 11/02/21 1037   Wound Length (cm) 6 cm 11/02/21 1037   Wound Width (cm) 7 cm 11/02/21 1037   Wound Depth (cm) 0.6 cm 11/02/21 1037   Wound Surface Area (cm^2) 42 cm^2 11/02/21 1037   Wound Volume (cm^3) 25.2 cm^3 11/02/21 1037   Wound Assessment Pale granulation tissue; Devitalized tissue; Exposed Structure Tendon 11/02/21 1037   Drainage Amount Scant 11/02/21 1037   Drainage Description Serosanguinous 11/02/21 1037   Wound Odor None 11/02/21 1037   Edges Undefined edges 11/02/21 1037   Wound Thickness Description Full thickness 11/02/21 1037   Number of days: 0           Incision 11/01/21 Foot Left (Active)   Dressing Status Clean;Dry; Intact 11/02/21 0358   Dressing/Treatment Gauze dressing/dressing sponge 11/02/21 0358   Drainage Amount None 11/01/21 1547   Wound Odor None 11/01/21 1547   Number of days: 1      Patient tolerated the dressing change.  Patients daughter in room and she stated that patient had, had a wound vac before. NPWT/Wound VAC  Left foot  -125 mm/hg, continuous  Dressing changes x3/week  Black granufoam - medium dressing kit. Inpatient Wound VAC management :WOUND VAC EQUIPMENT DOES NOT LEAVE  [address] Change canister when full. 2.  Excessive bleeding turn VAC off and notify MD   3. If wound VAC fails to maintain a seal for greater than 2 hours notify Wound Care #[id number] (leave a message)and apply a NS wet-to-dry dressing Daily until wound care can replace VAC dressing. 4. Extra canisters and dressings stored in U supply room #1737. Plan: will discuss with Dr Katherine Hu or his NP about plan of care upon discharge.     Ramila Ragland RN, [employer]

## 2021-11-02 NOTE — PROGRESS NOTES
End of Shift Note    Bedside shift change report given to [address] (oncoming nurse) by Sunil Mike (offgoing nurse). Report included the following information SBAR, Kardex, Intake/Output, MAR and Recent Results    Shift worked:  9068-0019     Shift summary and any significant changes:     Patient is resting comfortable. No c/o of pain or discomfort noted. Left foot gauze dressing is intact with no drainage. Concerns for physician to address:       Zone phone for oncoming shift:   [phone]       Activity:  Activity Level: Bed Rest  Number times ambulated in hallways past shift: 0  Number of times OOB to chair past shift: 0    Cardiac:   Cardiac Monitoring: No      Cardiac Rhythm: AV Paced    Access:   Current line(s): PIV     Genitourinary:   Urinary status: external catheter    Respiratory:   O2 Device: None (Room air)  Chronic home O2 use?: NO  Incentive spirometer at bedside: YES     GI:     Current diet:  ADULT DIET Clear Liquid  Passing flatus: NO  Tolerating current diet: YES       Pain Management:   Patient states pain is manageable on current regimen: YES    Skin:  Gerald Score: 16  Interventions: float heels, increase time out of bed and nutritional support     Patient Safety:  Fall Score:  Total Score: 4  Interventions: bed/chair alarm, assistive device (walker, cane, etc), gripper socks and pt to call before getting OOB  High Fall Risk: Yes    Length of Stay:  Expected LOS: - - -  Actual LOS: [address] LPN

## 2021-11-02 NOTE — PROGRESS NOTES
Problem: Falls - Risk of  Goal: *Absence of Falls  Description: Document Clydene Bone Fall Risk and appropriate interventions in the flowsheet. Outcome: Progressing Towards Goal  Note: Fall Risk Interventions:       Mentation Interventions: Adequate sleep, hydration, pain control, Bed/chair exit alarm, Door open when patient unattended, Family/sitter at bedside    Medication Interventions: Bed/chair exit alarm, Patient to call before getting OOB, Teach patient to arise slowly    Elimination Interventions: Bed/chair exit alarm, Call light in reach, Patient to call for help with toileting needs    History of Falls Interventions: Bed/chair exit alarm         Problem: Patient Education: Go to Patient Education Activity  Goal: Patient/Family Education  Outcome: Progressing Towards Goal     Problem: Pressure Injury - Risk of  Goal: *Prevention of pressure injury  Description: Document Gerald Scale and appropriate interventions in the flowsheet.   Outcome: Progressing Towards Goal  Note: Pressure Injury Interventions:  Sensory Interventions: Assess changes in LOC    Moisture Interventions: Absorbent underpads    Activity Interventions: Increase time out of bed    Mobility Interventions: Assess need for specialty bed    Nutrition Interventions: Offer support with meals,snacks and hydration                     Problem: Patient Education: Go to Patient Education Activity  Goal: Patient/Family Education  Outcome: Progressing Towards Goal

## 2021-11-03 LAB
ERYTHROCYTE [DISTWIDTH] IN BLOOD BY AUTOMATED COUNT: 15 % (ref 11.5–14.5)
GLUCOSE BLD STRIP.AUTO-MCNC: 101 MG/DL (ref 65–117)
GLUCOSE BLD STRIP.AUTO-MCNC: 167 MG/DL (ref 65–117)
GLUCOSE BLD STRIP.AUTO-MCNC: 260 MG/DL (ref 65–117)
GLUCOSE BLD STRIP.AUTO-MCNC: 82 MG/DL (ref 65–117)
HCT VFR BLD AUTO: 34.8 % (ref 35–47)
HGB BLD-MCNC: 11.2 G/DL (ref 11.5–16)
MCH RBC QN AUTO: 30.4 PG (ref 26–34)
MCHC RBC AUTO-ENTMCNC: 32.2 G/DL (ref 30–36.5)
MCV RBC AUTO: 94.6 FL (ref 80–99)
NRBC # BLD: 0 K/UL (ref 0–0.01)
NRBC BLD-RTO: 0 PER 100 WBC
PLATELET # BLD AUTO: 247 K/UL (ref 150–400)
PMV BLD AUTO: 9.3 FL (ref 8.9–12.9)
RBC # BLD AUTO: 3.68 M/UL (ref 3.8–5.2)
SERVICE CMNT-IMP: ABNORMAL
SERVICE CMNT-IMP: ABNORMAL
SERVICE CMNT-IMP: NORMAL
SERVICE CMNT-IMP: NORMAL
WBC # BLD AUTO: 7.9 K/UL (ref 3.6–11)

## 2021-11-03 PROCEDURE — 51798 US URINE CAPACITY MEASURE: CPT

## 2021-11-03 PROCEDURE — 36415 COLL VENOUS BLD VENIPUNCTURE: CPT

## 2021-11-03 PROCEDURE — 74011250637 HC RX REV CODE- 250/637: Performed by: SURGERY

## 2021-11-03 PROCEDURE — 74011636637 HC RX REV CODE- 636/637: Performed by: SURGERY

## 2021-11-03 PROCEDURE — 82962 GLUCOSE BLOOD TEST: CPT

## 2021-11-03 PROCEDURE — 85027 COMPLETE CBC AUTOMATED: CPT

## 2021-11-03 PROCEDURE — 65270000029 HC RM PRIVATE

## 2021-11-03 RX ORDER — FAMOTIDINE 20 MG/1
40 TABLET, FILM COATED ORAL DAILY
Status: DISCONTINUED | OUTPATIENT
Start: 2021-11-04 | End: 2021-11-04 | Stop reason: HOSPADM

## 2021-11-03 RX ORDER — FAMOTIDINE 40 MG/1
40 TABLET, FILM COATED ORAL DAILY
Qty: 30 TABLET | Refills: 0 | Status: SHIPPED | OUTPATIENT
Start: 2021-11-04

## 2021-11-03 RX ADMIN — CITALOPRAM HYDROBROMIDE 20 MG: 20 TABLET ORAL at 08:30

## 2021-11-03 RX ADMIN — METOPROLOL SUCCINATE 50 MG: 50 TABLET, EXTENDED RELEASE ORAL at 08:30

## 2021-11-03 RX ADMIN — ACETAMINOPHEN 500 MG: 500 TABLET ORAL at 18:55

## 2021-11-03 RX ADMIN — MINERAL SUPPLEMENT IRON 300 MG / 5 ML STRENGTH LIQUID 100 PER BOX UNFLAVORED 300 MG: at 08:30

## 2021-11-03 RX ADMIN — INSULIN LISPRO 2 UNITS: 100 INJECTION, SOLUTION INTRAVENOUS; SUBCUTANEOUS at 16:30

## 2021-11-03 RX ADMIN — FUROSEMIDE 40 MG: 40 TABLET ORAL at 08:30

## 2021-11-03 RX ADMIN — SULFASALAZINE 500 MG: 500 TABLET ORAL at 17:09

## 2021-11-03 RX ADMIN — FAMOTIDINE 40 MG: 20 TABLET, FILM COATED ORAL at 08:30

## 2021-11-03 RX ADMIN — SULFASALAZINE 500 MG: 500 TABLET ORAL at 08:30

## 2021-11-03 RX ADMIN — VENLAFAXINE HYDROCHLORIDE 75 MG: 37.5 CAPSULE, EXTENDED RELEASE ORAL at 21:12

## 2021-11-03 RX ADMIN — INSULIN GLARGINE 11 UNITS: 100 INJECTION, SOLUTION SUBCUTANEOUS at 22:14

## 2021-11-03 RX ADMIN — POLYETHYLENE GLYCOL 3350 17 G: 17 POWDER, FOR SOLUTION ORAL at 08:30

## 2021-11-03 RX ADMIN — DILTIAZEM HYDROCHLORIDE 180 MG: 180 CAPSULE, COATED, EXTENDED RELEASE ORAL at 08:30

## 2021-11-03 RX ADMIN — INSULIN LISPRO 5 UNITS: 100 INJECTION, SOLUTION INTRAVENOUS; SUBCUTANEOUS at 17:09

## 2021-11-03 RX ADMIN — APIXABAN 2.5 MG: 2.5 TABLET, FILM COATED ORAL at 08:30

## 2021-11-03 RX ADMIN — APIXABAN 2.5 MG: 2.5 TABLET, FILM COATED ORAL at 17:09

## 2021-11-03 RX ADMIN — LEVOTHYROXINE SODIUM 25 MCG: 0.05 TABLET ORAL at 05:33

## 2021-11-03 RX ADMIN — ROSUVASTATIN CALCIUM 5 MG: 10 TABLET, COATED ORAL at 21:12

## 2021-11-03 RX ADMIN — MINERAL SUPPLEMENT IRON 300 MG / 5 ML STRENGTH LIQUID 100 PER BOX UNFLAVORED 300 MG: at 13:02

## 2021-11-03 RX ADMIN — MINERAL SUPPLEMENT IRON 300 MG / 5 ML STRENGTH LIQUID 100 PER BOX UNFLAVORED 300 MG: at 17:08

## 2021-11-03 NOTE — PROGRESS NOTES
End of Shift Note    Bedside shift change report given to Gaby Mcrae (oncoming nurse) by Radha Campbell RN (offgoing nurse). Report included the following information SBAR, Kardex, ED Summary, Procedure Summary, Intake/Output, MAR and Recent Results    Shift worked:  7a-7p     Shift summary and any significant changes:     Patient's woundvac is still on and intact. I gave her tylenol once for pain. Patient should be leaving tomorrow. Concerns for physician to address:  none     Zone phone for oncoming shift:   [phone]       Activity:  Activity Level: Bed Rest  Number times ambulated in hallways past shift: 0  Number of times OOB to chair past shift: 0    Cardiac:   Cardiac Monitoring: No      Cardiac Rhythm: AV Paced    Access:   Current line(s): PIV     Genitourinary:   Urinary status: voiding and external catheter    Respiratory:   O2 Device: None (Room air)  Chronic home O2 use?: NO  Incentive spirometer at bedside: NO     GI:     Current diet:  ADULT DIET Regular; 5 carb choices (75 gm/meal)  Passing flatus: YES  Tolerating current diet: YES       Pain Management:   Patient states pain is manageable on current regimen: YES    Skin:  Gerald Score: 16  Interventions: internal/external urinary devices    Patient Safety:  Fall Score:  Total Score: 4  Interventions: bed/chair alarm, gripper socks and pt to call before getting OOB  High Fall Risk: Yes    Length of Stay:  Expected LOS: 5d 9h  Actual LOS: [address], RN

## 2021-11-03 NOTE — PROGRESS NOTES
End of Shift Note    Bedside shift change report given to Ngoc Ren RN (oncoming nurse) by Scottie Alvarez RN (offgoing nurse). Report included the following information SBAR, Kardex, Intake/Output, MAR and Recent Results    Shift worked:  8738-2523     Shift summary and any significant changes:    Patient confused and trying to get out of bed. Tele sitter placed in room, effective. Patient didn't have any output during shift, bladder scan showed 600ml, Dr. Tana Diaz paged and received order for straight cath however patient urinated in brief.        Concerns for physician to address: None   Zone phone for oncoming shift:

## 2021-11-03 NOTE — PROGRESS NOTES
2125: Patient very confused and trying to get out of bed and pulled her IV out. Requested a tele sitter.

## 2021-11-03 NOTE — PROGRESS NOTES
Transition of Care Plan:    RUR: 13% \"low risk\"  Disposition: Home w/ [address] [facility] HH (SN/PT) & follow-up appts  Follow up appointments: PCP, Vascular  DME needed: Wound Vac- [employer]; no PT/OT orders- pt has a hospital bed, w/c, RW, cane, bsc  Transportation at Discharge: DtrSheena Rust or means to access home:  Family      IM Medicare Letter: Needed at d/c  Is patient a BCPI-A Bundle: No   If yes, was Bundle Letter given?: N/A    Caregiver Contact: DhavalDemario Erwin, [phone]  Discharge Caregiver contacted prior to discharge? CM reviewed pt's chart. Patient medically stable for d/c; awaiting delivery of home wound vac. CM input barrier to reflect delay in discharge. CM met w/ pt & her dtr Jaki at bedside to introduce role & complete initial assessment. Pt has hx of AMS, oriented x1-2. Dtr in room & able to confirm demographic information. Pt lives w/ her adult children in single level/ 5 YAIMA home. Requires assistance w/ ADL/ IADL & does not drive. Dtr reports family as pt's caregivers. Family assists w/ transportation. Pt uses/ has access to DME in the form of a hospital bed, wheelchair, RW, cane, & basc. Denies prior hx of rehab; prior [place] w/ [address] [facility]. Pt currently open to [facility] (SN/PT); dtr agreeable to CM sending grace orders. Pt will need wound vac at discharge- CM explained to dtr once wound vac is delivered at bedside pt can d/c home. Dtr verbalized understanding & reports she will be armen to transport pt at discharge. [facility] HH order faxed to [facility] (fax # [phone]). CM faxed [employer] wound vac form & brief op-note to [facility] (fax# [phone]), [employer] rep Orestes Cifuentes (fax# [phone]), & uploaded via [employer]; # [id number]. Will continue to follow & report updates. CM completed LTSS assessment search; screening completed in 2014 by [address]. Dtr reports pt has not had/ currently does not have caregivers.  CM to complete LTSS & provide copy to local . Reason for Admission: Gangrene of left foot                    RUR Score:  13%                   Plan for utilizing home health:  Open to [facility]- agreeable to resume at discharge        PCP: First and Last name:  Noah Garcia NP   [facility] of Practice[facility]   Are you a current patient: Yes/No: Yes   Approximate date of last visit: Within 3 months   Can you participate in a virtual visit with your PCP:                     Current Advanced Directive/Advance Care Plan: Prior  Gerald Espinoza (ACP) Conversation  Date of Conversation: 11/3/2021  Conducted with: Healthcare Decision Maker: Next of Kin by law (only applies in absence of a Healthcare Power of  or Legal Guardian)   Dtr- [address]:   No healthcare decision makers have been documented. Click here to complete [address] including selection of the Healthcare Decision Maker Relationship (ie \"Primary\")    Content/Action Overview: Has ACP document(s) NOT on file - requested patient to provide  Reviewed DNR/DNI and patient elects Full Code (Attempt Resuscitation)    Length of Voluntary ACP Conversation in minutes:  <16 minutes (Non-Billable)    [facility] Management Interventions  PCP Verified by CM: Yes  Palliative Care Criteria Met (RRAT>21 & CHF Dx)?: No  Mode of Transport at Discharge:  Other (see comment) ([facility])  Transition of Care Consult (CM Consult): Home Health, Discharge Planning, DME/Supply [address]: No  Reason Outside [place]: Patient already serviced by other home care/hospice agency  Discharge Durable Medical Equipment: Yes (wound vac- [employer])  Physical Therapy Consult: No  Occupational Therapy Consult: No  Speech Therapy Consult: No  Support Systems: Child(jenny), Other Family Member(s), Caregiver/Home Care Staff  Confirm Follow Up Transport: Family  The Plan for Transition of Care is Related to the Following Treatment Goals : Home w/ [address]  [facility] (SN/PT) & follow-up appts  The Patient and/or Patient Representative was Provided with a Choice of Provider and Agrees with the Discharge Plan?: Yes  Name of the Patient Representative Who was Provided with a Choice of Provider and Agrees with the Discharge Plan: Silvia Denson  Discharge Location  Discharge Placement: Home with home health    TAHIRA Madrigal  Care Management

## 2021-11-03 NOTE — PROGRESS NOTES
CM acknowledged discharge. CM input barrier to reflect delay in discharge re: delivery of home wound vac. [facility] wound vac form completed & faxed today; anticipate bedside delivery tomorrow (11/4). Full assessment to follow.     TAHIRA Conrad  Care Management

## 2021-11-03 NOTE — PROGRESS NOTES
Vascular Surgery Progress Note  Gerhard Langford, AGACNP-BC          Admit Date: 2021   LOS: 2 days        Daily Progress Note: 11/3/2021    POD: 2 Days Post-Op    S/P: Procedure(s):  LEFT FOOT DEBRIDEMENT (ANKLE BLOCK)    Subjective: Eugene Mueller is an 79yo female with PMHx of Alzheimer's disease, chronic pain, HTN, HLD, DMII w/ polyneuropathy, ASHD (s/p CABG), diastolic heart failure, depression, hypothroidism, COPD, GERD and PVD with severe mulitlevel atherosclerotic occlusive disease who underwent a L foot debridement on 21 for failed TMA. She tolerated the procedure without complication. Historically, she underwent a transmetatarsal left foot amputation after a failed left hallux amputation on 9/10/21 under general anesthesia. Wound vac placed yesterday. Overnight there were no acute events. Per nursing note, she was attempting to get out of bed and remove her IVs.     This morning, Ms. Kandy Beltre was pleasant but confused and needed redirection. Tele-sitter in place She denied foot pain. Objective:   Review of Systems   Unable to perform ROS: Dementia     Vital signs  Temp (24hrs), Av.9 °F (36.6 °C), Min:97 °F (36.1 °C), Max:98.5 °F (36.9 °C)   No intake/output data recorded.  1901 -  0700  In: 270 [P.O.:270]  Out: 750 [Urine:750]    Visit Vitals  BP (!) 150/72   Pulse 77   Temp 97.7 °F (36.5 °C)   Resp 18   Ht 4' 11\" (1.499 m)   Wt 59.2 kg (130 lb 8 oz)   SpO2 99%   BMI 26.36 kg/m²    O2 Flow Rate (L/min): 2 l/min O2 Device: None (Room air)     Pain control  Pain Assessment  Pain Scale 1: Numeric (0 - 10)  Pain Intensity 1: 3  Pain Location 1: Foot  Pain Orientation 1: Left  Pain Description 1: Aching  Pain Intervention(s) 1: Medication (see MAR)    PT/OT  Gait                 Physical Exam  Vitals and nursing note reviewed. Constitutional:       General: She is not in acute distress. Appearance: She is not ill-appearing. HENT:      Head: Normocephalic. Cardiovascular:      Rate and Rhythm: Normal rate. Pulmonary:      Effort: Pulmonary effort is normal.   Abdominal:      Palpations: Abdomen is soft. Feet:      Comments: Left foot cdi, wound vac in place. No drainage. @125mmHg continuous  Skin:     General: Skin is warm and dry. Neurological:      General: No focal deficit present. Mental Status: She is alert. She is disoriented.       Comments: baseline   Psychiatric:         Mood and Affect: Mood normal.            24 hour results:    Recent Results (from the past 24 hour(s))   GLUCOSE, POC    Collection Time: 11/02/21 11:47 AM   Result Value Ref Range    Glucose (POC) 309 (H) 65 - 117 mg/dL    Performed by Michela Dawson (TR PCT)    GLUCOSE, POC    Collection Time: 11/02/21  3:44 PM   Result Value Ref Range    Glucose (POC) 268 (H) 65 - 117 mg/dL    Performed by Michela Dawson (TR PCT)    GLUCOSE, POC    Collection Time: 11/02/21  9:26 PM   Result Value Ref Range    Glucose (POC) 142 (H) 65 - 117 mg/dL    Performed by Sherri Dean PCT    CBC W/O DIFF    Collection Time: 11/03/21  3:06 AM   Result Value Ref Range    WBC 7.9 3.6 - 11.0 K/uL    RBC 3.68 (L) 3.80 - 5.20 M/uL    HGB 11.2 (L) 11.5 - 16.0 g/dL    HCT 34.8 (L) 35.0 - 47.0 %    MCV 94.6 80.0 - 99.0 FL    MCH 30.4 26.0 - 34.0 PG    MCHC 32.2 30.0 - 36.5 g/dL    RDW 15.0 (H) 11.5 - 14.5 %    PLATELET 656 822 - 901 K/uL    MPV 9.3 8.9 - 12.9 FL    NRBC 0.0 0  WBC    ABSOLUTE NRBC 0.00 0.00 - 0.01 K/uL   GLUCOSE, POC    Collection Time: 11/03/21  8:12 AM   Result Value Ref Range    Glucose (POC) 82 65 - 117 mg/dL    Performed by Neda Calles PCT           Assessment/Plan:     Active Problems:    Gangrene of left foot ([address] (11/1/2021)      PVD  s/p failed L hallux amp (8/27/21)  s/p L TMA  (9/10/21)  Gangrene necrosis, POA  Acute osteomyelitis, POA  - s/p L foot debridement 11/1/21  - woundvac @ 125mmHg, no drainage,   - positive motor/sensory  - pain regimen- APAP, Norco PRN  - Eliquis 2.5mg BID  - statin  - Diet advanced  - bowel regimen     DMII  Polyneuropathy  - Lantus  - SSI     HTN  CHF  ASHD  - home meds     Chronic Pain  Polyneuropathy  - frequent bed turns per floor protocol     Hypothyroid  -  Home meds     Depression  - home meds     Arthritis  - home meds     GERD     Alzheimer's disease      Activity:  DVT ppx: Eliquis  Dispo: Home later today with HH?         Gerda Haskins NP

## 2021-11-03 NOTE — WOUND CARE
Wound VAC check: left foot    No issues with wound vac dressing applied yesterday per patient or staff. No drainage in cannister as expected, some scant serous seen in tubing. [facility] home wound VAC filled out and CM notified. Form awaiting MD/NP signature to process.      Alice Abad RN, [employer]

## 2021-11-03 NOTE — DISCHARGE SUMMARY
Vascular Surgery Discharge Summary     Patient ID:  Umesh Mercedes  [id number]  84 y.o.  1940    Admitting Provider: Maurizio Stoll MD  Discharging Provider:    Manny Apple Date: 11/1/2021    Discharge Date: 11/3/2021    Discharge Diagnoses: Active Problems:    Gangrene of left foot ([address] (11/1/2021)    Alzheimer's disease (POA)  Chronic pain (POA)  HTN (POA)  HLD (POA)  DMII w/ neuropathy (POA)  ASHD (s/p CABG) (POA)  CHF (POA)  Hypothyroidism (POA)  COPD (POA)  GERD (POA)  PVD (POA)  Depression (POA)    Procedure(s):  LEFT FOOT DEBRIDEMENT (ANKLE BLOCK)      Hospital Course: Umesh Mercedes is an 77yo female with PMHx of Alzheimer's disease, chronic pain, HTN, HLD, DMII w/ polyneuropathy, ASHD (s/p CABG), diastolic heart failure, depression, hypothroidism, COPD, GERD and PVD with severe mulitlevel atherosclerotic occlusive disease (s/p TMA 9/10/21) who underwent a L foot debridement on 11/1/21 for failed TMA. She tolerated the procedure without complication. Throughout her post-operative course, there were no acute events. Her pain was tolerable and treated with narcotic. Her wound showed no signs of infection or poor healing. A wound vac was placed which she will continue with upon discharge with home health. Discharge instructions were given along with a follow-up appointment.       Pertinent Results:   Recent Results (from the past 24 hour(s))   GLUCOSE, POC    Collection Time: 11/02/21  3:44 PM   Result Value Ref Range    Glucose (POC) 268 (H) 65 - 117 mg/dL    Performed by Michela Dawson (NAREN PCT)    GLUCOSE, POC    Collection Time: 11/02/21  9:26 PM   Result Value Ref Range    Glucose (POC) 142 (H) 65 - 117 mg/dL    Performed by Sherri Dean PCT    CBC W/O DIFF    Collection Time: 11/03/21  3:06 AM   Result Value Ref Range    WBC 7.9 3.6 - 11.0 K/uL    RBC 3.68 (L) 3.80 - 5.20 M/uL    HGB 11.2 (L) 11.5 - 16.0 g/dL    HCT 34.8 (L) 35.0 - 47.0 %    MCV 94.6 80.0 - 99.0 FL    MCH 30.4 26.0 - 34.0 PG    MCHC 32.2 30.0 - 36.5 g/dL    RDW 15.0 (H) 11.5 - 14.5 %    PLATELET 981 302 - 436 K/uL    MPV 9.3 8.9 - 12.9 FL    NRBC 0.0 0  WBC    ABSOLUTE NRBC 0.00 0.00 - 0.01 K/uL   GLUCOSE, POC    Collection Time: 11/03/21  8:12 AM   Result Value Ref Range    Glucose (POC) 82 65 - 117 mg/dL    Performed by Saba Sizer PCT    GLUCOSE, POC    Collection Time: 11/03/21 11:27 AM   Result Value Ref Range    Glucose (POC) 101 65 - 117 mg/dL    Performed by Saba Sizer PCT        Vital signs:   Patient Vitals for the past 24 hrs:   BP Temp Pulse Resp SpO2 Weight   11/03/21 1122 (!) 143/69 98.3 °F (36.8 °C) 80 18 96 % --   11/03/21 0809 (!) 150/72 97.7 °F (36.5 °C) 77 18 99 % --   11/03/21 0347 133/68 97 °F (36.1 °C) 80 18 100 % --   11/02/21 2330 (!) 138/55 98.5 °F (36.9 °C) 89 18 100 % 59.2 kg (130 lb 8 oz)   11/02/21 1922 (!) 163/68 98 °F (36.7 °C) 77 18 94 % --       Patient Weight:   Last 3 Recorded Weights in this Encounter    10/25/21 1345 11/01/21 0957 11/02/21 2330   Weight: 57 kg (125 lb 10.6 oz) 57 kg (125 lb 10.6 oz) 59.2 kg (130 lb 8 oz)       Consults: None    Patient Condition at Discharge: good    Disposition: home    Patient Instructions:   Current Discharge Medication List      CONTINUE these medications which have CHANGED    Details   famotidine (PEPCID) 40 mg tablet Take 1 Tablet by mouth daily. Qty: 30 Tablet, Refills: 0         CONTINUE these medications which have NOT CHANGED    Details   venlafaxine-SR (Effexor XR) 75 mg capsule Take 75 mg by mouth nightly. metoprolol succinate (TOPROL-XL) 50 mg XL tablet Take 50 mg by mouth daily. HYDROcodone-acetaminophen (NORCO) 5-325 mg per tablet Take 1 Tablet by mouth as needed for Pain.      polyethylene glycol (Miralax) 17 gram/dose powder Take 17 g by mouth daily. rosuvastatin (CRESTOR) 5 mg tablet Take 5 mg by mouth nightly. ondansetron hcl (ZOFRAN) 8 mg tablet Take 8 mg by mouth every eight (8) hours as needed for Nausea or Vomiting. apixaban (ELIQUIS) 2.5 mg tablet Take 1 Tablet by mouth two (2) times a day. Qty: 180 Tablet, Refills: 1      citalopram (CELEXA) 20 mg tablet Take 20 mg by mouth daily. cholecalciferol (Vitamin D3) (1000 Units /25 mcg) tablet Take 2,000 Units by mouth daily. levothyroxine (synthroid) 25 mcg tablet Take 25 mcg by mouth Daily (before breakfast). sulfaSALAzine (AZULFIDINE) 500 mg tablet Take 500 mg by mouth two (2) times a day. ferrous gluconate (FERGON) 240 mg (27 mg iron) tablet Take 240 mg by mouth three (3) times daily (with meals). dilTIAZem ER (TIAZAC) 180 mg capsule Take 180 mg by mouth daily. spironolactone (ALDACTONE) 25 mg tablet Take 25 mg by mouth every other day. insulin aspart protamine/insulin aspart (NOVOLOG MIX 70-30 U-100 INSULN) 100 unit/mL (70-30) injection 16 Units by SubCUTAneous route daily. Patient takes 16 units in the AM and 5 units QHS      furosemide (LASIX) 40 mg tablet Take 1 Tab by mouth daily. Qty: 30 Tab, Refills: 0      acetaminophen (Tylenol Extra Strength) 500 mg tablet Take 500 mg by mouth every six (6) hours as needed for Pain. Diet: Diabetic Diet    Activity/Wound Care:     Patient may shower. Dry the wound carefully. The dressing can be removed if there is no drainage, or changed and kept in place for comfort. Patient should not attempt to drive a car until they are comfortable and NOT taking pain medication. No heavy lifting (3 lbs limit). Mild exercise and light duties around the house are OK. Call the office at [phone] if there are any problems.     Follow-up Information     Follow up With Specialties Details Why Contact Info    Chip Clement MD General and Vascular Surgery Go on 11/17/2021 For wound re-check @ 2:45pm [address] [place] [address] 83.  [phone]            Signed:  Nicole Poon NP  11/3/2021  2:14 PM

## 2021-11-04 VITALS
OXYGEN SATURATION: 99 % | TEMPERATURE: 98 F | DIASTOLIC BLOOD PRESSURE: 63 MMHG | HEART RATE: 79 BPM | SYSTOLIC BLOOD PRESSURE: 139 MMHG | RESPIRATION RATE: 18 BRPM | HEIGHT: 59 IN | BODY MASS INDEX: 26.31 KG/M2 | WEIGHT: 130.5 LBS

## 2021-11-04 LAB
GLUCOSE BLD STRIP.AUTO-MCNC: 171 MG/DL (ref 65–117)
GLUCOSE BLD STRIP.AUTO-MCNC: 78 MG/DL (ref 65–117)
SERVICE CMNT-IMP: ABNORMAL
SERVICE CMNT-IMP: NORMAL

## 2021-11-04 PROCEDURE — 74011250637 HC RX REV CODE- 250/637: Performed by: SURGERY

## 2021-11-04 PROCEDURE — 82962 GLUCOSE BLOOD TEST: CPT

## 2021-11-04 PROCEDURE — 74011636637 HC RX REV CODE- 636/637: Performed by: SURGERY

## 2021-11-04 RX ADMIN — APIXABAN 2.5 MG: 2.5 TABLET, FILM COATED ORAL at 09:17

## 2021-11-04 RX ADMIN — DILTIAZEM HYDROCHLORIDE 180 MG: 180 CAPSULE, COATED, EXTENDED RELEASE ORAL at 09:16

## 2021-11-04 RX ADMIN — METOPROLOL SUCCINATE 50 MG: 50 TABLET, EXTENDED RELEASE ORAL at 09:16

## 2021-11-04 RX ADMIN — SULFASALAZINE 500 MG: 500 TABLET ORAL at 09:17

## 2021-11-04 RX ADMIN — FAMOTIDINE 40 MG: 20 TABLET, FILM COATED ORAL at 09:16

## 2021-11-04 RX ADMIN — POLYETHYLENE GLYCOL 3350 17 G: 17 POWDER, FOR SOLUTION ORAL at 09:17

## 2021-11-04 RX ADMIN — LEVOTHYROXINE SODIUM 25 MCG: 0.05 TABLET ORAL at 09:16

## 2021-11-04 RX ADMIN — CITALOPRAM HYDROBROMIDE 20 MG: 20 TABLET ORAL at 09:16

## 2021-11-04 RX ADMIN — INSULIN LISPRO 2 UNITS: 100 INJECTION, SOLUTION INTRAVENOUS; SUBCUTANEOUS at 09:16

## 2021-11-04 RX ADMIN — INSULIN LISPRO 2 UNITS: 100 INJECTION, SOLUTION INTRAVENOUS; SUBCUTANEOUS at 12:13

## 2021-11-04 RX ADMIN — MINERAL SUPPLEMENT IRON 300 MG / 5 ML STRENGTH LIQUID 100 PER BOX UNFLAVORED 300 MG: at 09:17

## 2021-11-04 RX ADMIN — MINERAL SUPPLEMENT IRON 300 MG / 5 ML STRENGTH LIQUID 100 PER BOX UNFLAVORED 300 MG: at 12:13

## 2021-11-04 RX ADMIN — SPIRONOLACTONE 25 MG: 25 TABLET ORAL at 09:17

## 2021-11-04 RX ADMIN — FUROSEMIDE 40 MG: 40 TABLET ORAL at 09:16

## 2021-11-04 NOTE — PROGRESS NOTES
Patient ready to discharge from CM standpoint. RN notified. Transition of Care Plan:     RUR:   13% \"low risk\"  Disposition: Home w/ [address] [facility] HH (SN/PT) & follow-up appts  Follow up appointments: PCP, Vascular- details in AVS  DME needed: Wound Vac- [employer]; no PT/OT orders- pt has a hospital bed, w/c, RW, cane, bsc  Transportation at Discharge: [facility] or means to access home:  Family     Medicare Letter: Reviewed w/ pt's dtr via phone  Is patient a BCPI-A Bundle: No              If yes, was Bundle Letter given?: N/A    Caregiver Contact: Elliot Marquez, [phone]  Discharge Caregiver contacted prior to discharge? CM acknowledged discharged. Update 11:31 AM  Wound vac delivered to pt at bedside. CM notified wound care RN; home wound vac to be placed prior to discharge. CM notified [facility] ([phone]) of pt's discharge home today. CM attempted to secure PCP follow-up appt; office requests to contact pt/ pt's dtr directly. If no addtl needs arise, pt ready to discharge from CM standpoint. RN notified. Initial note-  CM reviewed pt's chart. CM contacted [facility]/  ([phone]) for update on wound vac. CM spoke w/ [employer] rep, Dianne May, reporting referral has been approved and ETA of wound vac is 11 AM. CM updated pt's dtr Uriel Arango ([phone]) via phone. Reports no questions or concerns for discharge. CM reviewed 2nd IMM letter with pt's dtr via phone. Dtr verbalized understanding & is agreeable to discharge. Medicare pt has received, reviewed, and signed 2nd IM letter informing them of their right to appeal the discharge. Signed copy has been placed on pt bedside chart. Care Management Interventions  PCP Verified by CM:  Yes  Palliative Care Criteria Met (RRAT>21 & CHF Dx)?: No  Mode of Transport at Discharge: [facility] Transport Time of Discharge: [address] (CM Consult): Home Health, Discharge Planning, DME/Supply [address] [address]: No  Reason Outside [place]: Patient already serviced by other home care/hospice agency  Discharge Durable Medical Equipment: Yes (wound vac- [employer])  Physical Therapy Consult: No  Occupational Therapy Consult: No  Speech Therapy Consult: No  Support Systems: Child(jenny), Other Family Member(s), Caregiver/Home Care Staff  Confirm Follow Up Transport: Family  The Plan for Transition of Care is Related to the Following Treatment Goals : Home w/ [id number]  [facility] (SN/PT) & follow-up appts  The Patient and/or Patient Representative was Provided with a Choice of Provider and Agrees with the Discharge Plan?: Yes  Name of the Patient Representative Who was Provided with a Choice of Provider and Agrees with the Discharge Plan: Dtr- [address] of Choice List was Provided with Basic Dialogue that Supports the Patient's Individualized Plan of Care/Goals, Treatment Preferences and Shares the Quality Data Associated with the Providers?: Yes  Discharge Location  Discharge Placement: Home with home health    TAHIRA Hdz  Care Management

## 2021-11-04 NOTE — PROGRESS NOTES
Pharmacist Discharge Medication Reconciliation    Significant PMH:   Past Medical History:   Diagnosis Date    Alzheimer disease ([facility] [address]     Arrhythmia     atrial fibrillation 2018    Arthritis     CAD (coronary artery disease)     CABG, pacemaker    Chronic pain     COPD     Diabetes ([facility] [address]     GERD (gastroesophageal reflux disease)     Heart failure (HCC)     Hypertension     Ill-defined condition     high cholesterol    Peripheral vascular disease (HCC)     Sleep apnea     no longer uses-stopped years ago    Thyroid disease      Encounter Diagnoses:  Gangrene of left foot (s/p TMA 9/10/21 - failed), debridement  Allergies: Percocet [oxycodone-acetaminophen]    Discharge Medications:   Current Discharge Medication List        CONTINUE these medications which have CHANGED    Details   famotidine (PEPCID) 40 mg tablet Take 1 Tablet by mouth daily. Qty: 30 Tablet, Refills: 0  Start date: 11/4/2021           CONTINUE these medications which have NOT CHANGED    Details   venlafaxine-SR (Effexor XR) 75 mg capsule Take 75 mg by mouth nightly. metoprolol succinate (TOPROL-XL) 50 mg XL tablet Take 50 mg by mouth daily. HYDROcodone-acetaminophen (NORCO) 5-325 mg per tablet Take 1 Tablet by mouth as needed for Pain.      polyethylene glycol (Miralax) 17 gram/dose powder Take 17 g by mouth daily. rosuvastatin (CRESTOR) 5 mg tablet Take 5 mg by mouth nightly. ondansetron hcl (ZOFRAN) 8 mg tablet Take 8 mg by mouth every eight (8) hours as needed for Nausea or Vomiting. apixaban (ELIQUIS) 2.5 mg tablet Take 1 Tablet by mouth two (2) times a day. Qty: 180 Tablet, Refills: 1      citalopram (CELEXA) 20 mg tablet Take 20 mg by mouth daily. cholecalciferol (Vitamin D3) (1000 Units /25 mcg) tablet Take 2,000 Units by mouth daily. levothyroxine (synthroid) 25 mcg tablet Take 25 mcg by mouth Daily (before breakfast).       sulfaSALAzine (AZULFIDINE) 500 mg tablet Take 500 mg by mouth two (2) times a day.      ferrous gluconate (FERGON) 240 mg (27 mg iron) tablet Take 240 mg by mouth three (3) times daily (with meals). dilTIAZem ER (TIAZAC) 180 mg capsule Take 180 mg by mouth daily. spironolactone (ALDACTONE) 25 mg tablet Take 25 mg by mouth every other day. insulin aspart protamine/insulin aspart (NOVOLOG MIX 70-30 U-100 INSULN) 100 unit/mL (70-30) injection 16 Units by SubCUTAneous route daily. Patient takes 16 units in the AM and 5 units QHS      furosemide (LASIX) 40 mg tablet Take 1 Tab by mouth daily. Qty: 30 Tab, Refills: 0      acetaminophen (Tylenol Extra Strength) 500 mg tablet Take 500 mg by mouth every six (6) hours as needed for Pain. The patient's chart, MAR and AVS were reviewed by Willi Lofton [facility].     Discharging Provider: Nandini Wyatt    Thank you,     Willi Lofton, [facility]

## 2021-11-04 NOTE — PROGRESS NOTES
DISCHARGE NOTE FROM [facility] NURSE    Patient determined to be stable for discharge by attending provider. I have reviewed the discharge instructions and follow-up appointments with the daughter. They verbalized understanding and all questions were answered to their satisfaction. No complaints or further questions were expressed. Medications sent to pharmacy. Appropriate educational materials and medication side effect teaching were provided. PIV were removed prior to discharge. Patient did not discharge with any line, mae, or drain. Pt discharged with wound vac and education provided to daughter. All personal items collected during admission were returned to the patient prior to discharge.     Post-op patient: Roselyn Alicea RN

## 2021-11-04 NOTE — OP NOTES
[address]  OPERATIVE REPORT    Name:  Ozzie Mijares  MR#:  [id number]  :  1940  ACCOUNT #:  [de-identified]  DATE OF SERVICE:  2021    PREOPERATIVE DIAGNOSIS:  Failing left transmetatarsal amputation. POSTOPERATIVE DIAGNOSIS:  Failing left transmetatarsal amputation. PROCEDURE PERFORMED:  Excisional debridement, left foot with removal of failing flap. SURGEON:  Torrey Chavez MD    ANESTHESIA:  Regional block. INDICATIONS:  The patient is an 78-year-old woman who several weeks ago had left transmetatarsal amputation. A portion of the flap has failed and become necrotic. The underlying tissue is healthy, viable, and granulating. She is admitted at this time for conversion to an open TMA and ultimately wound VAC placement. PROCEDURE:  After obtaining informed consent, the patient was placed supine on the operating table. After adequate induction of regional block anesthesia, site and patient's confirmation, administration of prophylactic antibiotics, the left foot was prepped and draped in sterile fashion. The existing flap was sharply excised using a 10-blade knife and pickups. All tissue over the end of the TMA was removed leaving only clean viable bleeding tissue. The resulting open TMA was 6 cm x 4 cm in size and the depth of the overall wound was increased by 5 mm. A nonadherent bulky dressing was applied. The [facility] will be applied on the floor prior to discharge.         MD TERRY [id number]  D:  2021 8:37  T:  2021 15:41  JOB #:  [id number]  CC:  Torrey Chavez MD

## 2021-11-04 NOTE — WOUND CARE
Wound care nurse: patient for discharge home today with Yuri Batista to change NPWT/wound vac dressing on left foot.  nurse notified that patients home wound vac supplies had arrived and she was to be d/c home today at [facility], staff nurse and [facility] nurse switched patient from hospital equipment to home equipment without incidence. Patient has dementia, so Maurice Maldonado told to explain to patients daughter:  Large box of supplies go home with patient for [place] [address] use  Home wound vac needs to be plugged in to charge when patient gets home and settled. Questions or concerns about wound vac when home need to be directed to [place] [address] or [employer] (1800# on black case in box)     nurse removed the hospital wound vac equipment from room to be cleaned and re-stocked.     Kenneth Nino RN, [employer]

## 2022-03-06 NOTE — PROGRESS NOTES
Problem: Falls - Risk of  Goal: *Absence of Falls  Description: Document Jennifer Blood Fall Risk and appropriate interventions in the flowsheet. Outcome: Progressing Towards Goal  Note: Fall Risk Interventions:       Mentation Interventions: Adequate sleep, hydration, pain control, Bed/chair exit alarm, Door open when patient unattended    Medication Interventions: Bed/chair exit alarm    Elimination Interventions: Bed/chair exit alarm, Call light in reach, Patient to call for help with toileting needs    History of Falls Interventions: Bed/chair exit alarm, Door open when patient unattended         Problem: Patient Education: Go to Patient Education Activity  Goal: Patient/Family Education  Outcome: Progressing Towards Goal     Problem: Pressure Injury - Risk of  Goal: *Prevention of pressure injury  Description: Document Gerald Scale and appropriate interventions in the flowsheet.   Outcome: Progressing Towards Goal  Note: Pressure Injury Interventions:  Sensory Interventions: Assess changes in LOC    Moisture Interventions: Absorbent underpads    Activity Interventions: Assess need for specialty bed    Mobility Interventions: Assess need for specialty bed    Nutrition Interventions: Offer support with meals,snacks and hydration                     Problem: Patient Education: Go to Patient Education Activity  Goal: Patient/Family Education  Outcome: Progressing Towards Goal Yes
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
## 2022-03-19 PROBLEM — R79.89 ELEVATED BRAIN NATRIURETIC PEPTIDE (BNP) LEVEL: Status: ACTIVE | Noted: 2017-08-10

## 2022-03-19 PROBLEM — R10.9 ABDOMINAL PAIN: Status: ACTIVE | Noted: 2020-09-02

## 2022-03-19 PROBLEM — I50.31 DIASTOLIC CHF, ACUTE (HCC): Status: ACTIVE | Noted: 2018-02-23

## 2022-03-19 PROBLEM — N17.9 AKI (ACUTE KIDNEY INJURY) (HCC): Status: ACTIVE | Noted: 2017-08-10

## 2022-03-19 PROBLEM — R42 DIZZINESS: Status: ACTIVE | Noted: 2017-08-10

## 2022-03-19 PROBLEM — E16.2 HYPOGLYCEMIA: Status: ACTIVE | Noted: 2017-08-10

## 2022-03-19 PROBLEM — I96 GANGRENE OF LEFT FOOT (HCC): Status: ACTIVE | Noted: 2021-11-01

## 2022-03-19 PROBLEM — I99.8 LIMB ISCHEMIA: Status: ACTIVE | Noted: 2021-08-27

## 2022-03-19 PROBLEM — S80.811A ABRASION OF RIGHT LEG: Status: ACTIVE | Noted: 2021-05-03

## 2022-03-19 PROBLEM — S81.802A LEG WOUND, LEFT: Status: ACTIVE | Noted: 2021-09-12

## 2022-03-19 PROBLEM — E86.0 DEHYDRATION: Status: ACTIVE | Noted: 2017-08-10

## 2022-03-19 PROBLEM — R07.9 CHEST PAIN: Status: ACTIVE | Noted: 2021-05-03

## 2022-03-19 PROBLEM — Z89.432 STATUS POST TRANSMETATARSAL AMPUTATION OF LEFT FOOT (HCC): Status: ACTIVE | Noted: 2021-09-10

## 2022-03-20 PROBLEM — S06.5XAA SDH (SUBDURAL HEMATOMA) (HCC): Status: ACTIVE | Noted: 2017-07-18

## 2022-03-20 PROBLEM — J18.9 RLL PNEUMONIA: Status: ACTIVE | Noted: 2020-09-01

## 2022-06-24 ENCOUNTER — TELEPHONE (OUTPATIENT)
Dept: NEUROLOGY | Age: 82
End: 2022-06-24

## 2022-06-24 DIAGNOSIS — R44.3 HALLUCINATIONS: Primary | ICD-10-CM

## 2022-06-24 NOTE — TELEPHONE ENCOUNTER
Spoke with [employer]. HIPAA verified. She stated she has not had MRI due to having left foot amputated. Stated she is only having hallucinations every so often. Dr. Vero Vega would like to re-order her MRI and reschedule appointment until she has her MRI. [employer] will call office back to reschedule appointment.

## 2022-06-30 ENCOUNTER — ANESTHESIA (OUTPATIENT)
Dept: CARDIAC CATH/INVASIVE PROCEDURES | Age: 82
End: 2022-06-30
Payer: MEDICARE

## 2022-06-30 ENCOUNTER — HOSPITAL ENCOUNTER (OUTPATIENT)
Age: 82
Setting detail: OUTPATIENT SURGERY
Discharge: HOME | End: 2022-06-30
Attending: INTERNAL MEDICINE | Admitting: INTERNAL MEDICINE
Payer: MEDICARE

## 2022-06-30 ENCOUNTER — ANESTHESIA EVENT (OUTPATIENT)
Dept: CARDIAC CATH/INVASIVE PROCEDURES | Age: 82
End: 2022-06-30
Payer: MEDICARE

## 2022-06-30 VITALS
HEART RATE: 70 BPM | RESPIRATION RATE: 21 BRPM | OXYGEN SATURATION: 97 % | HEIGHT: 57 IN | TEMPERATURE: 97.7 F | WEIGHT: 127 LBS | DIASTOLIC BLOOD PRESSURE: 53 MMHG | BODY MASS INDEX: 27.4 KG/M2 | SYSTOLIC BLOOD PRESSURE: 145 MMHG

## 2022-06-30 DIAGNOSIS — Z45.010 PACEMAKER AT END OF BATTERY LIFE: ICD-10-CM

## 2022-06-30 LAB
GLUCOSE BLD STRIP.AUTO-MCNC: 100 MG/DL (ref 65–117)
SERVICE CMNT-IMP: NORMAL

## 2022-06-30 PROCEDURE — C2621 PMKR, OTHER THAN SING/DUAL: HCPCS | Performed by: INTERNAL MEDICINE

## 2022-06-30 PROCEDURE — 74011250636 HC RX REV CODE- 250/636: Performed by: NURSE ANESTHETIST, CERTIFIED REGISTERED

## 2022-06-30 PROCEDURE — 77030002996 HC SUT SLK J&J -A: Performed by: INTERNAL MEDICINE

## 2022-06-30 PROCEDURE — 33229 REMV&REPLC PM GEN MULT LEADS: CPT | Performed by: INTERNAL MEDICINE

## 2022-06-30 PROCEDURE — C1781 MESH (IMPLANTABLE): HCPCS | Performed by: INTERNAL MEDICINE

## 2022-06-30 PROCEDURE — 77030010507 HC ADH SKN DERMBND J&J -B: Performed by: INTERNAL MEDICINE

## 2022-06-30 PROCEDURE — 77030018729 HC ELECTRD DEFIB PAD CARD -B: Performed by: INTERNAL MEDICINE

## 2022-06-30 PROCEDURE — 74011000250 HC RX REV CODE- 250: Performed by: INTERNAL MEDICINE

## 2022-06-30 PROCEDURE — 77030039825 HC MSK NSL PAP SUPERNO2VA VYRM -B: Performed by: STUDENT IN AN ORGANIZED HEALTH CARE EDUCATION/TRAINING PROGRAM

## 2022-06-30 PROCEDURE — 76060000032 HC ANESTHESIA 0.5 TO 1 HR: Performed by: INTERNAL MEDICINE

## 2022-06-30 PROCEDURE — 77030031139 HC SUT VCRL2 J&J -A: Performed by: INTERNAL MEDICINE

## 2022-06-30 PROCEDURE — 74011000272 HC RX REV CODE- 272: Performed by: INTERNAL MEDICINE

## 2022-06-30 PROCEDURE — 74011250636 HC RX REV CODE- 250/636: Performed by: INTERNAL MEDICINE

## 2022-06-30 PROCEDURE — 77030028698 HC BLD TISS PLSM MEDT -D: Performed by: INTERNAL MEDICINE

## 2022-06-30 PROCEDURE — 82962 GLUCOSE BLOOD TEST: CPT

## 2022-06-30 PROCEDURE — 2709999900 HC NON-CHARGEABLE SUPPLY: Performed by: INTERNAL MEDICINE

## 2022-06-30 PROCEDURE — 74011000250 HC RX REV CODE- 250: Performed by: NURSE ANESTHETIST, CERTIFIED REGISTERED

## 2022-06-30 DEVICE — CRT CARDIAC RESYNCHRONIZATION THERAPY PACEMAKER DDDRV
Type: IMPLANTABLE DEVICE | Status: FUNCTIONAL
Brand: QUADRA ALLURE MP™

## 2022-06-30 DEVICE — ENVELOPE CMRM6133 ABSORB LRG MR
Type: IMPLANTABLE DEVICE | Status: FUNCTIONAL
Brand: TYRX™

## 2022-06-30 RX ORDER — DEXMEDETOMIDINE HYDROCHLORIDE 100 UG/ML
INJECTION, SOLUTION INTRAVENOUS AS NEEDED
Status: DISCONTINUED | OUTPATIENT
Start: 2022-06-30 | End: 2022-06-30 | Stop reason: HOSPADM

## 2022-06-30 RX ORDER — DEXAMETHASONE SODIUM PHOSPHATE 4 MG/ML
INJECTION, SOLUTION INTRA-ARTICULAR; INTRALESIONAL; INTRAMUSCULAR; INTRAVENOUS; SOFT TISSUE AS NEEDED
Status: DISCONTINUED | OUTPATIENT
Start: 2022-06-30 | End: 2022-06-30 | Stop reason: HOSPADM

## 2022-06-30 RX ORDER — CEFAZOLIN SODIUM 1 G/3ML
INJECTION, POWDER, FOR SOLUTION INTRAMUSCULAR; INTRAVENOUS AS NEEDED
Status: DISCONTINUED | OUTPATIENT
Start: 2022-06-30 | End: 2022-06-30 | Stop reason: HOSPADM

## 2022-06-30 RX ORDER — ONDANSETRON 2 MG/ML
INJECTION INTRAMUSCULAR; INTRAVENOUS AS NEEDED
Status: DISCONTINUED | OUTPATIENT
Start: 2022-06-30 | End: 2022-06-30 | Stop reason: HOSPADM

## 2022-06-30 RX ORDER — LIDOCAINE HYDROCHLORIDE AND EPINEPHRINE 10; 10 MG/ML; UG/ML
INJECTION, SOLUTION INFILTRATION; PERINEURAL AS NEEDED
Status: DISCONTINUED | OUTPATIENT
Start: 2022-06-30 | End: 2022-06-30 | Stop reason: HOSPADM

## 2022-06-30 RX ORDER — FENTANYL CITRATE 50 UG/ML
INJECTION, SOLUTION INTRAMUSCULAR; INTRAVENOUS AS NEEDED
Status: DISCONTINUED | OUTPATIENT
Start: 2022-06-30 | End: 2022-06-30 | Stop reason: HOSPADM

## 2022-06-30 RX ORDER — PROPOFOL 10 MG/ML
INJECTION, EMULSION INTRAVENOUS
Status: DISCONTINUED | OUTPATIENT
Start: 2022-06-30 | End: 2022-06-30 | Stop reason: HOSPADM

## 2022-06-30 RX ADMIN — FENTANYL CITRATE 25 MCG: 50 INJECTION, SOLUTION INTRAMUSCULAR; INTRAVENOUS at 13:57

## 2022-06-30 RX ADMIN — DEXMEDETOMIDINE HYDROCHLORIDE 1 MCG: 100 INJECTION, SOLUTION, CONCENTRATE INTRAVENOUS at 14:03

## 2022-06-30 RX ADMIN — CEFAZOLIN 2 G: 1 INJECTION, POWDER, FOR SOLUTION INTRAMUSCULAR; INTRAVENOUS; PARENTERAL at 13:56

## 2022-06-30 RX ADMIN — DEXMEDETOMIDINE HYDROCHLORIDE 1 MCG: 100 INJECTION, SOLUTION, CONCENTRATE INTRAVENOUS at 13:51

## 2022-06-30 RX ADMIN — FENTANYL CITRATE 25 MCG: 50 INJECTION, SOLUTION INTRAMUSCULAR; INTRAVENOUS at 14:05

## 2022-06-30 RX ADMIN — FENTANYL CITRATE 25 MCG: 50 INJECTION, SOLUTION INTRAMUSCULAR; INTRAVENOUS at 13:50

## 2022-06-30 RX ADMIN — ONDANSETRON HYDROCHLORIDE 4 MG: 2 INJECTION, SOLUTION INTRAMUSCULAR; INTRAVENOUS at 13:52

## 2022-06-30 RX ADMIN — FENTANYL CITRATE 25 MCG: 50 INJECTION, SOLUTION INTRAMUSCULAR; INTRAVENOUS at 14:03

## 2022-06-30 RX ADMIN — DEXAMETHASONE SODIUM PHOSPHATE 4 MG: 4 INJECTION, SOLUTION INTRAMUSCULAR; INTRAVENOUS at 13:58

## 2022-06-30 RX ADMIN — PROPOFOL 50 MCG/KG/MIN: 10 INJECTION, EMULSION INTRAVENOUS at 13:50

## 2022-06-30 RX ADMIN — DEXMEDETOMIDINE HYDROCHLORIDE 1 MCG: 100 INJECTION, SOLUTION, CONCENTRATE INTRAVENOUS at 14:09

## 2022-06-30 NOTE — PROGRESS NOTES
EP/ ARRHYTHMIA    Patient ID:  Patient: Nighat Florian  MRN: [id number]  Age: 78 y.o.  : 1940    Date of  Admission: 2022  9:54 AM   PCP:  Tati Boyd NP    Assessment: 1. BIV pacemaker battery depletion. Plan:     1. Given the potential benefits, risks, and alternatives, she agrees to proceed. Nighat Florian is a 78 y.o. female with a history of the above here for his procedure. Past Medical History:   Diagnosis Date    [facility] ([facility] [address]     Arrhythmia     atrial fibrillation     Arthritis     CAD (coronary artery disease)     CABG, pacemaker    Chronic pain     COPD     Diabetes ([facility] [address]     GERD (gastroesophageal reflux disease)     Heart failure ([facility] [address]     Hypertension     Ill-defined condition     high cholesterol    Peripheral vascular disease (HCC)     Sleep apnea     no longer uses-stopped years ago    Thyroid disease         Past Surgical History:   Procedure Laterality Date    HX ORTHOPAEDIC Left     toe amputation    HX PACEMAKER      2011    VT CARDIAC SURG PROCEDURE UNLIST      3 CABG, Stents Dr. Lennie Gowers FLX DX W/COLLJ SPEC WHEN PFRMD  11/10/2011         VT EGD TRANSORAL BIOPSY SINGLE/MULTIPLE  2012         VASCULAR SURGERY PROCEDURE UNLIST Left 2021    left transmetatarsal amputation       Social History     Tobacco Use    Smoking status: Never Smoker    Smokeless tobacco: Never Used   Substance Use Topics    Alcohol use: No        Family History   Problem Relation Age of Onset    Lung Disease Mother     Heart Disease Father     Hypertension Father     Diabetes Sister         Allergies   Allergen Reactions    Percocet [Oxycodone-Acetaminophen] Nausea and Vomiting          No current facility-administered medications for this encounter. Review of Symptoms:  See OV.      Objective:      Physical Exam:  Temp (24hrs), Av.1 °F (36.7 °C), Min:98.1 °F (36.7 °C), Max:98.1 °F (36.7 °C)    Patient Vitals for the past 8 hrs:   Pulse   06/30/22 1214 80    Patient Vitals for the past 8 hrs:   Resp   06/30/22 1214 18    Patient Vitals for the past 8 hrs:   BP   06/30/22 1214 (!) 169/63      No intake or output data in the 24 hours ending 06/30/22 1355    Nondiaphoretic, not in acute distress. RRR. Neuro grossly nonfocal.  No tremor. Awake and appropriate.       Brad Miranda MD  6/30/2022

## 2022-06-30 NOTE — PROGRESS NOTES
TRANSFER - IN REPORT:    Verbal report received from LAUREN Arroyo RN and Carol Villalpando CRNA on Bryant López  being received from  for routine progression of care. Report consisted of patients Situation, Background, Assessment and Recommendations(SBAR). Information from the following report(s) Procedure Summary and MAR was reviewed with the receiving clinician. Opportunity for questions and clarification was provided. Assessment completed upon patients arrival to [address] [place] [address] and care assumed. Cardiac Cath Lab Recovery Arrival Note:    Bryant López arrived to [facility] recovery area. Patient procedure= Generator change. Patient on cardiac monitor, non-invasive blood pressure, SPO2 monitor. On room air. Patient status doing well without problems. Patient is A&Ox 2. Patient reports no c/o. PROCEDURE SITE CHECK:    Procedure site:without any bleeding and no hematoma, no pain/discomfort reported at procedure site. No change in patient status. Continue to monitor patient and status.

## 2022-06-30 NOTE — DISCHARGE INSTRUCTIONS
DISCHARGE INSTRUCTIONS FOR PATIENTS WITH ICD'S    1. Remember to call for an appointment in 2-4 weeks [phone] to check healing and implant programming with Dr. Ham Horn nurse, [facility][1].  2. [address] are available from your pharmacist to wear at all times if you choose to wear one. 3. Carry your ID card for your ICD with you at all times. This card will be given to you in the hospital or mailed to you. 4. The ICD will bulge slightly under your skin. The bulge will decrease in size over the next few weeks. Please notify the doctor's office if you notice any of the following around your ICD site:   A.  A bruise that does not go away. B.  Soreness or yellow, green, or brown drainage from the site. C. Any swelling from the site. D. If you have a fever of 100 degrees or higher that lasts for a few days. INCISION CARE        1.  Leave the skin glue over your site until it dissolves on its own, usually in a few weeks. 2.  You may shower after 3 days as long as your incision isnt submerged or directly sprayed upon until well healed. 3.  For comfort, wear loose fitting clothing. 4.  Report any signs of infection, fever, pain, swelling, redness, oozing, or heat at site especially if these symptoms increase after the first 3 to 4 days. ACTIVITY PRECAUTIONS      1. Avoid rough contact with the implant site. 2. No driving for 14 days. 3. Avoid lifting your arm over your head, carrying anything on the affected side, or lifting over 10 pounds for 30 days. For the first 2 days only bend your arm at the elbow. 4. Any extreme activity such as golf, weight lifting or exercise biking should be restricted for 60 days. 5. Do not carry objects by holding them against your implant site. 6.  No shooting rifles or any type of gun with the affected shoulder permanently. 7.  Welding and chainsaws are prohibited. SPECIAL PRECAUTIONS     1.   You should avoid all strong magnetic fields, such as arc welding, large transformers, large motors. Some ICD devices will beep if it detects a strong magnet. If this occurs, move out of the area. 2.  You may not have an MRI which uses a strong magnet to take pictures. 3.  Treatments or surgery that requires diathermy or electrocautery should be discussed with your doctor before scheduled. 4.  Avoid radio frequency transmitters, including radar. 5.  Advise dentist or other medical personnel you see that you have an ICD. 6.  Cell phones and microwave oven use is okay. 7.  If you plan to move or take a trip to a new area, the doctor's office will give you a name of a doctor to contact for any problems. SPECIAL INSTRUCTIONS ON SHOCKS     1. Notify your doctor for any of the following:       A. Anytime a shock is received in a 24 hour period. An office visit is not usually required for a single shock. B.  Two or more shocks in a row. If you do not feel well, call the [facility], otherwise call your doctor. This may require an office visit. C. Two or more shocks spaced apart by several hours. This may require an office visit. 2.  Keep a record of events. Include date, time, symptoms and activity at that time. ANTIBIOTIC THERAPY    During the first 8 weeks after your ICD insertion, you may need antibiotics before any dental work or certain tests or operations. Let the dentist or doctor who is caring for you know that you have had an implanted device.

## 2022-06-30 NOTE — ANESTHESIA PREPROCEDURE EVALUATION
Anesthetic History               Review of Systems / Medical History  Patient summary reviewed, nursing notes reviewed and pertinent labs reviewed    Pulmonary    COPD            Comments: H/O RLL Pneumonia   Neuro/Psych             Comments: Diabetic peripheral neuropathy    H/O SDH (subdural hematoma)    Hard of hearing Cardiovascular    Hypertension  Valvular problems/murmurs: tricuspid insufficiency and mitral insufficiency    CHF  Dysrhythmias : atrial fibrillation  Pacemaker, CAD, PAD, cardiac stents, CABG (3 vessel) and hyperlipidemia    Exercise tolerance: <4 METS  Comments: Ejection fraction was  estimated in the range of 55 % to 60 %. Mod-severe TR & PHTN    ECG (5/4/21): Ventricular-paced rhythm   Abnormal ECG   When compared with ECG of 09-AUG-2017 18:58,   Vent. rate has increased BY  20 BPM     TTE (2/23/18): Left ventricle: Systolic function was normal. Ejection fraction was  estimated in the range of 55 % to 60 %. No obvious wall motion  abnormalities identified in the views obtained. There was mild concentric  hypertrophy. Left atrium: The atrium was moderately dilated. Right atrium: The atrium was moderately dilated. Mitral valve: There was mild regurgitation. Aortic valve: There was no stenosis. Tricuspid valve: There was moderate to severe regurgitation. Pulmonary  artery systolic pressure: 61 mmHg. There was moderate to severe pulmonary  hypertension.    GI/Hepatic/Renal     GERD    Renal disease: CRI      Comments: CRI, Stage III Endo/Other    Diabetes: type 2, using insulin  Hypothyroidism  Obesity and arthritis     Other Findings   Comments: Alzheimer disease  Chronic pain           Physical Exam    Airway  Mallampati: II  TM Distance: 4 - 6 cm  Neck ROM: normal range of motion   Mouth opening: Normal     Cardiovascular  Regular rate and rhythm,  S1 and S2 normal,  no murmur, click, rub, or gallop             Dental    Dentition: Edentulous, Full lower dentures and Full upper dentures     Pulmonary  Breath sounds clear to auscultation               Abdominal  GI exam deferred       Other Findings            Anesthetic Plan    ASA: 3  Anesthesia type: MAC - ankle block          Induction: Intravenous  Anesthetic plan and risks discussed with: Patient

## 2022-06-30 NOTE — H&P
EP/ ARRHYTHMIA    Patient ID:  Patient: Daija Mercer  MRN: [id number]  Age: 78 y.o.  : 1940    Date of  Admission: 2022  9:54 AM   PCP:  Amanda Araujo NP    Assessment: 1. BIV pacemaker battery depletion. Plan:     1. Given the potential benefits, risks, and alternatives, she agrees to proceed. Daija Mercer is a 78 y.o. female with a history of the above here for his procedure.       Past Medical History:   Diagnosis Date    [facility] disease [facility]     Arrhythmia     atrial fibrillation     Arthritis     CAD (coronary artery disease)     CABG, pacemaker    Chronic pain     COPD     Diabetes ([facility]     GERD (gastroesophageal reflux disease)     Heart failure ([facility]     Hypertension     Ill-defined condition     high cholesterol    Peripheral vascular disease (HCC)     Sleep apnea     no longer uses-stopped years ago    Thyroid disease         Past Surgical History:   Procedure Laterality Date    HX ORTHOPAEDIC Left     toe amputation    HX PACEMAKER      2011    ME CARDIAC SURG PROCEDURE UNLIST      3 CABG, Stents Dr. Malika De Leon FLX DX W/COLLJ SPEC WHEN PFRMD  11/10/2011         ME EGD TRANSORAL BIOPSY SINGLE/MULTIPLE  2012         VASCULAR SURGERY PROCEDURE UNLIST Left 2021    left transmetatarsal amputation       Social History     Tobacco Use    Smoking status: Never Smoker    Smokeless tobacco: Never Used   Substance Use Topics    Alcohol use: No        Family History   Problem Relation Age of Onset    Lung Disease Mother     Heart Disease Father     Hypertension Father     Diabetes Sister         Allergies   Allergen Reactions    Percocet [Oxycodone-Acetaminophen] Nausea and Vomiting          Current Facility-Administered Medications   Medication Dose Route Frequency    lidocaine-EPINEPHrine (XYLOCAINE) 1 %-1:100,000 injection    PRN    ceFAZolin 1 g in sodium chloride irrigation 0.9 % 500 mL In the next few weeks you may receive a [employer] survey regarding your most recent clinic visit with us.  Please take a few moments out of your day to accurately evaluate your visit.  We strive to provide you with the best medical care.  Again, thank you for your time and we look forward to your next visit.          Your Child's Health  11-Month-Old Visit      Ashok Bean  December 8, 2017    There were no vitals taken for this visit.        Weight:     NUTRITION:  Growth slows down after one year of age.  Ashok's appetite may decrease and become more sporadic. Serve Ashok food in small portions.  Let him be the  of how much to eat. You should be the one to control what food is available and when. Set a good example right from the start.    If you have not switched from formula yet, you may start cow's milk if Ashok is not sensitive to dairy products. Use whole milk if he is still on baby food. You may use lower fat milk if Ashok eats table foods, because those foods are higher in fat.  He needs some fats or oils in the diet in order to grow. Even if there is heart disease in the family, Ashok should not be fat-restricted before the age of two years. Your child needs to take between 16 and 24 ounces of milk per day.    If Ashok is still nursing, you may wish to supplement with beverages from a cup to ease the transition to independent drinking.    Excessive fruit juice may cause gas, crabbiness, and bowel movements that vary from diarrhea to constipation.  Whole fruit is better for Ashok.  Limit juice to 4 oz. per day.    A multivitamin is recommended for picky eaters and optional for good eaters.      Obesity and being overweight are serious problems in the [place]. You can help your child avoid these problems by following these guidelines:  1. American Academy of Pediatrics recommends no screen time under 2 years.   2. Don't encourage your children to eat if they tell you they are full. Healthy  Irrigation    PRN     Facility-Administered Medications Ordered in Other Encounters   Medication Dose Route Frequency    ceFAZolin (ANCEF) injection   IntraVENous PRN    fentaNYL citrate (PF) injection   IntraVENous PRN    propofoL (DIPRIVAN) 10 mg/mL injection   IntraVENous CONTINUOUS    dexamethasone (DECADRON) 4 mg/mL injection   IntraVENous PRN    ondansetron (ZOFRAN) injection   IntraVENous PRN    dexmedeTOMidine (PRECEDEX) 100 mcg/mL iv solution   IntraVENous PRN       Review of Symptoms:  See OV. Objective:      Physical Exam:  Temp (24hrs), Av.1 °F (36.7 °C), Min:98.1 °F (36.7 °C), Max:98.1 °F (36.7 °C)    Patient Vitals for the past 8 hrs:   Pulse   22 1214 80    Patient Vitals for the past 8 hrs:   Resp   22 1214 18    Patient Vitals for the past 8 hrs:   BP   22 1214 (!) 169/63      No intake or output data in the 24 hours ending 22 1429    Nondiaphoretic, not in acute distress. RRR. Neuro grossly nonfocal.  No tremor. Awake and appropriate.       Dorian Bolaños MD  2022 children will not starve themselves.  3. Don't reward your children for cleaning their plates. If they always leave food, reduce the size of the portions and tell them to ask for more if they are still hungry.  4. Don't allow children to dish out their own portions. They will imitate adults or imitate what they have seen on TV; in both cases, the amount will be too large. This results in increased calories and waste. At least for children above 5 years of age and for adults, people eat more when given larger portions.    DEVELOPMENT/BEHAVIOR:  Children at this age generally will attempt to walk or improve their walking. 10 percent of normal children do not walk by 15 months, so there is a wide range. They should be able to drink from a cup and point to things they want.    Babbling and imitating adult sounds are common at this age.    Ashok will begin to assert his independence at about the same time that he walks independently.  1. Parents should discuss and agree on which behaviors are to be forbidden and on their approach to discipline.  Remember that things that may seem \"cute\" when children are small (like hitting) are not so cute when they are teens.  2. Long lectures after misbehavior don't work; in fact, they actually reinforce the misbehavior by giving attention, the thing that your child most desires from you.  Help yourself and Ashok by being brief, by walling off hazards and breakables, and by distracting him from tantrums.  Nobody has the energy to teach all the rules at once. Work on a few things at a time.   3. Children are smart enough to learn that Daddy and Mommy may tolerate different things, but for dangerous behavior it is important to be consistent. Ashok will learn faster.  4. Tantrums will appear soon. The most effective way of dealing with tantrums is to turn your back and walk away. The loss of a parent's attention will bring the tantrum to an end.     ACCIDENT PREVENTION:  1. Falls:   Remove furniture with sharp edges. Coffee tables are especially hazardous.  Use ruiz on stairs and window latches on upper-story windows.  2. Choking:  Avoid peanuts, gum, hard candy, raisins, and popcorn until age five.  Children can choke on foods that they can't chew well. Don't let Ashok eat while running or playing. Be sure foods such as grapes, peas, and corn are prepared in a size and consistency he can handle.  3. Burns:  Begin to teach hot and cold. Have a family fire safety plan, including regular smoke detector battery checks, working fire extinguishers, and two planned escape routes.  4. Poisoning: Use safety caps on medicines. Household  and polishes must be kept out of reach. Store medicines and  out of sight. Don't transfer medicines to non-child-proofed or unlabeled containers.  Dispose of unused medications. Be especially careful when visiting older persons or families without children in the house. They may be in the habit of keeping their medicines out on tables.     Syrup of Ipecac is no longer recommended to be kept in the home.  Please dispose of any remaining supplies.    Call the Poison Center at 1-644.209.6890 for any known or suspected poisoning.    LEAD EXPOSURE:  If there is lead in the house, Ashok may be vulnerable. Let us know if any of the following apply:  1. Your home was built before 1978 and has peeling or chipping or chalking paint.  Homes built in older cities at the turn of the last century may have lead pipes.  Check to see if any of the above applies to Ashok's sitter's home, , , or any other house where he spends time.  2. You may have other sources of lead in the home, including:  (a) herbal remedies like natty, jian, ghasard, kandu, azarcon, pay-loo-ah, or balagoli; (b) antique toys, especially those made of lead or pewter; (c) imported mini blinds; and (d) imported canned goods, especially acid foods like tomato and citrus. Do not cook or  store foods in utensils made of crystal, pewter, or imported pottery.  3. You have another child or housemate who is being followed or treated for an elevated lead level.  4. Ashok lives with an adult whose job or hobby involves lead exposure (soldering, battery manufacture, recycling, casting, stained glass, etc.).  5. You live near an active lead smelter, a battery recycling plant, or a plant that processes hot metal.    CAR SAFETY:  An approved rear facing car seat in the back seat of the car is required by [place] law until Ashok is two years old. A rear-facing car seat in the back seat is the safest place for him. This is especially true if your car is equipped with passenger-side air bags. Forward-facing seats are not recommended until Ashok is over two years of age.    SMOKING:  Children exposed to tobacco smoke have more ear infections and pneumonia. Cold symptoms last longer. If you smoke, please quit. If you cannot quit, smoke outside. Do not smoke near Ashok, and do not let others smoke near him.    SHOES:  Ashok's feet will develop fine whether they have shoes or not. Children can run barefoot indoors or outdoors, as long as the surface is smooth and not hot.  Shoes provide safety, warmth, protection and, of course, decoration. Choose shoes that have a roomy fit and flat, flexible soles with nonskid bottoms.    SLEEP:  The basic principles of good sleep for children are similar to those for adults:  1. Have a consistent bedtime.  2. Have a consistent time for the evening meal, preferably not immediately before bedtime.  3. Avoid overly stimulating activity before bedtime.  For example, soon before going to bed Ashok should not engage in vigorous physical activity or watch scary or intense movies or TV shows, especially those in which children are frightened or hurt.  4. Follow a reassuring routine at bedtime (stories, prayers, songs, etc.).  5. Allow Ashok to have a transition object.  This might be a  pillow, favorite blanket, or stuffed animal.  Avoid providing transition objects for the mouth.  Do not use objects on strings or objects that could be swallowed.  6. Have a consistent waking time unless Ashok is ill and needs extra sleep.  Allowing children who have slept poorly at night to \"sleep in\" the next day delays meals and naps, and gets them further off schedule.  You would do better to allow a longer nap.    SUN EXPOSURE:  If you plan to have Ashok outside for more than 30 minutes, please apply sun screen.    TUBERCULOSIS:  There is such a low rate of tuberculosis in our area that frequent skin tests are no longer recommended. However, certain situations do increase Ashok's risk, including contact with AIDS patients, nursing home residents, prisoners, immigrants, or homeless persons. Please let us know if Ashok has contacts with such persons, or if he has contact with anyone known to have or suspected of having tuberculosis.      MEDICATION FOR FEVER OR PAIN:   Acetaminophen liquid (e.g., Tylenol or Tempra) may be given every four hours as needed for pain or fever. Be sure to check which product CONCENTRATION you are using.    INFANT Tylenol/Acetaminophen  Drops (160 mg/5 mL)    Child’s Weight: Dose:  18 - 23 pounds:   120 mg (3.75 mL (3/4 Teaspoon))  23 - 27 pounds:   160 mg (5.0 mL (1 Teaspoon))    CHILDREN’S Tylenol/Acetaminophen  (160 mg/5 mL)    Child’s Weight: Dose:  18 - 23 pounds:   120 mg (3.75 mL (3/4 Teaspoon))  23 - 27 pounds:   160 mg (5.0 mL (1 Teaspoon))    INFANT Ibuprofen (50 mg/1.25 ml)  liquid (for example Advil or Motrin) may be given every 6 hours as needed for pain or fever.    Child’s Weight: Dose:  18 - 23 pounds:   80 - 100 mg (2.0 - 2.5 mL (2/5 - 1/2 Teaspoon))  23 - 27 pounds:   100 - 125 mg (2.5 - 3.125 mL (1/2 - 2/3 Teaspoon))      CHILDREN'S Ibuprofen (100 mg/5 mL) liquid (for example Advil or Motrin) may be given every 6 hours as needed for pain or fever.    Child’s  Weight: Dose:  18 - 23 pounds:   80 - 100 mg (4.0 - 5.0 mL (4/5 - 1 Teaspoon))  23 - 27 pounds:   100 - 125 mg (5.0 - 6.25 mL (1 - 1 1/4 Teaspoons))    If Ashok is outside these weight ranges, call your pediatrician's office for advice.    Most Recent Immunizations   Administered Date(s) Administered   • DTaP/HIB/IPV 2017   • DTaP/Hep B/IPV 2017   • HIB, Unspecified Formulation 2017   • Hep B, adolescent or pediatric 2017   • Influenza, injectable, quadrivalent 2017   • Pneumococcal Conjugate 13 valent 2017   • Rotavirus - monovalent 2017       If Ashok develops any of the following reactions within 72 hours after an immunization, notify your pediatrician by calling the pediatric phone nurse:  1. A temperature of 105 degrees or above  2. More than 3 hours of continuous crying  3. A shrill, high-pitched cry  4. A pale, limp spell  5. A seizure or fainting spell.  In this case, you should call 911 or go immediately to the emergency room.    NEXT VISIT:  13 MONTHS OF AGE    Thank you for entrusting your care to [facility].

## 2022-06-30 NOTE — PROGRESS NOTES
Primary Nurse Curtis Garcia and Marimar Mojica, RN performed a dual skin assessment on this patient No impairment noted  Gerald score is 23; patient refused meplix

## 2022-06-30 NOTE — ANESTHESIA POSTPROCEDURE EVALUATION
Procedure(s):  REMOVE & REPLACE PPM GEN BIV MULTI LEADS. MAC    Anesthesia Post Evaluation      Multimodal analgesia: multimodal analgesia used between 6 hours prior to anesthesia start to PACU discharge  Patient location during evaluation: PACU  Patient participation: complete - patient participated  Level of consciousness: sleepy but conscious and responsive to verbal stimuli  Pain score: 2  Pain management: adequate  Airway patency: patent  Anesthetic complications: no  Cardiovascular status: acceptable  Respiratory status: acceptable  Hydration status: acceptable  Comments: +Post-Anesthesia Evaluation and Assessment    Patient: Delgado Stephens MRN: [id number]  SSN: [id number]   YOB: 1940  Age: 78 y.o. Sex: female      Cardiovascular Function/Vital Signs    BP (!) 145/48   Pulse 70   Temp 36.5 °C (97.7 °F)   Resp 19   Ht 4' 9\" (1.448 m)   Wt 57.6 kg (127 lb)   SpO2 97%   Breastfeeding No   BMI 27.48 kg/m²     Patient is status post Procedure(s):  REMOVE & REPLACE PPM GEN BIV MULTI LEADS. Nausea/Vomiting: Controlled. Postoperative hydration reviewed and adequate. Pain:  Pain Scale 1: Numeric (0 - 10) (06/30/22 1421)  Pain Intensity 1: 0 (06/30/22 1421)   Managed. Neurological Status: At baseline. Mental Status and Level of Consciousness: Arousable. Pulmonary Status:   O2 Device: None (06/30/22 1435)   Adequate oxygenation and airway patent. Complications related to anesthesia: None    Post-anesthesia assessment completed. No concerns.     Signed By: Arpita Ramirez MD    6/30/2022  Post anesthesia nausea and vomiting:  controlled  Final Post Anesthesia Temperature Assessment:  Normothermia (36.0-37.5 degrees C)      INITIAL Post-op Vital signs:   Vitals Value Taken Time   /48 06/30/22 1435   Temp 36.5 °C (97.7 °F) 06/30/22 1435   Pulse 70 06/30/22 1435   Resp 19 06/30/22 1435   SpO2 97 % 06/30/22 1435

## 2022-06-30 NOTE — PROGRESS NOTES
Laurie Cruz is recovering post-procedure. Left infraclavicular site dressing is CDI without swelling or bleeding. VSS. Rhythm paced. Laurie Cruz denies complaints at this time. If recovery continues to progress without complication, discharge is planned for later today.           Dakota Soria NP  DNP, RN, AGACNP-BC

## 2022-06-30 NOTE — PROGRESS NOTES
Patient sitting up side of bed and assisted to transfer to wheelchair with moderate assistance which is baseline; no complaints of discomfort, dizziness, sob. Left incision site clean dry and intact. Discharge instructions reviewed with patient and patients family; answered questions as needed. Transported patient in wheelchair to car.

## 2022-06-30 NOTE — PROGRESS NOTES
Cardiac Cath Lab Recovery Arrival Note:      Marilee Meléndez arrived to Cardiac Cath Lab, Recovery Area. Staff introduced to patient. Patient identifiers verified with NAME and DATE OF BIRTH. Procedure verified with patient. Consent forms reviewed and signed by patient or authorized representative and verified. Allergies verified. Patient and family oriented to department. Patient and family informed of procedure and plan of care. Questions answered with review. Patient prepped for procedure, per orders from physician, prior to arrival.    Patient on cardiac monitor, non-invasive blood pressure, SPO2 monitor. On RA. Patient is A&Ox 4. Patient reports no complaints; patient required a wheelchair and max assistance. Patient in stretcher, in low position, with side rails up, call bell within reach, patient instructed to call if assistance as needed. Patient prep in: [address], [place]. Patient family has pager # none  Family in: daughter in [facility].    Prep by: Zulay Novak

## 2022-06-30 NOTE — Clinical Note
TRANSFER - OUT REPORT:     Verbal report given to: [facility] Recovery Area, (at bedside). Report consisted of patient's Situation, Background, Assessment and   Recommendations(SBAR). Opportunity for questions and clarification was provided. Patient transported with a Registered Nurse and Oxygen. Oxygen used for patient = nasal cannula, @ 2 - 6 Liters. Patient transported to: [address]    transported with CRNA and EP Lab staff/ see Anesthesia Record

## 2022-11-04 ENCOUNTER — APPOINTMENT (OUTPATIENT)
Dept: CT IMAGING | Age: 82
DRG: 300 | End: 2022-11-04
Attending: EMERGENCY MEDICINE
Payer: MEDICARE

## 2022-11-04 ENCOUNTER — HOSPITAL ENCOUNTER (INPATIENT)
Age: 82
LOS: 5 days | Discharge: HOME HEALTH | DRG: 300 | End: 2022-11-10
Attending: EMERGENCY MEDICINE | Admitting: INTERNAL MEDICINE
Payer: MEDICARE

## 2022-11-04 DIAGNOSIS — I73.9 PAD (PERIPHERAL ARTERY DISEASE) (HCC): Primary | ICD-10-CM

## 2022-11-04 LAB
ALBUMIN SERPL-MCNC: 3.9 G/DL (ref 3.5–5)
ALBUMIN/GLOB SERPL: 1 {RATIO} (ref 1.1–2.2)
ALP SERPL-CCNC: 408 U/L (ref 45–117)
ALT SERPL-CCNC: 17 U/L (ref 12–78)
ANION GAP SERPL CALC-SCNC: 5 MMOL/L (ref 5–15)
AST SERPL-CCNC: 25 U/L (ref 15–37)
BASOPHILS # BLD: 0.1 K/UL (ref 0–0.1)
BASOPHILS NFR BLD: 1 % (ref 0–1)
BILIRUB SERPL-MCNC: 0.4 MG/DL (ref 0.2–1)
BUN SERPL-MCNC: 25 MG/DL (ref 6–20)
BUN/CREAT SERPL: 18 (ref 12–20)
CALCIUM SERPL-MCNC: 10.4 MG/DL (ref 8.5–10.1)
CHLORIDE SERPL-SCNC: 107 MMOL/L (ref 97–108)
CO2 SERPL-SCNC: 28 MMOL/L (ref 21–32)
CREAT SERPL-MCNC: 1.42 MG/DL (ref 0.55–1.02)
DIFFERENTIAL METHOD BLD: ABNORMAL
EOSINOPHIL # BLD: 0.2 K/UL (ref 0–0.4)
EOSINOPHIL NFR BLD: 2 % (ref 0–7)
ERYTHROCYTE [DISTWIDTH] IN BLOOD BY AUTOMATED COUNT: 12.4 % (ref 11.5–14.5)
GLOBULIN SER CALC-MCNC: 4.1 G/DL (ref 2–4)
GLUCOSE BLD STRIP.AUTO-MCNC: 190 MG/DL (ref 65–117)
GLUCOSE BLD STRIP.AUTO-MCNC: 60 MG/DL (ref 65–117)
GLUCOSE BLD STRIP.AUTO-MCNC: 62 MG/DL (ref 65–117)
GLUCOSE BLD STRIP.AUTO-MCNC: 69 MG/DL (ref 65–117)
GLUCOSE SERPL-MCNC: 63 MG/DL (ref 65–100)
HCT VFR BLD AUTO: 42.5 % (ref 35–47)
HGB BLD-MCNC: 13.6 G/DL (ref 11.5–16)
IMM GRANULOCYTES # BLD AUTO: 0 K/UL (ref 0–0.04)
IMM GRANULOCYTES NFR BLD AUTO: 0 % (ref 0–0.5)
LYMPHOCYTES # BLD: 1.7 K/UL (ref 0.8–3.5)
LYMPHOCYTES NFR BLD: 19 % (ref 12–49)
MCH RBC QN AUTO: 32.9 PG (ref 26–34)
MCHC RBC AUTO-ENTMCNC: 32 G/DL (ref 30–36.5)
MCV RBC AUTO: 102.9 FL (ref 80–99)
MONOCYTES # BLD: 1 K/UL (ref 0–1)
MONOCYTES NFR BLD: 11 % (ref 5–13)
NEUTS SEG # BLD: 6 K/UL (ref 1.8–8)
NEUTS SEG NFR BLD: 67 % (ref 32–75)
NRBC # BLD: 0 K/UL (ref 0–0.01)
NRBC BLD-RTO: 0 PER 100 WBC
PLATELET # BLD AUTO: 178 K/UL (ref 150–400)
PMV BLD AUTO: 10.6 FL (ref 8.9–12.9)
POTASSIUM SERPL-SCNC: 4.4 MMOL/L (ref 3.5–5.1)
PROT SERPL-MCNC: 8 G/DL (ref 6.4–8.2)
RBC # BLD AUTO: 4.13 M/UL (ref 3.8–5.2)
SERVICE CMNT-IMP: ABNORMAL
SERVICE CMNT-IMP: NORMAL
SODIUM SERPL-SCNC: 140 MMOL/L (ref 136–145)
WBC # BLD AUTO: 8.9 K/UL (ref 3.6–11)

## 2022-11-04 PROCEDURE — 74011000636 HC RX REV CODE- 636: Performed by: EMERGENCY MEDICINE

## 2022-11-04 PROCEDURE — 80053 COMPREHEN METABOLIC PANEL: CPT

## 2022-11-04 PROCEDURE — 82962 GLUCOSE BLOOD TEST: CPT

## 2022-11-04 PROCEDURE — 75635 CT ANGIO ABDOMINAL ARTERIES: CPT

## 2022-11-04 PROCEDURE — 36415 COLL VENOUS BLD VENIPUNCTURE: CPT

## 2022-11-04 PROCEDURE — 74011250637 HC RX REV CODE- 250/637: Performed by: EMERGENCY MEDICINE

## 2022-11-04 PROCEDURE — 85025 COMPLETE CBC W/AUTO DIFF WBC: CPT

## 2022-11-04 PROCEDURE — 99285 EMERGENCY DEPT VISIT HI MDM: CPT

## 2022-11-04 RX ORDER — ACETAMINOPHEN 500 MG
1000 TABLET ORAL ONCE
Status: COMPLETED | OUTPATIENT
Start: 2022-11-04 | End: 2022-11-04

## 2022-11-04 RX ADMIN — ACETAMINOPHEN 1000 MG: 500 TABLET ORAL at 23:01

## 2022-11-04 RX ADMIN — IOPAMIDOL 100 ML: 755 INJECTION, SOLUTION INTRAVENOUS at 21:35

## 2022-11-04 NOTE — ED PROVIDER NOTES
EMERGENCY DEPARTMENT HISTORY AND PHYSICAL EXAM      Date: 11/4/2022  Patient Name: Graciela Live  Patient Age and Sex: 78 y.o. female     History of Presenting Illness     Chief Complaint   Patient presents with    Leg Pain     B/l leg pain for the past 2 days sharp/pressure calf pain      History Provided By: Patient    HPI: Graciela Live is an 78-year-old history CAD, PVD, CABG presenting with right leg pain. Patient has had persistent right leg pain for the past 2 days. It is sharp mostly in her calf. No fall no trauma. She has baseline neuropathy from her diabetes. Patient and family numbers have also noted her right lower extremity to be cooler to the touch than her left. No changes to her medications. Seen by podiatry a couple of days ago. Patient found to be hypoglycemic on arrival.  She is acting  her normal self. Last insulin was administered about 3 PM, received 5 units of insulin at the time and ate a normal supper following this. Patient has Alzheimer's,    There are no other complaints, changes, or physical findings at this time. PCP: Aureliano Ledesma NP    No current facility-administered medications on file prior to encounter. Current Outpatient Medications on File Prior to Encounter   Medication Sig Dispense Refill    OTHER Restart eliquis 7/2/22 1 UNSPECIFIED 0    famotidine (PEPCID) 40 mg tablet Take 1 Tablet by mouth daily. 30 Tablet 0    venlafaxine-SR (Effexor XR) 75 mg capsule Take 75 mg by mouth nightly. metoprolol succinate (TOPROL-XL) 50 mg XL tablet Take 50 mg by mouth daily. HYDROcodone-acetaminophen (NORCO) 5-325 mg per tablet Take 1 Tablet by mouth as needed for Pain.      polyethylene glycol (Miralax) 17 gram/dose powder Take 17 g by mouth daily. acetaminophen (Tylenol Extra Strength) 500 mg tablet Take 500 mg by mouth every six (6) hours as needed for Pain. rosuvastatin (CRESTOR) 5 mg tablet Take 5 mg by mouth nightly.       ondansetron hcl (ZOFRAN) 8 mg tablet Take 8 mg by mouth every eight (8) hours as needed for Nausea or Vomiting. apixaban (ELIQUIS) 2.5 mg tablet Take 1 Tablet by mouth two (2) times a day. (Patient taking differently: Take 2.5 mg by mouth two (2) times a day. To stop 2 days prior to surgery per surgeon) 180 Tablet 1    cholecalciferol (Vitamin D3) (1000 Units /25 mcg) tablet Take 2,000 Units by mouth daily. levothyroxine (synthroid) 25 mcg tablet Take 25 mcg by mouth Daily (before breakfast). sulfaSALAzine (AZULFIDINE) 500 mg tablet Take 500 mg by mouth two (2) times a day. ferrous gluconate (FERGON) 240 mg (27 mg iron) tablet Take 240 mg by mouth three (3) times daily (with meals). dilTIAZem ER (TIAZAC) 180 mg capsule Take 180 mg by mouth daily. spironolactone (ALDACTONE) 25 mg tablet Take 25 mg by mouth every other day. insulin aspart protamine/insulin aspart (NOVOLOG MIX 70-30 U-100 INSULN) 100 unit/mL (70-30) injection 16 Units by SubCUTAneous route daily. Patient takes 16 units in the AM and 5 units QHS      furosemide (LASIX) 40 mg tablet Take 1 Tab by mouth daily.  30 Tab 0       Past History   Past Medical History:  Past Medical History:   Diagnosis Date    Alzheimer disease ([facility] [address]     Arrhythmia     atrial fibrillation 2018    Arthritis     CAD (coronary artery disease)     CABG, pacemaker    Chronic pain     COPD     Diabetes ([facility] [address]     GERD (gastroesophageal reflux disease)     Heart failure (HCC)     Hypertension     Ill-defined condition     high cholesterol    Peripheral vascular disease (HCC)     Sleep apnea     no longer uses-stopped years ago    Thyroid disease        Past Surgical History:  Past Surgical History:   Procedure Laterality Date    HX ORTHOPAEDIC Left     toe amputation    HX PACEMAKER      July 2011    DC CARDIAC SURG PROCEDURE UNLIST      3 CABG, Stents Dr. Jose Morales COLONOSCOPY FLX DX W/COLLJ [facility] WHEN PFRMD  11/10/2011         DC EGD TRANSORAL BIOPSY SINGLE/MULTIPLE  8/29/2012         VASCULAR SURGERY PROCEDURE UNLIST Left 09/2021    left transmetatarsal amputation       Family History:  Family History   Problem Relation Age of Onset    Lung Disease Mother     Heart Disease Father     Hypertension Father     Diabetes Sister        Social History:  Social History     Tobacco Use    Smoking status: Never    Smokeless tobacco: Never   Vaping Use    Vaping Use: Never used   Substance Use Topics    Alcohol use: No    Drug use: No       Allergies: Allergies   Allergen Reactions    Percocet [Oxycodone-Acetaminophen] Nausea and Vomiting       Review of Systems   Review of Systems   Constitutional:  Negative for chills and fever. HENT:  Negative for congestion and rhinorrhea. Respiratory:  Negative for shortness of breath. Cardiovascular:  Negative for chest pain. Gastrointestinal:  Negative for abdominal pain, nausea and vomiting. Genitourinary:  Negative for dysuria and frequency. Musculoskeletal:  Negative for myalgias. Positive for right leg pain   All other systems reviewed and are negative. Physical Exam   Physical Exam  Vitals and nursing note reviewed. Constitutional:       General: She is not in acute distress. Appearance: Normal appearance. She is not ill-appearing. HENT:      Head: Normocephalic. Mouth/Throat:      Mouth: Mucous membranes are moist.   Eyes:      Conjunctiva/sclera: Conjunctivae normal.   Cardiovascular:      Rate and Rhythm: Normal rate and regular rhythm. Pulses: Normal pulses. Comments: I do not palpate PT or PT pulses on the right however capillary fill is approximately 3 seconds on the right  Pulmonary:      Effort: Pulmonary effort is normal.      Breath sounds: Normal breath sounds. Abdominal:      General: Abdomen is flat. Palpations: Abdomen is soft. Musculoskeletal:         General: No deformity.       Comments: Status post left transmetatarsal amputation   Skin:     General: Skin is dry. Capillary Refill: Capillary refill takes 2 to 3 seconds. Comments: Right lower extremity is colder than the left   Neurological:      Mental Status: She is alert and oriented to person, place, and time. Mental status is at baseline. Psychiatric:         Behavior: Behavior normal.         Thought Content: Thought content normal.        Diagnostic Study Results   Labs  Recent Results (from the past 12 hour(s))   GLUCOSE, POC    Collection Time: 11/04/22  5:59 PM   Result Value Ref Range    Glucose (POC) 62 (L) 65 - 117 mg/dL    Performed by Alison MARCANO    GLUCOSE, POC    Collection Time: 11/04/22  6:04 PM   Result Value Ref Range    Glucose (POC) 60 (L) 65 - 117 mg/dL    Performed by Alison MARCANO    CBC WITH AUTOMATED DIFF    Collection Time: 11/04/22  6:30 PM   Result Value Ref Range    WBC 8.9 3.6 - 11.0 K/uL    RBC 4.13 3.80 - 5.20 M/uL    HGB 13.6 11.5 - 16.0 g/dL    HCT 42.5 35.0 - 47.0 %    .9 (H) 80.0 - 99.0 FL    MCH 32.9 26.0 - 34.0 PG    MCHC 32.0 30.0 - 36.5 g/dL    RDW 12.4 11.5 - 14.5 %    PLATELET 686 393 - 578 K/uL    MPV 10.6 8.9 - 12.9 FL    NRBC 0.0 0  WBC    ABSOLUTE NRBC 0.00 0.00 - 0.01 K/uL    NEUTROPHILS 67 32 - 75 %    LYMPHOCYTES 19 12 - 49 %    MONOCYTES 11 5 - 13 %    EOSINOPHILS 2 0 - 7 %    BASOPHILS 1 0 - 1 %    IMMATURE GRANULOCYTES 0 0.0 - 0.5 %    ABS. NEUTROPHILS 6.0 1.8 - 8.0 K/UL    ABS. LYMPHOCYTES 1.7 0.8 - 3.5 K/UL    ABS. MONOCYTES 1.0 0.0 - 1.0 K/UL    ABS. EOSINOPHILS 0.2 0.0 - 0.4 K/UL    ABS. BASOPHILS 0.1 0.0 - 0.1 K/UL    ABS. IMM.  GRANS. 0.0 0.00 - 0.04 K/UL    DF AUTOMATED     METABOLIC PANEL, COMPREHENSIVE    Collection Time: 11/04/22  6:30 PM   Result Value Ref Range    Sodium 140 136 - 145 mmol/L    Potassium 4.4 3.5 - 5.1 mmol/L    Chloride 107 97 - 108 mmol/L    CO2 28 21 - 32 mmol/L    Anion gap 5 5 - 15 mmol/L    Glucose 63 (L) 65 - 100 mg/dL    BUN 25 (H) 6 - 20 MG/DL    Creatinine 1.42 (H) 0.55 - 1.02 MG/DL BUN/Creatinine ratio 18 12 - 20      eGFR 37 (L) >60 ml/min/1.73m2    Calcium 10.4 (H) 8.5 - 10.1 MG/DL    Bilirubin, total 0.4 0.2 - 1.0 MG/DL    ALT (SGPT) 17 12 - 78 U/L    AST (SGOT) 25 15 - 37 U/L    Alk. phosphatase 408 (H) 45 - 117 U/L    Protein, total 8.0 6.4 - 8.2 g/dL    Albumin 3.9 3.5 - 5.0 g/dL    Globulin 4.1 (H) 2.0 - 4.0 g/dL    A-G Ratio 1.0 (L) 1.1 - 2.2     GLUCOSE, POC    Collection Time: 11/04/22  6:40 PM   Result Value Ref Range    Glucose (POC) 69 65 - 117 mg/dL    Performed by Anupam MARCANO    GLUCOSE, POC    Collection Time: 11/04/22 10:48 PM   Result Value Ref Range    Glucose (POC) 190 (H) 65 - 117 mg/dL    Performed by Miroslava Kellogg RN        Radiologic Studies -   CTA ABD ART W RUNOFF W WO CONT   Final Result   Vascular:   1. Ostial stenosis of the right SFA, with occlusion of the mid SFA. There is   weak distal reconstitution. 2. Multifocal stenosis of the left SFA, with weak distal arterial enhancement. 3. Irregular beaded contour of the splenic artery, raising the possibility of   viral muscular dysplasia. The renal arteries demonstrate moderate   atherosclerotic disease, without beading. Nonvascular:   1. Cirrhosis. 2. Cardiomegaly, with findings compatible with diminished right heart function. 3. Fibroid uterus. 4. Severe degenerative changes of the lower lumbar spine, with multiple levels   of moderate spinal canal narrowing. CT Results  (Last 48 hours)                 11/04/22 2134  CTA ABD ART W RUNOFF W WO CONT Final result    Impression:  Vascular:   1. Ostial stenosis of the right SFA, with occlusion of the mid SFA. There is   weak distal reconstitution. 2. Multifocal stenosis of the left SFA, with weak distal arterial enhancement. 3. Irregular beaded contour of the splenic artery, raising the possibility of   viral muscular dysplasia. The renal arteries demonstrate moderate   atherosclerotic disease, without beading. Nonvascular:   1. Cirrhosis. 2. Cardiomegaly, with findings compatible with diminished right heart function. 3. Fibroid uterus. 4. Severe degenerative changes of the lower lumbar spine, with multiple levels   of moderate spinal canal narrowing. Narrative:  CLINICAL HISTORY:  Painful, cool right lower extremity        COMPARISON: CT abdomen pelvis September 1, 2020, CTA abdomen pelvis with runoff   December 14, 2018       TECHNIQUE: CT of the abdomen and pelvis with  IV contrast ,100 cc [id number] is   performed. Axial images from the abdomen to the level of the feet is obtained. Manual post-processing of the images and coronal reformatting is also performed. Standard dose modulation was utilized to reduce the overall radiation dose   administered to the patient. Multiplanar reformatted imaging was performed. Sagittal and coronal reformatting. CT dose reduction was achieved through use   of a standardized protocol tailored for this examination and automatic exposure   control for dose modulation. 3-D MIP reconstructed images were obtained in the coronal plane. FINDINGS:    Vasculature: Aorta: The abdominal aorta, with moderate atherosclerotic disease, without   aneurysm or dissection. Visceral arteries: The celiac and superior mesenteric arteries are widely   patent. The inferior mesenteric artery is widely patent. There is irregular   contour of the splenic artery, with a bead like appearance. Renal arteries: Moderate disease bilaterally, without hemodynamically   significant stenosis. Right iliac arteries: Moderate atherosclerotic disease without hemodynamically   significant stenosis. Left iliac arteries: Moderate atherosclerotic disease without hemodynamically   significant stenosis. Right femoral trunk: The right common femoral artery is patent with moderate   disease. Ostial stenosis of the right SFA. There is a patent stent in the   proximal right SFA.  There is occlusion of the mid SFA, with weak reconstitution. There is weak reconstitution of the popliteal artery, with intact but weak   opacification of the right fibular artery. Left femoral trunk: Moderate disease of the left CFA. Multifocal stenoses of the   left SFA. There is intact but weak opacification of the left anterior tibial   artery. Weak intermittent opacification of the fibular artery, without any   opacification distal to the ankle. Lower chest:   Visualized part of the lungs are clear. No nodules or masses. No pericardial   effusion or pneumothorax. The distal esophagus is unremarkable. Cardiomegaly. There is a large amount of contrast reflux in the right atrium into the IVC and   hepatic veins. Abdomen/Pelvis:   Cirrhosis. Normal gallbladder. Cortical thinning in the kidneys bilaterally. Stable hyperenhancing focus in the spleen, likely reflecting a hemangioma. There are no focal abnormalities within the pancreas, adrenal glands. Stomach, duodenum, and small bowel are unremarkable. The colon is unremarkable. No retroperitoneal lymphadenopathy or mass. Coarse calcification in the uterus   compatible with fibroids. Severe degenerative changes in the lower lumbar spine, with multiple levels of   moderate spinal canal narrowing. CXR Results  (Last 48 hours)      None            Medical Decision Making   I am the first provider for this patient. I reviewed the vital signs, available nursing notes, past medical history, past surgical history, family history and social history. Vital Signs-Reviewed the patient's vital signs.   Patient Vitals for the past 12 hrs:   Temp Pulse Resp BP SpO2   11/04/22 2209 -- -- -- (!) 152/98 --   11/04/22 2024 -- -- -- (!) 148/55 97 %   11/04/22 1924 98.7 °F (37.1 °C) 70 18 (!) 158/63 97 %   11/04/22 1801 -- -- -- 138/74 --   11/04/22 1759 98.3 °F (36.8 °C) 70 16 -- 97 %       Records Reviewed: Nursing Notes and Old Medical Records    Provider Notes (Medical Decision Making):   DDx chronic PAD versus acute ischemic limb, there is no welling there is no trauma there is no focal bony tenderness, doubt osseous injury no pain with range of motion of joints. Obtain CT abdomen with runoff given asymmetric coolness to the right lower extremity. She is mildly hypoglycemic on arrival to 60, given oral glucose, will recheck. Check CBC CMP as well. ED Course:   Initial assessment performed. The patients presenting problems have been discussed, and they are in agreement with the care plan formulated and outlined with them. I have encouraged them to ask questions as they arise throughout their visit. ED Course as of 11/05/22 0031   Fri Nov 04, 2022   1844 Repeat glucose normal at 69 [WB]   1916 Creatinine of 1.42 [WB]   1916 Alkaline phosphatase of 408 which is elevated from her prior however it has been elevated in the past.  Other LFTs are within normal limits [WB]   2234 Patient sitting comfortably in room, family at bedside pending CT results [WB]   2249 Glucose 190 [WB]   2327 I did call radiology as study was performed 2 hours ago still not read [WB]   26 Message has been left for Dr. Hancock Code by CT rad tech [WB]   0907 CT shows ostial stenosis of the right SFA with occlusion of the mid SFA with a weak distal reconstitution. On the left there is multifocal stenosis of the left SFA with weak arterial enhancement. [WB]   Sat Nov 05, 2022   0014 There is no dopplerable DP or PT pulse, will consult vascular surgery start on IV heparin admit [WB]   0021 Pain is controlled with Tylenol [WB]      ED Course User Index  [WB] Ted Pizano MD     Disposition:  Admission Note:  Patient is being admitted to the hospital by Dr. Mike Fuller, Service: Hospitalist.  The results of their tests and reasons for their admission have been discussed with them and available family.  They convey agreement and understanding for the need to be admitted and for their admission diagnosis. Diagnosis     Clinical Impression:   1. PAD (peripheral artery disease) (HCC)        Attestations:    Yifan Wallace M.D. Please note that this dictation was completed with VSHORE, the computer voice recognition software. Quite often unanticipated grammatical, syntax, homophones, and other interpretive errors are inadvertently transcribed by the computer software. Please disregard these errors. Please excuse any errors that have escaped final proofreading. Thank you.

## 2022-11-04 NOTE — Clinical Note
Status[de-identified] INPATIENT [101]   Type of Bed: Telemetry [19]   Cardiac Monitoring Required?: Yes   Inpatient Hospitalization Certified Necessary for the Following Reasons: 9.  Other (further clarification in H&P documentation)   Admitting Diagnosis: Occlusive thrombus [082331]   Admitting Physician: Liz Ying [[id number]]   Attending Physician: Alberto Baker   Estimated Length of Stay: 2 Midnights   Discharge Plan[de-identified] Home with Office Follow-up

## 2022-11-05 PROBLEM — I82.90 OCCLUSIVE THROMBUS: Status: ACTIVE | Noted: 2022-11-05

## 2022-11-05 LAB
APTT PPP: 31.2 SEC (ref 22.1–31)
APTT PPP: 31.6 SEC (ref 22.1–31)
APTT PPP: 38.4 SEC (ref 22.1–31)
APTT PPP: 84.8 SEC (ref 22.1–31)
BASOPHILS # BLD: 0 K/UL (ref 0–0.1)
BASOPHILS NFR BLD: 1 % (ref 0–1)
DIFFERENTIAL METHOD BLD: ABNORMAL
EOSINOPHIL # BLD: 0.1 K/UL (ref 0–0.4)
EOSINOPHIL NFR BLD: 2 % (ref 0–7)
ERYTHROCYTE [DISTWIDTH] IN BLOOD BY AUTOMATED COUNT: 12.6 % (ref 11.5–14.5)
GLUCOSE BLD STRIP.AUTO-MCNC: 102 MG/DL (ref 65–117)
GLUCOSE BLD STRIP.AUTO-MCNC: 105 MG/DL (ref 65–117)
GLUCOSE BLD STRIP.AUTO-MCNC: 99 MG/DL (ref 65–117)
HCT VFR BLD AUTO: 37.4 % (ref 35–47)
HGB BLD-MCNC: 11.7 G/DL (ref 11.5–16)
IMM GRANULOCYTES # BLD AUTO: 0 K/UL (ref 0–0.04)
IMM GRANULOCYTES NFR BLD AUTO: 0 % (ref 0–0.5)
LYMPHOCYTES # BLD: 1.3 K/UL (ref 0.8–3.5)
LYMPHOCYTES NFR BLD: 16 % (ref 12–49)
MCH RBC QN AUTO: 32 PG (ref 26–34)
MCHC RBC AUTO-ENTMCNC: 31.3 G/DL (ref 30–36.5)
MCV RBC AUTO: 102.2 FL (ref 80–99)
MONOCYTES # BLD: 0.7 K/UL (ref 0–1)
MONOCYTES NFR BLD: 8 % (ref 5–13)
NEUTS SEG # BLD: 5.9 K/UL (ref 1.8–8)
NEUTS SEG NFR BLD: 73 % (ref 32–75)
NRBC # BLD: 0 K/UL (ref 0–0.01)
NRBC BLD-RTO: 0 PER 100 WBC
PLATELET # BLD AUTO: 158 K/UL (ref 150–400)
PMV BLD AUTO: 10.4 FL (ref 8.9–12.9)
RBC # BLD AUTO: 3.66 M/UL (ref 3.8–5.2)
SERVICE CMNT-IMP: NORMAL
THERAPEUTIC RANGE,PTTT: ABNORMAL SECS (ref 58–77)
WBC # BLD AUTO: 8.1 K/UL (ref 3.6–11)

## 2022-11-05 PROCEDURE — 74011250637 HC RX REV CODE- 250/637: Performed by: NURSE PRACTITIONER

## 2022-11-05 PROCEDURE — 74011250636 HC RX REV CODE- 250/636: Performed by: EMERGENCY MEDICINE

## 2022-11-05 PROCEDURE — 36415 COLL VENOUS BLD VENIPUNCTURE: CPT

## 2022-11-05 PROCEDURE — 82962 GLUCOSE BLOOD TEST: CPT

## 2022-11-05 PROCEDURE — 65270000046 HC RM TELEMETRY

## 2022-11-05 PROCEDURE — 74011250637 HC RX REV CODE- 250/637: Performed by: INTERNAL MEDICINE

## 2022-11-05 PROCEDURE — 74011250636 HC RX REV CODE- 250/636: Performed by: INTERNAL MEDICINE

## 2022-11-05 PROCEDURE — 85025 COMPLETE CBC W/AUTO DIFF WBC: CPT

## 2022-11-05 PROCEDURE — 74011000250 HC RX REV CODE- 250: Performed by: INTERNAL MEDICINE

## 2022-11-05 PROCEDURE — 85730 THROMBOPLASTIN TIME PARTIAL: CPT

## 2022-11-05 RX ORDER — DILTIAZEM HYDROCHLORIDE 180 MG/1
180 CAPSULE, EXTENDED RELEASE ORAL DAILY
Status: DISCONTINUED | OUTPATIENT
Start: 2022-11-05 | End: 2022-11-05 | Stop reason: CLARIF

## 2022-11-05 RX ORDER — SODIUM CHLORIDE 0.9 % (FLUSH) 0.9 %
5-40 SYRINGE (ML) INJECTION AS NEEDED
Status: DISCONTINUED | OUTPATIENT
Start: 2022-11-05 | End: 2022-11-10 | Stop reason: HOSPADM

## 2022-11-05 RX ORDER — OXYCODONE HYDROCHLORIDE 5 MG/1
5 TABLET ORAL
Status: DISCONTINUED | OUTPATIENT
Start: 2022-11-05 | End: 2022-11-10 | Stop reason: HOSPADM

## 2022-11-05 RX ORDER — GABAPENTIN 100 MG/1
100 CAPSULE ORAL 2 TIMES DAILY
Status: DISCONTINUED | OUTPATIENT
Start: 2022-11-05 | End: 2022-11-10 | Stop reason: HOSPADM

## 2022-11-05 RX ORDER — POLYETHYLENE GLYCOL 3350 17 G/17G
17 POWDER, FOR SOLUTION ORAL DAILY PRN
Status: DISCONTINUED | OUTPATIENT
Start: 2022-11-05 | End: 2022-11-10 | Stop reason: HOSPADM

## 2022-11-05 RX ORDER — ONDANSETRON 2 MG/ML
4 INJECTION INTRAMUSCULAR; INTRAVENOUS
Status: DISCONTINUED | OUTPATIENT
Start: 2022-11-05 | End: 2022-11-10 | Stop reason: HOSPADM

## 2022-11-05 RX ORDER — SODIUM CHLORIDE 9 MG/ML
75 INJECTION, SOLUTION INTRAVENOUS CONTINUOUS
Status: DISCONTINUED | OUTPATIENT
Start: 2022-11-05 | End: 2022-11-06

## 2022-11-05 RX ORDER — INSULIN LISPRO 100 [IU]/ML
INJECTION, SOLUTION INTRAVENOUS; SUBCUTANEOUS EVERY 6 HOURS
Status: DISCONTINUED | OUTPATIENT
Start: 2022-11-05 | End: 2022-11-10

## 2022-11-05 RX ORDER — HEPARIN SODIUM 10000 [USP'U]/100ML
12-25 INJECTION, SOLUTION INTRAVENOUS
Status: DISCONTINUED | OUTPATIENT
Start: 2022-11-05 | End: 2022-11-09

## 2022-11-05 RX ORDER — POLYETHYLENE GLYCOL 3350 17 G/17G
17 POWDER, FOR SOLUTION ORAL DAILY
Status: DISCONTINUED | OUTPATIENT
Start: 2022-11-06 | End: 2022-11-10 | Stop reason: HOSPADM

## 2022-11-05 RX ORDER — DILTIAZEM HYDROCHLORIDE 180 MG/1
180 CAPSULE, COATED, EXTENDED RELEASE ORAL DAILY
Status: DISCONTINUED | OUTPATIENT
Start: 2022-11-05 | End: 2022-11-10 | Stop reason: HOSPADM

## 2022-11-05 RX ORDER — HEPARIN SODIUM 1000 [USP'U]/ML
60 INJECTION, SOLUTION INTRAVENOUS; SUBCUTANEOUS ONCE
Status: COMPLETED | OUTPATIENT
Start: 2022-11-05 | End: 2022-11-05

## 2022-11-05 RX ORDER — MAGNESIUM SULFATE 100 %
4 CRYSTALS MISCELLANEOUS AS NEEDED
Status: DISCONTINUED | OUTPATIENT
Start: 2022-11-05 | End: 2022-11-10 | Stop reason: HOSPADM

## 2022-11-05 RX ORDER — METOPROLOL SUCCINATE 50 MG/1
50 TABLET, EXTENDED RELEASE ORAL DAILY
Status: DISCONTINUED | OUTPATIENT
Start: 2022-11-05 | End: 2022-11-10 | Stop reason: HOSPADM

## 2022-11-05 RX ORDER — ACETAMINOPHEN 325 MG/1
650 TABLET ORAL
Status: DISCONTINUED | OUTPATIENT
Start: 2022-11-05 | End: 2022-11-10 | Stop reason: HOSPADM

## 2022-11-05 RX ORDER — HEPARIN SODIUM 1000 [USP'U]/ML
30 INJECTION, SOLUTION INTRAVENOUS; SUBCUTANEOUS AS NEEDED
Status: DISCONTINUED | OUTPATIENT
Start: 2022-11-05 | End: 2022-11-09

## 2022-11-05 RX ORDER — LANOLIN ALCOHOL/MO/W.PET/CERES
10.5 CREAM (GRAM) TOPICAL ONCE
Status: COMPLETED | OUTPATIENT
Start: 2022-11-05 | End: 2022-11-05

## 2022-11-05 RX ORDER — CHOLECALCIFEROL (VITAMIN D3) 125 MCG
10 CAPSULE ORAL
Status: ON HOLD | COMMUNITY
End: 2022-11-07

## 2022-11-05 RX ORDER — OXYCODONE HYDROCHLORIDE 5 MG/1
TABLET ORAL
Status: ON HOLD | COMMUNITY
Start: 2022-08-09 | End: 2022-11-07

## 2022-11-05 RX ORDER — HYDROMORPHONE HYDROCHLORIDE 1 MG/ML
1 INJECTION, SOLUTION INTRAMUSCULAR; INTRAVENOUS; SUBCUTANEOUS
Status: DISCONTINUED | OUTPATIENT
Start: 2022-11-05 | End: 2022-11-10 | Stop reason: HOSPADM

## 2022-11-05 RX ORDER — QUETIAPINE FUMARATE 25 MG/1
25 TABLET, FILM COATED ORAL
Status: DISCONTINUED | OUTPATIENT
Start: 2022-11-05 | End: 2022-11-10 | Stop reason: HOSPADM

## 2022-11-05 RX ORDER — GABAPENTIN 100 MG/1
100 CAPSULE ORAL 2 TIMES DAILY
Status: ON HOLD | COMMUNITY
Start: 2022-08-04 | End: 2022-11-07

## 2022-11-05 RX ORDER — CITALOPRAM 20 MG/1
1 TABLET, FILM COATED ORAL
Status: ON HOLD | COMMUNITY
End: 2022-11-07

## 2022-11-05 RX ORDER — QUETIAPINE FUMARATE 25 MG/1
TABLET, FILM COATED ORAL
COMMUNITY
Start: 2022-10-24 | End: 2022-11-22

## 2022-11-05 RX ORDER — VENLAFAXINE HYDROCHLORIDE 37.5 MG/1
75 CAPSULE, EXTENDED RELEASE ORAL
Status: DISCONTINUED | OUTPATIENT
Start: 2022-11-05 | End: 2022-11-10 | Stop reason: HOSPADM

## 2022-11-05 RX ORDER — HEPARIN SODIUM 1000 [USP'U]/ML
60 INJECTION, SOLUTION INTRAVENOUS; SUBCUTANEOUS AS NEEDED
Status: DISCONTINUED | OUTPATIENT
Start: 2022-11-05 | End: 2022-11-09

## 2022-11-05 RX ORDER — TRAMADOL HYDROCHLORIDE 50 MG/1
TABLET ORAL
Status: ON HOLD | COMMUNITY
Start: 2022-11-02 | End: 2022-11-07

## 2022-11-05 RX ORDER — OXYCODONE HYDROCHLORIDE 5 MG/1
5 TABLET ORAL ONCE
Status: COMPLETED | OUTPATIENT
Start: 2022-11-05 | End: 2022-11-05

## 2022-11-05 RX ORDER — ACETAMINOPHEN 650 MG/1
650 SUPPOSITORY RECTAL
Status: DISCONTINUED | OUTPATIENT
Start: 2022-11-05 | End: 2022-11-10 | Stop reason: HOSPADM

## 2022-11-05 RX ORDER — SODIUM CHLORIDE 0.9 % (FLUSH) 0.9 %
5-40 SYRINGE (ML) INJECTION EVERY 8 HOURS
Status: DISCONTINUED | OUTPATIENT
Start: 2022-11-05 | End: 2022-11-10 | Stop reason: HOSPADM

## 2022-11-05 RX ORDER — ONDANSETRON 4 MG/1
4 TABLET, ORALLY DISINTEGRATING ORAL
Status: DISCONTINUED | OUTPATIENT
Start: 2022-11-05 | End: 2022-11-10 | Stop reason: HOSPADM

## 2022-11-05 RX ADMIN — SODIUM CHLORIDE, PRESERVATIVE FREE 10 ML: 5 INJECTION INTRAVENOUS at 23:41

## 2022-11-05 RX ADMIN — QUETIAPINE FUMARATE 25 MG: 25 TABLET ORAL at 23:40

## 2022-11-05 RX ADMIN — OXYCODONE 5 MG: 5 TABLET ORAL at 04:28

## 2022-11-05 RX ADMIN — HEPARIN SODIUM 3540 UNITS: 1000 INJECTION INTRAVENOUS; SUBCUTANEOUS at 23:21

## 2022-11-05 RX ADMIN — METOPROLOL SUCCINATE 50 MG: 50 TABLET, EXTENDED RELEASE ORAL at 14:00

## 2022-11-05 RX ADMIN — HEPARIN SODIUM 12 UNITS/KG/HR: 10000 INJECTION, SOLUTION INTRAVENOUS at 02:15

## 2022-11-05 RX ADMIN — GABAPENTIN 100 MG: 100 CAPSULE ORAL at 23:40

## 2022-11-05 RX ADMIN — SODIUM CHLORIDE, PRESERVATIVE FREE 20 MG: 5 INJECTION INTRAVENOUS at 10:41

## 2022-11-05 RX ADMIN — MELATONIN 10.5 MG: at 04:28

## 2022-11-05 RX ADMIN — SODIUM CHLORIDE 75 ML/HR: 9 INJECTION, SOLUTION INTRAVENOUS at 10:40

## 2022-11-05 RX ADMIN — HEPARIN SODIUM 3540 UNITS: 1000 INJECTION INTRAVENOUS; SUBCUTANEOUS at 02:12

## 2022-11-05 RX ADMIN — GABAPENTIN 100 MG: 100 CAPSULE ORAL at 10:41

## 2022-11-05 RX ADMIN — SODIUM CHLORIDE, PRESERVATIVE FREE 10 ML: 5 INJECTION INTRAVENOUS at 11:01

## 2022-11-05 RX ADMIN — HYDROMORPHONE HYDROCHLORIDE 1 MG: 1 INJECTION, SOLUTION INTRAMUSCULAR; INTRAVENOUS; SUBCUTANEOUS at 10:41

## 2022-11-05 RX ADMIN — SODIUM CHLORIDE, PRESERVATIVE FREE 20 MG: 5 INJECTION INTRAVENOUS at 23:39

## 2022-11-05 RX ADMIN — VENLAFAXINE HYDROCHLORIDE 75 MG: 37.5 CAPSULE, EXTENDED RELEASE ORAL at 23:40

## 2022-11-05 NOTE — ED NOTES
Verbal shift change report given to Jes Navarro RN (oncoming nurse) by Jose Mishra RN (offgoing nurse). Report included the following information SBAR and ED Summary.

## 2022-11-05 NOTE — ED NOTES
Per primary RN request, US guided PIV inserted in R basilic to prevent occlusion at [facility] site, heparin gtt switched to new PIV.

## 2022-11-05 NOTE — H&P
Hospitalist Admission Note    NAME: Abhay Langford   :  1940   MRN:  [id number]     Date/Time:  2022 8:59 AM    Patient PCP: Kirsten Gonsales NP  ______________________________________________________________________  Given the patient's current clinical presentation, I have a high level of concern for decompensation if discharged from the emergency department. Complex decision making was performed, which includes reviewing the patient's available past medical records, laboratory results, and x-ray films. My assessment of this patient's clinical condition and my plan of care is as follows. Assessment / Plan:  Resting bilateral (R>L) lower extremity pain POA  Due to severe PAD POA  ? Ischemic right leg POA  History of left TMA  PENELOPE POA  CTA abdomen pelvis with runoffs:  1. Ostial stenosis of the right SFA, with occlusion of the mid SFA. There is  weak distal reconstitution. 2. Multifocal stenosis of the left SFA, with weak distal arterial enhancement. 3. 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 The renal arteries demonstrate moderate  atherosclerotic disease, without beading. Nonvascular:  1. Cirrhosis. 2. Cardiomegaly, with findings compatible with diminished right heart function. 3. Fibroid uterus. 4. Severe degenerative changes of the lower lumbar spine, with multiple levels  of moderate spinal canal narrowing. Creatinine 1.4    Admit to telemetry bed  N.p.o. with ice chips and sips of water for now till seen by vascular surgery  Started on IV heparin by the ED, will continue for now (patient has history of being on Eliquis at home)  Pain management  Maintenance IV fluids, BMP in a.m.   Check lactate  Aggressive bowel regimen with opioid pain meds  Inpatient vascular surgery consulted by ED last night-we will await further recommendations    Diabetes type 2 insulin-dependent with hypoglycemia POA  Fingersticks every 6 hours when n.p.o., before meals and at bedtime when eating  Patient on 70/30 combination insulin 16 units in the morning 5 units in the evening, sliding scale lispro here for now, resume long-acting insulin once eating  Follow fingersticks trend for now till patient not hypoglycemic. Continue home neuropathy medication-gabapentin    CAD status post CABG  A. fib status post PPM  Continue home cardiac meds, holding Eliquis as above as patient on IV heparin drip  Continue home Cardizem CD, metoprolol  Holding home Lasix, Aldactone for now    Alzheimer's dementia  Continue home psych meds including Seroquel, Effexor    GERD  Pepcid    Code Status: Full code  Surrogate Decision Maker: Daughter    DVT Prophylaxis: IV heparin as above  GI Prophylaxis: IV Pepcid    Baseline: Patient lives with son at home, has been having difficulty ambulating for the past couple of days with limited distance before severe pain      Subjective:   CHIEF COMPLAINT: Worsening (R>L) bilateral leg pain for the past 2 days    HISTORY OF PRESENT ILLNESS:     Carlos Enrique Ornelas is a 78 y.o.  female who presents with above complaints from home with family. Patient present with chief complaint of worsening right greater than left bilateral leg pain for the past 2 days to a point where right leg pain is at all times/resting pain now. Pain worsens on ambulation. Patient has history of severe PAD, has history of left TMA in the past.  Patient has history of CAD status post CABG, A. fib status post PPM-on Eliquis for it. Patient was found to have severe bilateral superficial femoral artery occlusions on CTA abdomen and pelvis with lower extremity runoffs with otherwise unremarkable work-up except mildly elevated creatinine at 1.4    We were asked to admit for work up and evaluation of the above problems.      Past Medical History:   Diagnosis Date    Alzheimer disease [facility])     Arrhythmia     atrial fibrillation 2018    Arthritis     CAD (coronary artery disease)     CABG, pacemaker    Chronic pain     COPD     Diabetes ([address]     GERD (gastroesophageal reflux disease)     Heart failure (HCC)     Hypertension     Ill-defined condition     high cholesterol    Peripheral vascular disease (HCC)     Sleep apnea     no longer uses-stopped years ago    Thyroid disease         Past Surgical History:   Procedure Laterality Date    HX ORTHOPAEDIC Left     toe amputation    HX PACEMAKER      July 2011    [place] CARDIAC SURG PROCEDURE UNLIST      3 CABG, Stents Dr. Roxie Ocampo COLONOSCOPY FLX DX W/COLLJ SPEC WHEN PFRMD  11/10/2011         [place] EGD TRANSORAL BIOPSY SINGLE/MULTIPLE  8/29/2012         VASCULAR SURGERY PROCEDURE UNLIST Left 09/2021    left transmetatarsal amputation       Social History     Tobacco Use    Smoking status: Never    Smokeless tobacco: Never   Substance Use Topics    Alcohol use: No        Family History   Problem Relation Age of Onset    Lung Disease Mother     Heart Disease Father     Hypertension Father     Diabetes Sister      Allergies   Allergen Reactions    Percocet [Oxycodone-Acetaminophen] Nausea and Vomiting        Prior to Admission medications    Medication Sig Start Date End Date Taking? Authorizing Provider   melatonin 5 mg tablet Take 10 mg by mouth nightly. Yes Other, MD Omar   OTHER Restart eliquis 7/2/22 7/2/22   Steff MAE NP   famotidine (PEPCID) 40 mg tablet Take 1 Tablet by mouth daily. 11/4/21   Della Henry NP   venlafaxine-SR (Effexor XR) 75 mg capsule Take 75 mg by mouth nightly. Provider, Historical   metoprolol succinate (TOPROL-XL) 50 mg XL tablet Take 50 mg by mouth daily. Provider, Historical   HYDROcodone-acetaminophen (NORCO) 5-325 mg per tablet Take 1 Tablet by mouth as needed for Pain. Provider, Historical   polyethylene glycol (Miralax) 17 gram/dose powder Take 17 g by mouth daily.     Provider, Historical   acetaminophen (Tylenol Extra Strength) 500 mg tablet Take 500 mg by mouth every six (6) hours as needed for Pain. Provider, Historical   rosuvastatin (CRESTOR) 5 mg tablet Take 5 mg by mouth nightly. Provider, Historical   ondansetron hcl (ZOFRAN) 8 mg tablet Take 8 mg by mouth every eight (8) hours as needed for Nausea or Vomiting. Provider, Historical   apixaban (ELIQUIS) 2.5 mg tablet Take 1 Tablet by mouth two (2) times a day. Patient taking differently: Take 2.5 mg by mouth two (2) times a day. To stop 2 days prior to surgery per surgeon 9/15/21   Shalonda Whitlock NP   cholecalciferol (Vitamin D3) (1000 Units /25 mcg) tablet Take 2,000 Units by mouth daily. Provider, Historical   levothyroxine (synthroid) 25 mcg tablet Take 25 mcg by mouth Daily (before breakfast). Provider, Historical   sulfaSALAzine (AZULFIDINE) 500 mg tablet Take 500 mg by mouth two (2) times a day. Provider, Historical   ferrous gluconate (FERGON) 240 mg (27 mg iron) tablet Take 240 mg by mouth three (3) times daily (with meals). Provider, Historical   dilTIAZem ER (TIAZAC) 180 mg capsule Take 180 mg by mouth daily. 3/25/19   Provider, Historical   spironolactone (ALDACTONE) 25 mg tablet Take 25 mg by mouth every other day. Provider, Historical   insulin aspart protamine/insulin aspart (NOVOLOG MIX 70-30 U-100 INSULN) 100 unit/mL (70-30) injection 16 Units by SubCUTAneous route daily. Patient takes 16 units in the AM and 5 units QHS    Provider, Historical   furosemide (LASIX) 40 mg tablet Take 1 Tab by mouth daily. 8/12/17   Meagan Rojas MD       REVIEW OF SYSTEMS:     I am not able to complete the review of systems because:    The patient is intubated and sedated    The patient has altered mental status due to his acute medical problems    The patient has baseline aphasia from prior stroke(s)   x The patient has baseline dementia and is not reliable historian    The patient is in acute medical distress and unable to provide information           Total of 12 systems reviewed as follows:       POSITIVE= underlined text  Negative = text not underlined  General:  fever, chills, sweats, generalized weakness, weight loss/gain,      loss of appetite   Eyes:    blurred vision, eye pain, loss of vision, double vision  ENT:    rhinorrhea, pharyngitis   Respiratory:   cough, sputum production, SOB, LE, wheezing, pleuritic pain   Cardiology:   chest pain, palpitations, orthopnea, PND, edema, syncope   Gastrointestinal:  abdominal pain , N/V, diarrhea, dysphagia, constipation, bleeding   Genitourinary:  frequency, urgency, dysuria, hematuria, incontinence   Muskuloskeletal :  arthralgia, myalgia, back pain  Hematology:  easy bruising, nose or gum bleeding, lymphadenopathy   Dermatological: rash, ulceration, pruritis, color change / jaundice  Endocrine:   hot flashes or polydipsia   Neurological:  headache, dizziness, confusion, focal weakness, paresthesia,     Speech difficulties, memory loss, gait difficulty  Psychological: Feelings of anxiety, depression, agitation    Objective:   VITALS:    Visit Vitals  BP (!) 152/65   Pulse 71   Temp 98.7 °F (37.1 °C)   Resp 17   Ht 4' 11\" (1.499 m)   Wt 59 kg (130 lb)   SpO2 97%   BMI 26.26 kg/m²       PHYSICAL EXAM:    General:    Alert, cooperative, no distress, appears stated age. HEENT: Atraumatic, anicteric sclerae, pink conjunctivae     No oral ulcers, mucosa moist, throat clear, dentition fair  Neck:  Supple, symmetrical,  thyroid: non tender  Lungs:   Clear to auscultation bilaterally. No Wheezing or Rhonchi. No rales. Chest wall:  No tenderness  No Accessory muscle use. Heart:   Regular  rhythm,  No  murmur   No edema  Abdomen:   Soft, non-tender. Not distended. Bowel sounds normal  Extremities: No cyanosis. No clubbing, R LE cool to touch compared to left hand, decrease lower extremity pulses on the right leg/foot    Skin turgor normal, Capillary refill normal, Radial dial pulse 2+  Skin:     Not pale.   Not Jaundiced No rashes   Psych:  Poor insight. Not depressed. Not anxious or agitated. Dementia +  Neurologic: EOMs intact. No facial asymmetry. No aphasia or slurred speech. Symmetrical strength, Sensation grossly intact. Alert and oriented X 1     _______________________________________________________________________  Care Plan discussed with:    Comments   Patient x    Family      RN x    Care Manager                    Consultant:      _______________________________________________________________________  Expected  Disposition:   Home with Family x   HH/PT/OT/RN ?   SNF/LTC ? [facility]    ________________________________________________________________________  TOTAL TIME:  46 Minutes    Critical Care Provided     Minutes non procedure based      Comments    x Reviewed previous records   >50% of visit spent in counseling and coordination of care x Discussion with patient and questions answered       ________________________________________________________________________  Signed: Gustavo Perea MD    Procedures: see electronic medical records for all procedures/Xrays and details which were not copied into this note but were reviewed prior to creation of Plan.     LAB DATA REVIEWED:    Recent Results (from the past 24 hour(s))   GLUCOSE, POC    Collection Time: 11/04/22  5:59 PM   Result Value Ref Range    Glucose (POC) 62 (L) 65 - 117 mg/dL    Performed by Elinor Em EDT    GLUCOSE, POC    Collection Time: 11/04/22  6:04 PM   Result Value Ref Range    Glucose (POC) 60 (L) 65 - 117 mg/dL    Performed by Elinor Sanchez EDT    CBC WITH AUTOMATED DIFF    Collection Time: 11/04/22  6:30 PM   Result Value Ref Range    WBC 8.9 3.6 - 11.0 K/uL    RBC 4.13 3.80 - 5.20 M/uL    HGB 13.6 11.5 - 16.0 g/dL    HCT 42.5 35.0 - 47.0 %    .9 (H) 80.0 - 99.0 FL    MCH 32.9 26.0 - 34.0 PG    MCHC 32.0 30.0 - 36.5 g/dL    RDW 12.4 11.5 - 14.5 %    PLATELET 364 271 - 572 K/uL    MPV 10.6 8.9 - 12.9 FL    NRBC 0.0 0  WBC ABSOLUTE NRBC 0.00 0.00 - 0.01 K/uL    NEUTROPHILS 67 32 - 75 %    LYMPHOCYTES 19 12 - 49 %    MONOCYTES 11 5 - 13 %    EOSINOPHILS 2 0 - 7 %    BASOPHILS 1 0 - 1 %    IMMATURE GRANULOCYTES 0 0.0 - 0.5 %    ABS. NEUTROPHILS 6.0 1.8 - 8.0 K/UL    ABS. LYMPHOCYTES 1.7 0.8 - 3.5 K/UL    ABS. MONOCYTES 1.0 0.0 - 1.0 K/UL    ABS. EOSINOPHILS 0.2 0.0 - 0.4 K/UL    ABS. BASOPHILS 0.1 0.0 - 0.1 K/UL    ABS. IMM. GRANS. 0.0 0.00 - 0.04 K/UL    DF AUTOMATED     METABOLIC PANEL, COMPREHENSIVE    Collection Time: 11/04/22  6:30 PM   Result Value Ref Range    Sodium 140 136 - 145 mmol/L    Potassium 4.4 3.5 - 5.1 mmol/L    Chloride 107 97 - 108 mmol/L    CO2 28 21 - 32 mmol/L    Anion gap 5 5 - 15 mmol/L    Glucose 63 (L) 65 - 100 mg/dL    BUN 25 (H) 6 - 20 MG/DL    Creatinine 1.42 (H) 0.55 - 1.02 MG/DL    BUN/Creatinine ratio 18 12 - 20      eGFR 37 (L) >60 ml/min/1.73m2    Calcium 10.4 (H) 8.5 - 10.1 MG/DL    Bilirubin, total 0.4 0.2 - 1.0 MG/DL    ALT (SGPT) 17 12 - 78 U/L    AST (SGOT) 25 15 - 37 U/L    Alk.  phosphatase 408 (H) 45 - 117 U/L    Protein, total 8.0 6.4 - 8.2 g/dL    Albumin 3.9 3.5 - 5.0 g/dL    Globulin 4.1 (H) 2.0 - 4.0 g/dL    A-G Ratio 1.0 (L) 1.1 - 2.2     GLUCOSE, POC    Collection Time: 11/04/22  6:40 PM   Result Value Ref Range    Glucose (POC) 69 65 - 117 mg/dL    Performed by Alanda Goldmann EDT    GLUCOSE, POC    Collection Time: 11/04/22 10:48 PM   Result Value Ref Range    Glucose (POC) 190 (H) 65 - 117 mg/dL    Performed by Nuria Jonnie RN    PTT    Collection Time: 11/05/22 12:35 AM   Result Value Ref Range    aPTT 31.2 (H) 22.1 - 31.0 sec    aPTT, therapeutic range     58.0 - 77.0 SECS   CBC WITH AUTOMATED DIFF    Collection Time: 11/05/22 12:35 AM   Result Value Ref Range    WBC 8.1 3.6 - 11.0 K/uL    RBC 3.66 (L) 3.80 - 5.20 M/uL    HGB 11.7 11.5 - 16.0 g/dL    HCT 37.4 35.0 - 47.0 %    .2 (H) 80.0 - 99.0 FL    MCH 32.0 26.0 - 34.0 PG    MCHC 31.3 30.0 - 36.5 g/dL    RDW 12.6 11.5 - 14.5 %    PLATELET 516 333 - 199 K/uL    MPV 10.4 8.9 - 12.9 FL    NRBC 0.0 0  WBC    ABSOLUTE NRBC 0.00 0.00 - 0.01 K/uL    NEUTROPHILS 73 32 - 75 %    LYMPHOCYTES 16 12 - 49 %    MONOCYTES 8 5 - 13 %    EOSINOPHILS 2 0 - 7 %    BASOPHILS 1 0 - 1 %    IMMATURE GRANULOCYTES 0 0.0 - 0.5 %    ABS. NEUTROPHILS 5.9 1.8 - 8.0 K/UL    ABS. LYMPHOCYTES 1.3 0.8 - 3.5 K/UL    ABS. MONOCYTES 0.7 0.0 - 1.0 K/UL    ABS. EOSINOPHILS 0.1 0.0 - 0.4 K/UL    ABS. BASOPHILS 0.0 0.0 - 0.1 K/UL    ABS. IMM.  GRANS. 0.0 0.00 - 0.04 K/UL    DF AUTOMATED     PTT    Collection Time: 11/05/22  6:52 AM   Result Value Ref Range    aPTT 84.8 (H) 22.1 - 31.0 sec    aPTT, therapeutic range     58.0 - 77.0 SECS

## 2022-11-05 NOTE — PROGRESS NOTES
Received notification from bedside RN about patient with regards to: difficulty with sleep and right leg pain, requesting medication to help  VS: /98, HR 70, RR 18, O2 sat 97% on RA    Intervention given: Melatonin PO x 1 dose, Roxicodone PO x 1 dose ordered

## 2022-11-05 NOTE — CONSULTS
Consult    Patient: Alexandru Mederos MRN: [id number]  SSN: [id number]    YOB: 1940  Age: 78 y.o. Sex: female      Subjective: Alexandru Mederos is a 78 y.o. female who is being seen for evaluation of R foot pain. The patient has known PAD. She has undergoing prior interventions with the last one by Dr Chery Victoria ~ a month ago. Had been doing well. Now returns with progressively worsening R foot pain and discomfort. Given similarity to prior symptoms she was transproted to the ED for evaluation by her family.     Past Medical History:   Diagnosis Date    Alzheimer disease ([facility] [address]     Arrhythmia     atrial fibrillation 2018    Arthritis     CAD (coronary artery disease)     CABG, pacemaker    Chronic pain     COPD     Diabetes ([facility] [address]     GERD (gastroesophageal reflux disease)     Heart failure (HCC)     Hypertension     Ill-defined condition     high cholesterol    Peripheral vascular disease (HCC)     Sleep apnea     no longer uses-stopped years ago    Thyroid disease      Past Surgical History:   Procedure Laterality Date    HX ORTHOPAEDIC Left     toe amputation    HX PACEMAKER      July 2011    [place] CARDIAC SURG PROCEDURE UNLIST      3 CABG, Stents Dr. Katherine Agosto FLX DX W/COLLJ [facility] INPATIENT REHABILITATION WHEN PFRMD  11/10/2011         [place] EGD TRANSORAL BIOPSY SINGLE/MULTIPLE  8/29/2012         VASCULAR SURGERY PROCEDURE UNLIST Left 09/2021    left transmetatarsal amputation      Family History   Problem Relation Age of Onset    Lung Disease Mother     Heart Disease Father     Hypertension Father     Diabetes Sister      Social History     Tobacco Use    Smoking status: Never    Smokeless tobacco: Never   Substance Use Topics    Alcohol use: No      Current Facility-Administered Medications   Medication Dose Route Frequency Provider Last Rate Last Admin    heparin 25,000 units in D5W 250 ml infusion  12-25 Units/kg/hr IntraVENous TITRATE Shelbi Tom MD 7.1 mL/hr at 11/05/22 1522 12 Units/kg/hr at 11/05/22 1522    heparin (porcine) 1,000 unit/mL injection 1,770 Units  30 Units/kg IntraVENous PRN Steven Whitehead MD        Or    heparin (porcine) 1,000 unit/mL injection 3,540 Units  60 Units/kg IntraVENous PRN Steven Whitehead MD        famotidine (PF) (PEPCID) 20 mg in 0.9% sodium chloride 10 mL injection  20 mg IntraVENous Q12H Cameron AGUILAR MD   20 mg at 11/05/22 1041    gabapentin (NEURONTIN) capsule 100 mg  100 mg Oral BID Cameron AGUILAR MD   100 mg at 11/05/22 1041    insulin lispro (HUMALOG) injection   SubCUTAneous Q6H Osiris Leach MD        glucose chewable tablet 16 g  4 Tablet Oral PRN Kash Wilde MD        glucagon (GLUCAGEN) injection 1 mg  1 mg IntraMUSCular PRN Kash Wilde MD        QUEtiapine (SEROquel) tablet 25 mg  25 mg Oral QHS Osiris Leach MD        oxyCODONE IR (ROXICODONE) tablet 5 mg  5 mg Oral Q4H PRN Cameron AGUILAR MD        HYDROmorphone (DILAUDID) injection 1 mg  1 mg IntraVENous Q3H PRN Cameron AGUILAR MD   1 mg at 11/05/22 1041    [START ON 11/6/2022] polyethylene glycol (MIRALAX) packet 17 g  17 g Oral DAILY Kash Wilde MD        venlafaxine-SR (EFFEXOR-XR) capsule 75 mg  75 mg Oral QHS Osiris Leach MD        sodium chloride (NS) flush 5-40 mL  5-40 mL IntraVENous Q8H Osiris Leach MD   10 mL at 11/05/22 1101    sodium chloride (NS) flush 5-40 mL  5-40 mL IntraVENous PRN Cameron AGUILAR MD        acetaminophen (TYLENOL) tablet 650 mg  650 mg Oral Q6H PRN Cameron AGUILAR MD        Or    acetaminophen (TYLENOL) suppository 650 mg  650 mg Rectal Q6H PRN Cameron AGUILAR MD        polyethylene glycol (MIRALAX) packet 17 g  17 g Oral DAILY PRN Cameron AGUILAR MD        ondansetron (ZOFRAN ODT) tablet 4 mg  4 mg Oral Q8H PRN Cameron AGUILAR MD        Or    ondansetron (ZOFRAN) injection 4 mg  4 mg IntraVENous Q6H PRN Osiris Leach MD        0.9% sodium chloride infusion  75 mL/hr IntraVENous CONTINUOUS Harinder Zamarripa MD 75 mL/hr at 11/05/22 1040 75 mL/hr at 11/05/22 1040    dilTIAZem ER (CARDIZEM CD) capsule 180 mg  180 mg Oral DAILY Derek AGUILAR MD        metoprolol succinate (TOPROL-XL) XL tablet 50 mg  50 mg Oral DAILY Derek AGUILAR MD   50 mg at 11/05/22 1400     Current Outpatient Medications   Medication Sig Dispense Refill    melatonin 5 mg tablet Take 10 mg by mouth nightly. citalopram (CELEXA) 20 mg tablet Take 1 Tablet by mouth.      gabapentin (NEURONTIN) 100 mg capsule Take 100 mg by mouth two (2) times a day. oxyCODONE IR (ROXICODONE) 5 mg immediate release tablet TAKE 1 TABLET BY MOUTH EVERY 6 HOURS AS NEEDED      QUEtiapine (SEROquel) 25 mg tablet TAKE 1 TABLET BY MOUTH EVERY DAY AT BEDTIME      traMADoL (ULTRAM) 50 mg tablet TAKE 1 TABLET BY MOUTH EVERY 8 HOURS AS NEEDED      OTHER Restart eliquis 7/2/22 1 UNSPECIFIED 0    famotidine (PEPCID) 40 mg tablet Take 1 Tablet by mouth daily. 30 Tablet 0    venlafaxine-SR (Effexor XR) 75 mg capsule Take 75 mg by mouth nightly. metoprolol succinate (TOPROL-XL) 50 mg XL tablet Take 50 mg by mouth daily. HYDROcodone-acetaminophen (NORCO) 5-325 mg per tablet Take 1 Tablet by mouth as needed for Pain.      polyethylene glycol (Miralax) 17 gram/dose powder Take 17 g by mouth daily. acetaminophen (Tylenol Extra Strength) 500 mg tablet Take 500 mg by mouth every six (6) hours as needed for Pain. rosuvastatin (CRESTOR) 5 mg tablet Take 5 mg by mouth nightly. ondansetron hcl (ZOFRAN) 8 mg tablet Take 8 mg by mouth every eight (8) hours as needed for Nausea or Vomiting. apixaban (ELIQUIS) 2.5 mg tablet Take 1 Tablet by mouth two (2) times a day. (Patient taking differently: Take 2.5 mg by mouth two (2) times a day. To stop 2 days prior to surgery per surgeon) 180 Tablet 1    cholecalciferol (Vitamin D3) (1000 Units /25 mcg) tablet Take 2,000 Units by mouth daily.       levothyroxine (synthroid) 25 mcg tablet Take 25 mcg by mouth Daily (before breakfast). sulfaSALAzine (AZULFIDINE) 500 mg tablet Take 500 mg by mouth two (2) times a day. ferrous gluconate (FERGON) 240 mg (27 mg iron) tablet Take 240 mg by mouth three (3) times daily (with meals). dilTIAZem ER (TIAZAC) 180 mg capsule Take 180 mg by mouth daily. spironolactone (ALDACTONE) 25 mg tablet Take 25 mg by mouth every other day. insulin aspart protamine/insulin aspart (NOVOLOG MIX 70-30 U-100 INSULN) 100 unit/mL (70-30) injection 16 Units by SubCUTAneous route daily. Patient takes 16 units in the AM and 5 units QHS      furosemide (LASIX) 40 mg tablet Take 1 Tab by mouth daily. 30 Tab 0        Allergies   Allergen Reactions    Percocet [Oxycodone-Acetaminophen] Nausea and Vomiting       Review of Systems:  A comprehensive review of systems was negative except for that written in the History of Present Illness. Objective:     Vitals:    11/05/22 0400 11/05/22 0800 11/05/22 0911 11/05/22 1200   BP: (!) 152/65  (!) 134/113    Pulse: 70 71 82 71   Resp: 17  22    Temp: 98.7 °F (37.1 °C)  97.7 °F (36.5 °C)    SpO2:       Weight:       Height:            Physical Exam:  General: Patient is pleasant and cooperative and appears to be in no acute distress. HEENT: Normocephalic atraumatic. Appropriate tracking. No scleral icterus. Nares patent. Trachea is midline. Chest: Unlabored respirations. Symmetrical chest expansion. Cardiac: Regular rate and rhythm. Acyanotic  Abdomen: Abdomen is soft, nontender, nondistended. Extremities: Moves all extremities. Vascular: LLE warm throughout. RLE warm to the mid calf then cool. Some cyanotic changes to the distal 3rd digit but no wounds. Able to move the foot and gross sensation intact.      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 Has SFA occlusion with distal reconstitution. Given her history suspect this is worsening/recurrence of chronic problem. --Agree with admission  --Heparin drip  --Analgesia  --Patient will benefit from endovascular revascularization.  Timing TBD    Signed By: Stephy Romeo MD     November 5, 2022

## 2022-11-05 NOTE — PROGRESS NOTES
TRANSITION OF CARE - SBAR OUT    Patient is being transferred to [facility] 2 General Surgery, Room# 3236. Report GIVEN TO Nahun Velazquez RN on Claudell Alas for routine progression of care. Report is consisted of the following information SBAR, Kardex, ED Summary, MAR, Recent Results, and Cardiac Rhythm Atrial paced . Patient transferred to receiving unit by: Dilcia Coburn (RN or Tech Name)     Called outstanding consults: Yes   Collected routine labs: Yes     All current orders reviewed with accepting nurse: Yes    The following personal items will be sent with the patient during transfer to the floor:   All valuables:      Visual Aid: Glasses, With patient                   CARDIAC MONITORING ORDERED: Yes Sent with telemonitor box    The following CURRENT information were reported to the receiving RN:    CODE STATUS: Full Code    NIH SCORE:    COSMO SCREENING:      NEURO ASSESSMENT: Neuro  Orientation Level: Oriented to person (11/05/22 0911)  Cognition: Decreased attention/concentration, Impulsive, Memory loss (11/05/22 0911)      RESTRAINTS IN USE: No      IS DOCUMENTATION COMPLETE: Yes      Vital Signs  Level of Consciousness: Alert (0) (11/05/22 0400)  Temp: 97.7 °F (36.5 °C) (11/05/22 0911)  Temp Source: Oral (11/05/22 0911)  Pulse (Heart Rate): 76 (11/05/22 1600)  Heart Rate Source: Monitor (11/05/22 0400)  Cardiac Rhythm: Atrial Paced (11/05/22 1200)  Resp Rate: 12 (11/05/22 1600)  BP: (!) 133/56 (11/05/22 1600)  MAP (Monitor): 77 (11/05/22 1600)  MAP (Calculated): 82 (11/05/22 1600)  MEWS Score: 1 (11/05/22 0400)  Pain 1  Pain Scale 1: Visual (11/05/22 1107)  Pain Intensity 1: 0 (11/05/22 1107)  Patient Stated Pain Goal: 0 (11/05/22 1107)  Pain Reassessment 1: Yes (Pt sleeping) (11/05/22 1107)  Pain Onset 1: hx of PAD (11/05/22 0911)  Pain Location 1: Leg (11/05/22 1041)  Pain Orientation 1: Left, Right (11/05/22 1041)  Pain Intervention(s) 1: Medication (see MAR) (11/05/22 1041)      OXYGEN: Oxygen Therapy  O2 Device: None (Room air) (11/05/22 0911)    KINDER FALL ASSESSMENT:  Presents to emergency department  because of falls (Syncope, seizure, or loss of consciousness): Yes, Age > 79: Yes, Altered Mental Status, Intoxication with alcohol or substance confusion (Disorientation, impaired judgment, poor safety awaremess, or inability to follow instructions): Yes, Impaired Mobility: Ambulates or transfers with assistive devices or assistance; Unable to ambulate or transer.: Yes, Nursing Judgement : Yes    WOUNDS: No      URINARY CATHETER: incontinent and external catheter    LINE ACCESS:   Peripheral IV 11/05/22 Anterior;Proximal;Right Forearm (Active)   Site Assessment Clean, dry, & intact 11/05/22 0911   Phlebitis Assessment 0 11/05/22 0911   Infiltration Assessment 0 11/05/22 0911   Dressing Status Clean, dry, & intact 11/05/22 0911   Dressing Type Tape;Transparent 11/05/22 0911   Hub Color/Line Status Green;Patent; Flushed 11/05/22 0911   Alcohol Cap Used No 11/05/22 0243       Peripheral IV 11/05/22 Left Antecubital (Active)   Site Assessment Clean, dry, & intact 11/05/22 0911   Phlebitis Assessment 0 11/05/22 0911   Infiltration Assessment 0 11/05/22 0911   Dressing Status Clean, dry, & intact 11/05/22 0911   Dressing Type Tape;Transparent 11/05/22 0911   Hub Color/Line Status Pink;Patent; Flushed 11/05/22 0911   Alcohol Cap Used No 11/05/22 0243       Peripheral IV 87/82/96 Right Basilic (Active)   Site Assessment Clean, dry, & intact 11/05/22 0810   Phlebitis Assessment 0 11/05/22 0810   Infiltration Assessment 0 11/05/22 0810   Dressing Status Clean, dry, & intact 11/05/22 0810   Hub Color/Line Status Pink 11/05/22 0810        Opportunity for questions and clarification were provided.   Eduard Dobbins

## 2022-11-06 LAB
ANION GAP SERPL CALC-SCNC: 9 MMOL/L (ref 5–15)
APTT PPP: 61.5 SEC (ref 22.1–31)
APTT PPP: >130 SEC (ref 22.1–31)
BUN SERPL-MCNC: 22 MG/DL (ref 6–20)
BUN/CREAT SERPL: 23 (ref 12–20)
CALCIUM SERPL-MCNC: 10.6 MG/DL (ref 8.5–10.1)
CHLORIDE SERPL-SCNC: 112 MMOL/L (ref 97–108)
CO2 SERPL-SCNC: 25 MMOL/L (ref 21–32)
CREAT SERPL-MCNC: 0.96 MG/DL (ref 0.55–1.02)
ERYTHROCYTE [DISTWIDTH] IN BLOOD BY AUTOMATED COUNT: 12.5 % (ref 11.5–14.5)
GLUCOSE BLD STRIP.AUTO-MCNC: 123 MG/DL (ref 65–117)
GLUCOSE BLD STRIP.AUTO-MCNC: 160 MG/DL (ref 65–117)
GLUCOSE BLD STRIP.AUTO-MCNC: 173 MG/DL (ref 65–117)
GLUCOSE SERPL-MCNC: 112 MG/DL (ref 65–100)
HCT VFR BLD AUTO: 39.5 % (ref 35–47)
HGB BLD-MCNC: 12.4 G/DL (ref 11.5–16)
LACTATE SERPL-SCNC: 1.2 MMOL/L (ref 0.4–2)
MCH RBC QN AUTO: 32 PG (ref 26–34)
MCHC RBC AUTO-ENTMCNC: 31.4 G/DL (ref 30–36.5)
MCV RBC AUTO: 101.8 FL (ref 80–99)
NRBC # BLD: 0 K/UL (ref 0–0.01)
NRBC BLD-RTO: 0 PER 100 WBC
PLATELET # BLD AUTO: 148 K/UL (ref 150–400)
PMV BLD AUTO: 10.3 FL (ref 8.9–12.9)
POTASSIUM SERPL-SCNC: 4 MMOL/L (ref 3.5–5.1)
RBC # BLD AUTO: 3.88 M/UL (ref 3.8–5.2)
SERVICE CMNT-IMP: ABNORMAL
SODIUM SERPL-SCNC: 146 MMOL/L (ref 136–145)
THERAPEUTIC RANGE,PTTT: ABNORMAL SECS (ref 58–77)
WBC # BLD AUTO: 6.6 K/UL (ref 3.6–11)

## 2022-11-06 PROCEDURE — 85027 COMPLETE CBC AUTOMATED: CPT

## 2022-11-06 PROCEDURE — 36415 COLL VENOUS BLD VENIPUNCTURE: CPT

## 2022-11-06 PROCEDURE — 74011250637 HC RX REV CODE- 250/637: Performed by: INTERNAL MEDICINE

## 2022-11-06 PROCEDURE — 80048 BASIC METABOLIC PNL TOTAL CA: CPT

## 2022-11-06 PROCEDURE — 82962 GLUCOSE BLOOD TEST: CPT

## 2022-11-06 PROCEDURE — 74011250636 HC RX REV CODE- 250/636: Performed by: INTERNAL MEDICINE

## 2022-11-06 PROCEDURE — 74011000250 HC RX REV CODE- 250: Performed by: INTERNAL MEDICINE

## 2022-11-06 PROCEDURE — 74011636637 HC RX REV CODE- 636/637: Performed by: INTERNAL MEDICINE

## 2022-11-06 PROCEDURE — 83605 ASSAY OF LACTIC ACID: CPT

## 2022-11-06 PROCEDURE — 85730 THROMBOPLASTIN TIME PARTIAL: CPT

## 2022-11-06 PROCEDURE — 65270000046 HC RM TELEMETRY

## 2022-11-06 PROCEDURE — 74011250636 HC RX REV CODE- 250/636: Performed by: EMERGENCY MEDICINE

## 2022-11-06 RX ORDER — INSULIN LISPRO 100 [IU]/ML
2 INJECTION, SOLUTION INTRAVENOUS; SUBCUTANEOUS
Status: DISCONTINUED | OUTPATIENT
Start: 2022-11-06 | End: 2022-11-10

## 2022-11-06 RX ORDER — INSULIN GLARGINE 100 [IU]/ML
11 INJECTION, SOLUTION SUBCUTANEOUS
Status: DISCONTINUED | OUTPATIENT
Start: 2022-11-06 | End: 2022-11-10

## 2022-11-06 RX ORDER — SODIUM CHLORIDE 450 MG/100ML
75 INJECTION, SOLUTION INTRAVENOUS CONTINUOUS
Status: DISCONTINUED | OUTPATIENT
Start: 2022-11-06 | End: 2022-11-07

## 2022-11-06 RX ADMIN — POLYETHYLENE GLYCOL 3350 17 G: 17 POWDER, FOR SOLUTION ORAL at 10:13

## 2022-11-06 RX ADMIN — GABAPENTIN 100 MG: 100 CAPSULE ORAL at 10:13

## 2022-11-06 RX ADMIN — SODIUM CHLORIDE, PRESERVATIVE FREE 10 ML: 5 INJECTION INTRAVENOUS at 13:25

## 2022-11-06 RX ADMIN — Medication 2 UNITS: at 18:14

## 2022-11-06 RX ADMIN — INSULIN GLARGINE 11 UNITS: 100 INJECTION, SOLUTION SUBCUTANEOUS at 22:39

## 2022-11-06 RX ADMIN — ACETAMINOPHEN 650 MG: 325 TABLET ORAL at 22:39

## 2022-11-06 RX ADMIN — OXYCODONE 5 MG: 5 TABLET ORAL at 16:28

## 2022-11-06 RX ADMIN — METOPROLOL SUCCINATE 50 MG: 50 TABLET, EXTENDED RELEASE ORAL at 10:13

## 2022-11-06 RX ADMIN — GABAPENTIN 100 MG: 100 CAPSULE ORAL at 18:15

## 2022-11-06 RX ADMIN — VENLAFAXINE HYDROCHLORIDE 75 MG: 37.5 CAPSULE, EXTENDED RELEASE ORAL at 22:39

## 2022-11-06 RX ADMIN — SODIUM CHLORIDE, PRESERVATIVE FREE 20 MG: 5 INJECTION INTRAVENOUS at 22:39

## 2022-11-06 RX ADMIN — DILTIAZEM HYDROCHLORIDE 180 MG: 180 CAPSULE, COATED, EXTENDED RELEASE ORAL at 10:13

## 2022-11-06 RX ADMIN — HYDROMORPHONE HYDROCHLORIDE 1 MG: 1 INJECTION, SOLUTION INTRAMUSCULAR; INTRAVENOUS; SUBCUTANEOUS at 01:50

## 2022-11-06 RX ADMIN — ACETAMINOPHEN 650 MG: 325 TABLET ORAL at 16:28

## 2022-11-06 RX ADMIN — HEPARIN SODIUM 13 UNITS/KG/HR: 10000 INJECTION, SOLUTION INTRAVENOUS at 18:24

## 2022-11-06 RX ADMIN — QUETIAPINE FUMARATE 25 MG: 25 TABLET ORAL at 22:39

## 2022-11-06 RX ADMIN — HYDROMORPHONE HYDROCHLORIDE 1 MG: 1 INJECTION, SOLUTION INTRAMUSCULAR; INTRAVENOUS; SUBCUTANEOUS at 22:14

## 2022-11-06 RX ADMIN — SODIUM CHLORIDE, PRESERVATIVE FREE 20 MG: 5 INJECTION INTRAVENOUS at 10:13

## 2022-11-06 RX ADMIN — SODIUM CHLORIDE 75 ML/HR: 4.5 INJECTION, SOLUTION INTRAVENOUS at 10:48

## 2022-11-06 NOTE — PROGRESS NOTES
[facility] of Shift Note    Bedside shift change report given to Farhad Riggs RN (oncoming nurse) by Santa Ellis LPN (offgoing nurse). Report included the following information SBAR, Kardex, and MAR    Shift worked:  7a-7p     Shift summary and any significant changes:    Patient is very lethargic today during shift. Crying when awakened from sleep. aPTT came back >130.0 so heparin drip discontinued and  now aPTT is 61 so restarted at 13units/kg/hr per heparin protocol. RLE cold and very painful for patient and LLE cool, not as painful. Redrawing patients aPTT Q6H. Concerns for physician to address: BLE coolness     Zone phone for oncoming shift:  [phone]       Activity:  Activity Level: Bed Rest  Number times ambulated in hallways past shift: 0  Number of times OOB to chair past shift: 0    Cardiac:   Cardiac Monitoring: Yes      Cardiac Rhythm: Atrial Paced    Access:  Current line(s): PIV     Genitourinary:   Urinary status: incontinent and external catheter    Respiratory:   O2 Device: None (Room air)  Chronic home O2 use?: NO  Incentive spirometer at bedside: NO       GI:     Current diet:  ADULT DIET Dysphagia - Pureed; Low Fat/Low Chol/High Fiber/RUBEN  Passing flatus: YES  Tolerating current diet: YES       Pain Management:   Patient states pain is manageable on current regimen: YES    Skin:  Gerald Score: 13  Interventions: speciality bed, PT/OT consult, internal/external urinary devices, and nutritional support     Patient Safety:  Fall Score:  Total Score: 4  Interventions: gripper socks  High Fall Risk: Yes    Length of Stay:  Expected LOS: - - -  Actual LOS: 1      Santa Ellis LPN

## 2022-11-06 NOTE — PROGRESS NOTES
Hospitalist Progress Note    NAME: Colby Lopez   :  1940   MRN:  [id number]       Assessment / Plan:  Resting bilateral (R>L) lower extremity pain POA  Due to severe PAD POA  ? Ischemic right leg POA  History of left TMA  PENELOPE POA  CTA abdomen pelvis with runoffs:  1. Ostial stenosis of the right SFA, with occlusion of the mid SFA. There is  weak distal reconstitution. 2. Multifocal stenosis of the left SFA, with weak distal arterial enhancement. 3. 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 The renal arteries demonstrate moderate  atherosclerotic disease, without beading. Nonvascular:  1. Cirrhosis. 2. Cardiomegaly, with findings compatible with diminished right heart function. 3. Fibroid uterus. 4. Severe degenerative changes of the lower lumbar spine, with multiple levels  of moderate spinal canal narrowing. Creatinine 1.4  Plan for surgery, timing to be determined by vascular  Allow diet today, continue with heparin drip, monitor PTT  Hold home dose Eliquis.   Aggressive bowel regimen with opioid pain meds  Patient noticed to have difficulty with swallowing, will order puréed diet and consult SLP    diabetes type 2 insulin-dependent with hypoglycemia POA  Continue with home dose insulin regiment, adjust dosing depending on finger sticks  Add sliding scale insulin  CAD status post CABG  A. fib status post PPM  Continue home cardiac meds, holding Eliquis as above as patient on IV heparin drip  Continue home Cardizem CD, metoprolol  Holding home Lasix, Aldactone for now     Alzheimer's dementia  Continue home psych meds including Seroquel, Effexor     GERD  Pepcid     Code Status: Full code  Surrogate Decision Maker: Daughter  Updated patient daughter at bedside, all questions answered  DVT Prophylaxis: IV heparin as above  GI Prophylaxis: IV Pepcid     Baseline: Patient lives with son at home, has been having difficulty ambulating for the past couple of days with limited distance before severe pain    25.0 - 29.9 Overweight / Body mass index is 26.26 kg/m². Estimated discharge date: November 10  Barriers:    Code status: Full  Prophylaxis:  heparin drip   Recommended Disposition: SNF/LTC     Subjective:     Chief Complaint / Reason for Physician Visit  Follow-up severe PAD, ischemic right leg discussed with RN events overnight. Review of Systems:  Symptom Y/N Comments  Symptom Y/N Comments   Fever/Chills    Chest Pain     Poor Appetite    Edema     Cough    Abdominal Pain     Sputum    Joint Pain     SOB/LE    Pruritis/Rash     Nausea/vomit    Tolerating PT/OT     Diarrhea    Tolerating Diet     Constipation    Other       Could NOT obtain due to: Confused     Objective:     VITALS:   Last 24hrs VS reviewed since prior progress note. Most recent are:  Patient Vitals for the past 24 hrs:   Temp Pulse Resp BP SpO2   11/06/22 1154 99.3 °F (37.4 °C) 69 17 (!) 175/74 100 %   11/06/22 0752 98.4 °F (36.9 °C) 81 18 (!) 165/54 95 %   11/05/22 2356 98.8 °F (37.1 °C) 83 16 (!) 186/61 95 %   11/05/22 2130 98 °F (36.7 °C) 74 14 130/60 --   11/05/22 1600 -- 76 12 (!) 133/56 --     No intake or output data in the 24 hours ending 11/06/22 1424     I had a face to face encounter and independently examined this patient on 11/6/2022, as outlined below:  PHYSICAL EXAM:  General: WD, WN. Alert, cooperative, no acute distress    EENT:  EOMI. Anicteric sclerae. MMM  Resp:  CTA bilaterally, no wheezing or rales. No accessory muscle use  CV:  Regular  rhythm,  No edema  GI:  Soft, Non distended, Non tender. +Bowel sounds  Neurologic:  Alert and oriented X 1,  normal speech,   Psych:   Poor insight, not anxious nor agitated  Skin:  No rashes.   No jaundice    Reviewed most current lab test results and cultures  YES  Reviewed most current radiology test results   YES  Review and summation of old records today    NO  Reviewed patient's current orders and [facility] YES  PMH/SH reviewed - no change compared to H&P  ________________________________________________________________________  Care Plan discussed with:    Comments   Patient     Family  x    RN x    Care Manager     Consultant                        Multidiciplinary team rounds were held today with , nursing, pharmacist and clinical coordinator. Patient's plan of care was discussed; medications were reviewed and discharge planning was addressed. ________________________________________________________________________  Total NON critical care TIME:  35   Minutes    Total CRITICAL CARE TIME Spent:   Minutes non procedure based      Comments   >50% of visit spent in counseling and coordination of care     ________________________________________________________________________  Rebekah Valle MD     Procedures: see electronic medical records for all procedures/Xrays and details which were not copied into this note but were reviewed prior to creation of Plan. LABS:  I reviewed today's most current labs and imaging studies.   Pertinent labs include:  Recent Labs     11/06/22 0546 11/05/22  0035 11/04/22  1830   WBC 6.6 8.1 8.9   HGB 12.4 11.7 13.6   HCT 39.5 37.4 42.5   * 158 178     Recent Labs     11/06/22  0546 11/04/22  1830   * 140   K 4.0 4.4   * 107   CO2 25 28   * 63*   BUN 22* 25*   CREA 0.96 1.42*   CA 10.6* 10.4*   ALB  --  3.9   TBILI  --  0.4   ALT  --  17       Signed: Rebekah Valle MD

## 2022-11-06 NOTE — PROGRESS NOTES
Problem: Falls - Risk of  Goal: *Absence of Falls  Description: Document Geri Fall Risk and appropriate interventions in the flowsheet.   Outcome: Progressing Towards Goal  Note: Fall Risk Interventions:  Mobility Interventions: Communicate number of staff needed for ambulation/transfer    Mentation Interventions: Door open when patient unattended    Medication Interventions: Patient to call before getting OOB, Teach patient to arise slowly    Elimination Interventions: Call light in reach              Problem: Patient Education: Go to Patient Education Activity  Goal: Patient/Family Education  Outcome: Progressing Towards Goal     Problem: General Medical Care Plan  Goal: *Vital signs within specified parameters  Outcome: Progressing Towards Goal  Goal: *Labs within defined limits  Outcome: Progressing Towards Goal  Goal: *Absence of infection signs and symptoms  Outcome: Progressing Towards Goal  Goal: *Optimal pain control at patient's stated goal  Outcome: Progressing Towards Goal  Goal: *Skin integrity maintained  Outcome: Progressing Towards Goal  Goal: *Fluid volume balance  Outcome: Progressing Towards Goal  Goal: *Optimize nutritional status  Outcome: Progressing Towards Goal  Goal: *Anxiety reduced or absent  Outcome: Progressing Towards Goal  Goal: *Progressive mobility and function (eg: ADL's)  Outcome: Progressing Towards Goal     Problem: Patient Education: Go to Patient Education Activity  Goal: Patient/Family Education  Outcome: Progressing Towards Goal

## 2022-11-06 NOTE — PROGRESS NOTES
Appears more comfortable at time of my visit. Still reporting some rest pain. RLE imaging reviewed. Has SFA occlusion with distal reconstitution. Given her history suspect this is worsening/recurrence of chronic problem. --OK for diet today  --Heparin drip  --Analgesia  --Patient will benefit from endovascular revascularization. Will discuss with Dr Joanna Julian.  Definitive plan to follow

## 2022-11-06 NOTE — PROGRESS NOTES
Spoke with Nancie Stubbs in pharmacy to verify heparin. Informed to stop heparin drip and redraw aPTT Q2H. Notified MD. Abel Paniagua aPTT at 3:05PM and will repeat lab draws per orders.

## 2022-11-07 LAB
ANION GAP SERPL CALC-SCNC: 7 MMOL/L (ref 5–15)
APTT PPP: 121.6 SEC (ref 22.1–31)
APTT PPP: 43.5 SEC (ref 22.1–31)
APTT PPP: 46.2 SEC (ref 22.1–31)
BASOPHILS # BLD: 0 K/UL (ref 0–0.1)
BASOPHILS NFR BLD: 1 % (ref 0–1)
BUN SERPL-MCNC: 18 MG/DL (ref 6–20)
BUN/CREAT SERPL: 23 (ref 12–20)
CALCIUM SERPL-MCNC: 10.4 MG/DL (ref 8.5–10.1)
CHLORIDE SERPL-SCNC: 112 MMOL/L (ref 97–108)
CO2 SERPL-SCNC: 24 MMOL/L (ref 21–32)
CREAT SERPL-MCNC: 0.77 MG/DL (ref 0.55–1.02)
DIFFERENTIAL METHOD BLD: ABNORMAL
EOSINOPHIL # BLD: 0.2 K/UL (ref 0–0.4)
EOSINOPHIL NFR BLD: 3 % (ref 0–7)
ERYTHROCYTE [DISTWIDTH] IN BLOOD BY AUTOMATED COUNT: 12.3 % (ref 11.5–14.5)
GLUCOSE BLD STRIP.AUTO-MCNC: 101 MG/DL (ref 65–117)
GLUCOSE BLD STRIP.AUTO-MCNC: 118 MG/DL (ref 65–117)
GLUCOSE BLD STRIP.AUTO-MCNC: 166 MG/DL (ref 65–117)
GLUCOSE BLD STRIP.AUTO-MCNC: 88 MG/DL (ref 65–117)
GLUCOSE SERPL-MCNC: 96 MG/DL (ref 65–100)
HCT VFR BLD AUTO: 35.4 % (ref 35–47)
HGB BLD-MCNC: 11.4 G/DL (ref 11.5–16)
IMM GRANULOCYTES # BLD AUTO: 0 K/UL (ref 0–0.04)
IMM GRANULOCYTES NFR BLD AUTO: 0 % (ref 0–0.5)
LYMPHOCYTES # BLD: 1.1 K/UL (ref 0.8–3.5)
LYMPHOCYTES NFR BLD: 19 % (ref 12–49)
MCH RBC QN AUTO: 32.8 PG (ref 26–34)
MCHC RBC AUTO-ENTMCNC: 32.2 G/DL (ref 30–36.5)
MCV RBC AUTO: 101.7 FL (ref 80–99)
MONOCYTES # BLD: 0.6 K/UL (ref 0–1)
MONOCYTES NFR BLD: 11 % (ref 5–13)
NEUTS SEG # BLD: 3.9 K/UL (ref 1.8–8)
NEUTS SEG NFR BLD: 66 % (ref 32–75)
NRBC # BLD: 0 K/UL (ref 0–0.01)
NRBC BLD-RTO: 0 PER 100 WBC
PLATELET # BLD AUTO: 136 K/UL (ref 150–400)
PMV BLD AUTO: 10 FL (ref 8.9–12.9)
POTASSIUM SERPL-SCNC: 4 MMOL/L (ref 3.5–5.1)
RBC # BLD AUTO: 3.48 M/UL (ref 3.8–5.2)
SERVICE CMNT-IMP: ABNORMAL
SERVICE CMNT-IMP: ABNORMAL
SERVICE CMNT-IMP: NORMAL
SERVICE CMNT-IMP: NORMAL
SODIUM SERPL-SCNC: 143 MMOL/L (ref 136–145)
THERAPEUTIC RANGE,PTTT: ABNORMAL SECS (ref 58–77)
WBC # BLD AUTO: 5.8 K/UL (ref 3.6–11)

## 2022-11-07 PROCEDURE — 74011250637 HC RX REV CODE- 250/637: Performed by: INTERNAL MEDICINE

## 2022-11-07 PROCEDURE — 74011250636 HC RX REV CODE- 250/636: Performed by: INTERNAL MEDICINE

## 2022-11-07 PROCEDURE — 74011250636 HC RX REV CODE- 250/636: Performed by: EMERGENCY MEDICINE

## 2022-11-07 PROCEDURE — 82962 GLUCOSE BLOOD TEST: CPT

## 2022-11-07 PROCEDURE — 85025 COMPLETE CBC W/AUTO DIFF WBC: CPT

## 2022-11-07 PROCEDURE — 74011000250 HC RX REV CODE- 250: Performed by: INTERNAL MEDICINE

## 2022-11-07 PROCEDURE — 36415 COLL VENOUS BLD VENIPUNCTURE: CPT

## 2022-11-07 PROCEDURE — 80048 BASIC METABOLIC PNL TOTAL CA: CPT

## 2022-11-07 PROCEDURE — 92610 EVALUATE SWALLOWING FUNCTION: CPT | Performed by: SPEECH-LANGUAGE PATHOLOGIST

## 2022-11-07 PROCEDURE — 74011636637 HC RX REV CODE- 636/637: Performed by: INTERNAL MEDICINE

## 2022-11-07 PROCEDURE — 85730 THROMBOPLASTIN TIME PARTIAL: CPT

## 2022-11-07 PROCEDURE — 65270000046 HC RM TELEMETRY

## 2022-11-07 RX ORDER — HYDRALAZINE HYDROCHLORIDE 20 MG/ML
10 INJECTION INTRAMUSCULAR; INTRAVENOUS
Status: DISCONTINUED | OUTPATIENT
Start: 2022-11-07 | End: 2022-11-10 | Stop reason: HOSPADM

## 2022-11-07 RX ADMIN — QUETIAPINE FUMARATE 25 MG: 25 TABLET ORAL at 22:55

## 2022-11-07 RX ADMIN — GABAPENTIN 100 MG: 100 CAPSULE ORAL at 18:00

## 2022-11-07 RX ADMIN — SODIUM CHLORIDE, PRESERVATIVE FREE 10 ML: 5 INJECTION INTRAVENOUS at 13:47

## 2022-11-07 RX ADMIN — SODIUM CHLORIDE 75 ML/HR: 4.5 INJECTION, SOLUTION INTRAVENOUS at 12:40

## 2022-11-07 RX ADMIN — HEPARIN SODIUM 1770 UNITS: 1000 INJECTION, SOLUTION INTRAVENOUS; SUBCUTANEOUS at 18:53

## 2022-11-07 RX ADMIN — SODIUM CHLORIDE, PRESERVATIVE FREE 20 MG: 5 INJECTION INTRAVENOUS at 20:41

## 2022-11-07 RX ADMIN — SODIUM CHLORIDE, PRESERVATIVE FREE 10 ML: 5 INJECTION INTRAVENOUS at 22:55

## 2022-11-07 RX ADMIN — HEPARIN SODIUM 10 UNITS/KG/HR: 10000 INJECTION, SOLUTION INTRAVENOUS at 11:20

## 2022-11-07 RX ADMIN — OXYCODONE 5 MG: 5 TABLET ORAL at 20:41

## 2022-11-07 RX ADMIN — HYDRALAZINE HYDROCHLORIDE 10 MG: 20 INJECTION INTRAMUSCULAR; INTRAVENOUS at 09:27

## 2022-11-07 RX ADMIN — VENLAFAXINE HYDROCHLORIDE 75 MG: 37.5 CAPSULE, EXTENDED RELEASE ORAL at 20:41

## 2022-11-07 RX ADMIN — SODIUM CHLORIDE 75 ML/HR: 4.5 INJECTION, SOLUTION INTRAVENOUS at 00:00

## 2022-11-07 RX ADMIN — HYDROMORPHONE HYDROCHLORIDE 1 MG: 1 INJECTION, SOLUTION INTRAMUSCULAR; INTRAVENOUS; SUBCUTANEOUS at 10:14

## 2022-11-07 RX ADMIN — INSULIN GLARGINE 11 UNITS: 100 INJECTION, SOLUTION SUBCUTANEOUS at 22:54

## 2022-11-07 RX ADMIN — HYDROMORPHONE HYDROCHLORIDE 1 MG: 1 INJECTION, SOLUTION INTRAMUSCULAR; INTRAVENOUS; SUBCUTANEOUS at 22:55

## 2022-11-07 RX ADMIN — HYDROMORPHONE HYDROCHLORIDE 1 MG: 1 INJECTION, SOLUTION INTRAMUSCULAR; INTRAVENOUS; SUBCUTANEOUS at 16:18

## 2022-11-07 RX ADMIN — HYDROMORPHONE HYDROCHLORIDE 1 MG: 1 INJECTION, SOLUTION INTRAMUSCULAR; INTRAVENOUS; SUBCUTANEOUS at 04:47

## 2022-11-07 NOTE — PROGRESS NOTES
Pharmacy Heparin Monitoring    Indication: ACS/PCI/STEMI/NSTEMI (uncertain at this time)    Goal aPTT: 58-77.9 seconds    Initial dosing Weight: 59 kg    Labs:  Recent Labs     11/07/22  1006 11/07/22  0438 11/06/22  1733   APTT 46.2* 121.6* 61.5*   HGB  --  11.4*  --    PLT  --  136*  --        Current rate:  0 unit/kg/hr    Impression/Plan:   New rate: 10 unit/kg/hr  Changed aptt checks to q6h  PRN bolus dose: no, patient aptt has been very labile so taking a conservative approach to reach therapeutic range  Infusion rate, PRN bolus dose and aPTT results were discussed with the nurse: yes, Colleen Ward     Thank you,  PK Fitch

## 2022-11-07 NOTE — PROGRESS NOTES
End of Shift Note    Bedside shift change report given to Linda Muhammad RN (oncoming nurse) by Liz Pritchard RN (offgoing nurse). Report included the following information SBAR, Kardex, and MAR    Shift worked:  7a-7p     Shift summary and any significant changes:    Pt emotional at times. Refused meds, MD tried to give as well. PRN hydralazine ordered for BP, effective. Heparin adjusted according to pharmacist. This morning, removed BP cuff that was on pt and skin tear was present - charge nurse and MD notified. Family kept updated on patient. Speech therapy in to see pt. Family supportive of pt. Vascular in to see pt. Concerns for physician to address: BLE coolness     Zone phone for oncoming shift:  [phone]       Activity:  Activity Level: Bed Rest  Number times ambulated in hallways past shift: 0  Number of times OOB to chair past shift: 0    Cardiac:   Cardiac Monitoring: Yes      Cardiac Rhythm: Atrial Paced    Access:  Current line(s): PIV     Genitourinary:   Urinary status: incontinent and external catheter    Respiratory:   O2 Device: None (Room air)  Chronic home O2 use?: NO  Incentive spirometer at bedside: NO       GI:     Current diet:  ADULT DIET Dysphagia - Pureed; Low Fat/Low Chol/High Fiber/RUBEN  DIET ONE TIME MESSAGE  DIET ONE TIME MESSAGE  DIET ONE TIME MESSAGE  Passing flatus: YES  Tolerating current diet: YES       Pain Management:   Patient states pain is manageable on current regimen: YES    Skin:  Gerald Score: 13  Interventions: speciality bed, PT/OT consult, internal/external urinary devices, and nutritional support     Patient Safety:  Fall Score:  Total Score: 4  Interventions: gripper socks  High Fall Risk: Yes    Length of Stay:  Expected LOS: - - -  Actual LOS: 2      Liz Pritchard RN

## 2022-11-07 NOTE — PROGRESS NOTES
Pharmacy Heparin Monitoring    Indication: ACS/PCI/STEMI/NSTEMI (uncertain at this time)    Goal aPTT: 58-77.9 seconds    Initial dosing Weight: 59 kg    Labs:  Recent Labs     11/07/22  1805 11/07/22  1006 11/07/22  0438   APTT 43.5* 46.2* 121.6*   HGB  --   --  11.4*   PLT  --   --  136*       Current rate:  10 unit/kg/hr    Impression/Plan:   New rate: 12 unit/kg/hr  aPTT checks to q6h  PRN bolus dose: Yes, 30 unit/kg bolus. Infusion rate, PRN bolus dose and aPTT results were discussed with the nurse: yes, Torri Downing  CAUTION - Patient APTT very labile.       Thank you,  PK Stratton

## 2022-11-07 NOTE — PROGRESS NOTES
Vascular Surgery Progress Note  Selam Calvillo, AGACNP-BC          Admit Date: 2022   LOS: 2 days        Daily Progress Note: 2022    Subjective: Ida Acosta is an 79 yo female with PMHx of Alzheimer's disease, chronic pain, HTN, HLD, DMII w/ polyneuropathy, ASHD (s/p CABG), diastolic heart failure, depression, hypothroidism, COPD, GERD and PVD with severe mulitlevel atherosclerotic occlusive disease who we are following for PAD with CLTI. She presented to the ED on  with complaints of R foot pain with onset ~ seven days ago. She is most recently s/p R SFA atherectomy on 22 for arterial occlusion with flatline R KING. Her post-intervention R KING was 0.9 in September. CTA abd/pelvis with runoff on  show:  Ostial stenosis of the right SFA, with occlusion of the mid SFA. There is weak distal reconstitution. She was admitted and started on heparin infusion. Prior to admission she was on eliquis. Overnight, there were no acute events noted. This morning, she is in bed resting with intermittent confusion. Review of Systems   Unable to perform ROS: Dementia        Objective:     Vital signs  Temp (24hrs), Av.4 °F (36.9 °C), Min:97.4 °F (36.3 °C), Max:99.3 °F (37.4 °C)   No intake/output data recorded.  1901 -  0700  In: -   Out: 650 [Urine:650]    Visit Vitals  BP (!) 179/80   Pulse 70   Temp 99 °F (37.2 °C)   Resp 18   Ht 4' 11\" (1.499 m)   Wt 59 kg (130 lb)   SpO2 99%   BMI 26.26 kg/m²      O2 Device: None (Room air)     Pain control  Pain Assessment  Pain Scale 1: Numeric (0 - 10)  Pain Intensity 1: 7  Pain Onset 1: hx of PAD  Pain Location 1: Leg  Pain Orientation 1: Right  Pain Description 1: Aching  Pain Intervention(s) 1: Medication (see MAR)    PT/OT  Gait                 Physical Exam  Vitals and nursing note reviewed. HENT:      Head: Normocephalic. Eyes:      General: No scleral icterus. Cardiovascular:      Rate and Rhythm: Normal rate. Pulses:           Femoral pulses are 2+ on the left side. Pulmonary:      Effort: Pulmonary effort is normal.   Abdominal:      Palpations: Abdomen is soft. Musculoskeletal:      Cervical back: Normal range of motion. Comments: Cold right foot with sensation. Neurological:      Mental Status: She is alert.           24 hour results:    Recent Results (from the past 24 hour(s))   GLUCOSE, POC    Collection Time: 11/06/22 11:24 AM   Result Value Ref Range    Glucose (POC) 173 (H) 65 - 117 mg/dL    Performed by Dion Sawyer PCT    PTT    Collection Time: 11/06/22  1:09 PM   Result Value Ref Range    aPTT >130.0 (HH) 22.1 - 31.0 sec    aPTT, therapeutic range     58.0 - 77.0 SECS   PTT    Collection Time: 11/06/22  3:05 PM   Result Value Ref Range    aPTT >130.0 (HH) 22.1 - 31.0 sec    aPTT, therapeutic range     58.0 - 77.0 SECS   GLUCOSE, POC    Collection Time: 11/06/22  5:01 PM   Result Value Ref Range    Glucose (POC) 160 (H) 65 - 117 mg/dL    Performed by Lulú Simpson PCT    PTT    Collection Time: 11/06/22  5:33 PM   Result Value Ref Range    aPTT 61.5 (H) 22.1 - 31.0 sec    aPTT, therapeutic range     58.0 - 77.0 SECS   GLUCOSE, POC    Collection Time: 11/07/22 12:18 AM   Result Value Ref Range    Glucose (POC) 118 (H) 65 - 117 mg/dL    Performed by [place] PCT    METABOLIC PANEL, BASIC    Collection Time: 11/07/22  4:38 AM   Result Value Ref Range    Sodium 143 136 - 145 mmol/L    Potassium 4.0 3.5 - 5.1 mmol/L    Chloride 112 (H) 97 - 108 mmol/L    CO2 24 21 - 32 mmol/L    Anion gap 7 5 - 15 mmol/L    Glucose 96 65 - 100 mg/dL    BUN 18 6 - 20 MG/DL    Creatinine 0.77 0.55 - 1.02 MG/DL    BUN/Creatinine ratio 23 (H) 12 - 20      eGFR >60 >60 ml/min/1.73m2    Calcium 10.4 (H) 8.5 - 10.1 MG/DL   CBC WITH AUTOMATED DIFF    Collection Time: 11/07/22  4:38 AM   Result Value Ref Range    WBC 5.8 3.6 - 11.0 K/uL    RBC 3.48 (L) 3.80 - 5.20 M/uL    HGB 11.4 (L) 11.5 - 16.0 g/dL    HCT 35.4 35.0 - 47.0 %    .7 (H) 80.0 - 99.0 FL    MCH 32.8 26.0 - 34.0 PG    MCHC 32.2 30.0 - 36.5 g/dL    RDW 12.3 11.5 - 14.5 %    PLATELET 894 (L) 902 - 400 K/uL    MPV 10.0 8.9 - 12.9 FL    NRBC 0.0 0  WBC    ABSOLUTE NRBC 0.00 0.00 - 0.01 K/uL    NEUTROPHILS 66 32 - 75 %    LYMPHOCYTES 19 12 - 49 %    MONOCYTES 11 5 - 13 %    EOSINOPHILS 3 0 - 7 %    BASOPHILS 1 0 - 1 %    IMMATURE GRANULOCYTES 0 0.0 - 0.5 %    ABS. NEUTROPHILS 3.9 1.8 - 8.0 K/UL    ABS. LYMPHOCYTES 1.1 0.8 - 3.5 K/UL    ABS. MONOCYTES 0.6 0.0 - 1.0 K/UL    ABS. EOSINOPHILS 0.2 0.0 - 0.4 K/UL    ABS. BASOPHILS 0.0 0.0 - 0.1 K/UL    ABS. IMM. GRANS. 0.0 0.00 - 0.04 K/UL    DF AUTOMATED     PTT    Collection Time: 11/07/22  4:38 AM   Result Value Ref Range    aPTT 121.6 (HH) 22.1 - 31.0 sec    aPTT, therapeutic range     58.0 - 77.0 SECS   GLUCOSE, POC    Collection Time: 11/07/22  7:58 AM   Result Value Ref Range    Glucose (POC) 88 65 - 117 mg/dL    Performed by Marcelo Bassett RN    PTT    Collection Time: 11/07/22 10:06 AM   Result Value Ref Range    aPTT 46.2 (H) 22.1 - 31.0 sec    aPTT, therapeutic range     58.0 - 77.0 SECS   GLUCOSE, POC    Collection Time: 11/07/22 11:17 AM   Result Value Ref Range    Glucose (POC) 101 65 - 117 mg/dL    Performed by Courtney Day PCT           Assessment/Plan:     Active Problems:    Occlusive thrombus (11/5/2022)      PVD  CLTI with RLE rest pain  s/p failed L hallux amp (8/27/21)  s/p L TMA  (9/10/21)  S/p RLE angiogram with R SFA atherectomy 8/11/22  - SFA occlusion with distal reconstitution on CTA   - right foot cool, left tma warm. L Femoral pulse palpable  - on heparin gtt  - statin  - diet- dysphagia  - SLP consult was placed  - bowel regimen  - pain control  - PT/OT okay  - Cr 0.77, PENELOPE on admission,  resolved  - Discussed with Dr. Reginald Quevedo. Will plan for RLE arteriogram on Wed.         Mgmt per primary team:    DMII  Polyneuropathy  - Lantus  - SSI  - home meds  HTN  CHF - right sided  Afib  ASHD  - home meds  - eliquis held  Chronic Pain  Polyneuropathy  Degenerative Disc disease  Depression  Arthritis  GERD  Alzheimer's disease  Thyroid disease    Activity: bedrest with PT/OT  DVT ppx: Heparin IV  Dispo:  home    Plan d/w Dr. Gustabo Vernon, NP

## 2022-11-07 NOTE — PROGRESS NOTES
Speech pathology note  Reviewed chart and discussed case with RN, and note SLP consulted due to difficulty swallowing noted by staff. Patient had just fallen asleep per daughter report, and therefore SLP will follow up when patient alert per daughter. Daughter reported that patient always has difficulty taking pills, and must take her pills crushed. However, she eats regular food and thin liquids with no difficulty per daughter. SLP will follow as patient alert and agreeable. Thank you.     Michell Lyon, Alycia Ramesh., CCC-SLP

## 2022-11-07 NOTE — PROGRESS NOTES
Hospitalist Progress Note    NAME: Mike Eason   :  1940   MRN:  [id number]       Assessment / Plan:  Resting bilateral (R>L) lower extremity pain POA  Due to severe PAD POA  ? Ischemic right leg POA  History of left TMA  PENELOPE POA  CTA abdomen pelvis with runoffs:  1. Ostial stenosis of the right SFA, with occlusion of the mid SFA. There is  weak distal reconstitution. 2. Multifocal stenosis of the left SFA, with weak distal arterial enhancement. 3. 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 The renal arteries demonstrate moderate  atherosclerotic disease, without beading. Nonvascular:  1. Cirrhosis. 2. Cardiomegaly, with findings compatible with diminished right heart function. 3. Fibroid uterus. 4. Severe degenerative changes of the lower lumbar spine, with multiple levels  of moderate spinal canal narrowing. Creatinine 1.4  Plan for surgery, timing to be determined by vascular most likely from Wednesday  Allow diet today, continue with heparin drip, monitor PTT  Hold home dose Eliquis.   Aggressive bowel regimen with opioid pain meds  Patient noticed to have difficulty with swallowing, will order puréed diet and consult SLP  , SLP recommendations reviewed, patient upgraded to minced and moist diet    diabetes type 2 insulin-dependent with hypoglycemia POA  Continue with home dose insulin regiment, adjust dosing depending on finger sticks  Add sliding scale insulin  CAD status post CABG  A. fib status post PPM  HTN  Continue home cardiac meds, holding Eliquis as above as patient on IV heparin drip  Continue home Cardizem CD, metoprolol  Holding home Lasix, Aldactone for now  BP Elevated this morning, patient refused to take her home BP meds, as needed hydralazine     Alzheimer's dementia  Continue home psych meds including Seroquel, Effexor     GERD  Pepcid     Code Status: Full code  Surrogate Decision Maker: Daughter  Updated patient daughter at bedside, all questions answered  DVT Prophylaxis: IV heparin as above  GI Prophylaxis: IV Pepcid     Baseline: Patient lives with son at home, has been having difficulty ambulating for the past couple of days with limited distance before severe pain    25.0 - 29.9 Overweight / Body mass index is 26.26 kg/m². Estimated discharge date: November 10  Barriers: Surgery on Wednesday 11/09  upDated patient daughter at bedside, all questions answered  Code status: Full  Prophylaxis:  heparin drip   Recommended Disposition: SNF/LTC     Subjective:     Chief Complaint / Reason for Physician Visit  Follow-up severe PAD, ischemic right leg discussed with RN events overnight. Review of Systems:  Symptom Y/N Comments  Symptom Y/N Comments   Fever/Chills    Chest Pain     Poor Appetite    Edema     Cough    Abdominal Pain     Sputum    Joint Pain     SOB/LE    Pruritis/Rash     Nausea/vomit    Tolerating PT/OT     Diarrhea    Tolerating Diet     Constipation    Other       Could NOT obtain due to: Confused     Objective:     VITALS:   Last 24hrs VS reviewed since prior progress note. Most recent are:  Patient Vitals for the past 24 hrs:   Temp Pulse Resp BP SpO2   11/07/22 1452 98.8 °F (37.1 °C) 80 18 (!) 146/61 94 %   11/07/22 1130 99.1 °F (37.3 °C) 78 18 (!) 121/59 94 %   11/07/22 0755 99 °F (37.2 °C) 70 18 (!) 179/80 99 %   11/07/22 0558 98.4 °F (36.9 °C) 72 18 (!) 194/87 98 %   11/06/22 2245 97.4 °F (36.3 °C) 91 20 (!) 174/67 97 %   11/06/22 2048 98.2 °F (36.8 °C) 72 18 (!) 163/81 98 %         Intake/Output Summary (Last 24 hours) at 11/7/2022 1604  Last data filed at 11/7/2022 1548  Gross per 24 hour   Intake 2651. 95 ml   Output 650 ml   Net 2001.95 ml          I had a face to face encounter and independently examined this patient on 11/7/2022, as outlined below:  PHYSICAL EXAM:  General: WD, WN. Alert, cooperative, no acute distress    EENT:  EOMI. Anicteric sclerae.  MMM  Resp:  CTA bilaterally, no wheezing or rales. No accessory muscle use  CV:  Regular  rhythm,  No edema  GI:  Soft, Non distended, Non tender. +Bowel sounds  Neurologic:  Alert and oriented X 1,  normal speech,   Psych:   Poor insight, not anxious nor agitated  Skin:  No rashes. No jaundice    Reviewed most current lab test results and cultures  YES  Reviewed most current radiology test results   YES  Review and summation of old records today    NO  Reviewed patient's current orders and MAR    YES  PMH/SH reviewed - no change compared to H&P  ________________________________________________________________________  Care Plan discussed with:    Comments   Patient     Family  x    RN x    Care Manager     Consultant                        Multidiciplinary team rounds were held today with , nursing, pharmacist and clinical coordinator. Patient's plan of care was discussed; medications were reviewed and discharge planning was addressed. ________________________________________________________________________  Total NON critical care TIME:  35   Minutes    Total CRITICAL CARE TIME Spent:   Minutes non procedure based      Comments   >50% of visit spent in counseling and coordination of care     ________________________________________________________________________  June Thomas MD     Procedures: see electronic medical records for all procedures/Xrays and details which were not copied into this note but were reviewed prior to creation of Plan. LABS:  I reviewed today's most current labs and imaging studies.   Pertinent labs include:  Recent Labs     11/07/22  0438 11/06/22  0546 11/05/22  0035   WBC 5.8 6.6 8.1   HGB 11.4* 12.4 11.7   HCT 35.4 39.5 37.4   * 148* 158       Recent Labs     11/07/22  0438 11/06/22  0546 11/04/22  1830    146* 140   K 4.0 4.0 4.4   * 112* 107   CO2 24 25 28   GLU 96 112* 63*   BUN 18 22* 25*   CREA 0.77 0.96 1.42*   CA 10.4* 10.6* 10.4*   ALB  --   --  3.9   TBILI  -- --  0.4   ALT  --   --  17         Signed: Cinthya Garcia MD

## 2022-11-07 NOTE — PROGRESS NOTES
Problem: Dysphagia (Adult)  Goal: *Acute Goals and Plan of Care (Insert Text)  Description: Speech Therapy Goals  Initiated 11/7/2022  1. Patient will tolerate soft and bite sized diet with thin liquids without signs/symptoms of aspiration given minimal cues within 7 day(s). Outcome: Progressing Towards Goal       SPEECH LANGUAGE PATHOLOGY BEDSIDE SWALLOW EVALUATION  Patient: Suze Vega (78 y.o. female)  Date: 11/7/2022  Primary Diagnosis: Occlusive thrombus [I82.90]  Procedure(s) (LRB):  RIGHT LEG ARTERIOGRAM (Right)     Precautions:       ASSESSMENT :  Based on the objective data described below, the patient presents with hx of oral dysphagia base don family description, as well as lifelong difficulty swallowing pills. Takes meds with applesauce at home. Per family, eats regular diet at home but frequently needs to spit out food after extended chewing. D/w family why this is less than ideal in the hospital and temporary downgrade is recommended, and they were in agreement. Patient will benefit from skilled intervention to address the above impairments. Patients rehabilitation potential is considered to be Fair     PLAN :  Recommendations and Planned Interventions:  Minced and moist diet (upgrade from puree)   Pills crushed in puree  Upright for PO  Straws okay  Oral care  Frequency/Duration: Patient will be followed by speech-language pathology 3 times a week to address goals. Discharge Recommendations: To Be Determined and likely None for SLP     SUBJECTIVE:   Patient stated something about punching with her fists but wasn't;t otherwise aggressive, was calm and cooperative.     OBJECTIVE:     Past Medical History:   Diagnosis Date    Alzheimer disease ([facility] [address]     Arrhythmia     atrial fibrillation 2018    Arthritis     CAD (coronary artery disease)     CABG, pacemaker    Chronic pain     COPD     Diabetes ([facility] [address]     GERD (gastroesophageal reflux disease)     Heart failure ([facility] [address]     Hypertension Ill-defined condition     high cholesterol    Peripheral vascular disease (H[facility])     Sleep apnea     no longer uses-stopped years ago    Thyroid disease      Past Surgical History:   Procedure Laterality Date    HX ORTHOPAEDIC Left     toe amputation    HX PACEMAKER      July 2011    OK CARDIAC SURG PROCEDURE UNLIST      3 CABG, Stents Dr. Denis Carter FLX DX W/COLLJ SPEC WHEN PFRMD  11/10/2011         OK EGD TRANSORAL BIOPSY SINGLE/MULTIPLE  8/29/2012         VASCULAR SURGERY PROCEDURE UNLIST Left 09/2021    left transmetatarsal amputation     Prior Level of Function/Home Situation:   Home Situation  Support Systems: Child(jenny)  Patient Expects to be Discharged to[de-identified] Home with home health  Diet prior to admission: unrestricted but often spit out meat after prolonged chewing  Current Diet:  puree   Cognitive and Communication Status:  Neurologic State: Alert  Orientation Level: Disoriented to place, Disoriented to situation, Disoriented to time, Oriented to person  Cognition: Decreased command following  Perception: Appears intact  Perseveration:  (perseverates with following commands)  Safety/Judgement: Decreased awareness of environment  Oral Assessment:  Oral Assessment  Labial: Decreased seal  Dentition: Lower dentures; Upper dentures  Oral Hygiene: dry tongue with coating, discussed with family, plans to increase water intake and offered diet soda as well  Lingual: Decreased strength  Mandible:  (decreased stregth, little ability to bite a piece off a whole, likely part of overalll weaknes)  P.O. Trials:  Patient Position: upright in bed, slightly sidelying. Repositioned to more upright when SLP entered, had been eating slightly reclined but sidelying with family  Vocal quality prior to P.O.: No impairment  Consistency Presented: Puree; Solid; Thin liquid  How Presented: Straw;Spoon     Bolus Acceptance:  (difficulty biting a piece off of a whole)        Propulsion: Delayed (# of seconds)  Oral Residue: Less than 10% of bolus (needs sip of liquid to propel solid)     Laryngeal Elevation: Functional  Aspiration Signs/Symptoms: None  Pharyngeal Phase Characteristics: No impairment, issues, or problems         Comments: family reports that she eats unrestricted diet but has some extended chewing and occasionally spits out meat after prolonged chewing            NOMS:   The NOMS functional outcome measure was used to quantify this patient's level of swallowing impairment. Based on the NOMS, the patient was determined to be at level 4 for swallow function       NOMS Swallowing Levels:  Level 1 (CN): NPO  Level 2 (CM): NPO but takes consistency in therapy  Level 3 (CL): Takes less than 50% of nutrition p.o. and continues with nonoral feedings; and/or safe with mod cues; and/or max diet restriction  Level 4 (CK): Safe swallow but needs mod cues; and/or mod diet restriction; and/or still requires some nonoral feeding/supplements  Level 5 (CJ): Safe swallow with min diet restriction; and/or needs min cues  Level 6 (CI): Independent with p.o.; rare cues; usually self cues; may need to avoid some foods or needs extra time  Level 7 ([address] [place] [address]): Independent for all p.o.  MORALES. (2003). National Outcomes Measurement System (NOMS): Adult Speech-Language Pathology User's Guide. Pain:  Pain Scale 1: Numeric (0 - 10)  Pain Intensity 1: 3  Pain Location 1: Leg    After treatment:   Patient left in no apparent distress in bed and Caregiver / family present    COMMUNICATION/EDUCATION:   Patient was educated regarding her deficit(s) of dysphagia as this relates to her diagnosis of debility, dementia. She demonstrated Guarded understanding as evidenced by her mental status, but family with good understanding and their questions were answered .     The patient's plan of care including recommendations, planned interventions, and recommended diet changes were discussed with: Registered nurse.     family have participated as able in goal setting and plan of care.     Thank you for this referral.  Stephen Goldstein, SLP  Time Calculation: 20 mins

## 2022-11-07 NOTE — PROGRESS NOTES
Chart reviewed by CM - Initial CM assessment completed by remote CM. Patient has a cane and wheelchair at home and requires assistance with ADL's. Patient has declined all offers for SNF placement - will be offered choices for [facility] and referrals to be sent.     Luis Payne RN, BSN, [address] [place] [address]  Manager of Case Management  [phone]

## 2022-11-07 NOTE — PROGRESS NOTES
Reason for Admission:  Occlusive Thrombus                     RUR Score:   13%                  Plan for utilizing home health:  Agreeable        PCP: First and Last name:  Shira Luis NP     Name of Practice:    Are you a current patient: Yes/No:    Approximate date of last visit: Unsure   Can you participate in a virtual visit with your PCP:                     Current Advanced Directive/Advance Care Plan: Full Code  CM confirmed patient is a Full Code    Healthcare Decision Maker:   Click here to complete [address] including selection of the Healthcare Decision Maker Relationship (ie \"Primary\")           Daughter, Casey Tatum, [phone]                  Transition of Care Plan:                    Patient lives at home with her daughter, Charan Adair. Patient has a cane and wheelchair at home. Patient requires assistance with daily care. Family is interested in [facility] if recommended. Patient's daughter is her transport. Patient uses the [facility] in [facility].   Current Dispo: Home with [facility]

## 2022-11-07 NOTE — PROGRESS NOTES
Pharmacy Medication Reconciliation     The patient's daughter, Evangelina Tejeda, was interviewed regarding current PTA medication list. She was also questioned regarding use of any other inhalers, topical products, over the counter medications, herbal medications, vitamin products or ophthalmic/nasal/otic medication use. Recommendations/Findings: The following amendments were made to the patient's active medication list on file at [address]:     1) Additions:   None    2) Deletions:   Cholecalciferol  Citalopram  Famotidine  Ferrous gluconate  Gabapentin  Hydrocodone/APAP  Melatonin  Ondansetron  Oxycodone  Tramadol    3) Changes:   None      Pertinent Findings:     Clarified PTA med list with patient's daughter, Evangelina Tejeda. PTA medication list was corrected to the following:     Prior to Admission Medications   Prescriptions Last Dose Informant Taking? QUEtiapine (SEROquel) 25 mg tablet 2022 at 2239 Family Member Yes   Sig: TAKE 1 TABLET BY MOUTH EVERY DAY AT BEDTIME   acetaminophen (TYLENOL) 500 mg tablet 2022 Family Member Yes   Sig: Take 500 mg by mouth every six (6) hours as needed for Pain. apixaban (ELIQUIS) 2.5 mg tablet  Family Member Yes   Sig: Take 1 Tablet by mouth two (2) times a day. Patient taking differently: Take 2.5 mg by mouth two (2) times a day. To stop 2 days prior to surgery per surgeon   dilTIAZem ER [facility]) 180 mg capsule 2022 Family Member Yes   Sig: Take 180 mg by mouth daily. furosemide (LASIX) 40 mg tablet  Family Member Yes   Sig: Take 1 Tab by mouth daily. insulin aspart protamine/insulin aspart (NOVOLOG MIX 70/30) 100 unit/mL (70-30) injection 2022 Family Member Yes   Si Units by SubCUTAneous route daily. Patient takes 20 units in the AM, 5 units mid-day, and 5 units QHS   levothyroxine (SYNTHROID) 25 mcg tablet  Family Member Yes   Sig: Take 25 mcg by mouth Daily (before breakfast).    metoprolol succinate (TOPROL-XL) 50 mg XL tablet 2022 Family Member Yes   Sig: Take 50 mg by mouth daily. polyethylene glycol (MIRALAX) 17 gram/dose powder 11/6/2022 at 1013 Family Member Yes   Sig: Take 17 g by mouth daily. rosuvastatin (CRESTOR) 5 mg tablet  Family Member Yes   Sig: Take 5 mg by mouth nightly. spironolactone (ALDACTONE) 25 mg tablet  Family Member Yes   Sig: Take 25 mg by mouth every other day. sulfaSALAzine (AZULFIDINE) 500 mg tablet  Family Member Yes   Sig: Take 500 mg by mouth two (2) times a day. venlafaxine-SR (EFFEXOR-XR) 75 mg capsule 11/6/2022 Family Member Yes   Sig: Take 75 mg by mouth nightly.       Facility-Administered Medications: None        Thank you,  Nuzhat Salazar, PHARMD

## 2022-11-07 NOTE — PROGRESS NOTES
Problem: Falls - Risk of  Goal: *Absence of Falls  Description: Document Sofia Fothergill Fall Risk and appropriate interventions in the flowsheet.   Outcome: Progressing Towards Goal  Note: Fall Risk Interventions:  Mobility Interventions: Bed/chair exit alarm    Mentation Interventions: Bed/chair exit alarm    Medication Interventions: Bed/chair exit alarm    Elimination Interventions: Bed/chair exit alarm

## 2022-11-08 ENCOUNTER — ANESTHESIA EVENT (OUTPATIENT)
Dept: SURGERY | Age: 82
DRG: 300 | End: 2022-11-08
Payer: MEDICARE

## 2022-11-08 LAB
ANION GAP SERPL CALC-SCNC: 4 MMOL/L (ref 5–15)
APTT PPP: 60.8 SEC (ref 22.1–31)
APTT PPP: 73.6 SEC (ref 22.1–31)
BUN SERPL-MCNC: 26 MG/DL (ref 6–20)
BUN/CREAT SERPL: 19 (ref 12–20)
CALCIUM SERPL-MCNC: 9.9 MG/DL (ref 8.5–10.1)
CHLORIDE SERPL-SCNC: 106 MMOL/L (ref 97–108)
CO2 SERPL-SCNC: 25 MMOL/L (ref 21–32)
CREAT SERPL-MCNC: 1.34 MG/DL (ref 0.55–1.02)
ERYTHROCYTE [DISTWIDTH] IN BLOOD BY AUTOMATED COUNT: 12.4 % (ref 11.5–14.5)
GLUCOSE BLD STRIP.AUTO-MCNC: 158 MG/DL (ref 65–117)
GLUCOSE BLD STRIP.AUTO-MCNC: 197 MG/DL (ref 65–117)
GLUCOSE BLD STRIP.AUTO-MCNC: 258 MG/DL (ref 65–117)
GLUCOSE BLD STRIP.AUTO-MCNC: 266 MG/DL (ref 65–117)
GLUCOSE SERPL-MCNC: 298 MG/DL (ref 65–100)
HCT VFR BLD AUTO: 33.6 % (ref 35–47)
HGB BLD-MCNC: 10.7 G/DL (ref 11.5–16)
MCH RBC QN AUTO: 32.8 PG (ref 26–34)
MCHC RBC AUTO-ENTMCNC: 31.8 G/DL (ref 30–36.5)
MCV RBC AUTO: 103.1 FL (ref 80–99)
NRBC # BLD: 0 K/UL (ref 0–0.01)
NRBC BLD-RTO: 0 PER 100 WBC
PLATELET # BLD AUTO: 141 K/UL (ref 150–400)
PMV BLD AUTO: 10.5 FL (ref 8.9–12.9)
POTASSIUM SERPL-SCNC: 4.6 MMOL/L (ref 3.5–5.1)
RBC # BLD AUTO: 3.26 M/UL (ref 3.8–5.2)
SERVICE CMNT-IMP: ABNORMAL
SODIUM SERPL-SCNC: 135 MMOL/L (ref 136–145)
THERAPEUTIC RANGE,PTTT: ABNORMAL SECS (ref 58–77)
THERAPEUTIC RANGE,PTTT: ABNORMAL SECS (ref 58–77)
WBC # BLD AUTO: 5.9 K/UL (ref 3.6–11)

## 2022-11-08 PROCEDURE — 82962 GLUCOSE BLOOD TEST: CPT

## 2022-11-08 PROCEDURE — 74011636637 HC RX REV CODE- 636/637: Performed by: INTERNAL MEDICINE

## 2022-11-08 PROCEDURE — 65270000046 HC RM TELEMETRY

## 2022-11-08 PROCEDURE — 74011250636 HC RX REV CODE- 250/636: Performed by: INTERNAL MEDICINE

## 2022-11-08 PROCEDURE — 74011250636 HC RX REV CODE- 250/636: Performed by: EMERGENCY MEDICINE

## 2022-11-08 PROCEDURE — 74011250637 HC RX REV CODE- 250/637: Performed by: INTERNAL MEDICINE

## 2022-11-08 PROCEDURE — 85730 THROMBOPLASTIN TIME PARTIAL: CPT

## 2022-11-08 PROCEDURE — 85027 COMPLETE CBC AUTOMATED: CPT

## 2022-11-08 PROCEDURE — 74011000250 HC RX REV CODE- 250: Performed by: INTERNAL MEDICINE

## 2022-11-08 PROCEDURE — 80048 BASIC METABOLIC PNL TOTAL CA: CPT

## 2022-11-08 PROCEDURE — 36415 COLL VENOUS BLD VENIPUNCTURE: CPT

## 2022-11-08 RX ADMIN — SODIUM CHLORIDE, PRESERVATIVE FREE 10 ML: 5 INJECTION INTRAVENOUS at 22:11

## 2022-11-08 RX ADMIN — GABAPENTIN 100 MG: 100 CAPSULE ORAL at 19:27

## 2022-11-08 RX ADMIN — HEPARIN SODIUM 12 UNITS/KG/HR: 10000 INJECTION, SOLUTION INTRAVENOUS at 16:10

## 2022-11-08 RX ADMIN — OXYCODONE 5 MG: 5 TABLET ORAL at 06:39

## 2022-11-08 RX ADMIN — INSULIN GLARGINE 11 UNITS: 100 INJECTION, SOLUTION SUBCUTANEOUS at 22:11

## 2022-11-08 RX ADMIN — OXYCODONE 5 MG: 5 TABLET ORAL at 19:37

## 2022-11-08 RX ADMIN — QUETIAPINE FUMARATE 25 MG: 25 TABLET ORAL at 22:10

## 2022-11-08 RX ADMIN — METOPROLOL SUCCINATE 50 MG: 50 TABLET, EXTENDED RELEASE ORAL at 11:26

## 2022-11-08 RX ADMIN — Medication 3 UNITS: at 18:00

## 2022-11-08 RX ADMIN — SODIUM CHLORIDE, PRESERVATIVE FREE 20 MG: 5 INJECTION INTRAVENOUS at 11:26

## 2022-11-08 RX ADMIN — GABAPENTIN 100 MG: 100 CAPSULE ORAL at 11:26

## 2022-11-08 RX ADMIN — Medication 3 UNITS: at 06:30

## 2022-11-08 RX ADMIN — VENLAFAXINE HYDROCHLORIDE 75 MG: 37.5 CAPSULE, EXTENDED RELEASE ORAL at 22:10

## 2022-11-08 RX ADMIN — Medication 2 UNITS: at 16:30

## 2022-11-08 RX ADMIN — OXYCODONE 5 MG: 5 TABLET ORAL at 01:44

## 2022-11-08 RX ADMIN — DILTIAZEM HYDROCHLORIDE 180 MG: 180 CAPSULE, COATED, EXTENDED RELEASE ORAL at 09:00

## 2022-11-08 RX ADMIN — Medication 2 UNITS: at 01:46

## 2022-11-08 RX ADMIN — SODIUM CHLORIDE, PRESERVATIVE FREE 10 ML: 5 INJECTION INTRAVENOUS at 06:23

## 2022-11-08 RX ADMIN — POLYETHYLENE GLYCOL 3350 17 G: 17 POWDER, FOR SOLUTION ORAL at 11:24

## 2022-11-08 RX ADMIN — ONDANSETRON 4 MG: 4 TABLET, ORALLY DISINTEGRATING ORAL at 22:10

## 2022-11-08 NOTE — PROGRESS NOTES
Vascular Surgery Progress Note  Tori Jackson, AGACNP-BC       Admit Date: 2022   LOS: 3 days        Daily Progress Note: 2022    Subjective: Kevin Ferrer is an 77 yo female with PMHx of Alzheimer's disease, chronic pain, HTN, HLD, DMII w/ polyneuropathy, ASHD (s/p CABG), diastolic heart failure, depression, hypothroidism, COPD, GERD and PVD with severe mulitlevel atherosclerotic occlusive disease who we are following for PAD with CLTI. She presented to the ED on  with complaints of R foot pain with onset ~ seven days ago. She is most recently s/p R SFA atherectomy on 22 for arterial occlusion with flatline R KING. Her post-intervention R KING was 0.9 in September. CTA abd/pelvis with runoff on  show:  Ostial stenosis of the right SFA, with occlusion of the mid SFA. There is weak distal reconstitution. She was admitted and started on heparin infusion. Prior to admission, she was on eliquis. Overnight, there were no acute events noted. Per nursing, she refused her medications yesterday. This morning, she is in bed with calm confusion as to events and place. Review of Systems   Unable to perform ROS: Dementia        Objective:     Vital signs  Temp (24hrs), Av.8 °F (37.1 °C), Min:98.5 °F (36.9 °C), Max:99.1 °F (37.3 °C)   No intake/output data recorded.    1901 -  0700  In: 2652 [I.V.:2652]  Out: 300 [Urine:300]    Visit Vitals  BP (!) 152/54   Pulse 71   Temp 98.5 °F (36.9 °C)   Resp 18   Ht 4' 11\" (1.499 m)   Wt 59 kg (130 lb)   SpO2 96%   BMI 26.26 kg/m²      O2 Device: None (Room air)     Pain control  Pain Assessment  Pain Scale 1: Numeric (0 - 10)  Pain Intensity 1: 7  Pain Onset 1: Acute  Pain Location 1: Leg  Pain Orientation 1: Right  Pain Description 1: Aching, Sore, Sharp, Stabbing  Pain Intervention(s) 1: Medication (see MAR), Emotional support, Family Support, Repositioned, Relaxation technique    PT/OT  Gait                 Physical Exam  Vitals and nursing note reviewed. HENT:      Head: Normocephalic. Eyes:      General: No scleral icterus. Cardiovascular:      Rate and Rhythm: Normal rate. Pulses:           Femoral pulses are 2+ on the right side and 2+ on the left side. Pulmonary:      Effort: Pulmonary effort is normal.   Abdominal:      Palpations: Abdomen is soft. Musculoskeletal:      Cervical back: Normal range of motion. Comments: Cold right foot with sensation. Neurological:      Mental Status: She is alert.           24 hour results:    Recent Results (from the past 24 hour(s))   PTT    Collection Time: 11/07/22 10:06 AM   Result Value Ref Range    aPTT 46.2 (H) 22.1 - 31.0 sec    aPTT, therapeutic range     58.0 - 77.0 SECS   GLUCOSE, POC    Collection Time: 11/07/22 11:17 AM   Result Value Ref Range    Glucose (POC) 101 65 - 117 mg/dL    Performed by Marge Camejo PCT    GLUCOSE, POC    Collection Time: 11/07/22  5:22 PM   Result Value Ref Range    Glucose (POC) 166 (H) 65 - 117 mg/dL    Performed by Melisa Russell PCT    PTT    Collection Time: 11/07/22  6:05 PM   Result Value Ref Range    aPTT 43.5 (H) 22.1 - 31.0 sec    aPTT, therapeutic range     58.0 - 77.0 SECS   GLUCOSE, POC    Collection Time: 11/08/22 12:10 AM   Result Value Ref Range    Glucose (POC) 266 (H) 65 - 117 mg/dL    Performed by Marcelo Joiner PCT    PTT    Collection Time: 11/08/22  2:34 AM   Result Value Ref Range    aPTT 73.6 (H) 22.1 - 31.0 sec    aPTT, therapeutic range     58.0 - 61.2 SECS   METABOLIC PANEL, BASIC    Collection Time: 11/08/22  3:07 AM   Result Value Ref Range    Sodium 135 (L) 136 - 145 mmol/L    Potassium 4.6 3.5 - 5.1 mmol/L    Chloride 106 97 - 108 mmol/L    CO2 25 21 - 32 mmol/L    Anion gap 4 (L) 5 - 15 mmol/L    Glucose 298 (H) 65 - 100 mg/dL    BUN 26 (H) 6 - 20 MG/DL    Creatinine 1.34 (H) 0.55 - 1.02 MG/DL    BUN/Creatinine ratio 19 12 - 20      eGFR 39 (L) >60 ml/min/1.73m2    Calcium 9.9 8.5 - 10.1 MG/DL   CBC W/O DIFF Collection Time: 11/08/22  3:07 AM   Result Value Ref Range    WBC 5.9 3.6 - 11.0 K/uL    RBC 3.26 (L) 3.80 - 5.20 M/uL    HGB 10.7 (L) 11.5 - 16.0 g/dL    HCT 33.6 (L) 35.0 - 47.0 %    .1 (H) 80.0 - 99.0 FL    MCH 32.8 26.0 - 34.0 PG    MCHC 31.8 30.0 - 36.5 g/dL    RDW 12.4 11.5 - 14.5 %    PLATELET 809 (L) 061 - 400 K/uL    MPV 10.5 8.9 - 12.9 FL    NRBC 0.0 0  WBC    ABSOLUTE NRBC 0.00 0.00 - 0.01 K/uL   GLUCOSE, POC    Collection Time: 11/08/22  8:08 AM   Result Value Ref Range    Glucose (POC) 197 (H) 65 - 117 mg/dL    Performed by Louis Thomas PCT           Assessment/Plan:     Active Problems:    Occlusive thrombus (11/5/2022)    PVD  CLTI with RLE rest pain  s/p failed L hallux amp (8/27/21)  s/p L TMA  (9/10/21)  S/p RLE angiogram with R SFA atherectomy 8/11/22  - SFA occlusion with distal reconstitution on CTA   - right foot cool, left tma warm.  L Femoral pulse palpable  - on heparin gtt - titration per algorithm currently therapeutic  - AC labs, aPTT 73.6 this morning  - statin  - diet- dysphagia  - SLP consult was placed  - bowel regimen  - pain control  - PT/OT okay  - Cr 1.34 today, 0.77 yesterday  - hgb 10.7 this morning, 12.4 on 11/6  - Plan for RLE arteriogram in am  - NPO at midnight  - verify consent    Mgmt per primary team:     PENELOPE on admission  - Cr 1.34 this am, 0.77 yesterday  Renal artery stenosis  - finding on CTA  HTN  - home meds - Metoprolol, furosemide, spironalactone  DMII  Polyneuropathy  - Lantus  - SSI  - home meds    cardiomegaly  CHF - right sided  Afib  ASHD  - home meds  - eliquis held  Chronic Pain  Polyneuropathy  Degenerative Disc disease  Depression  Arthritis  GERD  Alzheimer's disease  Thyroid disease    Activity: bedrest with PT/OT  DVT ppx: Heparin IV  Dispo:  home    Plan d/w Dr. Jamil Valiente, NP

## 2022-11-08 NOTE — PROGRESS NOTES
Pharmacy Heparin Monitoring    Indication: ACS/PCI/STEMI/NSTEMI (uncertain at this time)    Goal aPTT: 58-77.9 seconds    Initial dosing Weight: 59 kg    Labs:  Recent Labs     11/08/22  0928 11/08/22  0307 11/08/22  0234 11/07/22  1805   APTT 60.8*  --  73.6* 43.5*   HGB  --  10.7*  --   --    PLT  --  141*  --   --        Current rate:  12 unit/kg/hr    Impression/Plan:   New rate: continue 12 unit/kg/hr  Second consecutive therapeutic aPTT; changed checks to q24h  PRN bolus dose: No  Infusion rate, PRN bolus dose and aPTT results were discussed with the nurse: no, will speak with patient's nurse on the unit. CAUTION - Patient APTT very labile.       Thank you,  PK Cho

## 2022-11-08 NOTE — PROGRESS NOTES
Hospitalist Progress Note    NAME: Claudell Alas   :  1940   MRN:  [id number]       Assessment / Plan:  Resting bilateral (R>L) lower extremity pain POA  Due to severe PAD POA  ? Ischemic right leg POA  History of left TMA  PENELOPE POA  CTA abdomen pelvis with runoffs:  1. Ostial stenosis of the right SFA, with occlusion of the mid SFA. There is  weak distal reconstitution. 2. Multifocal stenosis of the left SFA, with weak distal arterial enhancement. 3. 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 The renal arteries demonstrate moderate  atherosclerotic disease, without beading. Nonvascular:  1. Cirrhosis. 2. Cardiomegaly, with findings compatible with diminished right heart function. 3. Fibroid uterus. 4. Severe degenerative changes of the lower lumbar spine, with multiple levels  of moderate spinal canal narrowing. Creatinine 1.4  Plan for surgery, timing to be determined by vascular most likely from Wednesday  Allow diet today, continue with heparin drip, monitor PTT  Hold home dose Eliquis.   Aggressive bowel regimen with opioid pain meds  Patient noticed to have difficulty with swallowing, will order puréed diet and consult SLP  , SLP recommendations reviewed, patient upgraded to minced and moist diet    diabetes type 2 insulin-dependent with hypoglycemia POA  Continue with home dose insulin regiment, adjust dosing depending on finger sticks  Add sliding scale insulin  CAD status post CABG  A. fib status post PPM  HTN  Continue home cardiac meds, holding Eliquis as above as patient on IV heparin drip  Continue home Cardizem CD, metoprolol  Holding home Lasix, Aldactone for now  BP Elevated this morning, patient refused to take her home BP meds, as needed hydralazine     Alzheimer's dementia  Continue home psych meds including Seroquel, Effexor     GERD  Pepcid     Code Status: Full code  Surrogate Decision Maker: Daughter  Updated patient daughter at bedside, all questions answered  DVT Prophylaxis: IV heparin as above  GI Prophylaxis: IV Pepcid     Baseline: Patient lives with son at home, has been having difficulty ambulating for the past couple of days with limited distance before severe pain    25.0 - 29.9 Overweight / Body mass index is 26.26 kg/m². Estimated discharge date: November 10  Barriers: Surgery on Wednesday 11/09  upDated patient daughter at bedside, all questions answered  Code status: Full  Prophylaxis:  heparin drip   Recommended Disposition: SNF/LTC     Subjective:     Chief Complaint / Reason for Physician Visit  Follow-up severe PAD, ischemic right leg discussed with RN events overnight. Review of Systems:  Symptom Y/N Comments  Symptom Y/N Comments   Fever/Chills    Chest Pain     Poor Appetite    Edema     Cough    Abdominal Pain     Sputum    Joint Pain     SOB/LE    Pruritis/Rash     Nausea/vomit    Tolerating PT/OT     Diarrhea    Tolerating Diet     Constipation    Other       Could NOT obtain due to: Confused     Objective:     VITALS:   Last 24hrs VS reviewed since prior progress note. Most recent are:  Patient Vitals for the past 24 hrs:   Temp Pulse Resp BP SpO2   11/08/22 0832 98.5 °F (36.9 °C) 71 18 (!) 152/54 96 %       No intake or output data in the 24 hours ending 11/08/22 1615       I had a face to face encounter and independently examined this patient on 11/8/2022, as outlined below:  PHYSICAL EXAM:  General: WD, WN. Alert, cooperative, no acute distress    EENT:  EOMI. Anicteric sclerae. MMM  Resp:  CTA bilaterally, no wheezing or rales. No accessory muscle use  CV:  Regular  rhythm,  No edema  GI:  Soft, Non distended, Non tender. +Bowel sounds  Neurologic:  Alert and oriented X 1,  normal speech,   Psych:   Poor insight, not anxious nor agitated  Skin:  No rashes.   No jaundice    Reviewed most current lab test results and cultures  YES  Reviewed most current radiology test results YES  Review and summation of old records today    NO  Reviewed patient's current orders and MAR    YES  PMH/SH reviewed - no change compared to H&P  ________________________________________________________________________  Care Plan discussed with:    Comments   Patient     Family  x    RN x    Care Manager     Consultant                        Multidiciplinary team rounds were held today with , nursing, pharmacist and clinical coordinator. Patient's plan of care was discussed; medications were reviewed and discharge planning was addressed. ________________________________________________________________________  Total NON critical care TIME:  35   Minutes    Total CRITICAL CARE TIME Spent:   Minutes non procedure based      Comments   >50% of visit spent in counseling and coordination of care     ________________________________________________________________________  Leeann Ramos MD     Procedures: see electronic medical records for all procedures/Xrays and details which were not copied into this note but were reviewed prior to creation of Plan. LABS:  I reviewed today's most current labs and imaging studies.   Pertinent labs include:  Recent Labs     11/08/22 0307 11/07/22 0438 11/06/22  0546   WBC 5.9 5.8 6.6   HGB 10.7* 11.4* 12.4   HCT 33.6* 35.4 39.5   * 136* 148*       Recent Labs     11/08/22 0307 11/07/22  0438 11/06/22  0546   * 143 146*   K 4.6 4.0 4.0    112* 112*   CO2 25 24 25   * 96 112*   BUN 26* 18 22*   CREA 1.34* 0.77 0.96   CA 9.9 10.4* 10.6*         Signed: Leeann Ramos MD

## 2022-11-08 NOTE — PROGRESS NOTES
[facility] to [facility] preferences on today, was advised to return tomorrow as pt's daughter Chase Cervantes would be at bedside tomorrow morning. CM to follow up on tomorrow.     Tamiko Aguilar 178, [address] [facility]

## 2022-11-09 ENCOUNTER — ANESTHESIA (OUTPATIENT)
Dept: SURGERY | Age: 82
DRG: 300 | End: 2022-11-09
Payer: MEDICARE

## 2022-11-09 ENCOUNTER — APPOINTMENT (OUTPATIENT)
Dept: GENERAL RADIOLOGY | Age: 82
DRG: 300 | End: 2022-11-09
Attending: SURGERY
Payer: MEDICARE

## 2022-11-09 LAB
ABO + RH BLD: NORMAL
ANION GAP SERPL CALC-SCNC: 5 MMOL/L (ref 5–15)
APTT PPP: 54.8 SEC (ref 22.1–31)
BLOOD GROUP ANTIBODIES SERPL: NORMAL
BUN SERPL-MCNC: 17 MG/DL (ref 6–20)
BUN/CREAT SERPL: 19 (ref 12–20)
CALCIUM SERPL-MCNC: 11.1 MG/DL (ref 8.5–10.1)
CALCULATED R AXIS, ECG10: 18 DEGREES
CALCULATED T AXIS, ECG11: -139 DEGREES
CHLORIDE SERPL-SCNC: 109 MMOL/L (ref 97–108)
CO2 SERPL-SCNC: 28 MMOL/L (ref 21–32)
CREAT SERPL-MCNC: 0.88 MG/DL (ref 0.55–1.02)
DIAGNOSIS, 93000: NORMAL
ERYTHROCYTE [DISTWIDTH] IN BLOOD BY AUTOMATED COUNT: 12.5 % (ref 11.5–14.5)
GLUCOSE BLD STRIP.AUTO-MCNC: 161 MG/DL (ref 65–117)
GLUCOSE BLD STRIP.AUTO-MCNC: 162 MG/DL (ref 65–117)
GLUCOSE BLD STRIP.AUTO-MCNC: 171 MG/DL (ref 65–117)
GLUCOSE BLD STRIP.AUTO-MCNC: 185 MG/DL (ref 65–117)
GLUCOSE BLD STRIP.AUTO-MCNC: 218 MG/DL (ref 65–117)
GLUCOSE BLD STRIP.AUTO-MCNC: 346 MG/DL (ref 65–117)
GLUCOSE SERPL-MCNC: 218 MG/DL (ref 65–100)
HCT VFR BLD AUTO: 36.3 % (ref 35–47)
HGB BLD-MCNC: 11.7 G/DL (ref 11.5–16)
MCH RBC QN AUTO: 32.4 PG (ref 26–34)
MCHC RBC AUTO-ENTMCNC: 32.2 G/DL (ref 30–36.5)
MCV RBC AUTO: 100.6 FL (ref 80–99)
NRBC # BLD: 0 K/UL (ref 0–0.01)
NRBC BLD-RTO: 0 PER 100 WBC
PLATELET # BLD AUTO: 143 K/UL (ref 150–400)
PMV BLD AUTO: 10.7 FL (ref 8.9–12.9)
POTASSIUM SERPL-SCNC: 4.6 MMOL/L (ref 3.5–5.1)
Q-T INTERVAL, ECG07: 412 MS
QRS DURATION, ECG06: 98 MS
QTC CALCULATION (BEZET), ECG08: 475 MS
RBC # BLD AUTO: 3.61 M/UL (ref 3.8–5.2)
SERVICE CMNT-IMP: ABNORMAL
SODIUM SERPL-SCNC: 142 MMOL/L (ref 136–145)
SPECIMEN EXP DATE BLD: NORMAL
THERAPEUTIC RANGE,PTTT: ABNORMAL SECS (ref 58–77)
VENTRICULAR RATE, ECG03: 80 BPM
WBC # BLD AUTO: 7.7 K/UL (ref 3.6–11)

## 2022-11-09 PROCEDURE — 77030040922 HC BLNKT HYPOTHRM STRY -A

## 2022-11-09 PROCEDURE — 72190 X-RAY EXAM OF PELVIS: CPT

## 2022-11-09 PROCEDURE — 76010000161 HC OR TIME 1 TO 1.5 HR INTENSV-TIER 1: Performed by: SURGERY

## 2022-11-09 PROCEDURE — 74011000250 HC RX REV CODE- 250: Performed by: SURGERY

## 2022-11-09 PROCEDURE — C1887 CATHETER, GUIDING: HCPCS | Performed by: SURGERY

## 2022-11-09 PROCEDURE — 2709999900 HC NON-CHARGEABLE SUPPLY: Performed by: SURGERY

## 2022-11-09 PROCEDURE — 74011250637 HC RX REV CODE- 250/637: Performed by: INTERNAL MEDICINE

## 2022-11-09 PROCEDURE — 93005 ELECTROCARDIOGRAM TRACING: CPT

## 2022-11-09 PROCEDURE — B41F1ZZ FLUOROSCOPY OF RIGHT LOWER EXTREMITY ARTERIES USING LOW OSMOLAR CONTRAST: ICD-10-PCS | Performed by: SURGERY

## 2022-11-09 PROCEDURE — C1892 INTRO/SHEATH,FIXED,PEEL-AWAY: HCPCS | Performed by: SURGERY

## 2022-11-09 PROCEDURE — 74011250636 HC RX REV CODE- 250/636: Performed by: STUDENT IN AN ORGANIZED HEALTH CARE EDUCATION/TRAINING PROGRAM

## 2022-11-09 PROCEDURE — 80048 BASIC METABOLIC PNL TOTAL CA: CPT

## 2022-11-09 PROCEDURE — 82962 GLUCOSE BLOOD TEST: CPT

## 2022-11-09 PROCEDURE — 65270000046 HC RM TELEMETRY

## 2022-11-09 PROCEDURE — 74011636637 HC RX REV CODE- 636/637: Performed by: SURGERY

## 2022-11-09 PROCEDURE — 77030004561 HC CATH ANGI DX COBRA ANGI -B: Performed by: SURGERY

## 2022-11-09 PROCEDURE — C1894 INTRO/SHEATH, NON-LASER: HCPCS | Performed by: SURGERY

## 2022-11-09 PROCEDURE — 74011250637 HC RX REV CODE- 250/637: Performed by: SURGERY

## 2022-11-09 PROCEDURE — 2709999900 HC NON-CHARGEABLE SUPPLY

## 2022-11-09 PROCEDURE — 76210000017 HC OR PH I REC 1.5 TO 2 HR: Performed by: SURGERY

## 2022-11-09 PROCEDURE — 74011000636 HC RX REV CODE- 636: Performed by: SURGERY

## 2022-11-09 PROCEDURE — 86900 BLOOD TYPING SEROLOGIC ABO: CPT

## 2022-11-09 PROCEDURE — C1760 CLOSURE DEV, VASC: HCPCS | Performed by: SURGERY

## 2022-11-09 PROCEDURE — 76060000033 HC ANESTHESIA 1 TO 1.5 HR: Performed by: SURGERY

## 2022-11-09 PROCEDURE — 74011250636 HC RX REV CODE- 250/636: Performed by: NURSE ANESTHETIST, CERTIFIED REGISTERED

## 2022-11-09 PROCEDURE — 77030038269 HC DRN EXT URIN PURWCK BARD -A

## 2022-11-09 PROCEDURE — 74011000250 HC RX REV CODE- 250: Performed by: NURSE ANESTHETIST, CERTIFIED REGISTERED

## 2022-11-09 PROCEDURE — 76000 FLUOROSCOPY <1 HR PHYS/QHP: CPT

## 2022-11-09 PROCEDURE — 74011250636 HC RX REV CODE- 250/636: Performed by: SURGERY

## 2022-11-09 PROCEDURE — 36415 COLL VENOUS BLD VENIPUNCTURE: CPT

## 2022-11-09 PROCEDURE — 74011250636 HC RX REV CODE- 250/636: Performed by: EMERGENCY MEDICINE

## 2022-11-09 PROCEDURE — 74011000250 HC RX REV CODE- 250: Performed by: INTERNAL MEDICINE

## 2022-11-09 PROCEDURE — 85027 COMPLETE CBC AUTOMATED: CPT

## 2022-11-09 PROCEDURE — 85730 THROMBOPLASTIN TIME PARTIAL: CPT

## 2022-11-09 PROCEDURE — 74011250636 HC RX REV CODE- 250/636: Performed by: INTERNAL MEDICINE

## 2022-11-09 PROCEDURE — C1769 GUIDE WIRE: HCPCS | Performed by: SURGERY

## 2022-11-09 RX ORDER — DEXAMETHASONE SODIUM PHOSPHATE 4 MG/ML
INJECTION, SOLUTION INTRA-ARTICULAR; INTRALESIONAL; INTRAMUSCULAR; INTRAVENOUS; SOFT TISSUE AS NEEDED
Status: DISCONTINUED | OUTPATIENT
Start: 2022-11-09 | End: 2022-11-09 | Stop reason: HOSPADM

## 2022-11-09 RX ORDER — LIDOCAINE HYDROCHLORIDE 20 MG/ML
INJECTION, SOLUTION EPIDURAL; INFILTRATION; INTRACAUDAL; PERINEURAL AS NEEDED
Status: DISCONTINUED | OUTPATIENT
Start: 2022-11-09 | End: 2022-11-09 | Stop reason: HOSPADM

## 2022-11-09 RX ORDER — HYDROMORPHONE HYDROCHLORIDE 1 MG/ML
0.2 INJECTION, SOLUTION INTRAMUSCULAR; INTRAVENOUS; SUBCUTANEOUS
Status: DISCONTINUED | OUTPATIENT
Start: 2022-11-09 | End: 2022-11-09 | Stop reason: HOSPADM

## 2022-11-09 RX ORDER — PROPOFOL 10 MG/ML
INJECTION, EMULSION INTRAVENOUS
Status: DISCONTINUED | OUTPATIENT
Start: 2022-11-09 | End: 2022-11-09 | Stop reason: HOSPADM

## 2022-11-09 RX ORDER — MIDAZOLAM HYDROCHLORIDE 1 MG/ML
INJECTION, SOLUTION INTRAMUSCULAR; INTRAVENOUS AS NEEDED
Status: DISCONTINUED | OUTPATIENT
Start: 2022-11-09 | End: 2022-11-09 | Stop reason: HOSPADM

## 2022-11-09 RX ORDER — FENTANYL CITRATE 50 UG/ML
25 INJECTION, SOLUTION INTRAMUSCULAR; INTRAVENOUS
Status: DISCONTINUED | OUTPATIENT
Start: 2022-11-09 | End: 2022-11-09 | Stop reason: HOSPADM

## 2022-11-09 RX ORDER — SODIUM CHLORIDE, SODIUM LACTATE, POTASSIUM CHLORIDE, CALCIUM CHLORIDE 600; 310; 30; 20 MG/100ML; MG/100ML; MG/100ML; MG/100ML
INJECTION, SOLUTION INTRAVENOUS
Status: DISCONTINUED | OUTPATIENT
Start: 2022-11-09 | End: 2022-11-09 | Stop reason: HOSPADM

## 2022-11-09 RX ORDER — SODIUM CHLORIDE, SODIUM LACTATE, POTASSIUM CHLORIDE, CALCIUM CHLORIDE 600; 310; 30; 20 MG/100ML; MG/100ML; MG/100ML; MG/100ML
25 INJECTION, SOLUTION INTRAVENOUS CONTINUOUS
Status: DISCONTINUED | OUTPATIENT
Start: 2022-11-09 | End: 2022-11-09 | Stop reason: HOSPADM

## 2022-11-09 RX ORDER — OXYCODONE HYDROCHLORIDE 5 MG/1
5 TABLET ORAL AS NEEDED
Status: DISCONTINUED | OUTPATIENT
Start: 2022-11-09 | End: 2022-11-09 | Stop reason: HOSPADM

## 2022-11-09 RX ORDER — ENOXAPARIN SODIUM 100 MG/ML
40 INJECTION SUBCUTANEOUS
Status: DISCONTINUED | OUTPATIENT
Start: 2022-11-10 | End: 2022-11-10 | Stop reason: HOSPADM

## 2022-11-09 RX ORDER — FENTANYL CITRATE 50 UG/ML
INJECTION, SOLUTION INTRAMUSCULAR; INTRAVENOUS AS NEEDED
Status: DISCONTINUED | OUTPATIENT
Start: 2022-11-09 | End: 2022-11-09 | Stop reason: HOSPADM

## 2022-11-09 RX ORDER — LIDOCAINE HYDROCHLORIDE 10 MG/ML
INJECTION INFILTRATION; PERINEURAL AS NEEDED
Status: DISCONTINUED | OUTPATIENT
Start: 2022-11-09 | End: 2022-11-09 | Stop reason: HOSPADM

## 2022-11-09 RX ORDER — SPIRONOLACTONE 25 MG/1
25 TABLET ORAL EVERY OTHER DAY
Status: DISCONTINUED | OUTPATIENT
Start: 2022-11-10 | End: 2022-11-10 | Stop reason: HOSPADM

## 2022-11-09 RX ORDER — FUROSEMIDE 40 MG/1
40 TABLET ORAL DAILY
Status: DISCONTINUED | OUTPATIENT
Start: 2022-11-10 | End: 2022-11-10 | Stop reason: HOSPADM

## 2022-11-09 RX ORDER — ONDANSETRON 2 MG/ML
4 INJECTION INTRAMUSCULAR; INTRAVENOUS AS NEEDED
Status: DISCONTINUED | OUTPATIENT
Start: 2022-11-09 | End: 2022-11-09 | Stop reason: HOSPADM

## 2022-11-09 RX ADMIN — OXYCODONE 5 MG: 5 TABLET ORAL at 01:10

## 2022-11-09 RX ADMIN — OXYCODONE 5 MG: 5 TABLET ORAL at 06:26

## 2022-11-09 RX ADMIN — QUETIAPINE FUMARATE 25 MG: 25 TABLET ORAL at 22:28

## 2022-11-09 RX ADMIN — HEPARIN SODIUM 13 UNITS/KG/HR: 10000 INJECTION, SOLUTION INTRAVENOUS at 07:32

## 2022-11-09 RX ADMIN — MIDAZOLAM HYDROCHLORIDE 0.5 MG: 1 INJECTION, SOLUTION INTRAMUSCULAR; INTRAVENOUS at 08:24

## 2022-11-09 RX ADMIN — SODIUM CHLORIDE, POTASSIUM CHLORIDE, SODIUM LACTATE AND CALCIUM CHLORIDE: 600; 310; 30; 20 INJECTION, SOLUTION INTRAVENOUS at 08:22

## 2022-11-09 RX ADMIN — LIDOCAINE HYDROCHLORIDE 20 MG: 20 INJECTION, SOLUTION EPIDURAL; INFILTRATION; INTRACAUDAL; PERINEURAL at 08:24

## 2022-11-09 RX ADMIN — INSULIN GLARGINE 11 UNITS: 100 INJECTION, SOLUTION SUBCUTANEOUS at 22:29

## 2022-11-09 RX ADMIN — FENTANYL CITRATE 25 MCG: 50 INJECTION INTRAMUSCULAR; INTRAVENOUS at 10:28

## 2022-11-09 RX ADMIN — FENTANYL CITRATE 10 MCG: 50 INJECTION, SOLUTION INTRAMUSCULAR; INTRAVENOUS at 09:13

## 2022-11-09 RX ADMIN — METOPROLOL SUCCINATE 1 MG: 50 TABLET, EXTENDED RELEASE ORAL at 09:22

## 2022-11-09 RX ADMIN — DEXAMETHASONE SODIUM PHOSPHATE 4 MG: 4 INJECTION, SOLUTION INTRAMUSCULAR; INTRAVENOUS at 08:33

## 2022-11-09 RX ADMIN — FENTANYL CITRATE 10 MCG: 50 INJECTION, SOLUTION INTRAMUSCULAR; INTRAVENOUS at 08:24

## 2022-11-09 RX ADMIN — VENLAFAXINE HYDROCHLORIDE 75 MG: 37.5 CAPSULE, EXTENDED RELEASE ORAL at 22:28

## 2022-11-09 RX ADMIN — SODIUM CHLORIDE, PRESERVATIVE FREE 10 ML: 5 INJECTION INTRAVENOUS at 22:00

## 2022-11-09 RX ADMIN — Medication 3 AMPULE: at 08:17

## 2022-11-09 RX ADMIN — PROPOFOL 30 MCG/KG/MIN: 10 INJECTION, EMULSION INTRAVENOUS at 08:24

## 2022-11-09 RX ADMIN — FENTANYL CITRATE 25 MCG: 50 INJECTION INTRAMUSCULAR; INTRAVENOUS at 10:36

## 2022-11-09 RX ADMIN — Medication 1 AMPULE: at 22:28

## 2022-11-09 RX ADMIN — SODIUM CHLORIDE, PRESERVATIVE FREE 10 ML: 5 INJECTION INTRAVENOUS at 06:28

## 2022-11-09 RX ADMIN — HYDROMORPHONE HYDROCHLORIDE 1 MG: 1 INJECTION, SOLUTION INTRAMUSCULAR; INTRAVENOUS; SUBCUTANEOUS at 20:24

## 2022-11-09 RX ADMIN — SODIUM CHLORIDE, POTASSIUM CHLORIDE, SODIUM LACTATE AND CALCIUM CHLORIDE 25 ML/HR: 600; 310; 30; 20 INJECTION, SOLUTION INTRAVENOUS at 08:16

## 2022-11-09 RX ADMIN — FAMOTIDINE 20 MG: 10 INJECTION INTRAVENOUS at 14:50

## 2022-11-09 RX ADMIN — DILTIAZEM HYDROCHLORIDE 180 MG: 180 CAPSULE, COATED, EXTENDED RELEASE ORAL at 09:00

## 2022-11-09 RX ADMIN — HYDRALAZINE HYDROCHLORIDE 10 MG: 20 INJECTION INTRAMUSCULAR; INTRAVENOUS at 10:19

## 2022-11-09 RX ADMIN — FENTANYL CITRATE 10 MCG: 50 INJECTION, SOLUTION INTRAMUSCULAR; INTRAVENOUS at 08:38

## 2022-11-09 NOTE — PERIOP NOTES
07: 30= arrival to unit via bed from room 3236; alert to self; PTT 54.8 at 02:23AM; continuous Heparin drip NOT changed at that time (was scheduled to be increased by 1 unit/kg/hr at that time); per order in [facility], increased Heparin to 13 units/kg/hr and verified with David Monroy, Charge RN. Will inform Dr. Lito Hernandez when he arrives to Pre OP of 5 hour delay in Heparin change from floor. Pt completely saturated in urine upon arrival; changed pad, depends, and gown; skin on sacrum CDI with no erythema or skin breakdown; small amount of cream noted in crease of buttocks; repositioned in bed. Upper and lower dentures in mouth; will remove and place in PACU in pink denture cup.     07:50= consents for procedure and blood verified on phone with Laila Castaneda (daughter) and second verification performed with Diana Pool. 08:06= unable to answer any questions due to alzheimers history; only alert to name; no questions answered from Second tab (Admission Database, STOP-BANG, Functional or Falls). 08:08= Dr. Lito Hernandez in to see; informed of Heparin continuous not being increased until 07:32AM; stated pt will get a Bolus in OR of Heparin.

## 2022-11-09 NOTE — PROGRESS NOTES
Speech Pathology Note    Chart reviewed and note patient KARRIE for procedure. Recommend resume Minced&Moist/Thin Liquid diet when cleared for PO intake, per initial SLP evaluation on 11/7. Will defer and continue to follow. Thank you.     Rachna Whelan M.S., CCC-SLP

## 2022-11-09 NOTE — PERIOP NOTES
Handoff Report from Operating Room to PACU    Report received from [address] [place] [address] and C Whitnum CRNA regarding Bryan Starkey. Surgeon(s):  Zuleima Nguyen MD  And Procedure(s) (LRB):  RIGHT LEG ARTERIOGRAM (Right)  confirmed   with allergies and dressings discussed. Anesthesia type, drugs, patient history, complications, estimated blood loss, vital signs, intake and output, and last pain medication, lines, and temperature were reviewed. 10:15 AM  Notified Dr Lakisha Barber of hypertension  TORB to give PRN Hydralazine dose per [facility]. 10:55 AM  TRANSFER - OUT REPORT:    Verbal report given to Lydia(name) on Bryan Starkey  being transferred to Surg Tele (unit) for routine progression of care       Report consisted of patients Situation, Background, Assessment and   Recommendations(SBAR). Information from the following report(s) SBAR, Kardex, Intake/Output, MAR, and Accordion was reviewed with the receiving nurse. Lines:   Peripheral IV 11/07/22 Distal;Posterior; Left Forearm (Active)   Site Assessment Clean, dry, & intact 11/09/22 0942   Phlebitis Assessment 0 11/09/22 0942   Infiltration Assessment 0 11/09/22 0942   Dressing Status Clean, dry, & intact 11/09/22 0942   Dressing Type Transparent;Tape 11/09/22 0942   Hub Color/Line Status Pink; Infusing 11/09/22 0942       Peripheral IV 11/09/22 Posterior;Right Wrist (Active)   Site Assessment Clean, dry, & intact 11/09/22 0942   Phlebitis Assessment 0 11/09/22 0942   Infiltration Assessment 0 11/09/22 0942   Dressing Status Clean, dry, & intact 11/09/22 0942   Dressing Type Transparent;Tape 11/09/22 0942   Hub Color/Line Status Pink;Capped 11/09/22 5671        Opportunity for questions and clarification was provided. Dentures sent up with pt in pink denture cup.      Patient transported with:   O2 @ 2 liters  Tech

## 2022-11-09 NOTE — PROGRESS NOTES
Spiritual Care Assessment/Progress Note  [facility]      NAME: Bryan Starkey      MRN: [id number]  AGE: 78 y.o. SEX: female  Caodaism Affiliation: Restorationism   Language: English     11/9/2022     Total Time (in minutes): 16     Spiritual Assessment begun in [facility] 3 SURG TELE through conversation with:         []Patient        [x] Family    [] Friend(s)        Reason for Consult: Initial/Spiritual assessment, patient floor     Spiritual beliefs: (Please include comment if needed)     [x] Identifies with a lily tradition:         [] Supported by a lily community:            [] Claims no spiritual orientation:           [] Seeking spiritual identity:                [] Adheres to an individual form of spirituality:           [] Not able to assess:                           Identified resources for coping:      [x] Prayer                               [] Music                  [] Guided Imagery     [x] Family/friends                 [] Pet visits     [] Devotional reading                         [] Unknown     [] Other:                                                Interventions offered during this visit: (See comments for more details)    Patient Interventions: Initial visit, Initial/Spiritual assessment, patient floor     Family/Friend(s):  Affirmation of emotions/emotional suffering, Affirmation of lily, Catharsis/review of pertinent events in supportive environment, Iconic (affirming the presence of God/Higher Power), Prayer (assurance of)     Plan of Care:     [] Support spiritual and/or cultural needs    [] Support AMD and/or advance care planning process      [] Support grieving process   [] Coordinate Rites and/or Rituals    [] Coordination with community clergy   [x] No spiritual needs identified at this time   [] Detailed Plan of Care below (See Comments)  [] Make referral to Music Therapy  [] Make referral to Pet Therapy     [] Make referral to Addiction services  [] Make referral to [facility]  [] Make referral to Spiritual Care Partner  [] No future visits requested        [x] Contact Spiritual Care for further referrals     Comments:   Reviewed chart prior to visit on Surg Tele for spiritual assessment. Patient's daughter and two other visitors were present. Patient appeared to be sleeping soundly and did not wake during visit. Daughter indicated things are going well and her mother is doing okay. She is clearly well supported by family. Daughter acknowledges good spiritual support as well. No spiritual needs or concerns were expressed during visit. Explained role of chaplains, provided spiritual presence and active listening. Offered assurance of prayer and advised of ongoing availability of pastoral support.      FREDI Haley, [facility], Staff [address] [place] [address] Paging Service  [phone])

## 2022-11-09 NOTE — ANESTHESIA POSTPROCEDURE EVALUATION
Procedure(s):  RIGHT LEG ARTERIOGRAM.    general    Anesthesia Post Evaluation      Multimodal analgesia: multimodal analgesia used between 6 hours prior to anesthesia start to PACU discharge  Patient location during evaluation: PACU  Patient participation: complete - patient participated  Level of consciousness: sleepy but conscious  Pain management: adequate  Airway patency: patent  Anesthetic complications: no  Cardiovascular status: acceptable, blood pressure returned to baseline and hemodynamically stable  Respiratory status: acceptable and nasal cannula  Hydration status: acceptable  Post anesthesia nausea and vomiting:  none  Final Post Anesthesia Temperature Assessment:  Normothermia (36.0-37.5 degrees C)      INITIAL Post-op Vital signs:   Vitals Value Taken Time   /54 11/09/22 1115   Temp 37.3 °C (99.2 °F) 11/09/22 1045   Pulse 72 11/09/22 1118   Resp 15 11/09/22 1118   SpO2 100 % 11/09/22 1118   Vitals shown include unvalidated device data.

## 2022-11-09 NOTE — ANESTHESIA PREPROCEDURE EVALUATION
Anesthetic History               Review of Systems / Medical History  Patient summary reviewed, nursing notes reviewed and pertinent labs reviewed    Pulmonary    COPD            Comments: H/O RLL Pneumonia   Neuro/Psych             Comments: Diabetic peripheral neuropathy    H/O SDH (subdural hematoma)    Hard of hearing Cardiovascular    Hypertension  Valvular problems/murmurs: tricuspid insufficiency and mitral insufficiency    CHF  Dysrhythmias : atrial fibrillation  Pacemaker, CAD, PAD, cardiac stents, CABG (3 vessel) and hyperlipidemia    Exercise tolerance: <4 METS  Comments: Ejection fraction was  estimated in the range of 55 % to 60 %. Mod-severe TR & PHTN    ECG (5/4/21): Ventricular-paced rhythm   Abnormal ECG   When compared with ECG of 09-AUG-2017 18:58,   Vent. rate has increased BY  20 BPM     TTE (2/23/18): Left ventricle: Systolic function was normal. Ejection fraction was  estimated in the range of 55 % to 60 %. No obvious wall motion  abnormalities identified in the views obtained. There was mild concentric  hypertrophy. Left atrium: The atrium was moderately dilated. Right atrium: The atrium was moderately dilated. Mitral valve: There was mild regurgitation. Aortic valve: There was no stenosis. Tricuspid valve: There was moderate to severe regurgitation. Pulmonary  artery systolic pressure: 61 mmHg. There was moderate to severe pulmonary  hypertension.    GI/Hepatic/Renal     GERD    Renal disease: CRI      Comments: CRI, Stage III Endo/Other    Diabetes: type 2, using insulin  Hypothyroidism  Obesity and arthritis     Other Findings   Comments: Alzheimer disease  Chronic pain           Physical Exam    Airway  Mallampati: II  TM Distance: 4 - 6 cm  Neck ROM: normal range of motion   Mouth opening: Normal     Cardiovascular  Regular rate and rhythm,  S1 and S2 normal,  no murmur, click, rub, or gallop             Dental    Dentition: Edentulous, Full lower dentures and Full upper dentures     Pulmonary  Breath sounds clear to auscultation               Abdominal  GI exam deferred       Other Findings            Anesthetic Plan    ASA: 3  Anesthesia type: general - ankle block          Induction: Intravenous  Anesthetic plan and risks discussed with: Patient

## 2022-11-09 NOTE — PROGRESS NOTES
Hospitalist Progress Note    NAME: Suze Vega   :  1940   MRN:  [id number]       Assessment / Plan:  Resting bilateral (R>L) lower extremity pain POA  Due to severe PAD POA  ? Ischemic right leg POA  History of left TMA  PENELOPE POA  S/p arteriogram of RLE on   CTA abdomen pelvis with runoffs:  1. Ostial stenosis of the right SFA, with occlusion of the mid SFA. There is  weak distal reconstitution. 2. Multifocal stenosis of the left SFA, with weak distal arterial enhancement. 3. 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 The renal arteries demonstrate moderate  atherosclerotic disease, without beading. Nonvascular:  1. Cirrhosis. 2. Cardiomegaly, with findings compatible with diminished right heart function. 3. Fibroid uterus. 4. Severe degenerative changes of the lower lumbar spine, with multiple levels  of moderate spinal canal narrowing. Creatinine 1.4  Plan for surgery, timing to be determined by vascular most likely from Wednesday  Allow diet today, continue with heparin drip, monitor PTT  Hold home dose Eliquis. Aggressive bowel regimen with opioid pain meds  Patient noticed to have difficulty with swallowing, will order puréed diet and consult SLP  , SLP recommendations reviewed, patient upgraded to minced and moist diet  : Underwent arteriogram of the right lower extremity which revealedLong segment R SFA/popliteal occlusion with reconstitution small below knee pop and one vessel runoff via very modest peroneal .   Per vascular surgery,no percutaneous or minimally invasive options for right lower extremity ischemia, option include attempt at bypass.   Awaiting  daughter consent    Family considered nonsurgical options in the outpatient basis   S/p heparin drip   diabetes type 2 insulin-dependent with hypoglycemia POA  Continue with home dose insulin regiment, adjust dosing depending on finger sticks  Add sliding scale insulin  CAD status post CABG  A. fib status post PPM  HTN  Continue home cardiac meds, holding Eliquis as above as patient on IV heparin drip  Continue home Cardizem CD, metoprolol  Resume home Aldactone , hold lasix   BP Elevated ,  as needed hydralazine     Alzheimer's dementia  Continue home psych meds including Seroquel, Effexor     GERD  Pepcid     Code Status: Full code  Surrogate Decision Maker: Daughter  Updated patient daughter at bedside, all questions answered  DVT Prophylaxis: s/p IV heparin   GI Prophylaxis: IV Pepcid     Baseline: Patient lives with son at home, has been having difficulty ambulating for the past couple of days with limited distance before severe pain    25.0 - 29.9 Overweight / Body mass index is 26.26 kg/m². Estimated discharge date: November 10  Barriers:   upDated patient daughter at bedside, all questions answered  Code status: Full  Prophylaxis:  lovenox   Recommended Disposition: SNF/LTC     Subjective:     Chief Complaint / Reason for Physician Visit  Follow-up severe PAD, ischemic right leg discussed with RN events overnight. Review of Systems:  Symptom Y/N Comments  Symptom Y/N Comments   Fever/Chills    Chest Pain     Poor Appetite    Edema     Cough    Abdominal Pain     Sputum    Joint Pain     SOB/LE    Pruritis/Rash     Nausea/vomit    Tolerating PT/OT     Diarrhea    Tolerating Diet     Constipation    Other       Could NOT obtain due to: Confused     Objective:     VITALS:   Last 24hrs VS reviewed since prior progress note.  Most recent are:  Patient Vitals for the past 24 hrs:   Temp Pulse Resp BP SpO2   11/09/22 1502 -- -- -- (!) 166/60 --   11/09/22 1134 98 °F (36.7 °C) 77 16 (!) 162/69 100 %   11/09/22 1115 -- 75 14 (!) 155/54 100 %   11/09/22 1100 -- 77 15 (!) 147/48 100 %   11/09/22 1055 -- 73 16 -- 100 %   11/09/22 1045 99.2 °F (37.3 °C) 76 14 (!) 145/51 99 %   11/09/22 1030 -- 76 13 (!) 164/62 100 %   11/09/22 1025 -- 72 20 (!) 182/69 100 %   11/09/22 1015 -- 74 20 (!) 201/77 100 %   11/09/22 1000 -- 74 20 (!) 194/74 (!) 89 %   11/09/22 0955 -- 73 20 (!) 181/107 90 %   11/09/22 0950 -- 72 16 (!) 207/82 100 %   11/09/22 0945 -- 74 20 (!) 205/79 100 %   11/09/22 0942 97 °F (36.1 °C) 72 12 (!) 197/81 100 %   11/09/22 0757 -- 80 23 (!) 203/73 96 %   11/08/22 1718 99.9 °F (37.7 °C) 70 18 (!) 190/65 97 %         Intake/Output Summary (Last 24 hours) at 11/9/2022 1630  Last data filed at 11/9/2022 0934  Gross per 24 hour   Intake 500 ml   Output 10 ml   Net 490 ml          I had a face to face encounter and independently examined this patient on 11/9/2022, as outlined below:  PHYSICAL EXAM:  General: WD, WN. Alert, cooperative, no acute distress    EENT:  EOMI. Anicteric sclerae. MMM  Resp:  CTA bilaterally, no wheezing or rales. No accessory muscle use  CV:  Regular  rhythm,  No edema  GI:  Soft, Non distended, Non tender. +Bowel sounds  Neurologic:  Alert and oriented X 1,  normal speech,   Psych:   Poor insight, not anxious nor agitated  Skin:  No rashes. No jaundice    Reviewed most current lab test results and cultures  YES  Reviewed most current radiology test results   YES  Review and summation of old records today    NO  Reviewed patient's current orders and MAR    YES  PMH/SH reviewed - no change compared to H&P  ________________________________________________________________________  Care Plan discussed with:    Comments   Patient     Family  x    RN x    Care Manager     Consultant                        Multidiciplinary team rounds were held today with , nursing, pharmacist and clinical coordinator. Patient's plan of care was discussed; medications were reviewed and discharge planning was addressed.      ________________________________________________________________________  Total NON critical care TIME:  35   Minutes    Total CRITICAL CARE TIME Spent:   Minutes non procedure based      Comments   >50% of visit spent in counseling and coordination of care     ________________________________________________________________________  Leeann Ramos MD     Procedures: see electronic medical records for all procedures/Xrays and details which were not copied into this note but were reviewed prior to creation of Plan. LABS:  I reviewed today's most current labs and imaging studies.   Pertinent labs include:  Recent Labs     11/09/22  0223 11/08/22  0307 11/07/22  0438   WBC 7.7 5.9 5.8   HGB 11.7 10.7* 11.4*   HCT 36.3 33.6* 35.4   * 141* 136*       Recent Labs     11/09/22  1246 11/08/22  0307 11/07/22  0438    135* 143   K 4.6 4.6 4.0   * 106 112*   CO2 28 25 24   * 298* 96   BUN 17 26* 18   CREA 0.88 1.34* 0.77   CA 11.1* 9.9 10.4*         Signed: Leeann Ramos MD

## 2022-11-09 NOTE — PROGRESS NOTES
[facility] of Shift Note    Bedside shift change report given to [facility] (oncoming nurse) by Yen Melissa LPN (offgoing nurse). Report included the following information SBAR, Kardex, ED Summary, Procedure Summary, Intake/Output, MAR, and Recent Results    Shift worked:  7am-7pm     Shift summary and any significant changes:     Plan of care, managing leg pain with neurontin. Patient alert but pleasantly confused. Plan for arteriogram tomorrow 11/09. Patient turned in bed, on CBR. Family at bedside. Ointment applied to slight excoration on sacrum. Concerns for physician to address:  Plan of care, procedures tomorrow 11/09     Zone phone for oncoming shift:   [phone]       Activity:  Activity Level: Bed Rest  Number times ambulated in hallways past shift: 0  Number of times OOB to chair past shift: 0    Cardiac:   Cardiac Monitoring: No      Cardiac Rhythm: Atrial Paced    Access:  Current line(s): PIV /midline    Genitourinary:   Urinary status: incontinent and external catheter    Respiratory:   O2 Device: None (Room air)  Chronic home O2 use?: NO  Incentive spirometer at bedside: NO       GI:     Current diet:  DIET ONE TIME MESSAGE  DIET ONE TIME MESSAGE  DIET ONE TIME MESSAGE  ADULT DIET Dysphagia - Minced & Moist; Low Fat/Low Chol/High Fiber/RUBEN  DIET NPO Sips of Water with Meds  Passing flatus: YES  Tolerating current diet: YES       Pain Management:   Patient states pain is manageable on current regimen: YES    Skin:  Gerald Score: 13  Interventions: turn team, speciality bed, float heels, internal/external urinary devices, and nutritional support     Patient Safety:  Fall Score:  Total Score: 4  Interventions: pt to call before getting OOB and stay with me (per policy)  High Fall Risk: Yes    Length of Stay:  Expected LOS: - - -  Actual LOS: [facility], LPN

## 2022-11-09 NOTE — BRIEF OP NOTE
Brief Postoperative Note    Patient:  Zenaida Diaz  YOB: 1940  MRN: [id number]    Date of Procedure: 11/9/2022     Pre-Op Diagnosis: CLTI RLE    Post-Op Diagnosis: same      Procedure(s): Aortogram and RLE runoff                          3rd level cath placement RLE    Surgeon(s):  Waqas Ribeiro MD    Surgical Assistant: Surg Asst-1: Fermin Chadwick    Anesthesia: MAC     Estimated Blood Loss (mL): Minimal    Complications: None    Findings: Long segment R SFA/popliteal occlusion with reconstitution small below knee pop and one vessel runoff via very modest peroneal    Electronically Signed by Peri Arcos MD on 11/9/2022 at 9:56 AM

## 2022-11-09 NOTE — PROGRESS NOTES
TRANSFER - IN REPORT:    Verbal report received from Glo Eldridge, [facility] (name) on Graciela Motts  being received from [id number] (unit) for ordered procedure      Report consisted of patients Situation, Background, Assessment and   Recommendations(SBAR). Information from the following report(s) SBAR, MAR, and Recent Results was reviewed with the receiving nurse. Opportunity for questions and clarification was provided. Assessment completed upon patients arrival to unit and care assumed.

## 2022-11-09 NOTE — PROGRESS NOTES
Not percutaneous or minimally invasive options for RLE ischemia. Options include an attempt at bypass which will probably have modest to low probability of success given lack of autologous conduit (CABG), below knee target, and solely peroneal runoff vs. BKA. Discussed with both daughters by phone: They would like to take her home with analgesics tomorrow and consider options. Cierra Jones

## 2022-11-10 VITALS
HEART RATE: 69 BPM | DIASTOLIC BLOOD PRESSURE: 59 MMHG | SYSTOLIC BLOOD PRESSURE: 129 MMHG | WEIGHT: 130 LBS | BODY MASS INDEX: 26.21 KG/M2 | RESPIRATION RATE: 18 BRPM | HEIGHT: 59 IN | TEMPERATURE: 98 F | OXYGEN SATURATION: 99 %

## 2022-11-10 LAB
APTT PPP: 34.5 SEC (ref 22.1–31)
GLUCOSE BLD STRIP.AUTO-MCNC: 124 MG/DL (ref 65–117)
GLUCOSE BLD STRIP.AUTO-MCNC: 323 MG/DL (ref 65–117)
GLUCOSE BLD STRIP.AUTO-MCNC: 396 MG/DL (ref 65–117)
GLUCOSE BLD STRIP.AUTO-MCNC: 396 MG/DL (ref 65–117)
GLUCOSE BLD STRIP.AUTO-MCNC: 409 MG/DL (ref 65–117)
GLUCOSE BLD STRIP.AUTO-MCNC: 458 MG/DL (ref 65–117)
GLUCOSE BLD STRIP.AUTO-MCNC: 514 MG/DL (ref 65–117)
GLUCOSE BLD STRIP.AUTO-MCNC: 89 MG/DL (ref 65–117)
SERVICE CMNT-IMP: ABNORMAL
SERVICE CMNT-IMP: NORMAL
THERAPEUTIC RANGE,PTTT: ABNORMAL SECS (ref 58–77)

## 2022-11-10 PROCEDURE — 74011250636 HC RX REV CODE- 250/636: Performed by: SURGERY

## 2022-11-10 PROCEDURE — 77010033678 HC OXYGEN DAILY

## 2022-11-10 PROCEDURE — 74011250636 HC RX REV CODE- 250/636: Performed by: INTERNAL MEDICINE

## 2022-11-10 PROCEDURE — 36415 COLL VENOUS BLD VENIPUNCTURE: CPT

## 2022-11-10 PROCEDURE — 85730 THROMBOPLASTIN TIME PARTIAL: CPT

## 2022-11-10 PROCEDURE — 74011000250 HC RX REV CODE- 250: Performed by: SURGERY

## 2022-11-10 PROCEDURE — 74011636637 HC RX REV CODE- 636/637: Performed by: INTERNAL MEDICINE

## 2022-11-10 PROCEDURE — 74011250637 HC RX REV CODE- 250/637: Performed by: INTERNAL MEDICINE

## 2022-11-10 PROCEDURE — 82962 GLUCOSE BLOOD TEST: CPT

## 2022-11-10 PROCEDURE — 74011250637 HC RX REV CODE- 250/637: Performed by: SURGERY

## 2022-11-10 PROCEDURE — 74011636637 HC RX REV CODE- 636/637: Performed by: SURGERY

## 2022-11-10 PROCEDURE — 94760 N-INVAS EAR/PLS OXIMETRY 1: CPT

## 2022-11-10 RX ORDER — INSULIN GLARGINE 100 [IU]/ML
16 INJECTION, SOLUTION SUBCUTANEOUS
Status: DISCONTINUED | OUTPATIENT
Start: 2022-11-10 | End: 2022-11-10 | Stop reason: HOSPADM

## 2022-11-10 RX ORDER — INSULIN GLARGINE 100 [IU]/ML
5 INJECTION, SOLUTION SUBCUTANEOUS ONCE
Status: COMPLETED | OUTPATIENT
Start: 2022-11-10 | End: 2022-11-10

## 2022-11-10 RX ORDER — HYDROMORPHONE HYDROCHLORIDE 2 MG/1
2 TABLET ORAL
Qty: 20 TABLET | Refills: 0 | Status: SHIPPED | OUTPATIENT
Start: 2022-11-10 | End: 2022-11-13

## 2022-11-10 RX ORDER — INSULIN LISPRO 100 [IU]/ML
INJECTION, SOLUTION INTRAVENOUS; SUBCUTANEOUS
Status: DISCONTINUED | OUTPATIENT
Start: 2022-11-10 | End: 2022-11-10 | Stop reason: HOSPADM

## 2022-11-10 RX ORDER — INSULIN LISPRO 100 [IU]/ML
12 INJECTION, SOLUTION INTRAVENOUS; SUBCUTANEOUS ONCE
Status: COMPLETED | OUTPATIENT
Start: 2022-11-10 | End: 2022-11-10

## 2022-11-10 RX ORDER — OXYCODONE AND ACETAMINOPHEN 5; 325 MG/1; MG/1
1 TABLET ORAL
Qty: 30 TABLET | Refills: 0 | Status: SHIPPED | OUTPATIENT
Start: 2022-11-10 | End: 2022-11-10

## 2022-11-10 RX ORDER — INSULIN LISPRO 100 [IU]/ML
5 INJECTION, SOLUTION INTRAVENOUS; SUBCUTANEOUS
Status: DISCONTINUED | OUTPATIENT
Start: 2022-11-10 | End: 2022-11-10 | Stop reason: HOSPADM

## 2022-11-10 RX ORDER — INSULIN LISPRO 100 [IU]/ML
INJECTION, SOLUTION INTRAVENOUS; SUBCUTANEOUS ONCE
Status: DISCONTINUED | OUTPATIENT
Start: 2022-11-10 | End: 2022-11-10 | Stop reason: HOSPADM

## 2022-11-10 RX ADMIN — FAMOTIDINE 20 MG: 10 INJECTION INTRAVENOUS at 09:00

## 2022-11-10 RX ADMIN — DILTIAZEM HYDROCHLORIDE 180 MG: 180 CAPSULE, COATED, EXTENDED RELEASE ORAL at 09:00

## 2022-11-10 RX ADMIN — FUROSEMIDE 40 MG: 40 TABLET ORAL at 09:00

## 2022-11-10 RX ADMIN — Medication 4 UNITS: at 09:19

## 2022-11-10 RX ADMIN — POLYETHYLENE GLYCOL 3350 17 G: 17 POWDER, FOR SOLUTION ORAL at 09:00

## 2022-11-10 RX ADMIN — SODIUM CHLORIDE, PRESERVATIVE FREE 10 ML: 5 INJECTION INTRAVENOUS at 06:00

## 2022-11-10 RX ADMIN — ENOXAPARIN SODIUM 40 MG: 100 INJECTION SUBCUTANEOUS at 08:17

## 2022-11-10 RX ADMIN — Medication 12 UNITS: at 08:17

## 2022-11-10 RX ADMIN — METOPROLOL SUCCINATE 50 MG: 50 TABLET, EXTENDED RELEASE ORAL at 09:00

## 2022-11-10 RX ADMIN — SPIRONOLACTONE 25 MG: 25 TABLET ORAL at 09:00

## 2022-11-10 RX ADMIN — GABAPENTIN 100 MG: 100 CAPSULE ORAL at 09:00

## 2022-11-10 RX ADMIN — HYDROMORPHONE HYDROCHLORIDE 1 MG: 1 INJECTION, SOLUTION INTRAMUSCULAR; INTRAVENOUS; SUBCUTANEOUS at 09:14

## 2022-11-10 RX ADMIN — Medication 4 UNITS: at 02:02

## 2022-11-10 RX ADMIN — INSULIN GLARGINE 5 UNITS: 100 INJECTION, SOLUTION SUBCUTANEOUS at 09:19

## 2022-11-10 RX ADMIN — Medication 1 AMPULE: at 12:04

## 2022-11-10 RX ADMIN — OXYCODONE 5 MG: 5 TABLET ORAL at 15:16

## 2022-11-10 NOTE — PROGRESS NOTES
Khris of Shift Note    Bedside shift change report given to [facility] TAHMINA HILTON (oncoming nurse) by Bernadine Arceo LPN (offgoing nurse). Report included the following information SBAR, Kardex, ED Summary, Procedure Summary, Intake/Output, MAR, and Recent Results    Shift worked:  7am-7pm     Shift summary and any significant changes:     Arteriogram completed today. Patient medicated for leg pain today, Patient continues with confusion , hx of alzheimers. Family at bedside today. Purewick in use for incontinence. Concerns for physician to address:  Plan of care     Zone phone for oncoming shift:   [phone]       Activity:  Activity Level: Bed Rest  Number times ambulated in hallways past shift: 0  Number of times OOB to chair past shift: 0    Cardiac:   Cardiac Monitoring: No      Cardiac Rhythm: Ventricular Paced    Access:  Current line(s): PIV     Genitourinary:   Urinary status: incontinent and external catheter    Respiratory:   O2 Device: Nasal cannula  Chronic home O2 use?: NO  Incentive spirometer at bedside: NO       GI:     Current diet:  DIET ONE TIME MESSAGE  DIET ONE TIME MESSAGE  DIET ONE TIME MESSAGE  ADULT DIET Regular; 3 carb choices (45 gm/meal)  Passing flatus: NO  Tolerating current diet: YES       Pain Management:   Patient states pain is manageable on current regimen: YES    Skin:  Gerald Score: 11  Interventions: speciality bed, float heels, internal/external urinary devices, and nutritional support     Patient Safety:  Fall Score:  Total Score: 3  Interventions: bed/chair alarm and stay with me (per policy)  High Fall Risk: Yes    Length of Stay:  Expected LOS: - - -  Actual LOS: SOCORRO Gardner

## 2022-11-10 NOTE — PROGRESS NOTES
.I have reviewed discharge instructions with the patient and guardian. The patient, caregiver, and guardian verbalized understanding. PIV x 2 removed from right arm and left hand. Patient will discharge via ambulance to home with caregiver at side.

## 2022-11-10 NOTE — DISCHARGE SUMMARY
Hospitalist Discharge Summary     Patient ID:  Lala Abbott  [id number]  57 y.o.  1940 11/4/2022    PCP on record: Hermelindo Zee NP    Admit date: 11/4/2022  Discharge date and time: 11/10/2022    DISCHARGE DIAGNOSIS:    Resting bilateral (R>L) lower extremity pain POA- pain management  Due to severe PAD POA- Outpatient followup with Vasc surgery for definitive Surgical plan as recommended, attempt at bypass which will probably have modest to low probability of success given lack of autologous conduit (CABG), below knee target, and solely peroneal runoff vs. BKA. ? Ischemic right leg POA  History of left TMA  PENELOPE POA  S/p arteriogram of RLE on 11/09  diabetes type 2 insulin-dependent with hypoglycemia POA  CAD status post CABG  A. fib status post PPM  HTN  Dementia  GERD      CONSULTATIONS:  IP CONSULT TO VASCULAR SURGERY    Excerpted HPI from H&P of Calvin Ambrose MD:  Baldo.Broom y.o. African American female who presents with above complaints from home with family. Patient present with chief complaint of worsening right greater than left bilateral leg pain for the past 2 days to a point where right leg pain is at all times/resting pain now. Pain worsens on ambulation. Patient has history of severe PAD, has history of left TMA in the past.  Patient has history of CAD status post CABG, A. fib status post PPM-on Eliquis for it. Patient was found to have severe bilateral superficial femoral artery occlusions on CTA abdomen and pelvis with lower extremity runoffs with otherwise unremarkable work-up except mildly elevated creatinine at 1.4     We were asked to admit for work up and evaluation of the above problems. \"    ______________________________________________________________________  DISCHARGE SUMMARY/HOSPITAL COURSE:  for full details see H&P, daily progress notes, labs, consult notes. Resting bilateral (R>L) lower extremity pain POA  Due to severe PAD POA  ?   Ischemic right leg POA  History of left TMA  PENELOPE POA  S/p arteriogram of RLE on 11/09  CTA abdomen pelvis with runoffs:  1. Ostial stenosis of the right SFA, with occlusion of the mid SFA. There is  weak distal reconstitution. 2. Multifocal stenosis of the left SFA, with weak distal arterial enhancement. 3. 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 The renal arteries demonstrate moderate  atherosclerotic disease, without beading. Nonvascular:  1. Cirrhosis. 2. Cardiomegaly, with findings compatible with diminished right heart function. 3. Fibroid uterus. 4. Severe degenerative changes of the lower lumbar spine, with multiple levels  of moderate spinal canal narrowing. Creatinine 1.4  Plan for surgery, timing to be determined by vascular most likely from Wednesday  Allow diet today, continue with heparin drip, monitor PTT  Hold home dose Eliquis. Aggressive bowel regimen with opioid pain meds  Patient noticed to have difficulty with swallowing, will order puréed diet and consult SLP  , SLP recommendations reviewed, patient upgraded to minced and moist diet  11/09: Underwent arteriogram of the right lower extremity which revealedLong segment R SFA/popliteal occlusion with reconstitution small below knee pop and one vessel runoff via very modest peroneal .   Per vascular surgery,no percutaneous or minimally invasive options for right lower extremity ischemia, option include attempt at bypass.   Awaiting  daughter consent    Family considered nonsurgical options in the outpatient basis   S/p heparin drip   diabetes type 2 insulin-dependent with hypoglycemia POA  Continue with home dose insulin regiment, adjust dosing depending on finger sticks  Add sliding scale insulin  CAD status post CABG  A. fib status post PPM  HTN  Continue home cardiac meds, holding Eliquis as above as patient on IV heparin drip  Continue home Cardizem CD, metoprolol  Resume home Aldactone , hold lasix   BP Elevated ,  as needed hydralazine     Alzheimer's dementia  Continue home psych meds including Seroquel, Effexor     GERD  Pepcid     Code Status: Full code  Surrogate Decision Maker: Daughter  Updated patient daughter at bedside, all questions answered        _______________________________________________________________________  Patient seen and examined by me on discharge day. Pertinent Findings:  Gen:    Not in distress  Chest: Clear lungs  CVS:   Regular rhythm. No edema  Abd:  Soft, not distended, not tender  Neuro:  Alert, oriented x 1, dementia +  _______________________________________________________________________  DISCHARGE MEDICATIONS:   Current Discharge Medication List        START taking these medications    Details   HYDROmorphone (Dilaudid) 2 mg tablet Take 1 Tablet by mouth every four (4) hours as needed for Pain for up to 3 days. Max Daily Amount: 12 mg. Indications: excessive pain  Qty: 20 Tablet, Refills: 0  Start date: 11/10/2022, End date: 11/13/2022    Associated Diagnoses: [facility]peripheral artery disease) ([facility]           CONTINUE these medications which have NOT CHANGED    Details   QUEtiapine (SEROquel) 25 mg tablet TAKE 1 TABLET BY MOUTH EVERY DAY AT BEDTIME      venlafaxine-SR (EFFEXOR-XR) 75 mg capsule Take 75 mg by mouth nightly. metoprolol succinate (TOPROL-XL) 50 mg XL tablet Take 50 mg by mouth daily. polyethylene glycol (MIRALAX) 17 gram/dose powder Take 17 g by mouth daily. acetaminophen (TYLENOL) 500 mg tablet Take 500 mg by mouth every six (6) hours as needed for Pain. rosuvastatin (CRESTOR) 5 mg tablet Take 5 mg by mouth nightly. apixaban (ELIQUIS) 2.5 mg tablet Take 1 Tablet by mouth two (2) times a day. Qty: 180 Tablet, Refills: 1      levothyroxine (SYNTHROID) 25 mcg tablet Take 25 mcg by mouth Daily (before breakfast). sulfaSALAzine (AZULFIDINE) 500 mg tablet Take 500 mg by mouth two (2) times a day.       dilTIAZem ER [facility]) 180 mg capsule Take 180 mg by mouth daily. spironolactone (ALDACTONE) 25 mg tablet Take 25 mg by mouth every other day. insulin aspart protamine/insulin aspart (NOVOLOG MIX 70/30) 100 unit/mL (70-30) injection 16 Units by SubCUTAneous route daily. Patient takes 20 units in the AM, 5 units mid-day, and 5 units QHS      furosemide (LASIX) 40 mg tablet Take 1 Tab by mouth daily. Qty: 30 Tab, Refills: 0           STOP taking these medications       gabapentin (NEURONTIN) 100 mg capsule Comments:   Reason for Stopping:         oxyCODONE IR (ROXICODONE) 5 mg immediate release tablet Comments:   Reason for Stopping:                 Patient Follow Up Instructions:    Activity: Activity as tolerated  Diet: Cardiac Diet, Diabetic Diet, and Low fat, Low cholesterol      Follow-up with Dr Sandeep Dawkins ([facility]) in 1 week for further options      Follow-up Information       Follow up With Specialties Details Why Contact Info    Nuha Alva NP Nurse Practitioner   [address] [place] [id number] [phone]            ________________________________________________________________    Risk of deterioration: Low    Condition at Discharge:  Stable  __________________________________________________________________    Disposition  Home with family and home health services    ____________________________________________________________________    Code Status: Full Code  ___________________________________________________________________      Total time in minutes spent coordinating this discharge (includes going over instructions, follow-up, prescriptions, and preparing report for sign off to her PCP) :  >30 minutes    Signed:  Praveen Baig MD

## 2022-11-10 NOTE — PROGRESS NOTES
Attempted to schedule hospital follow up PCP appointment. Unable to reach anyone, unable to leave voicemail. Pending patient discharge.  Nicola Lacy, Care Management Assistant

## 2022-11-10 NOTE — DISCHARGE INSTRUCTIONS
HOSPITALIST DISCHARGE INSTRUCTIONS    NAME: Iva Barahona   :  1940   MRN:  [id number]     Date/Time:  11/10/2022 1:12 PM    ADMIT DATE: 2022     DISCHARGE DATE: 11/10/2022     DISCHARGE DIAGNOSIS:  Resting bilateral (R>L) lower extremity pain POA- pain management  Due to severe PAD POA- Outpatient followup with Vas surgery for definitive Surgical plan as recommended, attempt at bypass which will probably have modest to low probability of success given lack of autologous conduit (CABG), below knee target, and solely peroneal runoff vs. BKA. ? Ischemic right leg POA  History of left TMA  PENELOPE POA  S/p arteriogram of RLE on   diabetes type 2 insulin-dependent with hypoglycemia POA  CAD status post CABG  A. fib status post PPM  HTN  Dementia  GERD    MEDICATIONS:  As per medication reconciliation  list  It is important that you take the medication exactly as they are prescribed. Keep your medication in the bottles provided by the pharmacist and keep a list of the medication names, dosages, and times to be taken in your wallet. Do not take other medications without consulting your doctor. Pain Management: per above medications    What to do at Home    Recommended diet:  Cardiac Diet, Diabetic Diet, and Low fat, Low cholesterol    Recommended activity: Activity as tolerated    If you have questions regarding the hospital related prescriptions or hospital related issues please call at [phone]. If you experience any of the following symptoms then please call your primary care physician or return to the emergency room if you cannot get hold of your doctor:  Fever, chills, nausea, vomiting, diarrhea, change in mentation, falling, bleeding, shortness of breath,     Follow Up:  Dr Shauna Bundy (Vas Surgery)  you are to call and set up an appointment to see them in 7-10 days.   PCP as needed -- hospice recommended when pt ready and still not wanting surgery      Information obtained by :  I understand that if any problems occur once I am at home I am to contact my physician. I understand and acknowledge receipt of the instructions indicated above.                                                                                                                                            Physician's or R.N.'s Signature                                                                  Date/Time                                                                                                                                              Patient or Representative Signature                                                          Date/Time

## 2022-11-10 NOTE — PROGRESS NOTES
Referral faxed to [facility].       HARDEEP AlvaradoN, RN    Care Management  [phone]

## 2022-11-10 NOTE — PROGRESS NOTES
adEnd of Shift Note    Bedside shift change report given to Alexandria Reyna (oncoming nurse) by Jeremiah Sanchez RN (offgoing nurse). Report included the following information SBAR, Kardex, Intake/Output, MAR, and Recent Results    Shift worked:  7p-7a     Shift summary and any significant changes:     Patient noted discomfort this shift; medication administered-- see MAR. Patient daughter stayed at bedside until patient fell asleep; patient slept throughout the night.     AM labs collected    Patient's BG was elevated this shift; notified provider on call; informed to administer sliding scale insulin consistent with BG between 300-349     Patient's BG was elevated near the end of this shift; notified provider; received orders for a customized amount of insulin-- see MAR     Concerns for physician to address:  Hyperglycemia     Zone phone for oncoming shift:   Horacio Tapia RN

## 2022-11-11 NOTE — PROGRESS NOTES
Transition of Care Plan:    RUR: 13% - Moderate  Disposition: Home w/ [address]  Follow up appointments:PCP; Vascular  DME needed:No new needs  Transportation at [facility] transport arranged for [address] or means to access home:     per family   IM Medicare Letter:2nd IM letter provided  Is patient a  and connected with the [place]? N/A  If yes, was [place] transfer form completed and [facility] notified? Caregiver Contact:Daughter - Dolores Braun - [phone]   Discharge Caregiver contacted prior to discharge? Daughter, sister and niece at bedside  Care Conference needed?:    No    Care Management Interventions  PCP Verified by CM: Yes (Unsure)  Mode of Transport at Discharge:  Other (see comment) (Daughter)  Transition of Care Consult (CM Consult): Discharge [address]: Child(jenny)  Confirm Follow Up Transport: Family  Discharge Location  Patient Expects to be Discharged to[de-identified] Home with home health    Bere Almeida RN, BSN, [address] [place] [address]  Manager of Case Management  [phone]

## 2022-11-14 NOTE — OP NOTES
[address]  OPERATIVE REPORT    Name:  Cristopher Haskins  MR#:  [id number]  :  1940  ACCOUNT #:  [de-identified]  DATE OF SERVICE:  2022    PREOPERATIVE DIAGNOSIS:  Critical limb-threatening ischemia on the right with rest pain. POSTOPERATIVE DIAGNOSIS:  Critical limb-threatening ischemia on the right with rest pain. PROCEDURE PERFORMED:  1. Left common femoral artery sheath placement with ultrasound guidance. 2.  Aortogram and right lower extremity runoff using intraoperative fluoroscopy. 3.  Third-level catheter placement on the right. SURGEON:  Sandy Casiano MD    ANESTHESIA:  Local plus IV sedation. INDICATIONS:  The patient is an 41-year-old woman with a long history of bilateral lower extremity occlusive disease and foot wounds. She has had bilateral interventions in the past and now has a healed left TMA sheath. She has recently developed recurrent ischemic rest pain on the right, but no wounds as of yet. PROCEDURE:  After obtaining informed consent, placed supine on the operating table. After adequate induction of IV sedation, left groin was prepped and draped in sterile fashion. A real-time handheld ultrasound was used in conjunction with a micropuncture kit to identify and access the left common femoral artery. A 6-Telugu sheath was placed retrograde. The catheter and Glidewire advanced into the juxtarenal aorta and aortogram and pelvic arteriogram obtained revealing no significant occlusive or aneurysmal disease. The catheter was retracted to the aortic bifurcation and the wire used to gain access to the right iliac system. Catheter was advanced to the level of the right femoral head where a right leg runoff arteriogram was obtained to the level of the ankle and foot. This revealed that the previous right femoral-popliteal stent was occluded, as was the entire superficial femoral and popliteal artery.   There was reconstitution of a small poor quality distal below-knee popliteal artery and tibioperoneal trunk with only one-vessel runoff to the ankle and foot via a small similar disease peroneal artery. There was poor collateral flow onto the foot. The patient was systemically heparinized. The 6-Filipino sheath was exchanged for a long 6-Filipino sheath which was placed in the external iliac artery on the right. A stiff-angled 0.035 Glidewire was used in conjunction with a crossing catheter to traverse the occluded right superficial femoral and popliteal artery, but the true lumen of the below-knee popliteal artery or tibioperoneal trunk could never be reentered. Multiple catheters and wires were attempted, and ultimately further attempts were abandoned, after there was a small extravasation and the patient became somewhat intolerant of further attempts. Instrumentation was removed from the left groin and access site controlled satisfactorily with a Mynx device. Operative findings were discussed with the patient's daughter and an offer of an attempt at left leg bypass was made. This will be especially problematic as she has no ipsilateral saphenous vein. The daughter is going to consider and discuss with the patient.         Zee Mckenzie MD      [id number]  D:  11/14/2022 11:10  T:  11/14/2022 17:59  JOB #:  [id number]  CC:  Vevelyn Scheuermann, MD

## 2022-11-17 NOTE — PROGRESS NOTES
NETTE Post Discharge Note  CM followed up with [facility] who was assigned for patient follow up at [facility]. CM faxed over [place] documents and H&P and HH got community PCP to follow patient. Contacted [place] today [place] at [phone] and was told that a Nurse will follow patient starting today. They will call patient with time. I called the patient and spoke with her daughter. She said patient had used all of her pain medication and did not have any left. CM let her know to discuss this with [place] nurse to find a resolution to problem. She will continue to follow up with Ramon Egan.     [address] [place] [address]  [phone]

## 2022-11-22 ENCOUNTER — HOSPITAL ENCOUNTER (OUTPATIENT)
Dept: PREADMISSION TESTING | Age: 82
Discharge: HOME | End: 2022-11-22
Payer: MEDICARE

## 2022-11-22 LAB
ALBUMIN SERPL-MCNC: 2.7 G/DL (ref 3.5–5)
ALBUMIN/GLOB SERPL: 0.5 {RATIO} (ref 1.1–2.2)
ALP SERPL-CCNC: 236 U/L (ref 45–117)
ALT SERPL-CCNC: 22 U/L (ref 12–78)
ANION GAP SERPL CALC-SCNC: 4 MMOL/L (ref 5–15)
APTT PPP: 41.3 SEC (ref 22.1–31)
AST SERPL-CCNC: 24 U/L (ref 15–37)
BILIRUB SERPL-MCNC: 0.3 MG/DL (ref 0.2–1)
BUN SERPL-MCNC: 30 MG/DL (ref 6–20)
BUN/CREAT SERPL: 23 (ref 12–20)
CALCIUM SERPL-MCNC: 11.1 MG/DL (ref 8.5–10.1)
CHLORIDE SERPL-SCNC: 105 MMOL/L (ref 97–108)
CO2 SERPL-SCNC: 32 MMOL/L (ref 21–32)
CREAT SERPL-MCNC: 1.33 MG/DL (ref 0.55–1.02)
ERYTHROCYTE [DISTWIDTH] IN BLOOD BY AUTOMATED COUNT: 11.9 % (ref 11.5–14.5)
GLOBULIN SER CALC-MCNC: 5.5 G/DL (ref 2–4)
GLUCOSE SERPL-MCNC: 256 MG/DL (ref 65–100)
HCT VFR BLD AUTO: 40.5 % (ref 35–47)
HGB BLD-MCNC: 13 G/DL (ref 11.5–16)
INR PPP: 1.1 (ref 0.9–1.1)
MCH RBC QN AUTO: 31.7 PG (ref 26–34)
MCHC RBC AUTO-ENTMCNC: 32.1 G/DL (ref 30–36.5)
MCV RBC AUTO: 98.8 FL (ref 80–99)
NRBC # BLD: 0 K/UL (ref 0–0.01)
NRBC BLD-RTO: 0 PER 100 WBC
PLATELET # BLD AUTO: 391 K/UL (ref 150–400)
PMV BLD AUTO: 9.7 FL (ref 8.9–12.9)
POTASSIUM SERPL-SCNC: 4.7 MMOL/L (ref 3.5–5.1)
PROT SERPL-MCNC: 8.2 G/DL (ref 6.4–8.2)
PROTHROMBIN TIME: 11.4 SEC (ref 9–11.1)
RBC # BLD AUTO: 4.1 M/UL (ref 3.8–5.2)
SODIUM SERPL-SCNC: 141 MMOL/L (ref 136–145)
THERAPEUTIC RANGE,PTTT: ABNORMAL SECS (ref 58–77)
WBC # BLD AUTO: 18.4 K/UL (ref 3.6–11)

## 2022-11-22 PROCEDURE — 85730 THROMBOPLASTIN TIME PARTIAL: CPT

## 2022-11-22 PROCEDURE — 85610 PROTHROMBIN TIME: CPT

## 2022-11-22 PROCEDURE — 86900 BLOOD TYPING SEROLOGIC ABO: CPT

## 2022-11-22 PROCEDURE — 36415 COLL VENOUS BLD VENIPUNCTURE: CPT

## 2022-11-22 PROCEDURE — 80053 COMPREHEN METABOLIC PANEL: CPT

## 2022-11-22 PROCEDURE — 85027 COMPLETE CBC AUTOMATED: CPT

## 2022-11-22 RX ORDER — CYANOCOBALAMIN (VITAMIN B-12) 1000 MCG
1 TABLET, EXTENDED RELEASE ORAL DAILY
Status: ON HOLD | COMMUNITY

## 2022-11-22 RX ORDER — FAMOTIDINE 40 MG/1
40 TABLET, FILM COATED ORAL DAILY
Status: ON HOLD | COMMUNITY

## 2022-11-22 RX ORDER — CEFAZOLIN SODIUM/WATER 2 G/20 ML
2 SYRINGE (ML) INTRAVENOUS ONCE
Status: CANCELLED | OUTPATIENT
Start: 2022-11-28 | End: 2022-11-28

## 2022-11-22 RX ORDER — SODIUM CHLORIDE, SODIUM LACTATE, POTASSIUM CHLORIDE, CALCIUM CHLORIDE 600; 310; 30; 20 MG/100ML; MG/100ML; MG/100ML; MG/100ML
25 INJECTION, SOLUTION INTRAVENOUS CONTINUOUS
Status: CANCELLED | OUTPATIENT
Start: 2022-11-28

## 2022-11-22 RX ORDER — HYDROMORPHONE HYDROCHLORIDE 2 MG/1
4 TABLET ORAL
Status: ON HOLD | COMMUNITY

## 2022-11-22 NOTE — PERIOP NOTES
Spoke with Roxana, nurse for [facility] to inform her pt is incontinent, has dementia. Obtaining a urine specimen was not a possibility during PAT visit. In speaking with NP Sagar Oleary, it was determined specimen can be obtained day of surgery.  Michele Schofield stated she would notify Dr. Nasra Lloyd

## 2022-11-22 NOTE — PERIOP NOTES
[facility]  Preoperative Instructions        Surgery Date 11/28/22          Time of Arrival to be called @  [phone]    1. On the day of your surgery, please report to the Surgical Services Registration Desk and sign in at your designated time. The Surgery Center is located to the right of the Emergency Room. 2. You must have someone with you to drive you home. You should not drive a car for 24 hours following surgery. Please make arrangements for a friend or family member to stay with you for the first 24 hours after your surgery. 3. Do not have anything to eat or drink (including water, gum, mints, coffee, juice) after midnight. ?This may not apply to medications prescribed by your physician. ?(Please note below the special instructions with medications to take the morning of your procedure.)    4. We recommend you do not drink any alcoholic beverages for 24 hours before and after your surgery. 5. Contact your surgeons office for instructions on the following medications: non-steroidal anti-inflammatory drugs (i.e. Advil, Aleve), vitamins, and supplements. (Some surgeons will want you to stop these medications prior to surgery and others may allow you to take them)  **If you are currently taking Plavix, Coumadin, Aspirin and/or other blood-thinning agents, contact your surgeon for instructions. ** Your surgeon will partner with the physician prescribing these medications to determine if it is safe to stop or if you need to continue taking. Please do not stop taking these medications without instructions from your surgeon    6. Wear comfortable clothes. Wear glasses instead of contacts. Do not bring any money or jewelry. Please bring picture ID, insurance card, and any prearranged co-payment or hospital payment. Do not wear make-up, particularly mascara the morning of your surgery. Do not wear nail polish, particularly if you are having foot /hand surgery.   Wear your hair loose or down, no ponytails, buns, emily pins or clips. All body piercings must be removed. Please shower with antibacterial soap for three consecutive days before and on the morning of surgery, but do not apply any lotions, powders or deodorants after the shower on the day of surgery. Please use a fresh towels after each shower. Please sleep in clean clothes and change bed linens the night before surgery. Please do not shave for 48 hours prior to surgery. Shaving of the face is acceptable. 7. You should understand that if you do not follow these instructions your surgery may be cancelled. If your physical condition changes (I.e. fever, cold or flu) please contact your surgeon as soon as possible. 8. It is important that you be on time. If a situation occurs where you may be late, please call [phone] (OR Holding Area). 9. If you have any questions and or problems, please call [phone] (Pre-admission Testing). 10. Your surgery time may be subject to change. You will receive a phone call the evening prior if your time changes. 11.  If having outpatient surgery, you must have someone to drive you here, stay with you during the duration of your stay, and to drive you home at time of discharge. Hold all vitamins supplements, NSAIDS 5 days prior to surgery     TAKE ALL MEDICATIONS DAY OF SURGERY EXCEPT:  Diabetic meds       I understand a pre-operative phone call will be made to verify my surgery time. In the event that I am not available, I give permission for a message to be left on my answering service and/or with another person?   yes         ___________________      __________   _________    (Signature of Patient)             (Witness)                (Date and Time)

## 2022-11-22 NOTE — PERIOP NOTES
Incentive Spirometer        Using the incentive spirometer helps expand the small air sacs of your lungs, helps you breathe deeply, and helps improve your lung function. Use your incentive spirometer twice a day (10 breaths each time) prior to surgery. How to Use Your Incentive Spirometer:  Hold the incentive spirometer in an upright position. Breathe out as usual.   Place the mouthpiece in your mouth and seal your lips tightly around it. Take a deep breath. Breathe in slowly and as deeply as possible. Keep the blue flow rate guide between the arrows. Hold your breath as long as possible. Then exhale slowly and allow the piston to fall to the bottom of the column. Rest for a few seconds and repeat steps one through five at least 10 times. PAT Tidal Volume__________________  x________________  Date______11/22/22_________________    Pt has dementia, reviewed use with pt's daughter and copy of instructions were provided for family to help patient with use if possible     BRING THE INCENTIVE SPIROMETER WITH YOU TO [address] [place] [address]. Opportunity given to ask and answer questions as well as to observe return demonstration.     Patient signature_____________________________    Witness____________________________

## 2022-11-23 LAB
ABO + RH BLD: NORMAL
BLOOD GROUP ANTIBODIES SERPL: NORMAL
SPECIMEN EXP DATE BLD: NORMAL

## 2022-11-23 NOTE — PERIOP NOTES
Spoke with Hang Mo pt's daughter who lives with patient and is the primary caregiver as pt has /dementia, in order to obtain phone consent for day of surgery. There is not a designated POA at this time however family has requested to obtain paperwork to begin this process. On speaker phone,  reviewed pt's name,  and  consent w/ Milton Ramachandran who states that they had recently had a visit with Kelly Mckoy who had reviewed the procedure and answered all of the daughters questions. Milton Ramachandran gave consent for procedure. This was witnessed by Radha Bey RN. Milton Ramachandran denies any further questions at this time.

## 2022-11-27 ENCOUNTER — ANESTHESIA EVENT (OUTPATIENT)
Dept: SURGERY | Age: 82
End: 2022-11-27
Payer: MEDICAID

## 2022-11-28 ENCOUNTER — HOSPITAL ENCOUNTER (INPATIENT)
Age: 82
LOS: 4 days | Discharge: HOME HEALTH | End: 2022-12-02
Attending: SURGERY | Admitting: SURGERY
Payer: MEDICAID

## 2022-11-28 ENCOUNTER — ANESTHESIA (OUTPATIENT)
Dept: SURGERY | Age: 82
End: 2022-11-28
Payer: MEDICAID

## 2022-11-28 ENCOUNTER — APPOINTMENT (OUTPATIENT)
Dept: GENERAL RADIOLOGY | Age: 82
End: 2022-11-28
Attending: SURGERY
Payer: MEDICAID

## 2022-11-28 DIAGNOSIS — S88.119A AMPUTATION BELOW KNEE (HCC): Primary | ICD-10-CM

## 2022-11-28 LAB
GLUCOSE BLD STRIP.AUTO-MCNC: 149 MG/DL (ref 65–117)
GLUCOSE BLD STRIP.AUTO-MCNC: 249 MG/DL (ref 65–117)
GLUCOSE BLD STRIP.AUTO-MCNC: 387 MG/DL (ref 65–117)
SERVICE CMNT-IMP: ABNORMAL

## 2022-11-28 PROCEDURE — 77030038692 HC WND DEB SYS IRMX -B: Performed by: SURGERY

## 2022-11-28 PROCEDURE — 77030002996 HC SUT SLK J&J -A: Performed by: SURGERY

## 2022-11-28 PROCEDURE — 74011636637 HC RX REV CODE- 636/637: Performed by: SURGERY

## 2022-11-28 PROCEDURE — B41F1ZZ FLUOROSCOPY OF RIGHT LOWER EXTREMITY ARTERIES USING LOW OSMOLAR CONTRAST: ICD-10-PCS | Performed by: SURGERY

## 2022-11-28 PROCEDURE — 88307 TISSUE EXAM BY PATHOLOGIST: CPT

## 2022-11-28 PROCEDURE — 74011000250 HC RX REV CODE- 250: Performed by: NURSE ANESTHETIST, CERTIFIED REGISTERED

## 2022-11-28 PROCEDURE — 77030005513 HC CATH URETH FOL11 MDII -B: Performed by: SURGERY

## 2022-11-28 PROCEDURE — 74011000272 HC RX REV CODE- 272: Performed by: SURGERY

## 2022-11-28 PROCEDURE — 74011000250 HC RX REV CODE- 250: Performed by: SURGERY

## 2022-11-28 PROCEDURE — 74011250636 HC RX REV CODE- 250/636: Performed by: NURSE ANESTHETIST, CERTIFIED REGISTERED

## 2022-11-28 PROCEDURE — 74011000636 HC RX REV CODE- 636: Performed by: SURGERY

## 2022-11-28 PROCEDURE — 77030008684 HC TU ET CUF COVD -B: Performed by: ANESTHESIOLOGY

## 2022-11-28 PROCEDURE — 65270000046 HC RM TELEMETRY

## 2022-11-28 PROCEDURE — 77030018673: Performed by: SURGERY

## 2022-11-28 PROCEDURE — 73552 X-RAY EXAM OF FEMUR 2/>: CPT

## 2022-11-28 PROCEDURE — 74011250636 HC RX REV CODE- 250/636: Performed by: ANESTHESIOLOGY

## 2022-11-28 PROCEDURE — 74011250636 HC RX REV CODE- 250/636: Performed by: SURGERY

## 2022-11-28 PROCEDURE — 77030006835 HC BLD SAW SAG STRY -B: Performed by: SURGERY

## 2022-11-28 PROCEDURE — 0Y6H0Z1 DETACHMENT AT RIGHT LOWER LEG, HIGH, OPEN APPROACH: ICD-10-PCS | Performed by: SURGERY

## 2022-11-28 PROCEDURE — 77030019908 HC STETH ESOPH SIMS -A: Performed by: ANESTHESIOLOGY

## 2022-11-28 PROCEDURE — 76060000034 HC ANESTHESIA 1.5 TO 2 HR: Performed by: SURGERY

## 2022-11-28 PROCEDURE — 2709999900 HC NON-CHARGEABLE SUPPLY: Performed by: SURGERY

## 2022-11-28 PROCEDURE — 88311 DECALCIFY TISSUE: CPT

## 2022-11-28 PROCEDURE — 74011250637 HC RX REV CODE- 250/637: Performed by: SURGERY

## 2022-11-28 PROCEDURE — 77030026438 HC STYL ET INTUB CARD -A: Performed by: ANESTHESIOLOGY

## 2022-11-28 PROCEDURE — 77030010929 HC CLMP VASC FGRTY EDWD -B: Performed by: SURGERY

## 2022-11-28 PROCEDURE — 76000 FLUOROSCOPY <1 HR PHYS/QHP: CPT

## 2022-11-28 PROCEDURE — 77030002986 HC SUT PROL J&J -A: Performed by: SURGERY

## 2022-11-28 PROCEDURE — 82962 GLUCOSE BLOOD TEST: CPT

## 2022-11-28 PROCEDURE — 76010000162 HC OR TIME 1.5 TO 2 HR INTENSV-TIER 1: Performed by: SURGERY

## 2022-11-28 PROCEDURE — 77030002916 HC SUT ETHLN J&J -A: Performed by: SURGERY

## 2022-11-28 PROCEDURE — 76210000017 HC OR PH I REC 1.5 TO 2 HR: Performed by: SURGERY

## 2022-11-28 PROCEDURE — 77030008463 HC STPLR SKN PROX J&J -B: Performed by: SURGERY

## 2022-11-28 PROCEDURE — 77030031139 HC SUT VCRL2 J&J -A: Performed by: SURGERY

## 2022-11-28 PROCEDURE — 77030002987 HC SUT PROL J&J -B: Performed by: SURGERY

## 2022-11-28 RX ORDER — SODIUM CHLORIDE, SODIUM LACTATE, POTASSIUM CHLORIDE, CALCIUM CHLORIDE 600; 310; 30; 20 MG/100ML; MG/100ML; MG/100ML; MG/100ML
25 INJECTION, SOLUTION INTRAVENOUS CONTINUOUS
Status: DISCONTINUED | OUTPATIENT
Start: 2022-11-28 | End: 2022-11-28 | Stop reason: HOSPADM

## 2022-11-28 RX ORDER — PHENYLEPHRINE HCL IN 0.9% NACL 0.4MG/10ML
SYRINGE (ML) INTRAVENOUS AS NEEDED
Status: DISCONTINUED | OUTPATIENT
Start: 2022-11-28 | End: 2022-11-28 | Stop reason: HOSPADM

## 2022-11-28 RX ORDER — SPIRONOLACTONE 25 MG/1
25 TABLET ORAL EVERY OTHER DAY
Status: DISCONTINUED | OUTPATIENT
Start: 2022-11-28 | End: 2022-12-02 | Stop reason: HOSPADM

## 2022-11-28 RX ORDER — FENTANYL CITRATE 50 UG/ML
INJECTION, SOLUTION INTRAMUSCULAR; INTRAVENOUS AS NEEDED
Status: DISCONTINUED | OUTPATIENT
Start: 2022-11-28 | End: 2022-11-28 | Stop reason: HOSPADM

## 2022-11-28 RX ORDER — DILTIAZEM HYDROCHLORIDE 180 MG/1
180 CAPSULE, COATED, EXTENDED RELEASE ORAL DAILY
Status: DISCONTINUED | OUTPATIENT
Start: 2022-11-29 | End: 2022-12-02 | Stop reason: HOSPADM

## 2022-11-28 RX ORDER — INSULIN LISPRO 100 [IU]/ML
INJECTION, SOLUTION INTRAVENOUS; SUBCUTANEOUS
Status: DISCONTINUED | OUTPATIENT
Start: 2022-11-28 | End: 2022-12-02 | Stop reason: HOSPADM

## 2022-11-28 RX ORDER — SODIUM CHLORIDE 9 MG/ML
50 INJECTION, SOLUTION INTRAVENOUS CONTINUOUS
Status: DISCONTINUED | OUTPATIENT
Start: 2022-11-28 | End: 2022-11-30

## 2022-11-28 RX ORDER — ONDANSETRON 2 MG/ML
4 INJECTION INTRAMUSCULAR; INTRAVENOUS AS NEEDED
Status: DISCONTINUED | OUTPATIENT
Start: 2022-11-28 | End: 2022-11-28 | Stop reason: HOSPADM

## 2022-11-28 RX ORDER — MAGNESIUM SULFATE 100 %
4 CRYSTALS MISCELLANEOUS AS NEEDED
Status: DISCONTINUED | OUTPATIENT
Start: 2022-11-28 | End: 2022-12-02 | Stop reason: HOSPADM

## 2022-11-28 RX ORDER — SUCCINYLCHOLINE CHLORIDE 20 MG/ML
INJECTION INTRAMUSCULAR; INTRAVENOUS AS NEEDED
Status: DISCONTINUED | OUTPATIENT
Start: 2022-11-28 | End: 2022-11-28 | Stop reason: HOSPADM

## 2022-11-28 RX ORDER — MORPHINE SULFATE 2 MG/ML
2 INJECTION, SOLUTION INTRAMUSCULAR; INTRAVENOUS
Status: DISCONTINUED | OUTPATIENT
Start: 2022-11-28 | End: 2022-12-02 | Stop reason: HOSPADM

## 2022-11-28 RX ORDER — LORAZEPAM 0.5 MG/1
0.5 TABLET ORAL
Status: DISCONTINUED | OUTPATIENT
Start: 2022-11-28 | End: 2022-12-02 | Stop reason: HOSPADM

## 2022-11-28 RX ORDER — ROPIVACAINE HYDROCHLORIDE 5 MG/ML
INJECTION, SOLUTION EPIDURAL; INFILTRATION; PERINEURAL AS NEEDED
Status: DISCONTINUED | OUTPATIENT
Start: 2022-11-28 | End: 2022-11-28 | Stop reason: HOSPADM

## 2022-11-28 RX ORDER — SODIUM CHLORIDE 0.9 % (FLUSH) 0.9 %
5-40 SYRINGE (ML) INJECTION EVERY 8 HOURS
Status: DISCONTINUED | OUTPATIENT
Start: 2022-11-28 | End: 2022-11-28 | Stop reason: HOSPADM

## 2022-11-28 RX ORDER — SODIUM CHLORIDE 0.9 % (FLUSH) 0.9 %
5-40 SYRINGE (ML) INJECTION AS NEEDED
Status: DISCONTINUED | OUTPATIENT
Start: 2022-11-28 | End: 2022-11-28 | Stop reason: HOSPADM

## 2022-11-28 RX ORDER — ROCURONIUM BROMIDE 10 MG/ML
INJECTION, SOLUTION INTRAVENOUS AS NEEDED
Status: DISCONTINUED | OUTPATIENT
Start: 2022-11-28 | End: 2022-11-28 | Stop reason: HOSPADM

## 2022-11-28 RX ORDER — HYDROMORPHONE HYDROCHLORIDE 1 MG/ML
0.2 INJECTION, SOLUTION INTRAMUSCULAR; INTRAVENOUS; SUBCUTANEOUS
Status: DISCONTINUED | OUTPATIENT
Start: 2022-11-28 | End: 2022-11-28 | Stop reason: HOSPADM

## 2022-11-28 RX ORDER — FUROSEMIDE 40 MG/1
40 TABLET ORAL DAILY
Status: DISCONTINUED | OUTPATIENT
Start: 2022-11-29 | End: 2022-12-02 | Stop reason: HOSPADM

## 2022-11-28 RX ORDER — DEXTROSE MONOHYDRATE 100 MG/ML
0-250 INJECTION, SOLUTION INTRAVENOUS AS NEEDED
Status: DISCONTINUED | OUTPATIENT
Start: 2022-11-28 | End: 2022-12-02 | Stop reason: HOSPADM

## 2022-11-28 RX ORDER — HYDROMORPHONE HYDROCHLORIDE 2 MG/1
4 TABLET ORAL
Status: DISCONTINUED | OUTPATIENT
Start: 2022-11-28 | End: 2022-12-02 | Stop reason: HOSPADM

## 2022-11-28 RX ORDER — ACETAMINOPHEN 500 MG
500 TABLET ORAL
Status: DISCONTINUED | OUTPATIENT
Start: 2022-11-28 | End: 2022-12-02 | Stop reason: HOSPADM

## 2022-11-28 RX ORDER — DEXAMETHASONE SODIUM PHOSPHATE 4 MG/ML
INJECTION, SOLUTION INTRA-ARTICULAR; INTRALESIONAL; INTRAMUSCULAR; INTRAVENOUS; SOFT TISSUE AS NEEDED
Status: DISCONTINUED | OUTPATIENT
Start: 2022-11-28 | End: 2022-11-28 | Stop reason: HOSPADM

## 2022-11-28 RX ORDER — VENLAFAXINE HYDROCHLORIDE 37.5 MG/1
75 CAPSULE, EXTENDED RELEASE ORAL
Status: DISCONTINUED | OUTPATIENT
Start: 2022-11-28 | End: 2022-12-02 | Stop reason: HOSPADM

## 2022-11-28 RX ORDER — METOPROLOL SUCCINATE 50 MG/1
50 TABLET, EXTENDED RELEASE ORAL DAILY
Status: DISCONTINUED | OUTPATIENT
Start: 2022-11-29 | End: 2022-12-02 | Stop reason: HOSPADM

## 2022-11-28 RX ORDER — PROPOFOL 10 MG/ML
INJECTION, EMULSION INTRAVENOUS AS NEEDED
Status: DISCONTINUED | OUTPATIENT
Start: 2022-11-28 | End: 2022-11-28 | Stop reason: HOSPADM

## 2022-11-28 RX ORDER — INSULIN LISPRO 100 [IU]/ML
3 INJECTION, SOLUTION INTRAVENOUS; SUBCUTANEOUS
Status: DISCONTINUED | OUTPATIENT
Start: 2022-11-28 | End: 2022-12-02 | Stop reason: HOSPADM

## 2022-11-28 RX ORDER — ENOXAPARIN SODIUM 100 MG/ML
30 INJECTION SUBCUTANEOUS EVERY 24 HOURS
Status: DISCONTINUED | OUTPATIENT
Start: 2022-11-28 | End: 2022-12-02 | Stop reason: HOSPADM

## 2022-11-28 RX ORDER — ONDANSETRON 2 MG/ML
4 INJECTION INTRAMUSCULAR; INTRAVENOUS
Status: DISCONTINUED | OUTPATIENT
Start: 2022-11-28 | End: 2022-12-02 | Stop reason: HOSPADM

## 2022-11-28 RX ORDER — FAMOTIDINE 20 MG/1
20 TABLET, FILM COATED ORAL DAILY
Status: DISCONTINUED | OUTPATIENT
Start: 2022-11-29 | End: 2022-12-02 | Stop reason: HOSPADM

## 2022-11-28 RX ORDER — INSULIN LISPRO 100 [IU]/ML
INJECTION, SOLUTION INTRAVENOUS; SUBCUTANEOUS
Status: DISCONTINUED | OUTPATIENT
Start: 2022-11-29 | End: 2022-11-28

## 2022-11-28 RX ORDER — NEOSTIGMINE METHYLSULFATE 1 MG/ML
INJECTION, SOLUTION INTRAVENOUS AS NEEDED
Status: DISCONTINUED | OUTPATIENT
Start: 2022-11-28 | End: 2022-11-28 | Stop reason: HOSPADM

## 2022-11-28 RX ORDER — FENTANYL CITRATE 50 UG/ML
25 INJECTION, SOLUTION INTRAMUSCULAR; INTRAVENOUS
Status: COMPLETED | OUTPATIENT
Start: 2022-11-28 | End: 2022-11-28

## 2022-11-28 RX ORDER — ROSUVASTATIN CALCIUM 10 MG/1
5 TABLET, COATED ORAL
Status: DISCONTINUED | OUTPATIENT
Start: 2022-11-28 | End: 2022-12-02 | Stop reason: HOSPADM

## 2022-11-28 RX ORDER — INSULIN GLARGINE 100 [IU]/ML
17 INJECTION, SOLUTION SUBCUTANEOUS DAILY
Status: DISCONTINUED | OUTPATIENT
Start: 2022-11-29 | End: 2022-12-02 | Stop reason: HOSPADM

## 2022-11-28 RX ORDER — POLYETHYLENE GLYCOL 3350 17 G/17G
17 POWDER, FOR SOLUTION ORAL
Status: DISCONTINUED | OUTPATIENT
Start: 2022-11-28 | End: 2022-12-02 | Stop reason: HOSPADM

## 2022-11-28 RX ORDER — ONDANSETRON 2 MG/ML
INJECTION INTRAMUSCULAR; INTRAVENOUS AS NEEDED
Status: DISCONTINUED | OUTPATIENT
Start: 2022-11-28 | End: 2022-11-28 | Stop reason: HOSPADM

## 2022-11-28 RX ORDER — FAMOTIDINE 20 MG/1
40 TABLET, FILM COATED ORAL DAILY
Status: DISCONTINUED | OUTPATIENT
Start: 2022-11-29 | End: 2022-11-28

## 2022-11-28 RX ORDER — LEVOTHYROXINE SODIUM 25 UG/1
25 TABLET ORAL
Status: DISCONTINUED | OUTPATIENT
Start: 2022-11-29 | End: 2022-12-02 | Stop reason: HOSPADM

## 2022-11-28 RX ORDER — LABETALOL HYDROCHLORIDE 5 MG/ML
INJECTION, SOLUTION INTRAVENOUS AS NEEDED
Status: DISCONTINUED | OUTPATIENT
Start: 2022-11-28 | End: 2022-11-28 | Stop reason: HOSPADM

## 2022-11-28 RX ORDER — GLYCOPYRROLATE 0.2 MG/ML
INJECTION INTRAMUSCULAR; INTRAVENOUS AS NEEDED
Status: DISCONTINUED | OUTPATIENT
Start: 2022-11-28 | End: 2022-11-28 | Stop reason: HOSPADM

## 2022-11-28 RX ADMIN — HYDROMORPHONE HYDROCHLORIDE 0.2 MG: 1 INJECTION, SOLUTION INTRAMUSCULAR; INTRAVENOUS; SUBCUTANEOUS at 13:25

## 2022-11-28 RX ADMIN — HYDROMORPHONE HYDROCHLORIDE 4 MG: 2 TABLET ORAL at 23:35

## 2022-11-28 RX ADMIN — Medication 3 AMPULE: at 10:20

## 2022-11-28 RX ADMIN — WATER 2 G: 1 INJECTION INTRAMUSCULAR; INTRAVENOUS; SUBCUTANEOUS at 10:55

## 2022-11-28 RX ADMIN — DEXAMETHASONE SODIUM PHOSPHATE 4 MG: 4 INJECTION, SOLUTION INTRAMUSCULAR; INTRAVENOUS at 10:51

## 2022-11-28 RX ADMIN — LABETALOL HYDROCHLORIDE 10 MG: 5 INJECTION, SOLUTION INTRAVENOUS at 12:07

## 2022-11-28 RX ADMIN — ONDANSETRON HYDROCHLORIDE 4 MG: 2 INJECTION, SOLUTION INTRAMUSCULAR; INTRAVENOUS at 12:01

## 2022-11-28 RX ADMIN — Medication 3 UNITS: at 16:53

## 2022-11-28 RX ADMIN — SPIRONOLACTONE 25 MG: 25 TABLET ORAL at 15:39

## 2022-11-28 RX ADMIN — HYDROMORPHONE HYDROCHLORIDE 0.2 MG: 1 INJECTION, SOLUTION INTRAMUSCULAR; INTRAVENOUS; SUBCUTANEOUS at 13:40

## 2022-11-28 RX ADMIN — MORPHINE SULFATE 2 MG: 2 INJECTION, SOLUTION INTRAMUSCULAR; INTRAVENOUS at 16:54

## 2022-11-28 RX ADMIN — HYDROMORPHONE HYDROCHLORIDE 0.2 MG: 1 INJECTION, SOLUTION INTRAMUSCULAR; INTRAVENOUS; SUBCUTANEOUS at 13:10

## 2022-11-28 RX ADMIN — FENTANYL CITRATE 25 MCG: 50 INJECTION, SOLUTION INTRAMUSCULAR; INTRAVENOUS at 12:30

## 2022-11-28 RX ADMIN — FENTANYL CITRATE 25 MCG: 50 INJECTION, SOLUTION INTRAMUSCULAR; INTRAVENOUS at 12:54

## 2022-11-28 RX ADMIN — SODIUM CHLORIDE, POTASSIUM CHLORIDE, SODIUM LACTATE AND CALCIUM CHLORIDE 25 ML/HR: 600; 310; 30; 20 INJECTION, SOLUTION INTRAVENOUS at 10:20

## 2022-11-28 RX ADMIN — ROCURONIUM BROMIDE 30 MG: 10 INJECTION INTRAVENOUS at 11:09

## 2022-11-28 RX ADMIN — ROPIVACAINE HYDROCHLORIDE 25 ML: 5 INJECTION, SOLUTION EPIDURAL; INFILTRATION; PERINEURAL at 12:41

## 2022-11-28 RX ADMIN — Medication 5 MG: at 12:01

## 2022-11-28 RX ADMIN — ROSUVASTATIN CALCIUM 5 MG: 10 TABLET, COATED ORAL at 22:28

## 2022-11-28 RX ADMIN — FENTANYL CITRATE 25 MCG: 50 INJECTION, SOLUTION INTRAMUSCULAR; INTRAVENOUS at 12:25

## 2022-11-28 RX ADMIN — LORAZEPAM 0.5 MG: 0.5 TABLET ORAL at 17:42

## 2022-11-28 RX ADMIN — ENOXAPARIN SODIUM 30 MG: 100 INJECTION SUBCUTANEOUS at 15:39

## 2022-11-28 RX ADMIN — FENTANYL CITRATE 50 MCG: 50 INJECTION, SOLUTION INTRAMUSCULAR; INTRAVENOUS at 11:12

## 2022-11-28 RX ADMIN — MORPHINE SULFATE 2 MG: 2 INJECTION, SOLUTION INTRAMUSCULAR; INTRAVENOUS at 23:01

## 2022-11-28 RX ADMIN — LABETALOL HYDROCHLORIDE 10 MG: 5 INJECTION, SOLUTION INTRAVENOUS at 11:32

## 2022-11-28 RX ADMIN — Medication 4 UNITS: at 22:28

## 2022-11-28 RX ADMIN — MORPHINE SULFATE 2 MG: 2 INJECTION, SOLUTION INTRAMUSCULAR; INTRAVENOUS at 20:03

## 2022-11-28 RX ADMIN — PROPOFOL 70 MG: 10 INJECTION, EMULSION INTRAVENOUS at 10:46

## 2022-11-28 RX ADMIN — PROPOFOL 30 MG: 10 INJECTION, EMULSION INTRAVENOUS at 11:29

## 2022-11-28 RX ADMIN — FENTANYL CITRATE 50 MCG: 50 INJECTION, SOLUTION INTRAMUSCULAR; INTRAVENOUS at 10:45

## 2022-11-28 RX ADMIN — Medication 120 MCG: at 10:51

## 2022-11-28 RX ADMIN — VENLAFAXINE HYDROCHLORIDE 75 MG: 37.5 CAPSULE, EXTENDED RELEASE ORAL at 22:28

## 2022-11-28 RX ADMIN — SUCCINYLCHOLINE CHLORIDE 100 MG: 20 INJECTION, SOLUTION INTRAMUSCULAR; INTRAVENOUS at 10:46

## 2022-11-28 RX ADMIN — GLYCOPYRROLATE 0.6 MG: 0.2 INJECTION, SOLUTION INTRAMUSCULAR; INTRAVENOUS at 12:01

## 2022-11-28 RX ADMIN — FENTANYL CITRATE 25 MCG: 50 INJECTION, SOLUTION INTRAMUSCULAR; INTRAVENOUS at 12:47

## 2022-11-28 RX ADMIN — HYDROMORPHONE HYDROCHLORIDE 0.2 MG: 1 INJECTION, SOLUTION INTRAMUSCULAR; INTRAVENOUS; SUBCUTANEOUS at 12:36

## 2022-11-28 RX ADMIN — SODIUM CHLORIDE 50 ML/HR: 9 INJECTION, SOLUTION INTRAVENOUS at 15:40

## 2022-11-28 NOTE — PROGRESS NOTES
RENAL DOSE ADJUSTMENT MADE PER P/T PROTOCOL     PREVIOUS ORDER:  famotidine 40 mg daily    Estimated Creatinine Clearance: 24.3 mL/min (A) (by C-G formula based on SCr of 1.33 mg/dL (H)).     Creatinine   Date/Time Value Ref Range Status   11/22/2022 02:52 PM 1.33 (H) 0.55 - 1.02 MG/DL Final     BUN   Date/Time Value Ref Range Status   11/22/2022 02:52 PM 30 (H) 6 - 20 MG/DL Final        NEW RENALLY ADJUSTED ORDER: famotidine 20 mg daily     [place], [place]  11/28/2022 2:42 PM

## 2022-11-28 NOTE — PROGRESS NOTES
Problem: Pressure Injury - Risk of  Goal: *Prevention of pressure injury  Description: Document Gerald Scale and appropriate interventions in the flowsheet.   Outcome: Progressing Towards Goal  Note: Pressure Injury Interventions:  Sensory Interventions: Assess changes in LOC, Assess need for specialty bed, Keep linens dry and wrinkle-free, Minimize linen layers, Maintain/enhance activity level, Monitor skin under medical devices, Pad between skin to skin    Moisture Interventions: Absorbent underpads, Assess need for specialty bed, Apply protective barrier, creams and emollients, Limit adult briefs, Maintain skin hydration (lotion/cream), Minimize layers, Moisture barrier, Offer toileting Q_hr    Activity Interventions: Assess need for specialty bed, Pressure redistribution bed/mattress(bed type), Increase time out of bed, PT/OT evaluation    Mobility Interventions: Assess need for specialty bed, Pressure redistribution bed/mattress (bed type), PT/OT evaluation, Trapeze to reposition    Nutrition Interventions: Document food/fluid/supplement intake, Discuss nutritional consult with provider    Friction and Shear Interventions: Apply protective barrier, creams and emollients, Feet elevated on foot rest, Transferring/repositioning devices

## 2022-11-28 NOTE — PROGRESS NOTES
Transition of Care Plan  RUR: 11%  DISPOSITION: The disposition plan is pending; [facility], will [facility]   F/U with PCP/Specialist    Transport: daughter         Reason for Readmission:     limb ischemia          RUR Score/Risk Level:     11%    PCP: First and Last name:  Aureliano Ledesma NP   Name of Practice:    Are you a current patient: Yes/No:    Approximate date of last visit:    Can you participate in a virtual visit with your PCP:     Is a Care Conference indicated:  no      Did you attend your follow up appointment (s): If not, why not: yes          Resources/supports as identified by patient/family:   family        Top Challenges facing patient (as identified by patient/family and CM):     none     Finances/Medication cost?       Transportation        Support system or lack thereof? Living arrangements? Self-care/ADLs/Cognition? Current Advanced Directive/Advance Care Plan:             Plan for utilizing home health:   [facility], will need L[facility]              Transition of Care Plan:    Based on readmission, the patient's previous Plan of Care   has been evaluated and/or modified. The current Transition of Care Plan is:             Patient lives at home with her daughter, Sotero Ramirez. Patient has a cane and wheelchair at home. Patient requires assistance with daily care. Patient's daughter is her transport. Patient uses the [facility]. Care Management Interventions  PCP Verified by CM: Yes  Palliative Care Criteria Met (RRAT>21 & CHF Dx)?: No  Mode of Transport at Discharge:  Other (see comment) (daughter vs tbd)  Transition of Care Consult (CM Consult): Discharge Planning  MyChart Signup: No  Discharge Durable Medical Equipment: No  Health Maintenance Reviewed: Yes  Physical Therapy Consult: No  Occupational Therapy Consult: No  Speech Therapy Consult: No  Support Systems: Child(jenny)  Confirm Follow Up Transport: Family  [address] Provided?: No  Discharge Location  Patient Expects to be Discharged to[de-identified] Home with home health    Readmission Assessment  Number of days since last admission?: 8-30 days  Previous disposition: Home with Home Health  Who is being interviewed?: Patient  What was the patient's/caregiver's perception as to why they think they needed to return back to the hospital?: Other (Comment)  Did you visit your Primary Care Physician after you left the hospital, before you returned this time?: Yes  Did you see a specialist, such as Cardiac, Pulmonary, Orthopedic Physician, etc. after you left the hospital?: No  Who advised the patient to return to the hospital?: Self-referral  Does the patient report anything that got in the way of taking their medications?: No  In our efforts to provide the best possible care to you and others like you, can you think of anything that we could have done to help you after you left the hospital the first time, so that you might not have needed to return so soon?: Other (Comment)    4:11 PM  Susie Peacock, CRM

## 2022-11-28 NOTE — PROGRESS NOTES
P&T-Approved DVT Prophylaxis Dosing    Per P&T Committee-approved protocol enoxaparin 40 mg daily has been adjusted to enoxaparin 30 mg daily based on weight and renal function as shown in the table below.          Carlos Pimentel, PHARMD

## 2022-11-28 NOTE — PROGRESS NOTES
1430: TRANSFER - IN REPORT:    Verbal report received from [address]  RN(name) on Luiza Hoffman  being received from PACU(unit) for routine progression of care      Report consisted of patients Situation, Background, Assessment and   Recommendations(SBAR). Information from the following report(s) SBAR, Kardex, Intake/Output, MAR, and Recent Results was reviewed with the receiving nurse. Opportunity for questions and clarification was provided. Assessment completed upon patients arrival to unit and care assumed. 1435: Patient arrived on unit a this time. A&OX1. Right BKA- clean, dry and intact. VSS. Call bell in reach, bed alarm on, will monitor. 1435: Primary Nurse Geeta Hunt and Dany Mckoy RN performed a dual skin assessment on this patient. 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 1750: Patient very anxious and confused. PRN Ativan given. 1900: End of Shift Note    Bedside shift change report given to oncoming RN (oncoming nurse) by Geeta Hunt (offgoing nurse). Report included the following information SBAR, Kardex, Intake/Output, MAR, and Recent Results    Shift worked:  [id number]     Shift summary and any significant changes:     R BKA TX from PACU. Patient confused, family at bedside. May need sitter if family leaves.       Concerns for physician to address:  none     Zone phone for oncoming shift:   XXX       Activity:  Activity Level: Bed Rest  Number times ambulated in hallways past shift: 0  Number of times OOB to chair past shift: 0    Cardiac:   Cardiac Monitoring: Yes      Cardiac Rhythm: Ventricular Paced    Access:  Current line(s): PIV     Genitourinary:   Urinary status: mae    Respiratory:   O2 Device: None (Room air)  Chronic home O2 use?: NO  Incentive spirometer at bedside: YES       GI:     Current diet:  ADULT DIET Clear Liquid  Passing flatus: YES  Tolerating current diet: YES       Pain Management:   Patient states pain is manageable on current regimen: YES    Skin:  Gerald Score: 14  Interventions: float heels, increase time out of bed, and PT/OT consult    Patient Safety:  Fall Score:  Total Score: 4  Interventions: bed/chair alarm, gripper socks, pt to call before getting OOB, and stay with me (per policy)  High Fall Risk: Yes    Length of Stay:  Expected LOS: 4d 9h  Actual LOS: [address]

## 2022-11-28 NOTE — PROGRESS NOTES
[facility] [facility]    Pharmacist Conversion of Mixed Insulin to Basal/Bolus Dosing    Mixed insulin products are non-formulary and will be converted to a basal/bolus strategy using insulin glargine (Lantus) and insulin lispro (Humalog) based on the patient's total daily dose of mixed insulin. Mixed insulin product ordered: Novolog Mix 70/30    Total daily dose (TDD) of mixed insulin: 30 units    Long-acting insulin dose: 30 units (TDD) x 70 % (intermediate acting portion) x 80% = 17 units daily insulin glargine    Rapid-acting insulin dose: 30 units (TDD) x 30 % (rapid acting portion) = 9 units, divided TID with meals = 3 units/meal insulin lispro    Please call with any questions.     Thank you,  Kaylene Cohen, PHARMD  11/28/2022, 3:48 PM

## 2022-11-28 NOTE — PERIOP NOTES
Handoff Report from Operating Room to PACU    Report received from Tameka Waters CRNA and DANICA Fulton RN regarding Kelsey Chana. Surgeon(s):  Tim Apple MD  And Procedure(s) (LRB):  RIGHT LOWER LEG ANGIOGRAM, RIGHT BELOW KNEE AMPUTATION (Right)  confirmed   with allergies and dressings discussed. Anesthesia type, drugs, patient history, complications, estimated blood loss, vital signs, intake and output, and last pain medication, lines, reversal medications, and temperature were reviewed. Patient appears to be in severe pain unrelieved by medication. Dr. Holden Warren notified and performed sciatic gluteal block at bedside in PACU. TRANSFER - OUT REPORT:    Verbal report given to Brooks Candelaria RN (name) on Kelsey Cahna  being transferred to [facility] [id number] (unit) for routine post - op       Report consisted of patients Situation, Background, Assessment and   Recommendations(SBAR). Information from the following report(s) SBAR, Kardex, OR Summary, Procedure Summary, Intake/Output, MAR, Recent Results, Med Rec Status, and Cardiac Rhythm Vpaced  was reviewed with the receiving nurse. Lines:   Peripheral IV 11/28/22 Right Forearm (Active)   Site Assessment Clean, dry, & intact 11/28/22 1415   Phlebitis Assessment 0 11/28/22 1415   Infiltration Assessment 0 11/28/22 1415   Dressing Status Clean, dry, & intact 11/28/22 1415   Dressing Type Tape;Transparent 11/28/22 1415   Hub Color/Line Status Pink;Capped 11/28/22 1415        Opportunity for questions and clarification was provided. Patient transported with:   Monitor  Registered Nurse  Tech    Patient belongings with family. Provided update to patient's daughter Stephany Pappas on patient condition and transfer.  Allowed the opportunity for questions

## 2022-11-28 NOTE — ANESTHESIA PROCEDURE NOTES
Right subgluteal sciatic peripheral nerve block    Start time: 11/28/2022 12:35 PM  End time: 11/28/2022 12:45 PM  Performed by: Janee Cedeno MD  Authorized by: Janee Cedeno MD       Pre-procedure:    Indications: at surgeon's request and post-op pain management    Preanesthetic Checklist: patient identified, risks and benefits discussed, site marked, timeout performed, anesthesia consent given, patient being monitored and fire risk safety assessment completed and verbalized    Timeout Time: 12:35 EST      Block Type:   Block Type:  Sciatic single shot  Laterality:  Right  Monitoring:  Standard ASA monitoring, continuous pulse ox, frequent vital sign checks, heart rate, responsive to questions and oxygen  Injection Technique:  Single shot  Procedures: ultrasound guided    Patient Position: left lateral decubitus  Prep: chlorhexidine    : subgluteal.  Needle Type:  Stimuplex  Needle Gauge:  20 G  Needle Localization:  Anatomical landmarks, infiltration and ultrasound guidance  Med Admin Time: 11/28/2022 12:43 PM    Assessment:  Number of attempts:  1  Injection Assessment:  Incremental injection every 5 mL, no paresthesia, ultrasound image on chart, local visualized surrounding nerve on ultrasound and negative aspiration for blood  Patient tolerance:  Patient tolerated the procedure well with no immediate complications  Adequate visualization on US  25 ml 0.5% ropivacaine administered

## 2022-11-28 NOTE — PERIOP NOTES
Daniel[facility] Wound Debridement and Cleansing System  Ref: Azalea Litten: [id number] LOT: [id number] Expiration Date: 2024/09/30

## 2022-11-28 NOTE — ANESTHESIA POSTPROCEDURE EVALUATION
Procedure(s):  RIGHT LOWER LEG ANGIOGRAM, RIGHT BELOW KNEE AMPUTATION. general    Anesthesia Post Evaluation      Multimodal analgesia: multimodal analgesia used between 6 hours prior to anesthesia start to PACU discharge  Patient location during evaluation: bedside  Patient participation: complete - patient participated  Level of consciousness: awake  Pain management: adequate  Airway patency: patent  Anesthetic complications: no  Cardiovascular status: acceptable  Respiratory status: acceptable  Hydration status: acceptable  Post anesthesia nausea and vomiting:  controlled  Final Post Anesthesia Temperature Assessment:  Normothermia (36.0-37.5 degrees C)      INITIAL Post-op Vital signs:   Vitals Value Taken Time   /67 11/28/22 1345   Temp 36.5 °C (97.7 °F) 11/28/22 1215   Pulse 75 11/28/22 1400   Resp 18 11/28/22 1400   SpO2 98 % 11/28/22 1400   Vitals shown include unvalidated device data.

## 2022-11-28 NOTE — ANESTHESIA PREPROCEDURE EVALUATION
Anesthetic History               Review of Systems / Medical History  Patient summary reviewed, nursing notes reviewed and pertinent labs reviewed    Pulmonary    COPD            Comments: H/O RLL Pneumonia   Neuro/Psych             Comments: Diabetic peripheral neuropathy    H/O SDH (subdural hematoma)    Hard of hearing Cardiovascular    Hypertension  Valvular problems/murmurs: tricuspid insufficiency and mitral insufficiency    CHF  Dysrhythmias : atrial fibrillation  Pacemaker, CAD, PAD, cardiac stents, CABG (3 vessel) and hyperlipidemia    Exercise tolerance: <4 METS  Comments: Ejection fraction was  estimated in the range of 55 % to 60 %. Mod-severe TR & PHTN    ECG (5/4/21): Ventricular-paced rhythm   Abnormal ECG   When compared with ECG of 09-AUG-2017 18:58,   Vent. rate has increased BY  20 BPM     TTE (2/23/18): Left ventricle: Systolic function was normal. Ejection fraction was  estimated in the range of 55 % to 60 %. No obvious wall motion  abnormalities identified in the views obtained. There was mild concentric  hypertrophy. Left atrium: The atrium was moderately dilated. Right atrium: The atrium was moderately dilated. Mitral valve: There was mild regurgitation. Aortic valve: There was no stenosis. Tricuspid valve: There was moderate to severe regurgitation. Pulmonary  artery systolic pressure: 61 mmHg. There was moderate to severe pulmonary  hypertension.    GI/Hepatic/Renal     GERD    Renal disease: CRI      Comments: CRI, Stage III Endo/Other    Diabetes: type 2, using insulin  Hypothyroidism  Obesity and arthritis     Other Findings   Comments: Alzheimer disease  Chronic pain           Physical Exam    Airway  Mallampati: II  TM Distance: 4 - 6 cm  Neck ROM: normal range of motion   Mouth opening: Normal     Cardiovascular  Regular rate and rhythm,  S1 and S2 normal,  no murmur, click, rub, or gallop             Dental    Dentition: Edentulous, Full lower dentures and Full upper dentures     Pulmonary  Breath sounds clear to auscultation               Abdominal  GI exam deferred       Other Findings            Anesthetic Plan    ASA: 4  Anesthesia type: general          Induction: Intravenous  Anesthetic plan and risks discussed with: Patient

## 2022-11-29 LAB
GLUCOSE BLD STRIP.AUTO-MCNC: 104 MG/DL (ref 65–117)
GLUCOSE BLD STRIP.AUTO-MCNC: 106 MG/DL (ref 65–117)
GLUCOSE BLD STRIP.AUTO-MCNC: 129 MG/DL (ref 65–117)
GLUCOSE BLD STRIP.AUTO-MCNC: 198 MG/DL (ref 65–117)
GLUCOSE BLD STRIP.AUTO-MCNC: 267 MG/DL (ref 65–117)
GLUCOSE BLD STRIP.AUTO-MCNC: 73 MG/DL (ref 65–117)
SERVICE CMNT-IMP: ABNORMAL
SERVICE CMNT-IMP: NORMAL

## 2022-11-29 PROCEDURE — 74011250636 HC RX REV CODE- 250/636: Performed by: SURGERY

## 2022-11-29 PROCEDURE — 65270000046 HC RM TELEMETRY

## 2022-11-29 PROCEDURE — 74011000250 HC RX REV CODE- 250: Performed by: SURGERY

## 2022-11-29 PROCEDURE — 74011636637 HC RX REV CODE- 636/637: Performed by: SURGERY

## 2022-11-29 PROCEDURE — 74011250637 HC RX REV CODE- 250/637: Performed by: SURGERY

## 2022-11-29 RX ORDER — DEXTROSE MONOHYDRATE 100 MG/ML
INJECTION, SOLUTION INTRAVENOUS
Status: DISPENSED
Start: 2022-11-29 | End: 2022-11-30

## 2022-11-29 RX ADMIN — FUROSEMIDE 40 MG: 40 TABLET ORAL at 08:16

## 2022-11-29 RX ADMIN — Medication 3 UNITS: at 08:17

## 2022-11-29 RX ADMIN — MORPHINE SULFATE 2 MG: 2 INJECTION, SOLUTION INTRAMUSCULAR; INTRAVENOUS at 08:52

## 2022-11-29 RX ADMIN — METOPROLOL SUCCINATE 50 MG: 50 TABLET, EXTENDED RELEASE ORAL at 08:15

## 2022-11-29 RX ADMIN — Medication 5 UNITS: at 08:16

## 2022-11-29 RX ADMIN — DILTIAZEM HYDROCHLORIDE 180 MG: 180 CAPSULE, COATED, EXTENDED RELEASE ORAL at 08:15

## 2022-11-29 RX ADMIN — DEXTROSE MONOHYDRATE 125 ML: 100 INJECTION, SOLUTION INTRAVENOUS at 16:37

## 2022-11-29 RX ADMIN — MORPHINE SULFATE 2 MG: 2 INJECTION, SOLUTION INTRAMUSCULAR; INTRAVENOUS at 04:52

## 2022-11-29 RX ADMIN — SODIUM CHLORIDE 50 ML/HR: 9 INJECTION, SOLUTION INTRAVENOUS at 08:19

## 2022-11-29 RX ADMIN — INSULIN GLARGINE 17 UNITS: 100 INJECTION, SOLUTION SUBCUTANEOUS at 08:16

## 2022-11-29 RX ADMIN — MORPHINE SULFATE 2 MG: 2 INJECTION, SOLUTION INTRAMUSCULAR; INTRAVENOUS at 01:45

## 2022-11-29 RX ADMIN — Medication 3 UNITS: at 11:36

## 2022-11-29 RX ADMIN — LEVOTHYROXINE SODIUM 25 MCG: 0.03 TABLET ORAL at 08:15

## 2022-11-29 RX ADMIN — MORPHINE SULFATE 2 MG: 2 INJECTION, SOLUTION INTRAMUSCULAR; INTRAVENOUS at 13:21

## 2022-11-29 RX ADMIN — FAMOTIDINE 20 MG: 20 TABLET, FILM COATED ORAL at 08:16

## 2022-11-29 RX ADMIN — ENOXAPARIN SODIUM 30 MG: 100 INJECTION SUBCUTANEOUS at 16:42

## 2022-11-29 NOTE — PROGRESS NOTES
1900: Bedside and Verbal shift change report given to Anuel Salinas RN (oncoming nurse) by Torri Quiroz RN (offgoing nurse). Report included the following information SBAR, Kardex, MAR, and Recent Results. 2055: Called Attending phone for insulin coverage. 2215: Nurse called surgeon about clarity for insulin coverage. It was a sliding scale for TID and changed it to ACHS. End of Shift Note    Bedside shift change report given to Torri Quiroz RN (oncoming nurse) by Prabhu Powers RN (offgoing nurse). Report included the following information SBAR, Kardex, and MAR    Shift worked:  [id number]     Shift summary and any significant changes:          Concerns for physician to address: Patient takes melatonin at night, please add to PRN meds. Zone phone for oncoming shift:          Activity:  Activity Level: Bed Rest  Number times ambulated in hallways past shift: 0  Number of times OOB to chair past shift: 0    Cardiac:   Cardiac Monitoring: Yes      Cardiac Rhythm: Ventricular Paced    Access:  Current line(s): PIV     Genitourinary:   Urinary status: mae    Respiratory:   O2 Device: None (Room air)  Chronic home O2 use?: NO  Incentive spirometer at bedside: NO       GI:     Current diet:  ADULT DIET Clear Liquid  Passing flatus: YES  Tolerating current diet:        Pain Management:   Patient states pain is manageable on current regimen: N/A    Skin:  Gerald Score: 13  Interventions: internal/external urinary devices    Patient Safety:  Fall Score:  Total Score: 4  Interventions: bed/chair alarm  High Fall Risk: Yes    Length of Stay:  Expected LOS: 4d 9h  Actual LOS: 1      Prabhu Powers RN

## 2022-11-29 NOTE — PROGRESS NOTES
Problem: Falls - Risk of  Goal: *Absence of Falls  Description: Document Clearence Skates Fall Risk and appropriate interventions in the flowsheet.   Outcome: Progressing Towards Goal  Note: Fall Risk Interventions:  Mobility Interventions: Bed/chair exit alarm, Communicate number of staff needed for ambulation/transfer, PT Consult for mobility concerns, OT consult for ADLs    Mentation Interventions: Adequate sleep, hydration, pain control, Bed/chair exit alarm, Room close to nurse's station    Medication Interventions: Bed/chair exit alarm, Assess postural VS orthostatic hypotension    Elimination Interventions: Bed/chair exit alarm, Call light in reach

## 2022-11-29 NOTE — PROGRESS NOTES
Transition of Care Plan:    RUR: 11  MINAL: 12/1? Disposition: Home with Resumption of Care order with [facility]. If SNF or IPR: Date FOC offered:  Date FOC received:  Date authorization started with reference number:  Date authorization received and expires:  Accepting facility:    Follow up appointments: PCP: SINGH Clements  DME needed: tbd  Transportation at Discharge:DTR will transport  [place] or means to access home:      DTR  IM Medicare Letter:1st IM letter 11/28  2nd IM letter to be delivered  Is patient a  and connected with the [address] [place] [address]?              no  If yes, was [employer] transfer form completed and [address] [place] [address] notified? Caregiver Contact: DTR: [place]: [phone]  Discharge Caregiver contacted prior to discharge? No   Care Conference needed?:          no    CM will continue to monitor discharge plan.      Karsten Akbar, [address]

## 2022-11-29 NOTE — PROGRESS NOTES
Vascular Surgery Progress Note  Gemma Sifuentes, AGACNP-BC          Admit Date: 2022   LOS: 1 day        Daily Progress Note: 2022    POD:1 Day Post-Op    S/P: Procedure(s):    RLE angiogram and below-knee amputation 22      Subjective: Louise Donato is an 77 yo female with PMHx of Alzheimer's disease, chronic pain, HTN, HLD, DMII w/ polyneuropathy, ASHD (s/p CABG), diastolic heart failure, depression, hypothroidism, COPD, GERD and PVD with severe mulitlevel atherosclerotic occlusive disease. She has a history of multiple percutaneous interventions which have failed. She presented to the hospital for RLE arteriogram and popliteal exploration and right below knee amputation for RLE ischemia on 22 under GA. She tolerated the procedure and there were no complications. Overnight, there were no acute events. This morning, despite multimodal pain medication administration throughout the night, she remained in pain. She required re-orientation to time and place. Given confusion, ROS not obtained. Objective:     Vital signs  Temp (24hrs), Av.7 °F (36.5 °C), Min:97 °F (36.1 °C), Max:98.4 °F (36.9 °C)    0701 -  1900  In: 240 [P.O.:240]  Out: 700 [Urine:700]  1901 -  0700  In: 1046.7 [P.O.:480; I.V.:566.7]  Out: 1155 [Urine:1105]    Visit Vitals  BP (!) 166/70 (BP 1 Location: Left upper arm, BP Patient Position: At rest;Semi fowlers)   Pulse 84   Temp 97.5 °F (36.4 °C)   Resp 16   Ht 4' 11\" (1.499 m)   Wt 52.5 kg (115 lb 12.8 oz)   SpO2 98%   BMI 23.39 kg/m²    O2 Flow Rate (L/min): 2 l/min O2 Device: None (Room air)     Pain control  Pain Assessment  Pain Scale 1: FLACC  Pain Intensity 1: 8  Pain Onset 1: acute  Pain Location 1: Leg  Pain Orientation 1: Right  Pain Description 1: Aching, Phantom  Pain Intervention(s) 1: Medication (see MAR)    PT/OT  Gait                 Physical Exam  Constitutional:       General: She is not in acute distress.   HENT: Head: Normocephalic. Eyes:      General: No scleral icterus. Cardiovascular:      Rate and Rhythm: Normal rate. Pulmonary:      Effort: Pulmonary effort is normal.   Abdominal:      Palpations: Abdomen is soft. Musculoskeletal:      Cervical back: Normal range of motion. Right Lower Extremity: Right leg is amputated below knee. Feet:      Comments: RLE dressing CDI  Skin:     General: Skin is warm and dry. 24 hour results:    Recent Results (from the past 24 hour(s))   GLUCOSE, POC    Collection Time: 11/28/22  4:15 PM   Result Value Ref Range    Glucose (POC) 249 (H) 65 - 117 mg/dL    Performed by Meet Osman PCT    GLUCOSE, POC    Collection Time: 11/28/22  8:14 PM   Result Value Ref Range    Glucose (POC) 387 (H) 65 - 117 mg/dL    Performed by Nathan Good PCT    GLUCOSE, POC    Collection Time: 11/29/22  7:31 AM   Result Value Ref Range    Glucose (POC) 267 (H) 65 - 117 mg/dL    Performed by Cecil [employer] PCT    GLUCOSE, POC    Collection Time: 11/29/22 11:24 AM   Result Value Ref Range    Glucose (POC) 106 65 - 117 mg/dL    Performed by Kit Punt PCT           Assessment/Plan:     Active Problems:    Limb ischemia (8/27/2021)        PVD  CLTI with RLE rest pain  s/p failed L hallux amp (8/27/21)  s/p L TMA  (9/10/21)  - s/p RLE arteriogram with BKA 11/28/22  - dressing CDI  - Pain control  - bedrest- PT/OT if tolerated  - mae to remain today - she is fidgeting in bed and unlikely to tolerate PureWick placement, concern for dressing getting wet.    - statin  - diet- diabetic  - fluids at 50mL/hr until eating/drinking    HTN  - home meds - Metoprolol, furosemide, spironalactone  DMII  Polyneuropathy  - MDI     cardiomegaly  CHF - right sided  Afib  ASHD  - home meds  - eliquis held    Thyroid disease  - home meds    Renal artery stenosis  Chronic Pain  Polyneuropathy  Degenerative Disc disease  Depression  Arthritis  GERD  - pepcid  Alzheimer's disease          Activity:  DVT ppx: Lovenox subq  Dispo:     Plan d/w Dr. Alfreda Norman, NP

## 2022-11-29 NOTE — OP NOTES
[facility]  OPERATIVE REPORT    Name:  Ashley Burger  MR#:  [id number]  :  1940  ACCOUNT #:  [de-identified]  DATE OF SERVICE:  2022      PREOPERATIVE DIAGNOSIS:  Right leg ischemia. POSTOPERATIVE DIAGNOSIS:  Right leg ischemia. PROCEDURE PERFORMED:  1. Right popliteal artery exploration. 2.  Right leg arteriogram with intraoperative C-arm fluoroscopy. 3.  Right below-knee amputation. SURGEON:  Braydon Funez MD    ANESTHESIA:  General.    COMPLICATIONS:  None. SPECIMENS REMOVED:  As above. ESTIMATED BLOOD LOSS:  Minimal.    INDICATIONS:  The patient is an 75-year-old woman who for years has struggled with bilateral lower extremity ischemia. She has had recent failure of percutaneous interventions on the right and an attempted re-intervention failed. 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 We have made the decision to image her from the below-knee popliteal level today and then proceed based on the assessment. PROCEDURE:  After obtaining informed consent, the patient was placed supine on the operating table. After adequate induction of general anesthesia, site and patient confirmation, administration of prophylactic antibiotics, the right leg was prepped and draped in sterile fashion. A longitudinal incision was made in the medial aspect of the right leg below the knee and deepened until the below-knee popliteal artery was identified, dissected free and controlled proximally and distally with Silastic vessel loops. A 21-gauge butterfly needle was used to access the lumen of the below-knee popliteal artery and intraoperative C-arm fluoroscopy used to image the right lower leg from below-knee popliteal artery to the foot and toes.   There was a single small diseased runoff vessel in the form of the peroneal artery which gave several small collaterals anteriorly and posteriorly just above the ankle. No named vessel crossed the ankle and there was evidence of severe digital occlusive disease and essentially no reflow onto the foot past the ankle. This situation was thought to be inadequate to sustain a below-knee popliteal bypass and not consistent with salvaging the amount of soft tissue loss on her foot. The butterfly was removed. The access site controlled with a single 6-0 Prolene suture. Further incision lines were made to facilitate a below-knee amputation. A long posterior base flap was fashioned. The anterior incision was made equal with the distal aspect of the existing medial leg incision. The periosteum of the tibia was elevated and the tibia divided with the oscillating saw as was the fibula. Posterior soft tissue was divided with amputation knife and specimen delivered. All bleeding points were controlled with suture ligatures and Bovie electrocautery. The anterior aspect of the tibia was beveled and then smoothed with a rasp. The posterior flap was brought forward and closed at the fascial level with interrupted buried Vicryl sutures. The medial leg incision was closed in the same fashion. Interrupted 3-0 horizontal mattress sutures were used at the medial corner as well as another spots along the closure as retention sutures. The remainder of the skin was closed with standard skin staples. The patient tolerated the procedure well with no complications.         MD TERRY [email or web address]  D:  11/28/2022 13:22  T:  11/28/2022 22:27  JOB #:  [id number]  CC:  Eros Díaz MD

## 2022-11-29 NOTE — PROGRESS NOTES
0700: Bedside shift change report given to Thiago Hood (oncoming nurse) by Maricarmen Ambrose RN (offgoing nurse). Report included the following information SBAR, Kardex, Intake/Output, MAR, and Recent Results. 0700: Leonela Carlson RN will be documenting on patient. 1340: Discussed mae with Maritza Rai NP and with patient current condition, we will keep mae d/t prevention of getting dressing wet. 1640: Patient's BS 73. Patient is very drowsy and not eating, will give D10 per hypoglycemia protocol. 1641: [address]    Patient with hypoglycemic episode(s) at 1640(time) on 11/29/22(date). BG value(s) pre-treatment 68    Was patient symptomatic? [x] yes, [] no  Patient was treated with the following rescue medications/treatments: [x] D50                [] Glucose tablets                [] Glucagon                [] 4oz juice                [] 6oz reg soda                [] 8oz low fat milk  BG value post-treatment: 123  Once BG treated and value greater than 80mg/dl, pt was provided with the following:  [] snack  [x] meal  Name of MD notified: N/A  The following orders were received: no     1900: End of Shift Note    Bedside shift change report given to oncoming RN (oncoming nurse) by José Luis Shannon (offgoing nurse). Report included the following information SBAR, Kardex, Intake/Output, MAR, and Recent Results    Shift worked:  [id number]     Shift summary and any significant changes:     Pt got morphine X2, PT/OT orders placed.  Slightly hypoglycemic, d10 given     Concerns for physician to address:  none     Zone phone for oncoming shift:   XXX       Activity:  Activity Level: Bed Rest  Number times ambulated in hallways past shift: 0  Number of times OOB to chair past shift: 0    Cardiac:   Cardiac Monitoring: Yes      Cardiac Rhythm: Ventricular Paced    Access:  Current line(s): PIV     Genitourinary:   Urinary status: mae    Respiratory:   O2 Device: None (Room air)  Chronic home O2 use?: NO  Incentive spirometer at bedside: YES       GI:     Current diet:  ADULT DIET Easy to Chew; 4 carb choices (60 gm/meal)  Passing flatus: YES  Tolerating current diet: YES       Pain Management:   Patient states pain is manageable on current regimen: YES    Skin:  Gerald Score: 13  Interventions: turn team, speciality bed, float heels, increase time out of bed, and PT/OT consult    Patient Safety:  Fall Score:  Total Score: 4  Interventions: bed/chair alarm, gripper socks, pt to call before getting OOB, and stay with me (per policy)  High Fall Risk: Yes    Length of Stay:  Expected LOS: 4d 9h  Actual LOS: [facility]

## 2022-11-29 NOTE — PROGRESS NOTES
Spiritual Care Partner Volunteer visited patient at [address] in [facility] PROGRESSIVE CARE on 11/29/2022   Documented by:     FREDI Culp, [facility], Staff [address]    [address] Paging Service  [phone])

## 2022-11-29 NOTE — PROGRESS NOTES
0700: Bedside verbal report received by ADRIANO Pulliam and Royer Cristobal (oncomming nurse) given by Monroe Varma (offgoing nurse). Report includes the following SBAR, Kardex, Intake/Output, MAR and Recent Results. 16:40: Patient BS was 73. Patient is very drowsy and not eating much and gave D10 as per the protocol. 16:41: HYPOGLYCEMIC EPISODE DOCUMENTATION    Patient with hypoglycemic episode(s) at 1640(time) on 11/29/22(date). BG value(s) pre-treatment 68    Was patient symptomatic? [x] yes, [] no  Patient was treated with the following rescue medications/treatments: [x] D50                [] Glucose tablets                [] Glucagon                [] 4oz juice                [] 6oz reg soda                [] 8oz low fat milk  BG value post-treatment: 123  Once BG treated and value greater than 80mg/dl, pt was provided with the following:  [] snack  [x] meal  Name of MD notified:none  The following orders were received: as per protocol. End of Shift Note    Bedside shift change report given to  (oncoming nurse) by An Vora RN (offgoing nurse). Report included the following information SBAR, Kardex, Intake/Output, MAR, Recent Results, and Cardiac Rhythm . Shift worked:  [id number]     Shift summary and any significant changes:     Have very low appetite and have gone to hypoglycemic once.      Concerns for physician to address:  none     [facility]   [facility]       Activity:  Activity Level: Bed Rest  Number times ambulated in hallways past shift: 0  Number of times OOB to chair past shift: 0    Cardiac:   Cardiac Monitoring: Yes      Cardiac Rhythm: Ventricular Paced    Access:  Current line(s): PIV     Genitourinary:   Urinary status: voiding    Respiratory:   O2 Device: None (Room air)  Chronic home O2 use?: N/A  Incentive spirometer at bedside: NO       GI:     Current diet:  ADULT DIET Easy to Chew; 4 carb choices (60 gm/meal)  Passing flatus: YES  Tolerating current diet: YES Pain Management:   Patient states pain is manageable on current regimen: YES    Skin:  Gerald Score: 13  Interventions: turn team, speciality bed, and PT/OT consult    Patient Safety:  Fall Score:  Total Score: 4  Interventions: bed/chair alarm, gripper socks, and pt to call before getting OOB  High Fall Risk: Yes    Length of Stay:  Expected LOS: 4d 9h  Actual LOS: 1      Ruba Leon RN

## 2022-11-30 LAB
GLUCOSE BLD STRIP.AUTO-MCNC: 103 MG/DL (ref 65–117)
GLUCOSE BLD STRIP.AUTO-MCNC: 185 MG/DL (ref 65–117)
GLUCOSE BLD STRIP.AUTO-MCNC: 77 MG/DL (ref 65–117)
GLUCOSE BLD STRIP.AUTO-MCNC: 89 MG/DL (ref 65–117)
SERVICE CMNT-IMP: ABNORMAL
SERVICE CMNT-IMP: NORMAL

## 2022-11-30 PROCEDURE — 74011636637 HC RX REV CODE- 636/637: Performed by: SURGERY

## 2022-11-30 PROCEDURE — 97161 PT EVAL LOW COMPLEX 20 MIN: CPT

## 2022-11-30 PROCEDURE — 74011250637 HC RX REV CODE- 250/637: Performed by: SURGERY

## 2022-11-30 PROCEDURE — 74011250636 HC RX REV CODE- 250/636: Performed by: SURGERY

## 2022-11-30 PROCEDURE — 82962 GLUCOSE BLOOD TEST: CPT

## 2022-11-30 PROCEDURE — 65270000046 HC RM TELEMETRY

## 2022-11-30 PROCEDURE — 97530 THERAPEUTIC ACTIVITIES: CPT

## 2022-11-30 PROCEDURE — 97535 SELF CARE MNGMENT TRAINING: CPT

## 2022-11-30 PROCEDURE — 97165 OT EVAL LOW COMPLEX 30 MIN: CPT

## 2022-11-30 RX ORDER — LANOLIN ALCOHOL/MO/W.PET/CERES
5 CREAM (GRAM) TOPICAL
Status: DISCONTINUED | OUTPATIENT
Start: 2022-11-30 | End: 2022-12-02 | Stop reason: HOSPADM

## 2022-11-30 RX ADMIN — ENOXAPARIN SODIUM 30 MG: 100 INJECTION SUBCUTANEOUS at 15:19

## 2022-11-30 RX ADMIN — MORPHINE SULFATE 2 MG: 2 INJECTION, SOLUTION INTRAMUSCULAR; INTRAVENOUS at 04:49

## 2022-11-30 RX ADMIN — MELATONIN 4.5 MG: at 22:24

## 2022-11-30 RX ADMIN — MORPHINE SULFATE 2 MG: 2 INJECTION, SOLUTION INTRAMUSCULAR; INTRAVENOUS at 08:32

## 2022-11-30 RX ADMIN — VENLAFAXINE HYDROCHLORIDE 75 MG: 37.5 CAPSULE, EXTENDED RELEASE ORAL at 22:24

## 2022-11-30 RX ADMIN — HYDROMORPHONE HYDROCHLORIDE 4 MG: 2 TABLET ORAL at 18:35

## 2022-11-30 RX ADMIN — METOPROLOL SUCCINATE 50 MG: 50 TABLET, EXTENDED RELEASE ORAL at 08:32

## 2022-11-30 RX ADMIN — LEVOTHYROXINE SODIUM 25 MCG: 0.03 TABLET ORAL at 08:32

## 2022-11-30 RX ADMIN — MORPHINE SULFATE 2 MG: 2 INJECTION, SOLUTION INTRAMUSCULAR; INTRAVENOUS at 01:45

## 2022-11-30 RX ADMIN — SPIRONOLACTONE 25 MG: 25 TABLET ORAL at 15:20

## 2022-11-30 RX ADMIN — MORPHINE SULFATE 2 MG: 2 INJECTION, SOLUTION INTRAMUSCULAR; INTRAVENOUS at 16:58

## 2022-11-30 RX ADMIN — FAMOTIDINE 20 MG: 20 TABLET, FILM COATED ORAL at 08:32

## 2022-11-30 RX ADMIN — Medication 2 UNITS: at 12:12

## 2022-11-30 RX ADMIN — MORPHINE SULFATE 2 MG: 2 INJECTION, SOLUTION INTRAMUSCULAR; INTRAVENOUS at 12:23

## 2022-11-30 RX ADMIN — ROSUVASTATIN CALCIUM 5 MG: 10 TABLET, COATED ORAL at 22:24

## 2022-11-30 RX ADMIN — SODIUM CHLORIDE 50 ML/HR: 9 INJECTION, SOLUTION INTRAVENOUS at 04:49

## 2022-11-30 RX ADMIN — DILTIAZEM HYDROCHLORIDE 180 MG: 180 CAPSULE, COATED, EXTENDED RELEASE ORAL at 08:33

## 2022-11-30 RX ADMIN — LORAZEPAM 0.5 MG: 0.5 TABLET ORAL at 18:35

## 2022-11-30 RX ADMIN — FUROSEMIDE 40 MG: 40 TABLET ORAL at 08:32

## 2022-11-30 RX ADMIN — INSULIN GLARGINE 17 UNITS: 100 INJECTION, SOLUTION SUBCUTANEOUS at 08:32

## 2022-11-30 RX ADMIN — Medication 3 UNITS: at 12:13

## 2022-11-30 NOTE — PROGRESS NOTES
PHYSICAL THERAPY EVALUATION/DISCHARGE  Patient: Owen Dowling (78 y.o. female)  Date: 11/30/2022  Primary Diagnosis: Limb ischemia [I99.8]  Procedure(s) (LRB):  RIGHT LOWER LEG ANGIOGRAM, RIGHT BELOW KNEE AMPUTATION (Right) 2 Days Post-Op   Precautions:          ASSESSMENT  Based on the objective data described below, the patient presents with dependent baseline mobility, but her daughter is present and states that she was last ambulatory with a RW about 1 month ago. Pt s/p R BKA and stating her leg hurts. She is in bed on arrival and inconsistently follow commands, thus inconsistently participating in PT eval.  Daughter states that pt is a 2 person assist to a wheelchair and is bathed in bed. Pt has also stopped feeding herself just prior to this admission due to RLE pain. Pt is not a rehab candidate due to her dementia and recent R BKA in addition to her L TMA that was done 9/2022. Feel pt would benefit most from discharging home with family and HHPT for janna lift training. No other deficits that warrant further acute PT intervention, thus will sign off. Functional Outcome Measure: The patient scored 0/100 on the Barthel Index outcome measure which is indicative of dependence. Other factors to consider for discharge: dependent at baseline     Further skilled acute physical therapy is not indicated at this time.      PLAN :  Recommendation for discharge: (in order for the patient to meet his/her long term goals)  Physical therapy at least 2 days/week in the home AND ensure assist and/or supervision for safety with all mobility    This discharge recommendation:  A follow-up discussion with the attending provider and/or case management is planned    IF patient discharges home will need the following DME: mechanical lift       OBJECTIVE DATA SUMMARY:   HISTORY:    Past Medical History:   Diagnosis Date    Alzheimer disease ([facility] [address]     Arrhythmia     atrial fibrillation 2018    Arthritis     CAD (coronary artery disease)     CABG, pacemaker    Chronic pain     COPD     Dementia ([facility] [address] 2021    Diabetes ([facility]     GERD (gastroesophageal reflux disease)     Heart failure (HCC)     Hypertension     Ill-defined condition     high cholesterol    Peripheral vascular disease (HCC)     Sleep apnea     no longer uses-stopped years ago    Thyroid disease      Past Surgical History:   Procedure Laterality Date    HX ORTHOPAEDIC Left     toe amputation    HX PACEMAKER      July 2011    [place] CARDIAC SURG PROCEDURE UNLIST      3 CABG, Stents Dr. Clint Dunn FLX DX W/COLLJ SPEC WHEN PFRMD  11/10/2011         [place] EGD TRANSORAL BIOPSY SINGLE/MULTIPLE  8/29/2012         VASCULAR SURGERY PROCEDURE UNLIST Left 09/2021    left transmetatarsal amputation       Prior level of function: dependent with all mobility and adls  Personal factors and/or comorbidities impacting plan of care: dementia, pain    Home Situation  Home Environment: Private residence  One/Two Story Residence: One story  Living Alone: No  Support Systems: Child(jenny)  Patient Expects to be Discharged to[de-identified] Home with home health  Current DME Used/Available at Home: Shower chair, Wheelchair, Cane, straight    EXAMINATION/PRESENTATION/DECISION MAKING:   Critical Behavior:  Neurologic State: Alert, Confused  Orientation Level: Oriented to person  Cognition: Decreased attention/concentration, Impaired decision making, Memory loss, Poor safety awareness, Decreased command following  Safety/Judgement: Decreased insight into deficits, Decreased awareness of need for safety, Decreased awareness of need for assistance  Hearing:   Auditory  Auditory Impairment: Hard of hearing, bilateral  Hearing Aids/Status: Does not own  Range Of Motion:  AROM: Generally decreased, functional           PROM: Generally decreased, functional           Strength:    Strength: Generally decreased, functional                    Tone & Sensation:   Tone: Normal              Sensation: Intact Coordination:  Coordination: Generally decreased, functional  Vision:      Functional Mobility:  Bed Mobility:  Rolling: Moderate assistance;Maximum assistance;Assist x1;Assist x2; Additional time        Functional Measure:  Barthel Index:    Bathin  Bladder: 0  Bowels: 0  Groomin  Dressin  Feedin  Mobility: 0  Stairs: 0  Toilet Use: 0  Transfer (Bed to Chair and Back): 0  Total: 0/100       The Barthel ADL Index: Guidelines  1. The index should be used as a record of what a patient does, not as a record of what a patient could do. 2. The main aim is to establish degree of independence from any help, physical or verbal, however minor and for whatever reason. 3. The need for supervision renders the patient not independent. 4. A patient's performance should be established using the best available evidence. Asking the patient, friends/relatives and nurses are the usual sources, but direct observation and common sense are also important. However direct testing is not needed. 5. Usually the patient's performance over the preceding 24-48 hours is important, but occasionally longer periods will be relevant. 6. Middle categories imply that the patient supplies over 50 per cent of the effort. 7. Use of aids to be independent is allowed. Score Interpretation (from [address] [facility])    Independent   60-79 Minimally independent   40-59 Partially dependent   20-39 Very dependent   <20 Totally dependent     -Suman Moreno., Barthel, D.W. (1965). Functional evaluation: the Barthel Index. [facility] ([address] (1997). The Barthel activities of daily living index: self-reporting versus actual performance in the old (> or = 75 years). Journal of [address] [place] [address] 45(7), [facility], J.J.MYusufF, Anahi Riley., Estefani Vyas. (1999).  Measuring the change in disability after inpatient rehabilitation; comparison of the responsiveness of the Barthel Index and Functional [place] Measure. Journal of Neurology, Neurosurgery, and Psychiatry, 66(4), 996-422. GREGORY Soria, HUNTER Zhou, & Ralph Brody M.A. (2004) Assessment of post-stroke quality of life in cost-effectiveness studies: The usefulness of the Barthel Index and the EuroQoL-5D. Quality of Life Research, 13, 427-43          Pain Rating:  Pt stating her leg hurts, unable to quantify    Activity Tolerance:   Unable to accurately assess due to pt's dementia and poor cooperation      After treatment patient left in no apparent distress:   Supine in bed, Call bell within reach, Bed / chair alarm activated, Caregiver / family present, and Side rails x 3    COMMUNICATION/EDUCATION:   The patients plan of care was discussed with: Occupational therapist.     Fall prevention education was provided and the patient/caregiver indicated understanding.     Thank you for this referral.  Dian Ganser, PT   Time Calculation: 18 mins

## 2022-11-30 NOTE — PROGRESS NOTES
Transition of Care Plan:     RUR: 12% \"low risk\"  LOS: 2 days  Disposition: Home with Resumption of Care order with [facility]. If SNF or IPR: Date FOC offered: N/A  Date FOC received: N/A  Date authorization started with reference number: N/A  Date authorization received and expires: N/A  Accepting facility: N/A   Follow up appointments: PCP & Specialists as needed  DME needed: Mechanical life  Transportation at Discharge: Pt's daughter to provide transport  [address] or means to access home:      DTR  IM Medicare Letter: 2nd IM letter to be given at d/c  Is patient a [place] and connected with the [address] [employer]? No  Caregiver Contact: DTR: Ciara Jones: [phone]  Discharge Caregiver contacted prior to discharge? No   Care Conference needed?:  Not at this time    Initial note: Chart reviewed for updates. PT/OT recommendations acknowledged. CM contacted [facility] to request resumption of care. CM left message with answering center and requested a call back. CM will continue to follow up.     TAHIRA Mckoy    [phone]

## 2022-11-30 NOTE — PROGRESS NOTES
Problem: Pressure Injury - Risk of  Goal: *Prevention of pressure injury  Description: Document Gerald Scale and appropriate interventions in the flowsheet. Outcome: Progressing Towards Goal  Note: Pressure Injury Interventions:  Sensory Interventions: Assess changes in LOC, Assess need for specialty bed, Avoid rigorous massage over bony prominences    Moisture Interventions: Absorbent underpads, Assess need for specialty bed, Minimize layers    Activity Interventions: Assess need for specialty bed, PT/OT evaluation    Mobility Interventions: Assess need for specialty bed, HOB 30 degrees or less    Nutrition Interventions: Document food/fluid/supplement intake    Friction and Shear Interventions: Feet elevated on foot rest, Minimize layers                Problem: Falls - Risk of  Goal: *Absence of Falls  Description: Document Baljinder Fall Risk and appropriate interventions in the flowsheet.   Outcome: Progressing Towards Goal  Note: Fall Risk Interventions:  Mobility Interventions: Bed/chair exit alarm, OT consult for ADLs, Patient to call before getting OOB, PT Consult for mobility concerns, PT Consult for assist device competence, Strengthening exercises (ROM-active/passive)    Mentation Interventions: Bed/chair exit alarm, Door open when patient unattended, Evaluate medications/consider consulting pharmacy, Increase mobility, More frequent rounding    Medication Interventions: Bed/chair exit alarm, Patient to call before getting OOB, Teach patient to arise slowly    Elimination Interventions: Call light in reach, Patient to call for help with toileting needs, Stay With Me (per policy), Bed/chair exit alarm, Toileting schedule/hourly rounds

## 2022-11-30 NOTE — PROGRESS NOTES
1900: Bedside and Verbal shift change report given to Yaya Metzger RN (oncoming nurse) by Valerie Hudson RN (offgoing nurse). Report included the following information SBAR, Kardex, MAR, and Recent Results. End of Shift Note    Bedside shift change report given to Mike rose RN (oncoming nurse) by Tato Matamoros RN (offgoing nurse). Report included the following information SBAR, Kardex, and MAR    Shift worked:  [id number]     Shift summary and any significant changes:     Pain med given twice     Concerns for physician to address: Daughter was asking if it's possible to keep the mae in.     Zone phone for oncoming shift:          Activity:  Activity Level: Bed Rest  Number times ambulated in hallways past shift: 0  Number of times OOB to chair past shift: 0    Cardiac:   Cardiac Monitoring: Yes      Cardiac Rhythm: Ventricular Paced    Access:  Current line(s): PIV     Genitourinary:   Urinary status: mae    Respiratory:   O2 Device: None (Room air)  Chronic home O2 use?: NO  Incentive spirometer at bedside: NO       GI:     Current diet:  ADULT DIET Easy to Chew; 4 carb choices (60 gm/meal)  Passing flatus: Tolerating current diet:        Pain Management:   Patient states pain is manageable on current regimen: N/A    Skin:  Gerald Score: 14  Interventions: PT/OT consult and internal/external urinary devices    Patient Safety:  Fall Score:  Total Score: 4  Interventions: bed/chair alarm  High Fall Risk: Yes    Length of Stay:  Expected LOS: 4d 9h  Actual LOS: 2      Tato Matamoros RN

## 2022-11-30 NOTE — PROGRESS NOTES
Outcome: Not Met   OCCUPATIONAL THERAPY EVALUATION/DISCHARGE  Patient: Mariya Machado (78 y.o. female)  Date: 11/30/2022  Primary Diagnosis: Limb ischemia [I99.8]  Procedure(s) (LRB):  RIGHT LOWER LEG ANGIOGRAM, RIGHT BELOW KNEE AMPUTATION (Right) 2 Days Post-Op   Precautions: fall       ASSESSMENT  Based on the objective data described below, the patient presents with decreased endurance, strength, functional mobility, ADLs and dementia. Pt was at home with aliy with 24/7 assist and prior to this surgery pt was performing stand pivot transfer to wheelchair. Pt has been total for ADLs and all other care. OT tried to see if pt would engage or follow commands and her attention span was poor and she was not able to follow commands for even rolling in bed. Per family they plan to take her back home. Spoke with daughter and family will need a janna lift at home to assist them with transfers after this BKA. Pt was left supine in bed and at this time she has no OT needs. Current Level of Function Impacting Discharge (ADLs/self-care) total for ADLs and decreased balance and endurance      Functional Outcome Measure: The patient scored 0/100 on the Barthel outcome measure which is indicative         Patient will benefit from skilled therapy intervention to address the above noted impairments.        PLAN :      Recommendation for discharge: (in order for the patient to meet his/her long term goals)  Return home with 24/7 assist from family and will need janna lift    This discharge recommendation:  Has been made in collaboration with the attending provider and/or case management    IF patient discharges home will need the following DME: janna lift       SUBJECTIVE:   Patient stated What?.    OBJECTIVE DATA SUMMARY:   HISTORY:   Past Medical History:   Diagnosis Date    Alzheimer disease ([facility] [address]     Arrhythmia     atrial fibrillation 2018    Arthritis     CAD (coronary artery disease)     CABG, pacemaker    Chronic pain     COPD     Dementia ([facility] [address] 2021    Diabetes ([facility] [address]     GERD (gastroesophageal reflux disease)     Heart failure (HCC)     Hypertension     Ill-defined condition     high cholesterol    Peripheral vascular disease (HCC)     Sleep apnea     no longer uses-stopped years ago    Thyroid disease      Past Surgical History:   Procedure Laterality Date    HX ORTHOPAEDIC Left     toe amputation    HX PACEMAKER      July 2011    HI CARDIAC SURG PROCEDURE UNLIST      3 CABG, Stents Dr. Iris Crowell FLX DX W/COLLJ SPEC WHEN PFRMD  11/10/2011         HI EGD TRANSORAL BIOPSY SINGLE/MULTIPLE  8/29/2012         VASCULAR SURGERY PROCEDURE UNLIST Left 09/2021    left transmetatarsal amputation       Expanded or extensive additional review of patient history:     Home Situation  Home Environment: Private residence  One/Two Story Residence: One story  Living Alone: No  Support Systems: Child(jenny)  Patient Expects to be Discharged to[de-identified] Home with home health  Current DME Used/Available at Home: Shower chair, Wheelchair, Cane, straight    Hand dominance: Right    EXAMINATION OF PERFORMANCE DEFICITS:  Cognitive/Behavioral Status:  Neurologic State: Alert;Confused  Orientation Level: Oriented to person  Cognition: Decreased attention/concentration; Impaired decision making;Memory loss;Poor safety awareness;Decreased command following  Perception:  (pt reports fairly intact)  Perseveration: Perseverates during conversation  Safety/Judgement: Decreased insight into deficits; Decreased awareness of need for safety;Decreased awareness of need for assistance    Skin: in fair health     Edema: none noted    Hearing:   Auditory  Auditory Impairment: Hard of hearing, bilateral  Hearing Aids/Status: Does not own    Vision/Perceptual:         Grossly intact                    Range of Motion:    AROM: Generally decreased, functional  PROM: Generally decreased, functional                      Strength:    Strength: Generally decreased, functional                Coordination:  Coordination: Generally decreased, functional  Fine Motor Skills-Upper: Left Impaired;Right Impaired    Gross Motor Skills-Upper: Left Impaired;Right Impaired    Tone & Sensation:    Tone: Normal  Sensation: Intact                      Balance:   impaired    Functional Mobility and Transfers for ADLs:  Bed Mobility:   Max to total    Transfers:   Dependent     ADL Assessment:  Feeding: Total assistance    Oral Facial Hygiene/Grooming: Total assistance    Bathing: Total assistance    Type of Bath: Chlorhexidine (CHG)    Upper Body Dressing: Total assistance    Lower Body Dressing: Total assistance    Toileting: Total assistance           Type of Bath: Chlorhexidine (CHG)            Cognitive Retraining  Safety/Judgement: Decreased insight into deficits; Decreased awareness of need for safety;Decreased awareness of need for assistance        Functional Measure:    Barthel Index:  Bathin  Bladder: (P) 0  Bowels: (P) 0  Groomin  Dressing: (P) 0  Feeding: (P) 0  Mobility: 0  Stairs: 0  Toilet Use: (P) 0  Transfer (Bed to Chair and Back): (P) 0  Total: (P) 0/100      The Barthel ADL Index: Guidelines  1. The index should be used as a record of what a patient does, not as a record of what a patient could do. 2. The main aim is to establish degree of independence from any help, physical or verbal, however minor and for whatever reason. 3. The need for supervision renders the patient not independent. 4. A patient's performance should be established using the best available evidence. Asking the patient, friends/relatives and nurses are the usual sources, but direct observation and common sense are also important. However direct testing is not needed. 5. Usually the patient's performance over the preceding 24-48 hours is important, but occasionally longer periods will be relevant. 6. Middle categories imply that the patient supplies over 50 per cent of the effort.   7. Use of aids to be independent is allowed. Score Interpretation (from [facility] 83)    Independent   60-79 Minimally independent   40-59 Partially dependent   20-39 Very dependent   <20 Totally dependent     -Suman Moreno., Barthel, D.W. (1965). Functional evaluation: the Barthel Index. [address] [facility] ([address] (1997). The Barthel activities of daily living index: self-reporting versus actual performance in the old (> or = 75 years). Journal of [address] [place] [address] 45(7), [address] [facility], J.JJERAMIEF, Denise Fontenot., [facility]. (1999). Measuring the change in disability after inpatient rehabilitation; comparison of the responsiveness of the Barthel Index and Functional [place] Measure. Journal of Neurology, Neurosurgery, and Psychiatry, 66(4), 954-642. GREGORY Helms, HUNTER Zhou, & Mukul Gonzalez MNISHA. (2004) Assessment of post-stroke quality of life in cost-effectiveness studies: The usefulness of the Barthel Index and the EuroQoL-5D. Quality of Life Research, 15, 209-81         Occupational Therapy Evaluation Charge Determination   History Examination Decision-Making   MEDIUM Complexity : Expanded review of history including physical, cognitive and psychosocial  history  MEDIUM Complexity : 3-5 performance deficits relating to physical, cognitive , or psychosocial skils that result in activity limitations and / or participation restrictions MEDIUM Complexity : Patient may present with comorbidities that affect occupational performnce.  Miniml to moderate modification of tasks or assistance (eg, physical or verbal ) with assesment(s) is necessary to enable patient to complete evaluation       Based on the above components, the patient evaluation is determined to be of the following complexity level: MEDIUM  Pain Rating:  none    Activity Tolerance:   Fair    After treatment patient left in no apparent distress:    Supine in bed with family in room    COMMUNICATION/EDUCATION:   The patients plan of care was discussed with: Physical therapist and Registered nurse.            Thank you for this referral.  Vicky Jones, JALEESA  Time Calculation: 25 mins

## 2022-11-30 NOTE — PROGRESS NOTES
Vascular Surgery Progress Note  Melissa Marcelino, AGACNP-BC          Admit Date: 2022   LOS: 2 days        Daily Progress Note: 2022    POD: 2 Days Post-Op    S/P: Procedure(s):    RLE angiogram and below-knee amputation 22      Subjective: Cordelia Ohara is an 77 yo female with PMHx of Alzheimer's disease, chronic pain, HTN, HLD, DMII w/ polyneuropathy, ASHD (s/p CABG), diastolic heart failure, depression, hypothroidism, COPD, GERD and PVD with severe mulitlevel atherosclerotic occlusive disease. She has a history of multiple percutaneous interventions which have failed. She presented to the hospital for RLE arteriogram and popliteal exploration and right below knee amputation for RLE ischemia on 22 under GA. She tolerated the procedure and there were no complications. Overnight, there were no acute events. This morning, Ms. Melvi Jimenez was much more comfortable and calmer. Her daughter was at bedside. Discussed plan to remove mae and use adult diapers as she is urine incontinent given risk for UTI. Her daughter agreed. Given confusion, ROS not obtained. Objective:     Vital signs  Temp (24hrs), Av.8 °F (36.6 °C), Min:97.5 °F (36.4 °C), Max:98.1 °F (36.7 °C)   No intake/output data recorded.  1901 -  0700  In: 2188.3 [P.O.:480; I.V.:1708.3]  Out: 3275 [Urine:3275]    Visit Vitals  BP (!) (P) 148/57   Pulse 81   Temp 98.1 °F (36.7 °C)   Resp 19   Ht 4' 11\" (1.499 m)   Wt 51.3 kg (113 lb 1.6 oz)   SpO2 97%   BMI 22.84 kg/m²    O2 Flow Rate (L/min): 2 l/min O2 Device: None (Room air)     Pain control  Pain Assessment  Pain Scale 1: Adult Nonverbal Pain Scale  Pain Intensity 1: 0  Pain Onset 1: acute  Pain Location 1: Leg  Pain Orientation 1: Right  Pain Description 1: Aching, Phantom  Pain Intervention(s) 1: Medication (see MAR)    PT/OT  Gait                 Physical Exam  Constitutional:       General: She is not in acute distress. HENT:      Head: Normocephalic. Eyes:      General: No scleral icterus. Cardiovascular:      Rate and Rhythm: Normal rate. Pulmonary:      Effort: Pulmonary effort is normal.   Abdominal:      Palpations: Abdomen is soft. Musculoskeletal:      Cervical back: Normal range of motion. Right Lower Extremity: Right leg is amputated below knee. Feet:      Comments: RLE dressing CDI  Skin:     General: Skin is warm and dry.           24 hour results:    Recent Results (from the past 24 hour(s))   GLUCOSE, POC    Collection Time: 11/29/22 11:24 AM   Result Value Ref Range    Glucose (POC) 106 65 - 117 mg/dL    Performed by Luz BOLAÑOS    GLUCOSE, POC    Collection Time: 11/29/22  4:24 PM   Result Value Ref Range    Glucose (POC) 73 65 - 117 mg/dL    Performed by Lona Contreras RN    GLUCOSE, POC    Collection Time: 11/29/22  5:27 PM   Result Value Ref Range    Glucose (POC) 129 (H) 65 - 117 mg/dL    Performed by Lona Contreras RN    GLUCOSE, POC    Collection Time: 11/29/22  9:16 PM   Result Value Ref Range    Glucose (POC) 104 65 - 117 mg/dL    Performed by Stephanie Waller 61, POC    Collection Time: 11/30/22  7:27 AM   Result Value Ref Range    Glucose (POC) 103 65 - 117 mg/dL    Performed by Mitchell RUSH           Assessment/Plan:     Active Problems:    Limb ischemia (8/27/2021)      PVD  CLTI with RLE rest pain  s/p failed L hallux amp (8/27/21)  s/p L TMA  (9/10/21)  - s/p RLE arteriogram with BKA 11/28/22  - dressing CDI  - continue pain control - much more comfortable this morning  - bedrest- PT/OT if tolerated  - discontinue mae - adult diapers  - statin  - diet- diabetic  - d/c fluids    HTN  - home meds - Metoprolol, furosemide, spironalactone  DMII  Polyneuropathy  - MDI  -  this am  - short acting held  - will monitor     cardiomegaly  CHF - right sided  Afib  ASHD  - home meds    Thyroid disease  - home meds    Renal artery stenosis  Chronic Pain  Polyneuropathy  Degenerative Disc disease  Depression  Arthritis  GERD  - pepcid  Alzheimer's disease          Activity:  DVT ppx: Lovenox subq  Dispo:     Plan d/w Dr. Sergio Delgado, NP

## 2022-11-30 NOTE — PROGRESS NOTES
0730   Attempted to call MD regarding BG of 103. Will hold standing order of humalog am dose. 1030   Julian removed. Patient tolerated well    End of Shift Note    Bedside shift change report given to Leighann Antonio RN (oncoming nurse) by Cynthia Reynolds RN (offgoing nurse). Report included the following information SBAR, Kardex, Cardiac Rhythm Vpaced, and Quality Measures    Shift worked:  Day     Shift summary and any significant changes:     PRN Morphine given x3   PRN Ativan given x1   PRN Dilaudid given x1   Julian removed   Made aware of night RN needs of bladder scan      Concerns for physician to address:  Confusion, agitation   Zone phone for oncoming shift:          Activity:  Activity Level: Bed Rest  Number times ambulated in hallways past shift: 0  Number of times OOB to chair past shift: 0    Cardiac:   Cardiac Monitoring: Yes      Cardiac Rhythm: Ventricular Paced    Access:  Current line(s): PICC     Genitourinary:   Urinary status: incontinent    Respiratory:   O2 Device: None (Room air)  Chronic home O2 use?: NO  Incentive spirometer at bedside: NO       GI:     Current diet:  ADULT DIET Easy to Chew; 4 carb choices (60 gm/meal)  Passing flatus: YES  Tolerating current diet: YES       Pain Management:   Patient states pain is manageable on current regimen: YES    Skin:  Gerald Score: 13  Interventions: PT/OT consult, internal/external urinary devices, and nutritional support     Patient Safety:  Fall Score:  Total Score: 4  Interventions: bed/chair alarm and gripper socks  High Fall Risk: Yes    Length of Stay:  Expected LOS: 4d 9h  Actual LOS: 2      Cynthia Reynolds RN

## 2022-12-01 LAB
GLUCOSE BLD STRIP.AUTO-MCNC: 171 MG/DL (ref 65–117)
GLUCOSE BLD STRIP.AUTO-MCNC: 71 MG/DL (ref 65–117)
GLUCOSE BLD STRIP.AUTO-MCNC: 83 MG/DL (ref 65–117)
GLUCOSE BLD STRIP.AUTO-MCNC: 86 MG/DL (ref 65–117)
SERVICE CMNT-IMP: ABNORMAL
SERVICE CMNT-IMP: NORMAL

## 2022-12-01 PROCEDURE — 74011250637 HC RX REV CODE- 250/637: Performed by: SURGERY

## 2022-12-01 PROCEDURE — 65270000046 HC RM TELEMETRY

## 2022-12-01 PROCEDURE — 82962 GLUCOSE BLOOD TEST: CPT

## 2022-12-01 PROCEDURE — 74011250636 HC RX REV CODE- 250/636: Performed by: SURGERY

## 2022-12-01 PROCEDURE — 97164 PT RE-EVAL EST PLAN CARE: CPT

## 2022-12-01 PROCEDURE — 97530 THERAPEUTIC ACTIVITIES: CPT

## 2022-12-01 PROCEDURE — 74011636637 HC RX REV CODE- 636/637: Performed by: SURGERY

## 2022-12-01 RX ADMIN — FUROSEMIDE 40 MG: 40 TABLET ORAL at 09:39

## 2022-12-01 RX ADMIN — HYDROMORPHONE HYDROCHLORIDE 4 MG: 2 TABLET ORAL at 17:05

## 2022-12-01 RX ADMIN — LORAZEPAM 0.5 MG: 0.5 TABLET ORAL at 23:22

## 2022-12-01 RX ADMIN — FAMOTIDINE 20 MG: 20 TABLET, FILM COATED ORAL at 09:40

## 2022-12-01 RX ADMIN — METOPROLOL SUCCINATE 50 MG: 50 TABLET, EXTENDED RELEASE ORAL at 09:40

## 2022-12-01 RX ADMIN — Medication 3 UNITS: at 17:07

## 2022-12-01 RX ADMIN — MORPHINE SULFATE 2 MG: 2 INJECTION, SOLUTION INTRAMUSCULAR; INTRAVENOUS at 09:17

## 2022-12-01 RX ADMIN — Medication 2 UNITS: at 12:25

## 2022-12-01 RX ADMIN — Medication 3 UNITS: at 12:25

## 2022-12-01 RX ADMIN — MORPHINE SULFATE 2 MG: 2 INJECTION, SOLUTION INTRAMUSCULAR; INTRAVENOUS at 20:24

## 2022-12-01 RX ADMIN — LEVOTHYROXINE SODIUM 25 MCG: 0.03 TABLET ORAL at 09:40

## 2022-12-01 RX ADMIN — MORPHINE SULFATE 2 MG: 2 INJECTION, SOLUTION INTRAMUSCULAR; INTRAVENOUS at 15:16

## 2022-12-01 RX ADMIN — DILTIAZEM HYDROCHLORIDE 180 MG: 180 CAPSULE, COATED, EXTENDED RELEASE ORAL at 09:40

## 2022-12-01 RX ADMIN — MELATONIN 4.5 MG: at 23:22

## 2022-12-01 RX ADMIN — ENOXAPARIN SODIUM 30 MG: 100 INJECTION SUBCUTANEOUS at 17:05

## 2022-12-01 RX ADMIN — MORPHINE SULFATE 2 MG: 2 INJECTION, SOLUTION INTRAMUSCULAR; INTRAVENOUS at 01:03

## 2022-12-01 RX ADMIN — VENLAFAXINE HYDROCHLORIDE 75 MG: 37.5 CAPSULE, EXTENDED RELEASE ORAL at 22:18

## 2022-12-01 RX ADMIN — ROSUVASTATIN CALCIUM 5 MG: 10 TABLET, COATED ORAL at 22:18

## 2022-12-01 NOTE — PROGRESS NOTES
Problem: Pressure Injury - Risk of  Goal: *Prevention of pressure injury  Description: Document Gerald Scale and appropriate interventions in the flowsheet. Outcome: Progressing Towards Goal  Note: Pressure Injury Interventions:  Sensory Interventions: Assess changes in LOC, Assess need for specialty bed, Float heels, Maintain/enhance activity level, Keep linens dry and wrinkle-free, Turn and reposition approx. every two hours (pillows and wedges if needed)    Moisture Interventions: Absorbent underpads, Apply protective barrier, creams and emollients, Check for incontinence Q2 hours and as needed, Limit adult briefs    Activity Interventions: PT/OT evaluation, Increase time out of bed    Mobility Interventions: Assess need for specialty bed, PT/OT evaluation, Turn and reposition approx. every two hours(pillow and wedges), Pressure redistribution bed/mattress (bed type)    Nutrition Interventions: Document food/fluid/supplement intake    Friction and Shear Interventions: Apply protective barrier, creams and emollients, Lift sheet, Lift team/patient mobility team                Problem: Patient Education: Go to Patient Education Activity  Goal: Patient/Family Education  Outcome: Progressing Towards Goal     Problem: Falls - Risk of  Goal: *Absence of Falls  Description: Document Clearence Skates Fall Risk and appropriate interventions in the flowsheet.   Outcome: Progressing Towards Goal  Note: Fall Risk Interventions:  Mobility Interventions: Bed/chair exit alarm, OT consult for ADLs, Patient to call before getting OOB, PT Consult for mobility concerns, PT Consult for assist device competence, Strengthening exercises (ROM-active/passive)    Mentation Interventions: Adequate sleep, hydration, pain control, Bed/chair exit alarm, Family/sitter at bedside, More frequent rounding, Room close to nurse's station    Medication Interventions: Bed/chair exit alarm, Patient to call before getting OOB, Teach patient to arise slowly    Elimination Interventions: Bed/chair exit alarm, Call light in reach              Problem: Patient Education: Go to Patient Education Activity  Goal: Patient/Family Education  Outcome: Progressing Towards Goal     Problem: Infection - Risk of, Urinary Catheter-Associated Urinary Tract Infection  Goal: *Absence of infection signs and symptoms  Outcome: Progressing Towards Goal     Problem: Patient Education: Go to Patient Education Activity  Goal: Patient/Family Education  Outcome: Progressing Towards Goal     Problem: Patient Education: Go to Patient Education Activity  Goal: Patient/Family Education  Outcome: Progressing Towards Goal

## 2022-12-01 NOTE — PROGRESS NOTES
Vascular Surgery Progress Note  Shanna Slater, AGACNP-BC          Admit Date: 2022   LOS: 3 days        Daily Progress Note: 2022      S/P: Procedure(s):    RLE angiogram and below-knee amputation 22      Subjective: Janeth Wilson is an 79 yo female with PMHx of Alzheimer's disease, chronic pain, HTN, HLD, DMII w/ polyneuropathy, ASHD (s/p CABG), diastolic heart failure, depression, hypothroidism, COPD, GERD and PVD with severe mulitlevel atherosclerotic occlusive disease. She has a history of multiple percutaneous interventions which have failed. She presented to the hospital for RLE arteriogram and popliteal exploration and right below knee amputation for RLE ischemia on 22 under GA. She tolerated the procedure and there were no complications. Overnight, there were no acute events. This morning, Ms. Lawrence Matamoros was resting comfortably. Her two daughters were at the bedside. For discharge home, janna lift would be needed at home. Given confusion, ROS not obtained. Objective:     Vital signs  Temp (24hrs), Av.6 °F (37 °C), Min:98 °F (36.7 °C), Max:99.1 °F (37.3 °C)   No intake/output data recorded.  1901 -  0700  In: 673.3 [P.O.:110; I.V.:563.3]  Out: 1670 [Urine:1670]    Visit Vitals  BP (!) 166/66 (BP 1 Location: Left upper arm, BP Patient Position: At rest)   Pulse 73   Temp 98.8 °F (37.1 °C)   Resp 18   Ht 4' 11\" (1.499 m)   Wt 51.3 kg (113 lb 1.6 oz)   SpO2 99%   BMI 22.84 kg/m²    O2 Flow Rate (L/min): 2 l/min O2 Device: None (Room air)     Pain control  Pain Assessment  Pain Scale 1: Adult Nonverbal Pain Scale  Pain Intensity 1: 0  Pain Onset 1: post up site/ dressing change  Pain Location 1: Leg  Pain Orientation 1: Right  Pain Description 1: Aching, Phantom  Pain Intervention(s) 1: Medication (see MAR)    PT/OT  Gait                 Physical Exam  Constitutional:       General: She is not in acute distress. HENT:      Head: Normocephalic.    Eyes: General: No scleral icterus. Cardiovascular:      Rate and Rhythm: Normal rate. Pulmonary:      Effort: Pulmonary effort is normal.   Abdominal:      Palpations: Abdomen is soft. Musculoskeletal:      Cervical back: Normal range of motion. Right Lower Extremity: Right leg is amputated below knee. Feet:      Comments: RLE dressing CDI  Skin:     General: Skin is warm and dry. 24 hour results:    Recent Results (from the past 24 hour(s))   GLUCOSE, POC    Collection Time: 11/30/22  5:47 PM   Result Value Ref Range    Glucose (POC) 77 65 - 117 mg/dL    Performed by Ketty Youngblood PCT    GLUCOSE, POC    Collection Time: 11/30/22 10:33 PM   Result Value Ref Range    Glucose (POC) 89 65 - 117 mg/dL    Performed by Kelsea MENJIVAR)    GLUCOSE, POC    Collection Time: 12/01/22  7:16 AM   Result Value Ref Range    Glucose (POC) 86 65 - 117 mg/dL    Performed by Antoinette Barker PCT    GLUCOSE, POC    Collection Time: 12/01/22 11:21 AM   Result Value Ref Range    Glucose (POC) 171 (H) 65 - 117 mg/dL    Performed by Antoinette Mj PCT    GLUCOSE, POC    Collection Time: 12/01/22  4:10 PM   Result Value Ref Range    Glucose (POC) 83 65 - 117 mg/dL    Performed by Antoinette Mj PCT           Assessment/Plan:     Active Problems:    Limb ischemia (8/27/2021)      PVD  CLTI with RLE rest pain  s/p failed L hallux amp (8/27/21)  s/p L TMA  (9/10/21)  - s/p RLE arteriogram with BKA 11/28/22  - dressing change today- stump looks good  - continue pain control  - PT/OT  - order for [facility] lift with case mgmt  - plan for Home with [facility]  - Updated by case management that she was approved for [facility] lift. [facility] arranged. [place] lift to be delivered to home tomorrow. - plan for discharge in the morning.      HTN  - home meds - Metoprolol, furosemide, spironalactone  DMII  Polyneuropathy  - MDI  -  this am  - short acting held  - will monitor     cardiomegaly  CHF - right sided  Afib  ASHD  - home meds    Thyroid disease  - home meds    Renal artery stenosis  Chronic Pain  Polyneuropathy  Degenerative Disc disease  Depression  Arthritis  GERD  - pepcid  Alzheimer's disease          Activity:  DVT ppx: Lovenox subq  Dispo:     Plan d/w Dr. Sly Ramirez, NP

## 2022-12-01 NOTE — PROGRESS NOTES
Bedside shift change report given to Vamsi tolentino RN  (oncoming nurse) by Truman Eason RN  (offgoing nurse). Report included the following information SBAR, Kardex, MAR, Recent Results, and Cardiac Rhythm V Paced  . Family at bedside. Has some concerns about pt having an increase in agitation. Family states that pt takes melatonin at home. 2010:Called vascular to ask about melatonin orders   2012: spoke with Conner Harris . Telephone with read back orders placed for melatonin  0100: Pt has an incontinent episode. After Pt bath, Pt became agitated and seemed to be in pain. Will give morphine for pain at this time. 0115: Pt still seems to be uncomfortable, but less restless, Repositioned Pt in bed, pt back to right side. Will continue to monitor pt pain. 0200: Pt resting quietly      End of Shift Note    Bedside shift change report given to ADRIANO Higgins  (oncoming nurse) by Bhargavi Rivas (offgoing nurse).   Report included the following information SBAR, Kardex, Recent Results, and Cardiac Rhythm V paced     Shift worked:  [id number]     Shift summary and any significant changes:     Pt had some elevated BP overnight     Concerns for physician to address:  See note above      Zone phone for oncoming shift:          Activity:  Activity Level: Bed Rest  Number times ambulated in hallways past shift: 0  Number of times OOB to chair past shift: 0    Cardiac:   Cardiac Monitoring: Yes      Cardiac Rhythm: Ventricular Paced    Access:  Current line(s): PIV     Genitourinary:   Urinary status: voiding    Respiratory:   O2 Device: None (Room air)  Chronic home O2 use?: NO  Incentive spirometer at bedside: NO       GI:     Current diet:  ADULT DIET Easy to Chew; 4 carb choices (60 gm/meal)  Passing flatus: YES  Tolerating current diet: YES       Pain Management:   Patient states pain is manageable on current regimen: YES    Skin:  Gerald Score: 13  Interventions: turn team, speciality bed, float heels, increase time out of bed, and PT/OT consult    Patient Safety:  Fall Score:  Total Score: 4  Interventions: bed/chair alarm, assistive device (walker, cane, etc), gripper socks, and pt to call before getting OOB  High Fall Risk: Yes    Length of Stay:  Expected LOS: 4d 9h  Actual LOS: 3      [address]

## 2022-12-01 NOTE — PROGRESS NOTES
Physical Therapy    Duplicate order acknowledged. PT eval done on 11/29 and no skilled PT was recommended. Pt was DC from PT at that point. Pt was re-evaluated today and a bit further assessment was done to see if perhaps it was reasonable to continue PT. Spoke with family (7 persons in room today) who indicated that they are going to get a janna lift as Dr. Sandeep Dawkins suggested to them since she cannot transfer with only one LE. Worked with patient today on bed mobiity and to assess sitting balance. She was in pain and moaning, asking to get up out of bed. Pt able to answer 20% of questions appropriately. Pt needed constant support. At this point it is not reasonalbe to assume that pt will be able to pivot transfer with her R LE to a chair. This may come in time but she will still need max A then and for now a janna lift is the best scenario for transfer. Family is supportive and they plan to take turns assisting patient. Patient is not a candidate for inpatient rehab and since family wants to take her home, she may benefit from HHPT to assist family with pivot transfers down the road.     Will sign off

## 2022-12-01 NOTE — PROGRESS NOTES
0700 Bedside and Verbal shift change report given to Fabian Ron RN (oncoming nurse) by Lukas Ramsey RN (offgoing nurse). Report included the following information SBAR, Kardex, MAR, and Cardiac Rhythm NSR . Patient is responsive to verbal stimuli, with daughter at bedside this morning. 1900 End of Shift Note    Bedside shift change report given to Hay Bonner RN (oncoming nurse) by Hali Lee RN (offgoing nurse). Report included the following information SBAR, Kardex, MAR, and Cardiac Rhythm NSR    Shift worked:  7 am- 7 pm     Shift summary and any significant changes:     Discharge to home tomorrow. Concerns for physician to address:       Zone phone for oncoming shift:          Activity:  Activity Level: Bed Rest  Number times ambulated in hallways past shift: 0  Number of times OOB to chair past shift: 0    Cardiac:   Cardiac Monitoring: Yes      Cardiac Rhythm: Ventricular Paced    Access:  Current line(s): PIV     Genitourinary:   Urinary status: voiding    Respiratory:   O2 Device: None (Room air)  Chronic home O2 use?: NO  Incentive spirometer at bedside: NO       GI:     Current diet:  ADULT DIET Easy to Chew; 4 carb choices (60 gm/meal)  Passing flatus: NO  Tolerating current diet: NO       Pain Management:   Patient states pain is manageable on current regimen: NO    Skin:  Gerald Score: 13  Interventions: turn team, PT/OT consult, and nutritional support     Patient Safety:  Fall Score:  Total Score: 4  Interventions: bed/chair alarm  High Fall Risk: Yes    Length of Stay:  Expected LOS: 4d 9h  Actual LOS: 3      Hali Lee RN

## 2022-12-01 NOTE — PROGRESS NOTES
Occupational Therapy note:    Duplicate order acknowledged and OT xochitl completed 11/30. Patient dependent at baseline for ADLs/transfers and required 24/7 assist from family prior to admission. Family planning to resume 24/7 supervision and assist. Recommend patient return home with janna lift. Patient does not have any additional acute OT needs as she is at her baseline for ADLs. Will complete order.     Robert Padron, OTR/L

## 2022-12-01 NOTE — PROGRESS NOTES
Transition of Care Plan:     RUR: 12% \"low risk\"  LOS: 2 days  Disposition: Home with Resumption of Care order with [facility]. If SNF or IPR: Date FOC offered: N/A  Date FOC received: N/A  Date authorization started with reference number: N/A  Date authorization received and expires: N/A  Accepting facility: N/A   Follow up appointments: PCP & Specialists as needed  DME needed: [employer] life  Transportation at Discharge: Hospital to Home to be secured  [place] or means to access home:  Pt has access to home  IM Medicare Letter: 2nd IM letter to be given at d/c  Is patient a [place] and connected with the [place]? No  Caregiver Contact: Pt's daughter Etelvina [place] [phone]  Discharge Caregiver contacted prior to discharge? No   Care Conference needed?:  Not at this time    Update 0445 pm: CM received call from MD and confirmed to MD that the poncho lift was approved and will be delivered tomorrow 12/02/22. MD planning for D/C 12/2/22. NETTE contacted pt's daughter Etelvina Johnsonred [phone] and confirmed that the poncho lift was approved. Daughter notified that the Zakia Maudlin will be delivered tomorrow. Prue given daughter's number to call when ready to deliver. Daughter aware that MD is reporting MINAL 12/02/22. Daughter requesting help with transportation. Update 0400 pm: Poncho lift approved by [facility]. CM has relayed message to RN. MD has been paged. CM waiting on MD to call. CM will continue to follow up. Update 0150 pm: CM received call from [facility] confirming they did receive the LALA orders and will accept pt for home health. CM received call from MD moreira reporting that pt will need a poncho lift. She reported that she will place a consult for a poncho lift for pt. CM will continue to follow up. Initial note: Chart reviewed for updates. PT/OT recommendations acknowledged. NETTE contacted [facility] [phone] to request for LALA.  [place] requesting for CM to send medical notes and orders to [phone]. CM will send information. CM will continue to follow up.     TAHIRA Gonzalez    [phone]

## 2022-12-01 NOTE — PROGRESS NOTES
Problem: Pressure Injury - Risk of  Goal: *Prevention of pressure injury  Description: Document Gerald Scale and appropriate interventions in the flowsheet.   Outcome: Progressing Towards Goal  Note: Pressure Injury Interventions:  Sensory Interventions: Assess changes in LOC, Assess need for specialty bed, Minimize linen layers    Moisture Interventions: Absorbent underpads, Apply protective barrier, creams and emollients    Activity Interventions: PT/OT evaluation    Mobility Interventions: Assess need for specialty bed, Chair cushion, PT/OT evaluation, HOB 30 degrees or less    Nutrition Interventions: Document food/fluid/supplement intake    Friction and Shear Interventions: Apply protective barrier, creams and emollients, HOB 30 degrees or less                Problem: Patient Education: Go to Patient Education Activity  Goal: Patient/Family Education  Outcome: Progressing Towards Goal

## 2022-12-02 VITALS
DIASTOLIC BLOOD PRESSURE: 64 MMHG | TEMPERATURE: 98 F | BODY MASS INDEX: 21.37 KG/M2 | SYSTOLIC BLOOD PRESSURE: 146 MMHG | WEIGHT: 106 LBS | OXYGEN SATURATION: 100 % | RESPIRATION RATE: 15 BRPM | HEIGHT: 59 IN | HEART RATE: 73 BPM

## 2022-12-02 LAB
GLUCOSE BLD STRIP.AUTO-MCNC: 79 MG/DL (ref 65–117)
GLUCOSE BLD STRIP.AUTO-MCNC: 91 MG/DL (ref 65–117)
SERVICE CMNT-IMP: NORMAL
SERVICE CMNT-IMP: NORMAL

## 2022-12-02 PROCEDURE — 82962 GLUCOSE BLOOD TEST: CPT

## 2022-12-02 PROCEDURE — 74011636637 HC RX REV CODE- 636/637: Performed by: SURGERY

## 2022-12-02 PROCEDURE — 74011250637 HC RX REV CODE- 250/637: Performed by: SURGERY

## 2022-12-02 RX ORDER — OXYCODONE AND ACETAMINOPHEN 5; 325 MG/1; MG/1
1 TABLET ORAL
Qty: 20 TABLET | Refills: 0 | Status: SHIPPED | OUTPATIENT
Start: 2022-12-02 | End: 2022-12-02

## 2022-12-02 RX ORDER — HYDROMORPHONE HYDROCHLORIDE 2 MG/1
1 TABLET ORAL
Qty: 10 TABLET | Refills: 0 | Status: SHIPPED | OUTPATIENT
Start: 2022-12-02 | End: 2022-12-07

## 2022-12-02 RX ADMIN — INSULIN GLARGINE 17 UNITS: 100 INJECTION, SOLUTION SUBCUTANEOUS at 09:51

## 2022-12-02 RX ADMIN — LEVOTHYROXINE SODIUM 25 MCG: 0.03 TABLET ORAL at 09:52

## 2022-12-02 RX ADMIN — FUROSEMIDE 40 MG: 40 TABLET ORAL at 09:52

## 2022-12-02 RX ADMIN — HYDROMORPHONE HYDROCHLORIDE 4 MG: 2 TABLET ORAL at 10:08

## 2022-12-02 RX ADMIN — FAMOTIDINE 20 MG: 20 TABLET, FILM COATED ORAL at 09:52

## 2022-12-02 RX ADMIN — HYDROMORPHONE HYDROCHLORIDE 4 MG: 2 TABLET ORAL at 00:45

## 2022-12-02 RX ADMIN — METOPROLOL SUCCINATE 50 MG: 50 TABLET, EXTENDED RELEASE ORAL at 09:52

## 2022-12-02 RX ADMIN — DILTIAZEM HYDROCHLORIDE 180 MG: 180 CAPSULE, COATED, EXTENDED RELEASE ORAL at 09:58

## 2022-12-02 NOTE — DISCHARGE INSTRUCTIONS
[employer] Activation    Thank you for requesting access to [employer]. Please follow the instructions below to securely access and download your online medical record. [employer] allows you to send messages to your doctor, view your test results, renew your prescriptions, schedule appointments, and more. How Do I Sign Up? In your internet browser, go to [email or web address]  Click on the First Time User? Click Here link in the Sign In box. You will be redirect to the New Member Sign Up page. Enter your [employer] Access Code exactly as it appears below. You will not need to use this code after youve completed the sign-up process. If you do not sign up before the expiration date, you must request a new code. [employer] Access Code: Activation code not generated  Current [employer] Status: Active (This is the date your [employer] access code will )    Enter the last four digits of your Social Security Number (xxxx) and Date of Birth (mm/dd/yyyy) as indicated and click Submit. You will be taken to the next sign-up page. Create a [employer] ID. This will be your [employer] login ID and cannot be changed, so think of one that is secure and easy to remember. Create a [employer] password. You can change your password at any time. Enter your Password Reset Question and Answer. This can be used at a later time if you forget your password. Enter your e-mail address. You will receive e-mail notification when new information is available in [address] Click Sign Up. You can now view and download portions of your medical record. Click the [place] link to download a portable copy of your medical information. Additional Information    If you have questions, please visit the Frequently Asked Questions section of the [employer] website at [email or web address] Remember, [employer] is NOT to be used for urgent needs. For medical emergencies, dial 911.

## 2022-12-02 NOTE — PROGRESS NOTES
Bedside shift change report given to [id number] [employer] (oncoming nurse) by Rishi Daugherty RN (offgoing nurse). Report included the following information SBAR, Kardex, Intake/Output, MAR, Recent Results, Med Rec Status, and Cardiac Rhythm V PACED RHYTHM . End of Shift Note    Bedside shift change report given to [id number] [employer] (oncoming nurse) by Sky Browne RN (offgoing nurse). Report included the following information SBAR, Kardex, Intake/Output, MAR, Recent Results, Med Rec Status, and Cardiac Rhythm V PACED RHTYHM    Shift worked:  [id number]     Shift summary and any significant changes:     Uneventful night. Frequent re-orientation done with the patient; Pain management tolerated. Concerns for physician to address:  none     Zone phone for oncoming shift:   N/a       Activity:  Activity Level: Bed Rest  Number times ambulated in hallways past shift: 0  Number of times OOB to chair past shift: 0    Cardiac:   Cardiac Monitoring: Yes      Cardiac Rhythm: Ventricular Paced    Access:  Current line(s): PIV     Genitourinary:   Urinary status: incontinent    Respiratory:   O2 Device: None (Room air)  Chronic home O2 use?: NO  Incentive spirometer at bedside: NO       GI:     Current diet:  ADULT DIET Easy to Chew; 4 carb choices (60 gm/meal)  Passing flatus: YES  Tolerating current diet: YES       Pain Management:   Patient states pain is manageable on current regimen: YES    Skin:  Gerald Score: 13  Interventions: turn team, speciality bed, float heels, and increase time out of bed    Patient Safety:  Fall Score:  Total Score: 4  Interventions: bed/chair alarm, assistive device (walker, cane, etc), and gripper socks  High Fall Risk: Yes    Length of Stay:  Expected LOS: 4d 9h  Actual LOS: King Sultana RN

## 2022-12-02 NOTE — PROGRESS NOTES
Reviewed discharge with Sydnee Valdez, who stated pt is allergic to Percocet . I notified provider to change this to something else.

## 2022-12-02 NOTE — DISCHARGE SUMMARY
Vascular Surgery Discharge Summary     Patient ID:  Wojciech Dickson  [id number]  27 y.o.  1940    Admitting Provider: Eros Díaz MD  Discharging Provider:    [address] [place] [address] Date: 11/28/2022    Discharge Date: 12/2/2022    Discharge Diagnoses: Active Problems:    Limb ischemia (8/27/2021)       Alzheimer's disease  HTN, POA  HLD, POA  DMII w/ polyneuropathy, POA  ASHD, POA  Diastolic heart failure, POA  Depression, POA  Hypothyroid disease, POA  COPD, POA  GERD, POA  PVD, POA    Procedure(s):  RIGHT LOWER LEG ANGIOGRAM, RIGHT BELOW KNEE AMPUTATION      Hospital Course: Alexis De Jesus is an 77 yo female with PMHx of Alzheimer's disease, chronic pain, HTN, HLD, DMII w/ polyneuropathy, ASHD (s/p CABG), diastolic heart failure, depression, hypothyroidism, COPD, GERD and PVD with severe mulitlevel atherosclerotic occlusive disease. She has a history of multiple percutaneous interventions which have failed. She presented to the hospital for RLE arteriogram and popliteal exploration and right below knee amputation for RLE ischemia on 11/28/22 under [facility]. She tolerated the procedure and there were no complications. Post-operatively there were no acute events. Her stay was complicated with post-op pain, gradually improved each day. On the day of discharge, POD4, her pain was controlled. Her stump incisions looked good with no signs or dehiscence or ischemia. Her family has opted for discharge home with [facility] and appropriate medical equipment. She was given discharge instructions and a follow-up appointment in clinic.     Pertinent Results:   Recent Results (from the past 24 hour(s))   GLUCOSE, POC    Collection Time: 12/01/22 11:21 AM   Result Value Ref Range    Glucose (POC) 171 (H) 65 - 117 mg/dL    Performed by [facility] PCT    GLUCOSE, POC    Collection Time: 12/01/22  4:10 PM   Result Value Ref Range    Glucose (POC) 83 65 - 117 mg/dL    Performed by [facility] PCT    GLUCOSE, POC    Collection Time: 12/01/22 9:01 PM   Result Value Ref Range    Glucose (POC) 71 65 - 117 mg/dL    Performed by [facility], POC    Collection Time: 12/02/22  7:41 AM   Result Value Ref Range    Glucose (POC) 79 65 - 117 mg/dL    Performed by [facility] PCT        Vital signs: Patient Vitals for the past 24 hrs:   BP Temp Pulse Resp SpO2 Weight   12/02/22 0400 (!) 146/51 98.1 °F (36.7 °C) 72 16 100 % 48.1 kg (106 lb)   12/02/22 0000 (!) 152/78 97.8 °F (36.6 °C) 75 21 100 % --   12/01/22 1924 (!) 155/57 98.2 °F (36.8 °C) 69 16 100 % --   12/01/22 1526 (!) 166/66 98.8 °F (37.1 °C) 73 18 99 % --   12/01/22 1046 (!) 157/63 98.9 °F (37.2 °C) 77 18 100 % --   12/01/22 1037 (!) 152/62 98 °F (36.7 °C) 84 16 100 % --       Patient Weight:   Last 3 Recorded Weights in this Encounter    11/30/22 0145 11/30/22 0449 12/02/22 0400   Weight: 52.1 kg (114 lb 12.8 oz) 51.3 kg (113 lb 1.6 oz) 48.1 kg (106 lb)       Consults: None    Patient Condition at Discharge: fair    Disposition: home    Patient Instructions:   Current Discharge Medication List        START taking these medications    Details   oxyCODONE-acetaminophen (Percocet) 5-325 mg per tablet Take 1 Tablet by mouth every six (6) hours as needed for Pain for up to 7 days. Max Daily Amount: 4 Tablets. Qty: 20 Tablet, Refills: 0    Associated Diagnoses: Amputation below knee ([address]           CONTINUE these medications which have NOT CHANGED    Details   iron, carbonyl (FEOSOL) 45 mg tab Take 1 Tablet by mouth daily. famotidine (PEPCID) 40 mg tablet Take 40 mg by mouth daily. venlafaxine-SR (EFFEXOR-XR) 75 mg capsule Take 75 mg by mouth nightly. metoprolol succinate (TOPROL-XL) 50 mg XL tablet Take 50 mg by mouth daily. polyethylene glycol (MIRALAX) 17 gram/dose powder Take 17 g by mouth daily. acetaminophen (TYLENOL) 500 mg tablet Take 500 mg by mouth every six (6) hours as needed for Pain. rosuvastatin (CRESTOR) 5 mg tablet Take 5 mg by mouth nightly. apixaban (ELIQUIS) 2.5 mg tablet Take 1 Tablet by mouth two (2) times a day. Qty: 180 Tablet, Refills: 1      levothyroxine (SYNTHROID) 25 mcg tablet Take 25 mcg by mouth Daily (before breakfast). sulfaSALAzine (AZULFIDINE) 500 mg tablet Take 500 mg by mouth two (2) times a day. dilTIAZem ER (TIAZAC) 180 mg capsule Take 180 mg by mouth daily. spironolactone (ALDACTONE) 25 mg tablet Take 25 mg by mouth every other day. insulin aspart protamine/insulin aspart (NOVOLOG MIX 70/30) 100 unit/mL (70-30) injection Patient takes 20 units in the AM, 5 units mid-day, and 5 units QHS      furosemide (LASIX) 40 mg tablet Take 1 Tab by mouth daily. Qty: 30 Tab, Refills: 0           STOP taking these medications       HYDROmorphone (DILAUDID) 2 mg tablet Comments:   Reason for Stopping:               Diet: Diabetic Diet    Activity/Wound Care:       Patient may shower. Dry the wound carefully. The dressing can be removed if there is no drainage, or changed and kept in place for comfort. Patient should not attempt to drive a car until they are comfortable and NOT taking pain medication. No heavy lifting (3 lbs limit). Mild exercise and light duties around the house are OK. Call the office at [phone] if there are any problems.     Follow-up Information       Follow up With Specialties Details Why Contact Info    Amanda Carlin MD General and Vascular Surgery Go on 12/14/2022 For wound re-check at 2:45pm [address] [place] [address] 13      Aureliano Ledesma NP Nurse Practitioner   [address] [place] [address] [address] [address]  [phone]              Signed:  Francoise Koenig NP  12/2/2022  10:07 AM

## 2022-12-02 NOTE — PROGRESS NOTES
No CM needs at this time. Transition of Care Plan:     RUR: 11% \"low risk\"  LOS: 4 days  Disposition: Home with [address]  If SNF or IPR: Date FOC offered: N/A  Date FOC received: N/A  Date authorization started with reference number: N/A  Date authorization received and expires: N/A  Accepting facility: N/A   Follow up appointments: PCP & Specialists as needed  DME needed: Mechanical lift-to be delivered 12/02/22  Transportation at Discharge: Hospital to Home to be secured  [place] or means to access home:  Pt has access to home  IM Medicare Letter: 2nd IM letter given; signed copy on chart  Is patient a  and connected with the [place]? No  Caregiver Contact: Pt's daughter Rocael [facility] [phone]  Discharge Caregiver contacted prior to discharge? Pt's daughter notified  Care Conference needed?:  No, MINAL 12/02/22    Update 1153 am: CM met with pt and her daughter at bedside and discussed overview for d/c planning. Pt and daughter agreeable with d/c planning. Daughter notified that [facility] will call her with ETA for delivery of Threasa Likes lift. No additional questions/concerns reported by pt. 2nd IM given, verbal signed copy on chart. Hospital to home secured for 1230pm. Daughter and pt aware. No additional questions/concerns by pt. PCP appointment to be secured. No additional CM needs at this time. CM will remain accessible for consult incase CM needs arise prior d/c. Initial note: Chart reviewed for updates. D/C acknowledged by CM. CM paged MD Augustina Coleman requesting for a D/C order and summary for pt. CM will arrange transport once a d/c order and summary is documented. Chloe reported that they will call daughter for ETA of delivery of janna lift. [address] notified that pt's MINAL is 12/02/22. CM will continue to follow up. Care Management Interventions  PCP Verified by CM: Yes  Palliative Care Criteria Met (RRAT>21 & CHF Dx)?: No  Mode of Transport at Discharge:  Other (see comment) [facility] for 1PM)  Transition of Care Consult (CM Consult): Discharge Planning  MyChart Signup: No  Discharge Durable Medical Equipment: Yes  Health Maintenance Reviewed:  [address]  lift)  Physical Therapy Consult: Yes  Occupational Therapy Consult: Yes  Speech Therapy Consult: Yes  Support Systems: Child(jenny) (Pt lives with daughter who is her main support system)  Confirm Follow Up Transport: Family  The Plan for Transition of Care is Related to the Following Treatment Goals : Pt returning home with [address] [place] [address] Provided?: No  Discharge Location  Patient Expects to be Discharged to[de-identified] Home with home health (Pt is returning home with HH/follow up apts)     TAHIRA Cottrell    [phone]

## 2022-12-28 NOTE — PERIOP NOTES
Ph assess completed with daughter Gabrielle Tong. Alfonsotimi Choco is coming with patient the DOS to sign consents - patient has hx of Alzheimer's DS, daughter has signed consents for previous surgeries. No POA on file. Call made to [address] [place] [address] for recent OV - patient has not been seen in office since 1/13/2022, visit is in [address] [place] [address] under media. Patient had a recent Pacemaker battery change and check done 6/2023.

## 2022-12-28 NOTE — PERIOP NOTES
[facility]  Preoperative Instructions        Surgery Date 12/30/2022     Time of Arrival To Be Called  Contact# [phone] - Kevin Santos    1. On the day of your surgery, please report to the Surgical Services Registration Desk and sign in at your designated time. The Surgery Center is located to the right of the Emergency Room. 2. You must have someone with you to drive you home. You should not drive a car for 24 hours following surgery. Please make arrangements for a friend or family member to stay with you for the first 24 hours after your surgery. 3. Do not have anything to eat or drink (including water, gum, mints, coffee, juice) after midnight 12/29/2022. ? This may not apply to medications prescribed by your physician. ?(Please note below the special instructions with medications to take the morning of your procedure.)    4. We recommend you do not drink any alcoholic beverages for 24 hours before and after your surgery. 5. Contact your surgeons office for instructions on the following medications: non-steroidal anti-inflammatory drugs (i.e. Advil, Aleve), vitamins, and supplements. (Some surgeons will want you to stop these medications prior to surgery and others may allow you to take them)  **If you are currently taking Plavix, Coumadin, Aspirin and/or other blood-thinning agents, contact your surgeon for instructions. ** Your surgeon will partner with the physician prescribing these medications to determine if it is safe to stop or if you need to continue taking. Please do not stop taking these medications without instructions from your surgeon    6. Wear comfortable clothes. Wear glasses instead of contacts. Do not bring any money or jewelry. Please bring picture ID, insurance card, and any prearranged co-payment or hospital payment. Do not wear make-up, particularly mascara the morning of your surgery.   Do not wear nail polish, particularly if you are having foot /hand surgery. Wear your hair loose or down, no ponytails, buns, emily pins or clips. All body piercings must be removed. Please shower with antibacterial soap for three consecutive days before and on the morning of surgery, but do not apply any lotions, powders or deodorants after the shower on the day of surgery. Please use a fresh towels after each shower. Please sleep in clean clothes and change bed linens the night before surgery. Please do not shave for 48 hours prior to surgery. Shaving of the face is acceptable. 7. You should understand that if you do not follow these instructions your surgery may be cancelled. If your physical condition changes (I.e. fever, cold or flu) please contact your surgeon as soon as possible. 8. It is important that you be on time. If a situation occurs where you may be late, please call [phone] (OR Holding Area). 9. If you have any questions and or problems, please call [phone] (Pre-admission Testing). 10. Your surgery time may be subject to change. You will receive a phone call the evening prior if your time changes. 11.  If having outpatient surgery, you must have someone to drive you here, stay with you during the duration of your stay, and to drive you home at time of discharge. Follow all instructions given to you by your doctor. Insulin Dependent Diabetic Patients: Take your diabetic medications as prescribed the day before surgery. Hold all diabetic medications the morning of surgery. If you are scheduled to arrive for surgery after 8 am and your AM blood sugar is >200, please call the Surgery Center. TAKE ALL MEDICATIONS DAY OF SURGERY EXCEPT: diabetic medications      I understand a pre-operative phone call will be made to verify my surgery time. In the event that I am not available, I give permission for a message to be left on my answering service and/or with another person?   yes ___________________      __________   _________    (Signature of Patient)             (Witness)                (Date and Time)

## 2022-12-30 ENCOUNTER — ANESTHESIA (OUTPATIENT)
Dept: SURGERY | Age: 82
DRG: 475 | End: 2022-12-30
Payer: MEDICARE

## 2022-12-30 ENCOUNTER — ANESTHESIA EVENT (OUTPATIENT)
Dept: SURGERY | Age: 82
DRG: 475 | End: 2022-12-30
Payer: MEDICARE

## 2022-12-30 ENCOUNTER — HOSPITAL ENCOUNTER (INPATIENT)
Age: 82
LOS: 3 days | Discharge: HOME | DRG: 476 | End: 2023-01-02
Attending: SURGERY | Admitting: SURGERY
Payer: MEDICARE

## 2022-12-30 PROBLEM — Z89.611 S/P ABOVE KNEE AMPUTATION, RIGHT (HCC): Status: ACTIVE | Noted: 2022-12-30

## 2022-12-30 LAB
ERYTHROCYTE [DISTWIDTH] IN BLOOD BY AUTOMATED COUNT: 13.2 % (ref 11.5–14.5)
GLUCOSE BLD STRIP.AUTO-MCNC: 117 MG/DL (ref 65–117)
GLUCOSE BLD STRIP.AUTO-MCNC: 120 MG/DL (ref 65–117)
GLUCOSE BLD STRIP.AUTO-MCNC: 170 MG/DL (ref 65–117)
HCT VFR BLD AUTO: 34.7 % (ref 35–47)
HGB BLD-MCNC: 11.2 G/DL (ref 11.5–16)
MCH RBC QN AUTO: 29.4 PG (ref 26–34)
MCHC RBC AUTO-ENTMCNC: 32.3 G/DL (ref 30–36.5)
MCV RBC AUTO: 91.1 FL (ref 80–99)
NRBC # BLD: 0 K/UL (ref 0–0.01)
NRBC BLD-RTO: 0 PER 100 WBC
PLATELET # BLD AUTO: 396 K/UL (ref 150–400)
PMV BLD AUTO: 9 FL (ref 8.9–12.9)
RBC # BLD AUTO: 3.81 M/UL (ref 3.8–5.2)
SERVICE CMNT-IMP: ABNORMAL
SERVICE CMNT-IMP: ABNORMAL
SERVICE CMNT-IMP: NORMAL
WBC # BLD AUTO: 13.9 K/UL (ref 3.6–11)

## 2022-12-30 PROCEDURE — 77030008463 HC STPLR SKN PROX J&J -B: Performed by: SURGERY

## 2022-12-30 PROCEDURE — 0Y6C0Z3 DETACHMENT AT RIGHT UPPER LEG, LOW, OPEN APPROACH: ICD-10-PCS | Performed by: SURGERY

## 2022-12-30 PROCEDURE — 76210000001 HC OR PH I REC 2.5 TO 3 HR: Performed by: SURGERY

## 2022-12-30 PROCEDURE — 65270000046 HC RM TELEMETRY

## 2022-12-30 PROCEDURE — 77010033678 HC OXYGEN DAILY

## 2022-12-30 PROCEDURE — 74011000250 HC RX REV CODE- 250: Performed by: SURGERY

## 2022-12-30 PROCEDURE — 74011000258 HC RX REV CODE- 258: Performed by: NURSE ANESTHETIST, CERTIFIED REGISTERED

## 2022-12-30 PROCEDURE — 74011000250 HC RX REV CODE- 250: Performed by: NURSE ANESTHETIST, CERTIFIED REGISTERED

## 2022-12-30 PROCEDURE — 77030031139 HC SUT VCRL2 J&J -A: Performed by: SURGERY

## 2022-12-30 PROCEDURE — 74011250636 HC RX REV CODE- 250/636: Performed by: SURGERY

## 2022-12-30 PROCEDURE — 77030002996 HC SUT SLK J&J -A: Performed by: SURGERY

## 2022-12-30 PROCEDURE — 77030026438 HC STYL ET INTUB CARD -A: Performed by: ANESTHESIOLOGY

## 2022-12-30 PROCEDURE — 36415 COLL VENOUS BLD VENIPUNCTURE: CPT

## 2022-12-30 PROCEDURE — 74011250636 HC RX REV CODE- 250/636: Performed by: STUDENT IN AN ORGANIZED HEALTH CARE EDUCATION/TRAINING PROGRAM

## 2022-12-30 PROCEDURE — 76060000033 HC ANESTHESIA 1 TO 1.5 HR: Performed by: SURGERY

## 2022-12-30 PROCEDURE — 85027 COMPLETE CBC AUTOMATED: CPT

## 2022-12-30 PROCEDURE — 88311 DECALCIFY TISSUE: CPT

## 2022-12-30 PROCEDURE — 77030008684 HC TU ET CUF COVD -B: Performed by: ANESTHESIOLOGY

## 2022-12-30 PROCEDURE — 76010000149 HC OR TIME 1 TO 1.5 HR: Performed by: SURGERY

## 2022-12-30 PROCEDURE — 74011250636 HC RX REV CODE- 250/636: Performed by: NURSE ANESTHETIST, CERTIFIED REGISTERED

## 2022-12-30 PROCEDURE — 74011250636 HC RX REV CODE- 250/636: Performed by: ANESTHESIOLOGY

## 2022-12-30 PROCEDURE — 88307 TISSUE EXAM BY PATHOLOGIST: CPT

## 2022-12-30 PROCEDURE — 77030038692 HC WND DEB SYS IRMX -B: Performed by: SURGERY

## 2022-12-30 PROCEDURE — 2709999900 HC NON-CHARGEABLE SUPPLY: Performed by: SURGERY

## 2022-12-30 PROCEDURE — 82962 GLUCOSE BLOOD TEST: CPT

## 2022-12-30 PROCEDURE — 74011250637 HC RX REV CODE- 250/637: Performed by: SURGERY

## 2022-12-30 PROCEDURE — 77030014008 HC SPNG HEMSTAT J&J -C: Performed by: SURGERY

## 2022-12-30 PROCEDURE — 77030006835 HC BLD SAW SAG STRY -B: Performed by: SURGERY

## 2022-12-30 RX ORDER — KETAMINE HYDROCHLORIDE 10 MG/ML
INJECTION, SOLUTION INTRAMUSCULAR; INTRAVENOUS AS NEEDED
Status: DISCONTINUED | OUTPATIENT
Start: 2022-12-30 | End: 2022-12-30 | Stop reason: HOSPADM

## 2022-12-30 RX ORDER — LIDOCAINE HYDROCHLORIDE 10 MG/ML
0.1 INJECTION, SOLUTION EPIDURAL; INFILTRATION; INTRACAUDAL; PERINEURAL AS NEEDED
Status: DISCONTINUED | OUTPATIENT
Start: 2022-12-30 | End: 2022-12-30 | Stop reason: HOSPADM

## 2022-12-30 RX ORDER — FENTANYL CITRATE 50 UG/ML
50 INJECTION, SOLUTION INTRAMUSCULAR; INTRAVENOUS AS NEEDED
Status: COMPLETED | OUTPATIENT
Start: 2022-12-30 | End: 2022-12-30

## 2022-12-30 RX ORDER — ACETAMINOPHEN 500 MG
500 TABLET ORAL
Status: DISCONTINUED | OUTPATIENT
Start: 2022-12-30 | End: 2023-01-02 | Stop reason: HOSPADM

## 2022-12-30 RX ORDER — FENTANYL CITRATE 50 UG/ML
INJECTION, SOLUTION INTRAMUSCULAR; INTRAVENOUS AS NEEDED
Status: DISCONTINUED | OUTPATIENT
Start: 2022-12-30 | End: 2022-12-30 | Stop reason: HOSPADM

## 2022-12-30 RX ORDER — GLYCOPYRROLATE 0.2 MG/ML
INJECTION INTRAMUSCULAR; INTRAVENOUS AS NEEDED
Status: DISCONTINUED | OUTPATIENT
Start: 2022-12-30 | End: 2022-12-30 | Stop reason: HOSPADM

## 2022-12-30 RX ORDER — SODIUM CHLORIDE 0.9 % (FLUSH) 0.9 %
5-40 SYRINGE (ML) INJECTION AS NEEDED
Status: DISCONTINUED | OUTPATIENT
Start: 2022-12-30 | End: 2023-01-02 | Stop reason: HOSPADM

## 2022-12-30 RX ORDER — PROPOFOL 10 MG/ML
INJECTION, EMULSION INTRAVENOUS AS NEEDED
Status: DISCONTINUED | OUTPATIENT
Start: 2022-12-30 | End: 2022-12-30 | Stop reason: HOSPADM

## 2022-12-30 RX ORDER — FENTANYL CITRATE 50 UG/ML
25 INJECTION, SOLUTION INTRAMUSCULAR; INTRAVENOUS
Status: DISCONTINUED | OUTPATIENT
Start: 2022-12-30 | End: 2022-12-30 | Stop reason: HOSPADM

## 2022-12-30 RX ORDER — ROCURONIUM BROMIDE 10 MG/ML
INJECTION, SOLUTION INTRAVENOUS AS NEEDED
Status: DISCONTINUED | OUTPATIENT
Start: 2022-12-30 | End: 2022-12-30 | Stop reason: HOSPADM

## 2022-12-30 RX ORDER — DILTIAZEM HYDROCHLORIDE 180 MG/1
180 CAPSULE, EXTENDED RELEASE ORAL DAILY
Status: DISCONTINUED | OUTPATIENT
Start: 2022-12-31 | End: 2022-12-30

## 2022-12-30 RX ORDER — ROPIVACAINE HYDROCHLORIDE 5 MG/ML
INJECTION, SOLUTION EPIDURAL; INFILTRATION; PERINEURAL AS NEEDED
Status: DISCONTINUED | OUTPATIENT
Start: 2022-12-30 | End: 2022-12-30 | Stop reason: HOSPADM

## 2022-12-30 RX ORDER — POLYETHYLENE GLYCOL 3350 17 G/17G
17 POWDER, FOR SOLUTION ORAL DAILY
Status: DISCONTINUED | OUTPATIENT
Start: 2022-12-31 | End: 2023-01-02 | Stop reason: HOSPADM

## 2022-12-30 RX ORDER — HEPARIN SODIUM 5000 [USP'U]/ML
5000 INJECTION, SOLUTION INTRAVENOUS; SUBCUTANEOUS EVERY 8 HOURS
Status: DISCONTINUED | OUTPATIENT
Start: 2022-12-30 | End: 2022-12-30

## 2022-12-30 RX ORDER — SODIUM CHLORIDE 0.9 % (FLUSH) 0.9 %
5-40 SYRINGE (ML) INJECTION AS NEEDED
Status: DISCONTINUED | OUTPATIENT
Start: 2022-12-30 | End: 2022-12-30 | Stop reason: HOSPADM

## 2022-12-30 RX ORDER — LIDOCAINE HYDROCHLORIDE 20 MG/ML
INJECTION, SOLUTION EPIDURAL; INFILTRATION; INTRACAUDAL; PERINEURAL AS NEEDED
Status: DISCONTINUED | OUTPATIENT
Start: 2022-12-30 | End: 2022-12-30 | Stop reason: HOSPADM

## 2022-12-30 RX ORDER — SULFASALAZINE 500 MG/1
500 TABLET ORAL 2 TIMES DAILY
Status: DISCONTINUED | OUTPATIENT
Start: 2022-12-30 | End: 2023-01-02 | Stop reason: HOSPADM

## 2022-12-30 RX ORDER — SPIRONOLACTONE 25 MG/1
25 TABLET ORAL EVERY OTHER DAY
Status: DISCONTINUED | OUTPATIENT
Start: 2022-12-31 | End: 2023-01-02 | Stop reason: HOSPADM

## 2022-12-30 RX ORDER — HYDROMORPHONE HYDROCHLORIDE 1 MG/ML
0.2 INJECTION, SOLUTION INTRAMUSCULAR; INTRAVENOUS; SUBCUTANEOUS
Status: DISCONTINUED | OUTPATIENT
Start: 2022-12-30 | End: 2022-12-30 | Stop reason: HOSPADM

## 2022-12-30 RX ORDER — INSULIN LISPRO 100 [IU]/ML
3 INJECTION, SOLUTION INTRAVENOUS; SUBCUTANEOUS
Status: DISCONTINUED | OUTPATIENT
Start: 2022-12-31 | End: 2023-01-02 | Stop reason: HOSPADM

## 2022-12-30 RX ORDER — SODIUM CHLORIDE 0.9 % (FLUSH) 0.9 %
5-40 SYRINGE (ML) INJECTION EVERY 8 HOURS
Status: DISCONTINUED | OUTPATIENT
Start: 2022-12-30 | End: 2022-12-30 | Stop reason: HOSPADM

## 2022-12-30 RX ORDER — NEOSTIGMINE METHYLSULFATE 1 MG/ML
INJECTION, SOLUTION INTRAVENOUS AS NEEDED
Status: DISCONTINUED | OUTPATIENT
Start: 2022-12-30 | End: 2022-12-30 | Stop reason: HOSPADM

## 2022-12-30 RX ORDER — ACETAMINOPHEN 325 MG/1
650 TABLET ORAL ONCE
Status: DISCONTINUED | OUTPATIENT
Start: 2022-12-30 | End: 2022-12-30 | Stop reason: HOSPADM

## 2022-12-30 RX ORDER — DILTIAZEM HYDROCHLORIDE 180 MG/1
180 CAPSULE, COATED, EXTENDED RELEASE ORAL DAILY
Status: DISCONTINUED | OUTPATIENT
Start: 2022-12-31 | End: 2023-01-02 | Stop reason: HOSPADM

## 2022-12-30 RX ORDER — VENLAFAXINE HYDROCHLORIDE 37.5 MG/1
75 CAPSULE, EXTENDED RELEASE ORAL
Status: DISCONTINUED | OUTPATIENT
Start: 2022-12-30 | End: 2023-01-02 | Stop reason: HOSPADM

## 2022-12-30 RX ORDER — SODIUM CHLORIDE 0.9 % (FLUSH) 0.9 %
5-40 SYRINGE (ML) INJECTION EVERY 8 HOURS
Status: DISCONTINUED | OUTPATIENT
Start: 2022-12-30 | End: 2023-01-02 | Stop reason: HOSPADM

## 2022-12-30 RX ORDER — INSULIN GLARGINE 100 [IU]/ML
17 INJECTION, SOLUTION SUBCUTANEOUS DAILY
Status: DISCONTINUED | OUTPATIENT
Start: 2022-12-31 | End: 2023-01-02 | Stop reason: HOSPADM

## 2022-12-30 RX ORDER — PHENYLEPHRINE HCL IN 0.9% NACL 0.4MG/10ML
SYRINGE (ML) INTRAVENOUS AS NEEDED
Status: DISCONTINUED | OUTPATIENT
Start: 2022-12-30 | End: 2022-12-30 | Stop reason: HOSPADM

## 2022-12-30 RX ORDER — OXYCODONE HYDROCHLORIDE 5 MG/1
5 TABLET ORAL
Status: DISCONTINUED | OUTPATIENT
Start: 2022-12-30 | End: 2023-01-02 | Stop reason: HOSPADM

## 2022-12-30 RX ORDER — SODIUM CHLORIDE, SODIUM LACTATE, POTASSIUM CHLORIDE, CALCIUM CHLORIDE 600; 310; 30; 20 MG/100ML; MG/100ML; MG/100ML; MG/100ML
50 INJECTION, SOLUTION INTRAVENOUS CONTINUOUS
Status: DISCONTINUED | OUTPATIENT
Start: 2022-12-30 | End: 2022-12-30 | Stop reason: HOSPADM

## 2022-12-30 RX ORDER — HYDROMORPHONE HYDROCHLORIDE 1 MG/ML
1 INJECTION, SOLUTION INTRAMUSCULAR; INTRAVENOUS; SUBCUTANEOUS
Status: DISCONTINUED | OUTPATIENT
Start: 2022-12-30 | End: 2023-01-02 | Stop reason: HOSPADM

## 2022-12-30 RX ORDER — METOPROLOL SUCCINATE 50 MG/1
50 TABLET, EXTENDED RELEASE ORAL DAILY
Status: DISCONTINUED | OUTPATIENT
Start: 2022-12-31 | End: 2023-01-02 | Stop reason: HOSPADM

## 2022-12-30 RX ORDER — LEVOTHYROXINE SODIUM 25 UG/1
25 TABLET ORAL
Status: DISCONTINUED | OUTPATIENT
Start: 2022-12-31 | End: 2023-01-02 | Stop reason: HOSPADM

## 2022-12-30 RX ORDER — SUCCINYLCHOLINE CHLORIDE 20 MG/ML
INJECTION INTRAMUSCULAR; INTRAVENOUS AS NEEDED
Status: DISCONTINUED | OUTPATIENT
Start: 2022-12-30 | End: 2022-12-30 | Stop reason: HOSPADM

## 2022-12-30 RX ORDER — FUROSEMIDE 40 MG/1
40 TABLET ORAL DAILY
Status: DISCONTINUED | OUTPATIENT
Start: 2022-12-31 | End: 2023-01-02 | Stop reason: HOSPADM

## 2022-12-30 RX ORDER — SODIUM CHLORIDE, SODIUM LACTATE, POTASSIUM CHLORIDE, CALCIUM CHLORIDE 600; 310; 30; 20 MG/100ML; MG/100ML; MG/100ML; MG/100ML
25 INJECTION, SOLUTION INTRAVENOUS CONTINUOUS
Status: DISCONTINUED | OUTPATIENT
Start: 2022-12-30 | End: 2022-12-30 | Stop reason: HOSPADM

## 2022-12-30 RX ORDER — ROSUVASTATIN CALCIUM 10 MG/1
5 TABLET, COATED ORAL
Status: DISCONTINUED | OUTPATIENT
Start: 2022-12-30 | End: 2023-01-02 | Stop reason: HOSPADM

## 2022-12-30 RX ORDER — SODIUM CHLORIDE 9 MG/ML
50 INJECTION, SOLUTION INTRAVENOUS CONTINUOUS
Status: DISCONTINUED | OUTPATIENT
Start: 2022-12-30 | End: 2022-12-31

## 2022-12-30 RX ORDER — LANOLIN ALCOHOL/MO/W.PET/CERES
3 CREAM (GRAM) TOPICAL
Status: DISCONTINUED | OUTPATIENT
Start: 2022-12-30 | End: 2023-01-02 | Stop reason: HOSPADM

## 2022-12-30 RX ORDER — LANOLIN ALCOHOL/MO/W.PET/CERES
5 CREAM (GRAM) TOPICAL
Status: DISCONTINUED | OUTPATIENT
Start: 2022-12-30 | End: 2022-12-30

## 2022-12-30 RX ORDER — FAMOTIDINE 20 MG/1
40 TABLET, FILM COATED ORAL DAILY
Status: DISCONTINUED | OUTPATIENT
Start: 2022-12-31 | End: 2023-01-01

## 2022-12-30 RX ORDER — ONDANSETRON 2 MG/ML
4 INJECTION INTRAMUSCULAR; INTRAVENOUS AS NEEDED
Status: DISCONTINUED | OUTPATIENT
Start: 2022-12-30 | End: 2022-12-30 | Stop reason: HOSPADM

## 2022-12-30 RX ORDER — ONDANSETRON 2 MG/ML
4 INJECTION INTRAMUSCULAR; INTRAVENOUS
Status: DISCONTINUED | OUTPATIENT
Start: 2022-12-30 | End: 2023-01-02 | Stop reason: HOSPADM

## 2022-12-30 RX ADMIN — Medication 2 MG: at 14:55

## 2022-12-30 RX ADMIN — VENLAFAXINE HYDROCHLORIDE 75 MG: 37.5 CAPSULE, EXTENDED RELEASE ORAL at 22:02

## 2022-12-30 RX ADMIN — Medication 80 MCG: at 14:06

## 2022-12-30 RX ADMIN — ROPIVACAINE HYDROCHLORIDE 25 ML: 5 INJECTION, SOLUTION EPIDURAL; INFILTRATION; PERINEURAL at 14:11

## 2022-12-30 RX ADMIN — ROCURONIUM BROMIDE 15 MG: 10 INJECTION INTRAVENOUS at 14:14

## 2022-12-30 RX ADMIN — KETAMINE HYDROCHLORIDE 10 MG: 10 INJECTION, SOLUTION INTRAMUSCULAR; INTRAVENOUS at 14:06

## 2022-12-30 RX ADMIN — FENTANYL CITRATE 50 MCG: 50 INJECTION, SOLUTION INTRAMUSCULAR; INTRAVENOUS at 13:25

## 2022-12-30 RX ADMIN — MELATONIN 3 MG: at 22:02

## 2022-12-30 RX ADMIN — PROPOFOL 25 MG: 10 INJECTION, EMULSION INTRAVENOUS at 14:16

## 2022-12-30 RX ADMIN — GLYCOPYRROLATE 0.2 MG: 0.2 INJECTION, SOLUTION INTRAMUSCULAR; INTRAVENOUS at 14:55

## 2022-12-30 RX ADMIN — APIXABAN 2.5 MG: 2.5 TABLET, FILM COATED ORAL at 22:02

## 2022-12-30 RX ADMIN — ROSUVASTATIN CALCIUM 5 MG: 10 TABLET, COATED ORAL at 22:02

## 2022-12-30 RX ADMIN — PHENYLEPHRINE HYDROCHLORIDE 20 MCG/MIN: 10 INJECTION INTRAVENOUS at 14:17

## 2022-12-30 RX ADMIN — Medication 40 MCG: at 14:24

## 2022-12-30 RX ADMIN — WATER 2 G: 1 INJECTION INTRAMUSCULAR; INTRAVENOUS; SUBCUTANEOUS at 14:12

## 2022-12-30 RX ADMIN — FENTANYL CITRATE 50 MCG: 50 INJECTION, SOLUTION INTRAMUSCULAR; INTRAVENOUS at 13:50

## 2022-12-30 RX ADMIN — PROPOFOL 25 MG: 10 INJECTION, EMULSION INTRAVENOUS at 14:06

## 2022-12-30 RX ADMIN — SODIUM CHLORIDE 50 ML/HR: 9 INJECTION, SOLUTION INTRAVENOUS at 17:20

## 2022-12-30 RX ADMIN — SODIUM CHLORIDE, PRESERVATIVE FREE 10 ML: 5 INJECTION INTRAVENOUS at 22:03

## 2022-12-30 RX ADMIN — SUCCINYLCHOLINE CHLORIDE 100 MG: 20 INJECTION, SOLUTION INTRAMUSCULAR; INTRAVENOUS at 14:08

## 2022-12-30 RX ADMIN — FENTANYL CITRATE 50 MCG: 50 INJECTION, SOLUTION INTRAMUSCULAR; INTRAVENOUS at 13:15

## 2022-12-30 RX ADMIN — FENTANYL CITRATE 25 MCG: 50 INJECTION, SOLUTION INTRAMUSCULAR; INTRAVENOUS at 15:26

## 2022-12-30 RX ADMIN — SODIUM CHLORIDE, POTASSIUM CHLORIDE, SODIUM LACTATE AND CALCIUM CHLORIDE 25 ML/HR: 600; 310; 30; 20 INJECTION, SOLUTION INTRAVENOUS at 13:35

## 2022-12-30 RX ADMIN — LIDOCAINE HYDROCHLORIDE 40 MG: 20 INJECTION, SOLUTION EPIDURAL; INFILTRATION; INTRACAUDAL; PERINEURAL at 14:06

## 2022-12-30 RX ADMIN — FENTANYL CITRATE 25 MCG: 50 INJECTION, SOLUTION INTRAMUSCULAR; INTRAVENOUS at 15:46

## 2022-12-30 RX ADMIN — FENTANYL CITRATE 50 MCG: 50 INJECTION, SOLUTION INTRAMUSCULAR; INTRAVENOUS at 14:05

## 2022-12-30 RX ADMIN — FENTANYL CITRATE 25 MCG: 50 INJECTION, SOLUTION INTRAMUSCULAR; INTRAVENOUS at 16:04

## 2022-12-30 RX ADMIN — PROPOFOL 25 MG: 10 INJECTION, EMULSION INTRAVENOUS at 14:03

## 2022-12-30 RX ADMIN — ROPIVACAINE HYDROCHLORIDE 10 ML: 5 INJECTION, SOLUTION EPIDURAL; INFILTRATION; PERINEURAL at 14:14

## 2022-12-30 NOTE — PROGRESS NOTES
TRANSFER - IN REPORT:    Verbal report received from Pacu(name) on Daija Mercer  being received from Pacu(unit) for routine progression of care      Report consisted of patients Situation, Background, Assessment and   Recommendations(SBAR). Information from the following report(s) SBAR, Kardex, ED Summary, Intake/Output, MAR, and Cardiac Rhythm vpaced  was reviewed with the receiving nurse. Opportunity for questions and clarification was provided. Assessment completed upon patients arrival to unit and care assumed. 1759: patient arrived to unit, VSS    Patient family states patient is on dysphagia diet at home. Called on call physician to modify diet order. MD gave approval, this RN modified order.

## 2022-12-30 NOTE — ANESTHESIA PREPROCEDURE EVALUATION
Anesthetic History               Review of Systems / Medical History  Patient summary reviewed, nursing notes reviewed and pertinent labs reviewed    Pulmonary    COPD            Comments: H/O RLL Pneumonia   Neuro/Psych             Comments: Diabetic peripheral neuropathy    H/O SDH (subdural hematoma)    Hard of hearing Cardiovascular    Hypertension  Valvular problems/murmurs: tricuspid insufficiency and mitral insufficiency    CHF  Dysrhythmias : atrial fibrillation  Pacemaker, CAD, PAD, cardiac stents, CABG (3 vessel) and hyperlipidemia    Exercise tolerance: <4 METS  Comments: DCPM, pacer dependent     Ejection fraction was  estimated in the range of 55 % to 60 %. Mod-severe TR & PHTN    ECG (5/4/21): Ventricular-paced rhythm   Abnormal ECG   When compared with ECG of 09-AUG-2017 18:58,   Vent. rate has increased BY  20 BPM     TTE (2/23/18): Left ventricle: Systolic function was normal. Ejection fraction was  estimated in the range of 55 % to 60 %. No obvious wall motion  abnormalities identified in the views obtained. There was mild concentric  hypertrophy. Left atrium: The atrium was moderately dilated. Right atrium: The atrium was moderately dilated. Mitral valve: There was mild regurgitation. Aortic valve: There was no stenosis. Tricuspid valve: There was moderate to severe regurgitation. Pulmonary  artery systolic pressure: 61 mmHg. There was moderate to severe pulmonary  hypertension.    GI/Hepatic/Renal     GERD    Renal disease: CRI      Comments: CRI, Stage III Endo/Other    Diabetes: type 2, using insulin  Hypothyroidism  Obesity and arthritis     Other Findings   Comments: Alzheimer disease  Chronic pain         Physical Exam    Airway  Mallampati: II  TM Distance: 4 - 6 cm  Neck ROM: normal range of motion   Mouth opening: Normal     Cardiovascular  Regular rate and rhythm,  S1 and S2 normal,  no murmur, click, rub, or gallop             Dental    Dentition: Edentulous, Full lower dentures and Full upper dentures     Pulmonary  Breath sounds clear to auscultation               Abdominal  GI exam deferred       Other Findings            Anesthetic Plan    ASA: 4  Anesthesia type: general and regional - femoral single shot and sciatic single shot      Post-op pain plan if not by surgeon: peripheral nerve block single    Induction: Intravenous  Anesthetic plan and risks discussed with: Family      Will perform nerve blocks under general anesthesia due to pt's dementia and inability to follow commands.  Discussed the risks including increased risk of nerve injury and daughter agrees to proceed

## 2022-12-30 NOTE — ANESTHESIA POSTPROCEDURE EVALUATION
Procedure(s):  RIGHT ABOVE KNEE AMPUTATION. general    Anesthesia Post Evaluation      Multimodal analgesia: multimodal analgesia used between 6 hours prior to anesthesia start to PACU discharge  Patient location during evaluation: bedside  Patient participation: complete - patient participated  Level of consciousness: awake  Pain management: adequate  Airway patency: patent  Anesthetic complications: no  Cardiovascular status: acceptable  Respiratory status: acceptable  Hydration status: acceptable  Post anesthesia nausea and vomiting:  controlled  Final Post Anesthesia Temperature Assessment:  Normothermia (36.0-37.5 degrees C)      INITIAL Post-op Vital signs:   Vitals Value Taken Time   /55 12/30/22 1545   Temp 37.2 °C (99 °F) 12/30/22 1512   Pulse 70 12/30/22 1601   Resp 17 12/30/22 1601   SpO2 95 % 12/30/22 1601   Vitals shown include unvalidated device data.

## 2022-12-30 NOTE — PROGRESS NOTES
4633 Primary Nurse Denice Cervantes, ADRIANO and Jermaine Arzate RN performed a dual skin assessment on this patient Impairment noted- see wound doc flow sheet  Gerald score is 13

## 2022-12-30 NOTE — PERIOP NOTES
TRANSFER - OUT REPORT:    Verbal report given to Adali OH (name) on [place]  being transferred to [facility](unit) for routine post - op       Report consisted of patients Situation, Background, Assessment and   Recommendations(SBAR). Information from the following report(s) SBAR, Kardex, OR Summary, Intake/Output, MAR, Recent Results, and Cardiac Rhythm Vpaced  was reviewed with the receiving nurse. Opportunity for questions and clarification was provided.       Patient transported with:   Monitor  O2 @ 2 liters  Registered Nurse

## 2022-12-30 NOTE — PERIOP NOTES
After Visit Summary   10/2/2017    Florina Marie    MRN: [id number]           Patient Information     Date Of Birth          1987        Visit Information        Provider Department      10/2/2017 10:30 AM NL RN TEAM A, [facility]        Today's Diagnoses     Screening examination for pulmonary tuberculosis    -  1       Follow-ups after your visit        Who to contact     If you have questions or need follow up information about today's clinic visit or your schedule please contact [facility] directly at [phone].  Normal or non-critical lab and imaging results will be communicated to you by eMithilaHaathart, letter or phone within 4 business days after the clinic has received the results. If you do not hear from us within 7 days, please contact the clinic through Analizat or phone. If you have a critical or abnormal lab result, we will notify you by phone as soon as possible.  Submit refill requests through Turpitude or call your pharmacy and they will forward the refill request to us. Please allow 3 business days for your refill to be completed.          Additional Information About Your Visit        MyChart Information     Turpitude gives you secure access to your electronic health record. If you see a primary care provider, you can also send messages to your care team and make appointments. If you have questions, please call your primary care clinic.  If you do not have a primary care provider, please call [phone] and they will assist you.        Care EveryWhere ID     This is your Care EveryWhere ID. This could be used by other organizations to access your [place] medical records  [id number]         Blood Pressure from Last 3 Encounters:   08/29/17 112/82   07/26/17 134/88   04/18/17 122/74    Weight from Last 3 Encounters:   08/29/17 257 lb (116.6 kg)   04/18/17 247 lb 8 oz (112.3 kg)   09/09/16 237 lb 12 oz (107.8 kg)              Today, you had the  Handoff Report from Operating Room to PACU    Report received from Efrem Felix RN and D. Meryle Christen regarding Laurie Cruz. Surgeon(s):  Peña Mckinney MD  And Procedure(s) (LRB):  RIGHT ABOVE KNEE AMPUTATION (Right)  confirmed   with allergies and dressings discussed. Anesthesia type, drugs, patient history, complications, estimated blood loss, vital signs, intake and output, and last pain medication, lines, reversal medications, and temperature were reviewed. following     No orders found for display       Primary Care Provider Office Phone # Fax #    Alanna CORONA MD Veena [phone]       [address] [place] [address] [address]  [facility] [address]        Equal Access to Services     ROX SANTIAGO : Hadii aad ku hadsarahmoe Soshira, waaxda luqadaha, qaybta kaalmada adeegyada, rosalee ángelin hayaaalonzo corona nilsonbryan mathis. So [facility] [phone].    ATENCIÓN: Si habla español, tiene a berkowitz disposición servicios gratuitos de asistencia lingüística. Llame al [phone].    We comply with applicable federal civil rights laws and [place] laws. We do not discriminate on the basis of race, color, national origin, age, disability, sex, sexual orientation, or gender identity.            Thank you!     Thank you for choosing [facility]  for your care. Our goal is always to provide you with excellent care. Hearing back from our patients is one way we can continue to improve our services. Please take a few minutes to complete the written survey that you may receive in the mail after your visit with us. Thank you!             Your Updated Medication List - Protect others around you: Learn how to safely use, store and throw away your medicines at www.disposemymeds.org.          This list is accurate as of: 10/2/17 10:45 AM.  Always use your most recent med list.                   Brand Name Dispense Instructions for use Diagnosis    ALPRAZolam 0.5 MG tablet    XANAX     TAKE ONE TABLET AS NEEDED FOR SEVERE ANXIETY MAX OF 2 TABLETS PER DAY        ARIPiprazole 15 MG tablet    ABILIFY     Take 15 mg by mouth daily        desogestrel-ethinyl estradiol 0.15-30 MG-MCG per tablet    APRI    84 tablet    Take 1 tablet by mouth daily Use continuously for 3 months, then use placebo tablets    Irregular menstrual cycle       escitalopram 10 MG tablet    LEXAPRO     TAKE 1/2 tablet for 4 days then start 1 TABLET DAILY.

## 2022-12-30 NOTE — OP NOTES
[address]  OPERATIVE REPORT     Name: Brad Kuo  MR#: [id number]  : 1940  DATE OF SERVICE: 22       PREOPERATIVE DIAGNOSIS:  Failed right below knee amputation     POSTOPERATIVE DIAGNOSIS:  Failed right below knee amputation     PROCEDURE PERFORMED:  Right above knee amputation     SURGEON:  Tempie Mcburney, MD.     ANESTHESIA:  General.     COMPLICATIONS:  None. SPECIMENS REMOVED:  right leg     IMPLANTS:  None. ESTIMATED BLOOD LOSS:  250 mL. INDICATIONS FOR PROCEDURE:  The patient is a78year-old female with multiple comorbidities and dementia that had below knee amputation about 1 month ago and this has become ischemic. Discussed with family regarding palliation or above knee amputation they would like to proceed with right above amputation. Risks and benefits of the procedure were discussed with the patient and family wished to proceed. DESCRIPTION OF PROCEDURE IN DETAIL:  The patient was brought to the operating room and prepped and draped in the usual sterile fashion. An incision was made just above the knee and this was bovied, taken to the subcutaneous tissue with Bovie electrocautery. Hemostasis was achieved with combination of Bovie electrocautery and 3-0 silk sutures. The femur was divided with a oscillating saw. An amputation knife was then used to divide the remainder of the tissue and the leg was sent off as a specimen. The femoral artery, neurovascular bundle was then clamped and this was ligated with a 3-0 Prolene suture. The wound was then copiously irrigated. Additional hemostasis was achieved with Bovie electrocautery. The wound was then closed in layers with interrupted 2-0 Vicryl followed by skin staples. All needle and sponge counts were correct at the end of the case. The patient tolerated the procedure well, was extubated and taken to PACU in stable condition.            Anuja Babin MD

## 2022-12-30 NOTE — PROGRESS NOTES
Lantus 17 units daily and Humalog 3 units with meals  was therapeutically interchanged for Novolog 70/30 total of 30 units daily per the P&T Committee approved Therapeutic Interchanges Policy.     Sydnie CALVO.PH, Pharmacist  12/30/2022 6:12 PM

## 2022-12-30 NOTE — PROGRESS NOTES
RENAL DOSE ADJUSTMENT MADE PER P/T PROTOCOL     PREVIOUS ORDER:  Cefazolin 1 g q 8 h x 2 doses     Estimated Creatinine Clearance: 22.1 mL/min (A) (by C-G formula based on SCr of 1.33 mg/dL (H)).     Recent Labs     12/30/22  1257           NEW RENALLY ADJUSTED ORDER: Cefazolin 2 g q 12 h x 2 doses     Alee Last [facility] MS CALVO.PH 12/30/2022 5:57 PM

## 2022-12-30 NOTE — H&P
Vascular Surgery History & Physical    Subjective: Nighat Florian is a 78 y.o.  female presenting for above knee amputation. Past Medical History:   Diagnosis Date    Alzheimer disease ([facility]     Arrhythmia     atrial fibrillation 2018    Arthritis     CAD (coronary artery disease)     CABG, pacemaker/Dr. Darcy Mejia    Chronic pain     COPD     no specialist    Dementia ([facility] 2021    Diabetes ([facility]     GERD (gastroesophageal reflux disease)     Heart failure ([facility]     Hypertension     Ill-defined condition     high cholesterol    Peripheral vascular disease (H[facility])     Sleep apnea     no longer uses-stopped years ago    Thyroid disease       Past Surgical History:   Procedure Laterality Date    HX ORTHOPAEDIC Left     toe amputation    HX PACEMAKER      July 2011    MA CARDIAC SURG PROCEDURE UNLIST      3 CABG, Stents Dr. Radha Loja FLX DX W/COLLJ SPEC WHEN PFRMD  11/10/2011         MA EGD TRANSORAL BIOPSY SINGLE/MULTIPLE  8/29/2012         VASCULAR SURGERY PROCEDURE UNLIST Left 09/2021    left transmetatarsal amputation     Family History   Problem Relation Age of Onset    Lung Disease Mother     Heart Disease Father     Hypertension Father     Diabetes Sister       Social History     Tobacco Use    Smoking status: Never    Smokeless tobacco: Never   Substance Use Topics    Alcohol use: No       Prior to Admission medications    Medication Sig Start Date End Date Taking? Authorizing Provider   iron, carbonyl (FEOSOL) 45 mg tab Take 1 Tablet by mouth daily. Yes Provider, Historical   famotidine (PEPCID) 40 mg tablet Take 40 mg by mouth daily. Yes Provider, Historical   venlafaxine-SR (EFFEXOR-XR) 75 mg capsule Take 75 mg by mouth nightly. Yes Provider, Historical   metoprolol succinate (TOPROL-XL) 50 mg XL tablet Take 50 mg by mouth daily. Yes Provider, Historical   polyethylene glycol (MIRALAX) 17 gram/dose powder Take 17 g by mouth daily.    Yes Provider, Historical acetaminophen (TYLENOL) 500 mg tablet Take 500 mg by mouth every six (6) hours as needed for Pain. Yes Provider, Historical   rosuvastatin (CRESTOR) 5 mg tablet Take 5 mg by mouth nightly. Yes Provider, Historical   levothyroxine (SYNTHROID) 25 mcg tablet Take 25 mcg by mouth Daily (before breakfast). Yes Provider, Historical   sulfaSALAzine (AZULFIDINE) 500 mg tablet Take 500 mg by mouth two (2) times a day. Yes Provider, Historical   dilTIAZem ER (TIAZAC) 180 mg capsule Take 180 mg by mouth daily. 3/25/19  Yes    spironolactone (ALDACTONE) 25 mg tablet Take 25 mg by mouth every other day. Yes Provider, Historical   insulin aspart protamine/insulin aspart (NOVOLOG MIX 70/30) 100 unit/mL (70-30) injection Patient takes 20 units in the AM, 5 units mid-day, and 5 units QHS   Yes Provider, Historical   furosemide (LASIX) 40 mg tablet Take 1 Tab by mouth daily. 8/12/17  Yes Renny Wiggins MD   apixaban (ELIQUIS) 2.5 mg tablet Take 1 Tablet by mouth two (2) times a day. 9/15/21   Luella Sever, NP     Allergies   Allergen Reactions    Percocet [Oxycodone-Acetaminophen] Nausea and Vomiting        Review of Systems   Unable to perform ROS: Dementia     Objective:     Patient Vitals for the past 24 hrs:   BP Temp Pulse Resp SpO2 Height Weight   12/30/22 1326 (!) 145/60 97.8 °F (36.6 °C) 70 15 98 % 4' 11\" (1.499 m) 44.5 kg (98 lb 1.7 oz)       Physical Exam  Constitutional:       Appearance: Normal appearance. HENT:      Head: Normocephalic. Mouth/Throat:      Mouth: Mucous membranes are moist.   Eyes:      Extraocular Movements: Extraocular movements intact. Pupils: Pupils are equal, round, and reactive to light. Cardiovascular:      Rate and Rhythm: Normal rate. Pulmonary:      Effort: Pulmonary effort is normal.   Abdominal:      Palpations: Abdomen is soft. Musculoskeletal:         General: Normal range of motion. Cervical back: Normal range of motion.    Skin:     General: Skin is warm.      Capillary Refill: Capillary refill takes less than 2 seconds. Neurological:      General: No focal deficit present. Mental Status: She is alert.        Data Review:   Recent Results (from the past 24 hour(s))   CBC W/O DIFF    Collection Time: 12/30/22 12:57 PM   Result Value Ref Range    WBC 13.9 (H) 3.6 - 11.0 K/uL    RBC 3.81 3.80 - 5.20 M/uL    HGB 11.2 (L) 11.5 - 16.0 g/dL    HCT 34.7 (L) 35.0 - 47.0 %    MCV 91.1 80.0 - 99.0 FL    MCH 29.4 26.0 - 34.0 PG    MCHC 32.3 30.0 - 36.5 g/dL    RDW 13.2 11.5 - 14.5 %    PLATELET 816 315 - 532 K/uL    MPV 9.0 8.9 - 12.9 FL    NRBC 0.0 0  WBC    ABSOLUTE NRBC 0.00 0.00 - 0.01 K/uL   GLUCOSE, POC    Collection Time: 12/30/22  1:35 PM   Result Value Ref Range    Glucose (POC) 117 65 - 117 mg/dL    Performed by Noble Or RN        Assessment/Plan:   OR for R VIJI    Signed By: Zak Simmons MD     December 30, 2022

## 2022-12-31 LAB
ANION GAP SERPL CALC-SCNC: 7 MMOL/L (ref 5–15)
BUN SERPL-MCNC: 31 MG/DL (ref 6–20)
BUN/CREAT SERPL: 26 (ref 12–20)
CALCIUM SERPL-MCNC: 10 MG/DL (ref 8.5–10.1)
CHLORIDE SERPL-SCNC: 105 MMOL/L (ref 97–108)
CO2 SERPL-SCNC: 22 MMOL/L (ref 21–32)
CREAT SERPL-MCNC: 1.21 MG/DL (ref 0.55–1.02)
ERYTHROCYTE [DISTWIDTH] IN BLOOD BY AUTOMATED COUNT: 13.2 % (ref 11.5–14.5)
GLUCOSE BLD STRIP.AUTO-MCNC: 174 MG/DL (ref 65–117)
GLUCOSE BLD STRIP.AUTO-MCNC: 216 MG/DL (ref 65–117)
GLUCOSE BLD STRIP.AUTO-MCNC: 244 MG/DL (ref 65–117)
GLUCOSE SERPL-MCNC: 219 MG/DL (ref 65–100)
HCT VFR BLD AUTO: 34.5 % (ref 35–47)
HGB BLD-MCNC: 10.8 G/DL (ref 11.5–16)
MCH RBC QN AUTO: 29.4 PG (ref 26–34)
MCHC RBC AUTO-ENTMCNC: 31.3 G/DL (ref 30–36.5)
MCV RBC AUTO: 94 FL (ref 80–99)
NRBC # BLD: 0 K/UL (ref 0–0.01)
NRBC BLD-RTO: 0 PER 100 WBC
PLATELET # BLD AUTO: 327 K/UL (ref 150–400)
PMV BLD AUTO: 9.1 FL (ref 8.9–12.9)
POTASSIUM SERPL-SCNC: 4.9 MMOL/L (ref 3.5–5.1)
RBC # BLD AUTO: 3.67 M/UL (ref 3.8–5.2)
SERVICE CMNT-IMP: ABNORMAL
SODIUM SERPL-SCNC: 134 MMOL/L (ref 136–145)
WBC # BLD AUTO: 12.4 K/UL (ref 3.6–11)

## 2022-12-31 PROCEDURE — 51798 US URINE CAPACITY MEASURE: CPT

## 2022-12-31 PROCEDURE — 74011000250 HC RX REV CODE- 250: Performed by: SURGERY

## 2022-12-31 PROCEDURE — 65270000046 HC RM TELEMETRY

## 2022-12-31 PROCEDURE — 36415 COLL VENOUS BLD VENIPUNCTURE: CPT

## 2022-12-31 PROCEDURE — 85027 COMPLETE CBC AUTOMATED: CPT

## 2022-12-31 PROCEDURE — 74011250637 HC RX REV CODE- 250/637: Performed by: SURGERY

## 2022-12-31 PROCEDURE — 74011636637 HC RX REV CODE- 636/637: Performed by: SURGERY

## 2022-12-31 PROCEDURE — 82962 GLUCOSE BLOOD TEST: CPT

## 2022-12-31 PROCEDURE — 77010033678 HC OXYGEN DAILY

## 2022-12-31 PROCEDURE — 80048 BASIC METABOLIC PNL TOTAL CA: CPT

## 2022-12-31 PROCEDURE — 74011250636 HC RX REV CODE- 250/636: Performed by: SURGERY

## 2022-12-31 RX ADMIN — Medication 3 UNITS: at 09:07

## 2022-12-31 RX ADMIN — HYDROMORPHONE HYDROCHLORIDE 1 MG: 1 INJECTION, SOLUTION INTRAMUSCULAR; INTRAVENOUS; SUBCUTANEOUS at 14:09

## 2022-12-31 RX ADMIN — APIXABAN 2.5 MG: 2.5 TABLET, FILM COATED ORAL at 17:47

## 2022-12-31 RX ADMIN — ACETAMINOPHEN 500 MG: 500 TABLET ORAL at 22:39

## 2022-12-31 RX ADMIN — POLYETHYLENE GLYCOL 3350 17 G: 17 POWDER, FOR SOLUTION ORAL at 09:07

## 2022-12-31 RX ADMIN — ROSUVASTATIN CALCIUM 5 MG: 10 TABLET, COATED ORAL at 22:39

## 2022-12-31 RX ADMIN — SULFASALAZINE 500 MG: 500 TABLET ORAL at 17:47

## 2022-12-31 RX ADMIN — SODIUM CHLORIDE, PRESERVATIVE FREE 10 ML: 5 INJECTION INTRAVENOUS at 06:16

## 2022-12-31 RX ADMIN — VENLAFAXINE HYDROCHLORIDE 75 MG: 37.5 CAPSULE, EXTENDED RELEASE ORAL at 22:39

## 2022-12-31 RX ADMIN — Medication 3 UNITS: at 12:14

## 2022-12-31 RX ADMIN — MELATONIN 3 MG: at 22:39

## 2022-12-31 RX ADMIN — CEFAZOLIN 2 G: 1 INJECTION, POWDER, FOR SOLUTION INTRAMUSCULAR; INTRAVENOUS at 12:18

## 2022-12-31 RX ADMIN — OXYCODONE 5 MG: 5 TABLET ORAL at 20:15

## 2022-12-31 RX ADMIN — SODIUM CHLORIDE, PRESERVATIVE FREE 10 ML: 5 INJECTION INTRAVENOUS at 14:11

## 2022-12-31 RX ADMIN — CEFAZOLIN 2 G: 1 INJECTION, POWDER, FOR SOLUTION INTRAMUSCULAR; INTRAVENOUS at 00:46

## 2022-12-31 RX ADMIN — LEVOTHYROXINE SODIUM 25 MCG: 0.03 TABLET ORAL at 06:16

## 2022-12-31 RX ADMIN — SODIUM CHLORIDE, PRESERVATIVE FREE 10 ML: 5 INJECTION INTRAVENOUS at 22:42

## 2022-12-31 RX ADMIN — INSULIN GLARGINE 17 UNITS: 100 INJECTION, SOLUTION SUBCUTANEOUS at 09:07

## 2022-12-31 RX ADMIN — Medication 3 UNITS: at 17:47

## 2022-12-31 RX ADMIN — OXYCODONE 5 MG: 5 TABLET ORAL at 12:12

## 2022-12-31 NOTE — PROGRESS NOTES
Problem: Pressure Injury - Risk of  Goal: *Prevention of pressure injury  Description: Document Gerald Scale and appropriate interventions in the flowsheet. Outcome: Progressing Towards Goal  Note: Pressure Injury Interventions:  Sensory Interventions: Assess changes in LOC, Assess need for specialty bed, Float heels, Discuss PT/OT consult with provider, Turn and reposition approx. every two hours (pillows and wedges if needed)    Moisture Interventions: Absorbent underpads, Apply protective barrier, creams and emollients, Check for incontinence Q2 hours and as needed, Internal/External urinary devices    Activity Interventions: Assess need for specialty bed, PT/OT evaluation, Increase time out of bed    Mobility Interventions: Assess need for specialty bed, PT/OT evaluation    Nutrition Interventions: Document food/fluid/supplement intake                     Problem: Patient Education: Go to Patient Education Activity  Goal: Patient/Family Education  Outcome: Progressing Towards Goal     Problem: Falls - Risk of  Goal: *Absence of Falls  Description: Document Jenna Gutierrez Fall Risk and appropriate interventions in the flowsheet.   Outcome: Progressing Towards Goal  Note: Fall Risk Interventions:       Mentation Interventions: Adequate sleep, hydration, pain control, Bed/chair exit alarm, Family/sitter at bedside, Increase mobility, More frequent rounding    Medication Interventions: Bed/chair exit alarm, Patient to call before getting OOB, Teach patient to arise slowly    Elimination Interventions: Bed/chair exit alarm, Call light in reach, Toileting schedule/hourly rounds    History of Falls Interventions: Bed/chair exit alarm, Consult care management for discharge planning, Door open when patient unattended, Room close to nurse's station, Vital signs minimum Q4HRs X 24 hrs (comment for end date)         Problem: Patient Education: Go to Patient Education Activity  Goal: Patient/Family Education  Outcome: Progressing Towards Goal     Problem: Pain  Goal: *Control of Pain  Outcome: Progressing Towards Goal  Goal: *PALLIATIVE CARE:  Alleviation of Pain  Outcome: Progressing Towards Goal     Problem: Patient Education: Go to Patient Education Activity  Goal: Patient/Family Education  Outcome: Progressing Towards Goal     Problem: Patient Education: Go to Patient Education Activity  Goal: Patient/Family Education  Outcome: Progressing Towards Goal

## 2022-12-31 NOTE — PROGRESS NOTES
Bedside shift change report given to Vamsi tolentino RN  (oncoming nurse) by Jose Armando Etienne RN  (offgoing nurse). Report included the following information SBAR, Kardex, Intake/Output, MAR, Recent Results, and Cardiac Rhythm V paced  . Pt in bed, family at bedside, pt is talking alert to self. No needs or concerns expressed at this time . Family feeding pt . Pt sitting upright in bed , tolerating food well. 2024:Paged vascular about melatonin, family requesting that she has it nightly     End of Shift Note    Bedside shift change report given to ADRIANO Galan (oncoming nurse) by Norberto Lemus (offgoing nurse). Report included the following information SBAR, Kardex, Intake/Output, MAR, Recent Results, and Cardiac Rhythm V paced     Shift worked:  9331-6508     Shift summary and any significant changes:     Pt had one incontient episode during my shift. Had not voided since that time. Pt bladder scanned this morning ( see Flow sheet ) less than 400 ml     Concerns for physician to address:  See note above      Zone phone for oncoming shift:          Activity:  Activity Level: Bed Rest  Number times ambulated in hallways past shift: 0  Number of times OOB to chair past shift: 0    Cardiac:   Cardiac Monitoring: Yes      Cardiac Rhythm: Ventricular Paced    Access:  Current line(s): PIV     Genitourinary:   Urinary status: incontinent and external catheter    Respiratory:   O2 Device: Nasal cannula  Chronic home O2 use?: NO  Incentive spirometer at bedside: NO       GI:     Current diet:  ADULT DIET Dysphagia - Soft & Bite Sized; 3 carb choices (45 gm/meal)  DIET ONE TIME MESSAGE  Passing flatus: YES  Tolerating current diet: YES       Pain Management:   Patient states pain is manageable on current regimen: YES    Skin:  Gerald Score: 12  Interventions: turn team, speciality bed, float heels, increase time out of bed, PT/OT consult, and internal/external urinary devices    Patient Safety:  Fall Score:  Total Score: 3  Interventions: bed/chair alarm, gripper socks, and pt to call before getting OOB  High Fall Risk: Yes    Length of Stay:  Expected LOS: - - -  Actual LOS: 1      Miriam Bardales

## 2022-12-31 NOTE — PROGRESS NOTES
Problem: Pressure Injury - Risk of  Goal: *Prevention of pressure injury  Description: Document Gerald Scale and appropriate interventions in the flowsheet. Outcome: Progressing Towards Goal  Note: Pressure Injury Interventions:  Sensory Interventions: Assess changes in LOC, Keep linens dry and wrinkle-free, Maintain/enhance activity level, Minimize linen layers    Moisture Interventions: Internal/External urinary devices, Check for incontinence Q2 hours and as needed    Activity Interventions: Assess need for specialty bed, Increase time out of bed    Mobility Interventions: Assess need for specialty bed, HOB 30 degrees or less    Nutrition Interventions: Document food/fluid/supplement intake, Offer support with meals,snacks and hydration                     Problem: Patient Education: Go to Patient Education Activity  Goal: Patient/Family Education  Outcome: Progressing Towards Goal     Problem: Falls - Risk of  Goal: *Absence of Falls  Description: Document Baljinder Fall Risk and appropriate interventions in the flowsheet.   Outcome: Progressing Towards Goal  Note: Fall Risk Interventions:       Mentation Interventions: Adequate sleep, hydration, pain control, More frequent rounding    Medication Interventions: Patient to call before getting OOB, Teach patient to arise slowly    Elimination Interventions: Bed/chair exit alarm, Call light in reach, Patient to call for help with toileting needs, Stay With Me (per policy)    History of Falls Interventions: Bed/chair exit alarm, Room close to nurse's station         Problem: Patient Education: Go to Patient Education Activity  Goal: Patient/Family Education  Outcome: Progressing Towards Goal     Problem: Patient Education: Go to Patient Education Activity  Goal: Patient/Family Education  Outcome: Progressing Towards Goal

## 2022-12-31 NOTE — PROGRESS NOTES
Bedside shift change report given to [facility] (oncoming nurse) by Sharlene Sheehan RN (offgoing nurse). Report included the following information SBAR, Kardex, MAR, and Recent Results. 1500 MD contacted. Updated MD about patients refusal of oral medications. Also made MD aware that patient has not voided this shift, bladder scan 340 mL. Instructed to monitor patients output until 5pm at which time straight cath will be necessary.     1812 Patient bladder scan 439 mL; writer to straight cath patient per protocol    1828 Straight cath performed 350 mL output

## 2022-12-31 NOTE — PROGRESS NOTES
Alert and very comfortable  Stump dressing clean and dry  Family anxious to take home  She can go any time

## 2023-01-01 LAB
GLUCOSE BLD STRIP.AUTO-MCNC: 105 MG/DL (ref 65–117)
GLUCOSE BLD STRIP.AUTO-MCNC: 76 MG/DL (ref 65–117)
GLUCOSE BLD STRIP.AUTO-MCNC: 91 MG/DL (ref 65–117)
SERVICE CMNT-IMP: NORMAL

## 2023-01-01 PROCEDURE — 65270000046 HC RM TELEMETRY

## 2023-01-01 PROCEDURE — 82962 GLUCOSE BLOOD TEST: CPT

## 2023-01-01 PROCEDURE — 77030038269 HC DRN EXT URIN PURWCK BARD -A

## 2023-01-01 PROCEDURE — 77010033678 HC OXYGEN DAILY

## 2023-01-01 PROCEDURE — 74011250637 HC RX REV CODE- 250/637: Performed by: SURGERY

## 2023-01-01 PROCEDURE — 51798 US URINE CAPACITY MEASURE: CPT

## 2023-01-01 PROCEDURE — 74011636637 HC RX REV CODE- 636/637: Performed by: SURGERY

## 2023-01-01 PROCEDURE — 74011000250 HC RX REV CODE- 250: Performed by: SURGERY

## 2023-01-01 RX ORDER — FAMOTIDINE 20 MG/1
20 TABLET, FILM COATED ORAL DAILY
Status: DISCONTINUED | OUTPATIENT
Start: 2023-01-02 | End: 2023-01-02 | Stop reason: HOSPADM

## 2023-01-01 RX ADMIN — APIXABAN 2.5 MG: 2.5 TABLET, FILM COATED ORAL at 18:12

## 2023-01-01 RX ADMIN — INSULIN GLARGINE 17 UNITS: 100 INJECTION, SOLUTION SUBCUTANEOUS at 11:06

## 2023-01-01 RX ADMIN — SULFASALAZINE 500 MG: 500 TABLET ORAL at 11:02

## 2023-01-01 RX ADMIN — LEVOTHYROXINE SODIUM 25 MCG: 0.03 TABLET ORAL at 05:59

## 2023-01-01 RX ADMIN — OXYCODONE 5 MG: 5 TABLET ORAL at 05:59

## 2023-01-01 RX ADMIN — FUROSEMIDE 40 MG: 40 TABLET ORAL at 11:00

## 2023-01-01 RX ADMIN — POLYETHYLENE GLYCOL 3350 17 G: 17 POWDER, FOR SOLUTION ORAL at 11:06

## 2023-01-01 RX ADMIN — OXYCODONE 5 MG: 5 TABLET ORAL at 14:53

## 2023-01-01 RX ADMIN — OXYCODONE 5 MG: 5 TABLET ORAL at 22:48

## 2023-01-01 RX ADMIN — FAMOTIDINE 40 MG: 20 TABLET, FILM COATED ORAL at 09:00

## 2023-01-01 RX ADMIN — METOPROLOL SUCCINATE 50 MG: 50 TABLET, EXTENDED RELEASE ORAL at 11:00

## 2023-01-01 RX ADMIN — MELATONIN 3 MG: at 22:48

## 2023-01-01 RX ADMIN — DILTIAZEM HYDROCHLORIDE 180 MG: 180 CAPSULE, COATED, EXTENDED RELEASE ORAL at 09:00

## 2023-01-01 RX ADMIN — VENLAFAXINE HYDROCHLORIDE 75 MG: 37.5 CAPSULE, EXTENDED RELEASE ORAL at 22:48

## 2023-01-01 RX ADMIN — OXYCODONE 5 MG: 5 TABLET ORAL at 10:56

## 2023-01-01 RX ADMIN — SULFASALAZINE 500 MG: 500 TABLET ORAL at 18:12

## 2023-01-01 RX ADMIN — APIXABAN 2.5 MG: 2.5 TABLET, FILM COATED ORAL at 11:02

## 2023-01-01 RX ADMIN — SODIUM CHLORIDE, PRESERVATIVE FREE 10 ML: 5 INJECTION INTRAVENOUS at 22:48

## 2023-01-01 RX ADMIN — SODIUM CHLORIDE, PRESERVATIVE FREE 10 ML: 5 INJECTION INTRAVENOUS at 18:23

## 2023-01-01 RX ADMIN — SODIUM CHLORIDE, PRESERVATIVE FREE 10 ML: 5 INJECTION INTRAVENOUS at 06:01

## 2023-01-01 RX ADMIN — ROSUVASTATIN CALCIUM 5 MG: 10 TABLET, COATED ORAL at 22:48

## 2023-01-01 NOTE — PROGRESS NOTES
Problem: Pressure Injury - Risk of  Goal: *Prevention of pressure injury  Description: Document Gerald Scale and appropriate interventions in the flowsheet. Outcome: Progressing Towards Goal  Note: Pressure Injury Interventions:  Sensory Interventions: Assess changes in LOC, Discuss PT/OT consult with provider, Maintain/enhance activity level    Moisture Interventions: Internal/External urinary devices, Absorbent underpads, Maintain skin hydration (lotion/cream)    Activity Interventions: PT/OT evaluation, Assess need for specialty bed    Mobility Interventions: Assess need for specialty bed, HOB 30 degrees or less, PT/OT evaluation    Nutrition Interventions: Document food/fluid/supplement intake, Offer support with meals,snacks and hydration                     Problem: Patient Education: Go to Patient Education Activity  Goal: Patient/Family Education  Outcome: Progressing Towards Goal     Problem: Falls - Risk of  Goal: *Absence of Falls  Description: Document Baljinder Fall Risk and appropriate interventions in the flowsheet.   Outcome: Progressing Towards Goal  Note: Fall Risk Interventions:       Mentation Interventions: Bed/chair exit alarm, More frequent rounding, Reorient patient    Medication Interventions: Patient to call before getting OOB, Teach patient to arise slowly, Bed/chair exit alarm    Elimination Interventions: Bed/chair exit alarm, Call light in reach, Patient to call for help with toileting needs, Stay With Me (per policy)    History of Falls Interventions: Consult care management for discharge planning         Problem: Pain  Goal: *Control of Pain  Outcome: Progressing Towards Goal  Goal: *PALLIATIVE CARE:  Alleviation of Pain  Outcome: Progressing Towards Goal     Problem: Patient Education: Go to Patient Education Activity  Goal: Patient/Family Education  Outcome: Progressing Towards Goal     Problem: Patient Education: Go to Patient Education Activity  Goal: Patient/Family Education  Outcome: Progressing Towards Goal

## 2023-01-01 NOTE — PROGRESS NOTES
Bedside shift change report given to ADRIANO Rodriguez (oncoming nurse) by Alexis Katz RN (offgoing nurse). Report included the following information SBAR, Kardex, and MAR.

## 2023-01-01 NOTE — PROGRESS NOTES
Famotidine HD Dosing Adjustment   Indication:  GERD    Plan:  Famotidine 40mg daily has been adjusted to Famotidine 20 mg  q24h for HD dosing    Thank you,  [facility]

## 2023-01-01 NOTE — PROGRESS NOTES
Transition of Care Plan  RUR: 16%  DISPOSITION: The disposition plan is home with family assistance; needing [facility] for [facility] HHPT/OT  F/U with PCP/Specialist    Transport: family         Reason for Readmission:     peripheral vascular disease          RUR Score/Risk Level:     16%    PCP: First and Last name:  Anastacio Coates NP   Name of Practice:    Are you a current patient: Yes/No:    Approximate date of last visit:    Can you participate in a virtual visit with your PCP:     Is a Care Conference indicated: no      Did you attend your follow up appointment (s): If not, why not: yes          Resources/supports as identified by patient/family:   family       Top Challenges facing patient (as identified by patient/family and CM):     none    Finances/Medication cost?       Transportation        Support system or lack thereof? Living arrangements? Self-care/ADLs/Cognition? Current Advanced Directive/Advance Care Plan:             Plan for utilizing home health:   open to [facility]             Transition of Care Plan:    Based on readmission, the patient's previous Plan of Care   has been evaluated and/or modified. The current Transition of Care Plan is:             Patient lives at home with her daughter, Alondra Ball. Patient has a cane and wheelchair and janna lift at home. Patient requires assistance with daily care. Patient's daughter is her transport. Patient uses the [employer] in [address] [facility]. Care Management Interventions  PCP Verified by CM: Yes  Palliative Care Criteria Met (RRAT>21 & CHF Dx)?: No  Mode of Transport at Discharge:  Other (see comment) (daughter)  Transition of Care Consult (CM Consult): Discharge Planning  MyChart Signup: No  Discharge Durable Medical Equipment: No  Health Maintenance Reviewed: Yes  Physical Therapy Consult: Yes  Occupational Therapy Consult: Yes  Speech Therapy Consult: No  Support Systems: Child(jenny)  Confirm Follow Up Transport: Family  [employer] Information Provided?: No  Discharge Location  Patient Expects to be Discharged to[de-identified] Home with home health    Readmission Assessment  Number of days since last admission?: 8-30 days  Previous disposition: Home with Home Health  Who is being interviewed?: Patient  What was the patient's/caregiver's perception as to why they think they needed to return back to the hospital?: Other (Comment)  Did you visit your Primary Care Physician after you left the hospital, before you returned this time?: Yes  Did you see a specialist, such as Cardiac, Pulmonary, Orthopedic Physician, etc. after you left the hospital?: Yes  Who advised the patient to return to the hospital?: Self-referral  Does the patient report anything that got in the way of taking their medications?: No  In our efforts to provide the best possible care to you and others like you, can you think of anything that we could have done to help you after you left the hospital the first time, so that you might not have needed to return so soon?: Other (Comment)      12:50 PM  THERESE Merritt

## 2023-01-01 NOTE — PROGRESS NOTES
End of Shift Note    Bedside shift change report given to Janet Orr (oncoming nurse) by Emilia Anedrson RN (offgoing nurse). Report included the following information SBAR, Kardex, and MAR    Shift worked:  7a-7p     Shift summary and any significant changes:     Patient retaining urine this shift. Straight cath one time during shift per protocol, following bladder scan. Concerns for physician to address:  None     Zone phone for oncoming shift:   unknown       Activity:  Activity Level: Bed Rest  Number times ambulated in hallways past shift: 0  Number of times OOB to chair past shift: 0    Cardiac:   Cardiac Monitoring: Yes      Cardiac Rhythm: Ventricular Paced    Access:  Current line(s): PIV     Genitourinary:   Urinary status: straight cath    Respiratory:   O2 Device: Nasal cannula  Chronic home O2 use?: NO  Incentive spirometer at bedside: NO       GI:     Current diet:  ADULT DIET Dysphagia - Soft & Bite Sized; 3 carb choices (45 gm/meal)  DIET ONE TIME MESSAGE  Passing flatus: YES  Tolerating current diet: YES       Pain Management:   Patient states pain is manageable on current regimen: YES    Skin:  Gerald Score: 13  Interventions: increase time out of bed and PT/OT consult    Patient Safety:  Fall Score:  Total Score: 3  Interventions: bed/chair alarm, gripper socks, pt to call before getting OOB, and stay with me (per policy)  High Fall Risk: Yes    Length of Stay:  Expected LOS: - - -  Actual LOS: 1      Emilia Anderson RN

## 2023-01-01 NOTE — PROGRESS NOTES
End of Shift Note    Bedside shift change report given to [address] [place] [address] (oncoming nurse) by Danae Justin RN (offgoing nurse). Report included the following information SBAR, Kardex, Intake/Output, MAR, Recent Results, and Cardiac Rhythm v paced    Shift worked:  7p-7a     Shift summary and any significant changes:     Bladder scan performed at 0311: 213mL. PRN analgesia administered for post op pain. Concerns for physician to address:  Family asking about home health and PT     Zone phone for oncoming shift:   [phone]       Activity:  Activity Level: Chair  Number times ambulated in hallways past shift: 0  Number of times OOB to chair past shift: 0    Cardiac:   Cardiac Monitoring: Yes      Cardiac Rhythm: Ventricular Paced    Access:  Current line(s): PIV     Genitourinary:   Urinary status: due to void    Respiratory:   O2 Device: Nasal cannula  Chronic home O2 use?: NO  Incentive spirometer at bedside: NO       GI:     Current diet:  ADULT DIET Dysphagia - Soft & Bite Sized; 3 carb choices (45 gm/meal)  DIET ONE TIME MESSAGE  Passing flatus: YES  Tolerating current diet: YES       Pain Management:   Patient states pain is manageable on current regimen: YES    Skin:  Gerald Score: 13  Interventions: turn team, increase time out of bed, PT/OT consult, internal/external urinary devices, and nutritional support     Patient Safety:  Fall Score:  Total Score: 3  Interventions: bed/chair alarm, assistive device (walker, cane, etc), pt to call before getting OOB, and stay with me (per policy)  High Fall Risk: Yes    Length of Stay:  Expected LOS: - - -  Actual LOS: 2      Danae Justin RN

## 2023-01-02 VITALS
DIASTOLIC BLOOD PRESSURE: 48 MMHG | WEIGHT: 100 LBS | OXYGEN SATURATION: 94 % | HEART RATE: 72 BPM | BODY MASS INDEX: 20.16 KG/M2 | TEMPERATURE: 98.2 F | RESPIRATION RATE: 21 BRPM | HEIGHT: 59 IN | SYSTOLIC BLOOD PRESSURE: 127 MMHG

## 2023-01-02 LAB
GLUCOSE BLD STRIP.AUTO-MCNC: 152 MG/DL (ref 65–117)
GLUCOSE BLD STRIP.AUTO-MCNC: 168 MG/DL (ref 65–117)
GLUCOSE BLD STRIP.AUTO-MCNC: 38 MG/DL (ref 65–117)
GLUCOSE BLD STRIP.AUTO-MCNC: 39 MG/DL (ref 65–117)
GLUCOSE BLD STRIP.AUTO-MCNC: 41 MG/DL (ref 65–117)
GLUCOSE BLD STRIP.AUTO-MCNC: 41 MG/DL (ref 65–117)
GLUCOSE BLD STRIP.AUTO-MCNC: 67 MG/DL (ref 65–117)
SERVICE CMNT-IMP: ABNORMAL
SERVICE CMNT-IMP: NORMAL

## 2023-01-02 PROCEDURE — 82962 GLUCOSE BLOOD TEST: CPT

## 2023-01-02 PROCEDURE — 74011250637 HC RX REV CODE- 250/637: Performed by: SURGERY

## 2023-01-02 PROCEDURE — 74011000250 HC RX REV CODE- 250: Performed by: SURGERY

## 2023-01-02 RX ORDER — DOCUSATE SODIUM 100 MG/1
100 CAPSULE, LIQUID FILLED ORAL
Status: DISCONTINUED | OUTPATIENT
Start: 2023-01-02 | End: 2023-01-02 | Stop reason: HOSPADM

## 2023-01-02 RX ORDER — IBUPROFEN 200 MG
4 TABLET ORAL AS NEEDED
Status: DISCONTINUED | OUTPATIENT
Start: 2023-01-02 | End: 2023-01-02 | Stop reason: HOSPADM

## 2023-01-02 RX ORDER — DEXTROSE MONOHYDRATE 100 MG/ML
0-250 INJECTION, SOLUTION INTRAVENOUS AS NEEDED
Status: DISCONTINUED | OUTPATIENT
Start: 2023-01-02 | End: 2023-01-02 | Stop reason: HOSPADM

## 2023-01-02 RX ADMIN — DEXTROSE MONOHYDRATE 250 ML: 100 INJECTION, SOLUTION INTRAVENOUS at 08:05

## 2023-01-02 RX ADMIN — OXYCODONE 5 MG: 5 TABLET ORAL at 09:25

## 2023-01-02 RX ADMIN — OXYCODONE 5 MG: 5 TABLET ORAL at 13:28

## 2023-01-02 RX ADMIN — FAMOTIDINE 20 MG: 20 TABLET, FILM COATED ORAL at 09:25

## 2023-01-02 RX ADMIN — DOCUSATE SODIUM 100 MG: 100 CAPSULE, LIQUID FILLED ORAL at 12:22

## 2023-01-02 RX ADMIN — ACETAMINOPHEN 500 MG: 500 TABLET ORAL at 12:26

## 2023-01-02 RX ADMIN — DEXTROSE MONOHYDRATE 125 ML: 100 INJECTION, SOLUTION INTRAVENOUS at 08:31

## 2023-01-02 RX ADMIN — SODIUM CHLORIDE, PRESERVATIVE FREE 10 ML: 5 INJECTION INTRAVENOUS at 05:47

## 2023-01-02 RX ADMIN — LEVOTHYROXINE SODIUM 25 MCG: 0.03 TABLET ORAL at 05:41

## 2023-01-02 RX ADMIN — SULFASALAZINE 500 MG: 500 TABLET ORAL at 09:24

## 2023-01-02 RX ADMIN — ACETAMINOPHEN 500 MG: 500 TABLET ORAL at 05:41

## 2023-01-02 RX ADMIN — FUROSEMIDE 40 MG: 40 TABLET ORAL at 09:25

## 2023-01-02 RX ADMIN — APIXABAN 2.5 MG: 2.5 TABLET, FILM COATED ORAL at 09:25

## 2023-01-02 RX ADMIN — POLYETHYLENE GLYCOL 3350 17 G: 17 POWDER, FOR SOLUTION ORAL at 09:24

## 2023-01-02 NOTE — PROGRESS NOTES
End of Shift Note    Bedside shift change report given to Santiago Celaya RN (oncoming nurse) by Jarrell Colby RN (offgoing nurse). Report included the following information SBAR, Kardex, and Recent Results    Shift worked:  7a-7p     Shift summary and any significant changes:     Patient output appropriate this shift. Poor oral intake this shift. Oral medications taken successfully. BG lower today, no lispro administered. Concerns for physician to address:  PT/OT requested prior to discharge. Ensure wanted by family     Zone phone for oncoming shift:   unknown       Activity:  Activity Level: Bed Rest  Number times ambulated in hallways past shift: 0  Number of times OOB to chair past shift: 0    Cardiac:   Cardiac Monitoring: Yes      Cardiac Rhythm: Ventricular Paced    Access:  Current line(s): PIV     Genitourinary:   Urinary status: voiding and external catheter    Respiratory:   O2 Device: Nasal cannula  Chronic home O2 use?: NO  Incentive spirometer at bedside: NO       GI:     Current diet:  ADULT DIET Dysphagia - Soft & Bite Sized; 3 carb choices (45 gm/meal)  DIET ONE TIME MESSAGE  Passing flatus: YES  Tolerating current diet: NO       Pain Management:   Patient states pain is manageable on current regimen: YES    Skin:  Gerald Score: 12  Interventions: increase time out of bed, PT/OT consult, and internal/external urinary devices    Patient Safety:  Fall Score:  Total Score: 3  Interventions: bed/chair alarm, gripper socks, pt to call before getting OOB, and stay with me (per policy)  High Fall Risk: Yes    Length of Stay:  Expected LOS: - - -  Actual LOS: 2      Jarrell Colby RN

## 2023-01-02 NOTE — PROGRESS NOTES
Transition of Care Plan:    RUR: 16% \"moderate risk\"  Disposition: Home with [facility]  If SNF or IPR: Date FOC offered: N/A  Date FOC received: N/A  Date authorization started with reference number: N/A  Date authorization received and expires: N/A  Accepting facility: N/A  Follow up appointments: PCP & Specialists as needed  DME needed: Bee Mercer lift at home  Transportation at Discharge: Hospital to Home secured for 2pm  [place] or means to access home: Pt has access to home   IM Medicare Letter: 2nd IM letter given; copy on chart  Is patient a  and connected with the [address]? N/A  Caregiver Contact: Pt's daughter Leti Booker [phone]  Discharge Caregiver contacted prior to discharge? Daughter at [place] [id number] needed?: Not at this time    Initial note: Chart reviewed for updates. CM acknowledged d/c.  CM met with pt and daughter at bedside to discuss d/c planning. Pt has dementia; CM deferred to daughter to discuss d/c planning. Daughter agreeable with d/c planning. Daughter aware that transport is secured for 2pm. Daughter requesting [address] and wants referral sent to [facility]. Referral sent to [facility]. No questions/concerns reported by daughter. CM has shayleed MD to inquire about d/c summary. CM will remain accessible for consult incase additional CM needs arise prior d/c. Care Management Interventions  PCP Verified by CM: Yes  Palliative Care Criteria Met (RRAT>21 & CHF Dx)?: No  Mode of Transport at Discharge:  Other (see comment) [facility] Home secured for 2pm)  Hospital Transport Time of Discharge: 1400  Transition of Care Consult (CM Consult): Discharge Planning  MyChart Signup: No  Discharge Durable Medical Equipment: No  Health Maintenance Reviewed: Yes  Physical Therapy Consult: No  Occupational Therapy Consult: No  Speech Therapy Consult: No  Support Systems: Child(jenny) (Pt's children are her support system)  Confirm Follow Up Transport: Other (see comment) (Hospita to Home secured for 2pm)  The Plan for Transition of Care is Related to the Following Treatment Goals : [facility] Provided?: No  Discharge Location  Patient Expects to be Discharged to[de-identified] Home with home health (Pt is d/c home with home health and follow up apts)       TAHIRA Jefferson    [phone]

## 2023-01-02 NOTE — PROGRESS NOTES
End of Shift Note    Bedside shift change report given to Aaron Andrade (oncoming nurse) by Danae Justin RN (offgoing nurse). Report included the following information SBAR, Kardex, Intake/Output, MAR, Recent Results, and Cardiac Rhythm v paced    Shift worked:  7p-7a     Shift summary and any significant changes:     PRNs given for pain managerment, better controlled     Concerns for physician to address:  Constipation- family asking for stool softener  PT eval?  Ensures? Poor appetite, family concerned about nutrition     Zone phone for oncoming shift:   [phone]       Activity:  Activity Level: Bed Rest  Number times ambulated in hallways past shift: 0  Number of times OOB to chair past shift: 0    Cardiac:   Cardiac Monitoring: Yes      Cardiac Rhythm: Ventricular Paced    Access:  Current line(s): PIV     Genitourinary:   Urinary status: voiding and external catheter    Respiratory:   O2 Device: None (Room air)  Chronic home O2 use?: NO  Incentive spirometer at bedside: NO       GI:     Current diet:  ADULT DIET Dysphagia - Soft & Bite Sized; 3 carb choices (45 gm/meal)  DIET ONE TIME MESSAGE  Passing flatus: YES  Tolerating current diet: NO       Pain Management:   Patient states pain is manageable on current regimen: YES    Skin:  Gerald Score: 12  Interventions: turn team, increase time out of bed, PT/OT consult, limit briefs, internal/external urinary devices, and nutritional support     Patient Safety:  Fall Score:  Total Score: 3  Interventions: bed/chair alarm, assistive device (walker, cane, etc), pt to call before getting OOB, and stay with me (per policy)  High Fall Risk: Yes    Length of Stay:  Expected LOS: - - -  Actual LOS: 3      Danae Justin RN

## 2023-01-02 NOTE — DISCHARGE INSTRUCTIONS
Patient Discharge Instructions    Fe Villaseñor / [id number] : 1940    Admitted 2022 Discharged: 2023     What to do at Home  Try to keep stump clean and dry       Information obtained by :  I understand that if any problems occur once I am at home I am to contact my physician. I understand and acknowledge receipt of the instructions indicated above.                                                                                                                                            R.N.'s Signature                                                                  Date/Time                                                                                                                                              Patient or Representative Signature                                                          Date/Time      Lefty Hernandez MD

## 2023-01-02 NOTE — PROGRESS NOTES
0725: Bedside, Verbal, and Written shift change report given to [address] (oncoming nurse) by Adele Norman (offgoing nurse). Report included the following information SBAR, Kardex, Intake/Output, MAR, and Recent Results. 0961: PCT reported patients blood sugar of 38, repeat 41. Patient alert and asymptomatic, juice given. Will repeat blood sugar per protocol. 0348-8090: Repeat blood sugar 39 post juice. Patient remains alert and asymptomatic. D10 given PIV per protocol, see MAR. Patient eating breakfast, family present at bedside. 0830: Blood sugar 67 after receiving 250 ml D10. Will give additional 125ml per protocol. Patient remains alert and asymptomatic    0900:Blood sugar 168.     0950: MD paged. 1025: Spoke to Dr. Nicole Clemons and provided updates. Patient medically stable for dc per MD, pending CM /transportation home    [phone]: CM updated that patient will need ambulance transportation home. 1130:  reports transportation is set up for 2pm today. 1330: Written and verbal discharge instructions reviewed with family at bedside, all questions answered and verbalized understanding. All personal belongings packed up by family. 1415: Transportation here to take patient home.

## 2023-01-02 NOTE — PROGRESS NOTES
Problem: Pressure Injury - Risk of  Goal: *Prevention of pressure injury  Description: Document Gerald Scale and appropriate interventions in the flowsheet. Outcome: Progressing Towards Goal  Note: Pressure Injury Interventions:  Sensory Interventions: Assess changes in LOC, Float heels, Keep linens dry and wrinkle-free, Maintain/enhance activity level, Minimize linen layers, Turn and reposition approx. every two hours (pillows and wedges if needed)    Moisture Interventions: Absorbent underpads, Apply protective barrier, creams and emollients, Check for incontinence Q2 hours and as needed, Internal/External urinary devices, Minimize layers    Activity Interventions: Assess need for specialty bed    Mobility Interventions: Float heels, HOB 30 degrees or less, Turn and reposition approx.  every two hours(pillow and wedges)    Nutrition Interventions: Document food/fluid/supplement intake, Offer support with meals,snacks and hydration, Discuss nutritional consult with provider    Friction and Shear Interventions: Apply protective barrier, creams and emollients, Minimize layers

## 2023-01-02 NOTE — PROGRESS NOTES
1/3/23 0857 - POST DISCHARGE NOTE: PCP hospital follow-up transitional care appointment has been scheduled with NP Chloe Stubbs on 1/10/23 at 1500. Pending patient discharge. Brandin Anderson Care Management Assistant     Attempted to schedule hospital follow up PCP appointment. Office is closed for the holiday. Pending patient discharge.  Brandin Anderson Care Management Assistant

## 2023-01-03 NOTE — PROGRESS NOTES
[facility]ed to contact [facility] regarding [facility] mothers PCP hospital follow up appointment. Unable to reach anyone, unable to leave voicemail as there was no recording available.  Agatha Franco, Care Management Assistant

## 2023-01-06 NOTE — PROGRESS NOTES
Physician Progress Note      Angie Velásquez  CSN #:                  [id number]  :                       1940  ADMIT DATE:       2022 12:07 PM  [address] [place] DATE:        2023 2:16 PM  RESPONDING  PROVIDER #:        Sujata Donato MD          QUERY TEXT:    Patient presented for above knee amputation, noted to have atrial fibrillation and is maintained on Eliquis. 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     Query created by: Diane Davis on 2023 2:39 PM      Electronically signed by:  Sujata Donato MD 2023 7:48 AM

## 2023-01-09 NOTE — DISCHARGE SUMMARY
[address] SUMMARY    Name:  Junior Bro  MR#:  [id number]  :  1940  ACCOUNT #:  [de-identified]  ADMIT DATE:  2022  DISCHARGE DATE:  2023    ADMITTING DIAGNOSES:  Failed right below-knee amputation. PROCEDURE PERFORMED:  Right above-knee amputation. ATTENDING PHYSICIAN:  Dr. Bobo Perkins. HISTORY OF PRESENT ILLNESS/HOSPITAL COURSE:  The patient is a demented, nonambulatory 78-year-old woman who several weeks prior had undergone a right below-knee amputation. She had a noncompliant with limb protectors and limb immobilizers and developed a severe contracture at the knee which resulted in pressure ulcers and loss of her below-knee stump. She was admitted at this time for revision. She underwent the above noted procedure without difficulty. Postoperative course was unremarkable. She was discharged home as soon as it was clear that the wound was healing nicely and that she was comfortable. She had home health arranged by discharge planner and she already had a hospital bed and [facility] lift and trapeze at home. DISCHARGE INSTRUCTIONS:  She was asked to return to our [address] [place] [address] office in 3 weeks for followup.       MD TERRY Butts/[email or web address]  D:  2023 19:43  T:  2023 22:54  JOB #:  [id number]  CC:  Bobo Perkins MD

## 2023-02-20 NOTE — PROGRESS NOTES
Caller: Ava Andersen  ON  [facility]     Relationship: Emergency Contact    Best call back number:  [phone]    What test was performed:  Xray     When was the test performed:     Where was the test performed:    Additional notes: PLEASE CALL WITH TEST RESULTS            End of Shift Note    Bedside shift change report given to Esther Wiley RN  (oncoming nurse) by Bakari Dai RN (offgoing nurse). Report included the following information SBAR and Kardex    Shift worked:  7a-7p     Shift summary and any significant changes:     Pt received all care well. Used bedpan multiple times. Couple complaints of pain during shift. Concerns for physician to address:  Pt is asking to advance her diet. Zone phone for oncoming shift:   [phone]       Activity:  Activity Level: Bed Rest  Number times ambulated in hallways past shift: 0  Number of times OOB to chair past shift: 0    Cardiac:   Cardiac Monitoring: No      Cardiac Rhythm:  (PACED)    Access:   Current line(s): PIV     Genitourinary:   Urinary status: voiding    Respiratory:   O2 Device: None (Room air)  Chronic home O2 use?: NO  Incentive spirometer at bedside: YES     GI:     Current diet:  ADULT DIET Clear Liquid  Passing flatus: YES  Tolerating current diet: YES       Pain Management:   Patient states pain is manageable on current regimen: YES    Skin:  Gerald Score: 18  Interventions: increase time out of bed and limit briefs    Patient Safety:  Fall Score:  Total Score: 5  Interventions: bed/chair alarm, assistive device (walker, cane, etc), gripper socks, pt to call before getting OOB and stay with me (per policy)  High Fall Risk: Yes    Length of Stay:  Expected LOS: - - -  Actual LOS: 0      Deann Dyson RN

## 2023-04-28 ENCOUNTER — APPOINTMENT (OUTPATIENT)
Dept: GENERAL RADIOLOGY | Age: 83
DRG: 617 | End: 2023-04-28
Attending: STUDENT IN AN ORGANIZED HEALTH CARE EDUCATION/TRAINING PROGRAM
Payer: MEDICARE

## 2023-04-28 ENCOUNTER — HOSPITAL ENCOUNTER (INPATIENT)
Age: 83
LOS: 6 days | Discharge: HOME HEALTH | DRG: 617 | End: 2023-05-04
Attending: STUDENT IN AN ORGANIZED HEALTH CARE EDUCATION/TRAINING PROGRAM | Admitting: STUDENT IN AN ORGANIZED HEALTH CARE EDUCATION/TRAINING PROGRAM
Payer: MEDICARE

## 2023-04-28 DIAGNOSIS — I96 GANGRENE OF LEFT FOOT (HCC): ICD-10-CM

## 2023-04-28 DIAGNOSIS — A41.9 SEPSIS WITHOUT ACUTE ORGAN DYSFUNCTION, DUE TO UNSPECIFIED ORGANISM (HCC): Primary | ICD-10-CM

## 2023-04-28 PROBLEM — L08.9 DIABETIC FOOT INFECTION (HCC): Status: ACTIVE | Noted: 2023-04-28

## 2023-04-28 PROBLEM — E11.628 DIABETIC FOOT INFECTION (HCC): Status: ACTIVE | Noted: 2023-04-28

## 2023-04-28 LAB
ALBUMIN SERPL-MCNC: 2.9 G/DL (ref 3.5–5)
ALBUMIN/GLOB SERPL: 0.6 (ref 1.1–2.2)
ALP SERPL-CCNC: 167 U/L (ref 45–117)
ALT SERPL-CCNC: 13 U/L (ref 12–78)
ANION GAP BLD CALC-SCNC: 12 (ref 10–20)
ANION GAP SERPL CALC-SCNC: 4 MMOL/L (ref 5–15)
AST SERPL-CCNC: 19 U/L (ref 15–37)
BASE EXCESS BLD CALC-SCNC: 0.4 MMOL/L
BASOPHILS # BLD: 0 K/UL (ref 0–0.1)
BASOPHILS NFR BLD: 0 % (ref 0–1)
BILIRUB SERPL-MCNC: 0.4 MG/DL (ref 0.2–1)
BUN SERPL-MCNC: 31 MG/DL (ref 6–20)
BUN/CREAT SERPL: 35 (ref 12–20)
CA-I BLD-MCNC: 1.27 MMOL/L (ref 1.12–1.32)
CALCIUM SERPL-MCNC: 11.4 MG/DL (ref 8.5–10.1)
CHLORIDE BLD-SCNC: 108 MMOL/L (ref 100–108)
CHLORIDE SERPL-SCNC: 104 MMOL/L (ref 97–108)
CO2 BLD-SCNC: 25 MMOL/L (ref 19–24)
CO2 SERPL-SCNC: 28 MMOL/L (ref 21–32)
CREAT SERPL-MCNC: 0.89 MG/DL (ref 0.55–1.02)
DIFFERENTIAL METHOD BLD: ABNORMAL
EOSINOPHIL # BLD: 0 K/UL (ref 0–0.4)
EOSINOPHIL NFR BLD: 0 % (ref 0–7)
ERYTHROCYTE [DISTWIDTH] IN BLOOD BY AUTOMATED COUNT: 13.6 % (ref 11.5–14.5)
GLOBULIN SER CALC-MCNC: 5.1 G/DL (ref 2–4)
GLUCOSE BLD STRIP.AUTO-MCNC: 111 MG/DL (ref 74–106)
GLUCOSE BLD STRIP.AUTO-MCNC: 81 MG/DL (ref 65–117)
GLUCOSE SERPL-MCNC: 86 MG/DL (ref 65–100)
HCO3 BLDA-SCNC: 25 MMOL/L
HCT VFR BLD AUTO: 30.9 % (ref 35–47)
HGB BLD-MCNC: 9.8 G/DL (ref 11.5–16)
IMM GRANULOCYTES # BLD AUTO: 0.1 K/UL (ref 0–0.04)
IMM GRANULOCYTES NFR BLD AUTO: 1 % (ref 0–0.5)
LACTATE BLD-SCNC: 0.88 MMOL/L (ref 0.4–2)
LYMPHOCYTES # BLD: 0.8 K/UL (ref 0.8–3.5)
LYMPHOCYTES NFR BLD: 5 % (ref 12–49)
MCH RBC QN AUTO: 31.3 PG (ref 26–34)
MCHC RBC AUTO-ENTMCNC: 31.7 G/DL (ref 30–36.5)
MCV RBC AUTO: 98.7 FL (ref 80–99)
MONOCYTES # BLD: 0.8 K/UL (ref 0–1)
MONOCYTES NFR BLD: 5 % (ref 5–13)
NEUTS SEG # BLD: 15.2 K/UL (ref 1.8–8)
NEUTS SEG NFR BLD: 89 % (ref 32–75)
NRBC # BLD: 0 K/UL (ref 0–0.01)
NRBC BLD-RTO: 0 PER 100 WBC
PCO2 BLDV: 41.2 MMHG (ref 41–51)
PH BLDV: 7.4 (ref 7.32–7.42)
PLATELET # BLD AUTO: 287 K/UL (ref 150–400)
PMV BLD AUTO: 9.7 FL (ref 8.9–12.9)
PO2 BLDV: <13 MMHG (ref 25–40)
POTASSIUM BLD-SCNC: 4 MMOL/L (ref 3.5–5.5)
POTASSIUM SERPL-SCNC: 4.9 MMOL/L (ref 3.5–5.1)
PROT SERPL-MCNC: 8 G/DL (ref 6.4–8.2)
RBC # BLD AUTO: 3.13 M/UL (ref 3.8–5.2)
SERVICE CMNT-IMP: NORMAL
SODIUM BLD-SCNC: 145 MMOL/L (ref 136–145)
SODIUM SERPL-SCNC: 136 MMOL/L (ref 136–145)
SPECIMEN SITE: ABNORMAL
TROPONIN I SERPL HS-MCNC: 71 NG/L (ref 0–51)
TROPONIN I SERPL HS-MCNC: 76 NG/L (ref 0–51)
WBC # BLD AUTO: 17 K/UL (ref 3.6–11)

## 2023-04-28 PROCEDURE — 80053 COMPREHEN METABOLIC PANEL: CPT

## 2023-04-28 PROCEDURE — 74011000250 HC RX REV CODE- 250: Performed by: STUDENT IN AN ORGANIZED HEALTH CARE EDUCATION/TRAINING PROGRAM

## 2023-04-28 PROCEDURE — 74011250637 HC RX REV CODE- 250/637: Performed by: STUDENT IN AN ORGANIZED HEALTH CARE EDUCATION/TRAINING PROGRAM

## 2023-04-28 PROCEDURE — 96365 THER/PROPH/DIAG IV INF INIT: CPT

## 2023-04-28 PROCEDURE — 74011250636 HC RX REV CODE- 250/636: Performed by: STUDENT IN AN ORGANIZED HEALTH CARE EDUCATION/TRAINING PROGRAM

## 2023-04-28 PROCEDURE — 96367 TX/PROPH/DG ADDL SEQ IV INF: CPT

## 2023-04-28 PROCEDURE — 96375 TX/PRO/DX INJ NEW DRUG ADDON: CPT

## 2023-04-28 PROCEDURE — 87040 BLOOD CULTURE FOR BACTERIA: CPT

## 2023-04-28 PROCEDURE — 82962 GLUCOSE BLOOD TEST: CPT

## 2023-04-28 PROCEDURE — 74011000258 HC RX REV CODE- 258: Performed by: STUDENT IN AN ORGANIZED HEALTH CARE EDUCATION/TRAINING PROGRAM

## 2023-04-28 PROCEDURE — 85025 COMPLETE CBC W/AUTO DIFF WBC: CPT

## 2023-04-28 PROCEDURE — 82947 ASSAY GLUCOSE BLOOD QUANT: CPT

## 2023-04-28 PROCEDURE — 65270000046 HC RM TELEMETRY

## 2023-04-28 PROCEDURE — 84484 ASSAY OF TROPONIN QUANT: CPT

## 2023-04-28 PROCEDURE — 99285 EMERGENCY DEPT VISIT HI MDM: CPT

## 2023-04-28 PROCEDURE — 71045 X-RAY EXAM CHEST 1 VIEW: CPT

## 2023-04-28 PROCEDURE — 94760 N-INVAS EAR/PLS OXIMETRY 1: CPT

## 2023-04-28 PROCEDURE — 93005 ELECTROCARDIOGRAM TRACING: CPT

## 2023-04-28 PROCEDURE — 36415 COLL VENOUS BLD VENIPUNCTURE: CPT

## 2023-04-28 RX ORDER — ONDANSETRON 2 MG/ML
4 INJECTION INTRAMUSCULAR; INTRAVENOUS
Status: DISCONTINUED | OUTPATIENT
Start: 2023-04-28 | End: 2023-05-04 | Stop reason: HOSPADM

## 2023-04-28 RX ORDER — ACETAMINOPHEN 325 MG/1
975 TABLET ORAL
Status: COMPLETED | OUTPATIENT
Start: 2023-04-28 | End: 2023-04-28

## 2023-04-28 RX ORDER — ACETAMINOPHEN 650 MG/1
650 SUPPOSITORY RECTAL
Status: DISCONTINUED | OUTPATIENT
Start: 2023-04-28 | End: 2023-05-01

## 2023-04-28 RX ORDER — OXYCODONE HYDROCHLORIDE 5 MG/1
5 TABLET ORAL
Status: DISCONTINUED | OUTPATIENT
Start: 2023-04-28 | End: 2023-05-04 | Stop reason: HOSPADM

## 2023-04-28 RX ORDER — SODIUM CHLORIDE 0.9 % (FLUSH) 0.9 %
5-10 SYRINGE (ML) INJECTION AS NEEDED
Status: DISCONTINUED | OUTPATIENT
Start: 2023-04-28 | End: 2023-04-28

## 2023-04-28 RX ORDER — IBUPROFEN 200 MG
4 TABLET ORAL AS NEEDED
Status: DISCONTINUED | OUTPATIENT
Start: 2023-04-28 | End: 2023-05-03 | Stop reason: SDUPTHER

## 2023-04-28 RX ORDER — SODIUM CHLORIDE 0.9 % (FLUSH) 0.9 %
5-40 SYRINGE (ML) INJECTION EVERY 8 HOURS
Status: DISCONTINUED | OUTPATIENT
Start: 2023-04-28 | End: 2023-05-01

## 2023-04-28 RX ORDER — METOPROLOL SUCCINATE 50 MG/1
50 TABLET, EXTENDED RELEASE ORAL DAILY
Status: DISCONTINUED | OUTPATIENT
Start: 2023-04-29 | End: 2023-05-04 | Stop reason: HOSPADM

## 2023-04-28 RX ORDER — SULFASALAZINE 500 MG/1
500 TABLET ORAL 2 TIMES DAILY
Status: DISCONTINUED | OUTPATIENT
Start: 2023-04-29 | End: 2023-05-04 | Stop reason: HOSPADM

## 2023-04-28 RX ORDER — ACETAMINOPHEN 325 MG/1
650 TABLET ORAL
Status: DISCONTINUED | OUTPATIENT
Start: 2023-04-28 | End: 2023-05-01

## 2023-04-28 RX ORDER — FUROSEMIDE 40 MG/1
40 TABLET ORAL DAILY
Status: DISCONTINUED | OUTPATIENT
Start: 2023-04-29 | End: 2023-05-04 | Stop reason: HOSPADM

## 2023-04-28 RX ORDER — FENTANYL CITRATE 50 UG/ML
25 INJECTION, SOLUTION INTRAMUSCULAR; INTRAVENOUS
Status: DISCONTINUED | OUTPATIENT
Start: 2023-04-28 | End: 2023-04-29

## 2023-04-28 RX ORDER — HYDROMORPHONE HYDROCHLORIDE 1 MG/ML
0.5 INJECTION, SOLUTION INTRAMUSCULAR; INTRAVENOUS; SUBCUTANEOUS ONCE
Status: COMPLETED | OUTPATIENT
Start: 2023-04-28 | End: 2023-04-28

## 2023-04-28 RX ORDER — SPIRONOLACTONE 25 MG/1
25 TABLET ORAL EVERY OTHER DAY
Status: DISCONTINUED | OUTPATIENT
Start: 2023-04-28 | End: 2023-05-04 | Stop reason: HOSPADM

## 2023-04-28 RX ORDER — ONDANSETRON 2 MG/ML
4 INJECTION INTRAMUSCULAR; INTRAVENOUS
Status: DISCONTINUED | OUTPATIENT
Start: 2023-04-28 | End: 2023-04-28

## 2023-04-28 RX ORDER — VENLAFAXINE HYDROCHLORIDE 37.5 MG/1
75 CAPSULE, EXTENDED RELEASE ORAL
Status: DISCONTINUED | OUTPATIENT
Start: 2023-04-28 | End: 2023-05-04 | Stop reason: HOSPADM

## 2023-04-28 RX ORDER — POLYETHYLENE GLYCOL 3350 17 G/17G
17 POWDER, FOR SOLUTION ORAL DAILY PRN
Status: DISCONTINUED | OUTPATIENT
Start: 2023-04-28 | End: 2023-05-04 | Stop reason: HOSPADM

## 2023-04-28 RX ORDER — ENOXAPARIN SODIUM 100 MG/ML
40 INJECTION SUBCUTANEOUS DAILY
Status: DISCONTINUED | OUTPATIENT
Start: 2023-04-29 | End: 2023-05-04 | Stop reason: HOSPADM

## 2023-04-28 RX ORDER — INSULIN LISPRO 100 [IU]/ML
INJECTION, SOLUTION INTRAVENOUS; SUBCUTANEOUS
Status: DISCONTINUED | OUTPATIENT
Start: 2023-04-29 | End: 2023-05-04 | Stop reason: HOSPADM

## 2023-04-28 RX ORDER — FAMOTIDINE 20 MG/1
40 TABLET, FILM COATED ORAL DAILY
Status: DISCONTINUED | OUTPATIENT
Start: 2023-04-29 | End: 2023-05-02

## 2023-04-28 RX ORDER — DEXTROSE MONOHYDRATE 100 MG/ML
0-250 INJECTION, SOLUTION INTRAVENOUS AS NEEDED
Status: DISCONTINUED | OUTPATIENT
Start: 2023-04-28 | End: 2023-05-04 | Stop reason: HOSPADM

## 2023-04-28 RX ORDER — ROSUVASTATIN CALCIUM 10 MG/1
5 TABLET, COATED ORAL
Status: DISCONTINUED | OUTPATIENT
Start: 2023-04-28 | End: 2023-05-04 | Stop reason: HOSPADM

## 2023-04-28 RX ORDER — LEVOTHYROXINE SODIUM 25 UG/1
25 TABLET ORAL
Status: DISCONTINUED | OUTPATIENT
Start: 2023-04-29 | End: 2023-05-04 | Stop reason: HOSPADM

## 2023-04-28 RX ORDER — ACETAMINOPHEN 325 MG/1
650 TABLET ORAL
Status: DISCONTINUED | OUTPATIENT
Start: 2023-04-28 | End: 2023-04-28

## 2023-04-28 RX ORDER — ONDANSETRON 4 MG/1
4 TABLET, ORALLY DISINTEGRATING ORAL
Status: DISCONTINUED | OUTPATIENT
Start: 2023-04-28 | End: 2023-05-04 | Stop reason: HOSPADM

## 2023-04-28 RX ORDER — DILTIAZEM HYDROCHLORIDE 180 MG/1
180 CAPSULE, EXTENDED RELEASE ORAL DAILY
Status: DISCONTINUED | OUTPATIENT
Start: 2023-04-29 | End: 2023-04-29 | Stop reason: SDUPTHER

## 2023-04-28 RX ORDER — SODIUM CHLORIDE 0.9 % (FLUSH) 0.9 %
5-40 SYRINGE (ML) INJECTION AS NEEDED
Status: DISCONTINUED | OUTPATIENT
Start: 2023-04-28 | End: 2023-05-02 | Stop reason: SDUPTHER

## 2023-04-28 RX ADMIN — SPIRONOLACTONE 25 MG: 25 TABLET ORAL at 23:08

## 2023-04-28 RX ADMIN — SODIUM CHLORIDE 1000 ML: 9 INJECTION, SOLUTION INTRAVENOUS at 17:57

## 2023-04-28 RX ADMIN — ACETAMINOPHEN 975 MG: 325 TABLET ORAL at 18:36

## 2023-04-28 RX ADMIN — HYDROMORPHONE HYDROCHLORIDE 0.5 MG: 1 INJECTION, SOLUTION INTRAMUSCULAR; INTRAVENOUS; SUBCUTANEOUS at 17:51

## 2023-04-28 RX ADMIN — PIPERACILLIN AND TAZOBACTAM 3.38 G: 3; .375 INJECTION, POWDER, LYOPHILIZED, FOR SOLUTION INTRAVENOUS at 17:52

## 2023-04-28 RX ADMIN — VANCOMYCIN HYDROCHLORIDE 1000 MG: 1 INJECTION, POWDER, LYOPHILIZED, FOR SOLUTION INTRAVENOUS at 18:36

## 2023-04-28 RX ADMIN — VENLAFAXINE HYDROCHLORIDE 75 MG: 37.5 CAPSULE, EXTENDED RELEASE ORAL at 23:07

## 2023-04-28 RX ADMIN — ROSUVASTATIN CALCIUM 5 MG: 10 TABLET, FILM COATED ORAL at 23:07

## 2023-04-28 RX ADMIN — SODIUM CHLORIDE, PRESERVATIVE FREE 10 ML: 5 INJECTION INTRAVENOUS at 23:09

## 2023-04-28 RX ADMIN — OXYCODONE 5 MG: 5 TABLET ORAL at 23:17

## 2023-04-28 RX ADMIN — SODIUM CHLORIDE 515 ML: 9 INJECTION, SOLUTION INTRAVENOUS at 18:43

## 2023-04-29 LAB
ANION GAP SERPL CALC-SCNC: 4 MMOL/L (ref 5–15)
ATRIAL RATE: 84 BPM
BASOPHILS # BLD: 0 K/UL (ref 0–0.1)
BASOPHILS NFR BLD: 0 % (ref 0–1)
BUN SERPL-MCNC: 26 MG/DL (ref 6–20)
BUN/CREAT SERPL: 38 (ref 12–20)
CALCIUM SERPL-MCNC: 10.8 MG/DL (ref 8.5–10.1)
CALCULATED R AXIS, ECG10: 13 DEGREES
CALCULATED T AXIS, ECG11: -125 DEGREES
CHLORIDE SERPL-SCNC: 112 MMOL/L (ref 97–108)
CO2 SERPL-SCNC: 24 MMOL/L (ref 21–32)
CREAT SERPL-MCNC: 0.68 MG/DL (ref 0.55–1.02)
DIAGNOSIS, 93000: NORMAL
DIFFERENTIAL METHOD BLD: ABNORMAL
EOSINOPHIL # BLD: 0 K/UL (ref 0–0.4)
EOSINOPHIL NFR BLD: 0 % (ref 0–7)
ERYTHROCYTE [DISTWIDTH] IN BLOOD BY AUTOMATED COUNT: 13.8 % (ref 11.5–14.5)
GLUCOSE BLD STRIP.AUTO-MCNC: 115 MG/DL (ref 65–117)
GLUCOSE BLD STRIP.AUTO-MCNC: 127 MG/DL (ref 65–117)
GLUCOSE BLD STRIP.AUTO-MCNC: 174 MG/DL (ref 65–117)
GLUCOSE BLD STRIP.AUTO-MCNC: 225 MG/DL (ref 65–117)
GLUCOSE BLD STRIP.AUTO-MCNC: 95 MG/DL (ref 65–117)
GLUCOSE SERPL-MCNC: 97 MG/DL (ref 65–100)
HCT VFR BLD AUTO: 28.6 % (ref 35–47)
HGB BLD-MCNC: 8.9 G/DL (ref 11.5–16)
IMM GRANULOCYTES # BLD AUTO: 0.1 K/UL (ref 0–0.04)
IMM GRANULOCYTES NFR BLD AUTO: 1 % (ref 0–0.5)
LYMPHOCYTES # BLD: 0.8 K/UL (ref 0.8–3.5)
LYMPHOCYTES NFR BLD: 5 % (ref 12–49)
MAGNESIUM SERPL-MCNC: 2.2 MG/DL (ref 1.6–2.4)
MCH RBC QN AUTO: 31 PG (ref 26–34)
MCHC RBC AUTO-ENTMCNC: 31.1 G/DL (ref 30–36.5)
MCV RBC AUTO: 99.7 FL (ref 80–99)
MONOCYTES # BLD: 0.9 K/UL (ref 0–1)
MONOCYTES NFR BLD: 6 % (ref 5–13)
NEUTS SEG # BLD: 13.5 K/UL (ref 1.8–8)
NEUTS SEG NFR BLD: 88 % (ref 32–75)
NRBC # BLD: 0 K/UL (ref 0–0.01)
NRBC BLD-RTO: 0 PER 100 WBC
PLATELET # BLD AUTO: 267 K/UL (ref 150–400)
PMV BLD AUTO: 9.1 FL (ref 8.9–12.9)
POTASSIUM SERPL-SCNC: 4.4 MMOL/L (ref 3.5–5.1)
Q-T INTERVAL, ECG07: 354 MS
QRS DURATION, ECG06: 94 MS
QTC CALCULATION (BEZET), ECG08: 449 MS
RBC # BLD AUTO: 2.87 M/UL (ref 3.8–5.2)
SERVICE CMNT-IMP: ABNORMAL
SERVICE CMNT-IMP: NORMAL
SERVICE CMNT-IMP: NORMAL
SODIUM SERPL-SCNC: 140 MMOL/L (ref 136–145)
VENTRICULAR RATE, ECG03: 97 BPM
WBC # BLD AUTO: 15.4 K/UL (ref 3.6–11)

## 2023-04-29 PROCEDURE — 82962 GLUCOSE BLOOD TEST: CPT

## 2023-04-29 PROCEDURE — 74011000258 HC RX REV CODE- 258: Performed by: STUDENT IN AN ORGANIZED HEALTH CARE EDUCATION/TRAINING PROGRAM

## 2023-04-29 PROCEDURE — 65270000046 HC RM TELEMETRY

## 2023-04-29 PROCEDURE — 85025 COMPLETE CBC W/AUTO DIFF WBC: CPT

## 2023-04-29 PROCEDURE — 36415 COLL VENOUS BLD VENIPUNCTURE: CPT

## 2023-04-29 PROCEDURE — 74011000250 HC RX REV CODE- 250: Performed by: STUDENT IN AN ORGANIZED HEALTH CARE EDUCATION/TRAINING PROGRAM

## 2023-04-29 PROCEDURE — 74011250636 HC RX REV CODE- 250/636: Performed by: SURGERY

## 2023-04-29 PROCEDURE — 74011250637 HC RX REV CODE- 250/637: Performed by: STUDENT IN AN ORGANIZED HEALTH CARE EDUCATION/TRAINING PROGRAM

## 2023-04-29 PROCEDURE — 83735 ASSAY OF MAGNESIUM: CPT

## 2023-04-29 PROCEDURE — 74011636637 HC RX REV CODE- 636/637: Performed by: STUDENT IN AN ORGANIZED HEALTH CARE EDUCATION/TRAINING PROGRAM

## 2023-04-29 PROCEDURE — 80048 BASIC METABOLIC PNL TOTAL CA: CPT

## 2023-04-29 PROCEDURE — 74011250636 HC RX REV CODE- 250/636: Performed by: STUDENT IN AN ORGANIZED HEALTH CARE EDUCATION/TRAINING PROGRAM

## 2023-04-29 RX ORDER — MORPHINE SULFATE 2 MG/ML
2 INJECTION, SOLUTION INTRAMUSCULAR; INTRAVENOUS
Status: DISCONTINUED | OUTPATIENT
Start: 2023-04-29 | End: 2023-05-02

## 2023-04-29 RX ORDER — OXYCODONE HYDROCHLORIDE 5 MG/1
10 TABLET ORAL
Status: DISCONTINUED | OUTPATIENT
Start: 2023-04-29 | End: 2023-05-03

## 2023-04-29 RX ORDER — MORPHINE SULFATE 4 MG/ML
4 INJECTION INTRAVENOUS
Status: DISCONTINUED | OUTPATIENT
Start: 2023-04-29 | End: 2023-05-02

## 2023-04-29 RX ORDER — DILTIAZEM HYDROCHLORIDE 180 MG/1
180 CAPSULE, COATED, EXTENDED RELEASE ORAL DAILY
Status: DISCONTINUED | OUTPATIENT
Start: 2023-04-29 | End: 2023-05-04 | Stop reason: HOSPADM

## 2023-04-29 RX ADMIN — ROSUVASTATIN CALCIUM 5 MG: 10 TABLET, FILM COATED ORAL at 21:20

## 2023-04-29 RX ADMIN — METOPROLOL SUCCINATE 50 MG: 50 TABLET, EXTENDED RELEASE ORAL at 09:04

## 2023-04-29 RX ADMIN — PIPERACILLIN AND TAZOBACTAM 3.38 G: 3; .375 INJECTION, POWDER, LYOPHILIZED, FOR SOLUTION INTRAVENOUS at 12:50

## 2023-04-29 RX ADMIN — FENTANYL CITRATE 25 MCG: 50 INJECTION, SOLUTION INTRAMUSCULAR; INTRAVENOUS at 09:04

## 2023-04-29 RX ADMIN — MORPHINE SULFATE 4 MG: 4 INJECTION INTRAVENOUS at 21:40

## 2023-04-29 RX ADMIN — PIPERACILLIN AND TAZOBACTAM 3.38 G: 3; .375 INJECTION, POWDER, LYOPHILIZED, FOR SOLUTION INTRAVENOUS at 21:20

## 2023-04-29 RX ADMIN — PIPERACILLIN AND TAZOBACTAM 3.38 G: 3; .375 INJECTION, POWDER, LYOPHILIZED, FOR SOLUTION INTRAVENOUS at 04:11

## 2023-04-29 RX ADMIN — VANCOMYCIN HYDROCHLORIDE 500 MG: 500 INJECTION, POWDER, LYOPHILIZED, FOR SOLUTION INTRAVENOUS at 18:48

## 2023-04-29 RX ADMIN — FAMOTIDINE 40 MG: 20 TABLET, FILM COATED ORAL at 09:04

## 2023-04-29 RX ADMIN — LEVOTHYROXINE SODIUM 25 MCG: 0.03 TABLET ORAL at 06:37

## 2023-04-29 RX ADMIN — Medication 3 UNITS: at 12:50

## 2023-04-29 RX ADMIN — SODIUM CHLORIDE, PRESERVATIVE FREE 10 ML: 5 INJECTION INTRAVENOUS at 21:21

## 2023-04-29 RX ADMIN — VANCOMYCIN HYDROCHLORIDE 500 MG: 500 INJECTION, POWDER, LYOPHILIZED, FOR SOLUTION INTRAVENOUS at 06:27

## 2023-04-29 RX ADMIN — FUROSEMIDE 40 MG: 40 TABLET ORAL at 09:04

## 2023-04-29 RX ADMIN — SODIUM CHLORIDE, PRESERVATIVE FREE 10 ML: 5 INJECTION INTRAVENOUS at 13:21

## 2023-04-29 RX ADMIN — VENLAFAXINE HYDROCHLORIDE 75 MG: 37.5 CAPSULE, EXTENDED RELEASE ORAL at 21:20

## 2023-04-29 RX ADMIN — MORPHINE SULFATE 2 MG: 2 INJECTION, SOLUTION INTRAMUSCULAR; INTRAVENOUS at 13:12

## 2023-04-29 RX ADMIN — Medication 2 UNITS: at 17:44

## 2023-04-29 RX ADMIN — DILTIAZEM HYDROCHLORIDE 180 MG: 180 CAPSULE, COATED, EXTENDED RELEASE ORAL at 09:05

## 2023-04-29 RX ADMIN — SODIUM CHLORIDE, PRESERVATIVE FREE 10 ML: 5 INJECTION INTRAVENOUS at 05:44

## 2023-04-29 RX ADMIN — ENOXAPARIN SODIUM 40 MG: 100 INJECTION SUBCUTANEOUS at 09:04

## 2023-04-29 RX ADMIN — SULFASALAZINE 500 MG: 500 TABLET ORAL at 09:05

## 2023-04-29 RX ADMIN — SULFASALAZINE 500 MG: 500 TABLET ORAL at 17:44

## 2023-04-29 RX ADMIN — OXYCODONE 5 MG: 5 TABLET ORAL at 05:04

## 2023-04-30 LAB
ANION GAP SERPL CALC-SCNC: 6 MMOL/L (ref 5–15)
BASOPHILS # BLD: 0 K/UL (ref 0–0.1)
BASOPHILS NFR BLD: 0 % (ref 0–1)
BUN SERPL-MCNC: 22 MG/DL (ref 6–20)
BUN/CREAT SERPL: 26 (ref 12–20)
CALCIUM SERPL-MCNC: 10.3 MG/DL (ref 8.5–10.1)
CHLORIDE SERPL-SCNC: 111 MMOL/L (ref 97–108)
CO2 SERPL-SCNC: 24 MMOL/L (ref 21–32)
CREAT SERPL-MCNC: 0.85 MG/DL (ref 0.55–1.02)
DATE LAST DOSE: ABNORMAL
DIFFERENTIAL METHOD BLD: ABNORMAL
EOSINOPHIL # BLD: 0 K/UL (ref 0–0.4)
EOSINOPHIL NFR BLD: 0 % (ref 0–7)
ERYTHROCYTE [DISTWIDTH] IN BLOOD BY AUTOMATED COUNT: 13.8 % (ref 11.5–14.5)
GLUCOSE BLD STRIP.AUTO-MCNC: 108 MG/DL (ref 65–117)
GLUCOSE BLD STRIP.AUTO-MCNC: 143 MG/DL (ref 65–117)
GLUCOSE BLD STRIP.AUTO-MCNC: 158 MG/DL (ref 65–117)
GLUCOSE BLD STRIP.AUTO-MCNC: 185 MG/DL (ref 65–117)
GLUCOSE SERPL-MCNC: 97 MG/DL (ref 65–100)
HCT VFR BLD AUTO: 25.6 % (ref 35–47)
HGB BLD-MCNC: 8 G/DL (ref 11.5–16)
IMM GRANULOCYTES # BLD AUTO: 0.2 K/UL (ref 0–0.04)
IMM GRANULOCYTES NFR BLD AUTO: 1 % (ref 0–0.5)
LYMPHOCYTES # BLD: 1.2 K/UL (ref 0.8–3.5)
LYMPHOCYTES NFR BLD: 8 % (ref 12–49)
MCH RBC QN AUTO: 31.4 PG (ref 26–34)
MCHC RBC AUTO-ENTMCNC: 31.3 G/DL (ref 30–36.5)
MCV RBC AUTO: 100.4 FL (ref 80–99)
MONOCYTES # BLD: 0.9 K/UL (ref 0–1)
MONOCYTES NFR BLD: 6 % (ref 5–13)
NEUTS SEG # BLD: 13.7 K/UL (ref 1.8–8)
NEUTS SEG NFR BLD: 85 % (ref 32–75)
NRBC # BLD: 0 K/UL (ref 0–0.01)
NRBC BLD-RTO: 0 PER 100 WBC
PLATELET # BLD AUTO: 278 K/UL (ref 150–400)
PMV BLD AUTO: 8.8 FL (ref 8.9–12.9)
POTASSIUM SERPL-SCNC: 4 MMOL/L (ref 3.5–5.1)
RBC # BLD AUTO: 2.55 M/UL (ref 3.8–5.2)
REPORTED DOSE,DOSE: ABNORMAL UNITS
REPORTED DOSE/TIME,TMG: 1800
SERVICE CMNT-IMP: ABNORMAL
SERVICE CMNT-IMP: NORMAL
SODIUM SERPL-SCNC: 141 MMOL/L (ref 136–145)
VANCOMYCIN TROUGH SERPL-MCNC: 14 UG/ML (ref 5–10)
WBC # BLD AUTO: 16.1 K/UL (ref 3.6–11)

## 2023-04-30 PROCEDURE — 74011000250 HC RX REV CODE- 250: Performed by: STUDENT IN AN ORGANIZED HEALTH CARE EDUCATION/TRAINING PROGRAM

## 2023-04-30 PROCEDURE — 65270000046 HC RM TELEMETRY

## 2023-04-30 PROCEDURE — 74011636637 HC RX REV CODE- 636/637: Performed by: STUDENT IN AN ORGANIZED HEALTH CARE EDUCATION/TRAINING PROGRAM

## 2023-04-30 PROCEDURE — 36415 COLL VENOUS BLD VENIPUNCTURE: CPT

## 2023-04-30 PROCEDURE — 80048 BASIC METABOLIC PNL TOTAL CA: CPT

## 2023-04-30 PROCEDURE — 80202 ASSAY OF VANCOMYCIN: CPT

## 2023-04-30 PROCEDURE — 85025 COMPLETE CBC W/AUTO DIFF WBC: CPT

## 2023-04-30 PROCEDURE — 74011250636 HC RX REV CODE- 250/636: Performed by: STUDENT IN AN ORGANIZED HEALTH CARE EDUCATION/TRAINING PROGRAM

## 2023-04-30 PROCEDURE — 82962 GLUCOSE BLOOD TEST: CPT

## 2023-04-30 PROCEDURE — 74011000258 HC RX REV CODE- 258: Performed by: STUDENT IN AN ORGANIZED HEALTH CARE EDUCATION/TRAINING PROGRAM

## 2023-04-30 PROCEDURE — 74011250637 HC RX REV CODE- 250/637: Performed by: STUDENT IN AN ORGANIZED HEALTH CARE EDUCATION/TRAINING PROGRAM

## 2023-04-30 PROCEDURE — 74011250636 HC RX REV CODE- 250/636: Performed by: SURGERY

## 2023-04-30 RX ADMIN — FAMOTIDINE 40 MG: 20 TABLET, FILM COATED ORAL at 10:11

## 2023-04-30 RX ADMIN — SULFASALAZINE 500 MG: 500 TABLET ORAL at 18:27

## 2023-04-30 RX ADMIN — SODIUM CHLORIDE, PRESERVATIVE FREE 10 ML: 5 INJECTION INTRAVENOUS at 06:24

## 2023-04-30 RX ADMIN — ROSUVASTATIN CALCIUM 5 MG: 10 TABLET, FILM COATED ORAL at 21:37

## 2023-04-30 RX ADMIN — Medication 2 UNITS: at 12:51

## 2023-04-30 RX ADMIN — PIPERACILLIN AND TAZOBACTAM 3.38 G: 3; .375 INJECTION, POWDER, LYOPHILIZED, FOR SOLUTION INTRAVENOUS at 04:05

## 2023-04-30 RX ADMIN — SULFASALAZINE 500 MG: 500 TABLET ORAL at 10:11

## 2023-04-30 RX ADMIN — VANCOMYCIN HYDROCHLORIDE 500 MG: 500 INJECTION, POWDER, LYOPHILIZED, FOR SOLUTION INTRAVENOUS at 18:27

## 2023-04-30 RX ADMIN — PIPERACILLIN AND TAZOBACTAM 3.38 G: 3; .375 INJECTION, POWDER, LYOPHILIZED, FOR SOLUTION INTRAVENOUS at 21:23

## 2023-04-30 RX ADMIN — SODIUM CHLORIDE, PRESERVATIVE FREE 10 ML: 5 INJECTION INTRAVENOUS at 21:25

## 2023-04-30 RX ADMIN — DILTIAZEM HYDROCHLORIDE 180 MG: 180 CAPSULE, COATED, EXTENDED RELEASE ORAL at 10:11

## 2023-04-30 RX ADMIN — VANCOMYCIN HYDROCHLORIDE 500 MG: 500 INJECTION, POWDER, LYOPHILIZED, FOR SOLUTION INTRAVENOUS at 06:24

## 2023-04-30 RX ADMIN — PIPERACILLIN AND TAZOBACTAM 3.38 G: 3; .375 INJECTION, POWDER, LYOPHILIZED, FOR SOLUTION INTRAVENOUS at 12:51

## 2023-04-30 RX ADMIN — Medication 2 UNITS: at 18:27

## 2023-04-30 RX ADMIN — VENLAFAXINE HYDROCHLORIDE 75 MG: 37.5 CAPSULE, EXTENDED RELEASE ORAL at 21:25

## 2023-04-30 RX ADMIN — METOPROLOL SUCCINATE 50 MG: 50 TABLET, EXTENDED RELEASE ORAL at 10:11

## 2023-04-30 RX ADMIN — SPIRONOLACTONE 25 MG: 25 TABLET ORAL at 21:36

## 2023-04-30 RX ADMIN — SODIUM CHLORIDE, PRESERVATIVE FREE 10 ML: 5 INJECTION INTRAVENOUS at 13:34

## 2023-04-30 RX ADMIN — FUROSEMIDE 40 MG: 40 TABLET ORAL at 10:11

## 2023-04-30 RX ADMIN — LEVOTHYROXINE SODIUM 25 MCG: 0.03 TABLET ORAL at 06:40

## 2023-04-30 RX ADMIN — MORPHINE SULFATE 4 MG: 4 INJECTION INTRAVENOUS at 16:50

## 2023-05-01 ENCOUNTER — ANESTHESIA EVENT (OUTPATIENT)
Dept: SURGERY | Age: 83
DRG: 617 | End: 2023-05-01
Payer: MEDICARE

## 2023-05-01 ENCOUNTER — ANESTHESIA (OUTPATIENT)
Dept: SURGERY | Age: 83
DRG: 617 | End: 2023-05-01
Payer: MEDICARE

## 2023-05-01 LAB
ANION GAP SERPL CALC-SCNC: 8 MMOL/L (ref 5–15)
BASOPHILS # BLD: 0.1 K/UL (ref 0–0.1)
BASOPHILS NFR BLD: 0 % (ref 0–1)
BUN SERPL-MCNC: 25 MG/DL (ref 6–20)
BUN/CREAT SERPL: 25 (ref 12–20)
CALCIUM SERPL-MCNC: 10 MG/DL (ref 8.5–10.1)
CHLORIDE SERPL-SCNC: 108 MMOL/L (ref 97–108)
CO2 SERPL-SCNC: 21 MMOL/L (ref 21–32)
CREAT SERPL-MCNC: 1 MG/DL (ref 0.55–1.02)
DIFFERENTIAL METHOD BLD: ABNORMAL
EOSINOPHIL # BLD: 0.1 K/UL (ref 0–0.4)
EOSINOPHIL NFR BLD: 0 % (ref 0–7)
ERYTHROCYTE [DISTWIDTH] IN BLOOD BY AUTOMATED COUNT: 13.9 % (ref 11.5–14.5)
GLUCOSE BLD STRIP.AUTO-MCNC: 102 MG/DL (ref 65–117)
GLUCOSE BLD STRIP.AUTO-MCNC: 136 MG/DL (ref 65–117)
GLUCOSE BLD STRIP.AUTO-MCNC: 260 MG/DL (ref 65–117)
GLUCOSE BLD STRIP.AUTO-MCNC: 267 MG/DL (ref 65–117)
GLUCOSE BLD STRIP.AUTO-MCNC: 97 MG/DL (ref 65–117)
GLUCOSE SERPL-MCNC: 94 MG/DL (ref 65–100)
HCT VFR BLD AUTO: 25.7 % (ref 35–47)
HGB BLD-MCNC: 7.8 G/DL (ref 11.5–16)
HISTORY CHECKED?,CKHIST: NORMAL
IMM GRANULOCYTES # BLD AUTO: 0.1 K/UL (ref 0–0.04)
IMM GRANULOCYTES NFR BLD AUTO: 1 % (ref 0–0.5)
LYMPHOCYTES # BLD: 1.1 K/UL (ref 0.8–3.5)
LYMPHOCYTES NFR BLD: 6 % (ref 12–49)
MCH RBC QN AUTO: 30.7 PG (ref 26–34)
MCHC RBC AUTO-ENTMCNC: 30.4 G/DL (ref 30–36.5)
MCV RBC AUTO: 101.2 FL (ref 80–99)
MONOCYTES # BLD: 1 K/UL (ref 0–1)
MONOCYTES NFR BLD: 6 % (ref 5–13)
NEUTS SEG # BLD: 15.4 K/UL (ref 1.8–8)
NEUTS SEG NFR BLD: 87 % (ref 32–75)
NRBC # BLD: 0 K/UL (ref 0–0.01)
NRBC BLD-RTO: 0 PER 100 WBC
PLATELET # BLD AUTO: 295 K/UL (ref 150–400)
PMV BLD AUTO: 8.9 FL (ref 8.9–12.9)
POTASSIUM SERPL-SCNC: 3.9 MMOL/L (ref 3.5–5.1)
RBC # BLD AUTO: 2.54 M/UL (ref 3.8–5.2)
SERVICE CMNT-IMP: ABNORMAL
SERVICE CMNT-IMP: NORMAL
SERVICE CMNT-IMP: NORMAL
SODIUM SERPL-SCNC: 137 MMOL/L (ref 136–145)
WBC # BLD AUTO: 17.8 K/UL (ref 3.6–11)

## 2023-05-01 PROCEDURE — 74011636637 HC RX REV CODE- 636/637: Performed by: STUDENT IN AN ORGANIZED HEALTH CARE EDUCATION/TRAINING PROGRAM

## 2023-05-01 PROCEDURE — 86900 BLOOD TYPING SEROLOGIC ABO: CPT

## 2023-05-01 PROCEDURE — 74011250637 HC RX REV CODE- 250/637: Performed by: NURSE PRACTITIONER

## 2023-05-01 PROCEDURE — 88307 TISSUE EXAM BY PATHOLOGIST: CPT

## 2023-05-01 PROCEDURE — 74011250637 HC RX REV CODE- 250/637: Performed by: INTERNAL MEDICINE

## 2023-05-01 PROCEDURE — 77030002996 HC SUT SLK J&J -A: Performed by: SURGERY

## 2023-05-01 PROCEDURE — 74011250636 HC RX REV CODE- 250/636: Performed by: ANESTHESIOLOGY

## 2023-05-01 PROCEDURE — 2709999900 HC NON-CHARGEABLE SUPPLY: Performed by: SURGERY

## 2023-05-01 PROCEDURE — 74011000250 HC RX REV CODE- 250: Performed by: NURSE ANESTHETIST, CERTIFIED REGISTERED

## 2023-05-01 PROCEDURE — 74011000250 HC RX REV CODE- 250: Performed by: ANESTHESIOLOGY

## 2023-05-01 PROCEDURE — 88311 DECALCIFY TISSUE: CPT

## 2023-05-01 PROCEDURE — 77030008463 HC STPLR SKN PROX J&J -B: Performed by: SURGERY

## 2023-05-01 PROCEDURE — 86923 COMPATIBILITY TEST ELECTRIC: CPT

## 2023-05-01 PROCEDURE — 74011250636 HC RX REV CODE- 250/636: Performed by: STUDENT IN AN ORGANIZED HEALTH CARE EDUCATION/TRAINING PROGRAM

## 2023-05-01 PROCEDURE — 77030010509 HC AIRWY LMA MSK TELE -A: Performed by: ANESTHESIOLOGY

## 2023-05-01 PROCEDURE — 74011000258 HC RX REV CODE- 258: Performed by: STUDENT IN AN ORGANIZED HEALTH CARE EDUCATION/TRAINING PROGRAM

## 2023-05-01 PROCEDURE — 77030002888 HC SUT CHRMC J&J -A: Performed by: SURGERY

## 2023-05-01 PROCEDURE — 3E0T3BZ INTRODUCTION OF ANESTHETIC AGENT INTO PERIPHERAL NERVES AND PLEXI, PERCUTANEOUS APPROACH: ICD-10-PCS | Performed by: SURGERY

## 2023-05-01 PROCEDURE — 77030038692 HC WND DEB SYS IRMX -B: Performed by: SURGERY

## 2023-05-01 PROCEDURE — 80048 BASIC METABOLIC PNL TOTAL CA: CPT

## 2023-05-01 PROCEDURE — 74011250637 HC RX REV CODE- 250/637: Performed by: STUDENT IN AN ORGANIZED HEALTH CARE EDUCATION/TRAINING PROGRAM

## 2023-05-01 PROCEDURE — 76060000033 HC ANESTHESIA 1 TO 1.5 HR: Performed by: SURGERY

## 2023-05-01 PROCEDURE — 76210000006 HC OR PH I REC 0.5 TO 1 HR: Performed by: SURGERY

## 2023-05-01 PROCEDURE — 77030031139 HC SUT VCRL2 J&J -A: Performed by: SURGERY

## 2023-05-01 PROCEDURE — 74011250637 HC RX REV CODE- 250/637: Performed by: ANESTHESIOLOGY

## 2023-05-01 PROCEDURE — 65270000046 HC RM TELEMETRY

## 2023-05-01 PROCEDURE — 74011000250 HC RX REV CODE- 250: Performed by: STUDENT IN AN ORGANIZED HEALTH CARE EDUCATION/TRAINING PROGRAM

## 2023-05-01 PROCEDURE — 76010000149 HC OR TIME 1 TO 1.5 HR: Performed by: SURGERY

## 2023-05-01 PROCEDURE — 74011250636 HC RX REV CODE- 250/636: Performed by: NURSE ANESTHETIST, CERTIFIED REGISTERED

## 2023-05-01 PROCEDURE — 77030006835 HC BLD SAW SAG STRY -B: Performed by: SURGERY

## 2023-05-01 PROCEDURE — 77030013079 HC BLNKT BAIR HGGR 3M -A: Performed by: ANESTHESIOLOGY

## 2023-05-01 PROCEDURE — 85025 COMPLETE CBC W/AUTO DIFF WBC: CPT

## 2023-05-01 PROCEDURE — 0Y6D0Z3 DETACHMENT AT LEFT UPPER LEG, LOW, OPEN APPROACH: ICD-10-PCS | Performed by: SURGERY

## 2023-05-01 PROCEDURE — 36415 COLL VENOUS BLD VENIPUNCTURE: CPT

## 2023-05-01 PROCEDURE — 82962 GLUCOSE BLOOD TEST: CPT

## 2023-05-01 RX ORDER — PHENYLEPHRINE HCL IN 0.9% NACL 0.4MG/10ML
SYRINGE (ML) INTRAVENOUS AS NEEDED
Status: DISCONTINUED | OUTPATIENT
Start: 2023-05-01 | End: 2023-05-01 | Stop reason: HOSPADM

## 2023-05-01 RX ORDER — DEXAMETHASONE SODIUM PHOSPHATE 4 MG/ML
INJECTION, SOLUTION INTRA-ARTICULAR; INTRALESIONAL; INTRAMUSCULAR; INTRAVENOUS; SOFT TISSUE AS NEEDED
Status: DISCONTINUED | OUTPATIENT
Start: 2023-05-01 | End: 2023-05-01 | Stop reason: HOSPADM

## 2023-05-01 RX ORDER — ACETAMINOPHEN 325 MG/1
650 TABLET ORAL ONCE
Status: COMPLETED | OUTPATIENT
Start: 2023-05-01 | End: 2023-05-01

## 2023-05-01 RX ORDER — PROPOFOL 10 MG/ML
INJECTION, EMULSION INTRAVENOUS AS NEEDED
Status: DISCONTINUED | OUTPATIENT
Start: 2023-05-01 | End: 2023-05-01 | Stop reason: HOSPADM

## 2023-05-01 RX ORDER — HYDROMORPHONE HYDROCHLORIDE 1 MG/ML
0.2 INJECTION, SOLUTION INTRAMUSCULAR; INTRAVENOUS; SUBCUTANEOUS
Status: DISCONTINUED | OUTPATIENT
Start: 2023-05-01 | End: 2023-05-01 | Stop reason: HOSPADM

## 2023-05-01 RX ORDER — ROPIVACAINE HYDROCHLORIDE 5 MG/ML
INJECTION, SOLUTION EPIDURAL; INFILTRATION; PERINEURAL AS NEEDED
Status: DISCONTINUED | OUTPATIENT
Start: 2023-05-01 | End: 2023-05-01 | Stop reason: HOSPADM

## 2023-05-01 RX ORDER — ACETAMINOPHEN 500 MG
1000 TABLET ORAL EVERY 8 HOURS
Status: DISCONTINUED | OUTPATIENT
Start: 2023-05-01 | End: 2023-05-04 | Stop reason: HOSPADM

## 2023-05-01 RX ORDER — HYDROXYZINE 25 MG/1
25 TABLET, FILM COATED ORAL
Status: DISCONTINUED | OUTPATIENT
Start: 2023-05-01 | End: 2023-05-04 | Stop reason: HOSPADM

## 2023-05-01 RX ORDER — SODIUM CHLORIDE 9 MG/ML
250 INJECTION, SOLUTION INTRAVENOUS AS NEEDED
Status: DISCONTINUED | OUTPATIENT
Start: 2023-05-01 | End: 2023-05-03

## 2023-05-01 RX ORDER — LIDOCAINE HYDROCHLORIDE 20 MG/ML
INJECTION, SOLUTION EPIDURAL; INFILTRATION; INTRACAUDAL; PERINEURAL AS NEEDED
Status: DISCONTINUED | OUTPATIENT
Start: 2023-05-01 | End: 2023-05-01 | Stop reason: HOSPADM

## 2023-05-01 RX ORDER — SODIUM CHLORIDE 0.9 % (FLUSH) 0.9 %
5-40 SYRINGE (ML) INJECTION AS NEEDED
Status: DISCONTINUED | OUTPATIENT
Start: 2023-05-01 | End: 2023-05-01 | Stop reason: HOSPADM

## 2023-05-01 RX ORDER — DEXMEDETOMIDINE HYDROCHLORIDE 100 UG/ML
INJECTION, SOLUTION INTRAVENOUS AS NEEDED
Status: DISCONTINUED | OUTPATIENT
Start: 2023-05-01 | End: 2023-05-01 | Stop reason: HOSPADM

## 2023-05-01 RX ORDER — MORPHINE SULFATE 2 MG/ML
2 INJECTION, SOLUTION INTRAMUSCULAR; INTRAVENOUS
Status: DISCONTINUED | OUTPATIENT
Start: 2023-05-01 | End: 2023-05-02 | Stop reason: SDUPTHER

## 2023-05-01 RX ORDER — FENTANYL CITRATE 50 UG/ML
25 INJECTION, SOLUTION INTRAMUSCULAR; INTRAVENOUS
Status: DISCONTINUED | OUTPATIENT
Start: 2023-05-01 | End: 2023-05-01 | Stop reason: HOSPADM

## 2023-05-01 RX ORDER — SODIUM CHLORIDE, SODIUM LACTATE, POTASSIUM CHLORIDE, CALCIUM CHLORIDE 600; 310; 30; 20 MG/100ML; MG/100ML; MG/100ML; MG/100ML
INJECTION, SOLUTION INTRAVENOUS
Status: DISCONTINUED | OUTPATIENT
Start: 2023-05-01 | End: 2023-05-01 | Stop reason: HOSPADM

## 2023-05-01 RX ORDER — ONDANSETRON 2 MG/ML
4 INJECTION INTRAMUSCULAR; INTRAVENOUS AS NEEDED
Status: DISCONTINUED | OUTPATIENT
Start: 2023-05-01 | End: 2023-05-01 | Stop reason: HOSPADM

## 2023-05-01 RX ORDER — SODIUM CHLORIDE 0.9 % (FLUSH) 0.9 %
5-40 SYRINGE (ML) INJECTION EVERY 8 HOURS
Status: DISCONTINUED | OUTPATIENT
Start: 2023-05-01 | End: 2023-05-01 | Stop reason: HOSPADM

## 2023-05-01 RX ORDER — SODIUM CHLORIDE, SODIUM LACTATE, POTASSIUM CHLORIDE, CALCIUM CHLORIDE 600; 310; 30; 20 MG/100ML; MG/100ML; MG/100ML; MG/100ML
50 INJECTION, SOLUTION INTRAVENOUS CONTINUOUS
Status: DISPENSED | OUTPATIENT
Start: 2023-05-01 | End: 2023-05-02

## 2023-05-01 RX ORDER — GABAPENTIN 300 MG/1
300 CAPSULE ORAL
Status: DISCONTINUED | OUTPATIENT
Start: 2023-05-01 | End: 2023-05-04 | Stop reason: HOSPADM

## 2023-05-01 RX ORDER — LORAZEPAM 2 MG/ML
1 INJECTION INTRAMUSCULAR
Status: DISCONTINUED | OUTPATIENT
Start: 2023-05-01 | End: 2023-05-03

## 2023-05-01 RX ORDER — SODIUM CHLORIDE 0.9 % (FLUSH) 0.9 %
5-40 SYRINGE (ML) INJECTION AS NEEDED
Status: DISCONTINUED | OUTPATIENT
Start: 2023-05-01 | End: 2023-05-04 | Stop reason: HOSPADM

## 2023-05-01 RX ORDER — MIDAZOLAM HYDROCHLORIDE 1 MG/ML
1 INJECTION, SOLUTION INTRAMUSCULAR; INTRAVENOUS AS NEEDED
Status: DISCONTINUED | OUTPATIENT
Start: 2023-05-01 | End: 2023-05-02

## 2023-05-01 RX ORDER — ONDANSETRON 2 MG/ML
INJECTION INTRAMUSCULAR; INTRAVENOUS AS NEEDED
Status: DISCONTINUED | OUTPATIENT
Start: 2023-05-01 | End: 2023-05-01 | Stop reason: HOSPADM

## 2023-05-01 RX ORDER — FENTANYL CITRATE 50 UG/ML
INJECTION, SOLUTION INTRAMUSCULAR; INTRAVENOUS
Status: DISPENSED
Start: 2023-05-01 | End: 2023-05-01

## 2023-05-01 RX ORDER — SODIUM CHLORIDE 0.9 % (FLUSH) 0.9 %
5-40 SYRINGE (ML) INJECTION EVERY 8 HOURS
Status: DISCONTINUED | OUTPATIENT
Start: 2023-05-01 | End: 2023-05-04 | Stop reason: HOSPADM

## 2023-05-01 RX ORDER — FENTANYL CITRATE 50 UG/ML
INJECTION, SOLUTION INTRAMUSCULAR; INTRAVENOUS AS NEEDED
Status: DISCONTINUED | OUTPATIENT
Start: 2023-05-01 | End: 2023-05-01 | Stop reason: HOSPADM

## 2023-05-01 RX ORDER — FENTANYL CITRATE 50 UG/ML
50 INJECTION, SOLUTION INTRAMUSCULAR; INTRAVENOUS AS NEEDED
Status: DISCONTINUED | OUTPATIENT
Start: 2023-05-01 | End: 2023-05-02

## 2023-05-01 RX ORDER — LIDOCAINE HYDROCHLORIDE 10 MG/ML
0.1 INJECTION, SOLUTION EPIDURAL; INFILTRATION; INTRACAUDAL; PERINEURAL AS NEEDED
Status: DISCONTINUED | OUTPATIENT
Start: 2023-05-01 | End: 2023-05-02

## 2023-05-01 RX ADMIN — PIPERACILLIN AND TAZOBACTAM 3.38 G: 3; .375 INJECTION, POWDER, LYOPHILIZED, FOR SOLUTION INTRAVENOUS at 04:56

## 2023-05-01 RX ADMIN — Medication 2 UNITS: at 22:59

## 2023-05-01 RX ADMIN — SULFASALAZINE 500 MG: 500 TABLET ORAL at 19:06

## 2023-05-01 RX ADMIN — Medication 80 MCG: at 09:00

## 2023-05-01 RX ADMIN — DILTIAZEM HYDROCHLORIDE 180 MG: 180 CAPSULE, COATED, EXTENDED RELEASE ORAL at 12:39

## 2023-05-01 RX ADMIN — SODIUM CHLORIDE, SODIUM LACTATE, POTASSIUM CHLORIDE, AND CALCIUM CHLORIDE: 600; 310; 30; 20 INJECTION, SOLUTION INTRAVENOUS at 08:54

## 2023-05-01 RX ADMIN — SULFASALAZINE 500 MG: 500 TABLET ORAL at 12:41

## 2023-05-01 RX ADMIN — DEXMEDETOMIDINE HYDROCHLORIDE 4 MCG: 100 INJECTION, SOLUTION, CONCENTRATE INTRAVENOUS at 09:19

## 2023-05-01 RX ADMIN — ACETAMINOPHEN 650 MG: 325 TABLET ORAL at 12:38

## 2023-05-01 RX ADMIN — SODIUM CHLORIDE, POTASSIUM CHLORIDE, SODIUM LACTATE AND CALCIUM CHLORIDE 50 ML/HR: 600; 310; 30; 20 INJECTION, SOLUTION INTRAVENOUS at 12:49

## 2023-05-01 RX ADMIN — DEXAMETHASONE SODIUM PHOSPHATE 4 MG: 4 INJECTION, SOLUTION INTRAMUSCULAR; INTRAVENOUS at 09:19

## 2023-05-01 RX ADMIN — LIDOCAINE HYDROCHLORIDE 80 MG: 20 INJECTION, SOLUTION EPIDURAL; INFILTRATION; INTRACAUDAL; PERINEURAL at 09:00

## 2023-05-01 RX ADMIN — ONDANSETRON HYDROCHLORIDE 4 MG: 2 INJECTION, SOLUTION INTRAMUSCULAR; INTRAVENOUS at 09:19

## 2023-05-01 RX ADMIN — PIPERACILLIN AND TAZOBACTAM 3.38 G: 3; .375 INJECTION, POWDER, LYOPHILIZED, FOR SOLUTION INTRAVENOUS at 22:59

## 2023-05-01 RX ADMIN — ROPIVACAINE HYDROCHLORIDE 100 MG: 5 INJECTION, SOLUTION EPIDURAL; INFILTRATION; PERINEURAL at 09:15

## 2023-05-01 RX ADMIN — VANCOMYCIN HYDROCHLORIDE 500 MG: 500 INJECTION, POWDER, LYOPHILIZED, FOR SOLUTION INTRAVENOUS at 06:33

## 2023-05-01 RX ADMIN — Medication 1 CAPSULE: at 12:39

## 2023-05-01 RX ADMIN — SODIUM CHLORIDE, PRESERVATIVE FREE 10 ML: 5 INJECTION INTRAVENOUS at 05:20

## 2023-05-01 RX ADMIN — METOPROLOL SUCCINATE 50 MG: 50 TABLET, EXTENDED RELEASE ORAL at 12:39

## 2023-05-01 RX ADMIN — FAMOTIDINE 40 MG: 20 TABLET, FILM COATED ORAL at 12:39

## 2023-05-01 RX ADMIN — FENTANYL CITRATE 25 MCG: 50 INJECTION, SOLUTION INTRAMUSCULAR; INTRAVENOUS at 10:16

## 2023-05-01 RX ADMIN — VANCOMYCIN HYDROCHLORIDE 500 MG: 500 INJECTION, POWDER, LYOPHILIZED, FOR SOLUTION INTRAVENOUS at 18:58

## 2023-05-01 RX ADMIN — OXYCODONE 5 MG: 5 TABLET ORAL at 12:44

## 2023-05-01 RX ADMIN — PIPERACILLIN AND TAZOBACTAM 3.38 G: 3; .375 INJECTION, POWDER, LYOPHILIZED, FOR SOLUTION INTRAVENOUS at 12:41

## 2023-05-01 RX ADMIN — SODIUM CHLORIDE, PRESERVATIVE FREE 10 ML: 5 INJECTION INTRAVENOUS at 22:59

## 2023-05-01 RX ADMIN — SODIUM CHLORIDE, PRESERVATIVE FREE 5 ML: 5 INJECTION INTRAVENOUS at 14:00

## 2023-05-01 RX ADMIN — HYDROXYZINE HYDROCHLORIDE 25 MG: 25 TABLET, FILM COATED ORAL at 12:39

## 2023-05-01 RX ADMIN — PROPOFOL 70 MG: 10 INJECTION, EMULSION INTRAVENOUS at 09:00

## 2023-05-01 RX ADMIN — OXYCODONE 5 MG: 5 TABLET ORAL at 19:03

## 2023-05-01 RX ADMIN — FUROSEMIDE 40 MG: 40 TABLET ORAL at 12:39

## 2023-05-01 RX ADMIN — FENTANYL CITRATE 50 MCG: 50 INJECTION, SOLUTION INTRAMUSCULAR; INTRAVENOUS at 09:22

## 2023-05-01 NOTE — PROGRESS NOTES
Hospitalist Progress Note    NAME: Chance Larkin   :  1940   MRN:  [id number]       Assessment / Plan:  Infected diabetic foot wound POA  Extensive PAD- causing Resting pain in LLE- s/p L AKA today   H/o Status post right AKA and left TMA  CAD status post CABG  Elevated troponin-suspect type II MI  Imaging and labs reviewed and discussed with family     Continue empiric vancomycin and Zosyn perioperatively till cleared by [facility] surgery  monitor daily Vanc trough and BMP for toxic monitoring for vanc toxicity with help of pharmacy  Holding  PTA Eliquis  Lovenox DVT prophylaxis in interim, will hold off on therapeutic dosing given downtrending hemoglobin until hemoglobin trajectory is known- Hb still trending down at 7.8 today  Follow blood cultures- remains neg x  3 days  Continue PTA diltiazem, metoprolol, Lasix, rosuvastatin, spironolactone  IP [facility] surgery consulted- Dr Larissa Barrett for surgery- s/p L AKA today  Cont Pain management  Add ativan prn for crying spells post op- anxious with s/p L AKA status, will add PO Hydroxyzine Prn anxiety    Normocytic anemia, slightly worsened from baseline  Follow hgb, transfuse for hgb<7  Hb down to 7.8 today     Diabetes mellitus  SSI    Advanced Alzheimer's dementia  Adjustment disorder post op  Monitor for signs of delirium  Daughter to be given permission to remain overnight as she provides excellent behavioral control  Continue PTA venlafaxine  Add Hydroxyzine & ativan prn anxiety post op     Hypothyroidism  Continue PTA Synthroid     Code Status: Full  DVT Prophylaxis: Lovenox  GI Prophylaxis: PTA Pepcid  Diet: Mechanical soft diabetic/cardiac           Estimated discharge date: -3?  TBD pending surgery,  B:  vascular clearance  Code status: full  Prophylaxis: enoxparin   Recommended Disposition: SNF likely     Subjective:     Chief Complaint / Reason for Physician Visit:F/U Severe PAD, L DFU/infection, Chronic AFib, Dementia, DM  Pt in bed, confused, appears uncomfortable. Current pain regimen not controlling her pain. Discussed with RN events overnight. Review of Systems:  Symptom Y/N Comments  Symptom Y/N Comments   Fever/Chills    Chest Pain     Poor Appetite    Edema     Cough    Abdominal Pain     Sputum    Joint Pain     SOB/LE    Pruritis/Rash     Nausea/vomit    Tolerating PT/OT     Diarrhea    Tolerating Diet     Constipation    Other       Could NOT obtain due to: dementia     Objective:     VITALS:   Last 24hrs VS reviewed since prior progress note. Most recent are:  Patient Vitals for the past 24 hrs:   Temp Pulse Resp BP SpO2   05/01/23 1045 98.7 °F (37.1 °C) 86 10 137/67 98 %   05/01/23 1030 -- 85 19 (!) 124/54 99 %   05/01/23 1025 -- 75 18 (!) 138/58 98 %   05/01/23 1020 -- 81 19 138/61 98 %   05/01/23 1015 -- 86 21 (!) 131/59 95 %   05/01/23 1010 -- 81 11 (!) 113/56 100 %   05/01/23 1005 -- 85 10 (!) 147/55 100 %   05/01/23 1001 99.1 °F (37.3 °C) 86 18 (!) 141/56 100 %   05/01/23 0657 99.1 °F (37.3 °C) 63 24 (!) 155/67 98 %   05/01/23 0333 98.6 °F (37 °C) 73 16 (!) 154/97 93 %   04/30/23 2014 97.5 °F (36.4 °C) 72 16 (!) 141/71 95 %   04/30/23 1626 -- 70 16 132/66 95 %   04/30/23 1600 -- 72 -- -- --   04/30/23 1200 -- 77 -- -- --         Intake/Output Summary (Last 24 hours) at 5/1/2023 1151  Last data filed at 5/1/2023 1001  Gross per 24 hour   Intake 400 ml   Output 50 ml   Net 350 ml          I had a face to face encounter and independently examined this patient on 5/1/2023, as outlined below:  PHYSICAL EXAM:  General: WD, WN. Alert, cooperative, appears uncomfortable  Resp:  CTA bilaterally, no wheezing or rales. No accessory muscle use  CV:  Regular  rhythm,  No edema  GI:  Soft, Non distended, Non tender. +Bowel sounds  Neurologic:  Awake but not oriented. Psych:   No insight. Not anxious nor agitated, dementia +  Skin:  No rashes.   No jaundice  Extremities: s/p R AKA stump noted +, LLE s/p TMA tump noted with necrotic areas with pealing skin noted +    Reviewed most current lab test results and cultures  YES  Reviewed most current radiology test results   YES  Review and summation of old records today    NO  Reviewed patient's current orders and MAR    YES  PMH/SH reviewed - no change compared to H&P  ________________________________________________________________________  Care Plan discussed with:    Comments   Patient x    Family  x daughter   RN x    Care Manager x    Consultant                       x Multidiciplinary team rounds were held today with , nursing, pharmacist and clinical coordinator. Patient's plan of care was discussed; medications were reviewed and discharge planning was addressed. ________________________________________________________________________  Total NON critical care TIME:  37   Minutes    Total CRITICAL CARE TIME Spent:   Minutes non procedure based      Comments   >50% of visit spent in counseling and coordination of care x    ________________________________________________________________________  Sydnee Reeder MD     Procedures: see electronic medical records for all procedures/Xrays and details which were not copied into this note but were reviewed prior to creation of Plan. LABS:  I reviewed today's most current labs and imaging studies.   Pertinent labs include:  Recent Labs     05/01/23 0422 04/30/23  0313 04/29/23  0701   WBC 17.8* 16.1* 15.4*   HGB 7.8* 8.0* 8.9*   HCT 25.7* 25.6* 28.6*    278 267       Recent Labs     05/01/23 0422 04/30/23  0445 04/29/23  0701 04/28/23  1744    141 140 136   K 3.9 4.0 4.4 4.9    111* 112* 104   CO2 21 24 24 28   GLU 94 97 97 86   BUN 25* 22* 26* 31*   CREA 1.00 0.85 0.68 0.89   CA 10.0 10.3* 10.8* 11.4*   MG  --   --  2.2  --    ALB  --   --   --  2.9*   TBILI  --   --   --  0.4   ALT  --   --   --  13         Signed: Sydnee Reeder MD

## 2023-05-01 NOTE — PROGRESS NOTES
Transition of Care Plan:     RUR:19%  MINAL: 5/3  Disposition:Home vs rehab. - pending therapy review. 5:39 PM   CM completed chart review. Pt had L AKA 5/1. Will look for therapy notes to help with disposition  Insurance is [employer] and [employer]. If SNF or IPR: Date FOC offered:  Date FOC received:  Date authorization started with reference number:  Date authorization received and expires:  Accepting facility:     Follow up appointments:to be scheduled   DME needed:none  Transportation at [address] [place] [address] Loop or means to access home:  yes       IM Medicare Letter:2nd IM to be given prior to DC  Is patient a  and connected with the [address] ?    no      Caregiver Contact:daughter Benita Garcia [phone]  Discharge Caregiver contacted prior to discharge? yes  Care Conference needed?:          no        CM will continue to monitor discharge plan.      Kathy Huber, [address]  Ext [phone]

## 2023-05-01 NOTE — PROGRESS NOTES
Attempted to see pt for OT services. Pt is currently off the floor for AKA. Will defer but continue to follow.

## 2023-05-01 NOTE — PROGRESS NOTES
Physician Progress Note      Dequan Lovett  [facility] #:                  [id number]  :                       1940  ADMIT DATE:       2023 4:37 PM  [address] [place] DATE:  RESPONDING  PROVIDER #:        Ivin Saint MD          QUERY TEXT:    Pt admitted with Above Knee amputation and has CHF documented. If possible, please document in progress notes and discharge summary further specificity regarding the type and acuity of CHF:    The medical record reflects the following:  Risk Factors: CHF, Dysrhythmias: atrial fibrillation, Pacemaker, CAD, PAD, cardiac stents, CABG (3 vessel) and hyperlipidemia    Clinical Indicators:    H&P noted to have PMHx: Heart failure. pt does have pacemaker in place. 23 CXR: The heart is mildly enlarged, but unchanged. No focal consolidation, pleural effusion, or pneumothorax  IMPRESSION  No significant interval change. Historical Data:  Per 22 Cardiology procedure:  History: \"This is a female with a chronic ischemic cardiomyopathy EF 67%, chronic systolic CHF class 3, >3 months on an optimized guideline-directed medical regimen for CHF, and nonreversible symptomatic complete heart block with bradycardia. She is not a candidate for ICD shocks. She is here for a biventricular pacemaker generator change due to battery depletion\". Treatment: CXR, Lasix, Toprol-xl, Cardizem CD, Aldactone, daily weights, vitals per unit routine.   Options provided:  -- Acute Systolic CHF/HFrEF  -- Chronic Systolic CHF/HFrEF  -- Other - I will add my own diagnosis  -- Disagree - Not applicable / Not valid  -- Disagree - Clinically unable to determine / Unknown  -- Refer to Clinical Documentation Reviewer    PROVIDER RESPONSE TEXT:    Chronic Diastolic CHF    Query created by: Nemo Brooks on 2023 11:14 AM      Electronically signed by:  Ivin Saint MD 2023 12:17 PM

## 2023-05-01 NOTE — PROGRESS NOTES
Physical therapy:    Orders received and to start 5/2. Pt currently undergoing L AKA. Will hold and follow up tomorrow. Thank you.     Peri Barahona, PT, DPT

## 2023-05-01 NOTE — ANESTHESIA PROCEDURE NOTES
Peripheral Block    Start time: 5/1/2023 9:00 AM  End time: 5/1/2023 9:15 AM  Performed by: Amy Monique MD  Authorized by: Chase Pinto MD       Pre-procedure:    Indications: at surgeon's request and post-op pain management    Preanesthetic Checklist: patient identified, risks and benefits discussed, site marked, timeout performed, anesthesia consent given, patient being monitored and fire risk safety assessment completed and verbalized    Timeout Time: 09:03 EDT      Block Type:   Block Type:  Femoral single shot  Laterality:  Left  Monitoring:  Standard ASA monitoring, continuous pulse ox, frequent vital sign checks, heart rate, oxygen and responsive to questions  Injection Technique:  Single shot  Procedures: ultrasound guided    Patient Position: supine  Prep: chlorhexidine    Needle Type:  Stimuplex  Needle Gauge:  20 G  Needle Localization:  Ultrasound guidance and nerve stimulator  Med Admin Time: 5/1/2023 9:06 AM    Assessment:  Number of attempts:  1  Injection Assessment:  Incremental injection every 5 mL, local visualized surrounding nerve on ultrasound, negative aspiration for blood and no intravascular symptoms  Patient tolerance:  Patient tolerated the procedure well with no immediate complications

## 2023-05-01 NOTE — PROGRESS NOTES
0550 to 0630Patient scheduled for OR today Report given to the OR staff, for additional lab this morning, Blood drawn, IV accessed  on the right fore arm, 22G, Due meds given, kenton, vania changed. Patient wheeled to the OR.

## 2023-05-01 NOTE — PROGRESS NOTES
Pt received Alert to self complaining of not being able to move leg to intermittently crying upset about amputation   Rn attempted to reorient patient with out success  Notified MD Kaylie Zavala about anxiety N.O. received for Atarax, Ativan PRN  1239Pt medicated per MAR  1800- Pt resting  dinner refused  [address] Stump  dressing reinforced   Blood tinged fluid leaking

## 2023-05-01 NOTE — ANESTHESIA PROCEDURE NOTES
Peripheral Block    Start time: 5/1/2023 9:00 AM  End time: 5/1/2023 9:20 AM  Performed by: Nigel Mallory MD  Authorized by: Bette Ospina MD       Pre-procedure:    Indications: at surgeon's request and post-op pain management    Preanesthetic Checklist: patient identified, risks and benefits discussed, site marked, timeout performed, anesthesia consent given, patient being monitored and fire risk safety assessment completed and verbalized    Timeout Time: 09:03 EDT      Block Type:   Block Type:  Sciatic single shot  Laterality:  Left  Monitoring:  Standard ASA monitoring, continuous pulse ox, frequent vital sign checks, heart rate, oxygen and responsive to questions  Injection Technique:  Single shot  Procedures: ultrasound guided and nerve stimulator    Patient Position: supine  Prep: chlorhexidine    Needle Type:  Stimuplex  Needle Gauge:  20 G  Needle Localization:  Ultrasound guidance and nerve stimulator  Med Admin Time: 5/1/2023 9:10 AM    Assessment:  Number of attempts:  1  Injection Assessment:  Incremental injection every 5 mL, local visualized surrounding nerve on ultrasound, negative aspiration for blood, no paresthesia and no intravascular symptoms  Patient tolerance:  Patient tolerated the procedure well with no immediate complications

## 2023-05-01 NOTE — ANESTHESIA POSTPROCEDURE EVALUATION
Post-Anesthesia Evaluation and Assessment    Patient: Ishmael Chavez MRN: [id number]  SSN: [id number]    YOB: 1940  Age: 78 y.o. Sex: female      I have evaluated the patient and they are stable and ready for discharge from the PACU. Cardiovascular Function/Vital Signs  Visit Vitals  /67 (BP 1 Location: Left upper arm, BP Patient Position: At rest)   Pulse 86   Temp 37.1 °C (98.7 °F)   Resp 10   Ht 4' 11\" (1.499 m)   Wt 52.2 kg (115 lb 1.3 oz)   SpO2 98%   BMI 23.24 kg/m²       Patient is status post General anesthesia for Procedure(s):  LEFT ABOVE KNEE AMPUTATION (AKA). Nausea/Vomiting: None    Postoperative hydration reviewed and adequate. Pain:  Pain Scale 1: Numeric (0 - 10) (05/01/23 1045)  Pain Intensity 1: 0 (05/01/23 1045)   Managed    Neurological Status:   Neuro (WDL): Exceptions to WDL (05/01/23 1045)  Neuro  Neurologic State: Alert;Confused; Eyes open spontaneously (05/01/23 1045)  Orientation Level: Oriented to person (05/01/23 1045)  LUE Motor Response: Purposeful (05/01/23 1045)  LLE Motor Response: Purposeful (05/01/23 1045)  RUE Motor Response: Purposeful (05/01/23 1045)  RLE Motor Response: Purposeful (05/01/23 1045)   At baseline    Mental Status, Level of Consciousness: Alert and  oriented to person, place, and time    Pulmonary Status:   O2 Device: Nasal cannula (05/01/23 1045)   Adequate oxygenation and airway patent    Complications related to anesthesia: None    Post-anesthesia assessment completed.  No concerns    Signed By: Gabo Calvillo MD     May 1, 2023

## 2023-05-01 NOTE — OP NOTES
[address]  OPERATIVE REPORT    Name:  Shantell Bernal  MR#:  [id number]  :  1940  ACCOUNT #:  [de-identified]  DATE OF SERVICE:  2023    PREOPERATIVE DIAGNOSIS:  Gangrene of left heel. POSTOPERATIVE DIAGNOSIS:  Gangrene of left heel. PROCEDURE PERFORMED:  Left above-knee amputation. SURGEON:  Raymond Montero MD    ANESTHESIA:  General.    COMPLICATIONS:  None. ESTIMATED BLOOD LOSS:  Minimum. INDICATIONS:  The patient is a demented, nonambulatory 78-year-old with a prior right above-knee amputation who has a complete assist at home requiring a hospital bed and [facility] lift. She has developed wet gangrenous changes and intractable pain of her left heel. After weeks of office evaluation, her family has finally agreed to contralateral amputation. PROCEDURE:  After obtaining informed consent, patient was placed supine on the operating table. After adequate induction of general endotracheal anesthesia, site and patient confirmation, confirmation of ongoing antibiotics for infection, the left leg was prepped and draped in sterile fashion. [place] incision was made just above the knee and an anterior soft tissue divided with the Bovie electrocautery. The periosteum of the femur was elevated. The femur was divided with an oscillating saw. Posterior soft tissue was divided with amputation knife and specimen delivered. All bleeding points were controlled with suture ligatures, chromic ties, and Bovie electrocautery. The wound was irrigated, evacuated, deemed hemostatic, and the flaps were brought together under no tension at the fascial level with interrupted buried Vicryl sutures. Skin was closed with standard skin staples. The patient tolerated the procedure well. There were no complications.         MD TERRY Bateman/[email or web address]  D:  2023 10:56  T:  2023 12:09  JOB #:  [id number]  CC:  Raymond Montero MD

## 2023-05-01 NOTE — BRIEF OP NOTE
Brief Postoperative Note    Patient: Lizzy Latif  YOB: 1940  MRN: [id number]    Date of Procedure: 5/1/2023     Pre-Op Diagnosis: Gangrene L heel    Post-Op Diagnosis: Same as preoperative diagnosis.       Procedure(s): LEFT AKA    Surgeon(s):  Denver Zelaya MD    Surgical Assistant: Surg Asst-1: Sylvester Martins RN    Anesthesia: General     Estimated Blood Loss (mL): less than 50     Complications: None    Specimens:   ID Type Source Tests Collected by Time Destination   1 : left above knee amputation Fresh Leg, Left  Denver Zelaya MD 5/1/2023 4207 Pathology        Findings: none    Electronically Signed by Lencho Mcpherson MD on 5/1/2023 at 10:18 AM

## 2023-05-02 LAB
ANION GAP SERPL CALC-SCNC: 8 MMOL/L (ref 5–15)
BUN SERPL-MCNC: 32 MG/DL (ref 6–20)
BUN/CREAT SERPL: 30 (ref 12–20)
CALCIUM SERPL-MCNC: 9.9 MG/DL (ref 8.5–10.1)
CHLORIDE SERPL-SCNC: 110 MMOL/L (ref 97–108)
CO2 SERPL-SCNC: 22 MMOL/L (ref 21–32)
CREAT SERPL-MCNC: 1.08 MG/DL (ref 0.55–1.02)
ERYTHROCYTE [DISTWIDTH] IN BLOOD BY AUTOMATED COUNT: 13.6 % (ref 11.5–14.5)
GLUCOSE BLD STRIP.AUTO-MCNC: 189 MG/DL (ref 65–117)
GLUCOSE BLD STRIP.AUTO-MCNC: 206 MG/DL (ref 65–117)
GLUCOSE BLD STRIP.AUTO-MCNC: 234 MG/DL (ref 65–117)
GLUCOSE BLD STRIP.AUTO-MCNC: 299 MG/DL (ref 65–117)
GLUCOSE SERPL-MCNC: 214 MG/DL (ref 65–100)
HCT VFR BLD AUTO: 23.5 % (ref 35–47)
HGB BLD-MCNC: 7.4 G/DL (ref 11.5–16)
MCH RBC QN AUTO: 31.1 PG (ref 26–34)
MCHC RBC AUTO-ENTMCNC: 31.5 G/DL (ref 30–36.5)
MCV RBC AUTO: 98.7 FL (ref 80–99)
NRBC # BLD: 0 K/UL (ref 0–0.01)
NRBC BLD-RTO: 0 PER 100 WBC
PLATELET # BLD AUTO: 351 K/UL (ref 150–400)
PMV BLD AUTO: 9 FL (ref 8.9–12.9)
POTASSIUM SERPL-SCNC: 4.2 MMOL/L (ref 3.5–5.1)
RBC # BLD AUTO: 2.38 M/UL (ref 3.8–5.2)
SERVICE CMNT-IMP: ABNORMAL
SODIUM SERPL-SCNC: 140 MMOL/L (ref 136–145)
VANCOMYCIN SERPL-MCNC: 25.4 UG/ML
WBC # BLD AUTO: 16.8 K/UL (ref 3.6–11)

## 2023-05-02 PROCEDURE — 74011250637 HC RX REV CODE- 250/637: Performed by: STUDENT IN AN ORGANIZED HEALTH CARE EDUCATION/TRAINING PROGRAM

## 2023-05-02 PROCEDURE — 85027 COMPLETE CBC AUTOMATED: CPT

## 2023-05-02 PROCEDURE — 36415 COLL VENOUS BLD VENIPUNCTURE: CPT

## 2023-05-02 PROCEDURE — 74011636637 HC RX REV CODE- 636/637: Performed by: INTERNAL MEDICINE

## 2023-05-02 PROCEDURE — 80048 BASIC METABOLIC PNL TOTAL CA: CPT

## 2023-05-02 PROCEDURE — 74011000258 HC RX REV CODE- 258: Performed by: STUDENT IN AN ORGANIZED HEALTH CARE EDUCATION/TRAINING PROGRAM

## 2023-05-02 PROCEDURE — 74011636637 HC RX REV CODE- 636/637: Performed by: STUDENT IN AN ORGANIZED HEALTH CARE EDUCATION/TRAINING PROGRAM

## 2023-05-02 PROCEDURE — 74011250637 HC RX REV CODE- 250/637

## 2023-05-02 PROCEDURE — 82962 GLUCOSE BLOOD TEST: CPT

## 2023-05-02 PROCEDURE — 74011250636 HC RX REV CODE- 250/636: Performed by: STUDENT IN AN ORGANIZED HEALTH CARE EDUCATION/TRAINING PROGRAM

## 2023-05-02 PROCEDURE — 74011250637 HC RX REV CODE- 250/637: Performed by: NURSE PRACTITIONER

## 2023-05-02 PROCEDURE — 65270000046 HC RM TELEMETRY

## 2023-05-02 PROCEDURE — 74011000250 HC RX REV CODE- 250: Performed by: ANESTHESIOLOGY

## 2023-05-02 PROCEDURE — 94760 N-INVAS EAR/PLS OXIMETRY 1: CPT

## 2023-05-02 PROCEDURE — 80202 ASSAY OF VANCOMYCIN: CPT

## 2023-05-02 RX ORDER — LANOLIN ALCOHOL/MO/W.PET/CERES
3 CREAM (GRAM) TOPICAL
Status: DISCONTINUED | OUTPATIENT
Start: 2023-05-02 | End: 2023-05-04 | Stop reason: HOSPADM

## 2023-05-02 RX ORDER — INSULIN GLARGINE 100 [IU]/ML
10 INJECTION, SOLUTION SUBCUTANEOUS
Status: DISCONTINUED | OUTPATIENT
Start: 2023-05-02 | End: 2023-05-04 | Stop reason: HOSPADM

## 2023-05-02 RX ORDER — FAMOTIDINE 20 MG/1
20 TABLET, FILM COATED ORAL DAILY
Status: DISCONTINUED | OUTPATIENT
Start: 2023-05-03 | End: 2023-05-04 | Stop reason: HOSPADM

## 2023-05-02 RX ADMIN — SULFASALAZINE 500 MG: 500 TABLET ORAL at 09:33

## 2023-05-02 RX ADMIN — MELATONIN 3 MG: at 22:06

## 2023-05-02 RX ADMIN — SODIUM CHLORIDE, PRESERVATIVE FREE 10 ML: 5 INJECTION INTRAVENOUS at 22:00

## 2023-05-02 RX ADMIN — Medication 2 UNITS: at 09:34

## 2023-05-02 RX ADMIN — SODIUM CHLORIDE, PRESERVATIVE FREE 10 ML: 5 INJECTION INTRAVENOUS at 13:30

## 2023-05-02 RX ADMIN — ACETAMINOPHEN 1000 MG: 500 TABLET ORAL at 15:00

## 2023-05-02 RX ADMIN — VANCOMYCIN HYDROCHLORIDE 500 MG: 500 INJECTION, POWDER, LYOPHILIZED, FOR SOLUTION INTRAVENOUS at 06:37

## 2023-05-02 RX ADMIN — ROSUVASTATIN CALCIUM 5 MG: 10 TABLET, FILM COATED ORAL at 22:06

## 2023-05-02 RX ADMIN — LEVOTHYROXINE SODIUM 25 MCG: 0.03 TABLET ORAL at 09:33

## 2023-05-02 RX ADMIN — OXYCODONE 5 MG: 5 TABLET ORAL at 18:48

## 2023-05-02 RX ADMIN — ACETAMINOPHEN 1000 MG: 500 TABLET ORAL at 22:06

## 2023-05-02 RX ADMIN — FAMOTIDINE 40 MG: 20 TABLET, FILM COATED ORAL at 09:33

## 2023-05-02 RX ADMIN — DILTIAZEM HYDROCHLORIDE 180 MG: 180 CAPSULE, COATED, EXTENDED RELEASE ORAL at 09:34

## 2023-05-02 RX ADMIN — METOPROLOL SUCCINATE 50 MG: 50 TABLET, EXTENDED RELEASE ORAL at 09:33

## 2023-05-02 RX ADMIN — FUROSEMIDE 40 MG: 40 TABLET ORAL at 09:34

## 2023-05-02 RX ADMIN — VENLAFAXINE HYDROCHLORIDE 75 MG: 37.5 CAPSULE, EXTENDED RELEASE ORAL at 22:06

## 2023-05-02 RX ADMIN — Medication 1 CAPSULE: at 09:33

## 2023-05-02 RX ADMIN — Medication 5 UNITS: at 12:44

## 2023-05-02 RX ADMIN — SULFASALAZINE 500 MG: 500 TABLET ORAL at 18:48

## 2023-05-02 RX ADMIN — SPIRONOLACTONE 25 MG: 25 TABLET ORAL at 22:16

## 2023-05-02 RX ADMIN — GABAPENTIN 300 MG: 300 CAPSULE ORAL at 22:06

## 2023-05-02 RX ADMIN — PIPERACILLIN AND TAZOBACTAM 3.38 G: 3; .375 INJECTION, POWDER, LYOPHILIZED, FOR SOLUTION INTRAVENOUS at 07:43

## 2023-05-02 RX ADMIN — INSULIN GLARGINE 10 UNITS: 100 INJECTION, SOLUTION SUBCUTANEOUS at 22:13

## 2023-05-02 RX ADMIN — Medication 3 UNITS: at 18:48

## 2023-05-02 NOTE — PROGRESS NOTES
Crushed pm meds in apple sauce and attempted to give to pt. Pt refused w/ 2xs. Gabapentin included in crushed meds placed in apple sauce. Unable to waste gabapentin.

## 2023-05-03 ENCOUNTER — APPOINTMENT (OUTPATIENT)
Dept: CT IMAGING | Age: 83
DRG: 617 | End: 2023-05-03
Attending: INTERNAL MEDICINE
Payer: MEDICARE

## 2023-05-03 LAB
ERYTHROCYTE [DISTWIDTH] IN BLOOD BY AUTOMATED COUNT: 13.5 % (ref 11.5–14.5)
GLUCOSE BLD STRIP.AUTO-MCNC: 67 MG/DL (ref 65–117)
GLUCOSE BLD STRIP.AUTO-MCNC: 77 MG/DL (ref 65–117)
GLUCOSE BLD STRIP.AUTO-MCNC: 89 MG/DL (ref 65–117)
GLUCOSE BLD STRIP.AUTO-MCNC: 90 MG/DL (ref 65–117)
GLUCOSE BLD STRIP.AUTO-MCNC: 96 MG/DL (ref 65–117)
HCT VFR BLD AUTO: 21.8 % (ref 35–47)
HGB BLD-MCNC: 7 G/DL (ref 11.5–16)
HISTORY CHECKED?,CKHIST: NORMAL
MCH RBC QN AUTO: 30.8 PG (ref 26–34)
MCHC RBC AUTO-ENTMCNC: 32.1 G/DL (ref 30–36.5)
MCV RBC AUTO: 96 FL (ref 80–99)
NRBC # BLD: 0.02 K/UL (ref 0–0.01)
NRBC BLD-RTO: 0.1 PER 100 WBC
PLATELET # BLD AUTO: 286 K/UL (ref 150–400)
PMV BLD AUTO: 9.9 FL (ref 8.9–12.9)
RBC # BLD AUTO: 2.27 M/UL (ref 3.8–5.2)
SERVICE CMNT-IMP: NORMAL
WBC # BLD AUTO: 15.3 K/UL (ref 3.6–11)

## 2023-05-03 PROCEDURE — 36415 COLL VENOUS BLD VENIPUNCTURE: CPT

## 2023-05-03 PROCEDURE — 94760 N-INVAS EAR/PLS OXIMETRY 1: CPT

## 2023-05-03 PROCEDURE — 65270000046 HC RM TELEMETRY

## 2023-05-03 PROCEDURE — 30233N1 TRANSFUSION OF NONAUTOLOGOUS RED BLOOD CELLS INTO PERIPHERAL VEIN, PERCUTANEOUS APPROACH: ICD-10-PCS | Performed by: SURGERY

## 2023-05-03 PROCEDURE — 82962 GLUCOSE BLOOD TEST: CPT

## 2023-05-03 PROCEDURE — 74011250636 HC RX REV CODE- 250/636: Performed by: INTERNAL MEDICINE

## 2023-05-03 PROCEDURE — 74011250637 HC RX REV CODE- 250/637: Performed by: SURGERY

## 2023-05-03 PROCEDURE — 85027 COMPLETE CBC AUTOMATED: CPT

## 2023-05-03 PROCEDURE — 74011000250 HC RX REV CODE- 250: Performed by: SURGERY

## 2023-05-03 PROCEDURE — P9016 RBC LEUKOCYTES REDUCED: HCPCS

## 2023-05-03 PROCEDURE — 74011000250 HC RX REV CODE- 250: Performed by: INTERNAL MEDICINE

## 2023-05-03 PROCEDURE — 51798 US URINE CAPACITY MEASURE: CPT

## 2023-05-03 PROCEDURE — 36430 TRANSFUSION BLD/BLD COMPNT: CPT

## 2023-05-03 PROCEDURE — 74011250637 HC RX REV CODE- 250/637: Performed by: INTERNAL MEDICINE

## 2023-05-03 PROCEDURE — 70450 CT HEAD/BRAIN W/O DYE: CPT

## 2023-05-03 RX ORDER — NALOXONE HYDROCHLORIDE 0.4 MG/ML
0.2 INJECTION, SOLUTION INTRAMUSCULAR; INTRAVENOUS; SUBCUTANEOUS ONCE
Status: DISCONTINUED | OUTPATIENT
Start: 2023-05-03 | End: 2023-05-03 | Stop reason: SDUPTHER

## 2023-05-03 RX ORDER — SODIUM CHLORIDE 9 MG/ML
250 INJECTION, SOLUTION INTRAVENOUS AS NEEDED
Status: DISCONTINUED | OUTPATIENT
Start: 2023-05-03 | End: 2023-05-04 | Stop reason: HOSPADM

## 2023-05-03 RX ORDER — NALOXONE HYDROCHLORIDE 0.4 MG/ML
0.2 INJECTION, SOLUTION INTRAMUSCULAR; INTRAVENOUS; SUBCUTANEOUS ONCE
Status: COMPLETED | OUTPATIENT
Start: 2023-05-03 | End: 2023-05-03

## 2023-05-03 RX ORDER — DEXTROSE MONOHYDRATE 100 MG/ML
0-250 INJECTION, SOLUTION INTRAVENOUS AS NEEDED
Status: DISCONTINUED | OUTPATIENT
Start: 2023-05-03 | End: 2023-05-04 | Stop reason: HOSPADM

## 2023-05-03 RX ORDER — IBUPROFEN 200 MG
4 TABLET ORAL AS NEEDED
Status: DISCONTINUED | OUTPATIENT
Start: 2023-05-03 | End: 2023-05-04 | Stop reason: HOSPADM

## 2023-05-03 RX ORDER — DEXTROSE MONOHYDRATE 100 MG/ML
50 INJECTION, SOLUTION INTRAVENOUS CONTINUOUS
Status: DISCONTINUED | OUTPATIENT
Start: 2023-05-03 | End: 2023-05-04 | Stop reason: HOSPADM

## 2023-05-03 RX ADMIN — DEXTROSE MONOHYDRATE 50 ML/HR: 100 INJECTION, SOLUTION INTRAVENOUS at 23:20

## 2023-05-03 RX ADMIN — SODIUM CHLORIDE, PRESERVATIVE FREE 10 ML: 5 INJECTION INTRAVENOUS at 22:47

## 2023-05-03 RX ADMIN — SODIUM CHLORIDE, PRESERVATIVE FREE 10 ML: 5 INJECTION INTRAVENOUS at 10:07

## 2023-05-03 RX ADMIN — FUROSEMIDE 40 MG: 40 TABLET ORAL at 13:17

## 2023-05-03 RX ADMIN — SODIUM CHLORIDE, PRESERVATIVE FREE 10 ML: 5 INJECTION INTRAVENOUS at 12:38

## 2023-05-03 RX ADMIN — ACETAMINOPHEN 1000 MG: 500 TABLET ORAL at 13:18

## 2023-05-03 RX ADMIN — Medication 1 CAPSULE: at 13:17

## 2023-05-03 RX ADMIN — GABAPENTIN 300 MG: 300 CAPSULE ORAL at 22:46

## 2023-05-03 RX ADMIN — FAMOTIDINE 20 MG: 20 TABLET, FILM COATED ORAL at 13:17

## 2023-05-03 RX ADMIN — ACETAMINOPHEN 1000 MG: 500 TABLET ORAL at 22:46

## 2023-05-03 RX ADMIN — VENLAFAXINE HYDROCHLORIDE 75 MG: 37.5 CAPSULE, EXTENDED RELEASE ORAL at 22:46

## 2023-05-03 RX ADMIN — LEVOTHYROXINE SODIUM 25 MCG: 0.03 TABLET ORAL at 13:18

## 2023-05-03 RX ADMIN — DILTIAZEM HYDROCHLORIDE 180 MG: 180 CAPSULE, COATED, EXTENDED RELEASE ORAL at 13:17

## 2023-05-03 RX ADMIN — SULFASALAZINE 500 MG: 500 TABLET ORAL at 13:17

## 2023-05-03 RX ADMIN — OXYCODONE 10 MG: 5 TABLET ORAL at 04:03

## 2023-05-03 RX ADMIN — ROSUVASTATIN CALCIUM 5 MG: 10 TABLET, FILM COATED ORAL at 22:46

## 2023-05-03 RX ADMIN — LORAZEPAM 1 MG: 2 INJECTION INTRAMUSCULAR; INTRAVENOUS at 04:13

## 2023-05-03 RX ADMIN — DEXTROSE MONOHYDRATE 50 ML/HR: 100 INJECTION, SOLUTION INTRAVENOUS at 18:37

## 2023-05-03 RX ADMIN — NALOXONE HYDROCHLORIDE 0.2 MG: 0.4 INJECTION, SOLUTION INTRAMUSCULAR; INTRAVENOUS; SUBCUTANEOUS at 12:37

## 2023-05-03 RX ADMIN — METOPROLOL SUCCINATE 50 MG: 50 TABLET, EXTENDED RELEASE ORAL at 13:18

## 2023-05-04 VITALS
WEIGHT: 115.08 LBS | HEART RATE: 80 BPM | DIASTOLIC BLOOD PRESSURE: 60 MMHG | TEMPERATURE: 97 F | RESPIRATION RATE: 18 BRPM | BODY MASS INDEX: 23.2 KG/M2 | SYSTOLIC BLOOD PRESSURE: 163 MMHG | OXYGEN SATURATION: 99 % | HEIGHT: 59 IN

## 2023-05-04 LAB
ABO + RH BLD: NORMAL
ANION GAP SERPL CALC-SCNC: 3 MMOL/L (ref 5–15)
BACTERIA SPEC CULT: NORMAL
BACTERIA SPEC CULT: NORMAL
BASOPHILS # BLD: 0 K/UL (ref 0–0.1)
BASOPHILS NFR BLD: 0 % (ref 0–1)
BLD PROD TYP BPU: NORMAL
BLOOD GROUP ANTIBODIES SERPL: NORMAL
BPU ID: NORMAL
BUN SERPL-MCNC: 18 MG/DL (ref 6–20)
BUN/CREAT SERPL: 23 (ref 12–20)
CALCIUM SERPL-MCNC: 9.6 MG/DL (ref 8.5–10.1)
CHLORIDE SERPL-SCNC: 109 MMOL/L (ref 97–108)
CO2 SERPL-SCNC: 27 MMOL/L (ref 21–32)
CREAT SERPL-MCNC: 0.8 MG/DL (ref 0.55–1.02)
CROSSMATCH RESULT,%XM: NORMAL
DIFFERENTIAL METHOD BLD: ABNORMAL
EOSINOPHIL # BLD: 0.1 K/UL (ref 0–0.4)
EOSINOPHIL NFR BLD: 1 % (ref 0–7)
ERYTHROCYTE [DISTWIDTH] IN BLOOD BY AUTOMATED COUNT: 15.7 % (ref 11.5–14.5)
GLUCOSE BLD STRIP.AUTO-MCNC: 130 MG/DL (ref 65–117)
GLUCOSE BLD STRIP.AUTO-MCNC: 142 MG/DL (ref 65–117)
GLUCOSE BLD STRIP.AUTO-MCNC: 226 MG/DL (ref 65–117)
GLUCOSE SERPL-MCNC: 225 MG/DL (ref 65–100)
HCT VFR BLD AUTO: 25.1 % (ref 35–47)
HGB BLD-MCNC: 8.1 G/DL (ref 11.5–16)
IMM GRANULOCYTES # BLD AUTO: 0.3 K/UL (ref 0–0.04)
IMM GRANULOCYTES NFR BLD AUTO: 2 % (ref 0–0.5)
LYMPHOCYTES # BLD: 0.9 K/UL (ref 0.8–3.5)
LYMPHOCYTES NFR BLD: 7 % (ref 12–49)
MCH RBC QN AUTO: 30.2 PG (ref 26–34)
MCHC RBC AUTO-ENTMCNC: 32.3 G/DL (ref 30–36.5)
MCV RBC AUTO: 93.7 FL (ref 80–99)
MONOCYTES # BLD: 0.7 K/UL (ref 0–1)
MONOCYTES NFR BLD: 6 % (ref 5–13)
NEUTS SEG # BLD: 10.3 K/UL (ref 1.8–8)
NEUTS SEG NFR BLD: 84 % (ref 32–75)
NRBC # BLD: 0.03 K/UL (ref 0–0.01)
NRBC BLD-RTO: 0.2 PER 100 WBC
PLATELET # BLD AUTO: 322 K/UL (ref 150–400)
PMV BLD AUTO: 8.4 FL (ref 8.9–12.9)
POTASSIUM SERPL-SCNC: 3.2 MMOL/L (ref 3.5–5.1)
RBC # BLD AUTO: 2.68 M/UL (ref 3.8–5.2)
SERVICE CMNT-IMP: ABNORMAL
SERVICE CMNT-IMP: NORMAL
SERVICE CMNT-IMP: NORMAL
SODIUM SERPL-SCNC: 139 MMOL/L (ref 136–145)
SPECIMEN EXP DATE BLD: NORMAL
STATUS OF UNIT,%ST: NORMAL
UNIT DIVISION, %UDIV: 0
WBC # BLD AUTO: 12.3 K/UL (ref 3.6–11)

## 2023-05-04 PROCEDURE — 74011250637 HC RX REV CODE- 250/637: Performed by: INTERNAL MEDICINE

## 2023-05-04 PROCEDURE — 85025 COMPLETE CBC W/AUTO DIFF WBC: CPT

## 2023-05-04 PROCEDURE — 74011636637 HC RX REV CODE- 636/637: Performed by: SURGERY

## 2023-05-04 PROCEDURE — 74011000250 HC RX REV CODE- 250: Performed by: SURGERY

## 2023-05-04 PROCEDURE — 74011250637 HC RX REV CODE- 250/637: Performed by: SURGERY

## 2023-05-04 PROCEDURE — 74011000250 HC RX REV CODE- 250: Performed by: INTERNAL MEDICINE

## 2023-05-04 PROCEDURE — 82962 GLUCOSE BLOOD TEST: CPT

## 2023-05-04 PROCEDURE — 80048 BASIC METABOLIC PNL TOTAL CA: CPT

## 2023-05-04 PROCEDURE — 94760 N-INVAS EAR/PLS OXIMETRY 1: CPT

## 2023-05-04 PROCEDURE — 36415 COLL VENOUS BLD VENIPUNCTURE: CPT

## 2023-05-04 RX ORDER — OXYCODONE HYDROCHLORIDE 5 MG/1
5 TABLET ORAL
Qty: 12 TABLET | Refills: 0 | Status: SHIPPED | OUTPATIENT
Start: 2023-05-04 | End: 2023-05-08

## 2023-05-04 RX ADMIN — SODIUM CHLORIDE, PRESERVATIVE FREE 10 ML: 5 INJECTION INTRAVENOUS at 06:31

## 2023-05-04 RX ADMIN — DILTIAZEM HYDROCHLORIDE 180 MG: 180 CAPSULE, COATED, EXTENDED RELEASE ORAL at 10:36

## 2023-05-04 RX ADMIN — Medication 2 UNITS: at 12:07

## 2023-05-04 RX ADMIN — SULFASALAZINE 500 MG: 500 TABLET ORAL at 10:36

## 2023-05-04 RX ADMIN — FUROSEMIDE 40 MG: 40 TABLET ORAL at 10:36

## 2023-05-04 RX ADMIN — ACETAMINOPHEN 1000 MG: 500 TABLET ORAL at 15:00

## 2023-05-04 RX ADMIN — Medication 1 CAPSULE: at 10:36

## 2023-05-04 RX ADMIN — METOPROLOL SUCCINATE 50 MG: 50 TABLET, EXTENDED RELEASE ORAL at 10:37

## 2023-05-04 RX ADMIN — DEXTROSE MONOHYDRATE 50 ML/HR: 100 INJECTION, SOLUTION INTRAVENOUS at 06:44

## 2023-05-04 RX ADMIN — FAMOTIDINE 20 MG: 20 TABLET, FILM COATED ORAL at 10:36

## 2023-05-04 RX ADMIN — OXYCODONE 5 MG: 5 TABLET ORAL at 12:07

## 2023-05-04 RX ADMIN — LEVOTHYROXINE SODIUM 25 MCG: 0.03 TABLET ORAL at 10:37

## 2023-05-04 RX ADMIN — SULFASALAZINE 500 MG: 500 TABLET ORAL at 17:58

## 2023-05-04 RX ADMIN — Medication 3 UNITS: at 17:58

## 2023-06-01 ENCOUNTER — HOSPITAL ENCOUNTER (OUTPATIENT)
Facility: HOSPITAL | Age: 83
Discharge: HOME | End: 2023-06-01
Payer: MEDICARE

## 2023-06-01 VITALS
TEMPERATURE: 97.9 F | DIASTOLIC BLOOD PRESSURE: 63 MMHG | HEART RATE: 70 BPM | RESPIRATION RATE: 18 BRPM | SYSTOLIC BLOOD PRESSURE: 138 MMHG

## 2023-06-01 DIAGNOSIS — L89.323 PRESSURE INJURY OF LEFT BUTTOCK, STAGE 3 (HCC): Primary | ICD-10-CM

## 2023-06-01 PROBLEM — L89.303 PRESSURE INJURY OF BUTTOCK, STAGE 3 (HCC): Status: ACTIVE | Noted: 2023-06-01

## 2023-06-01 PROCEDURE — 99203 OFFICE O/P NEW LOW 30 MIN: CPT | Performed by: SURGERY

## 2023-06-01 PROCEDURE — 99214 OFFICE O/P EST MOD 30 MIN: CPT

## 2023-06-01 RX ORDER — BETAMETHASONE DIPROPIONATE 0.05 %
OINTMENT (GRAM) TOPICAL ONCE
Status: CANCELLED | OUTPATIENT
Start: 2023-06-01 | End: 2023-06-01

## 2023-06-01 RX ORDER — GINSENG 100 MG
CAPSULE ORAL ONCE
OUTPATIENT
Start: 2023-06-01 | End: 2023-06-01

## 2023-06-01 RX ORDER — GINSENG 100 MG
CAPSULE ORAL ONCE
Status: CANCELLED | OUTPATIENT
Start: 2023-06-01 | End: 2023-06-01

## 2023-06-01 RX ORDER — LIDOCAINE HYDROCHLORIDE 40 MG/ML
SOLUTION TOPICAL ONCE
OUTPATIENT
Start: 2023-06-01 | End: 2023-06-01

## 2023-06-01 RX ORDER — LIDOCAINE 40 MG/G
CREAM TOPICAL ONCE
OUTPATIENT
Start: 2023-06-01 | End: 2023-06-01

## 2023-06-01 RX ORDER — SODIUM CHLOR/HYPOCHLOROUS ACID 0.033 %
SOLUTION, IRRIGATION IRRIGATION ONCE
Status: CANCELLED | OUTPATIENT
Start: 2023-06-01 | End: 2023-06-01

## 2023-06-01 RX ORDER — BACITRACIN, NEOMYCIN, POLYMYXIN B 400; 3.5; 5 [USP'U]/G; MG/G; [USP'U]/G
OINTMENT TOPICAL ONCE
Status: CANCELLED | OUTPATIENT
Start: 2023-06-01 | End: 2023-06-01

## 2023-06-01 RX ORDER — LIDOCAINE HYDROCHLORIDE 20 MG/ML
JELLY TOPICAL ONCE
OUTPATIENT
Start: 2023-06-01 | End: 2023-06-01

## 2023-06-01 RX ORDER — BACITRACIN ZINC AND POLYMYXIN B SULFATE 500; 1000 [USP'U]/G; [USP'U]/G
OINTMENT TOPICAL ONCE
OUTPATIENT
Start: 2023-06-01 | End: 2023-06-01

## 2023-06-01 RX ORDER — LIDOCAINE HYDROCHLORIDE 20 MG/ML
JELLY TOPICAL ONCE
Status: CANCELLED | OUTPATIENT
Start: 2023-06-01 | End: 2023-06-01

## 2023-06-01 RX ORDER — GENTAMICIN SULFATE 1 MG/G
OINTMENT TOPICAL ONCE
Status: CANCELLED | OUTPATIENT
Start: 2023-06-01 | End: 2023-06-01

## 2023-06-01 RX ORDER — LIDOCAINE 50 MG/G
OINTMENT TOPICAL ONCE
OUTPATIENT
Start: 2023-06-01 | End: 2023-06-01

## 2023-06-01 RX ORDER — CLOBETASOL PROPIONATE 0.5 MG/G
OINTMENT TOPICAL ONCE
OUTPATIENT
Start: 2023-06-01 | End: 2023-06-01

## 2023-06-01 RX ORDER — LIDOCAINE HYDROCHLORIDE 40 MG/ML
SOLUTION TOPICAL ONCE
Status: CANCELLED | OUTPATIENT
Start: 2023-06-01 | End: 2023-06-01

## 2023-06-01 RX ORDER — SODIUM CHLOR/HYPOCHLOROUS ACID 0.033 %
SOLUTION, IRRIGATION IRRIGATION ONCE
OUTPATIENT
Start: 2023-06-01 | End: 2023-06-01

## 2023-06-01 RX ORDER — GENTAMICIN SULFATE 1 MG/G
OINTMENT TOPICAL ONCE
OUTPATIENT
Start: 2023-06-01 | End: 2023-06-01

## 2023-06-01 RX ORDER — LIDOCAINE 50 MG/G
OINTMENT TOPICAL ONCE
Status: CANCELLED | OUTPATIENT
Start: 2023-06-01 | End: 2023-06-01

## 2023-06-01 RX ORDER — BACITRACIN ZINC AND POLYMYXIN B SULFATE 500; 1000 [USP'U]/G; [USP'U]/G
OINTMENT TOPICAL ONCE
Status: CANCELLED | OUTPATIENT
Start: 2023-06-01 | End: 2023-06-01

## 2023-06-01 RX ORDER — LIDOCAINE 40 MG/G
CREAM TOPICAL ONCE
Status: CANCELLED | OUTPATIENT
Start: 2023-06-01 | End: 2023-06-01

## 2023-06-01 RX ORDER — CLOBETASOL PROPIONATE 0.5 MG/G
OINTMENT TOPICAL ONCE
Status: CANCELLED | OUTPATIENT
Start: 2023-06-01 | End: 2023-06-01

## 2023-06-01 RX ORDER — BACITRACIN, NEOMYCIN, POLYMYXIN B 400; 3.5; 5 [USP'U]/G; MG/G; [USP'U]/G
OINTMENT TOPICAL ONCE
OUTPATIENT
Start: 2023-06-01 | End: 2023-06-01

## 2023-06-01 RX ORDER — BETAMETHASONE DIPROPIONATE 0.05 %
OINTMENT (GRAM) TOPICAL ONCE
OUTPATIENT
Start: 2023-06-01 | End: 2023-06-01

## 2023-06-01 NOTE — FLOWSHEET NOTE
06/01/23 1326   Wound 06/01/23 Sacrum Left # 1   Date First Assessed/Time First Assessed: 06/01/23 1327   Present on Hospital Admission: Yes  Wound Approximate Age at First Assessment (Weeks): 3 weeks  Primary Wound Type: Pressure Injury  Location: Sacrum  Wound Location Orientation: Left  Wound Ricardo. ..    Wound Image    Wound Etiology Pressure Stage 3   Wound Length (cm) 1.2 cm   Wound Width (cm) 1.2 cm   Wound Depth (cm) 0.1 cm   Wound Surface Area (cm^2) 1.44 cm^2   Wound Volume (cm^3) 0.144 cm^3   Wound Assessment [place]/red;Slough   Drainage Amount Moderate   Drainage Description Serosanguinous   Odor None   Ariadna-wound Assessment Intact   Margins Flat/open edges   Wound Thickness Description not for Pressure Injury Full thickness   Pain Assessment   Pain Assessment None - Denies Pain     /63   Pulse 70   Temp 97.9 °F (36.6 °C) (Temporal)   Resp 18

## 2023-06-01 NOTE — CONSULTS
WOUND CARE initial consult      Subjective:     Patient 26-year-old female with dementia, diabetes, history of bilateral above-knee amputations, accompanied by her daughter, patient lives at home with home health and was noted to have stage III left buttocks sacral pressure injury. This was being treated with Medihoney and gauze. Review of Systems   Unable to perform ROS: Dementia     Objective:     Blood pressure 138/63, pulse 70, temperature 97.9 °F (36.6 °C), temperature source Temporal, resp. rate 18. Temp (24hrs), Av.9 °F (36.6 °C), Min:97.9 °F (36.6 °C), Max:97.9 °F (36.6 °C)      Physical Exam  Vitals and nursing note reviewed. Exam conducted with a chaperone present. Constitutional:       Appearance: Normal appearance. HENT:      Head: Normocephalic. Cardiovascular:      Rate and Rhythm: Normal rate. Pulmonary:      Effort: Pulmonary effort is normal.   Musculoskeletal:      Comments: Bilateral AKA's   Skin:     General: Skin is warm and dry. Neurological:      Mental Status: She is alert. Comments: Dementia     Left buttocks sacral pressure injury clean, no debridement needed no signs of infection or abscess      23 1326   Wound 23 Sacrum Left # 1   Date First Assessed/Time First Assessed: 23 1327   Present on Hospital Admission: Yes  Wound Approximate Age at First Assessment (Weeks): 3 weeks  Primary Wound Type: Pressure Injury  Location: Sacrum  Wound Location Orientation: Left  Wound Ricardo. ..    Wound Image    Wound Etiology Pressure Stage 3   Wound Length (cm) 1.2 cm   Wound Width (cm) 1.2 cm   Wound Depth (cm) 0.1 cm   Wound Surface Area (cm^2) 1.44 cm^2   Wound Volume (cm^3) 0.144 cm^3   Wound Assessment [place]/red;Slough   Drainage Amount Moderate   Drainage Description Serosanguinous   Odor None   Ariadna-wound Assessment Intact   Margins Flat/open edges   Wound Thickness Description not for Pressure Injury Full thickness   Pain Assessment   Pain

## 2023-06-01 NOTE — DISCHARGE INSTRUCTIONS
Discharge Instructions/Wound [address]  [address]  MOB 1, [address], [id number]  Telephone: [phone]  WOUND CARE ORDERS:  Left buttock wound :Cleanse with normal saline, apply primary dressing Medihoney gel or sheet,  cover with secondary dressing Border Foam .  Change daily and as needed for compromise. Follow-up with MD in 2 weeks. Home Health nurse to evaluate and obtain memory foam or gel overlay for hospital bed. TREATMENT ORDERS:  Turn/reposition every 2 hours when in bed, avoid direct pressure on wound site. When sitting, shift position or do seat lifts every 15 minutes. Limit side lying to 30 degree tilt. Limit HOB elevation to 30 degrees. Use speciality pressure relief cushion, mattress as appropriate. Follow diet as prescribed:  [] Diet as tolerated: [x] Calorie Diabetic Diet:Low carb and no Sugar [] No Added Salt:[x] Increase Protein: [] Other:Limit the amount of liquid you are drinking and avoid drinking in between meals              Return Appointment:  [x] Return Appointment: With Dr. Liz Davis  in  [facility])  [] Ordered tests:    Electronically signed Bhavna Sinclair on 6/1/2023 at 2:00 PM     [address] Information: Should you experience any significant changes in your wound(s) or have questions about your wound care, please contact the [facility] at [address] [place] [address] Street 8:00 am - 4:30. If you need help with your wound outside these hours and cannot wait until we are again available, contact your PCP or go to the hospital emergency room. PLEASE NOTE: IF YOU ARE UNABLE TO OBTAIN WOUND SUPPLIES, CONTINUE TO USE THE SUPPLIES YOU HAVE AVAILABLE UNTIL YOU ARE ABLE TO REACH US. IT IS MOST IMPORTANT TO KEEP THE WOUND COVERED AT ALL TIMES.      Physician Signature:_______________________    Date: ___________ Time:  ____________

## 2023-07-04 NOTE — ANESTHESIA PROCEDURE NOTES
Right anterior sciatic peripheral nerve block    Start time: 12/30/2022 2:00 PM  End time: 12/30/2022 2:05 PM  Performed by: Madison Alvarado MD  Authorized by: Madison Alvarado MD       Pre-procedure: Indications: at surgeon's request and post-op pain management    Preanesthetic Checklist: patient identified, risks and benefits discussed, site marked, timeout performed, anesthesia consent given, patient being monitored and fire risk safety assessment completed and verbalized    Timeout Time: 14:00 EST      Block Type:   Block Type:  Sciatic single shot  Laterality:  Right  Monitoring:  Standard ASA monitoring, continuous pulse ox, frequent vital sign checks, heart rate and oxygen  Injection Technique:  Single shot  Patient Position: supine  Prep: chlorhexidine    Location:  Upper thigh  Needle Type:  Stimuplex  Needle Gauge:  21 G  Needle Localization:  Anatomical landmarks and ultrasound guidance  Med Admin Time: 12/30/2022 2:05 PM    Assessment:  Number of attempts:  1  Injection Assessment:  Incremental injection every 5 mL, ultrasound image on chart, local visualized surrounding nerve on ultrasound, negative aspiration for blood and no intravascular symptoms    Done under general anesthesia, pt demented and unable to follow commands and tolerate procedure.   Discussed risk of increased nerve injury and daughter agrees to proceed
Right femoral peripheral nerve block    Start time: 12/30/2022 2:05 PM  End time: 12/30/2022 2:07 PM  Performed by: Ang Ruiz MD  Authorized by: Ang Ruiz MD       Pre-procedure:    Indications: at surgeon's request and post-op pain management    Preanesthetic Checklist: patient identified, risks and benefits discussed, site marked, timeout performed, anesthesia consent given, patient being monitored and fire risk safety assessment completed and verbalized    Timeout Time: 14:05 EST      Block Type:   Block Type:  Femoral single shot  Laterality:  Right  Monitoring:  Standard ASA monitoring, continuous pulse ox, frequent vital sign checks, heart rate, responsive to questions and oxygen  Injection Technique:  Single shot  Procedures: ultrasound guided    Patient Position: supine  Prep: chlorhexidine    Location:  Upper thigh  Needle Type:  Stimuplex  Needle Gauge:  21 G  Needle Localization:  Anatomical landmarks and ultrasound guidance  Med Admin Time: 12/30/2022 2:07 PM    Assessment:  Number of attempts:  1  Injection Assessment:  Incremental injection every 5 mL, ultrasound image on chart, local visualized surrounding nerve on ultrasound, negative aspiration for blood and no intravascular symptoms  Patient tolerance:  Patient tolerated the procedure well with no immediate complications  Done under general anesthesia due to pt's inability to follow commands secondary to dementia  Discussed risks and daughter agrees to proceed
None

## 2023-07-12 ENCOUNTER — HOSPITAL ENCOUNTER (OUTPATIENT)
Facility: HOSPITAL | Age: 83
Discharge: HOME | End: 2023-07-12
Payer: MEDICARE

## 2023-07-12 VITALS
DIASTOLIC BLOOD PRESSURE: 64 MMHG | TEMPERATURE: 97.8 F | HEART RATE: 70 BPM | SYSTOLIC BLOOD PRESSURE: 140 MMHG | RESPIRATION RATE: 18 BRPM

## 2023-07-12 DIAGNOSIS — L89.323 PRESSURE INJURY OF LEFT BUTTOCK, STAGE 3 (HCC): Primary | ICD-10-CM

## 2023-07-12 PROBLEM — L89.303 PRESSURE INJURY OF BUTTOCK, STAGE 3 (HCC): Status: RESOLVED | Noted: 2023-06-01 | Resolved: 2023-07-12

## 2023-07-12 PROCEDURE — 99213 OFFICE O/P EST LOW 20 MIN: CPT | Performed by: SURGERY

## 2023-07-12 PROCEDURE — 99213 OFFICE O/P EST LOW 20 MIN: CPT

## 2023-07-12 RX ORDER — IBUPROFEN 200 MG
TABLET ORAL ONCE
Status: CANCELLED | OUTPATIENT
Start: 2023-07-12 | End: 2023-07-12

## 2023-07-12 RX ORDER — CLOBETASOL PROPIONATE 0.5 MG/G
OINTMENT TOPICAL ONCE
Status: CANCELLED | OUTPATIENT
Start: 2023-07-12 | End: 2023-07-12

## 2023-07-12 RX ORDER — SODIUM CHLOR/HYPOCHLOROUS ACID 0.033 %
SOLUTION, IRRIGATION IRRIGATION ONCE
Status: CANCELLED | OUTPATIENT
Start: 2023-07-12 | End: 2023-07-12

## 2023-07-12 RX ORDER — GINSENG 100 MG
CAPSULE ORAL ONCE
Status: CANCELLED | OUTPATIENT
Start: 2023-07-12 | End: 2023-07-12

## 2023-07-12 RX ORDER — LIDOCAINE HYDROCHLORIDE 40 MG/ML
SOLUTION TOPICAL ONCE
Status: CANCELLED | OUTPATIENT
Start: 2023-07-12 | End: 2023-07-12

## 2023-07-12 RX ORDER — BACITRACIN ZINC AND POLYMYXIN B SULFATE 500; 1000 [USP'U]/G; [USP'U]/G
OINTMENT TOPICAL ONCE
Status: CANCELLED | OUTPATIENT
Start: 2023-07-12 | End: 2023-07-12

## 2023-07-12 RX ORDER — LIDOCAINE HYDROCHLORIDE 20 MG/ML
JELLY TOPICAL ONCE
Status: CANCELLED | OUTPATIENT
Start: 2023-07-12 | End: 2023-07-12

## 2023-07-12 RX ORDER — LIDOCAINE 50 MG/G
OINTMENT TOPICAL ONCE
Status: CANCELLED | OUTPATIENT
Start: 2023-07-12 | End: 2023-07-12

## 2023-07-12 RX ORDER — GENTAMICIN SULFATE 1 MG/G
OINTMENT TOPICAL ONCE
Status: CANCELLED | OUTPATIENT
Start: 2023-07-12 | End: 2023-07-12

## 2023-07-12 RX ORDER — LIDOCAINE 40 MG/G
CREAM TOPICAL ONCE
Status: CANCELLED | OUTPATIENT
Start: 2023-07-12 | End: 2023-07-12

## 2023-07-12 RX ORDER — BETAMETHASONE DIPROPIONATE 0.05 %
OINTMENT (GRAM) TOPICAL ONCE
Status: CANCELLED | OUTPATIENT
Start: 2023-07-12 | End: 2023-07-12

## 2023-07-12 NOTE — DISCHARGE INSTRUCTIONS
Discharge Instructions [place] [address], [address] [place] [address]  Telephone: [phone]    NAME:  Rita Buenrostro  YOB: 1940  MEDICAL RECORD NUMBER:  [id number]  DATE:  7/12/2023  WOUND CARE ORDERS:  Sacrum :Cleanse with soap and water. No follow up needed. TREATMENT ORDERS:    Elevate leg(s) above the level of the heart when sitting. Avoid prolonged standing in one place. Do no get dressing/wrap wet. Follow Diet as prescribed:   [] Diet as tolerated: [] Calorie Diabetic Diet: Low carb and no Sugar [] No Added Salt:  [] Increase Protein: [] Limit the amount of liquid you are drinking and avoid drinking in between meals     Return Appointment:  [] Return Appointment: With Dr. Kathy Farrell in  no follow up  [] Nurse Visit : *** days  [] Ordered tests:    Electronically signed Javier Potter RN on 7/12/2023 at [address] [facility]: Should you experience any significant changes in your wound(s) or have questions about your wound care, please contact the [address] [place] [address] at [address] [place] [address] 8:00 am - 4:30. If you need help with your wound outside these hours and cannot wait until we are again available, contact your PCP or go to the hospital emergency room. PLEASE NOTE: IF YOU ARE UNABLE TO OBTAIN WOUND SUPPLIES, CONTINUE TO USE THE SUPPLIES YOU HAVE AVAILABLE UNTIL YOU ARE ABLE TO REACH US. IT IS MOST IMPORTANT TO KEEP THE WOUND COVERED AT ALL TIMES.      Physician Signature:_______________________    Date: ___________ Time:  ____________

## 2023-07-12 NOTE — PROGRESS NOTES
The patient is an 78-year-old woman who was first evaluated at the wound care center by Dr. Luis Alberto Vivas on 6/1/2023 for a sacral pressure wound. The patient was first seen by me at the wound care center on 6/14/2023. The patient had her second above-knee amputation earlier this year. She is now living at home and is cared for by her daughters. The patient's daughters report that she is able to turn to her right and left sides. She at night sleeps on a bed with a memory foam overlay and during the day spends intermittent times sitting in a chair with a memory foam cushion with sacral cut out. The patient was reported to have a good appetite. The patient's past medical history is remarkable for diabetes mellitus type 2, coronary artery disease status post coronary artery bypass grafting, congestive heart failure, prior history of subdural hematoma. Dressing : Apply thin DuoDERM over the ulcer. Change dressing every other day and as needed. Reported weight 115 pounds, height 4 feet 11 inches. Physical examination     The patient is alert woman in no acute distress. She has noted to be hard of hearing. Examination of the lower sacral region reveals the prior ulcer site is healed. Surrounding skin appears normal.           Sacral pressure ulcer stage III, now healed. The patient's family is doing a good job with her care. No dressing is needed. The patient should avoid sleeping in the supine position. She should sleep on right and left lateral positions. She should change positions every 1-2 hours. When sitting up the patient should sit on a memory foam cushion with sacral cut out. Sit for no more than 2 hours per episode with a least 1/2-hour lying down between episodes. If she has discomfort in the lower sacral region prior to 2 hours sitting, she should immediately change position to offload the site. No scheduled follow-up.        Diagnosis: Pressure

## 2023-10-23 NOTE — PROGRESS NOTES
10/23/2023         RE: Teresa Fernandez  [address] [facility] [id number]        Dear Colleague,    Thank you for referring your patient, Teresa Fernandez, to the [facility]. Please see a copy of my visit note below.    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 This indicates pt is at risk to develop hyperplasia in other parathyroid glands and at risk for relapse of primary hyperparathyroidism  Calcium has normalized.  Pt has been feeling better, however still had two falls since an with one of them resulting in right ankle fracture.    Has been on Levothyroxine 88 and 100 mcg alternating.       Plan:   Lab check today for PTHi, TSH reflex  Continue Levothyroxine 100 mcg 5 days a week and 88 mcg on Tuesday and Thursday      This note was generated using computer recognized voice recognition. This might result in some expected imperfection.    Leigh Ann Ellsworth MD  Endocrinology and Diabetes  Telephone contact:  [facility] [place] [phone]  [facility] [phone]          Interval history:  Pt had parahtyroid scan revealing increase uptake in the left lower neck and was referred to ENT. She had parathyroidectomy in 8/2023 by Dr Good. Pt recovered quickly within less than a week. She has been feeling better with resolution of her bone pain.   She has had 2 falls one of which resulting in left ankle fracture. She notes some vision problems and had eye surgeries. This  Problem: Falls - Risk of  Goal: *Absence of Falls  Document Baljinder Fall Risk and appropriate interventions in the flowsheet.    Outcome: Progressing Towards Goal  Fall Risk Interventions:        Mentation Interventions: Door open when patient unattended, Eyeglasses and hearing aids, Family/sitter at bedside, Gait belt with transfers/ambulation, Increase mobility, More frequent rounding, Toileting rounds, Update white board     Medication Interventions: Evaluate medications/consider consulting pharmacy, Patient to call before getting OOB, Teach patient to arise slowly, Utilize gait belt for transfers/ambulation might have been helpful for avoiding falls.    Hypothyorid LT4 100 mcg 5 days a week and 88 mcg tues Thursday    Has history of syncope. Negative work up in Cardiology, multiple falls.    Bone denisty per DXA scan in 10/202 normal    No history of kidney stones     Hypothyroidism  This was diagnosed in 1996 after nephrectomy, patient notes that thyroid hormone levels have only been mildly abnormal.  However her symptoms improved significantly this levothyroxine started.  Takes medication as directed in the morning with water separates from food for 30 minutes or more  LT4 100 mcg takes in am , since 1996,       Medications:   Current Outpatient Medications   Medication Sig Dispense Refill     acetaminophen (TYLENOL) 325 MG tablet Take 2 tablets (650 mg) by mouth every 4 hours as needed for mild pain 50 tablet 0     acetaminophen (TYLENOL) 325 MG tablet Take 325-650 mg by mouth every 6 hours as needed for mild pain       albuterol (PROAIR HFA/PROVENTIL HFA/VENTOLIN HFA) 108 (90 Base) MCG/ACT inhaler Inhale 2 puffs into the lungs every 4 hours as needed for shortness of breath 18 g 4     albuterol (PROVENTIL) (2.5 MG/3ML) 0.083% neb solution Take 1 vial (2.5 mg) by nebulization every 6 hours as needed for shortness of breath / dyspnea or wheezing 90 mL 11     APNO CREA ointment Apply to rash on nose twice daily as needed. 100 g 3     augmented betamethasone dipropionate (DIPROLENE-AF) 0.05 % external ointment Apply topically 2 times daily 50 g 3     clobetasol (TEMOVATE) 0.05 % external cream Apply topically 2 times daily To affected external vaginal areas 60 g 3     DULoxetine (CYMBALTA) 30 MG capsule TAKE 1 CAPSULE (30 MG) BY MOUTH DAILY IN ADDITION TO THE 60 MG FOR A TOTAL OF 90 MG DAILY 90 capsule 3     DULoxetine (CYMBALTA) 60 MG capsule Take 1 cap by mouth daily in addition to the 30 mg for a total 90 mg/day 90 capsule 3     fexofenadine (ALLEGRA) 180 MG tablet Take 1 tablet (180 mg) by mouth daily 30 tablet 1      isosorbide mononitrate (IMDUR) 30 MG 24 hr tablet Take 1 tablet (30 mg) by mouth daily 90 tablet 3     levothyroxine (SYNTHROID/LEVOTHROID) 100 MCG tablet TAKE ONE TABLET BY MOUTH ONCE DAILY ON MONDAY, WEDNESDAY, FRIDAY, SATURDAY AND . 63 tablet 1     levothyroxine (SYNTHROID/LEVOTHROID) 88 MCG tablet TAKE ONE TABLET BY MOUTH ON  AND  30 tablet 3     loratadine (CLARITIN) 10 MG tablet Take 1 tablet by mouth daily       mometasone (ELOCON) 0.1 % external ointment Apply topically twice a week Use on eczematous lesions 45 g 3     omeprazole (PRILOSEC) 20 MG DR capsule Take 20 mg by mouth daily       ondansetron (ZOFRAN ODT) 4 MG ODT tab Take 1 tablet (4 mg) by mouth every 8 hours as needed for nausea 20 tablet 0     order for DME Equipment being ordered: Nebulizer 1 Units 0     OVER-THE-COUNTER Place 1 drop into both eyes 3 times daily. Systane Ultra, Systane Balance, Blink Tears or Refresh Optive Artificial Tear 1 Bottle      triamcinolone (KENALOG) 0.1 % external cream Apply topically 2 times daily 30 g 3       Social History:  Social History     Tobacco Use     Smoking status: Former     Packs/day: 2.00     Years: 15.00     Additional pack years: 0.00     Total pack years: 30.00     Types: Cigarettes     Quit date: 3/26/1996     Years since quittin.5     Smokeless tobacco: Former   Substance Use Topics     Alcohol use: Not Currently     Comment: rare       Family History  FAMILY HISTORY      Physical Examination:  /73 (BP Location: Left arm, Patient Position: Sitting, Cuff Size: Adult Regular)   Pulse 88   Wt 101.2 kg (223 lb)   LMP  (LMP Unknown)   SpO2 97%   BMI 33.91 kg/m      Wt Readings from Last 4 Encounters:   10/23/23 101.2 kg (223 lb)   23 100.2 kg (221 lb)   23 100.2 kg (221 lb)   23 101.6 kg (224 lb)       General: Well appearing obese woman in no distress, weight evenly distributed between trunk and extremities, no peripheral wasing.   Eyes:  EOMI. Sclerae and conjunctivae are clear.   HENT: No thyromegaly or mass.  No moon facies  Lymphatic: No cervical or supraclavicular lymphadenopathy.  Cardiovascular: RRR, with normal S1+S2 and no murmurs.   Skin: No rash or lesions. No abdominal striae.    Extremities: trace bilateral peripheral edema.   Neurologic: No tremor with hands outstretched. 1+ patellar reflexes.      Labs and Studies:               Lab Results   Component Value Date    ANNIE 9.9 09/11/2023    ANNIE 10.7 (H) 03/27/2023    ANNIE 10.7 (H) 03/27/2023    ANNIE 10.9 (H) 03/19/2023    ALBUMIN 4.5 03/27/2023    ALT 26 03/27/2023    PHOS 3.0 06/29/2022    PTHI 105 (H) 09/11/2023    PTHI 88 (H) 03/27/2023    PTHI 70 (H) 09/26/2022    PTHI 86 (H) 06/29/2022    PTHI 80 05/25/2022    PTHI 82 (H) 02/01/2022    PTHI 95 (H) 11/26/2021    AST 27 03/27/2023    BILITOTAL 0.3 03/27/2023    CR 0.70 03/27/2023    CR 0.70 03/19/2023    CR 0.84 06/29/2022     03/27/2023    TSH 1.97 03/27/2023    ALKPHOS 113 (H) 03/27/2023    HGB 13.6 03/27/2023             Lab Results   Component Value Date    CHLORIDE 101 03/27/2023    CO2 26 03/27/2023    CR 0.70 03/27/2023    ALKPHOS 113 (H) 03/27/2023    PTHI 105 (H) 09/11/2023    TSH 1.97 03/27/2023    T4 1.00 02/18/2021    HGB 13.6 03/27/2023       Lab Results   Component Value Date    ALT 26 03/27/2023    AST 27 03/27/2023    ALBUMIN 4.5 03/27/2023    BILITOTAL 0.3 03/27/2023     Lab Results   Component Value Date    ANNIE 9.9 09/11/2023    ANNIE 10.7 (H) 03/27/2023    ANNIE 10.7 (H) 03/27/2023    ANNIE 10.9 (H) 03/19/2023    ALBUMIN 4.5 03/27/2023    ALT 26 03/27/2023    PHOS 3.0 06/29/2022    PTHI 105 (H) 09/11/2023    PTHI 88 (H) 03/27/2023    PTHI 70 (H) 09/26/2022    PTHI 86 (H) 06/29/2022    PTHI 80 05/25/2022    PTHI 82 (H) 02/01/2022    PTHI 95 (H) 11/26/2021    AST 27 03/27/2023    BILITOTAL 0.3 03/27/2023    CR 0.70 03/27/2023    CR 0.70 03/19/2023    CR 0.84 06/29/2022     03/27/2023    TSH 1.97 03/27/2023     ALKPHOS 113 (H) 03/27/2023    HGB 13.6 03/27/2023       Results for orders placed during the hospital encounter of 04/24/23    NM Parathyroid Planar Imaging Single    Narrative  Examination: NM PARATHYROID PLANAR IMAGING SINGLE ISOTOPE  Nuclear medicine parathyroid examination with SPECT CT and High  Resolution CT images of the Neck.    Date:  4/24/2023 1:29 PM    Indication: Primary hyerparathyrodism, increased PTHi and Caliucm, nl  renal fuction; Hypercalcemia    Additional Information: Middle age woman with right lower extremity  bone pain, hypothyroidism, OA, depression, allergies, gluten  intolerance, history of renal cell Ca, melanoma more than 10 years  ago, St post Left nephrectomy?and L adrenalectomy, hysterectomy,  nicotine abuse (2 PPD?), obesity BMI 34, St post COVID in 2019, now  with mild hypercalcemia with mildly elevated PTHi.??Although  hypercalcemia can cause bone pain, if he appears the patient has a  generalized pain syndrome and therefore not clear how much her bone  pain could be driven by primary hyperparathyroidism.    Technique:    The patient received 27.1 mCi of Tc-99 Cardiolite.    1.  SPECT CT images were also obtained of the thyroid using a dual  energy technique for both I-123 and for Tc-99m followed by 3 mm high  resolution CT images of the neck from the base of the skull to the  base of the heart.  After normalization of the I-123 images to the  Tc-99m- Cardiolite images the reconstructed axial slices were  subtracted, yielding neck images highlighting abnormally perfused  tissues.    Findings:    The SPECT CT images subtraction images demonstrates subcentimeter soft  tissue and associated focal uptake left lateral to the trachea (series  4 image 97), concerning for parathyroid adenoma. Thyroid gland is not  visualized.    Diffuse gastric uptake, likely inflammatory.    If an abnormality is present, images were appended to the PACS system  demonstrating the tumor/lesion on the CT  images for localization.    Impression  Impression: Subcentimeter soft tissue and associated focal uptake left  lateral to the trachea, concerning for parathyroid adenoma.    I have personally reviewed the examination and initial interpretation  and I agree with the findings.    JONATHAN SHELTON MD      SYSTEM ID:  [id number]    Results for orders placed during the hospital encounter of 10/27/22    Dexa hip/pelvis/spine    Narrative  EXAM: DX HIP/PELVIS/SPINE  LOCATION: [facility]  DATE/TIME: 10/27/2022 3:06 PM    INDICATION:  Hypercalcemia  COMPARISON: None.  TECHNIQUE: Dual-energy x-ray absorptiometry performed with routine technique.    FINDINGS:    RIGHT Hip Total: BMD: 0.943 g/cm2. T-score: -0.5. Z-score: -0.3  RIGHT Hip Femoral neck: BMD: 0.952 g/cm2. T-score: -0.6. Z-score: -0.1  LEFT Hip Total: BMD: 0.931 g/cm2. T-score: -0.6. Z-score: -0.4  LEFT Hip Femoral neck: BMD: 0.935 g/cm2. T-score: -0.7. Z-score: -0.2  LEFT Radius 33%: BMD: 0.762 g/cm2. T-score: 0.8. Z-score: 1.9    WHO Criteria:  Normal: T score at or above -1 SD  Osteopenia: T score between -1 and -2.5 SD  Osteoporosis: T score at or below -2.5 SD    FRAX Results: Not applicable due to normal bone mineral density.    Impression  IMPRESSION: NORMAL. Bone mineral density measurements are within normal limits using T score.      Again, thank you for allowing me to participate in the care of your patient.        Sincerely,        Leigh Ann Ellsworth MD

## (undated) DEVICE — DRSG GZ OIL EMUL CURAD 3X8 -- 3/PK

## (undated) DEVICE — BLADE ASSEMB CLP HAIR FINE --

## (undated) DEVICE — SUT SLK 2-0SH 30IN BLK --

## (undated) DEVICE — SUTURE VCRL SZ 2-0 L36IN ABSRB VLT L36MM CT-1 1/2 CIR J345H

## (undated) DEVICE — RADIFOCUS GLIDEWIRE: Brand: GLIDEWIRE

## (undated) DEVICE — PAD,ABDOMINAL,5"X9",ST,LF,25/BX: Brand: MEDLINE INDUSTRIES, INC.

## (undated) DEVICE — DRAPE,REIN 53X77,STERILE: Brand: MEDLINE

## (undated) DEVICE — INFECTION CONTROL KIT SYS

## (undated) DEVICE — STOCKINETTE,IMPERVIOUS,12X48,STERILE: Brand: MEDLINE

## (undated) DEVICE — REM POLYHESIVE ADULT PATIENT RETURN ELECTRODE: Brand: VALLEYLAB

## (undated) DEVICE — EXTREMITY-MRMC: Brand: MEDLINE INDUSTRIES, INC.

## (undated) DEVICE — COVER,TABLE,HEAVY DUTY,77"X90",STRL: Brand: MEDLINE

## (undated) DEVICE — SOLUTION IRRIG 1000ML 0.9% SOD CHL USP POUR PLAS BTL

## (undated) DEVICE — SUTURE CHROMIC GUT SZ 0 L54IN ABSRB BRN LIGAPAK DISPNS REEL L114G

## (undated) DEVICE — PREP SKN CHLRAPRP APL 26ML STR --

## (undated) DEVICE — LOOP VES W1.3MM THK0.9MM MINI YEL SIL FLD REPELLENT

## (undated) DEVICE — DERMABOND SKIN ADH 0.7ML -- DERMABOND ADVANCED 12/BX

## (undated) DEVICE — SUT SLK 0 30IN SH BLK --

## (undated) DEVICE — TOWEL SURG W17XL27IN STD BLU COT NONFENESTRATED PREWASHED

## (undated) DEVICE — GOWN,SIRUS,NONRNF,SETINSLV,2XL,18/CS: Brand: MEDLINE

## (undated) DEVICE — GLOVE SURG SZ 8 CRM LTX FREE POLYISOPRENE POLYMER BEAD ANTI

## (undated) DEVICE — TRAY,IRRIGATION,PISTON SYRINGE,60ML,STRL: Brand: MEDLINE

## (undated) DEVICE — SURGICAL PROCEDURE PACK BASIN MAJ SET CUST NO CAUT

## (undated) DEVICE — SUTURE PERMAHAND SZ 2-0 L30IN NONABSORBABLE BLK SILK W/O A305H

## (undated) DEVICE — MEDI-TRACE CADENCE ADULT, DEFIBRILLATION ELECTRODE -RTS  (10 PR/PK) - PHYSIO-CONTROL: Brand: MEDI-TRACE CADENCE

## (undated) DEVICE — BANDAGE,GAUZE,BULKEE II,4.5"X4.1YD,STRL: Brand: MEDLINE

## (undated) DEVICE — PROBE VASC 8MHZ WTRPRF

## (undated) DEVICE — MICROPUNCTURE INTRODUCER SET SILHOUETTE TRANSITIONLESS WITH STAINLESS STEEL WIRE GUIDE: Brand: MICROPUNCTURE

## (undated) DEVICE — SUTURE PERMAHAND SZ 3-0 L30IN NONABSORBABLE BLK SILK BRAID A304H

## (undated) DEVICE — Device

## (undated) DEVICE — ROCKER SWITCH PENCIL BLADE ELECTRODE, HOLSTER: Brand: EDGE

## (undated) DEVICE — 450 ML BOTTLE OF 0.05% CHLORHEXIDINE GLUCONATE IN 99.95% STERILE WATER FOR IRRIGATION, USP AND APPLICATOR.: Brand: IRRISEPT ANTIMICROBIAL WOUND LAVAGE

## (undated) DEVICE — OR HYBRID-MRMC: Brand: MEDLINE INDUSTRIES, INC.

## (undated) DEVICE — SOL INJ SOD CL 0.9% 500ML BG --

## (undated) DEVICE — BAG RED 3PLY 2MIL 30X40 IN

## (undated) DEVICE — 3M™ IOBAN™ 2 ANTIMICROBIAL INCISE DRAPE 6650EZ: Brand: IOBAN™ 2

## (undated) DEVICE — SPONGE GZ W4XL4IN COT 12 PLY TYP VII WVN C FLD DSGN

## (undated) DEVICE — TAPE UMBILICAL 316X18IN STERILE 2PK

## (undated) DEVICE — BANDAGE,ELASTIC,ESMARK,STERILE,4"X9',LF: Brand: MEDLINE

## (undated) DEVICE — SUTURE VCRL SZ 3-0 L27IN ABSRB UD L26MM SH 1/2 CIR J416H

## (undated) DEVICE — SUTURE VCRL 2-0 L27IN ABSRB UD PS-2 L19MM 1/2 CIR J428H

## (undated) DEVICE — MARKER,SKIN,WI/RULER AND LABELS: Brand: MEDLINE

## (undated) DEVICE — SPONGE LAP 18X18IN STRL -- 5/PK

## (undated) DEVICE — SUTURE COAT VCRL SZ 4-0 L18IN ABSRB UD L19MM PS-2 1/2 CIR J496G

## (undated) DEVICE — DRESSING,GAUZE,XEROFORM,CURAD,5"X9",ST: Brand: CURAD

## (undated) DEVICE — CARDINAL HEALTH VESSEL LOOPS MINI RED: Brand: DEVON

## (undated) DEVICE — BNDG SELF ADH 4INX5YD STRL LF -- COFLEX LF2

## (undated) DEVICE — PACEMAKER SETUP: Brand: MEDLINE INDUSTRIES, INC.

## (undated) DEVICE — IMPLANTABLE DEVICE
Type: IMPLANTABLE DEVICE | Status: NON-FUNCTIONAL
Removed: 2022-06-30

## (undated) DEVICE — NEEDLE HYPO 25GA L1.5IN BVL ORIENTED ECLIPSE

## (undated) DEVICE — 3M™ MEDIPORE™ H SOFT CLOTH SURGICAL TAPE 2864, 4 INCH X 10 YARD (10CM X 9,14M), 12 ROLLS/CASE: Brand: 3M™ MEDIPORE™

## (undated) DEVICE — DRESSING PETRO W3XL8IN N ADH OIL EMUL GZ CURAD

## (undated) DEVICE — SUTURE VCRL SZ 3-0 L27IN ABSRB VLT L26MM SH 1/2 CIR J316H

## (undated) DEVICE — STRAP,POSITIONING,KNEE/BODY,FOAM,4X60": Brand: MEDLINE

## (undated) DEVICE — YANKAUER,BULB TIP,W/O VENT,RIGID,STERILE: Brand: MEDLINE

## (undated) DEVICE — BANDAGE COMPR M W6INXL10YD WHT BGE VELC E MTRX HK AND LOOP

## (undated) DEVICE — SUTURE PROL SZ 5-0 L24IN NONABSORBABLE BLU RB-2 L13IN 1/2 8554H

## (undated) DEVICE — STAPLER SKIN H3.9MM WIRE DIA0.58MM CRWN 6.9MM 35 STPL ROT

## (undated) DEVICE — SET BLD COLL 23GA L0.75IN W/OUT HLDR VENPUNC ADPT 12IN TBNG

## (undated) DEVICE — DRAPE,EXTREMITY,89X128,STERILE: Brand: MEDLINE

## (undated) DEVICE — STERILE POLYISOPRENE POWDER-FREE SURGICAL GLOVES: Brand: PROTEXIS

## (undated) DEVICE — FOGARTY SPRING CLIPS 6MM: Brand: FOGARTY SOFTJAW

## (undated) DEVICE — TRAY PREP DRY W/ PREM GLV 2 APPL 6 SPNG 2 UNDPD 1 OVERWRAP

## (undated) DEVICE — PLASMABLADE PS200-040 4.0: Brand: PLASMABLADE™

## (undated) DEVICE — PROVE COVER: Brand: UNBRANDED

## (undated) DEVICE — SUT VCRL 2-0 36IN CT1 UD --

## (undated) DEVICE — SYRINGE ANGIO CNTRST DEL 20 CC POLYCARB LIGHT GRN MEDALLION

## (undated) DEVICE — DESTINATION RENAL GUIDING SHEATH: Brand: DESTINATION

## (undated) DEVICE — CATHETER ANGIO MINI RIM 0.035 IN 5 FRX65 CM ST 0 SH IMPRESS

## (undated) DEVICE — 2108 SERIES SAGITTAL BLADE (24.8 X 0.88 X 73.8MM)

## (undated) DEVICE — SUTURE VCRL SZ 2-0 L36IN ABSRB UD L36MM CT-1 1/2 CIR J945H

## (undated) DEVICE — TOTAL TRAY, 16FR 10ML SIL FOLEY, URN: Brand: MEDLINE

## (undated) DEVICE — SUT ETHLN 3-0 18IN PS1 BLK --

## (undated) DEVICE — BLANKET WRM W25XL64IN NONWOVEN SFT LTWT PLIABLE HYPR

## (undated) DEVICE — GLOVE SURG SZ 75 CRM LTX FREE POLYISOPRENE POLYMER BEAD ANTI

## (undated) DEVICE — SUTURE PROL SZ 6-0 L24IN NONABSORBABLE BLU L9.3MM BV-1 3/8 8805H

## (undated) DEVICE — DRESSING GZ W3XL16IN CELOS ACETT OIL EMUL N ADH

## (undated) DEVICE — CATHETER GUID 6FR L150CM 0.035IN 50MM SPC HYDRPHLC OVR THE

## (undated) DEVICE — SUT ETHLN 4-0 18IN PS2 BLK --

## (undated) DEVICE — Device: Brand: JELCO

## (undated) DEVICE — SPONGE GZ W4XL4IN COT RADPQ HIGHLY ABSRB

## (undated) DEVICE — DRAPE SHT 3 QTR PROXIMA 53X77 --

## (undated) DEVICE — SUTURE VCRL SZ 2-0 L27IN ABSRB UD L26MM SH 1/2 CIR J417H

## (undated) DEVICE — INTRODUCER SHTH 6FR CANN L11CM DIL TIP 35MM GRN TUNGSTEN

## (undated) DEVICE — LOOP,VESSEL,MAXI,BLUE,2/PK,STERILE: Brand: MEDLINE

## (undated) DEVICE — GOWN,NON-REINFORCED,XXL: Brand: MEDLINE

## (undated) DEVICE — SPONGE LAPAROTOMY W18XL18IN WHITE STRUNG RADIOPAQUE STERILE

## (undated) DEVICE — SUTURE VCRL SZ 0 L36IN ABSRB UD L36MM CT-1 1/2 CIR J946H

## (undated) DEVICE — SOL IRR SOD CL 0.9% 1000ML BTL --

## (undated) DEVICE — VASCULAR-MRMC: Brand: MEDLINE INDUSTRIES, INC.

## (undated) DEVICE — AGENT HEMSTAT W4XL4IN OXIDIZED REGENERATED CELOS ABSRB SFT

## (undated) DEVICE — SUTURE NONABSORBABLE MONOFILAMENT 3-0 PS-1 18 IN BLK ETHILON 1663H